# Patient Record
Sex: FEMALE | Race: WHITE | Employment: UNEMPLOYED
[De-identification: names, ages, dates, MRNs, and addresses within clinical notes are randomized per-mention and may not be internally consistent; named-entity substitution may affect disease eponyms.]

---

## 2017-01-03 RX ORDER — METFORMIN HYDROCHLORIDE 500 MG/1
TABLET, EXTENDED RELEASE ORAL
Qty: 120 TABLET | Refills: 0 | Status: SHIPPED | OUTPATIENT
Start: 2017-01-03 | End: 2017-01-10 | Stop reason: SDUPTHER

## 2017-01-10 RX ORDER — METFORMIN HYDROCHLORIDE 500 MG/1
TABLET, EXTENDED RELEASE ORAL
Qty: 120 TABLET | Refills: 5 | Status: SHIPPED | OUTPATIENT
Start: 2017-01-10 | End: 2017-09-06 | Stop reason: SDUPTHER

## 2017-01-24 ENCOUNTER — OFFICE VISIT (OUTPATIENT)
Dept: INTERNAL MEDICINE | Facility: CLINIC | Age: 57
End: 2017-01-24

## 2017-01-24 VITALS
SYSTOLIC BLOOD PRESSURE: 120 MMHG | RESPIRATION RATE: 16 BRPM | WEIGHT: 250.2 LBS | HEIGHT: 62 IN | BODY MASS INDEX: 46.04 KG/M2 | DIASTOLIC BLOOD PRESSURE: 58 MMHG | HEART RATE: 88 BPM

## 2017-01-24 DIAGNOSIS — N18.2 CONTROLLED TYPE 2 DIABETES MELLITUS WITH STAGE 2 CHRONIC KIDNEY DISEASE, WITHOUT LONG-TERM CURRENT USE OF INSULIN (HCC): ICD-10-CM

## 2017-01-24 DIAGNOSIS — Z23 NEED FOR PROPHYLACTIC VACCINATION AGAINST STREPTOCOCCUS PNEUMONIAE (PNEUMOCOCCUS): ICD-10-CM

## 2017-01-24 DIAGNOSIS — R07.2 PRECORDIAL PAIN: Primary | ICD-10-CM

## 2017-01-24 DIAGNOSIS — I10 ESSENTIAL HYPERTENSION: ICD-10-CM

## 2017-01-24 DIAGNOSIS — E11.22 CONTROLLED TYPE 2 DIABETES MELLITUS WITH STAGE 2 CHRONIC KIDNEY DISEASE, WITHOUT LONG-TERM CURRENT USE OF INSULIN (HCC): ICD-10-CM

## 2017-01-24 PROCEDURE — 90471 IMMUNIZATION ADMIN: CPT | Performed by: INTERNAL MEDICINE

## 2017-01-24 PROCEDURE — 99214 OFFICE O/P EST MOD 30 MIN: CPT | Performed by: INTERNAL MEDICINE

## 2017-01-24 PROCEDURE — 90732 PPSV23 VACC 2 YRS+ SUBQ/IM: CPT | Performed by: INTERNAL MEDICINE

## 2017-01-24 RX ORDER — OXYCODONE HYDROCHLORIDE AND ACETAMINOPHEN 5; 325 MG/1; MG/1
1 TABLET ORAL EVERY 12 HOURS PRN
Qty: 50 TABLET | Refills: 0 | Status: SHIPPED | OUTPATIENT
Start: 2017-01-24 | End: 2017-03-02 | Stop reason: SDUPTHER

## 2017-01-24 RX ORDER — ISOSORBIDE MONONITRATE 30 MG/1
30 TABLET, EXTENDED RELEASE ORAL DAILY
Qty: 30 TABLET | Refills: 5 | Status: SHIPPED | OUTPATIENT
Start: 2017-01-24 | End: 2017-07-27 | Stop reason: SDUPTHER

## 2017-01-24 RX ORDER — ISOSORBIDE MONONITRATE 30 MG/1
30 TABLET, EXTENDED RELEASE ORAL DAILY
COMMUNITY
Start: 2017-01-16 | End: 2017-01-24 | Stop reason: SDUPTHER

## 2017-01-24 ASSESSMENT — ENCOUNTER SYMPTOMS
SHORTNESS OF BREATH: 0
COUGH: 0
HEMOPTYSIS: 0
ORTHOPNEA: 0
ABDOMINAL PAIN: 0

## 2017-02-16 ENCOUNTER — OFFICE VISIT (OUTPATIENT)
Dept: INTERNAL MEDICINE | Facility: CLINIC | Age: 57
End: 2017-02-16

## 2017-02-16 VITALS
HEIGHT: 62 IN | DIASTOLIC BLOOD PRESSURE: 56 MMHG | TEMPERATURE: 98.1 F | RESPIRATION RATE: 18 BRPM | HEART RATE: 89 BPM | SYSTOLIC BLOOD PRESSURE: 108 MMHG | BODY MASS INDEX: 46.22 KG/M2 | OXYGEN SATURATION: 95 % | WEIGHT: 251.2 LBS

## 2017-02-16 DIAGNOSIS — E11.22 CONTROLLED TYPE 2 DIABETES MELLITUS WITH STAGE 2 CHRONIC KIDNEY DISEASE, WITHOUT LONG-TERM CURRENT USE OF INSULIN (HCC): Primary | ICD-10-CM

## 2017-02-16 DIAGNOSIS — N18.2 CONTROLLED TYPE 2 DIABETES MELLITUS WITH STAGE 2 CHRONIC KIDNEY DISEASE, WITHOUT LONG-TERM CURRENT USE OF INSULIN (HCC): Primary | ICD-10-CM

## 2017-02-16 DIAGNOSIS — J20.9 ACUTE BRONCHITIS, UNSPECIFIED ORGANISM: ICD-10-CM

## 2017-02-16 LAB — HBA1C MFR BLD: 7.3 %

## 2017-02-16 PROCEDURE — 99213 OFFICE O/P EST LOW 20 MIN: CPT | Performed by: INTERNAL MEDICINE

## 2017-02-16 PROCEDURE — 83036 HEMOGLOBIN GLYCOSYLATED A1C: CPT | Performed by: INTERNAL MEDICINE

## 2017-02-16 RX ORDER — CEFUROXIME AXETIL 500 MG/1
500 TABLET ORAL 2 TIMES DAILY
Qty: 20 TABLET | Refills: 0 | Status: SHIPPED | OUTPATIENT
Start: 2017-02-16 | End: 2017-02-26

## 2017-02-16 RX ORDER — BENZONATATE 100 MG/1
100 CAPSULE ORAL 3 TIMES DAILY PRN
Qty: 30 CAPSULE | Refills: 1 | Status: SHIPPED | OUTPATIENT
Start: 2017-02-16 | End: 2017-02-23

## 2017-02-16 RX ORDER — LANCETS 30 GAUGE
EACH MISCELLANEOUS
Qty: 120 EACH | Refills: 3 | Status: SHIPPED | OUTPATIENT
Start: 2017-02-16 | End: 2019-09-09 | Stop reason: SDUPTHER

## 2017-02-16 ASSESSMENT — ENCOUNTER SYMPTOMS
RHINORRHEA: 0
SORE THROAT: 0
SHORTNESS OF BREATH: 1
WHEEZING: 0
HEMOPTYSIS: 0
HEARTBURN: 0
COUGH: 1

## 2017-02-22 ENCOUNTER — OFFICE VISIT (OUTPATIENT)
Dept: PODIATRY | Facility: CLINIC | Age: 57
End: 2017-02-22

## 2017-02-22 VITALS
DIASTOLIC BLOOD PRESSURE: 82 MMHG | HEART RATE: 77 BPM | HEIGHT: 66 IN | BODY MASS INDEX: 40.18 KG/M2 | WEIGHT: 250 LBS | SYSTOLIC BLOOD PRESSURE: 130 MMHG | RESPIRATION RATE: 19 BRPM

## 2017-02-22 DIAGNOSIS — B35.1 DERMATOPHYTOSIS OF NAIL: Primary | ICD-10-CM

## 2017-02-22 DIAGNOSIS — E11.51 TYPE II DIABETES MELLITUS WITH PERIPHERAL CIRCULATORY DISORDER (HCC): ICD-10-CM

## 2017-02-22 PROCEDURE — 11721 DEBRIDE NAIL 6 OR MORE: CPT | Performed by: PODIATRIST

## 2017-03-01 RX ORDER — GLIMEPIRIDE 4 MG/1
TABLET ORAL
Qty: 180 TABLET | Refills: 0 | Status: SHIPPED | OUTPATIENT
Start: 2017-03-01 | End: 2017-05-31 | Stop reason: SDUPTHER

## 2017-03-02 ENCOUNTER — OFFICE VISIT (OUTPATIENT)
Dept: INTERNAL MEDICINE | Facility: CLINIC | Age: 57
End: 2017-03-02

## 2017-03-02 ENCOUNTER — HOSPITAL ENCOUNTER (OUTPATIENT)
Age: 57
Setting detail: SPECIMEN
Discharge: HOME | End: 2017-03-02
Payer: MEDICARE

## 2017-03-02 VITALS
WEIGHT: 252.8 LBS | HEIGHT: 62 IN | DIASTOLIC BLOOD PRESSURE: 46 MMHG | SYSTOLIC BLOOD PRESSURE: 112 MMHG | BODY MASS INDEX: 46.52 KG/M2 | HEART RATE: 80 BPM | RESPIRATION RATE: 16 BRPM

## 2017-03-02 DIAGNOSIS — Z86.718 HISTORY OF DVT OF LOWER EXTREMITY: ICD-10-CM

## 2017-03-02 DIAGNOSIS — Z79.01 ENCOUNTER FOR MONITORING COUMADIN THERAPY: ICD-10-CM

## 2017-03-02 DIAGNOSIS — E11.22 CONTROLLED TYPE 2 DIABETES MELLITUS WITH STAGE 2 CHRONIC KIDNEY DISEASE, WITHOUT LONG-TERM CURRENT USE OF INSULIN (HCC): Primary | ICD-10-CM

## 2017-03-02 DIAGNOSIS — I10 ESSENTIAL HYPERTENSION: ICD-10-CM

## 2017-03-02 DIAGNOSIS — E78.00 PURE HYPERCHOLESTEROLEMIA: ICD-10-CM

## 2017-03-02 DIAGNOSIS — M15.9 PRIMARY OSTEOARTHRITIS INVOLVING MULTIPLE JOINTS: ICD-10-CM

## 2017-03-02 DIAGNOSIS — Z51.81 ENCOUNTER FOR MONITORING COUMADIN THERAPY: ICD-10-CM

## 2017-03-02 DIAGNOSIS — N18.2 CONTROLLED TYPE 2 DIABETES MELLITUS WITH STAGE 2 CHRONIC KIDNEY DISEASE, WITHOUT LONG-TERM CURRENT USE OF INSULIN (HCC): Primary | ICD-10-CM

## 2017-03-02 LAB
INR BLD: 3.2
PROTHROMBIN TIME: 35 SEC (ref 9.4–12.6)

## 2017-03-02 PROCEDURE — 99214 OFFICE O/P EST MOD 30 MIN: CPT | Performed by: INTERNAL MEDICINE

## 2017-03-02 RX ORDER — OXYCODONE HYDROCHLORIDE AND ACETAMINOPHEN 5; 325 MG/1; MG/1
1 TABLET ORAL EVERY 12 HOURS PRN
Qty: 50 TABLET | Refills: 0 | Status: SHIPPED | OUTPATIENT
Start: 2017-03-02 | End: 2017-04-11 | Stop reason: SDUPTHER

## 2017-03-02 ASSESSMENT — ENCOUNTER SYMPTOMS
BACK PAIN: 0
VISUAL CHANGE: 0
ABDOMINAL PAIN: 0
WHEEZING: 0
BLURRED VISION: 0
SHORTNESS OF BREATH: 0

## 2017-03-06 ENCOUNTER — ANTI-COAG VISIT (OUTPATIENT)
Dept: INTERNAL MEDICINE | Facility: CLINIC | Age: 57
End: 2017-03-06

## 2017-03-29 ENCOUNTER — OFFICE VISIT (OUTPATIENT)
Dept: PRIMARY CARE CLINIC | Age: 57
End: 2017-03-29
Payer: MEDICARE

## 2017-03-29 VITALS
RESPIRATION RATE: 20 BRPM | SYSTOLIC BLOOD PRESSURE: 130 MMHG | DIASTOLIC BLOOD PRESSURE: 80 MMHG | WEIGHT: 254.2 LBS | BODY MASS INDEX: 45.04 KG/M2 | HEIGHT: 63 IN | HEART RATE: 80 BPM

## 2017-03-29 DIAGNOSIS — R06.02 SOB (SHORTNESS OF BREATH): ICD-10-CM

## 2017-03-29 DIAGNOSIS — R05.9 COUGH: ICD-10-CM

## 2017-03-29 DIAGNOSIS — M10.072 ACUTE IDIOPATHIC GOUT OF LEFT FOOT: ICD-10-CM

## 2017-03-29 DIAGNOSIS — J01.40 ACUTE NON-RECURRENT PANSINUSITIS: Primary | ICD-10-CM

## 2017-03-29 DIAGNOSIS — M10.071 ACUTE IDIOPATHIC GOUT OF RIGHT FOOT: ICD-10-CM

## 2017-03-29 PROCEDURE — 99213 OFFICE O/P EST LOW 20 MIN: CPT | Performed by: INTERNAL MEDICINE

## 2017-03-29 RX ORDER — AZITHROMYCIN 250 MG/1
TABLET, FILM COATED ORAL
Qty: 1 PACKET | Refills: 0 | Status: SHIPPED | OUTPATIENT
Start: 2017-03-29 | End: 2017-04-11 | Stop reason: ALTCHOICE

## 2017-03-29 RX ORDER — PREDNISONE 20 MG/1
20 TABLET ORAL DAILY
Qty: 7 TABLET | Refills: 0 | Status: SHIPPED | OUTPATIENT
Start: 2017-03-29 | End: 2017-04-05

## 2017-03-29 ASSESSMENT — ENCOUNTER SYMPTOMS
ABDOMINAL PAIN: 0
SHORTNESS OF BREATH: 1
EYE REDNESS: 0
BACK PAIN: 0
VOMITING: 0
COUGH: 1
TROUBLE SWALLOWING: 0
SINUS PRESSURE: 1
EYE DISCHARGE: 0
NAUSEA: 0
SORE THROAT: 1

## 2017-03-30 ENCOUNTER — TELEPHONE (OUTPATIENT)
Dept: PRIMARY CARE CLINIC | Age: 57
End: 2017-03-30

## 2017-03-31 ENCOUNTER — TELEPHONE (OUTPATIENT)
Dept: PRIMARY CARE CLINIC | Age: 57
End: 2017-03-31

## 2017-04-03 RX ORDER — FEBUXOSTAT 40 MG/1
TABLET ORAL
Qty: 30 TABLET | Refills: 0 | Status: SHIPPED | OUTPATIENT
Start: 2017-04-03 | End: 2017-08-30 | Stop reason: SDUPTHER

## 2017-04-11 ENCOUNTER — OFFICE VISIT (OUTPATIENT)
Dept: PRIMARY CARE CLINIC | Age: 57
End: 2017-04-11
Payer: MEDICARE

## 2017-04-11 VITALS
DIASTOLIC BLOOD PRESSURE: 58 MMHG | HEART RATE: 98 BPM | RESPIRATION RATE: 16 BRPM | BODY MASS INDEX: 44.69 KG/M2 | TEMPERATURE: 98.5 F | WEIGHT: 252.2 LBS | SYSTOLIC BLOOD PRESSURE: 124 MMHG | HEIGHT: 63 IN

## 2017-04-11 DIAGNOSIS — N18.2 CONTROLLED TYPE 2 DIABETES MELLITUS WITH STAGE 2 CHRONIC KIDNEY DISEASE, WITHOUT LONG-TERM CURRENT USE OF INSULIN (HCC): ICD-10-CM

## 2017-04-11 DIAGNOSIS — I10 ESSENTIAL HYPERTENSION: ICD-10-CM

## 2017-04-11 DIAGNOSIS — J20.8 ACUTE BACTERIAL BRONCHITIS: Primary | ICD-10-CM

## 2017-04-11 DIAGNOSIS — E11.22 CONTROLLED TYPE 2 DIABETES MELLITUS WITH STAGE 2 CHRONIC KIDNEY DISEASE, WITHOUT LONG-TERM CURRENT USE OF INSULIN (HCC): ICD-10-CM

## 2017-04-11 DIAGNOSIS — B96.89 ACUTE BACTERIAL BRONCHITIS: Primary | ICD-10-CM

## 2017-04-11 PROCEDURE — 99213 OFFICE O/P EST LOW 20 MIN: CPT | Performed by: INTERNAL MEDICINE

## 2017-04-11 RX ORDER — AMOXICILLIN AND CLAVULANATE POTASSIUM 875; 125 MG/1; MG/1
1 TABLET, FILM COATED ORAL 2 TIMES DAILY
Qty: 20 TABLET | Refills: 0 | Status: SHIPPED | OUTPATIENT
Start: 2017-04-11 | End: 2017-04-21

## 2017-04-11 RX ORDER — BENZONATATE 100 MG/1
100 CAPSULE ORAL 3 TIMES DAILY PRN
Qty: 30 CAPSULE | Refills: 1 | Status: SHIPPED | OUTPATIENT
Start: 2017-04-11 | End: 2017-04-18

## 2017-04-11 RX ORDER — OXYCODONE HYDROCHLORIDE AND ACETAMINOPHEN 5; 325 MG/1; MG/1
1 TABLET ORAL EVERY 12 HOURS PRN
Qty: 50 TABLET | Refills: 0 | Status: SHIPPED | OUTPATIENT
Start: 2017-04-11 | End: 2017-05-11 | Stop reason: SDUPTHER

## 2017-04-11 ASSESSMENT — ENCOUNTER SYMPTOMS
ABDOMINAL PAIN: 0
WHEEZING: 1
SHORTNESS OF BREATH: 1
SORE THROAT: 0
DIARRHEA: 0
HEMOPTYSIS: 0
COUGH: 1

## 2017-05-03 ENCOUNTER — OFFICE VISIT (OUTPATIENT)
Dept: PODIATRY | Age: 57
End: 2017-05-03
Payer: MEDICARE

## 2017-05-03 VITALS
WEIGHT: 250 LBS | HEART RATE: 90 BPM | SYSTOLIC BLOOD PRESSURE: 137 MMHG | BODY MASS INDEX: 40.18 KG/M2 | HEIGHT: 66 IN | DIASTOLIC BLOOD PRESSURE: 84 MMHG

## 2017-05-03 DIAGNOSIS — B35.1 DERMATOPHYTOSIS OF NAIL: ICD-10-CM

## 2017-05-03 DIAGNOSIS — E11.51 TYPE II DIABETES MELLITUS WITH PERIPHERAL CIRCULATORY DISORDER (HCC): Primary | ICD-10-CM

## 2017-05-03 PROCEDURE — 11721 DEBRIDE NAIL 6 OR MORE: CPT | Performed by: PODIATRIST

## 2017-05-03 RX ORDER — HYDRALAZINE HYDROCHLORIDE 50 MG/1
TABLET, FILM COATED ORAL
Qty: 180 TABLET | Refills: 0 | Status: SHIPPED | OUTPATIENT
Start: 2017-05-03 | End: 2017-07-27 | Stop reason: SDUPTHER

## 2017-05-03 RX ORDER — FEBUXOSTAT 40 MG/1
40 TABLET, FILM COATED ORAL DAILY
Qty: 30 TABLET | Refills: 3 | Status: SHIPPED | OUTPATIENT
Start: 2017-05-03 | End: 2017-10-16 | Stop reason: SDUPTHER

## 2017-05-03 RX ORDER — WARFARIN SODIUM 2 MG/1
TABLET ORAL
Qty: 45 TABLET | Refills: 0 | Status: SHIPPED | OUTPATIENT
Start: 2017-05-03 | End: 2017-05-31 | Stop reason: SDUPTHER

## 2017-05-11 RX ORDER — OXYCODONE HYDROCHLORIDE AND ACETAMINOPHEN 5; 325 MG/1; MG/1
1 TABLET ORAL EVERY 12 HOURS PRN
Qty: 50 TABLET | Refills: 0 | Status: SHIPPED | OUTPATIENT
Start: 2017-05-11 | End: 2017-06-21 | Stop reason: SDUPTHER

## 2017-05-15 ENCOUNTER — HOSPITAL ENCOUNTER (OUTPATIENT)
Age: 57
Setting detail: SPECIMEN
Discharge: HOME | End: 2017-05-15
Payer: MEDICARE

## 2017-05-15 ENCOUNTER — TELEPHONE (OUTPATIENT)
Dept: PRIMARY CARE CLINIC | Age: 57
End: 2017-05-15

## 2017-05-15 DIAGNOSIS — Z86.718 HISTORY OF DVT (DEEP VEIN THROMBOSIS): ICD-10-CM

## 2017-05-15 DIAGNOSIS — Z86.718 HISTORY OF DVT (DEEP VEIN THROMBOSIS): Primary | ICD-10-CM

## 2017-05-15 LAB
INR BLD: 2.7
PROTHROMBIN TIME: 29.7 SEC (ref 9.4–12.6)

## 2017-05-16 DIAGNOSIS — Z86.718 HISTORY OF DVT OF LOWER EXTREMITY: Primary | ICD-10-CM

## 2017-05-31 RX ORDER — WARFARIN SODIUM 2 MG/1
TABLET ORAL
Qty: 45 TABLET | Refills: 0 | Status: SHIPPED | OUTPATIENT
Start: 2017-05-31 | End: 2017-06-28 | Stop reason: SDUPTHER

## 2017-05-31 RX ORDER — GLIMEPIRIDE 4 MG/1
TABLET ORAL
Qty: 180 TABLET | Refills: 0 | Status: SHIPPED | OUTPATIENT
Start: 2017-05-31 | End: 2017-08-30 | Stop reason: SDUPTHER

## 2017-06-01 ENCOUNTER — TELEPHONE (OUTPATIENT)
Dept: PRIMARY CARE CLINIC | Age: 57
End: 2017-06-01

## 2017-06-02 ENCOUNTER — ANTI-COAG VISIT (OUTPATIENT)
Dept: PRIMARY CARE CLINIC | Age: 57
End: 2017-06-02

## 2017-06-21 RX ORDER — OXYCODONE HYDROCHLORIDE AND ACETAMINOPHEN 5; 325 MG/1; MG/1
1 TABLET ORAL EVERY 12 HOURS PRN
Qty: 50 TABLET | Refills: 0 | Status: SHIPPED | OUTPATIENT
Start: 2017-06-21 | End: 2017-07-27 | Stop reason: SDUPTHER

## 2017-06-27 ENCOUNTER — OFFICE VISIT (OUTPATIENT)
Dept: PRIMARY CARE CLINIC | Age: 57
End: 2017-06-27
Payer: MEDICARE

## 2017-06-27 VITALS
DIASTOLIC BLOOD PRESSURE: 70 MMHG | HEIGHT: 63 IN | WEIGHT: 250 LBS | RESPIRATION RATE: 14 BRPM | BODY MASS INDEX: 44.3 KG/M2 | SYSTOLIC BLOOD PRESSURE: 120 MMHG | HEART RATE: 88 BPM

## 2017-06-27 DIAGNOSIS — I10 ESSENTIAL HYPERTENSION: ICD-10-CM

## 2017-06-27 DIAGNOSIS — E78.00 PURE HYPERCHOLESTEROLEMIA: ICD-10-CM

## 2017-06-27 DIAGNOSIS — E11.65 TYPE 2 DIABETES MELLITUS WITH HYPERGLYCEMIA, WITHOUT LONG-TERM CURRENT USE OF INSULIN (HCC): Primary | ICD-10-CM

## 2017-06-27 LAB — HBA1C MFR BLD: 7.3 %

## 2017-06-27 PROCEDURE — 83036 HEMOGLOBIN GLYCOSYLATED A1C: CPT | Performed by: INTERNAL MEDICINE

## 2017-06-27 PROCEDURE — 99214 OFFICE O/P EST MOD 30 MIN: CPT | Performed by: INTERNAL MEDICINE

## 2017-06-27 ASSESSMENT — ENCOUNTER SYMPTOMS
WHEEZING: 0
VISUAL CHANGE: 0
SHORTNESS OF BREATH: 0
CHEST TIGHTNESS: 0
ABDOMINAL PAIN: 0
DIARRHEA: 0

## 2017-06-27 ASSESSMENT — PATIENT HEALTH QUESTIONNAIRE - PHQ9
SUM OF ALL RESPONSES TO PHQ9 QUESTIONS 1 & 2: 0
SUM OF ALL RESPONSES TO PHQ QUESTIONS 1-9: 0
2. FEELING DOWN, DEPRESSED OR HOPELESS: 0
1. LITTLE INTEREST OR PLEASURE IN DOING THINGS: 0

## 2017-06-28 RX ORDER — GEMFIBROZIL 600 MG/1
TABLET, FILM COATED ORAL
Qty: 60 TABLET | Refills: 0 | Status: SHIPPED | OUTPATIENT
Start: 2017-06-28 | End: 2017-07-27 | Stop reason: SDUPTHER

## 2017-06-28 RX ORDER — FUROSEMIDE 20 MG/1
TABLET ORAL
Qty: 30 TABLET | Refills: 0 | Status: SHIPPED | OUTPATIENT
Start: 2017-06-28 | End: 2017-07-27 | Stop reason: SDUPTHER

## 2017-06-28 RX ORDER — ATORVASTATIN CALCIUM 20 MG/1
TABLET, FILM COATED ORAL
Qty: 30 TABLET | Refills: 0 | Status: SHIPPED | OUTPATIENT
Start: 2017-06-28 | End: 2017-07-27 | Stop reason: SDUPTHER

## 2017-06-28 RX ORDER — OMEPRAZOLE 20 MG/1
CAPSULE, DELAYED RELEASE ORAL
Qty: 30 CAPSULE | Refills: 0 | Status: SHIPPED | OUTPATIENT
Start: 2017-06-28 | End: 2017-07-27 | Stop reason: SDUPTHER

## 2017-06-28 RX ORDER — WARFARIN SODIUM 2 MG/1
TABLET ORAL
Qty: 45 TABLET | Refills: 0 | Status: SHIPPED | OUTPATIENT
Start: 2017-06-28 | End: 2017-09-06 | Stop reason: SDUPTHER

## 2017-06-28 RX ORDER — LISINOPRIL 20 MG/1
TABLET ORAL
Qty: 30 TABLET | Refills: 0 | Status: SHIPPED | OUTPATIENT
Start: 2017-06-28 | End: 2017-07-27 | Stop reason: SDUPTHER

## 2017-07-27 ENCOUNTER — OFFICE VISIT (OUTPATIENT)
Dept: PRIMARY CARE CLINIC | Age: 57
End: 2017-07-27
Payer: MEDICARE

## 2017-07-27 ENCOUNTER — HOSPITAL ENCOUNTER (OUTPATIENT)
Age: 57
Setting detail: SPECIMEN
Discharge: HOME | End: 2017-07-27
Payer: MEDICARE

## 2017-07-27 VITALS
RESPIRATION RATE: 16 BRPM | HEIGHT: 63 IN | WEIGHT: 253 LBS | HEART RATE: 86 BPM | DIASTOLIC BLOOD PRESSURE: 72 MMHG | BODY MASS INDEX: 44.83 KG/M2 | SYSTOLIC BLOOD PRESSURE: 124 MMHG

## 2017-07-27 DIAGNOSIS — E78.00 PURE HYPERCHOLESTEROLEMIA: ICD-10-CM

## 2017-07-27 DIAGNOSIS — Z51.81 ENCOUNTER FOR THERAPEUTIC DRUG MONITORING: ICD-10-CM

## 2017-07-27 DIAGNOSIS — M15.9 PRIMARY OSTEOARTHRITIS INVOLVING MULTIPLE JOINTS: ICD-10-CM

## 2017-07-27 DIAGNOSIS — E66.01 MORBID OBESITY DUE TO EXCESS CALORIES (HCC): ICD-10-CM

## 2017-07-27 DIAGNOSIS — Z86.718 HISTORY OF DVT (DEEP VEIN THROMBOSIS): ICD-10-CM

## 2017-07-27 DIAGNOSIS — Z51.81 ENCOUNTER FOR THERAPEUTIC DRUG MONITORING: Primary | ICD-10-CM

## 2017-07-27 DIAGNOSIS — I10 ESSENTIAL HYPERTENSION: ICD-10-CM

## 2017-07-27 LAB
AMPHETAMINE SCREEN URINE: NEGATIVE
BARBITURATE SCREEN URINE: NEGATIVE
BENZODIAZEPINE SCREEN, URINE: NEGATIVE
BUPRENORPHINE URINE: NORMAL
CANNABINOID SCREEN URINE: NEGATIVE
COCAINE METABOLITE, URINE: NEGATIVE
INR BLD: 2.7
INR BLD: 2.7
MDMA URINE: NORMAL
METHADONE SCREEN, URINE: NEGATIVE
METHAMPHETAMINE, URINE: NORMAL
OPIATES, URINE: NEGATIVE
OXYCODONE SCREEN URINE: NEGATIVE
PHENCYCLIDINE, URINE: NEGATIVE
PROPOXYPHENE, URINE: NORMAL
PROTHROMBIN TIME: 28.7 SEC (ref 9.4–12.6)
TEST INFORMATION: NORMAL
TRICYCLIC ANTIDEPRESSANTS, UR: NORMAL

## 2017-07-27 PROCEDURE — 99214 OFFICE O/P EST MOD 30 MIN: CPT | Performed by: INTERNAL MEDICINE

## 2017-07-27 RX ORDER — OXYCODONE HYDROCHLORIDE AND ACETAMINOPHEN 5; 325 MG/1; MG/1
1 TABLET ORAL EVERY 12 HOURS PRN
Qty: 50 TABLET | Refills: 0 | Status: SHIPPED | OUTPATIENT
Start: 2017-07-27 | End: 2017-08-30 | Stop reason: SDUPTHER

## 2017-07-27 ASSESSMENT — ENCOUNTER SYMPTOMS
VOICE CHANGE: 0
COLOR CHANGE: 0
DIARRHEA: 0
EYE REDNESS: 0
VOMITING: 0
WHEEZING: 0
ABDOMINAL PAIN: 0
SHORTNESS OF BREATH: 0
NAUSEA: 0
EYE PAIN: 0
BLURRED VISION: 0
CHEST TIGHTNESS: 0
TROUBLE SWALLOWING: 0
BACK PAIN: 1
VISUAL CHANGE: 0

## 2017-07-31 RX ORDER — HYDRALAZINE HYDROCHLORIDE 50 MG/1
TABLET, FILM COATED ORAL
Qty: 180 TABLET | Refills: 0 | Status: SHIPPED | OUTPATIENT
Start: 2017-07-31 | End: 2017-11-07 | Stop reason: SDUPTHER

## 2017-07-31 RX ORDER — LISINOPRIL 20 MG/1
TABLET ORAL
Qty: 30 TABLET | Refills: 0 | Status: SHIPPED | OUTPATIENT
Start: 2017-07-31 | End: 2017-08-30 | Stop reason: SDUPTHER

## 2017-07-31 RX ORDER — ISOSORBIDE MONONITRATE 30 MG/1
TABLET, EXTENDED RELEASE ORAL
Qty: 30 TABLET | Refills: 0 | Status: SHIPPED | OUTPATIENT
Start: 2017-07-31 | End: 2017-09-06 | Stop reason: SDUPTHER

## 2017-07-31 RX ORDER — GEMFIBROZIL 600 MG/1
TABLET, FILM COATED ORAL
Qty: 60 TABLET | Refills: 0 | Status: SHIPPED | OUTPATIENT
Start: 2017-07-31 | End: 2017-09-06 | Stop reason: SDUPTHER

## 2017-07-31 RX ORDER — FUROSEMIDE 20 MG/1
TABLET ORAL
Qty: 30 TABLET | Refills: 0 | Status: SHIPPED | OUTPATIENT
Start: 2017-07-31 | End: 2017-09-06 | Stop reason: SDUPTHER

## 2017-07-31 RX ORDER — OMEPRAZOLE 20 MG/1
CAPSULE, DELAYED RELEASE ORAL
Qty: 30 CAPSULE | Refills: 0 | Status: SHIPPED | OUTPATIENT
Start: 2017-07-31 | End: 2017-09-06 | Stop reason: SDUPTHER

## 2017-07-31 RX ORDER — ATORVASTATIN CALCIUM 20 MG/1
TABLET, FILM COATED ORAL
Qty: 30 TABLET | Refills: 0 | Status: SHIPPED | OUTPATIENT
Start: 2017-07-31 | End: 2017-08-30 | Stop reason: SDUPTHER

## 2017-08-02 ENCOUNTER — ANTI-COAG VISIT (OUTPATIENT)
Dept: PRIMARY CARE CLINIC | Age: 57
End: 2017-08-02

## 2017-08-30 DIAGNOSIS — M10.072 ACUTE IDIOPATHIC GOUT OF LEFT FOOT: ICD-10-CM

## 2017-08-30 DIAGNOSIS — M10.071 ACUTE IDIOPATHIC GOUT OF RIGHT FOOT: ICD-10-CM

## 2017-08-30 RX ORDER — GLIMEPIRIDE 4 MG/1
TABLET ORAL
Qty: 180 TABLET | Refills: 0 | Status: SHIPPED | OUTPATIENT
Start: 2017-08-30 | End: 2017-12-20 | Stop reason: SDUPTHER

## 2017-08-30 RX ORDER — FEBUXOSTAT 40 MG/1
TABLET ORAL
Qty: 30 TABLET | Refills: 3 | Status: SHIPPED | OUTPATIENT
Start: 2017-08-30 | End: 2017-12-24 | Stop reason: SDUPTHER

## 2017-09-01 RX ORDER — OXYCODONE HYDROCHLORIDE AND ACETAMINOPHEN 5; 325 MG/1; MG/1
1 TABLET ORAL EVERY 12 HOURS PRN
Qty: 50 TABLET | Refills: 0 | Status: SHIPPED | OUTPATIENT
Start: 2017-09-01 | End: 2017-09-28 | Stop reason: SDUPTHER

## 2017-09-01 RX ORDER — ATORVASTATIN CALCIUM 20 MG/1
TABLET, FILM COATED ORAL
Qty: 30 TABLET | Refills: 5 | Status: SHIPPED | OUTPATIENT
Start: 2017-09-01 | End: 2017-10-19 | Stop reason: SDUPTHER

## 2017-09-01 RX ORDER — LISINOPRIL 20 MG/1
TABLET ORAL
Qty: 30 TABLET | Refills: 5 | Status: SHIPPED | OUTPATIENT
Start: 2017-09-01 | End: 2018-03-08 | Stop reason: SDUPTHER

## 2017-09-06 RX ORDER — GEMFIBROZIL 600 MG/1
TABLET, FILM COATED ORAL
Qty: 60 TABLET | Refills: 1 | Status: SHIPPED | OUTPATIENT
Start: 2017-09-06 | End: 2017-10-19 | Stop reason: ALTCHOICE

## 2017-09-06 RX ORDER — ISOSORBIDE MONONITRATE 30 MG/1
TABLET, EXTENDED RELEASE ORAL
Qty: 30 TABLET | Refills: 1 | Status: SHIPPED | OUTPATIENT
Start: 2017-09-06 | End: 2017-11-04 | Stop reason: SDUPTHER

## 2017-09-06 RX ORDER — METFORMIN HYDROCHLORIDE 500 MG/1
TABLET, EXTENDED RELEASE ORAL
Qty: 120 TABLET | Refills: 1 | Status: SHIPPED | OUTPATIENT
Start: 2017-09-06 | End: 2017-11-04 | Stop reason: SDUPTHER

## 2017-09-06 RX ORDER — FUROSEMIDE 20 MG/1
TABLET ORAL
Qty: 30 TABLET | Refills: 1 | Status: SHIPPED | OUTPATIENT
Start: 2017-09-06 | End: 2017-11-04 | Stop reason: SDUPTHER

## 2017-09-06 RX ORDER — OMEPRAZOLE 20 MG/1
CAPSULE, DELAYED RELEASE ORAL
Qty: 30 CAPSULE | Refills: 1 | Status: SHIPPED | OUTPATIENT
Start: 2017-09-06 | End: 2017-11-04 | Stop reason: SDUPTHER

## 2017-09-06 RX ORDER — WARFARIN SODIUM 2 MG/1
TABLET ORAL
Qty: 45 TABLET | Refills: 1 | Status: SHIPPED | OUTPATIENT
Start: 2017-09-06 | End: 2017-11-04 | Stop reason: SDUPTHER

## 2017-09-25 ENCOUNTER — OFFICE VISIT (OUTPATIENT)
Dept: PODIATRY | Age: 57
End: 2017-09-25
Payer: MEDICARE

## 2017-09-25 VITALS
SYSTOLIC BLOOD PRESSURE: 116 MMHG | HEIGHT: 66 IN | RESPIRATION RATE: 19 BRPM | BODY MASS INDEX: 40.18 KG/M2 | HEART RATE: 85 BPM | DIASTOLIC BLOOD PRESSURE: 82 MMHG | WEIGHT: 250 LBS

## 2017-09-25 DIAGNOSIS — L30.9 DERMATITIS: ICD-10-CM

## 2017-09-25 DIAGNOSIS — E11.51 TYPE II DIABETES MELLITUS WITH PERIPHERAL CIRCULATORY DISORDER (HCC): Primary | ICD-10-CM

## 2017-09-25 DIAGNOSIS — B35.1 DERMATOPHYTOSIS OF NAIL: ICD-10-CM

## 2017-09-25 DIAGNOSIS — I73.9 PVD (PERIPHERAL VASCULAR DISEASE) (HCC): ICD-10-CM

## 2017-09-25 PROCEDURE — 99213 OFFICE O/P EST LOW 20 MIN: CPT | Performed by: PODIATRIST

## 2017-09-25 PROCEDURE — 11721 DEBRIDE NAIL 6 OR MORE: CPT | Performed by: PODIATRIST

## 2017-09-25 RX ORDER — AMMONIUM LACTATE 12 G/100G
LOTION TOPICAL
Qty: 1 BOTTLE | Refills: 7 | Status: SHIPPED | OUTPATIENT
Start: 2017-09-25 | End: 2018-07-25 | Stop reason: ALTCHOICE

## 2017-10-03 ENCOUNTER — HOSPITAL ENCOUNTER (OUTPATIENT)
Age: 57
Setting detail: SPECIMEN
Discharge: HOME | End: 2017-10-03
Payer: MEDICARE

## 2017-10-03 DIAGNOSIS — N18.30 CHRONIC RENAL FAILURE, STAGE 3 (MODERATE) (HCC): ICD-10-CM

## 2017-10-03 DIAGNOSIS — Z86.718 HISTORY OF DVT (DEEP VEIN THROMBOSIS): ICD-10-CM

## 2017-10-03 LAB
ANION GAP SERPL CALCULATED.3IONS-SCNC: 17 MMOL/L (ref 9–17)
BUN BLDV-MCNC: 18 MG/DL (ref 6–20)
BUN/CREAT BLD: ABNORMAL (ref 9–20)
CALCIUM SERPL-MCNC: 9.4 MG/DL (ref 8.6–10.4)
CHLORIDE BLD-SCNC: 94 MMOL/L (ref 98–107)
CO2: 24 MMOL/L (ref 20–31)
CREAT SERPL-MCNC: 1.28 MG/DL (ref 0.5–0.9)
GFR AFRICAN AMERICAN: 52 ML/MIN
GFR NON-AFRICAN AMERICAN: 43 ML/MIN
GFR SERPL CREATININE-BSD FRML MDRD: ABNORMAL ML/MIN/{1.73_M2}
GFR SERPL CREATININE-BSD FRML MDRD: ABNORMAL ML/MIN/{1.73_M2}
GLUCOSE BLD-MCNC: 103 MG/DL (ref 70–99)
INR BLD: 1.6
POTASSIUM SERPL-SCNC: 4.9 MMOL/L (ref 3.7–5.3)
PROTHROMBIN TIME: 17.5 SEC (ref 9.4–12.6)
SODIUM BLD-SCNC: 135 MMOL/L (ref 135–144)

## 2017-10-03 RX ORDER — OXYCODONE HYDROCHLORIDE AND ACETAMINOPHEN 5; 325 MG/1; MG/1
TABLET ORAL
Qty: 50 TABLET | Refills: 0 | Status: SHIPPED | OUTPATIENT
Start: 2017-10-03 | End: 2017-11-04 | Stop reason: SDUPTHER

## 2017-10-04 ENCOUNTER — ANTI-COAG VISIT (OUTPATIENT)
Dept: PRIMARY CARE CLINIC | Age: 57
End: 2017-10-04

## 2017-10-10 ENCOUNTER — HOSPITAL ENCOUNTER (OUTPATIENT)
Age: 57
Setting detail: SPECIMEN
Discharge: HOME | End: 2017-10-10
Payer: MEDICARE

## 2017-10-10 DIAGNOSIS — Z86.718 HISTORY OF DVT (DEEP VEIN THROMBOSIS): ICD-10-CM

## 2017-10-10 LAB
INR BLD: 2
PROTHROMBIN TIME: 21.3 SEC (ref 9.4–12.6)

## 2017-10-11 ENCOUNTER — ANTI-COAG VISIT (OUTPATIENT)
Dept: PRIMARY CARE CLINIC | Age: 57
End: 2017-10-11

## 2017-10-11 NOTE — PROGRESS NOTES
Please advise of new dosing if any and when to re-check, please complete anti coag track    INR- 2. Continue current dose, 3mg daily. Recheck in one week. If therapeutic will increase length of time for recheck.  thanks

## 2017-10-16 ENCOUNTER — HOSPITAL ENCOUNTER (OUTPATIENT)
Age: 57
Setting detail: SPECIMEN
Discharge: HOME | End: 2017-10-16
Payer: MEDICARE

## 2017-10-16 ENCOUNTER — OFFICE VISIT (OUTPATIENT)
Dept: FAMILY MEDICINE CLINIC | Age: 57
End: 2017-10-16
Payer: MEDICARE

## 2017-10-16 VITALS
HEIGHT: 62 IN | WEIGHT: 242.8 LBS | TEMPERATURE: 97.8 F | RESPIRATION RATE: 20 BRPM | DIASTOLIC BLOOD PRESSURE: 76 MMHG | BODY MASS INDEX: 44.68 KG/M2 | SYSTOLIC BLOOD PRESSURE: 112 MMHG | HEART RATE: 88 BPM

## 2017-10-16 DIAGNOSIS — Z12.39 BREAST CANCER SCREENING: ICD-10-CM

## 2017-10-16 DIAGNOSIS — M15.9 PRIMARY OSTEOARTHRITIS INVOLVING MULTIPLE JOINTS: ICD-10-CM

## 2017-10-16 DIAGNOSIS — E78.00 PURE HYPERCHOLESTEROLEMIA: ICD-10-CM

## 2017-10-16 DIAGNOSIS — N18.2 CONTROLLED TYPE 2 DIABETES MELLITUS WITH STAGE 2 CHRONIC KIDNEY DISEASE, WITHOUT LONG-TERM CURRENT USE OF INSULIN (HCC): Primary | ICD-10-CM

## 2017-10-16 DIAGNOSIS — Z86.718 HISTORY OF DVT OF LOWER EXTREMITY: ICD-10-CM

## 2017-10-16 DIAGNOSIS — E11.22 CONTROLLED TYPE 2 DIABETES MELLITUS WITH STAGE 2 CHRONIC KIDNEY DISEASE, WITHOUT LONG-TERM CURRENT USE OF INSULIN (HCC): Primary | ICD-10-CM

## 2017-10-16 DIAGNOSIS — Z76.89 ESTABLISHING CARE WITH NEW DOCTOR, ENCOUNTER FOR: ICD-10-CM

## 2017-10-16 DIAGNOSIS — Z79.891 CHRONIC PRESCRIPTION OPIATE USE: ICD-10-CM

## 2017-10-16 DIAGNOSIS — I10 ESSENTIAL HYPERTENSION: ICD-10-CM

## 2017-10-16 LAB
INTERNATIONAL NORMALIZATION RATIO, POC: 3.1
PROTHROMBIN TIME, POC: 36.9

## 2017-10-16 PROCEDURE — 85610 PROTHROMBIN TIME: CPT | Performed by: NURSE PRACTITIONER

## 2017-10-16 PROCEDURE — 99204 OFFICE O/P NEW MOD 45 MIN: CPT | Performed by: NURSE PRACTITIONER

## 2017-10-16 RX ORDER — INFLUENZA VIRUS VACCINE 15; 15; 15; 15 UG/.5ML; UG/.5ML; UG/.5ML; UG/.5ML
SUSPENSION INTRAMUSCULAR
COMMUNITY
Start: 2017-10-12 | End: 2017-10-16 | Stop reason: ALTCHOICE

## 2017-10-16 ASSESSMENT — ENCOUNTER SYMPTOMS
WHEEZING: 0
SHORTNESS OF BREATH: 0
CHEST TIGHTNESS: 0
BACK PAIN: 1
SINUS PRESSURE: 0
NAUSEA: 0
ABDOMINAL PAIN: 0
EYES NEGATIVE: 1
SORE THROAT: 0
DIARRHEA: 0
CONSTIPATION: 0
COUGH: 0

## 2017-10-16 NOTE — PROGRESS NOTES
Subjective:      Patient ID: Karena Griffin is a 59 y.o. female. Visit Information    Have you changed or started any medications since your last visit including any over-the-counter medicines, vitamins, or herbal medicines? no   Are you having any side effects from any of your medications? -  no  Have you stopped taking any of your medications? Is so, why? -  no    Have you seen any other physician or provider since your last visit? No  Have you had any other diagnostic tests since your last visit? No  Have you been seen in the emergency room and/or had an admission to a hospital since we last saw you? No  Have you had your routine dental cleaning in the past 6 months? No- Dentures     Have you activated your Adpeps account? If not, what are your barriers? Yes     Patient Care Team:  Chase Castle CNP as PCP - General (Family Medicine)    Medical History Review  Past Medical, Family, and Social History reviewed and does contribute to the patient presenting condition    Health Maintenance   Topic Date Due    Diabetic retinal exam  01/01/2016    Lipid screen  11/02/2017    Breast cancer screen  01/05/2018    Diabetic hemoglobin A1C test  02/16/2018    Diabetic foot exam  09/25/2018    Cervical cancer screen  12/29/2018    Colon cancer screen colonoscopy  03/01/2023    DTaP/Tdap/Td vaccine (2 - Td) 08/04/2026    Flu vaccine  Completed    Pneumococcal med risk  Completed    Hepatitis C screen  Addressed    HIV screen  Addressed       Patient presents today to establish care. Overall is doing ok. She was seeing Dr Ollie Esposito but decided to switch to our office as it is closer to her home. She has a past history of diabetes, hyperlipidemia, hypertension, DVT, depression, anxiety, NARCISO, chronic low back pain, osteoarthritis, and CKD. She is due for fasting labs but is not fasting today. She will complete those tomorrow.  She is following with pulmonary (DR Saeid Del Valle) on yearly basis, podiatrist (Dr Snow Vega) every 10 weeks, nephrology (Dr Roma Pritchett) every 3 months, and psychiatry (Naina) every 3 months. She does ask that we prescribe her percocet for her arthritic / back pain. States she takes this as she is unable to take NSAID's d/t her CKD. Using 0.5-1 tablet twice daily as needed. Was receiving 50 tablets which would last 4-6 weeks. This amount was working well for her and she would like to continue. Denies any concerns today. umair         Review of Systems   Constitutional: Negative for activity change, appetite change, fatigue and fever. HENT: Negative for congestion, ear pain, sinus pressure and sore throat. Eyes: Negative. Respiratory: Negative for cough, chest tightness, shortness of breath and wheezing. NARCISO - CPAP nightly   Follows with Dr Melissa Ram yearly   Cardiovascular: Negative for chest pain, palpitations and leg swelling. Gastrointestinal: Negative for abdominal pain, constipation, diarrhea and nausea. Genitourinary: Negative. Postmenopausal - has not had PAP in 2 years  Dr Roma Pritchett every 6 months (nephrology)   Musculoskeletal: Positive for arthralgias (osteoarthritis multiple joints - knees, hips, shoulders, and back) and back pain (lower back pain chronically). Podiatrist every 10 weeks on [facility]. Skin: Negative for rash and wound. Neurological: Negative for dizziness, seizures, syncope, numbness and headaches. Psychiatric/Behavioral: Negative for dysphoric mood, self-injury, sleep disturbance and suicidal ideas. The patient is nervous/anxious. Dr Thao Dotson at [facility] every 3 months - managing her psych medication. Mood well controlled       Objective:   Physical Exam   Constitutional: She is oriented to person, place, and time. She appears well-developed. No distress. obese   HENT:   Head: Atraumatic. Right Ear: External ear normal.   Left Ear: External ear normal.   Nose: Nose normal.   Mouth/Throat: Oropharynx is clear and moist. No oropharyngeal exudate. Eyes: Conjunctivae are normal. Right eye exhibits no discharge. Left eye exhibits no discharge. Neck: Normal range of motion. Neck supple. No JVD present. No thyromegaly present. Cardiovascular: Normal rate, regular rhythm, normal heart sounds and intact distal pulses. No murmur heard. Pulmonary/Chest: Effort normal and breath sounds normal. No respiratory distress. She has no wheezes. She has no rales. She exhibits no tenderness. Abdominal: Soft. Bowel sounds are normal. She exhibits no distension. There is no tenderness. There is no guarding. Lymphadenopathy:     She has no cervical adenopathy. Neurological: She is alert and oriented to person, place, and time. She exhibits normal muscle tone. Skin: Skin is warm and dry. Psychiatric: She has a normal mood and affect. Her behavior is normal. Judgment and thought content normal.   Nursing note and vitals reviewed. Assessment:      1. Controlled type 2 diabetes mellitus with stage 3 chronic kidney disease, without long-term current use of insulin (HCC)  Lipid Panel    Comprehensive Metabolic Panel    Hemoglobin A1C    CBC   2. Essential hypertension  Lipid Panel    Comprehensive Metabolic Panel    Hemoglobin A1C    CBC   3. Pure hypercholesterolemia  Lipid Panel    Comprehensive Metabolic Panel    Hemoglobin A1C    CBC   4. Primary osteoarthritis involving multiple joints     5. Chronic prescription opiate use  DRUG SCREEN, PAIN   6. History of DVT of lower extremity  POCT INR    POCT INR   7. Breast cancer screening  JAIRO DIGITAL SCREEN W CAD BILATERAL   8.  Establishing care with new doctor, encounter for           Plan:       Care established in office today  Urine drug screen today  Check INR - 3.1 - take 2 mg today and 3 mg daily after that, recheck INR in one week   Proceed with mammogram as ordered  Fasting labs tomorrow as ordered  Discussed pain medication with Dr Camille David who is agreeable to our office continuing to prescribe this for

## 2017-10-16 NOTE — PATIENT INSTRUCTIONS
Patient Education        Arthritis: Care Instructions  Your Care Instructions  Arthritis, also called osteoarthritis, is a breakdown of the cartilage that cushions your joints. When the cartilage wears down, your bones rub against each other. This causes pain and stiffness. Many people have some arthritis as they age. Arthritis most often affects the joints of the spine, hands, hips, knees, or feet. You can take simple measures to protect your joints, ease your pain, and help you stay active. Follow-up care is a key part of your treatment and safety. Be sure to make and go to all appointments, and call your doctor if you are having problems. It's also a good idea to know your test results and keep a list of the medicines you take. How can you care for yourself at home? · Stay at a healthy weight. Being overweight puts extra strain on your joints. · Talk to your doctor or physical therapist about exercises that will help ease joint pain. ¨ Stretch. You may enjoy gentle forms of yoga to help keep your joints and muscles flexible. ¨ Walk instead of jog. Other types of exercise that are less stressful on the joints include riding a bicycle, swimming, birgit chi, or water exercise. ¨ Lift weights. Strong muscles help reduce stress on your joints. Stronger thigh muscles, for example, take some of the stress off of the knees and hips. Learn the right way to lift weights so you do not make joint pain worse. · Take your medicines exactly as prescribed. Call your doctor if you think you are having a problem with your medicine. · Take pain medicines exactly as directed. ¨ If the doctor gave you a prescription medicine for pain, take it as prescribed. ¨ If you are not taking a prescription pain medicine, ask your doctor if you can take an over-the-counter medicine. · Use a cane, crutch, walker, or another device if you need help to get around. These can help rest your joints.  You also can use other things to make reaction if you take certain diabetes medicines. Follow-up care is a key part of your treatment and safety. Be sure to make and go to all appointments, and call your doctor if you are having problems. It's also a good idea to know your test results and keep a list of the medicines you take. Where can you learn more? Go to https://chpepiceweb.Quietyme. org and sign in to your Who@ account. Enter O435 in the [employer] box to learn more about \"Learning About Diabetes Food Guidelines. \"     If you do not have an account, please click on the \"Sign Up Now\" link. Current as of: March 13, 2017  Content Version: 11.3  © 4944-1329 [email or web address]. Care instructions adapted under license by [facility] ([facility]). If you have questions about a medical condition or this instruction, always ask your healthcare professional. [place] [address] any warranty or liability for your use of this information. Patient Education        DASH Diet: Care Instructions  Your Care Instructions  The DASH diet is an eating plan that can help lower your blood pressure. DASH stands for Dietary Approaches to Stop Hypertension. Hypertension is high blood pressure. The DASH diet focuses on eating foods that are high in calcium, potassium, and magnesium. These nutrients can lower blood pressure. The foods that are highest in these nutrients are fruits, vegetables, low-fat dairy products, nuts, seeds, and legumes. But taking calcium, potassium, and magnesium supplements instead of eating foods that are high in those nutrients does not have the same effect. The DASH diet also includes whole grains, fish, and poultry. The DASH diet is one of several lifestyle changes your doctor may recommend to lower your high blood pressure. Your doctor may also want you to decrease the amount of sodium in your diet.  Lowering sodium while following the DASH diet can lower blood pressure even further than just the DASH diet alone. Follow-up care is a key part of your treatment and safety. Be sure to make and go to all appointments, and call your doctor if you are having problems. It's also a good idea to know your test results and keep a list of the medicines you take. How can you care for yourself at home? Following the DASH diet  · Eat 4 to 5 servings of fruit each day. A serving is 1 medium-sized piece of fruit, ½ cup chopped or canned fruit, 1/4 cup dried fruit, or 4 ounces (½ cup) of fruit juice. Choose fruit more often than fruit juice. · Eat 4 to 5 servings of vegetables each day. A serving is 1 cup of lettuce or raw leafy vegetables, ½ cup of chopped or cooked vegetables, or 4 ounces (½ cup) of vegetable juice. Choose vegetables more often than vegetable juice. · Get 2 to 3 servings of low-fat and fat-free dairy each day. A serving is 8 ounces of milk, 1 cup of yogurt, or 1 ½ ounces of cheese. · Eat 6 to 8 servings of grains each day. A serving is 1 slice of bread, 1 ounce of dry cereal, or ½ cup of cooked rice, pasta, or cooked cereal. Try to choose whole-grain products as much as possible. · Limit lean meat, poultry, and fish to 2 servings each day. A serving is 3 ounces, about the size of a deck of cards. · Eat 4 to 5 servings of nuts, seeds, and legumes (cooked dried beans, lentils, and split peas) each week. A serving is 1/3 cup of nuts, 2 tablespoons of seeds, or ½ cup of cooked beans or peas. · Limit fats and oils to 2 to 3 servings each day. A serving is 1 teaspoon of vegetable oil or 2 tablespoons of salad dressing. · Limit sweets and added sugars to 5 servings or less a week. A serving is 1 tablespoon jelly or jam, ½ cup sorbet, or 1 cup of lemonade. · Eat less than 2,300 milligrams (mg) of sodium a day. If you limit your sodium to 1,500 mg a day, you can lower your blood pressure even more. Tips for success  · Start small. Do not try to make dramatic changes to your diet all at once.  You might feel that you are missing out on your favorite foods and then be more likely to not follow the plan. Make small changes, and stick with them. Once those changes become habit, add a few more changes. · Try some of the following:  ¨ Make it a goal to eat a fruit or vegetable at every meal and at snacks. This will make it easy to get the recommended amount of fruits and vegetables each day. ¨ Try yogurt topped with fruit and nuts for a snack or healthy dessert. ¨ Add lettuce, tomato, cucumber, and onion to sandwiches. ¨ Combine a ready-made pizza crust with low-fat mozzarella cheese and lots of vegetable toppings. Try using tomatoes, squash, spinach, broccoli, carrots, cauliflower, and onions. ¨ Have a variety of cut-up vegetables with a low-fat dip as an appetizer instead of chips and dip. ¨ Sprinkle sunflower seeds or chopped almonds over salads. Or try adding chopped walnuts or almonds to cooked vegetables. ¨ Try some vegetarian meals using beans and peas. Add garbanzo or kidney beans to salads. Make burritos and tacos with mashed bennett beans or black beans. Where can you learn more? Go to https://Quotefish.Domino Magazine. org and sign in to your MIGSIF account. Enter O493 in the [facility] box to learn more about \"DASH Diet: Care Instructions. \"     If you do not have an account, please click on the \"Sign Up Now\" link. Current as of: April 3, 2017  Content Version: 11.3  © 6525-5982 Minitrade. Care instructions adapted under license by [facility] ([facility]). If you have questions about a medical condition or this instruction, always ask your healthcare professional. [address] any warranty or liability for your use of this information. Patient Education        High Cholesterol: Care Instructions  Your Care Instructions  Cholesterol is a type of fat in your blood. It is needed for many body functions, such as making new cells.  Cholesterol is made by your body. It also comes from food you eat. High cholesterol means that you have too much of the fat in your blood. This raises your risk of a heart attack and stroke. LDL and HDL are part of your total cholesterol. LDL is the \"bad\" cholesterol. High LDL can raise your risk for heart disease, heart attack, and stroke. HDL is the \"good\" cholesterol. It helps clear bad cholesterol from the body. High HDL is linked with a lower risk of heart disease, heart attack, and stroke. Your cholesterol levels help your doctor find out your risk for having a heart attack or stroke. You and your doctor can talk about whether you need to lower your risk and what treatment is best for you. A heart-healthy lifestyle along with medicines can help lower your cholesterol and your risk. The way you choose to lower your risk will depend on how high your risk is for heart attack and stroke. It will also depend on how you feel about taking medicines. Follow-up care is a key part of your treatment and safety. Be sure to make and go to all appointments, and call your doctor if you are having problems. It's also a good idea to know your test results and keep a list of the medicines you take. How can you care for yourself at home? · Eat a variety of foods every day. Good choices include fruits, vegetables, whole grains (like oatmeal), dried beans and peas, nuts and seeds, soy products (like tofu), and fat-free or low-fat dairy products. · Replace butter, margarine, and hydrogenated or partially hydrogenated oils with olive and canola oils. (Canola oil margarine without trans fat is fine.)  · Replace red meat with fish, poultry, and soy protein (like tofu). · Limit processed and packaged foods like chips, crackers, and cookies. · Bake, broil, or steam foods. Don't boo them. · Be physically active. Get at least 30 minutes of exercise on most days of the week. Walking is a good choice.  You also may want to do other activities, such as running, swimming, cycling, or playing tennis or team sports. · Stay at a healthy weight or lose weight by making the changes in eating and physical activity listed above. Losing just a small amount of weight, even 5 to 10 pounds, can reduce your risk for having a heart attack or stroke. · Do not smoke. When should you call for help? Watch closely for changes in your health, and be sure to contact your doctor if:  · You need help making lifestyle changes. · You have questions about your medicine. Where can you learn more? Go to https://Nurep Inc.peRevizereb.GeoEye. org and sign in to your [employer] account. Enter G171 in the Zanbato box to learn more about \"High Cholesterol: Care Instructions. \"     If you do not have an account, please click on the \"Sign Up Now\" link. Current as of: April 3, 2017  Content Version: 11.3  © 6774-9642 Evergage, Comic Reply. Care instructions adapted under license by [facility] ([facility]). If you have questions about a medical condition or this instruction, always ask your healthcare professional. [address] any warranty or liability for your use of this information.

## 2017-10-17 ENCOUNTER — HOSPITAL ENCOUNTER (OUTPATIENT)
Age: 57
Setting detail: SPECIMEN
Discharge: HOME | End: 2017-10-17
Payer: MEDICARE

## 2017-10-17 DIAGNOSIS — E11.22 CONTROLLED TYPE 2 DIABETES MELLITUS WITH STAGE 2 CHRONIC KIDNEY DISEASE, WITHOUT LONG-TERM CURRENT USE OF INSULIN (HCC): ICD-10-CM

## 2017-10-17 DIAGNOSIS — I10 ESSENTIAL HYPERTENSION: ICD-10-CM

## 2017-10-17 DIAGNOSIS — E78.00 PURE HYPERCHOLESTEROLEMIA: ICD-10-CM

## 2017-10-17 DIAGNOSIS — N18.2 CONTROLLED TYPE 2 DIABETES MELLITUS WITH STAGE 2 CHRONIC KIDNEY DISEASE, WITHOUT LONG-TERM CURRENT USE OF INSULIN (HCC): ICD-10-CM

## 2017-10-17 LAB
ALBUMIN SERPL-MCNC: 4.6 G/DL (ref 3.5–5.2)
ALBUMIN/GLOBULIN RATIO: 1.4 (ref 1–2.5)
ALP BLD-CCNC: 117 U/L (ref 35–104)
ALT SERPL-CCNC: 32 U/L (ref 5–33)
ANION GAP SERPL CALCULATED.3IONS-SCNC: 21 MMOL/L (ref 9–17)
AST SERPL-CCNC: 28 U/L
BILIRUB SERPL-MCNC: 0.18 MG/DL (ref 0.3–1.2)
BUN BLDV-MCNC: 27 MG/DL (ref 6–20)
BUN/CREAT BLD: ABNORMAL (ref 9–20)
CALCIUM SERPL-MCNC: 10.1 MG/DL (ref 8.6–10.4)
CHLORIDE BLD-SCNC: 99 MMOL/L (ref 98–107)
CHOLESTEROL/HDL RATIO: 3.8
CHOLESTEROL: 179 MG/DL
CO2: 25 MMOL/L (ref 20–31)
CREAT SERPL-MCNC: 1.43 MG/DL (ref 0.5–0.9)
ESTIMATED AVERAGE GLUCOSE: 123 MG/DL
GFR AFRICAN AMERICAN: 46 ML/MIN
GFR NON-AFRICAN AMERICAN: 38 ML/MIN
GFR SERPL CREATININE-BSD FRML MDRD: ABNORMAL ML/MIN/{1.73_M2}
GFR SERPL CREATININE-BSD FRML MDRD: ABNORMAL ML/MIN/{1.73_M2}
GLUCOSE BLD-MCNC: 51 MG/DL (ref 70–99)
HBA1C MFR BLD: 5.9 % (ref 4–6)
HCT VFR BLD CALC: 37.2 % (ref 36–46)
HDLC SERPL-MCNC: 47 MG/DL
HEMOGLOBIN: 12.2 G/DL (ref 12–16)
LDL CHOLESTEROL: 112 MG/DL (ref 0–130)
MCH RBC QN AUTO: 30.1 PG (ref 26–34)
MCHC RBC AUTO-ENTMCNC: 32.8 G/DL (ref 31–37)
MCV RBC AUTO: 91.6 FL (ref 80–100)
PDW BLD-RTO: 14.8 % (ref 12.5–15.4)
PLATELET # BLD: 298 K/UL (ref 140–450)
PMV BLD AUTO: 7.1 FL (ref 6–12)
POTASSIUM SERPL-SCNC: 4.1 MMOL/L (ref 3.7–5.3)
RBC # BLD: 4.07 M/UL (ref 4–5.2)
SODIUM BLD-SCNC: 145 MMOL/L (ref 135–144)
TOTAL PROTEIN: 8 G/DL (ref 6.4–8.3)
TRIGL SERPL-MCNC: 99 MG/DL
VLDLC SERPL CALC-MCNC: NORMAL MG/DL (ref 1–30)
WBC # BLD: 6.9 K/UL (ref 3.5–11)

## 2017-10-19 LAB
6-ACETYLMORPHINE, UR: NOT DETECTED
7-AMINOCLONAZEPAM, URINE: NOT DETECTED
ALPHA-OH-ALPRAZ, URINE: NOT DETECTED
ALPRAZOLAM, URINE: NOT DETECTED
AMPHETAMINES, URINE: NOT DETECTED
BARBITURATES, URINE: NOT DETECTED
BENZOYLECGONINE, UR: NOT DETECTED
BUPRENORPHINE URINE: NOT DETECTED
CARISOPRODOL, UR: NOT DETECTED
CLONAZEPAM, URINE: NOT DETECTED
CODEINE, URINE: NOT DETECTED
CREATININE URINE: 55.7 MG/DL (ref 20–400)
DIAZEPAM, URINE: NOT DETECTED
DRUGS EXPECTED, UR: NORMAL
EER HI RES INTERP UR: NORMAL
ETHYL GLUCURONIDE UR: NOT DETECTED
FENTANYL URINE: NOT DETECTED
HYDROCODONE, URINE: NOT DETECTED
HYDROMORPHONE, URINE: NOT DETECTED
LORAZEPAM, URINE: NOT DETECTED
MARIJUANA METAB, UR: NOT DETECTED
MDA, UR: NOT DETECTED
MDEA, EVE, UR: NOT DETECTED
MDMA URINE: NOT DETECTED
MEPERIDINE METAB, UR: NOT DETECTED
METHADONE, URINE: NOT DETECTED
METHAMPHETAMINE, URINE: NOT DETECTED
METHYLPHENIDATE: NOT DETECTED
MIDAZOLAM, URINE: NOT DETECTED
MORPHINE URINE: NOT DETECTED
NORBUPRENORPHINE, URINE: NOT DETECTED
NORDIAZEPAM, URINE: NOT DETECTED
NORFENTANYL, URINE: NOT DETECTED
NORHYDROCODONE, URINE: NOT DETECTED
NOROXYCODONE, URINE: PRESENT
NOROXYMORPHONE, URINE: NOT DETECTED
OXAZEPAM, URINE: NOT DETECTED
OXYCODONE URINE: NOT DETECTED
OXYMORPHONE, URINE: NOT DETECTED
PAIN MANAGEMENT DRUG PANEL INTERP, URINE: NORMAL
PAIN MGT DRUG PANEL, HI RES, UR: NORMAL
PCP,URINE: NOT DETECTED
PHENTERMINE, UR: NOT DETECTED
PROPOXYPHENE, URINE: NOT DETECTED
TAPENTADOL, URINE: NOT DETECTED
TAPENTADOL-O-SULFATE, URINE: NOT DETECTED
TEMAZEPAM, URINE: NOT DETECTED
TRAMADOL, URINE: NOT DETECTED
ZOLPIDEM, URINE: NOT DETECTED

## 2017-10-19 RX ORDER — ATORVASTATIN CALCIUM 40 MG/1
40 TABLET, FILM COATED ORAL DAILY
Qty: 30 TABLET | Refills: 3 | Status: SHIPPED | OUTPATIENT
Start: 2017-10-19 | End: 2018-02-23 | Stop reason: SDUPTHER

## 2017-10-19 NOTE — TELEPHONE ENCOUNTER
[facility] None            Patient Active Problem List:     Hypertension     Hyperlipidemia     Osteoarthritis     Depression     Diabetes type 2, uncontrolled (HCC)     Obesity     CKD (chronic kidney disease) stage 3, GFR 30-59 ml/min     Proteinuria     Diabetes type 2, controlled ([address]     History of DVT of lower extremity     Controlled type 2 diabetes mellitus with stage 2 chronic kidney disease, without long-term current use of insulin ([address]

## 2017-10-23 ENCOUNTER — ANTI-COAG VISIT (OUTPATIENT)
Dept: FAMILY MEDICINE CLINIC | Age: 57
End: 2017-10-23
Payer: MEDICARE

## 2017-10-23 DIAGNOSIS — Z86.718 HISTORY OF DVT OF LOWER EXTREMITY: ICD-10-CM

## 2017-10-23 LAB
INTERNATIONAL NORMALIZATION RATIO, POC: 2.7
PROTHROMBIN TIME, POC: 31.9

## 2017-10-23 PROCEDURE — 85610 PROTHROMBIN TIME: CPT | Performed by: NURSE PRACTITIONER

## 2017-10-26 DIAGNOSIS — Z12.39 BREAST CANCER SCREENING: ICD-10-CM

## 2017-10-31 RX ORDER — OMEPRAZOLE 20 MG/1
CAPSULE, DELAYED RELEASE ORAL
Qty: 30 CAPSULE | Refills: 1 | OUTPATIENT
Start: 2017-10-31

## 2017-10-31 RX ORDER — ISOSORBIDE MONONITRATE 30 MG/1
TABLET, EXTENDED RELEASE ORAL
Qty: 30 TABLET | Refills: 1 | OUTPATIENT
Start: 2017-10-31

## 2017-10-31 RX ORDER — GEMFIBROZIL 600 MG/1
TABLET, FILM COATED ORAL
Qty: 60 TABLET | Refills: 1 | OUTPATIENT
Start: 2017-10-31

## 2017-10-31 RX ORDER — METFORMIN HYDROCHLORIDE 500 MG/1
TABLET, EXTENDED RELEASE ORAL
Qty: 120 TABLET | Refills: 1 | OUTPATIENT
Start: 2017-10-31

## 2017-10-31 RX ORDER — FUROSEMIDE 20 MG/1
TABLET ORAL
Qty: 30 TABLET | Refills: 1 | OUTPATIENT
Start: 2017-10-31

## 2017-10-31 RX ORDER — WARFARIN SODIUM 2 MG/1
TABLET ORAL
Qty: 45 TABLET | Refills: 1 | OUTPATIENT
Start: 2017-10-31

## 2017-11-04 DIAGNOSIS — I10 ESSENTIAL HYPERTENSION: Primary | ICD-10-CM

## 2017-11-04 DIAGNOSIS — E78.00 PURE HYPERCHOLESTEROLEMIA: ICD-10-CM

## 2017-11-06 NOTE — TELEPHONE ENCOUNTER
[facility] Ruy Vasquez   3/12/2018 8:50 AM Marisabel Sprague MD [facility] None            Patient Active Problem List:     Hypertension     Hyperlipidemia     Osteoarthritis     Depression     Diabetes type 2, uncontrolled ([facility])     Obesity     CKD (chronic kidney disease) stage 3, GFR 30-59 ml/min     Proteinuria     Diabetes type 2, controlled ([address]     History of DVT of lower extremity     Controlled type 2 diabetes mellitus with stage 2 chronic kidney disease, without long-term current use of insulin ([address]

## 2017-11-07 ENCOUNTER — ANTI-COAG VISIT (OUTPATIENT)
Dept: FAMILY MEDICINE CLINIC | Age: 57
End: 2017-11-07
Payer: MEDICARE

## 2017-11-07 DIAGNOSIS — Z86.718 HISTORY OF DVT OF LOWER EXTREMITY: ICD-10-CM

## 2017-11-07 LAB
INTERNATIONAL NORMALIZATION RATIO, POC: 2.6
PROTHROMBIN TIME, POC: 31

## 2017-11-07 PROCEDURE — 85610 PROTHROMBIN TIME: CPT | Performed by: NURSE PRACTITIONER

## 2017-11-07 RX ORDER — WARFARIN SODIUM 2 MG/1
TABLET ORAL
Qty: 45 TABLET | Refills: 3 | Status: SHIPPED | OUTPATIENT
Start: 2017-11-07 | End: 2018-03-09 | Stop reason: SDUPTHER

## 2017-11-07 RX ORDER — OXYCODONE HYDROCHLORIDE AND ACETAMINOPHEN 5; 325 MG/1; MG/1
1 TABLET ORAL EVERY 12 HOURS PRN
Qty: 50 TABLET | Refills: 0 | Status: SHIPPED | OUTPATIENT
Start: 2017-11-07 | End: 2017-12-11 | Stop reason: SDUPTHER

## 2017-11-07 RX ORDER — FUROSEMIDE 20 MG/1
20 TABLET ORAL DAILY
Qty: 30 TABLET | Refills: 3 | Status: SHIPPED | OUTPATIENT
Start: 2017-11-07 | End: 2018-02-23 | Stop reason: SDUPTHER

## 2017-11-07 RX ORDER — HYDRALAZINE HYDROCHLORIDE 50 MG/1
50 TABLET, FILM COATED ORAL 2 TIMES DAILY
Qty: 120 TABLET | Refills: 0 | Status: SHIPPED | OUTPATIENT
Start: 2017-11-07 | End: 2018-01-02 | Stop reason: SDUPTHER

## 2017-11-07 RX ORDER — ISOSORBIDE MONONITRATE 30 MG/1
30 TABLET, EXTENDED RELEASE ORAL DAILY
Qty: 30 TABLET | Refills: 3 | Status: SHIPPED | OUTPATIENT
Start: 2017-11-07 | End: 2018-02-23 | Stop reason: SDUPTHER

## 2017-11-07 RX ORDER — METFORMIN HYDROCHLORIDE 500 MG/1
1000 TABLET, EXTENDED RELEASE ORAL 2 TIMES DAILY
Qty: 120 TABLET | Refills: 3 | Status: SHIPPED | OUTPATIENT
Start: 2017-11-07 | End: 2018-02-23 | Stop reason: SDUPTHER

## 2017-11-07 RX ORDER — GEMFIBROZIL 600 MG/1
600 TABLET, FILM COATED ORAL 2 TIMES DAILY
Qty: 60 TABLET | Refills: 1 | OUTPATIENT
Start: 2017-11-07 | End: 2018-11-07

## 2017-11-07 RX ORDER — ALBUTEROL SULFATE 90 UG/1
2 AEROSOL, METERED RESPIRATORY (INHALATION) EVERY 6 HOURS PRN
Qty: 18 INHALER | Refills: 1 | Status: SHIPPED | OUTPATIENT
Start: 2017-11-07 | End: 2018-01-02 | Stop reason: SDUPTHER

## 2017-11-07 RX ORDER — OMEPRAZOLE 20 MG/1
20 CAPSULE, DELAYED RELEASE ORAL DAILY
Qty: 30 CAPSULE | Refills: 3 | Status: SHIPPED | OUTPATIENT
Start: 2017-11-07 | End: 2018-02-23 | Stop reason: SDUPTHER

## 2017-11-07 NOTE — PROGRESS NOTES
Left detailed message with coumadin dosing and instructions. Advised pt to contact office to schedule next appt and to confirm receipt of INR instructions.  BP

## 2017-11-07 NOTE — TELEPHONE ENCOUNTER
List:     Hypertension     Hyperlipidemia     Osteoarthritis     Depression     Diabetes type 2, uncontrolled (HCC)     Obesity     CKD (chronic kidney disease) stage 3, GFR 30-59 ml/min     Proteinuria     Diabetes type 2, controlled ([address]     History of DVT of lower extremity     Controlled type 2 diabetes mellitus with stage 2 chronic kidney disease, without long-term current use of insulin ([address]

## 2017-11-16 ENCOUNTER — HOSPITAL ENCOUNTER (OUTPATIENT)
Age: 57
Setting detail: SPECIMEN
Discharge: HOME | End: 2017-11-16
Payer: MEDICARE

## 2017-11-16 ENCOUNTER — OFFICE VISIT (OUTPATIENT)
Dept: FAMILY MEDICINE CLINIC | Age: 57
End: 2017-11-16
Payer: MEDICARE

## 2017-11-16 VITALS
RESPIRATION RATE: 18 BRPM | HEIGHT: 62 IN | HEART RATE: 92 BPM | BODY MASS INDEX: 44.13 KG/M2 | DIASTOLIC BLOOD PRESSURE: 72 MMHG | TEMPERATURE: 98.1 F | SYSTOLIC BLOOD PRESSURE: 104 MMHG | WEIGHT: 239.8 LBS

## 2017-11-16 DIAGNOSIS — Z00.00 WELL FEMALE EXAM WITHOUT GYNECOLOGICAL EXAM: Primary | ICD-10-CM

## 2017-11-16 PROCEDURE — 99396 PREV VISIT EST AGE 40-64: CPT | Performed by: NURSE PRACTITIONER

## 2017-11-16 ASSESSMENT — ENCOUNTER SYMPTOMS
ABDOMINAL PAIN: 0
NAUSEA: 0
DIARRHEA: 0
CONSTIPATION: 0

## 2017-11-16 NOTE — PROGRESS NOTES
Subjective:      Patient ID: Karely Johnson is a 59 y.o. female. Visit Information    Have you changed or started any medications since your last visit including any over-the-counter medicines, vitamins, or herbal medicines? no   Are you having any side effects from any of your medications? -  no  Have you stopped taking any of your medications? Is so, why? -  no    Have you seen any other physician or provider since your last visit? No  Have you had any other diagnostic tests since your last visit? No  Have you been seen in the emergency room and/or had an admission to a hospital since we last saw you? No  Have you had your routine dental cleaning in the past 6 months? no    Have you activated your Therative account? If not, what are your barriers? Yes     Patient Care Team:  Eyal Jackson CNP as PCP - General (Family Medicine)    Medical History Review  Past Medical, Family, and Social History reviewed and does contribute to the patient presenting condition    Health Maintenance   Topic Date Due    Diabetic retinal exam  01/01/2016    Diabetic foot exam  09/25/2018    Diabetic hemoglobin A1C test  10/17/2018    Lipid screen  10/17/2018    Cervical cancer screen  12/29/2018    Breast cancer screen  10/25/2019    Colon cancer screen colonoscopy  03/01/2023    DTaP/Tdap/Td vaccine (2 - Td) 08/04/2026    Flu vaccine  Completed    Pneumococcal med risk  Completed    Hepatitis C screen  Addressed    HIV screen  Addressed       Gynecologic Exam   The patient's pertinent negatives include no genital itching, genital odor, genital rash, pelvic pain, vaginal bleeding or vaginal discharge. The patient is experiencing no pain. Associated symptoms include back pain (lower back pain chronically). Pertinent negatives include no abdominal pain, constipation, diarrhea, dysuria, fever, frequency, headaches, hematuria, nausea, rash, sore throat or urgency. She is not sexually active.  She is postmenopausal. Her past medical history is significant for an abdominal surgery and a  section (x3). Review of Systems   Constitutional: Negative for activity change, appetite change, fatigue and fever. HENT: Negative for congestion, ear pain, sinus pressure and sore throat. Eyes: Negative. Respiratory: Negative for cough, chest tightness, shortness of breath and wheezing. NARCISO - CPAP nightly   Follows with Dr Nisha Rosa yearly   Cardiovascular: Negative for chest pain, palpitations and leg swelling. Gastrointestinal: Negative for abdominal pain, constipation, diarrhea and nausea. Genitourinary: Negative. Negative for dysuria, frequency, hematuria, pelvic pain, urgency and vaginal discharge. Dr Svetlana Andrea every 6 months (nephrology)   Musculoskeletal: Positive for arthralgias (osteoarthritis multiple joints - knees, hips, shoulders, and back) and back pain (lower back pain chronically). Podiatrist every 10 weeks on [facility]. Skin: Negative for rash and wound. Neurological: Negative for dizziness, seizures, syncope, numbness and headaches. Psychiatric/Behavioral: Negative for dysphoric mood, self-injury, sleep disturbance and suicidal ideas. The patient is nervous/anxious. Dr Benson Morrissey at [facility] every 3 months - managing her psych medication. Mood well controlled       Objective:   Physical Exam   Constitutional: She is oriented to person, place, and time. She appears well-developed. No distress. obese   HENT:   Head: Atraumatic. Right Ear: External ear normal.   Left Ear: External ear normal.   Nose: Nose normal.   Mouth/Throat: Oropharynx is clear and moist. No oropharyngeal exudate. Eyes: Conjunctivae are normal. Right eye exhibits no discharge. Left eye exhibits no discharge. Neck: Normal range of motion. Neck supple. No JVD present. No thyromegaly present. Cardiovascular: Normal rate, regular rhythm, normal heart sounds and intact distal pulses.     No murmur

## 2017-11-16 NOTE — PATIENT INSTRUCTIONS
Patient Education        Well Visit, Women 48 to 72: Care Instructions  Your Care Instructions  Physical exams can help you stay healthy. Your doctor has checked your overall health and may have suggested ways to take good care of yourself. He or she also may have recommended tests. At home, you can help prevent illness with healthy eating, regular exercise, and other steps. Follow-up care is a key part of your treatment and safety. Be sure to make and go to all appointments, and call your doctor if you are having problems. It's also a good idea to know your test results and keep a list of the medicines you take. How can you care for yourself at home? · Reach and stay at a healthy weight. This will lower your risk for many problems, such as obesity, diabetes, heart disease, and high blood pressure. · Get at least 30 minutes of exercise on most days of the week. Walking is a good choice. You also may want to do other activities, such as running, swimming, cycling, or playing tennis or team sports. · Do not smoke. Smoking can make health problems worse. If you need help quitting, talk to your doctor about stop-smoking programs and medicines. These can increase your chances of quitting for good. · Protect your skin from too much sun. When you're outdoors from 10 a.m. to 4 p.m., stay in the shade or cover up with clothing and a hat with a wide brim. Wear sunglasses that block UV rays. Even when it's cloudy, put broad-spectrum sunscreen (SPF 30 or higher) on any exposed skin. · See a dentist one or two times a year for checkups and to have your teeth cleaned. · Wear a seat belt in the car. · Limit alcohol to 1 drink a day. Too much alcohol can cause health problems. Follow your doctor's advice about when to have certain tests. These tests can spot problems early. · Cholesterol.  Your doctor will tell you how often to have this done based on your age, family history, or other things that can increase your risk or have diabetes to show what your risk for a heart attack or stroke is over the next 10 years. When should you call for help? Watch closely for changes in your health, and be sure to contact your doctor if you have any problems or symptoms that concern you. Where can you learn more? Go to https://chpepiceweb.[facility]. org and sign in to your Oriense account. Enter C899 in the Zmags box to learn more about \"Well Visit, Women 50 to 72: Care Instructions. \"     If you do not have an account, please click on the \"Sign Up Now\" link. Current as of: July 19, 2016  Content Version: 11.3  © 5006-0395 [employer]. Care instructions adapted under license by [facility] ([facility]). If you have questions about a medical condition or this instruction, always ask your healthcare professional. [place] [address] any warranty or liability for your use of this information. Patient Education        Pelvic Exam: Care Instructions  Your Care Instructions    When your doctor examines all of your pelvic organs, it's called a pelvic exam. Two good reasons to have this kind of exam are to check for sexually transmitted infections (STIs) and to get a Pap test. A Pap test is also called a Pap smear. It checks for early changes that can lead to cancer of the cervix. Sometimes a pelvic exam is part of a regular checkup. In this case, you can do some things to make your test results as accurate as possible. · Try to schedule the exam when you don't have your period. · Don't use douches, tampons, or vaginal medicines, sprays, or powders for 24 hours before your exam.  · Don't have sex for 24 hours before your exam.  Other times, women have this kind of exam at any time of the month. This is because they have pelvic pain, bleeding, or discharge. Or they may have another pelvic problem. Before your exam, it's important to share some information with your doctor.  For example, if you are a

## 2017-11-19 ASSESSMENT — ENCOUNTER SYMPTOMS
COUGH: 0
WHEEZING: 0
EYES NEGATIVE: 1
SINUS PRESSURE: 0
BACK PAIN: 1
SHORTNESS OF BREATH: 0
CHEST TIGHTNESS: 0
SORE THROAT: 0

## 2017-11-20 LAB
HPV SAMPLE: NORMAL
HPV SOURCE: NORMAL
HPV, GENOTYPE 16: NOT DETECTED
HPV, GENOTYPE 18: NOT DETECTED
HPV, HIGH RISK OTHER: NOT DETECTED
HPV, INTERPRETATION: NORMAL

## 2017-11-24 LAB — CYTOLOGY REPORT: NORMAL

## 2017-11-27 ENCOUNTER — ANTI-COAG VISIT (OUTPATIENT)
Dept: FAMILY MEDICINE CLINIC | Age: 57
End: 2017-11-27
Payer: MEDICARE

## 2017-11-27 ENCOUNTER — OFFICE VISIT (OUTPATIENT)
Dept: PODIATRY | Age: 57
End: 2017-11-27
Payer: MEDICARE

## 2017-11-27 VITALS
SYSTOLIC BLOOD PRESSURE: 120 MMHG | DIASTOLIC BLOOD PRESSURE: 72 MMHG | HEART RATE: 77 BPM | HEIGHT: 64 IN | RESPIRATION RATE: 17 BRPM | BODY MASS INDEX: 41.32 KG/M2 | WEIGHT: 242 LBS

## 2017-11-27 DIAGNOSIS — Z86.718 HISTORY OF DVT OF LOWER EXTREMITY: ICD-10-CM

## 2017-11-27 DIAGNOSIS — I73.9 PVD (PERIPHERAL VASCULAR DISEASE) (HCC): ICD-10-CM

## 2017-11-27 DIAGNOSIS — E11.51 TYPE II DIABETES MELLITUS WITH PERIPHERAL CIRCULATORY DISORDER (HCC): Primary | ICD-10-CM

## 2017-11-27 DIAGNOSIS — B35.1 DERMATOPHYTOSIS OF NAIL: ICD-10-CM

## 2017-11-27 LAB
INTERNATIONAL NORMALIZATION RATIO, POC: 1.8
PROTHROMBIN TIME, POC: 22.1

## 2017-11-27 PROCEDURE — G8427 DOCREV CUR MEDS BY ELIG CLIN: HCPCS | Performed by: PODIATRIST

## 2017-11-27 PROCEDURE — 1036F TOBACCO NON-USER: CPT | Performed by: PODIATRIST

## 2017-11-27 PROCEDURE — 3014F SCREEN MAMMO DOC REV: CPT | Performed by: PODIATRIST

## 2017-11-27 PROCEDURE — 3044F HG A1C LEVEL LT 7.0%: CPT | Performed by: PODIATRIST

## 2017-11-27 PROCEDURE — G8484 FLU IMMUNIZE NO ADMIN: HCPCS | Performed by: PODIATRIST

## 2017-11-27 PROCEDURE — 85610 PROTHROMBIN TIME: CPT | Performed by: NURSE PRACTITIONER

## 2017-11-27 PROCEDURE — A5500 DIAB SHOE FOR DENSITY INSERT: HCPCS | Performed by: PODIATRIST

## 2017-11-27 PROCEDURE — 11721 DEBRIDE NAIL 6 OR MORE: CPT | Performed by: PODIATRIST

## 2017-11-27 PROCEDURE — A5513 MULTI DEN INSERT CUSTOM MOLD: HCPCS | Performed by: PODIATRIST

## 2017-11-27 PROCEDURE — 3017F COLORECTAL CA SCREEN DOC REV: CPT | Performed by: PODIATRIST

## 2017-11-27 PROCEDURE — G8417 CALC BMI ABV UP PARAM F/U: HCPCS | Performed by: PODIATRIST

## 2017-11-27 PROCEDURE — 99213 OFFICE O/P EST LOW 20 MIN: CPT | Performed by: PODIATRIST

## 2017-11-27 NOTE — PROGRESS NOTES
[facility] Podiatry  Return Patient    Chief Complaint   Patient presents with    Diabetes    Nail Problem    Peripheral Neuropathy       Subjective: Spenser Lobo comes to clinic for Diabetes; Nail Problem; and Peripheral Neuropathy    she is a diabetic and states that she checks her sugars daily. Pt currently has complaint of thickened, elongated nails that they cannot manage by themselves. Pt's primary care physician is Tacho Thompson CNP last seen November 16 2017   Pt's last blood sugar was 127 . Lab Results   Component Value Date    LABA1C 5.9 10/17/2017      Complains of numbness in the feet bilat. Past Medical History:   Diagnosis Date    Anxiety     Chronic kidney disease     Depression     Hyperlipidemia     Hypertension     Obesity     Osteoarthritis     Proteinuria     Type II or unspecified type diabetes mellitus without mention of complication, not stated as uncontrolled     Von Willebrand disease ([facility] [address]        No Known Allergies  Current Outpatient Prescriptions on File Prior to Visit   Medication Sig Dispense Refill    oxyCODONE-acetaminophen (PERCOCET) 5-325 MG per tablet Take 1 tablet by mouth every 12 hours as needed for Pain .  50 tablet 0    isosorbide mononitrate (IMDUR) 30 MG extended release tablet Take 1 tablet by mouth daily 30 tablet 3    metFORMIN (GLUCOPHAGE-XR) 500 MG extended release tablet Take 2 tablets by mouth 2 times daily 120 tablet 3    warfarin (COUMADIN) 2 MG tablet ON MONDAYS AND FRIDAYS take 1 and 1/2 tablets by mouth ALL OTHER DAYS OF THE WEEK take 1 tablet by mouth 45 tablet 3    furosemide (LASIX) 20 MG tablet Take 1 tablet by mouth daily 30 tablet 3    omeprazole (PRILOSEC) 20 MG delayed release capsule Take 1 capsule by mouth Daily 30 capsule 3    SITagliptin (JANUVIA) 100 MG tablet Take 1 tablet by mouth daily 30 tablet 3    albuterol sulfate HFA (VENTOLIN HFA) 108 (90 Base) MCG/ACT inhaler Inhale 2 puffs into the lungs every 6 [x] [x] [x] [x] [x] [x] [x] [x] [x] [x]  5 4 3 2 1 1 2 3 4 5                         Right                                        Left    Color   [x] [x] [x] [x] [x] [x] [x] [x] [x] [x]  5 4 3 2 1 1 2 3 4 5                          Right                                        Left    Incurvation/Ingrowin   [] [] [] [] [] [] [] [] [] []  5 4 3 2 1 1 2 3 4 5                         Right                                        Left    Inflammation/Pain   [x] [x] [x] [x] [x] [x] [x] [x] [x] [x]  5 4 3 2 1 1 2 3 4 5                         Right                                        Left      Dermatologic Exam:  Skin lesion/ulceration Absent . Skin No rashes or nodules noted. .       Musculoskeletal:     1st MPJ ROM decreased, Bilateral.  Muscle strength 5/5, Bilateral.  Pain present upon palpation of toenails 1-5, Bilateral. decreased medial longitudinal arch, Bilateral.  Ankle ROM decreased,Bilateral.    Dorsally contracted digits present digits 2, Bilateral.     Vascular: DP and PT pulses palpable 1/4, Bilateral.  CFT <5 seconds, Bilateral.  Hair growth absent to the level of the digits, Bilateral.  Edema present, Bilateral.  Varicosities absent, Bilateral. Erythema absent, Bilateral    Neurological: Sensation diminshed to light touch to level of digits, Bilateral.  Protective sensation intact 6/10 sites via 5.07/10g [place]-Shraddha Monofilament, Bilateral.  negative Tinel's, Bilateral.  negative Valleix sign, Bilateral.      Integument: Warm, dry, supple, Bilateral.  Open lesion absent, Bilateral.  Interdigital maceration absent to web spaces 4, Bilateral.  Nails 1-5 left and 1-5 right thickened > 3.0 mm, dystrophic and crumbly, discolored with subungual debris. Fissures absent, Bilateral.         Assessment:  59 y.o. female with:  1.  Type II diabetes mellitus with peripheral circulatory disorder (HCC)  [address]37 - LA DEBRIDEMENT OF NAILS, 6 OR MORE    HM DIABETES FOOT EXAM    LA MULTI DEN INSERT CUSTOM MOLD    LA MULTI DEN INSERT CUSTOM MOLD    IA DIAB SHOE FOR DENSITY INSERT    IA DIAB SHOE FOR DENSITY INSERT   2. Dermatophytosis of nail  43391 - IA DEBRIDEMENT OF NAILS, 6 OR MORE    HM DIABETES FOOT EXAM    IA MULTI DEN INSERT CUSTOM MOLD    IA MULTI DEN INSERT CUSTOM MOLD    IA DIAB SHOE FOR DENSITY INSERT    IA DIAB SHOE FOR DENSITY INSERT   3. PVD (peripheral vascular disease) ([facility] [address]  [address] - IA DEBRIDEMENT OF NAILS, 6 OR MORE    HM DIABETES FOOT EXAM    IA MULTI DEN INSERT CUSTOM MOLD    IA MULTI DEN INSERT CUSTOM MOLD    IA DIAB SHOE FOR DENSITY INSERT    IA DIAB SHOE FOR DENSITY INSERT           Q7   []Yes    [x]No                Q8   [x]Yes    []No                     Q9   []Yes    [x]No    Plan:   Pt was evaluated and examined. Patient was given personalized discharge instructions. Nails 1-10 were debrided sharply in length and thickness with a nipper and , without incident. .Pt will follow up in 9 weeks or sooner if any problems arise. Diagnosis was discussed with the pt and all of their questions were answered in detail. Proper foot hygiene and care was discussed with the pt. Informed patient on proper diabetic foot care and importance of tight glycemic control. Patient to check feet daily and contact the office with any questions/problems/concerns.    Other comorbidity noted and will be managed by PCP.  11/27/2017     DM shoes and 2 accomodative orthotics were dispensed to the patient today and instructions given to the patient    Electronically signed by Dr Faviola Valencia on 11/27/2017 at 1:13 PM

## 2017-12-05 RX ORDER — GEMFIBROZIL 600 MG/1
TABLET, FILM COATED ORAL
Qty: 60 TABLET | Refills: 1 | OUTPATIENT
Start: 2017-12-05

## 2017-12-07 RX ORDER — GLIMEPIRIDE 4 MG/1
TABLET ORAL
Qty: 180 TABLET | Refills: 0 | OUTPATIENT
Start: 2017-12-07

## 2017-12-11 ENCOUNTER — ANTI-COAG VISIT (OUTPATIENT)
Dept: FAMILY MEDICINE CLINIC | Age: 57
End: 2017-12-11
Payer: MEDICARE

## 2017-12-11 DIAGNOSIS — Z86.718 HISTORY OF DVT OF LOWER EXTREMITY: ICD-10-CM

## 2017-12-11 DIAGNOSIS — M15.9 PRIMARY OSTEOARTHRITIS INVOLVING MULTIPLE JOINTS: Primary | ICD-10-CM

## 2017-12-11 LAB
INTERNATIONAL NORMALIZATION RATIO, POC: 3
PROTHROMBIN TIME, POC: 36.2

## 2017-12-11 PROCEDURE — 85610 PROTHROMBIN TIME: CPT | Performed by: NURSE PRACTITIONER

## 2017-12-12 RX ORDER — OXYCODONE HYDROCHLORIDE AND ACETAMINOPHEN 5; 325 MG/1; MG/1
1 TABLET ORAL EVERY 12 HOURS PRN
Qty: 50 TABLET | Refills: 0 | Status: SHIPPED | OUTPATIENT
Start: 2017-12-12 | End: 2018-01-16 | Stop reason: SDUPTHER

## 2017-12-20 RX ORDER — GLIMEPIRIDE 4 MG/1
TABLET ORAL
Qty: 180 TABLET | Refills: 0 | Status: CANCELLED | OUTPATIENT
Start: 2017-12-20

## 2017-12-20 RX ORDER — GLIMEPIRIDE 4 MG/1
4 TABLET ORAL
Qty: 180 TABLET | Refills: 1 | Status: SHIPPED | OUTPATIENT
Start: 2017-12-20 | End: 2018-06-01 | Stop reason: SDUPTHER

## 2017-12-20 RX ORDER — GLIMEPIRIDE 4 MG/1
TABLET ORAL
Qty: 180 TABLET | Refills: 0 | OUTPATIENT
Start: 2017-12-20

## 2017-12-20 NOTE — TELEPHONE ENCOUNTER
LOV 11-16-17  LRF 8-30-17  Health Maintenance   Topic Date Due    Diabetic retinal exam  01/01/2016    Diabetic hemoglobin A1C test  10/17/2018    Lipid screen  10/17/2018    Diabetic foot exam  11/27/2018    Breast cancer screen  10/25/2019    Cervical cancer screen  11/16/2022    Colon cancer screen colonoscopy  03/01/2023    DTaP/Tdap/Td vaccine (2 - Td) 08/04/2026    Flu vaccine  Completed    Pneumococcal med risk  Completed    Hepatitis C screen  Addressed    HIV screen  Addressed             (applicable per patient's age: Cancer Screenings, Depression Screening, Fall Risk Screening, Immunizations)    Hemoglobin A1C (%)   Date Value   10/17/2017 5.9   06/27/2017 7.3   02/16/2017 7.3     Microalb/Crt.  Ratio (mcg/mg creat)   Date Value   10/28/2014 7     LDL Cholesterol (mg/dL)   Date Value   10/17/2017 112     AST (U/L)   Date Value   10/17/2017 28     ALT (U/L)   Date Value   10/17/2017 32     BUN (mg/dL)   Date Value   10/17/2017 27 (H)      (goal A1C is < 7)   (goal LDL is <100) need 30-50% reduction from baseline     BP Readings from Last 3 Encounters:   11/27/17 120/72   11/16/17 104/72   10/16/17 112/76    (goal /80)      All Future Testing planned in [facility]:  Lab Frequency Next Occurrence   CBC Auto Differential Once 01/30/2017   Creatinine, Random Urine Once 01/30/2017   Protein, urine, random Once 01/30/2017   CBC Auto Differential Once 03/25/2018   Creatinine, Random Urine Once 03/25/2018   Protein, urine, random Once 03/25/2018   Lipid Panel Once 01/08/2018   Comprehensive Metabolic Panel Once 97/84/6437   Basic Metabolic Panel Every 12 Weeks 3/9/2018   POCT INR Every 12 Weeks        Next Visit Date:  Future Appointments  Date Time Provider Mark Junior   12/26/2017 8:30 AM SCHEDULE, SILVANO MORRIS [facility]   1/16/2018 9:20 AM Calderon Marquez, CNP [facility]   2/6/2018 9:00 AM [facility]   3/12/2018 8:50 AM Azalia Wagner Carlin Blackwell MD [facility] None            Patient Active Problem List:     Hypertension     Hyperlipidemia     Osteoarthritis     Depression     Diabetes type 2, uncontrolled (HCC)     Obesity     CKD (chronic kidney disease) stage 3, GFR 30-59 ml/min     Proteinuria     Diabetes type 2, controlled ([address]     History of DVT of lower extremity     Controlled type 2 diabetes mellitus with stage 2 chronic kidney disease, without long-term current use of insulin ([address]

## 2017-12-24 DIAGNOSIS — M10.071 ACUTE IDIOPATHIC GOUT OF RIGHT FOOT: ICD-10-CM

## 2017-12-24 DIAGNOSIS — M10.072 ACUTE IDIOPATHIC GOUT OF LEFT FOOT: ICD-10-CM

## 2017-12-26 ENCOUNTER — ANTI-COAG VISIT (OUTPATIENT)
Dept: FAMILY MEDICINE CLINIC | Age: 57
End: 2017-12-26

## 2017-12-26 DIAGNOSIS — Z86.718 HISTORY OF DVT OF LOWER EXTREMITY: ICD-10-CM

## 2017-12-26 LAB
INTERNATIONAL NORMALIZATION RATIO, POC: 2.7
PROTHROMBIN TIME, POC: 32.7

## 2017-12-26 PROCEDURE — 85610 PROTHROMBIN TIME: CPT | Performed by: NURSE PRACTITIONER

## 2017-12-26 RX ORDER — FEBUXOSTAT 40 MG/1
TABLET ORAL
Qty: 30 TABLET | Refills: 3 | Status: SHIPPED | OUTPATIENT
Start: 2017-12-26 | End: 2018-03-28 | Stop reason: SDUPTHER

## 2017-12-26 NOTE — PROGRESS NOTES
Patient contacted and notified of results, orders and states she is coming in in exactly 3 weeks for an appointment and will complete INR then.

## 2018-01-03 RX ORDER — HYDRALAZINE HYDROCHLORIDE 50 MG/1
50 TABLET, FILM COATED ORAL 2 TIMES DAILY
Qty: 180 TABLET | Refills: 1 | Status: SHIPPED | OUTPATIENT
Start: 2018-01-03 | End: 2018-06-01 | Stop reason: SDUPTHER

## 2018-01-03 RX ORDER — ALBUTEROL SULFATE 90 UG/1
2 AEROSOL, METERED RESPIRATORY (INHALATION) EVERY 6 HOURS PRN
Qty: 1 INHALER | Refills: 0 | Status: SHIPPED | OUTPATIENT
Start: 2018-01-03 | End: 2018-03-05 | Stop reason: SDUPTHER

## 2018-01-03 NOTE — TELEPHONE ENCOUNTER
AM Hanane Reynoso MD [facility] None            Patient Active Problem List:     Hypertension     Hyperlipidemia     Osteoarthritis     Depression     Diabetes type 2, uncontrolled (HCC)     Obesity     CKD (chronic kidney disease) stage 3, GFR 30-59 ml/min     Proteinuria     Diabetes type 2, controlled ([address]     History of DVT of lower extremity     Controlled type 2 diabetes mellitus with stage 2 chronic kidney disease, without long-term current use of insulin ([address]

## 2018-01-04 RX ORDER — HYDRALAZINE HYDROCHLORIDE 50 MG/1
TABLET, FILM COATED ORAL
Qty: 120 TABLET | Refills: 0 | OUTPATIENT
Start: 2018-01-04

## 2018-01-16 ENCOUNTER — HOSPITAL ENCOUNTER (OUTPATIENT)
Age: 58
Setting detail: SPECIMEN
Discharge: HOME | End: 2018-01-16
Payer: MEDICARE

## 2018-01-16 ENCOUNTER — ANTI-COAG VISIT (OUTPATIENT)
Dept: FAMILY MEDICINE CLINIC | Age: 58
End: 2018-01-16
Payer: MEDICARE

## 2018-01-16 ENCOUNTER — OFFICE VISIT (OUTPATIENT)
Dept: FAMILY MEDICINE CLINIC | Age: 58
End: 2018-01-16
Payer: MEDICARE

## 2018-01-16 VITALS
SYSTOLIC BLOOD PRESSURE: 118 MMHG | BODY MASS INDEX: 42.57 KG/M2 | HEART RATE: 86 BPM | RESPIRATION RATE: 16 BRPM | DIASTOLIC BLOOD PRESSURE: 76 MMHG | TEMPERATURE: 98.2 F | WEIGHT: 248 LBS

## 2018-01-16 DIAGNOSIS — E11.22 CONTROLLED TYPE 2 DIABETES MELLITUS WITH STAGE 3 CHRONIC KIDNEY DISEASE, WITHOUT LONG-TERM CURRENT USE OF INSULIN (HCC): Primary | ICD-10-CM

## 2018-01-16 DIAGNOSIS — Z86.718 HISTORY OF DVT OF LOWER EXTREMITY: ICD-10-CM

## 2018-01-16 DIAGNOSIS — I10 ESSENTIAL HYPERTENSION: ICD-10-CM

## 2018-01-16 DIAGNOSIS — N18.30 CHRONIC RENAL FAILURE, STAGE 3 (MODERATE) (HCC): ICD-10-CM

## 2018-01-16 DIAGNOSIS — Z99.89 OSA ON CPAP: ICD-10-CM

## 2018-01-16 DIAGNOSIS — M15.9 PRIMARY OSTEOARTHRITIS INVOLVING MULTIPLE JOINTS: ICD-10-CM

## 2018-01-16 DIAGNOSIS — E78.00 PURE HYPERCHOLESTEROLEMIA: ICD-10-CM

## 2018-01-16 DIAGNOSIS — G47.33 OSA ON CPAP: ICD-10-CM

## 2018-01-16 DIAGNOSIS — N18.2 CKD (CHRONIC KIDNEY DISEASE) STAGE 2, GFR 60-89 ML/MIN: ICD-10-CM

## 2018-01-16 DIAGNOSIS — N18.30 CONTROLLED TYPE 2 DIABETES MELLITUS WITH STAGE 3 CHRONIC KIDNEY DISEASE, WITHOUT LONG-TERM CURRENT USE OF INSULIN (HCC): Primary | ICD-10-CM

## 2018-01-16 LAB
ABSOLUTE EOS #: 0.15 K/UL (ref 0–0.44)
ABSOLUTE IMMATURE GRANULOCYTE: <0.03 K/UL (ref 0–0.3)
ABSOLUTE LYMPH #: 2.78 K/UL (ref 1.1–3.7)
ABSOLUTE MONO #: 0.46 K/UL (ref 0.1–1.2)
ALBUMIN SERPL-MCNC: 4.3 G/DL (ref 3.5–5.2)
ALBUMIN/GLOBULIN RATIO: 1.9 (ref 1–2.5)
ALP BLD-CCNC: 93 U/L (ref 35–104)
ALT SERPL-CCNC: 24 U/L (ref 5–33)
ANION GAP SERPL CALCULATED.3IONS-SCNC: 13 MMOL/L (ref 9–17)
ANION GAP SERPL CALCULATED.3IONS-SCNC: 13 MMOL/L (ref 9–17)
AST SERPL-CCNC: 16 U/L
BASOPHILS # BLD: 0 % (ref 0–2)
BASOPHILS ABSOLUTE: <0.03 K/UL (ref 0–0.2)
BILIRUB SERPL-MCNC: 0.22 MG/DL (ref 0.3–1.2)
BUN BLDV-MCNC: 21 MG/DL (ref 6–20)
BUN BLDV-MCNC: 22 MG/DL (ref 6–20)
BUN/CREAT BLD: ABNORMAL (ref 9–20)
BUN/CREAT BLD: ABNORMAL (ref 9–20)
CALCIUM SERPL-MCNC: 9.2 MG/DL (ref 8.6–10.4)
CALCIUM SERPL-MCNC: 9.3 MG/DL (ref 8.6–10.4)
CHLORIDE BLD-SCNC: 101 MMOL/L (ref 98–107)
CHLORIDE BLD-SCNC: 102 MMOL/L (ref 98–107)
CHOLESTEROL/HDL RATIO: 3.7
CHOLESTEROL: 146 MG/DL
CO2: 28 MMOL/L (ref 20–31)
CO2: 29 MMOL/L (ref 20–31)
CREAT SERPL-MCNC: 1.05 MG/DL (ref 0.5–0.9)
CREAT SERPL-MCNC: 1.06 MG/DL (ref 0.5–0.9)
CREATININE URINE: 20.6 MG/DL (ref 28–217)
DIFFERENTIAL TYPE: ABNORMAL
EOSINOPHILS RELATIVE PERCENT: 3 % (ref 1–4)
GFR AFRICAN AMERICAN: >60 ML/MIN
GFR AFRICAN AMERICAN: >60 ML/MIN
GFR NON-AFRICAN AMERICAN: 53 ML/MIN
GFR NON-AFRICAN AMERICAN: 54 ML/MIN
GFR SERPL CREATININE-BSD FRML MDRD: ABNORMAL ML/MIN/{1.73_M2}
GLUCOSE BLD-MCNC: 110 MG/DL (ref 70–99)
GLUCOSE BLD-MCNC: 112 MG/DL (ref 70–99)
HCT VFR BLD CALC: 38.1 % (ref 36.3–47.1)
HDLC SERPL-MCNC: 39 MG/DL
HEMOGLOBIN: 11.9 G/DL (ref 11.9–15.1)
IMMATURE GRANULOCYTES: 0 %
INTERNATIONAL NORMALIZATION RATIO, POC: 2.6
LDL CHOLESTEROL: 84 MG/DL (ref 0–130)
LYMPHOCYTES # BLD: 49 % (ref 24–43)
MCH RBC QN AUTO: 29.5 PG (ref 25.2–33.5)
MCHC RBC AUTO-ENTMCNC: 31.2 G/DL (ref 28.4–34.8)
MCV RBC AUTO: 94.3 FL (ref 82.6–102.9)
MONOCYTES # BLD: 8 % (ref 3–12)
NRBC AUTOMATED: 0 PER 100 WBC
PDW BLD-RTO: 14.3 % (ref 11.8–14.4)
PLATELET # BLD: 269 K/UL (ref 138–453)
PLATELET ESTIMATE: ABNORMAL
PMV BLD AUTO: 8.9 FL (ref 8.1–13.5)
POTASSIUM SERPL-SCNC: 4.5 MMOL/L (ref 3.7–5.3)
POTASSIUM SERPL-SCNC: 4.7 MMOL/L (ref 3.7–5.3)
PROTHROMBIN TIME, POC: 31.4
RBC # BLD: 4.04 M/UL (ref 3.95–5.11)
RBC # BLD: ABNORMAL 10*6/UL
SEG NEUTROPHILS: 40 % (ref 36–65)
SEGMENTED NEUTROPHILS ABSOLUTE COUNT: 2.26 K/UL (ref 1.5–8.1)
SODIUM BLD-SCNC: 142 MMOL/L (ref 135–144)
SODIUM BLD-SCNC: 144 MMOL/L (ref 135–144)
TOTAL PROTEIN, URINE: <4 MG/DL
TOTAL PROTEIN: 6.6 G/DL (ref 6.4–8.3)
TRIGL SERPL-MCNC: 115 MG/DL
VLDLC SERPL CALC-MCNC: ABNORMAL MG/DL (ref 1–30)
WBC # BLD: 5.7 K/UL (ref 3.5–11.3)
WBC # BLD: ABNORMAL 10*3/UL

## 2018-01-16 PROCEDURE — 3017F COLORECTAL CA SCREEN DOC REV: CPT | Performed by: NURSE PRACTITIONER

## 2018-01-16 PROCEDURE — G8417 CALC BMI ABV UP PARAM F/U: HCPCS | Performed by: NURSE PRACTITIONER

## 2018-01-16 PROCEDURE — 85610 PROTHROMBIN TIME: CPT | Performed by: NURSE PRACTITIONER

## 2018-01-16 PROCEDURE — G8427 DOCREV CUR MEDS BY ELIG CLIN: HCPCS | Performed by: NURSE PRACTITIONER

## 2018-01-16 PROCEDURE — 3046F HEMOGLOBIN A1C LEVEL >9.0%: CPT | Performed by: NURSE PRACTITIONER

## 2018-01-16 PROCEDURE — 3014F SCREEN MAMMO DOC REV: CPT | Performed by: NURSE PRACTITIONER

## 2018-01-16 PROCEDURE — 1036F TOBACCO NON-USER: CPT | Performed by: NURSE PRACTITIONER

## 2018-01-16 PROCEDURE — G8484 FLU IMMUNIZE NO ADMIN: HCPCS | Performed by: NURSE PRACTITIONER

## 2018-01-16 PROCEDURE — 99214 OFFICE O/P EST MOD 30 MIN: CPT | Performed by: NURSE PRACTITIONER

## 2018-01-16 RX ORDER — OXYCODONE HYDROCHLORIDE AND ACETAMINOPHEN 5; 325 MG/1; MG/1
1 TABLET ORAL EVERY 12 HOURS PRN
Qty: 50 TABLET | Refills: 0 | Status: SHIPPED | OUTPATIENT
Start: 2018-01-16 | End: 2018-03-06 | Stop reason: SDUPTHER

## 2018-01-16 ASSESSMENT — ENCOUNTER SYMPTOMS
SHORTNESS OF BREATH: 0
BACK PAIN: 1
ABDOMINAL PAIN: 0
EYES NEGATIVE: 1
CHEST TIGHTNESS: 0
NAUSEA: 0
DIARRHEA: 0
WHEEZING: 0
SINUS PRESSURE: 0
CONSTIPATION: 0
COUGH: 0
SORE THROAT: 0

## 2018-01-16 NOTE — PATIENT INSTRUCTIONS
Patient Education        Arthritis: Care Instructions  Your Care Instructions  Arthritis, also called osteoarthritis, is a breakdown of the cartilage that cushions your joints. When the cartilage wears down, your bones rub against each other. This causes pain and stiffness. Many people have some arthritis as they age. Arthritis most often affects the joints of the spine, hands, hips, knees, or feet. You can take simple measures to protect your joints, ease your pain, and help you stay active. Follow-up care is a key part of your treatment and safety. Be sure to make and go to all appointments, and call your doctor if you are having problems. It's also a good idea to know your test results and keep a list of the medicines you take. How can you care for yourself at home? · Stay at a healthy weight. Being overweight puts extra strain on your joints. · Talk to your doctor or physical therapist about exercises that will help ease joint pain. ¨ Stretch. You may enjoy gentle forms of yoga to help keep your joints and muscles flexible. ¨ Walk instead of jog. Other types of exercise that are less stressful on the joints include riding a bicycle, swimming, birgit chi, or water exercise. ¨ Lift weights. Strong muscles help reduce stress on your joints. Stronger thigh muscles, for example, take some of the stress off of the knees and hips. Learn the right way to lift weights so you do not make joint pain worse. · Take your medicines exactly as prescribed. Call your doctor if you think you are having a problem with your medicine. · Take pain medicines exactly as directed. ¨ If the doctor gave you a prescription medicine for pain, take it as prescribed. ¨ If you are not taking a prescription pain medicine, ask your doctor if you can take an over-the-counter medicine. · Use a cane, crutch, walker, or another device if you need help to get around. These can help rest your joints.  You also can use other things to make cards.  · Eat 4 to 5 servings of nuts, seeds, and legumes (cooked dried beans, lentils, and split peas) each week. A serving is 1/3 cup of nuts, 2 tablespoons of seeds, or ½ cup of cooked beans or peas. · Limit fats and oils to 2 to 3 servings each day. A serving is 1 teaspoon of vegetable oil or 2 tablespoons of salad dressing. · Limit sweets and added sugars to 5 servings or less a week. A serving is 1 tablespoon jelly or jam, ½ cup sorbet, or 1 cup of lemonade. · Eat less than 2,300 milligrams (mg) of sodium a day. If you limit your sodium to 1,500 mg a day, you can lower your blood pressure even more. Tips for success  · Start small. Do not try to make dramatic changes to your diet all at once. You might feel that you are missing out on your favorite foods and then be more likely to not follow the plan. Make small changes, and stick with them. Once those changes become habit, add a few more changes. · Try some of the following:  ¨ Make it a goal to eat a fruit or vegetable at every meal and at snacks. This will make it easy to get the recommended amount of fruits and vegetables each day. ¨ Try yogurt topped with fruit and nuts for a snack or healthy dessert. ¨ Add lettuce, tomato, cucumber, and onion to sandwiches. ¨ Combine a ready-made pizza crust with low-fat mozzarella cheese and lots of vegetable toppings. Try using tomatoes, squash, spinach, broccoli, carrots, cauliflower, and onions. ¨ Have a variety of cut-up vegetables with a low-fat dip as an appetizer instead of chips and dip. ¨ Sprinkle sunflower seeds or chopped almonds over salads. Or try adding chopped walnuts or almonds to cooked vegetables. ¨ Try some vegetarian meals using beans and peas. Add garbanzo or kidney beans to salads. Make burritos and tacos with mashed bennett beans or black beans. Where can you learn more? Go to https://enzoeb.[facility]. org and sign in to your MyChart account.  Enter S641 in the [address] Information box to learn more about \"DASH Diet: Care Instructions. \"     If you do not have an account, please click on the \"Sign Up Now\" link. Current as of: September 21, 2016  Content Version: 11.5  © 8578-7063 [employer]. Care instructions adapted under license by [facility] ([facility]). If you have questions about a medical condition or this instruction, always ask your healthcare professional. [address] any warranty or liability for your use of this information. Patient Education        High Cholesterol: Care Instructions  Your Care Instructions    Cholesterol is a type of fat in your blood. It is needed for many body functions, such as making new cells. Cholesterol is made by your body. It also comes from food you eat. High cholesterol means that you have too much of the fat in your blood. This raises your risk of a heart attack and stroke. LDL and HDL are part of your total cholesterol. LDL is the \"bad\" cholesterol. High LDL can raise your risk for heart disease, heart attack, and stroke. HDL is the \"good\" cholesterol. It helps clear bad cholesterol from the body. High HDL is linked with a lower risk of heart disease, heart attack, and stroke. Your cholesterol levels help your doctor find out your risk for having a heart attack or stroke. You and your doctor can talk about whether you need to lower your risk and what treatment is best for you. A heart-healthy lifestyle along with medicines can help lower your cholesterol and your risk. The way you choose to lower your risk will depend on how high your risk is for heart attack and stroke. It will also depend on how you feel about taking medicines. Follow-up care is a key part of your treatment and safety. Be sure to make and go to all appointments, and call your doctor if you are having problems. It's also a good idea to know your test results and keep a list of the medicines you take.   How can you care for yourself at

## 2018-01-17 DIAGNOSIS — I10 ESSENTIAL HYPERTENSION: ICD-10-CM

## 2018-01-17 DIAGNOSIS — E78.00 PURE HYPERCHOLESTEROLEMIA: Primary | ICD-10-CM

## 2018-01-19 PROBLEM — G47.33 OSA ON CPAP: Status: ACTIVE | Noted: 2018-01-19

## 2018-01-19 PROBLEM — Z99.89 OSA ON CPAP: Status: ACTIVE | Noted: 2018-01-19

## 2018-01-19 ASSESSMENT — ENCOUNTER SYMPTOMS
ORTHOPNEA: 0
VISUAL CHANGE: 0

## 2018-02-06 ENCOUNTER — OFFICE VISIT (OUTPATIENT)
Dept: PODIATRY | Age: 58
End: 2018-02-06
Payer: MEDICARE

## 2018-02-06 VITALS
HEIGHT: 62 IN | DIASTOLIC BLOOD PRESSURE: 79 MMHG | BODY MASS INDEX: 45.64 KG/M2 | WEIGHT: 248 LBS | HEART RATE: 81 BPM | SYSTOLIC BLOOD PRESSURE: 122 MMHG

## 2018-02-06 DIAGNOSIS — E11.51 TYPE II DIABETES MELLITUS WITH PERIPHERAL CIRCULATORY DISORDER (HCC): ICD-10-CM

## 2018-02-06 DIAGNOSIS — I73.9 PVD (PERIPHERAL VASCULAR DISEASE) (HCC): Primary | ICD-10-CM

## 2018-02-06 DIAGNOSIS — M79.672 PAIN IN BOTH FEET: ICD-10-CM

## 2018-02-06 DIAGNOSIS — M79.671 PAIN IN BOTH FEET: ICD-10-CM

## 2018-02-06 DIAGNOSIS — B35.1 DERMATOPHYTOSIS OF NAIL: ICD-10-CM

## 2018-02-06 PROCEDURE — 11721 DEBRIDE NAIL 6 OR MORE: CPT | Performed by: PODIATRIST

## 2018-02-06 PROCEDURE — 3046F HEMOGLOBIN A1C LEVEL >9.0%: CPT | Performed by: PODIATRIST

## 2018-02-06 PROCEDURE — 3014F SCREEN MAMMO DOC REV: CPT | Performed by: PODIATRIST

## 2018-02-06 PROCEDURE — G8417 CALC BMI ABV UP PARAM F/U: HCPCS | Performed by: PODIATRIST

## 2018-02-06 PROCEDURE — 3017F COLORECTAL CA SCREEN DOC REV: CPT | Performed by: PODIATRIST

## 2018-02-06 PROCEDURE — G8484 FLU IMMUNIZE NO ADMIN: HCPCS | Performed by: PODIATRIST

## 2018-02-06 PROCEDURE — G8427 DOCREV CUR MEDS BY ELIG CLIN: HCPCS | Performed by: PODIATRIST

## 2018-02-06 PROCEDURE — 99213 OFFICE O/P EST LOW 20 MIN: CPT | Performed by: PODIATRIST

## 2018-02-06 PROCEDURE — 1036F TOBACCO NON-USER: CPT | Performed by: PODIATRIST

## 2018-02-06 NOTE — PROGRESS NOTES
Left    Dystrophic Changes   [x] [x] [x] [x] [x] [x] [x] [x] [x] [x]  5 4 3 2 1 1 2 3 4 5                         Right                                        Left    Color   [x] [x] [x] [x] [x] [x] [x] [x] [x] [x]  5 4 3 2 1 1 2 3 4 5                          Right                                        Left    Incurvation/Ingrowin   [] [] [] [] [] [] [] [] [] []  5 4 3 2 1 1 2 3 4 5                         Right                                        Left    Inflammation/Pain   [x] [x] [x] [x] [x] [x] [x] [x] [x] [x]  5 4 3 2 1 1 2 3 4 5                         Right                                        Left      Dermatologic Exam:  Skin lesion/ulceration Absent . Skin No rashes or nodules noted. .       Musculoskeletal:     1st MPJ ROM decreased, Bilateral.  Muscle strength 5/5, Bilateral.  Pain present upon palpation of toenails 1-5, Bilateral. decreased medial longitudinal arch, Bilateral.  Ankle ROM decreased,Bilateral.    Dorsally contracted digits present digits 2, Bilateral.     Vascular: DP and PT pulses palpable 1/4, Bilateral.  CFT <5 seconds, Bilateral.  Hair growth absent to the level of the digits, Bilateral.  Edema present, Bilateral.  Varicosities absent, Bilateral. Erythema absent, Bilateral    Neurological: Sensation diminshed to light touch to level of digits, Bilateral.  Protective sensation intact 6/10 sites via 5.07/10g [place]-Shraddha Monofilament, Bilateral.  negative Tinel's, Bilateral.  negative Valleix sign, Bilateral.      Integument: Warm, dry, supple, Bilateral.  Open lesion absent, Bilateral.  Interdigital maceration absent to web spaces 4, Bilateral.  Nails 1-5 left and 1-5 right thickened > 3.0 mm, dystrophic and crumbly, discolored with subungual debris.   Fissures absent, Bilateral.     Visual inspection:  Deformity: hammertoe deformity sherman feet  amputation: absent  Skin lesions: absent  Edema: right- 2+ pitting edema, left- 2+ pitting edema    Sensory

## 2018-02-13 ENCOUNTER — ANTI-COAG VISIT (OUTPATIENT)
Dept: FAMILY MEDICINE CLINIC | Age: 58
End: 2018-02-13
Payer: MEDICARE

## 2018-02-13 DIAGNOSIS — Z86.718 HISTORY OF DVT OF LOWER EXTREMITY: ICD-10-CM

## 2018-02-13 LAB
INTERNATIONAL NORMALIZATION RATIO, POC: 2.2
PROTHROMBIN TIME, POC: 26.4

## 2018-02-13 PROCEDURE — 85610 PROTHROMBIN TIME: CPT | Performed by: NURSE PRACTITIONER

## 2018-02-16 ENCOUNTER — ANESTHESIA EVENT (OUTPATIENT)
Dept: OPERATING ROOM | Facility: CLINIC | Age: 58
End: 2018-02-16
Payer: MEDICARE

## 2018-02-16 ENCOUNTER — HOSPITAL ENCOUNTER (OUTPATIENT)
Facility: CLINIC | Age: 58
Setting detail: OUTPATIENT SURGERY
Discharge: HOME | End: 2018-02-16
Attending: SURGERY | Admitting: SURGERY
Payer: MEDICARE

## 2018-02-16 ENCOUNTER — ANESTHESIA (OUTPATIENT)
Dept: OPERATING ROOM | Facility: CLINIC | Age: 58
End: 2018-02-16
Payer: MEDICARE

## 2018-02-16 VITALS
TEMPERATURE: 98.1 F | OXYGEN SATURATION: 97 % | DIASTOLIC BLOOD PRESSURE: 88 MMHG | BODY MASS INDEX: 46.01 KG/M2 | HEIGHT: 62 IN | HEART RATE: 73 BPM | SYSTOLIC BLOOD PRESSURE: 143 MMHG | WEIGHT: 250 LBS | RESPIRATION RATE: 13 BRPM

## 2018-02-16 VITALS
RESPIRATION RATE: 16 BRPM | OXYGEN SATURATION: 95 % | SYSTOLIC BLOOD PRESSURE: 135 MMHG | DIASTOLIC BLOOD PRESSURE: 70 MMHG

## 2018-02-16 PROBLEM — Z12.11 COLON CANCER SCREENING: Status: RESOLVED | Noted: 2018-02-16 | Resolved: 2018-02-16

## 2018-02-16 PROBLEM — Z12.11 COLON CANCER SCREENING: Status: ACTIVE | Noted: 2018-02-16

## 2018-02-16 PROCEDURE — 2500000003 HC RX 250 WO HCPCS: Performed by: NURSE ANESTHETIST, CERTIFIED REGISTERED

## 2018-02-16 PROCEDURE — 7100000011 HC PHASE II RECOVERY - ADDTL 15 MIN: Performed by: SURGERY

## 2018-02-16 PROCEDURE — 2580000003 HC RX 258: Performed by: ANESTHESIOLOGY

## 2018-02-16 PROCEDURE — 3700000000 HC ANESTHESIA ATTENDED CARE: Performed by: SURGERY

## 2018-02-16 PROCEDURE — 3609027000 HC COLONOSCOPY: Performed by: SURGERY

## 2018-02-16 PROCEDURE — 3700000001 HC ADD 15 MINUTES (ANESTHESIA): Performed by: SURGERY

## 2018-02-16 PROCEDURE — 6360000002 HC RX W HCPCS: Performed by: NURSE ANESTHETIST, CERTIFIED REGISTERED

## 2018-02-16 PROCEDURE — 7100000010 HC PHASE II RECOVERY - FIRST 15 MIN: Performed by: SURGERY

## 2018-02-16 RX ORDER — PROPOFOL 10 MG/ML
INJECTION, EMULSION INTRAVENOUS PRN
Status: DISCONTINUED | OUTPATIENT
Start: 2018-02-16 | End: 2018-02-16 | Stop reason: SDUPTHER

## 2018-02-16 RX ORDER — LIDOCAINE HYDROCHLORIDE 10 MG/ML
INJECTION, SOLUTION EPIDURAL; INFILTRATION; INTRACAUDAL; PERINEURAL PRN
Status: DISCONTINUED | OUTPATIENT
Start: 2018-02-16 | End: 2018-02-16 | Stop reason: SDUPTHER

## 2018-02-16 RX ORDER — SODIUM CHLORIDE, SODIUM LACTATE, POTASSIUM CHLORIDE, CALCIUM CHLORIDE 600; 310; 30; 20 MG/100ML; MG/100ML; MG/100ML; MG/100ML
INJECTION, SOLUTION INTRAVENOUS CONTINUOUS
Status: DISCONTINUED | OUTPATIENT
Start: 2018-02-16 | End: 2018-02-16 | Stop reason: HOSPADM

## 2018-02-16 RX ADMIN — PROPOFOL 30 MG: 10 INJECTION, EMULSION INTRAVENOUS at 08:57

## 2018-02-16 RX ADMIN — SODIUM CHLORIDE, POTASSIUM CHLORIDE, SODIUM LACTATE AND CALCIUM CHLORIDE: 600; 310; 30; 20 INJECTION, SOLUTION INTRAVENOUS at 08:30

## 2018-02-16 RX ADMIN — PROPOFOL 20 MG: 10 INJECTION, EMULSION INTRAVENOUS at 08:56

## 2018-02-16 RX ADMIN — LIDOCAINE HYDROCHLORIDE 50 MG: 10 INJECTION, SOLUTION EPIDURAL; INFILTRATION; INTRACAUDAL; PERINEURAL at 08:41

## 2018-02-16 RX ADMIN — PROPOFOL 20 MG: 10 INJECTION, EMULSION INTRAVENOUS at 08:47

## 2018-02-16 RX ADMIN — PROPOFOL 20 MG: 10 INJECTION, EMULSION INTRAVENOUS at 08:45

## 2018-02-16 RX ADMIN — PROPOFOL 20 MG: 10 INJECTION, EMULSION INTRAVENOUS at 08:59

## 2018-02-16 RX ADMIN — PROPOFOL 30 MG: 10 INJECTION, EMULSION INTRAVENOUS at 08:54

## 2018-02-16 RX ADMIN — PROPOFOL 30 MG: 10 INJECTION, EMULSION INTRAVENOUS at 08:49

## 2018-02-16 RX ADMIN — PROPOFOL 30 MG: 10 INJECTION, EMULSION INTRAVENOUS at 08:52

## 2018-02-16 RX ADMIN — PROPOFOL 50 MG: 10 INJECTION, EMULSION INTRAVENOUS at 08:42

## 2018-02-16 RX ADMIN — PROPOFOL 20 MG: 10 INJECTION, EMULSION INTRAVENOUS at 09:01

## 2018-02-16 RX ADMIN — PROPOFOL 30 MG: 10 INJECTION, EMULSION INTRAVENOUS at 08:43

## 2018-02-16 ASSESSMENT — PULMONARY FUNCTION TESTS
PIF_VALUE: 0

## 2018-02-16 ASSESSMENT — PAIN SCALES - GENERAL: PAINLEVEL_OUTOF10: 0

## 2018-02-16 ASSESSMENT — PAIN - FUNCTIONAL ASSESSMENT: PAIN_FUNCTIONAL_ASSESSMENT: 0-10

## 2018-02-16 NOTE — OP NOTE
[address]    2/16/2018    Britt Carlee  1960      Pre op Diag: screening    Procedure: colonoscopy     Postop Diag: normal exam    Anesthesia MAC    The patient was placed on her left side. A rectal exam was done that showed no masses. The scope was passed under direct vision through the rectum and sigmoid colon. The prep was fair. The scope was then passed across the transverse colon, down the right colon to the cecum. The scope was then slowly withdrawn checking all the walls of the bowel a second time. There were no abnormalities found. The scope was flexed in the rectum. There were no significant hemorrhoids. The patient tolerated the procedure well. I recommend a repeat colonoscopy in 10 years. Anurag Moser MD.    Copy to Duane Chappell CNP

## 2018-02-16 NOTE — H&P
REASON FOR procedure:  screening    REQUESTING PHYSICIAN:  Mitzi Bunch MD    HISTORY OF PRESENT ILLNESS:    The patient is a 59 y.o. female who presents for colonoscopy    MEDICAL HISTORY:   Past Medical History:   Diagnosis Date    Anxiety     Chronic kidney disease     Depression     Hyperlipidemia     Hypertension     Obesity     Osteoarthritis     Proteinuria     Sleep apnea     Type II or unspecified type diabetes mellitus without mention of complication, not stated as uncontrolled     Von Willebrand disease ([facility] [address]        SURGICAL HISTORY:  Past Surgical History:   Procedure Laterality Date    APPENDECTOMY       SECTION      x3, , ,     EYE SURGERY Bilateral     cataracts    EYE SURGERY Bilateral     glaucoma    TONSILLECTOMY AND ADENOIDECTOMY         MEDS:  Prior to Admission medications    Medication Sig Start Date End Date Taking? Authorizing Provider   oxyCODONE-acetaminophen (PERCOCET) 5-325 MG per tablet Take 1 tablet by mouth every 12 hours as needed for Pain.  18 Yes Guerda Galeas, CNP   hydrALAZINE (APRESOLINE) 50 MG tablet Take 1 tablet by mouth 2 times daily 1/3/18 1/3/19 Yes Guerda Galeas, CNP   albuterol sulfate HFA (VENTOLIN HFA) 108 (90 Base) MCG/ACT inhaler Inhale 2 puffs into the lungs every 6 hours as needed for Wheezing or Shortness of Breath 1/3/18 1/3/19 Yes Guerda Galeas, CNP   ULORIC 40 MG TABS tablet take 1 tablet by mouth once daily 17  Yes Renee Galeana DPM   glimepiride (AMARYL) 4 MG tablet Take 1 tablet by mouth 2 times daily (before meals) 17 Yes Guerda Galeas, CNP   isosorbide mononitrate (IMDUR) 30 MG extended release tablet Take 1 tablet by mouth daily 17 Yes Guerda Galeas, CNP   metFORMIN (GLUCOPHAGE-XR) 500 MG extended release tablet Take 2 tablets by mouth 2 times daily 17 Yes Guerda Galeas, CNP   warfarin (COUMADIN) 2 MG tablet ON  AND

## 2018-02-16 NOTE — ANESTHESIA POSTPROCEDURE EVALUATION
Department of Anesthesiology  Postprocedure Note    Patient: Alexia Chambers  MRN: [id number]  YOB: 1960  Date of evaluation: 2/16/2018  Time:  9:10 AM     Procedure Summary     Date:  02/16/18 Room / Location:  [facility] [address] [place] [address] [facility]    Anesthesia Start:  0840 Anesthesia Stop:  2135    Procedure:  COLONOSCOPY (N/A ) Diagnosis:  (SCREENING)    Surgeon:  Jeff Goayl MD Responsible Provider:  Leonardo Diaz MD    Anesthesia Type:  MAC, general ASA Status:  2          Anesthesia Type: MAC, general    Joanna Phase I: Joanna Score: 10    Joanna Phase II:      Last vitals: Reviewed and per EMR flowsheets.        Anesthesia Post Evaluation    Patient location during evaluation: PACU  Patient participation: complete - patient participated  Level of consciousness: awake  Pain score: 0  Airway patency: patent  Nausea & Vomiting: no nausea and no vomiting  Complications: no  Cardiovascular status: hemodynamically stable  Respiratory status: nonlabored ventilation  Hydration status: euvolemic

## 2018-02-23 ENCOUNTER — TELEPHONE (OUTPATIENT)
Dept: FAMILY MEDICINE CLINIC | Age: 58
End: 2018-02-23

## 2018-02-23 DIAGNOSIS — N18.30 CONTROLLED TYPE 2 DIABETES MELLITUS WITH STAGE 3 CHRONIC KIDNEY DISEASE, WITHOUT LONG-TERM CURRENT USE OF INSULIN (HCC): Primary | ICD-10-CM

## 2018-02-23 DIAGNOSIS — E11.22 CONTROLLED TYPE 2 DIABETES MELLITUS WITH STAGE 3 CHRONIC KIDNEY DISEASE, WITHOUT LONG-TERM CURRENT USE OF INSULIN (HCC): Primary | ICD-10-CM

## 2018-02-23 RX ORDER — OMEPRAZOLE 20 MG/1
20 CAPSULE, DELAYED RELEASE ORAL DAILY
Qty: 30 CAPSULE | Refills: 5 | Status: SHIPPED | OUTPATIENT
Start: 2018-02-23 | End: 2018-07-28 | Stop reason: SDUPTHER

## 2018-02-23 RX ORDER — BUPROPION HYDROCHLORIDE 150 MG/1
150 TABLET ORAL EVERY MORNING
Qty: 30 TABLET | Refills: 5 | OUTPATIENT
Start: 2018-02-23 | End: 2019-02-23

## 2018-02-23 RX ORDER — METFORMIN HYDROCHLORIDE 500 MG/1
1000 TABLET, EXTENDED RELEASE ORAL 2 TIMES DAILY
Qty: 120 TABLET | Refills: 5 | Status: SHIPPED | OUTPATIENT
Start: 2018-02-23 | End: 2018-07-28 | Stop reason: SDUPTHER

## 2018-02-23 RX ORDER — BUPROPION HYDROCHLORIDE 300 MG/1
300 TABLET ORAL EVERY MORNING
Qty: 30 TABLET | Refills: 5 | OUTPATIENT
Start: 2018-02-23 | End: 2019-02-23

## 2018-02-23 RX ORDER — ATORVASTATIN CALCIUM 40 MG/1
40 TABLET, FILM COATED ORAL DAILY
Qty: 30 TABLET | Refills: 5 | Status: SHIPPED | OUTPATIENT
Start: 2018-02-23 | End: 2018-07-28 | Stop reason: SDUPTHER

## 2018-02-23 RX ORDER — RISPERIDONE 3 MG/1
3 TABLET ORAL NIGHTLY
Qty: 30 TABLET | Refills: 5 | OUTPATIENT
Start: 2018-02-23 | End: 2019-02-23

## 2018-02-23 RX ORDER — FUROSEMIDE 20 MG/1
20 TABLET ORAL DAILY
Qty: 30 TABLET | Refills: 5 | Status: SHIPPED | OUTPATIENT
Start: 2018-02-23 | End: 2018-07-28 | Stop reason: SDUPTHER

## 2018-02-23 RX ORDER — ISOSORBIDE MONONITRATE 30 MG/1
30 TABLET, EXTENDED RELEASE ORAL DAILY
Qty: 30 TABLET | Refills: 5 | Status: SHIPPED | OUTPATIENT
Start: 2018-02-23 | End: 2018-07-25 | Stop reason: SDUPTHER

## 2018-02-23 RX ORDER — CITALOPRAM 20 MG/1
20 TABLET ORAL DAILY
Qty: 30 TABLET | Refills: 5 | OUTPATIENT
Start: 2018-02-23 | End: 2019-02-23

## 2018-02-23 NOTE — TELEPHONE ENCOUNTER
LOV:  01/16/18  RTO: 3 months  LR: 11/07/17    Health Maintenance   Topic Date Due    Shingles Vaccine (1 of 2 - 2 Dose Series) 04/23/2008    Diabetic retinal exam  01/01/2016    A1C test (Diabetic or Prediabetic)  10/17/2018    Lipid screen  01/16/2019    Potassium monitoring  01/16/2019    Creatinine monitoring  01/16/2019    Diabetic foot exam  02/09/2019    Breast cancer screen  10/25/2019    Cervical cancer screen  11/16/2022    DTaP/Tdap/Td vaccine (2 - Td) 08/04/2026    Colon cancer screen colonoscopy  02/16/2028    Flu vaccine  Completed    Pneumococcal med risk  Completed    Hepatitis C screen  Addressed    HIV screen  Addressed             (applicable per patient's age: Cancer Screenings, Depression Screening, Fall Risk Screening, Immunizations)    Hemoglobin A1C (%)   Date Value   10/17/2017 5.9   06/27/2017 7.3   02/16/2017 7.3     Microalb/Crt.  Ratio (mcg/mg creat)   Date Value   10/28/2014 7     LDL Cholesterol (mg/dL)   Date Value   01/16/2018 84     AST (U/L)   Date Value   01/16/2018 16     ALT (U/L)   Date Value   01/16/2018 24     BUN (mg/dL)   Date Value   01/16/2018 22 (H)   01/16/2018 21 (H)      (goal A1C is < 7)   (goal LDL is <100) need 30-50% reduction from baseline     BP Readings from Last 3 Encounters:   02/16/18 (!) 143/88   02/16/18 135/70   02/06/18 122/79    (goal /80)      All Future Testing planned in [facility]:  Lab Frequency Next Occurrence   CBC Auto Differential Once 03/25/2018   Creatinine, Random Urine Once 03/25/2018   Protein, urine, random Once 03/25/2018   Comprehensive Metabolic Panel Once 84/50/5766   Hemoglobin A1C Once 07/16/2018   Lipid Panel Once 07/16/2018   POCT INR         Next Visit Date:  Future Appointments  Date Time Provider Mark Junior   3/6/2018 8:30 AM SILVANO [facility] Jarret [facility]   3/12/2018 8:50 AM Kathy Marinelli MD [facility] None   4/10/2018 9:00 [facility]   4/17/2018

## 2018-03-06 ENCOUNTER — ANTI-COAG VISIT (OUTPATIENT)
Dept: FAMILY MEDICINE CLINIC | Age: 58
End: 2018-03-06
Payer: MEDICARE

## 2018-03-06 DIAGNOSIS — M15.9 PRIMARY OSTEOARTHRITIS INVOLVING MULTIPLE JOINTS: ICD-10-CM

## 2018-03-06 DIAGNOSIS — Z86.718 HISTORY OF DVT OF LOWER EXTREMITY: ICD-10-CM

## 2018-03-06 LAB
INTERNATIONAL NORMALIZATION RATIO, POC: 2.8
PROTHROMBIN TIME, POC: 33.2

## 2018-03-06 PROCEDURE — 85610 PROTHROMBIN TIME: CPT | Performed by: NURSE PRACTITIONER

## 2018-03-06 RX ORDER — ALBUTEROL SULFATE 90 UG/1
2 AEROSOL, METERED RESPIRATORY (INHALATION) EVERY 6 HOURS PRN
Qty: 18 G | Refills: 0 | Status: SHIPPED | OUTPATIENT
Start: 2018-03-06 | End: 2018-04-30 | Stop reason: SDUPTHER

## 2018-03-07 RX ORDER — OXYCODONE HYDROCHLORIDE AND ACETAMINOPHEN 5; 325 MG/1; MG/1
1 TABLET ORAL EVERY 12 HOURS
Qty: 50 TABLET | Refills: 0 | Status: SHIPPED | OUTPATIENT
Start: 2018-03-07 | End: 2018-04-30 | Stop reason: SDUPTHER

## 2018-03-09 RX ORDER — WARFARIN SODIUM 2 MG/1
TABLET ORAL
Qty: 45 TABLET | Refills: 3 | Status: SHIPPED | OUTPATIENT
Start: 2018-03-09 | End: 2018-06-26 | Stop reason: SDUPTHER

## 2018-03-12 RX ORDER — LISINOPRIL 20 MG/1
TABLET ORAL
Qty: 30 TABLET | Refills: 5 | Status: SHIPPED | OUTPATIENT
Start: 2018-03-12 | End: 2018-08-31 | Stop reason: SDUPTHER

## 2018-03-28 DIAGNOSIS — M10.071 ACUTE IDIOPATHIC GOUT OF RIGHT FOOT: ICD-10-CM

## 2018-03-28 DIAGNOSIS — M10.072 ACUTE IDIOPATHIC GOUT OF LEFT FOOT: ICD-10-CM

## 2018-03-29 RX ORDER — FEBUXOSTAT 40 MG/1
TABLET ORAL
Qty: 30 TABLET | Refills: 0 | Status: SHIPPED | OUTPATIENT
Start: 2018-03-29 | End: 2018-04-30 | Stop reason: SDUPTHER

## 2018-03-31 RX ORDER — BENZONATATE 100 MG/1
100 CAPSULE ORAL 3 TIMES DAILY PRN
Qty: 30 CAPSULE | Refills: 0 | Status: SHIPPED | OUTPATIENT
Start: 2018-03-31 | End: 2018-07-25 | Stop reason: ALTCHOICE

## 2018-04-03 ENCOUNTER — OFFICE VISIT (OUTPATIENT)
Dept: FAMILY MEDICINE CLINIC | Age: 58
End: 2018-04-03
Payer: MEDICARE

## 2018-04-03 ENCOUNTER — ANTI-COAG VISIT (OUTPATIENT)
Dept: FAMILY MEDICINE CLINIC | Age: 58
End: 2018-04-03
Payer: MEDICARE

## 2018-04-03 VITALS
SYSTOLIC BLOOD PRESSURE: 137 MMHG | HEART RATE: 86 BPM | BODY MASS INDEX: 46.82 KG/M2 | OXYGEN SATURATION: 97 % | DIASTOLIC BLOOD PRESSURE: 91 MMHG | RESPIRATION RATE: 18 BRPM | TEMPERATURE: 97.3 F | WEIGHT: 256 LBS

## 2018-04-03 DIAGNOSIS — R06.2 WHEEZING: ICD-10-CM

## 2018-04-03 DIAGNOSIS — B34.9 VIRAL ILLNESS: ICD-10-CM

## 2018-04-03 DIAGNOSIS — Z86.718 HISTORY OF DVT OF LOWER EXTREMITY: ICD-10-CM

## 2018-04-03 DIAGNOSIS — J40 BRONCHITIS: Primary | ICD-10-CM

## 2018-04-03 LAB
INTERNATIONAL NORMALIZATION RATIO, POC: 2.4
PROTHROMBIN TIME, POC: 28.4

## 2018-04-03 PROCEDURE — 85610 PROTHROMBIN TIME: CPT | Performed by: NURSE PRACTITIONER

## 2018-04-03 PROCEDURE — 99213 OFFICE O/P EST LOW 20 MIN: CPT | Performed by: NURSE PRACTITIONER

## 2018-04-03 PROCEDURE — G8427 DOCREV CUR MEDS BY ELIG CLIN: HCPCS | Performed by: NURSE PRACTITIONER

## 2018-04-03 PROCEDURE — 3014F SCREEN MAMMO DOC REV: CPT | Performed by: NURSE PRACTITIONER

## 2018-04-03 PROCEDURE — 3017F COLORECTAL CA SCREEN DOC REV: CPT | Performed by: NURSE PRACTITIONER

## 2018-04-03 PROCEDURE — G8417 CALC BMI ABV UP PARAM F/U: HCPCS | Performed by: NURSE PRACTITIONER

## 2018-04-03 PROCEDURE — 1036F TOBACCO NON-USER: CPT | Performed by: NURSE PRACTITIONER

## 2018-04-03 RX ORDER — PREDNISONE 20 MG/1
TABLET ORAL
Qty: 11 TABLET | Refills: 0 | Status: SHIPPED | OUTPATIENT
Start: 2018-04-03 | End: 2018-04-10

## 2018-04-03 RX ORDER — ALBUTEROL SULFATE 2.5 MG/3ML
2.5 SOLUTION RESPIRATORY (INHALATION) EVERY 4 HOURS PRN
Qty: 100 EACH | Refills: 1 | Status: SHIPPED | OUTPATIENT
Start: 2018-04-03 | End: 2020-05-06

## 2018-04-03 RX ORDER — ALBUTEROL SULFATE 2.5 MG/3ML
2.5 SOLUTION RESPIRATORY (INHALATION) ONCE
Status: COMPLETED | OUTPATIENT
Start: 2018-04-03 | End: 2018-04-03

## 2018-04-03 RX ADMIN — ALBUTEROL SULFATE 2.5 MG: 2.5 SOLUTION RESPIRATORY (INHALATION) at 10:36

## 2018-04-03 ASSESSMENT — ENCOUNTER SYMPTOMS
COUGH: 1
TROUBLE SWALLOWING: 1
EYE REDNESS: 0
VOICE CHANGE: 1
VOMITING: 0
EYE DISCHARGE: 0
RHINORRHEA: 0
DIARRHEA: 1
ABDOMINAL PAIN: 0
EYE ITCHING: 0
WHEEZING: 1
CONSTIPATION: 0
NAUSEA: 0
CHEST TIGHTNESS: 1
SORE THROAT: 1
SHORTNESS OF BREATH: 1

## 2018-04-06 ENCOUNTER — TELEPHONE (OUTPATIENT)
Dept: FAMILY MEDICINE CLINIC | Age: 58
End: 2018-04-06

## 2018-04-06 RX ORDER — AMOXICILLIN 500 MG/1
500 CAPSULE ORAL 2 TIMES DAILY
Qty: 20 CAPSULE | Refills: 0 | Status: SHIPPED | OUTPATIENT
Start: 2018-04-06 | End: 2018-04-16

## 2018-04-10 ENCOUNTER — OFFICE VISIT (OUTPATIENT)
Dept: PODIATRY | Age: 58
End: 2018-04-10
Payer: MEDICARE

## 2018-04-10 VITALS — BODY MASS INDEX: 42.34 KG/M2 | WEIGHT: 248 LBS | HEART RATE: 68 BPM | RESPIRATION RATE: 16 BRPM | HEIGHT: 64 IN

## 2018-04-10 DIAGNOSIS — E11.51 TYPE II DIABETES MELLITUS WITH PERIPHERAL CIRCULATORY DISORDER (HCC): ICD-10-CM

## 2018-04-10 DIAGNOSIS — I73.9 PVD (PERIPHERAL VASCULAR DISEASE) (HCC): ICD-10-CM

## 2018-04-10 DIAGNOSIS — M79.671 PAIN IN BOTH FEET: ICD-10-CM

## 2018-04-10 DIAGNOSIS — B35.1 DERMATOPHYTOSIS OF NAIL: Primary | ICD-10-CM

## 2018-04-10 DIAGNOSIS — M79.672 PAIN IN BOTH FEET: ICD-10-CM

## 2018-04-10 PROCEDURE — 3046F HEMOGLOBIN A1C LEVEL >9.0%: CPT | Performed by: PODIATRIST

## 2018-04-10 PROCEDURE — G8427 DOCREV CUR MEDS BY ELIG CLIN: HCPCS | Performed by: PODIATRIST

## 2018-04-10 PROCEDURE — 3014F SCREEN MAMMO DOC REV: CPT | Performed by: PODIATRIST

## 2018-04-10 PROCEDURE — 1036F TOBACCO NON-USER: CPT | Performed by: PODIATRIST

## 2018-04-10 PROCEDURE — 99213 OFFICE O/P EST LOW 20 MIN: CPT | Performed by: PODIATRIST

## 2018-04-10 PROCEDURE — 3017F COLORECTAL CA SCREEN DOC REV: CPT | Performed by: PODIATRIST

## 2018-04-10 PROCEDURE — 11721 DEBRIDE NAIL 6 OR MORE: CPT | Performed by: PODIATRIST

## 2018-04-10 PROCEDURE — G8417 CALC BMI ABV UP PARAM F/U: HCPCS | Performed by: PODIATRIST

## 2018-04-13 ENCOUNTER — ANTI-COAG VISIT (OUTPATIENT)
Dept: FAMILY MEDICINE CLINIC | Age: 58
End: 2018-04-13
Payer: MEDICARE

## 2018-04-13 DIAGNOSIS — Z86.718 HISTORY OF DVT OF LOWER EXTREMITY: ICD-10-CM

## 2018-04-13 LAB
INTERNATIONAL NORMALIZATION RATIO, POC: 3.9
PROTHROMBIN TIME, POC: 47

## 2018-04-13 PROCEDURE — 85610 PROTHROMBIN TIME: CPT | Performed by: NURSE PRACTITIONER

## 2018-04-17 ENCOUNTER — OFFICE VISIT (OUTPATIENT)
Dept: FAMILY MEDICINE CLINIC | Age: 58
End: 2018-04-17
Payer: MEDICARE

## 2018-04-17 VITALS
DIASTOLIC BLOOD PRESSURE: 80 MMHG | SYSTOLIC BLOOD PRESSURE: 121 MMHG | BODY MASS INDEX: 43.08 KG/M2 | TEMPERATURE: 97.3 F | OXYGEN SATURATION: 98 % | WEIGHT: 251 LBS | HEART RATE: 99 BPM

## 2018-04-17 DIAGNOSIS — Z86.718 HISTORY OF DVT OF LOWER EXTREMITY: ICD-10-CM

## 2018-04-17 DIAGNOSIS — E11.22 CONTROLLED TYPE 2 DIABETES MELLITUS WITH STAGE 3 CHRONIC KIDNEY DISEASE, WITHOUT LONG-TERM CURRENT USE OF INSULIN (HCC): Primary | ICD-10-CM

## 2018-04-17 DIAGNOSIS — E78.00 PURE HYPERCHOLESTEROLEMIA: ICD-10-CM

## 2018-04-17 DIAGNOSIS — M15.9 PRIMARY OSTEOARTHRITIS INVOLVING MULTIPLE JOINTS: ICD-10-CM

## 2018-04-17 DIAGNOSIS — I10 ESSENTIAL HYPERTENSION: ICD-10-CM

## 2018-04-17 DIAGNOSIS — N18.30 CONTROLLED TYPE 2 DIABETES MELLITUS WITH STAGE 3 CHRONIC KIDNEY DISEASE, WITHOUT LONG-TERM CURRENT USE OF INSULIN (HCC): Primary | ICD-10-CM

## 2018-04-17 LAB
INTERNATIONAL NORMALIZATION RATIO, POC: 3
PROTHROMBIN TIME, POC: 35.4

## 2018-04-17 PROCEDURE — 99214 OFFICE O/P EST MOD 30 MIN: CPT | Performed by: NURSE PRACTITIONER

## 2018-04-17 PROCEDURE — 3046F HEMOGLOBIN A1C LEVEL >9.0%: CPT | Performed by: NURSE PRACTITIONER

## 2018-04-17 PROCEDURE — 3017F COLORECTAL CA SCREEN DOC REV: CPT | Performed by: NURSE PRACTITIONER

## 2018-04-17 PROCEDURE — 85610 PROTHROMBIN TIME: CPT | Performed by: NURSE PRACTITIONER

## 2018-04-17 PROCEDURE — G8427 DOCREV CUR MEDS BY ELIG CLIN: HCPCS | Performed by: NURSE PRACTITIONER

## 2018-04-17 PROCEDURE — G8417 CALC BMI ABV UP PARAM F/U: HCPCS | Performed by: NURSE PRACTITIONER

## 2018-04-17 PROCEDURE — 1036F TOBACCO NON-USER: CPT | Performed by: NURSE PRACTITIONER

## 2018-04-17 PROCEDURE — 2022F DILAT RTA XM EVC RTNOPTHY: CPT | Performed by: NURSE PRACTITIONER

## 2018-04-17 ASSESSMENT — ENCOUNTER SYMPTOMS
DIARRHEA: 0
EYES NEGATIVE: 1
ABDOMINAL PAIN: 0
CONSTIPATION: 0
WHEEZING: 0
NAUSEA: 0
BACK PAIN: 1
SORE THROAT: 0
SINUS PRESSURE: 0
COUGH: 0
ORTHOPNEA: 0
VISUAL CHANGE: 0
SHORTNESS OF BREATH: 0
CHEST TIGHTNESS: 0

## 2018-04-24 ENCOUNTER — ANTI-COAG VISIT (OUTPATIENT)
Dept: FAMILY MEDICINE CLINIC | Age: 58
End: 2018-04-24
Payer: MEDICARE

## 2018-04-24 ENCOUNTER — HOSPITAL ENCOUNTER (OUTPATIENT)
Age: 58
Setting detail: SPECIMEN
Discharge: HOME | End: 2018-04-24
Payer: MEDICARE

## 2018-04-24 DIAGNOSIS — Z86.718 HISTORY OF DVT OF LOWER EXTREMITY: ICD-10-CM

## 2018-04-24 DIAGNOSIS — N18.30 CHRONIC RENAL FAILURE, STAGE 3 (MODERATE) (HCC): ICD-10-CM

## 2018-04-24 LAB
ABSOLUTE EOS #: 0.12 K/UL (ref 0–0.44)
ABSOLUTE IMMATURE GRANULOCYTE: <0.03 K/UL (ref 0–0.3)
ABSOLUTE LYMPH #: 2.38 K/UL (ref 1.1–3.7)
ABSOLUTE MONO #: 0.47 K/UL (ref 0.1–1.2)
ANION GAP SERPL CALCULATED.3IONS-SCNC: 13 MMOL/L (ref 9–17)
BASOPHILS # BLD: 0 % (ref 0–2)
BASOPHILS ABSOLUTE: <0.03 K/UL (ref 0–0.2)
BUN BLDV-MCNC: 20 MG/DL (ref 8–23)
BUN/CREAT BLD: ABNORMAL (ref 9–20)
CALCIUM SERPL-MCNC: 8.6 MG/DL (ref 8.6–10.4)
CHLORIDE BLD-SCNC: 101 MMOL/L (ref 98–107)
CO2: 28 MMOL/L (ref 20–31)
CREAT SERPL-MCNC: 1.14 MG/DL (ref 0.5–0.9)
CREATININE URINE: 19 MG/DL (ref 28–217)
DIFFERENTIAL TYPE: ABNORMAL
EOSINOPHILS RELATIVE PERCENT: 2 % (ref 1–4)
GFR AFRICAN AMERICAN: 59 ML/MIN
GFR NON-AFRICAN AMERICAN: 49 ML/MIN
GFR SERPL CREATININE-BSD FRML MDRD: ABNORMAL ML/MIN/{1.73_M2}
GFR SERPL CREATININE-BSD FRML MDRD: ABNORMAL ML/MIN/{1.73_M2}
GLUCOSE BLD-MCNC: 173 MG/DL (ref 70–99)
HCT VFR BLD CALC: 38 % (ref 36.3–47.1)
HEMOGLOBIN: 11.8 G/DL (ref 11.9–15.1)
IMMATURE GRANULOCYTES: 0 %
INTERNATIONAL NORMALIZATION RATIO, POC: 2.5
LYMPHOCYTES # BLD: 33 % (ref 24–43)
MCH RBC QN AUTO: 29 PG (ref 25.2–33.5)
MCHC RBC AUTO-ENTMCNC: 31.1 G/DL (ref 28.4–34.8)
MCV RBC AUTO: 93.4 FL (ref 82.6–102.9)
MONOCYTES # BLD: 7 % (ref 3–12)
NRBC AUTOMATED: 0 PER 100 WBC
PDW BLD-RTO: 14.3 % (ref 11.8–14.4)
PLATELET # BLD: 206 K/UL (ref 138–453)
PLATELET ESTIMATE: ABNORMAL
PMV BLD AUTO: 9.1 FL (ref 8.1–13.5)
POTASSIUM SERPL-SCNC: 4.2 MMOL/L (ref 3.7–5.3)
PROTHROMBIN TIME, POC: 30
RBC # BLD: 4.07 M/UL (ref 3.95–5.11)
RBC # BLD: ABNORMAL 10*6/UL
SEG NEUTROPHILS: 58 % (ref 36–65)
SEGMENTED NEUTROPHILS ABSOLUTE COUNT: 4.15 K/UL (ref 1.5–8.1)
SODIUM BLD-SCNC: 142 MMOL/L (ref 135–144)
TOTAL PROTEIN, URINE: <4 MG/DL
WBC # BLD: 7.2 K/UL (ref 3.5–11.3)
WBC # BLD: ABNORMAL 10*3/UL

## 2018-04-24 PROCEDURE — 85610 PROTHROMBIN TIME: CPT | Performed by: NURSE PRACTITIONER

## 2018-04-30 DIAGNOSIS — M10.072 ACUTE IDIOPATHIC GOUT OF LEFT FOOT: ICD-10-CM

## 2018-04-30 DIAGNOSIS — M10.071 ACUTE IDIOPATHIC GOUT OF RIGHT FOOT: ICD-10-CM

## 2018-04-30 DIAGNOSIS — M15.9 PRIMARY OSTEOARTHRITIS INVOLVING MULTIPLE JOINTS: ICD-10-CM

## 2018-04-30 RX ORDER — ALBUTEROL SULFATE 90 UG/1
2 AEROSOL, METERED RESPIRATORY (INHALATION) EVERY 6 HOURS PRN
Qty: 18 G | Refills: 0 | Status: SHIPPED | OUTPATIENT
Start: 2018-04-30 | End: 2018-08-30 | Stop reason: SDUPTHER

## 2018-04-30 RX ORDER — OXYCODONE HYDROCHLORIDE AND ACETAMINOPHEN 5; 325 MG/1; MG/1
1 TABLET ORAL EVERY 12 HOURS
Qty: 50 TABLET | Refills: 0 | Status: SHIPPED | OUTPATIENT
Start: 2018-04-30 | End: 2018-06-26 | Stop reason: SDUPTHER

## 2018-05-01 RX ORDER — FEBUXOSTAT 40 MG/1
TABLET ORAL
Qty: 30 TABLET | Refills: 0 | Status: SHIPPED | OUTPATIENT
Start: 2018-05-01 | End: 2018-06-22 | Stop reason: SDUPTHER

## 2018-05-08 ENCOUNTER — ANTI-COAG VISIT (OUTPATIENT)
Dept: FAMILY MEDICINE CLINIC | Age: 58
End: 2018-05-08

## 2018-05-08 DIAGNOSIS — Z86.718 HISTORY OF DVT OF LOWER EXTREMITY: ICD-10-CM

## 2018-05-08 LAB
INTERNATIONAL NORMALIZATION RATIO, POC: 1.7
PROTHROMBIN TIME, POC: 19.9

## 2018-05-08 PROCEDURE — 85610 PROTHROMBIN TIME: CPT | Performed by: NURSE PRACTITIONER

## 2018-05-15 ENCOUNTER — ANTI-COAG VISIT (OUTPATIENT)
Dept: FAMILY MEDICINE CLINIC | Age: 58
End: 2018-05-15
Payer: MEDICARE

## 2018-05-15 DIAGNOSIS — Z86.718 HISTORY OF DVT OF LOWER EXTREMITY: ICD-10-CM

## 2018-05-15 LAB
INTERNATIONAL NORMALIZATION RATIO, POC: 1.7
PROTHROMBIN TIME, POC: 21

## 2018-05-15 PROCEDURE — 85610 PROTHROMBIN TIME: CPT | Performed by: NURSE PRACTITIONER

## 2018-05-22 ENCOUNTER — ANTI-COAG VISIT (OUTPATIENT)
Dept: FAMILY MEDICINE CLINIC | Age: 58
End: 2018-05-22
Payer: MEDICARE

## 2018-05-22 DIAGNOSIS — Z86.718 HISTORY OF DVT OF LOWER EXTREMITY: ICD-10-CM

## 2018-05-22 LAB
INTERNATIONAL NORMALIZATION RATIO, POC: 1.8
PROTHROMBIN TIME, POC: 21.9

## 2018-05-22 PROCEDURE — 93793 ANTICOAG MGMT PT WARFARIN: CPT | Performed by: NURSE PRACTITIONER

## 2018-05-22 PROCEDURE — 85610 PROTHROMBIN TIME: CPT | Performed by: NURSE PRACTITIONER

## 2018-05-29 ENCOUNTER — ANTI-COAG VISIT (OUTPATIENT)
Dept: FAMILY MEDICINE CLINIC | Age: 58
End: 2018-05-29
Payer: MEDICARE

## 2018-05-29 DIAGNOSIS — Z86.718 HISTORY OF DVT OF LOWER EXTREMITY: Primary | ICD-10-CM

## 2018-05-29 DIAGNOSIS — Z86.718 HISTORY OF DVT OF LOWER EXTREMITY: ICD-10-CM

## 2018-05-29 LAB
INTERNATIONAL NORMALIZATION RATIO, POC: 2.7
PROTHROMBIN TIME, POC: 31.8

## 2018-05-29 PROCEDURE — 85610 PROTHROMBIN TIME: CPT | Performed by: NURSE PRACTITIONER

## 2018-05-29 PROCEDURE — 93793 ANTICOAG MGMT PT WARFARIN: CPT | Performed by: NURSE PRACTITIONER

## 2018-06-01 RX ORDER — HYDRALAZINE HYDROCHLORIDE 50 MG/1
50 TABLET, FILM COATED ORAL 2 TIMES DAILY
Qty: 180 TABLET | Refills: 1 | Status: SHIPPED | OUTPATIENT
Start: 2018-06-01 | End: 2018-12-22 | Stop reason: SDUPTHER

## 2018-06-01 RX ORDER — GLIMEPIRIDE 4 MG/1
4 TABLET ORAL
Qty: 180 TABLET | Refills: 1 | Status: SHIPPED | OUTPATIENT
Start: 2018-06-01 | End: 2018-12-22 | Stop reason: SDUPTHER

## 2018-06-12 ENCOUNTER — ANTI-COAG VISIT (OUTPATIENT)
Dept: FAMILY MEDICINE CLINIC | Age: 58
End: 2018-06-12
Payer: MEDICARE

## 2018-06-12 DIAGNOSIS — Z86.718 HISTORY OF DVT OF LOWER EXTREMITY: Primary | ICD-10-CM

## 2018-06-12 DIAGNOSIS — Z86.718 HISTORY OF DVT OF LOWER EXTREMITY: ICD-10-CM

## 2018-06-12 LAB
INTERNATIONAL NORMALIZATION RATIO, POC: 2.8
PROTHROMBIN TIME, POC: 33.2

## 2018-06-12 PROCEDURE — 93793 ANTICOAG MGMT PT WARFARIN: CPT | Performed by: NURSE PRACTITIONER

## 2018-06-12 PROCEDURE — 85610 PROTHROMBIN TIME: CPT | Performed by: NURSE PRACTITIONER

## 2018-06-13 ENCOUNTER — OFFICE VISIT (OUTPATIENT)
Dept: PODIATRY | Age: 58
End: 2018-06-13
Payer: MEDICARE

## 2018-06-13 VITALS
HEIGHT: 62 IN | SYSTOLIC BLOOD PRESSURE: 155 MMHG | BODY MASS INDEX: 46.01 KG/M2 | DIASTOLIC BLOOD PRESSURE: 90 MMHG | RESPIRATION RATE: 19 BRPM | WEIGHT: 250 LBS | HEART RATE: 95 BPM

## 2018-06-13 DIAGNOSIS — I73.9 PVD (PERIPHERAL VASCULAR DISEASE) (HCC): ICD-10-CM

## 2018-06-13 DIAGNOSIS — B35.1 DERMATOPHYTOSIS OF NAIL: ICD-10-CM

## 2018-06-13 DIAGNOSIS — M79.672 PAIN IN BOTH FEET: ICD-10-CM

## 2018-06-13 DIAGNOSIS — E11.51 TYPE II DIABETES MELLITUS WITH PERIPHERAL CIRCULATORY DISORDER (HCC): Primary | ICD-10-CM

## 2018-06-13 DIAGNOSIS — M79.671 PAIN IN BOTH FEET: ICD-10-CM

## 2018-06-13 PROCEDURE — G8417 CALC BMI ABV UP PARAM F/U: HCPCS | Performed by: PODIATRIST

## 2018-06-13 PROCEDURE — G8427 DOCREV CUR MEDS BY ELIG CLIN: HCPCS | Performed by: PODIATRIST

## 2018-06-13 PROCEDURE — 1036F TOBACCO NON-USER: CPT | Performed by: PODIATRIST

## 2018-06-13 PROCEDURE — 3046F HEMOGLOBIN A1C LEVEL >9.0%: CPT | Performed by: PODIATRIST

## 2018-06-13 PROCEDURE — 99213 OFFICE O/P EST LOW 20 MIN: CPT | Performed by: PODIATRIST

## 2018-06-13 PROCEDURE — 11721 DEBRIDE NAIL 6 OR MORE: CPT | Performed by: PODIATRIST

## 2018-06-13 PROCEDURE — 2022F DILAT RTA XM EVC RTNOPTHY: CPT | Performed by: PODIATRIST

## 2018-06-13 PROCEDURE — 3017F COLORECTAL CA SCREEN DOC REV: CPT | Performed by: PODIATRIST

## 2018-06-22 DIAGNOSIS — M10.072 ACUTE IDIOPATHIC GOUT OF LEFT FOOT: ICD-10-CM

## 2018-06-22 DIAGNOSIS — M10.071 ACUTE IDIOPATHIC GOUT OF RIGHT FOOT: ICD-10-CM

## 2018-06-22 RX ORDER — FEBUXOSTAT 40 MG/1
TABLET ORAL
Qty: 30 TABLET | Refills: 0 | Status: SHIPPED | OUTPATIENT
Start: 2018-06-22 | End: 2018-07-19 | Stop reason: SDUPTHER

## 2018-06-26 DIAGNOSIS — M15.9 PRIMARY OSTEOARTHRITIS INVOLVING MULTIPLE JOINTS: ICD-10-CM

## 2018-06-26 RX ORDER — WARFARIN SODIUM 2 MG/1
TABLET ORAL
Qty: 45 TABLET | Refills: 2 | Status: SHIPPED | OUTPATIENT
Start: 2018-06-26 | End: 2018-10-31 | Stop reason: SDUPTHER

## 2018-06-27 RX ORDER — OXYCODONE HYDROCHLORIDE AND ACETAMINOPHEN 5; 325 MG/1; MG/1
1 TABLET ORAL EVERY 12 HOURS
Qty: 40 TABLET | Refills: 0 | Status: SHIPPED | OUTPATIENT
Start: 2018-06-27 | End: 2018-07-12 | Stop reason: SDUPTHER

## 2018-07-03 ENCOUNTER — ANTI-COAG VISIT (OUTPATIENT)
Dept: FAMILY MEDICINE CLINIC | Age: 58
End: 2018-07-03
Payer: MEDICARE

## 2018-07-03 DIAGNOSIS — Z86.718 HISTORY OF DVT OF LOWER EXTREMITY: Primary | ICD-10-CM

## 2018-07-03 LAB
INTERNATIONAL NORMALIZATION RATIO, POC: 2.8
PROTHROMBIN TIME, POC: 33.5

## 2018-07-03 PROCEDURE — 85610 PROTHROMBIN TIME: CPT | Performed by: NURSE PRACTITIONER

## 2018-07-03 PROCEDURE — 93793 ANTICOAG MGMT PT WARFARIN: CPT | Performed by: NURSE PRACTITIONER

## 2018-07-09 ENCOUNTER — TELEPHONE (OUTPATIENT)
Dept: FAMILY MEDICINE CLINIC | Age: 58
End: 2018-07-09

## 2018-07-09 DIAGNOSIS — G89.29 CHRONIC JOINT PAIN: Primary | ICD-10-CM

## 2018-07-09 DIAGNOSIS — M25.50 CHRONIC JOINT PAIN: Primary | ICD-10-CM

## 2018-07-09 DIAGNOSIS — M19.90 CHRONIC OSTEOARTHRITIS: ICD-10-CM

## 2018-07-12 RX ORDER — OXYCODONE HYDROCHLORIDE AND ACETAMINOPHEN 5; 325 MG/1; MG/1
1 TABLET ORAL EVERY 12 HOURS
Qty: 40 TABLET | Refills: 0 | Status: SHIPPED | OUTPATIENT
Start: 2018-07-12 | End: 2018-07-23 | Stop reason: SDUPTHER

## 2018-07-12 NOTE — TELEPHONE ENCOUNTER
Patient contacted and scheduled. Pharmacy contacted and Amarilys at pharmacy states the insurances are starting to reduce allotted dispense amount to 7 day supplies.

## 2018-07-13 NOTE — TELEPHONE ENCOUNTER
Patient called the office due to not being able to get her script. Karen called the Rx and was informed by Rizwana Ziegler that no matter what the Dx is the insurance will only cover 7 day supply and to get the 30 day supply a PA will need to be completed. Writer called the patients insurance to initiate PA and [employer] will not do PA over the phone, they are to be faxing the PA for to the office today. Writer then spoke to Shruti Faulkner at the Rx and was informed that the patient can get 7 day supply today to give time to get PA completed. Writer informed patient.

## 2018-07-16 DIAGNOSIS — G89.29 CHRONIC JOINT PAIN: ICD-10-CM

## 2018-07-16 DIAGNOSIS — M25.50 CHRONIC JOINT PAIN: ICD-10-CM

## 2018-07-16 DIAGNOSIS — M19.90 CHRONIC OSTEOARTHRITIS: ICD-10-CM

## 2018-07-16 NOTE — TELEPHONE ENCOUNTER
LOV:4-17-18  ROV:3 months  LRF:7-25-18  Approval from insurance came in for prescription. Pt was able to get filled for a 7 day supply pt still has 4 days left of current prescription. Pharmacy states that a new script must be sent. Health Maintenance   Topic Date Due    Shingles Vaccine (1 of 2 - 2 Dose Series) 04/23/2008    Diabetic retinal exam  01/01/2016    Flu vaccine (1) 09/01/2018    A1C test (Diabetic or Prediabetic)  10/17/2018    Lipid screen  01/16/2019    Potassium monitoring  04/24/2019    Creatinine monitoring  04/24/2019    Diabetic foot exam  06/20/2019    Breast cancer screen  10/25/2019    Cervical cancer screen  11/16/2022    DTaP/Tdap/Td vaccine (2 - Td) 08/04/2026    Colon cancer screen colonoscopy  02/16/2028    Pneumococcal med risk  Completed    Hepatitis C screen  Addressed    HIV screen  Addressed             (applicable per patient's age: Cancer Screenings, Depression Screening, Fall Risk Screening, Immunizations)    Hemoglobin A1C (%)   Date Value   10/17/2017 5.9   06/27/2017 7.3   02/16/2017 7.3     Microalb/Crt.  Ratio (mcg/mg creat)   Date Value   10/28/2014 7     LDL Cholesterol (mg/dL)   Date Value   01/16/2018 84     AST (U/L)   Date Value   01/16/2018 16     ALT (U/L)   Date Value   01/16/2018 24     BUN (mg/dL)   Date Value   04/24/2018 20      (goal A1C is < 7)   (goal LDL is <100) need 30-50% reduction from baseline     BP Readings from Last 3 Encounters:   06/13/18 (!) 155/90   04/30/18 108/66   04/17/18 121/80    (goal /80)      All Future Testing planned in [facility]:  Lab Frequency Next Occurrence   Comprehensive Metabolic Panel Once 50/36/7429   Hemoglobin A1C Once 07/16/2018   Lipid Panel Once 97/26/3074   Basic Metabolic Panel Once 03/98/7508   CBC Auto Differential Once 10/30/2018   Protein / Creatinine Ratio, Urine Once 10/30/2018   POCT INR         Next Visit Date:  Future Appointments  Date Time Provider Mark Junior   7/25/2018 8:40 AM Carrie Hernández, APRN - CNP Lars [facility]   7/31/2018 8:30 AM SCHEDULE, [facility]O [facility] Lars [facility]   8/15/2018 10:00 AM Sheldon Chowdhury DPM [facility]   10/29/2018 8:50 AM Iqra Reid MD [facility] None            Patient Active Problem List:     Hypertension     Hyperlipidemia     Osteoarthritis     Depression     Obesity     CKD (chronic kidney disease) stage 3, GFR 30-59 ml/min     Proteinuria     Controlled type 2 diabetes mellitus with stage 3 chronic kidney disease, without long-term current use of insulin (HCC)     History of DVT of lower extremity     NARCISO on CPAP

## 2018-07-17 RX ORDER — OXYCODONE HYDROCHLORIDE AND ACETAMINOPHEN 5; 325 MG/1; MG/1
1 TABLET ORAL EVERY 12 HOURS
Qty: 40 TABLET | Refills: 0 | OUTPATIENT
Start: 2018-07-17 | End: 2019-07-17

## 2018-07-17 NOTE — TELEPHONE ENCOUNTER
As last script was for 7 days, unable to send until 7/19/18 at the earliest.   Really should not be sent until patient needs it as it is an as needed medication. Last script for 7 days lasted her 14 days. Check with her to see if refill is needed.

## 2018-07-19 DIAGNOSIS — M10.072 ACUTE IDIOPATHIC GOUT OF LEFT FOOT: ICD-10-CM

## 2018-07-19 DIAGNOSIS — M10.071 ACUTE IDIOPATHIC GOUT OF RIGHT FOOT: ICD-10-CM

## 2018-07-19 RX ORDER — FEBUXOSTAT 40 MG/1
TABLET ORAL
Qty: 30 TABLET | Refills: 0 | Status: SHIPPED | OUTPATIENT
Start: 2018-07-19 | End: 2018-08-18 | Stop reason: SDUPTHER

## 2018-07-23 DIAGNOSIS — M25.50 CHRONIC JOINT PAIN: ICD-10-CM

## 2018-07-23 DIAGNOSIS — G89.29 CHRONIC JOINT PAIN: ICD-10-CM

## 2018-07-23 DIAGNOSIS — M19.90 CHRONIC OSTEOARTHRITIS: ICD-10-CM

## 2018-07-24 RX ORDER — OXYCODONE HYDROCHLORIDE AND ACETAMINOPHEN 5; 325 MG/1; MG/1
1 TABLET ORAL EVERY 12 HOURS
Qty: 40 TABLET | Refills: 0 | Status: SHIPPED | OUTPATIENT
Start: 2018-07-24 | End: 2018-08-22 | Stop reason: SDUPTHER

## 2018-07-25 ENCOUNTER — HOSPITAL ENCOUNTER (OUTPATIENT)
Age: 58
Setting detail: SPECIMEN
Discharge: HOME | End: 2018-07-25
Payer: MEDICARE

## 2018-07-25 ENCOUNTER — OFFICE VISIT (OUTPATIENT)
Dept: FAMILY MEDICINE CLINIC | Age: 58
End: 2018-07-25
Payer: MEDICARE

## 2018-07-25 VITALS
TEMPERATURE: 97.3 F | SYSTOLIC BLOOD PRESSURE: 126 MMHG | HEART RATE: 72 BPM | DIASTOLIC BLOOD PRESSURE: 66 MMHG | WEIGHT: 260 LBS | RESPIRATION RATE: 16 BRPM | BODY MASS INDEX: 47.55 KG/M2

## 2018-07-25 DIAGNOSIS — M15.9 PRIMARY OSTEOARTHRITIS INVOLVING MULTIPLE JOINTS: ICD-10-CM

## 2018-07-25 DIAGNOSIS — M25.561 CHRONIC PAIN OF BOTH KNEES: ICD-10-CM

## 2018-07-25 DIAGNOSIS — G89.29 CHRONIC PAIN OF BOTH KNEES: ICD-10-CM

## 2018-07-25 DIAGNOSIS — E78.00 PURE HYPERCHOLESTEROLEMIA: ICD-10-CM

## 2018-07-25 DIAGNOSIS — G89.29 CHRONIC BILATERAL LOW BACK PAIN WITHOUT SCIATICA: ICD-10-CM

## 2018-07-25 DIAGNOSIS — I10 ESSENTIAL HYPERTENSION: ICD-10-CM

## 2018-07-25 DIAGNOSIS — E11.22 CONTROLLED TYPE 2 DIABETES MELLITUS WITH STAGE 3 CHRONIC KIDNEY DISEASE, WITHOUT LONG-TERM CURRENT USE OF INSULIN (HCC): Primary | ICD-10-CM

## 2018-07-25 DIAGNOSIS — N18.30 CONTROLLED TYPE 2 DIABETES MELLITUS WITH STAGE 3 CHRONIC KIDNEY DISEASE, WITHOUT LONG-TERM CURRENT USE OF INSULIN (HCC): Primary | ICD-10-CM

## 2018-07-25 DIAGNOSIS — G47.33 OSA ON CPAP: ICD-10-CM

## 2018-07-25 DIAGNOSIS — Z99.89 OSA ON CPAP: ICD-10-CM

## 2018-07-25 DIAGNOSIS — M25.562 CHRONIC PAIN OF BOTH KNEES: ICD-10-CM

## 2018-07-25 DIAGNOSIS — M54.50 CHRONIC BILATERAL LOW BACK PAIN WITHOUT SCIATICA: ICD-10-CM

## 2018-07-25 DIAGNOSIS — E55.9 VITAMIN D DEFICIENCY: ICD-10-CM

## 2018-07-25 LAB
ALBUMIN SERPL-MCNC: 4.3 G/DL (ref 3.5–5.2)
ALBUMIN/GLOBULIN RATIO: 1.7 (ref 1–2.5)
ALP BLD-CCNC: 99 U/L (ref 35–104)
ALT SERPL-CCNC: 31 U/L (ref 5–33)
ANION GAP SERPL CALCULATED.3IONS-SCNC: 14 MMOL/L (ref 9–17)
AST SERPL-CCNC: 19 U/L
BILIRUB SERPL-MCNC: 0.18 MG/DL (ref 0.3–1.2)
BUN BLDV-MCNC: 21 MG/DL (ref 8–23)
BUN/CREAT BLD: ABNORMAL (ref 9–20)
CALCIUM SERPL-MCNC: 9.2 MG/DL (ref 8.6–10.4)
CHLORIDE BLD-SCNC: 102 MMOL/L (ref 98–107)
CHOLESTEROL/HDL RATIO: 3.5
CHOLESTEROL: 129 MG/DL
CO2: 27 MMOL/L (ref 20–31)
CREAT SERPL-MCNC: 1.11 MG/DL (ref 0.5–0.9)
ESTIMATED AVERAGE GLUCOSE: 151 MG/DL
GFR AFRICAN AMERICAN: >60 ML/MIN
GFR NON-AFRICAN AMERICAN: 50 ML/MIN
GFR SERPL CREATININE-BSD FRML MDRD: ABNORMAL ML/MIN/{1.73_M2}
GFR SERPL CREATININE-BSD FRML MDRD: ABNORMAL ML/MIN/{1.73_M2}
GLUCOSE BLD-MCNC: 140 MG/DL (ref 70–99)
HBA1C MFR BLD: 6.9 % (ref 4–6)
HDLC SERPL-MCNC: 37 MG/DL
LDL CHOLESTEROL: 62 MG/DL (ref 0–130)
POTASSIUM SERPL-SCNC: 4.9 MMOL/L (ref 3.7–5.3)
SODIUM BLD-SCNC: 143 MMOL/L (ref 135–144)
TOTAL PROTEIN: 6.8 G/DL (ref 6.4–8.3)
TRIGL SERPL-MCNC: 151 MG/DL
VLDLC SERPL CALC-MCNC: ABNORMAL MG/DL (ref 1–30)

## 2018-07-25 PROCEDURE — 1036F TOBACCO NON-USER: CPT | Performed by: NURSE PRACTITIONER

## 2018-07-25 PROCEDURE — G8417 CALC BMI ABV UP PARAM F/U: HCPCS | Performed by: NURSE PRACTITIONER

## 2018-07-25 PROCEDURE — 99214 OFFICE O/P EST MOD 30 MIN: CPT | Performed by: NURSE PRACTITIONER

## 2018-07-25 PROCEDURE — 3017F COLORECTAL CA SCREEN DOC REV: CPT | Performed by: NURSE PRACTITIONER

## 2018-07-25 PROCEDURE — 2022F DILAT RTA XM EVC RTNOPTHY: CPT | Performed by: NURSE PRACTITIONER

## 2018-07-25 PROCEDURE — G8427 DOCREV CUR MEDS BY ELIG CLIN: HCPCS | Performed by: NURSE PRACTITIONER

## 2018-07-25 PROCEDURE — 3044F HG A1C LEVEL LT 7.0%: CPT | Performed by: NURSE PRACTITIONER

## 2018-07-25 RX ORDER — ACETAMINOPHEN 160 MG
1 TABLET,DISINTEGRATING ORAL DAILY
Qty: 30 CAPSULE | Refills: 5 | Status: SHIPPED | OUTPATIENT
Start: 2018-07-25 | End: 2019-01-26 | Stop reason: SDUPTHER

## 2018-07-25 ASSESSMENT — ENCOUNTER SYMPTOMS
COUGH: 0
DIARRHEA: 0
BOWEL INCONTINENCE: 0
CONSTIPATION: 0
SORE THROAT: 0
VOMITING: 0
CHEST TIGHTNESS: 0
EYES NEGATIVE: 1
NAUSEA: 0
ABDOMINAL PAIN: 0
WHEEZING: 0
SINUS PRESSURE: 0
BACK PAIN: 1
SHORTNESS OF BREATH: 0

## 2018-07-25 NOTE — PROGRESS NOTES
Subjective:      Patient ID: Torito Mi is a 59 y.o. female. Visit Information    Have you changed or started any medications since your last visit including any over-the-counter medicines, vitamins, or herbal medicines? no   Are you having any side effects from any of your medications? -  no  Have you stopped taking any of your medications? Is so, why? -  no    Have you seen any other physician or provider since your last visit? No  Have you had any other diagnostic tests since your last visit? No  Have you been seen in the emergency room and/or had an admission to a hospital since we last saw you? No  Have you had your routine dental cleaning in the past 6 months? no    Have you activated your Bizimply account? If not, what are your barriers?  Yes     Patient Care Team:  BRAULIO Abdul CNP as PCP - General (Family Medicine)  BRAULIO Abdul CNP as PCP - S Attributed Provider    Medical History Review  Past Medical, Family, and Social History reviewed and does contribute to the patient presenting condition    Health Maintenance   Topic Date Due    Diabetic retinal exam  01/01/2016    Flu vaccine (1) 09/01/2018    Diabetic foot exam  06/20/2019    A1C test (Diabetic or Prediabetic)  07/25/2019    Lipid screen  07/25/2019    Potassium monitoring  07/25/2019    Creatinine monitoring  07/25/2019    Breast cancer screen  10/25/2019    Cervical cancer screen  11/16/2022    DTaP/Tdap/Td vaccine (2 - Td) 08/04/2026    Colon cancer screen colonoscopy  02/16/2028    Pneumococcal med risk  Completed    Shingles Vaccine  Completed    Hepatitis C screen  Addressed    HIV screen  Addressed       PHQ Scores 7/25/2018 6/27/2017   PHQ2 Score 0 0   PHQ9 Score 0 0     Interpretation of Total Score Depression Severity: 1-4 = Minimal depression, 5-9 = Mild depression, 10-14 = Moderate depression, 15-19 = Moderately severe depression, 20-27 = Severe depression    Current Outpatient Prescriptions obesity and sedentary lifestyle. She has tried analgesics and walking (percocet) for the symptoms. The treatment provided mild relief. Memory loss in last 1-1.5 months. Was driving some where and forgot where she was going. Pulled over and took her about 15 minutes to remember. Ended up late to an appointment because of this. Also, has been calling her grand kids by the wrong name. This is new and concerning to her. Never seen neurology in past. Lives with daughter and has cell phone on her all of the time. No safety concerns. Wearing cpap less often as sleeping in her recliner which helps with her back pain. Advised her to bring her cpap to use while she is in recliner. Knee and back pain is getting worse. Has not had imaging or physical therapy in past. Using percocet which helps. 1.5-2 tablets per day which has increased over last few months. umair      Review of Systems   Constitutional: Negative for activity change, appetite change, fatigue and fever. Does not exercise. Spends most of her time in her chair sitting   HENT: Negative for congestion, ear pain, sinus pressure and sore throat. Eyes: Negative. Wears glasses   Respiratory: Negative for cough, chest tightness, shortness of breath and wheezing. NARCISO - CPAP nightly - has not been wearing for last 1-1.5 months dt/ falling asleep ing recliner which helps her back pain  Follows with Dr Es Valdez yearly - needs to schedule   Cardiovascular: Positive for chest pain (tighting intermittently lasting a few minutes). Negative for palpitations and leg swelling. Stress test normal 5 years ago  Takes low dose asa when chest tightening happens - noticed has been taking more often 1 every 10 days   Gastrointestinal: Negative for abdominal pain, bowel incontinence, constipation, diarrhea, nausea and vomiting. Endocrine: Positive for polydipsia (chronic - likely d/t medication). Negative for polyphagia and polyuria.    Genitourinary:

## 2018-07-25 NOTE — PATIENT INSTRUCTIONS
Patient Education        Arthritis: Care Instructions  Your Care Instructions  Arthritis, also called osteoarthritis, is a breakdown of the cartilage that cushions your joints. When the cartilage wears down, your bones rub against each other. This causes pain and stiffness. Many people have some arthritis as they age. Arthritis most often affects the joints of the spine, hands, hips, knees, or feet. You can take simple measures to protect your joints, ease your pain, and help you stay active. Follow-up care is a key part of your treatment and safety. Be sure to make and go to all appointments, and call your doctor if you are having problems. It's also a good idea to know your test results and keep a list of the medicines you take. How can you care for yourself at home? · Stay at a healthy weight. Being overweight puts extra strain on your joints. · Talk to your doctor or physical therapist about exercises that will help ease joint pain. ¨ Stretch. You may enjoy gentle forms of yoga to help keep your joints and muscles flexible. ¨ Walk instead of jog. Other types of exercise that are less stressful on the joints include riding a bicycle, swimming, birgit chi, or water exercise. ¨ Lift weights. Strong muscles help reduce stress on your joints. Stronger thigh muscles, for example, take some of the stress off of the knees and hips. Learn the right way to lift weights so you do not make joint pain worse. · Take your medicines exactly as prescribed. Call your doctor if you think you are having a problem with your medicine. · Take pain medicines exactly as directed. ¨ If the doctor gave you a prescription medicine for pain, take it as prescribed. ¨ If you are not taking a prescription pain medicine, ask your doctor if you can take an over-the-counter medicine. · Use a cane, crutch, walker, or another device if you need help to get around. These can help rest your joints.  You also can use other things to make Moving, lifting, standing, sitting, or sleeping in an awkward way can strain the back. Sometimes you don't notice the injury until later. Arthritis is another common cause of back pain. Although it may hurt a lot, back pain usually improves on its own within several weeks. Most people recover in 12 weeks or less. Using good home treatment and being careful not to stress your back can help you feel better sooner. Follow-up care is a key part of your treatment and safety. Be sure to make and go to all appointments, and call your doctor if you are having problems. It's also a good idea to know your test results and keep a list of the medicines you take. How can you care for yourself at home? · Sit or lie in positions that are most comfortable and reduce your pain. Try one of these positions when you lie down:  ¨ Lie on your back with your knees bent and supported by large pillows. ¨ Lie on the floor with your legs on the seat of a sofa or chair. Fiona Cobble on your side with your knees and hips bent and a pillow between your legs. ¨ Lie on your stomach if it does not make pain worse. · Do not sit up in bed, and avoid soft couches and twisted positions. Bed rest can help relieve pain at first, but it delays healing. Avoid bed rest after the first day of back pain. · Change positions every 30 minutes. If you must sit for long periods of time, take breaks from sitting. Get up and walk around, or lie in a comfortable position. · Try using a heating pad on a low or medium setting for 15 to 20 minutes every 2 or 3 hours. Try a warm shower in place of one session with the heating pad. · You can also try an ice pack for 10 to 15 minutes every 2 to 3 hours. Put a thin cloth between the ice pack and your skin. · Take pain medicines exactly as directed. ¨ If the doctor gave you a prescription medicine for pain, take it as prescribed.   ¨ If you are not taking a prescription pain medicine, ask your doctor if you can take an \"continuous positive airway pressure. \"  The CPAP machine will have one of the following:  · A mask that covers your nose and mouth  · Prongs that fit into your nose  · A mask that covers your nose only, the most common type. This type is called NCPAP. The N stands for \"nasal.\"  Why is it done? CPAP is usually the best treatment for obstructive sleep apnea. It is the first treatment choice and the most widely used. Your doctor may suggest CPAP if you have:  · Moderate to severe sleep apnea. · Sleep apnea and coronary artery disease (CAD) or heart failure. How does it help? · CPAP can help you have more normal sleep, so you feel less sleepy and more alert during the daytime. · CPAP may help keep heart failure or other heart problems from getting worse. · CPAP may help lower your blood pressure. · If you use CPAP, your bed partner may also sleep better because you are not snoring or restless. What are the side effects? Some people who use CPAP have:  · A dry or stuffy nose and a sore throat. · Irritated skin on the face. · Sore eyes. · Bloating. If you have any of these problems, work with your doctor to fix them. Here are some things you can try:  · Be sure the mask or nasal prongs fit well. · See if your doctor can adjust the pressure of your CPAP. · If your nose is dry, try a humidifier. · If your nose is runny or stuffy, try decongestant medicine or a steroid nasal spray. Be safe with medicines. Read and follow all instructions on the label. Do not use the medicine longer than the label says. If these things do not help, you might try a different type of machine. Some machines have air pressure that adjusts on its own. Others have air pressures that are different when you breathe in than when you breathe out. This may reduce discomfort caused by too much pressure in your nose. Where can you learn more? Go to https://chpecurtiseb.[employer]. org and sign in to your Qosmos account.  Alex Soto in the [facility] box to learn more about \"Learning About CPAP for Sleep Apnea. \"     If you do not have an account, please click on the \"Sign Up Now\" link. Current as of: December 6, 2017  Content Version: 11.6  © 5407-2040 [employer]urb[employer], Incorporated. Care instructions adapted under license by [facility] ([facility]). If you have questions about a medical condition or this instruction, always ask your healthcare professional. [address] any warranty or liability for your use of this information. Patient Education        High Cholesterol: Care Instructions  Your Care Instructions    Cholesterol is a type of fat in your blood. It is needed for many body functions, such as making new cells. Cholesterol is made by your body. It also comes from food you eat. High cholesterol means that you have too much of the fat in your blood. This raises your risk of a heart attack and stroke. LDL and HDL are part of your total cholesterol. LDL is the \"bad\" cholesterol. High LDL can raise your risk for heart disease, heart attack, and stroke. HDL is the \"good\" cholesterol. It helps clear bad cholesterol from the body. High HDL is linked with a lower risk of heart disease, heart attack, and stroke. Your cholesterol levels help your doctor find out your risk for having a heart attack or stroke. You and your doctor can talk about whether you need to lower your risk and what treatment is best for you. A heart-healthy lifestyle along with medicines can help lower your cholesterol and your risk. The way you choose to lower your risk will depend on how high your risk is for heart attack and stroke. It will also depend on how you feel about taking medicines. Follow-up care is a key part of your treatment and safety. Be sure to make and go to all appointments, and call your doctor if you are having problems. It's also a good idea to know your test results and keep a list of the medicines you take.   How can you care for yourself at home? · Eat a variety of foods every day. Good choices include fruits, vegetables, whole grains (like oatmeal), dried beans and peas, nuts and seeds, soy products (like tofu), and fat-free or low-fat dairy products. · Replace butter, margarine, and hydrogenated or partially hydrogenated oils with olive and canola oils. (Canola oil margarine without trans fat is fine.)  · Replace red meat with fish, poultry, and soy protein (like tofu). · Limit processed and packaged foods like chips, crackers, and cookies. · Bake, broil, or steam foods. Don't boo them. · Be physically active. Get at least 30 minutes of exercise on most days of the week. Walking is a good choice. You also may want to do other activities, such as running, swimming, cycling, or playing tennis or team sports. · Stay at a healthy weight or lose weight by making the changes in eating and physical activity listed above. Losing just a small amount of weight, even 5 to 10 pounds, can reduce your risk for having a heart attack or stroke. · Do not smoke. When should you call for help? Watch closely for changes in your health, and be sure to contact your doctor if:    · You need help making lifestyle changes.     · You have questions about your medicine. Where can you learn more? Go to https://Embark HoldingspepicFlextripeb.Onstream Media. org and sign in to your Digital Health Dialog account. Enter V346 in the [facility] box to learn more about \"High Cholesterol: Care Instructions. \"     If you do not have an account, please click on the \"Sign Up Now\" link. Current as of: May 10, 2017  Content Version: 11.6  © 9090-8183 [employer]. Care instructions adapted under license by [facility] ([facility]). If you have questions about a medical condition or this instruction, always ask your healthcare professional. [address] any warranty or liability for your use of this information.        Patient Education        Learning About Vitamin D  Why is it important to get enough vitamin D? Your body needs vitamin D to absorb calcium. Calcium keeps your bones and muscles, including your heart, healthy and strong. If your muscles don't get enough calcium, they can cramp, hurt, or feel weak. You may have long-term (chronic) muscle aches and pains. If you don't get enough vitamin D throughout life, you have an increased chance of having thin and brittle bones (osteoporosis) in your later years. Children who don't get enough vitamin D may not grow as much as others their age. They also have a chance of getting a rare disease called rickets. It causes weak bones. Vitamin D and calcium are added to many foods. And your body uses sunshine to make its own vitamin D. How much vitamin D do you need? The [place] of [employer] recommends that people ages 3 through 79 get 600 IU (international units) every day. Adults 71 and older need 800 IU every day. Blood tests for vitamin D can check your vitamin D level. But there is no standard normal range used by all laboratories. The [place] of [employer] recommends a blood level of 20 ng/mL of vitamin D for healthy bones. And most people in the [place] and [facility] ([facility]) meet this goal.  How can you get more vitamin D? Foods that contain vitamin D include:  · [place], tuna, and mackerel. These are some of the best foods to eat when you need to get more vitamin D.  · Cheese, egg yolks, and beef liver. These foods have vitamin D in small amounts. · Milk, soy drinks, orange juice, yogurt, margarine, and some kinds of cereal have vitamin D added to them. Some people don't make vitamin D as well as others. They may have to take extra care in getting enough vitamin D. Things that reduce how much vitamin D your body makes include:  · Dark skin, such as many  Americans have. · Age, especially if you are older than 72. · Digestive problems, such as Crohn's or celiac disease.   · Liver and kidney disease. Some people who do not get enough vitamin D may need supplements. Are there any risks from taking vitamin D?  · Too much vitamin D:  ¨ Can damage your kidneys. ¨ Can cause nausea and vomiting, constipation, and weakness. ¨ Raises the amount of calcium in your blood. If this happens, you can get confused or have an irregular heart rhythm. · Vitamin D may interact with other medicines. Tell your doctor about all of the medicines you take, including over-the-counter drugs, herbs, and pills. Tell your doctor about all of your current medical problems. Where can you learn more? Go to https://Eldarionpepiceweb.GrubHub. org and sign in to your Liquid Accounts account. Enter [id number] in the [facility] box to learn more about \"Learning About Vitamin D. \"     If you do not have an account, please click on the \"Sign Up Now\" link. Current as of: May 12, 2017  Content Version: 11.6  © 2747-5689 [employer], Incorporated. Care instructions adapted under license by [facility] ([facility]). If you have questions about a medical condition or this instruction, always ask your healthcare professional. [place] [address] any warranty or liability for your use of this information.

## 2018-07-26 RX ORDER — ISOSORBIDE MONONITRATE 30 MG/1
30 TABLET, EXTENDED RELEASE ORAL DAILY
Qty: 30 TABLET | Refills: 5 | Status: SHIPPED | OUTPATIENT
Start: 2018-07-26 | End: 2019-02-23 | Stop reason: SDUPTHER

## 2018-07-27 DIAGNOSIS — E11.22 CONTROLLED TYPE 2 DIABETES MELLITUS WITH STAGE 3 CHRONIC KIDNEY DISEASE, WITHOUT LONG-TERM CURRENT USE OF INSULIN (HCC): Primary | ICD-10-CM

## 2018-07-27 DIAGNOSIS — E78.00 PURE HYPERCHOLESTEROLEMIA: ICD-10-CM

## 2018-07-27 DIAGNOSIS — N18.30 CONTROLLED TYPE 2 DIABETES MELLITUS WITH STAGE 3 CHRONIC KIDNEY DISEASE, WITHOUT LONG-TERM CURRENT USE OF INSULIN (HCC): Primary | ICD-10-CM

## 2018-07-28 DIAGNOSIS — N18.30 CONTROLLED TYPE 2 DIABETES MELLITUS WITH STAGE 3 CHRONIC KIDNEY DISEASE, WITHOUT LONG-TERM CURRENT USE OF INSULIN (HCC): ICD-10-CM

## 2018-07-28 DIAGNOSIS — E11.22 CONTROLLED TYPE 2 DIABETES MELLITUS WITH STAGE 3 CHRONIC KIDNEY DISEASE, WITHOUT LONG-TERM CURRENT USE OF INSULIN (HCC): ICD-10-CM

## 2018-07-28 DIAGNOSIS — E78.5 HYPERLIPIDEMIA, UNSPECIFIED HYPERLIPIDEMIA TYPE: Primary | ICD-10-CM

## 2018-07-28 DIAGNOSIS — K21.9 GASTROESOPHAGEAL REFLUX DISEASE, ESOPHAGITIS PRESENCE NOT SPECIFIED: ICD-10-CM

## 2018-07-30 RX ORDER — METFORMIN HYDROCHLORIDE 500 MG/1
1000 TABLET, EXTENDED RELEASE ORAL 2 TIMES DAILY
Qty: 120 TABLET | Refills: 5 | Status: SHIPPED | OUTPATIENT
Start: 2018-07-30 | End: 2019-02-23 | Stop reason: SDUPTHER

## 2018-07-30 RX ORDER — OMEPRAZOLE 20 MG/1
20 CAPSULE, DELAYED RELEASE ORAL DAILY
Qty: 30 CAPSULE | Refills: 5 | Status: SHIPPED | OUTPATIENT
Start: 2018-07-30 | End: 2019-02-23 | Stop reason: SDUPTHER

## 2018-07-30 RX ORDER — ATORVASTATIN CALCIUM 40 MG/1
40 TABLET, FILM COATED ORAL DAILY
Qty: 30 TABLET | Refills: 5 | Status: SHIPPED | OUTPATIENT
Start: 2018-07-30 | End: 2018-10-29 | Stop reason: SDUPTHER

## 2018-07-30 RX ORDER — FUROSEMIDE 20 MG/1
20 TABLET ORAL DAILY
Qty: 30 TABLET | Refills: 5 | Status: SHIPPED | OUTPATIENT
Start: 2018-07-30 | End: 2019-02-26 | Stop reason: SDUPTHER

## 2018-07-31 ENCOUNTER — ANTI-COAG VISIT (OUTPATIENT)
Dept: FAMILY MEDICINE CLINIC | Age: 58
End: 2018-07-31
Payer: MEDICARE

## 2018-07-31 DIAGNOSIS — Z86.718 HISTORY OF DVT OF LOWER EXTREMITY: ICD-10-CM

## 2018-07-31 LAB
INTERNATIONAL NORMALIZATION RATIO, POC: 3.4
PROTHROMBIN TIME, POC: 40.8

## 2018-07-31 PROCEDURE — 85610 PROTHROMBIN TIME: CPT | Performed by: NURSE PRACTITIONER

## 2018-07-31 PROCEDURE — 93793 ANTICOAG MGMT PT WARFARIN: CPT | Performed by: NURSE PRACTITIONER

## 2018-08-07 ENCOUNTER — ANTI-COAG VISIT (OUTPATIENT)
Dept: FAMILY MEDICINE CLINIC | Age: 58
End: 2018-08-07
Payer: MEDICARE

## 2018-08-07 DIAGNOSIS — Z86.718 HISTORY OF DVT OF LOWER EXTREMITY: ICD-10-CM

## 2018-08-07 LAB
INTERNATIONAL NORMALIZATION RATIO, POC: 3.1
PROTHROMBIN TIME, POC: 37.7

## 2018-08-07 PROCEDURE — 85610 PROTHROMBIN TIME: CPT | Performed by: NURSE PRACTITIONER

## 2018-08-18 DIAGNOSIS — M10.072 ACUTE IDIOPATHIC GOUT OF LEFT FOOT: ICD-10-CM

## 2018-08-18 DIAGNOSIS — M10.071 ACUTE IDIOPATHIC GOUT OF RIGHT FOOT: ICD-10-CM

## 2018-08-20 RX ORDER — FEBUXOSTAT 40 MG/1
TABLET ORAL
Qty: 30 TABLET | Refills: 0 | Status: SHIPPED | OUTPATIENT
Start: 2018-08-20 | End: 2018-09-01 | Stop reason: SDUPTHER

## 2018-08-21 ENCOUNTER — ANTI-COAG VISIT (OUTPATIENT)
Dept: FAMILY MEDICINE CLINIC | Age: 58
End: 2018-08-21
Payer: MEDICARE

## 2018-08-21 DIAGNOSIS — Z86.718 HISTORY OF DVT OF LOWER EXTREMITY: ICD-10-CM

## 2018-08-21 LAB
INTERNATIONAL NORMALIZATION RATIO, POC: 2.1
PROTHROMBIN TIME, POC: 25.3

## 2018-08-21 PROCEDURE — 93793 ANTICOAG MGMT PT WARFARIN: CPT | Performed by: NURSE PRACTITIONER

## 2018-08-21 PROCEDURE — 85610 PROTHROMBIN TIME: CPT | Performed by: NURSE PRACTITIONER

## 2018-08-21 NOTE — PROGRESS NOTES
Patient contacted and notified of INR results, Coumadin orders and scheduled for follow up INR. Patient verbalized understanding and denies any questions.

## 2018-08-22 ENCOUNTER — OFFICE VISIT (OUTPATIENT)
Dept: PODIATRY | Age: 58
End: 2018-08-22
Payer: MEDICARE

## 2018-08-22 VITALS — BODY MASS INDEX: 47.84 KG/M2 | HEIGHT: 62 IN | WEIGHT: 260 LBS

## 2018-08-22 DIAGNOSIS — M79.671 PAIN IN BOTH FEET: ICD-10-CM

## 2018-08-22 DIAGNOSIS — M19.90 CHRONIC OSTEOARTHRITIS: ICD-10-CM

## 2018-08-22 DIAGNOSIS — M79.672 PAIN IN BOTH FEET: ICD-10-CM

## 2018-08-22 DIAGNOSIS — G89.29 CHRONIC JOINT PAIN: ICD-10-CM

## 2018-08-22 DIAGNOSIS — E11.51 TYPE II DIABETES MELLITUS WITH PERIPHERAL CIRCULATORY DISORDER (HCC): Primary | ICD-10-CM

## 2018-08-22 DIAGNOSIS — I73.9 PERIPHERAL VASCULAR DISEASE, UNSPECIFIED (HCC): ICD-10-CM

## 2018-08-22 DIAGNOSIS — M25.50 CHRONIC JOINT PAIN: ICD-10-CM

## 2018-08-22 DIAGNOSIS — B35.1 DERMATOPHYTOSIS OF NAIL: ICD-10-CM

## 2018-08-22 PROCEDURE — 99213 OFFICE O/P EST LOW 20 MIN: CPT | Performed by: PODIATRIST

## 2018-08-22 PROCEDURE — 1036F TOBACCO NON-USER: CPT | Performed by: PODIATRIST

## 2018-08-22 PROCEDURE — 2022F DILAT RTA XM EVC RTNOPTHY: CPT | Performed by: PODIATRIST

## 2018-08-22 PROCEDURE — 11721 DEBRIDE NAIL 6 OR MORE: CPT | Performed by: PODIATRIST

## 2018-08-22 PROCEDURE — 3017F COLORECTAL CA SCREEN DOC REV: CPT | Performed by: PODIATRIST

## 2018-08-22 PROCEDURE — 3044F HG A1C LEVEL LT 7.0%: CPT | Performed by: PODIATRIST

## 2018-08-22 PROCEDURE — G8417 CALC BMI ABV UP PARAM F/U: HCPCS | Performed by: PODIATRIST

## 2018-08-22 PROCEDURE — G8427 DOCREV CUR MEDS BY ELIG CLIN: HCPCS | Performed by: PODIATRIST

## 2018-08-22 NOTE — PROGRESS NOTES
40 MG tablet Take 1 tablet by mouth daily 30 tablet 5    omeprazole (PRILOSEC) 20 MG delayed release capsule Take 1 capsule by mouth Daily 30 capsule 5    metFORMIN (GLUCOPHAGE-XR) 500 MG extended release tablet Take 2 tablets by mouth 2 times daily 120 tablet 5    SITagliptin (JANUVIA) 100 MG tablet Take 1 tablet by mouth daily 30 tablet 5    isosorbide mononitrate (IMDUR) 30 MG extended release tablet Take 1 tablet by mouth daily 30 tablet 5    Cholecalciferol (VITAMIN D3) 2000 units CAPS Take 1 capsule by mouth daily 30 capsule 5    oxyCODONE-acetaminophen (PERCOCET) 5-325 MG per tablet Take 1 tablet by mouth every 12 hours. . 40 tablet 0    warfarin (COUMADIN) 2 MG tablet 2 mg (2 mg x 1) on Mon, Wed, Fri; 3 mg (3 mg x 1) all other days 45 tablet 2    glimepiride (AMARYL) 4 MG tablet Take 1 tablet by mouth 2 times daily (before meals) 180 tablet 1    hydrALAZINE (APRESOLINE) 50 MG tablet Take 1 tablet by mouth 2 times daily 180 tablet 1    albuterol sulfate HFA (VENTOLIN HFA) 108 (90 Base) MCG/ACT inhaler Inhale 2 puffs into the lungs every 6 hours as needed for Wheezing 18 g 0    albuterol (PROVENTIL) (2.5 MG/3ML) 0.083% nebulizer solution Take 3 mLs by nebulization every 4 hours as needed for Wheezing or Shortness of Breath 100 each 1    lisinopril (PRINIVIL;ZESTRIL) 20 MG tablet take 1 tablet by mouth once daily 30 tablet 5    Lancets (BD LANCET ULTRAFINE 30G) MISC TEST BLOOD GLUCOSE DAILY AND AS NEEDED 120 each 3    fluticasone (FLONASE) 50 MCG/ACT nasal spray 1 spray by Nasal route daily 1 Bottle 3    risperiDONE (RISPERDAL) 3 MG tablet Take 3 mg by mouth daily       Urea (CARMOL) 40 % cream APPLY TWICE A DAY 1 Tube 2    buPROPion (WELLBUTRIN XL) 300 MG XL tablet Take 300 mg by mouth every morning.  citalopram (CELEXA) 20 MG tablet Take 20 mg by mouth daily.  buPROPion (WELLBUTRIN XL) 150 MG XL tablet Take 150 mg by mouth every morning.        No current facility-administered

## 2018-08-23 RX ORDER — OXYCODONE HYDROCHLORIDE AND ACETAMINOPHEN 5; 325 MG/1; MG/1
1 TABLET ORAL EVERY 12 HOURS
Qty: 40 TABLET | Refills: 0 | Status: SHIPPED | OUTPATIENT
Start: 2018-08-23 | End: 2018-09-26 | Stop reason: SDUPTHER

## 2018-08-23 NOTE — TELEPHONE ENCOUNTER
From: Michael Holt  Sent: 8/22/2018 11:03 AM EDT  Subject: Medication Renewal Request    Yadira Hendricks would like a refill of the following medications:     oxyCODONE-acetaminophen (PERCOCET) 5-325 MG per tablet Marcin Horowitz, APRN - CNP]    Preferred pharmacy: [address] [facility] [phone] - F [phone]    Comment:
Has she completed pending xray that were ordered at last follow up here. ? Orders are still pending.
Message left for patient to contact office
Zaira Macedo DPM [facility]   10/29/2018 8:50 AM Carolee Yoo MD [facility] None            Patient Active Problem List:     Hypertension     Hyperlipidemia     Osteoarthritis     Depression     Obesity     CKD (chronic kidney disease) stage 3, GFR 30-59 ml/min     Proteinuria     Controlled type 2 diabetes mellitus with stage 3 chronic kidney disease, without long-term current use of insulin (HCC)     History of DVT of lower extremity     NARCISO on CPAP     Vitamin D deficiency

## 2018-08-30 DIAGNOSIS — G89.29 CHRONIC BILATERAL LOW BACK PAIN WITHOUT SCIATICA: ICD-10-CM

## 2018-08-30 DIAGNOSIS — M25.562 CHRONIC PAIN OF BOTH KNEES: ICD-10-CM

## 2018-08-30 DIAGNOSIS — M54.50 CHRONIC BILATERAL LOW BACK PAIN WITHOUT SCIATICA: ICD-10-CM

## 2018-08-30 DIAGNOSIS — E11.22 CONTROLLED TYPE 2 DIABETES MELLITUS WITH STAGE 3 CHRONIC KIDNEY DISEASE, WITHOUT LONG-TERM CURRENT USE OF INSULIN (HCC): ICD-10-CM

## 2018-08-30 DIAGNOSIS — N18.30 CONTROLLED TYPE 2 DIABETES MELLITUS WITH STAGE 3 CHRONIC KIDNEY DISEASE, WITHOUT LONG-TERM CURRENT USE OF INSULIN (HCC): ICD-10-CM

## 2018-08-30 DIAGNOSIS — M25.561 CHRONIC PAIN OF BOTH KNEES: ICD-10-CM

## 2018-08-30 DIAGNOSIS — M15.9 PRIMARY OSTEOARTHRITIS INVOLVING MULTIPLE JOINTS: ICD-10-CM

## 2018-08-30 DIAGNOSIS — G89.29 CHRONIC PAIN OF BOTH KNEES: ICD-10-CM

## 2018-08-30 RX ORDER — ALBUTEROL SULFATE 90 UG/1
2 AEROSOL, METERED RESPIRATORY (INHALATION) EVERY 6 HOURS PRN
Qty: 18 G | Refills: 0 | Status: SHIPPED | OUTPATIENT
Start: 2018-08-30 | End: 2018-09-28 | Stop reason: SDUPTHER

## 2018-08-30 NOTE — TELEPHONE ENCOUNTER
From: Juan Pablo Cordero  Sent: 8/29/2018 7:36 PM EDT  Subject: Medication Renewal Request    Jocelynn Gee.  Rosalba Mendieta would like a refill of the following medications:     albuterol sulfate HFA (VENTOLIN HFA) 108 (90 Base) MCG/ACT inhaler [CATALINA GORMNA, APRN - CNP]     blood glucose test strips (ONE TOUCH ULTRA TEST) strip BRAULIO Mooney - CNP]    Preferred pharmacy: [facility] [address] [place] [address], [address] [id number] -  [phone]    Comment:

## 2018-08-30 NOTE — TELEPHONE ENCOUNTER
LR 4/30/18   LOV 7/25/18  RTO 3 month    Health Maintenance   Topic Date Due    Diabetic retinal exam  01/01/2016    Flu vaccine (1) 09/01/2018    A1C test (Diabetic or Prediabetic)  07/25/2019    Lipid screen  07/25/2019    Potassium monitoring  07/25/2019    Creatinine monitoring  07/25/2019    Diabetic foot exam  08/24/2019    Breast cancer screen  10/25/2019    Cervical cancer screen  11/16/2022    DTaP/Tdap/Td vaccine (2 - Td) 08/04/2026    Colon cancer screen colonoscopy  02/16/2028    Pneumococcal med risk  Completed    Shingles Vaccine  Completed    Hepatitis C screen  Addressed    HIV screen  Addressed             (applicable per patient's age: Cancer Screenings, Depression Screening, Fall Risk Screening, Immunizations)    Hemoglobin A1C (%)   Date Value   07/25/2018 6.9 (H)   10/17/2017 5.9   06/27/2017 7.3     Microalb/Crt.  Ratio (mcg/mg creat)   Date Value   10/28/2014 7     LDL Cholesterol (mg/dL)   Date Value   07/25/2018 62     AST (U/L)   Date Value   07/25/2018 19     ALT (U/L)   Date Value   07/25/2018 31     BUN (mg/dL)   Date Value   07/25/2018 21      (goal A1C is < 7)   (goal LDL is <100) need 30-50% reduction from baseline     BP Readings from Last 3 Encounters:   07/25/18 126/66   06/13/18 (!) 155/90   04/30/18 108/66    (goal /80)      All Future Testing planned in [facility]:  Lab Frequency Next Occurrence   Basic Metabolic Panel Once 64/62/8341   CBC Auto Differential Once 10/30/2018   Protein / Creatinine Ratio, Urine Once 10/30/2018   XR KNEE LEFT (3 VIEWS) Once 07/25/2018   XR KNEE RIGHT (3 VIEWS) Once 07/25/2018   XR LUMBAR SPINE (2-3 VIEWS) Once 07/25/2018   Comprehensive Metabolic Panel Once 64/69/0649   Lipid Panel Once 01/27/2019   Hemoglobin A1C Once 01/27/2019   POCT INR         Next Visit Date:  Future Appointments  Date Time Provider Mark Junior   9/18/2018 8:30 AM SCHEDULE, SILVANO HACKETTTOER[facility]   10/24/2018 9:30 AM Patricia Back

## 2018-09-01 DIAGNOSIS — M10.071 ACUTE IDIOPATHIC GOUT OF RIGHT FOOT: ICD-10-CM

## 2018-09-01 DIAGNOSIS — N18.30 CONTROLLED TYPE 2 DIABETES MELLITUS WITH STAGE 3 CHRONIC KIDNEY DISEASE, WITHOUT LONG-TERM CURRENT USE OF INSULIN (HCC): ICD-10-CM

## 2018-09-01 DIAGNOSIS — M10.072 ACUTE IDIOPATHIC GOUT OF LEFT FOOT: ICD-10-CM

## 2018-09-01 DIAGNOSIS — E11.22 CONTROLLED TYPE 2 DIABETES MELLITUS WITH STAGE 3 CHRONIC KIDNEY DISEASE, WITHOUT LONG-TERM CURRENT USE OF INSULIN (HCC): ICD-10-CM

## 2018-09-04 DIAGNOSIS — E11.22 CONTROLLED TYPE 2 DIABETES MELLITUS WITH STAGE 3 CHRONIC KIDNEY DISEASE, WITHOUT LONG-TERM CURRENT USE OF INSULIN (HCC): ICD-10-CM

## 2018-09-04 DIAGNOSIS — N18.30 CONTROLLED TYPE 2 DIABETES MELLITUS WITH STAGE 3 CHRONIC KIDNEY DISEASE, WITHOUT LONG-TERM CURRENT USE OF INSULIN (HCC): ICD-10-CM

## 2018-09-04 RX ORDER — ALBUTEROL SULFATE 90 UG/1
AEROSOL, METERED RESPIRATORY (INHALATION)
Qty: 18 G | Refills: 0 | OUTPATIENT
Start: 2018-09-04

## 2018-09-04 RX ORDER — FEBUXOSTAT 40 MG/1
TABLET ORAL
Qty: 30 TABLET | Refills: 0 | Status: SHIPPED | OUTPATIENT
Start: 2018-09-04 | End: 2018-11-04 | Stop reason: SDUPTHER

## 2018-09-04 RX ORDER — LISINOPRIL 20 MG/1
20 TABLET ORAL DAILY
Qty: 90 TABLET | Refills: 1 | Status: SHIPPED | OUTPATIENT
Start: 2018-09-04 | End: 2019-02-23 | Stop reason: SDUPTHER

## 2018-09-04 NOTE — TELEPHONE ENCOUNTER
Hypertension     Hyperlipidemia     Osteoarthritis     Depression     Obesity     CKD (chronic kidney disease) stage 3, GFR 30-59 ml/min     Proteinuria     Controlled type 2 diabetes mellitus with stage 3 chronic kidney disease, without long-term current use of insulin ([facility])     History of DVT of lower extremity     NARCISO on CPAP     Vitamin D deficiency

## 2018-09-04 NOTE — TELEPHONE ENCOUNTER
Inhaler was filled 5 days ago, test strips were sent in July with 5 refills-please verify that the patient received these as refills should not be needed

## 2018-09-04 NOTE — TELEPHONE ENCOUNTER
LOV:7-25-18  ROV:9-18-18  LRF:8-30-18  Health Maintenance   Topic Date Due    Diabetic retinal exam  01/01/2016    Flu vaccine (1) 09/01/2018    A1C test (Diabetic or Prediabetic)  07/25/2019    Lipid screen  07/25/2019    Potassium monitoring  07/25/2019    Creatinine monitoring  07/25/2019    Diabetic foot exam  08/24/2019    Breast cancer screen  10/25/2019    Cervical cancer screen  11/16/2022    DTaP/Tdap/Td vaccine (2 - Td) 08/04/2026    Colon cancer screen colonoscopy  02/16/2028    Pneumococcal med risk  Completed    Shingles Vaccine  Completed    Hepatitis C screen  Addressed    HIV screen  Addressed             (applicable per patient's age: Cancer Screenings, Depression Screening, Fall Risk Screening, Immunizations)    Hemoglobin A1C (%)   Date Value   07/25/2018 6.9 (H)   10/17/2017 5.9   06/27/2017 7.3     Microalb/Crt.  Ratio (mcg/mg creat)   Date Value   10/28/2014 7     LDL Cholesterol (mg/dL)   Date Value   07/25/2018 62     AST (U/L)   Date Value   07/25/2018 19     ALT (U/L)   Date Value   07/25/2018 31     BUN (mg/dL)   Date Value   07/25/2018 21      (goal A1C is < 7)   (goal LDL is <100) need 30-50% reduction from baseline     BP Readings from Last 3 Encounters:   07/25/18 126/66   06/13/18 (!) 155/90   04/30/18 108/66    (goal /80)      All Future Testing planned in [facility]:  Lab Frequency Next Occurrence   Basic Metabolic Panel Once 99/67/7822   CBC Auto Differential Once 10/30/2018   Protein / Creatinine Ratio, Urine Once 10/30/2018   Comprehensive Metabolic Panel Once 88/38/3638   Lipid Panel Once 01/27/2019   Hemoglobin A1C Once 01/27/2019   POCT INR         Next Visit Date:  Future Appointments  Date Time Provider Mark Junior   9/18/2018 8:30 AM SCHEDULE, SIL[facility]O SCHULER [facility]   10/24/2018 9:30 AM Lashaun Tran DPM [facility]   10/29/2018 8:50 AM Gutierrez Evans MD [facility] None            Patient Active Problem List:

## 2018-09-04 NOTE — TELEPHONE ENCOUNTER
LOV 7/25/18  RTO 10/25/18  LRF 7/30/18    Controlled type 2 diabetes mellitus with stage 3 chronic kidney disease, without long-term current use of insulin (HCC) (E11.22 , N18.3)    Health Maintenance   Topic Date Due    Diabetic retinal exam  01/01/2016    Flu vaccine (1) 09/01/2018    A1C test (Diabetic or Prediabetic)  07/25/2019    Lipid screen  07/25/2019    Potassium monitoring  07/25/2019    Creatinine monitoring  07/25/2019    Diabetic foot exam  08/24/2019    Breast cancer screen  10/25/2019    Cervical cancer screen  11/16/2022    DTaP/Tdap/Td vaccine (2 - Td) 08/04/2026    Colon cancer screen colonoscopy  02/16/2028    Pneumococcal med risk  Completed    Shingles Vaccine  Completed    Hepatitis C screen  Addressed    HIV screen  Addressed             (applicable per patient's age: Cancer Screenings, Depression Screening, Fall Risk Screening, Immunizations)    Hemoglobin A1C (%)   Date Value   07/25/2018 6.9 (H)   10/17/2017 5.9   06/27/2017 7.3     Microalb/Crt.  Ratio (mcg/mg creat)   Date Value   10/28/2014 7     LDL Cholesterol (mg/dL)   Date Value   07/25/2018 62     AST (U/L)   Date Value   07/25/2018 19     ALT (U/L)   Date Value   07/25/2018 31     BUN (mg/dL)   Date Value   07/25/2018 21      (goal A1C is < 7)   (goal LDL is <100) need 30-50% reduction from baseline     BP Readings from Last 3 Encounters:   07/25/18 126/66   06/13/18 (!) 155/90   04/30/18 108/66    (goal /80)      All Future Testing planned in [facility]:  Lab Frequency Next Occurrence   Basic Metabolic Panel Once 32/77/6207   CBC Auto Differential Once 10/30/2018   Protein / Creatinine Ratio, Urine Once 10/30/2018   Comprehensive Metabolic Panel Once 36/91/8527   Lipid Panel Once 01/27/2019   Hemoglobin A1C Once 01/27/2019   POCT INR         Next Visit Date:  Future Appointments  Date Time Provider Mark Junior   9/18/2018 8:30 AM SCHEDULE, SILVANO [facility]ACKET[facility]OLPP   10/24/2018 9:30 AM

## 2018-09-18 ENCOUNTER — ANTI-COAG VISIT (OUTPATIENT)
Dept: FAMILY MEDICINE CLINIC | Age: 58
End: 2018-09-18

## 2018-09-18 DIAGNOSIS — Z86.718 HISTORY OF DVT OF LOWER EXTREMITY: ICD-10-CM

## 2018-09-18 LAB
INTERNATIONAL NORMALIZATION RATIO, POC: 3.2
PROTHROMBIN TIME, POC: 38.3

## 2018-09-18 PROCEDURE — 85610 PROTHROMBIN TIME: CPT | Performed by: NURSE PRACTITIONER

## 2018-09-18 NOTE — PROGRESS NOTES
Left message on voicemail with INR results and medication dosing instructions. Patient should repeat testing in 2 weeks.

## 2018-09-26 DIAGNOSIS — G89.29 CHRONIC JOINT PAIN: ICD-10-CM

## 2018-09-26 DIAGNOSIS — M19.90 CHRONIC OSTEOARTHRITIS: ICD-10-CM

## 2018-09-26 DIAGNOSIS — M25.50 CHRONIC JOINT PAIN: ICD-10-CM

## 2018-09-26 RX ORDER — OXYCODONE HYDROCHLORIDE AND ACETAMINOPHEN 5; 325 MG/1; MG/1
1 TABLET ORAL EVERY 12 HOURS
Qty: 40 TABLET | Refills: 0 | Status: SHIPPED | OUTPATIENT
Start: 2018-09-26 | End: 2018-10-26 | Stop reason: SDUPTHER

## 2018-09-26 NOTE — TELEPHONE ENCOUNTER
From: Snow Dc  Sent: 9/26/2018 10:15 AM EDT  Subject: Medication Renewal Request    Karoline Ramos.  Sandi Cabello would like a refill of the following medications:     oxyCODONE-acetaminophen (PERCOCET) 5-325 MG per tablet Thierry Restrepo, APRN - CNP]    Preferred pharmacy: [address], [address] [facility] [phone] - F [phone]    Comment:
Hypertension     Hyperlipidemia     Osteoarthritis     Depression     Obesity     CKD (chronic kidney disease) stage 3, GFR 30-59 ml/min     Proteinuria     Controlled type 2 diabetes mellitus with stage 3 chronic kidney disease, without long-term current use of insulin ([facility])     History of DVT of lower extremity     NARCISO on CPAP     Vitamin D deficiency

## 2018-09-28 RX ORDER — ALBUTEROL SULFATE 90 UG/1
2 AEROSOL, METERED RESPIRATORY (INHALATION) EVERY 6 HOURS PRN
Qty: 1 INHALER | Refills: 0 | Status: SHIPPED | OUTPATIENT
Start: 2018-09-28 | End: 2018-11-04 | Stop reason: SDUPTHER

## 2018-10-02 ENCOUNTER — ANTI-COAG VISIT (OUTPATIENT)
Dept: FAMILY MEDICINE CLINIC | Age: 58
End: 2018-10-02
Payer: MEDICARE

## 2018-10-02 DIAGNOSIS — Z86.718 HISTORY OF DVT OF LOWER EXTREMITY: Primary | ICD-10-CM

## 2018-10-02 LAB
INTERNATIONAL NORMALIZATION RATIO, POC: 3
PROTHROMBIN TIME, POC: 36.6

## 2018-10-02 PROCEDURE — 85610 PROTHROMBIN TIME: CPT | Performed by: NURSE PRACTITIONER

## 2018-10-02 PROCEDURE — 93793 ANTICOAG MGMT PT WARFARIN: CPT | Performed by: NURSE PRACTITIONER

## 2018-10-24 ENCOUNTER — OFFICE VISIT (OUTPATIENT)
Dept: PODIATRY | Age: 58
End: 2018-10-24
Payer: MEDICARE

## 2018-10-24 VITALS — BODY MASS INDEX: 47.84 KG/M2 | HEIGHT: 62 IN | WEIGHT: 260 LBS

## 2018-10-24 DIAGNOSIS — B35.1 DERMATOPHYTOSIS OF NAIL: ICD-10-CM

## 2018-10-24 DIAGNOSIS — I73.9 PVD (PERIPHERAL VASCULAR DISEASE) (HCC): ICD-10-CM

## 2018-10-24 DIAGNOSIS — M79.672 PAIN IN BOTH FEET: ICD-10-CM

## 2018-10-24 DIAGNOSIS — E11.51 TYPE II DIABETES MELLITUS WITH PERIPHERAL CIRCULATORY DISORDER (HCC): Primary | ICD-10-CM

## 2018-10-24 DIAGNOSIS — M79.671 PAIN IN BOTH FEET: ICD-10-CM

## 2018-10-24 PROBLEM — F33.3 MAJOR DEPRESSIVE DISORDER, RECURRENT EPISODE, SEVERE, WITH PSYCHOTIC BEHAVIOR (HCC): Status: ACTIVE | Noted: 2018-10-24

## 2018-10-24 PROBLEM — F33.41 MAJOR DEPRESSIVE DISORDER, RECURRENT, IN PARTIAL REMISSION (HCC): Status: ACTIVE | Noted: 2018-10-24

## 2018-10-24 PROBLEM — F33.3 MAJOR DEPRESSIVE DISORDER, RECURRENT, SEVERE WITH PSYCHOTIC SYMPTOMS (HCC): Status: ACTIVE | Noted: 2018-10-24

## 2018-10-24 PROCEDURE — G8417 CALC BMI ABV UP PARAM F/U: HCPCS | Performed by: PODIATRIST

## 2018-10-24 PROCEDURE — 1036F TOBACCO NON-USER: CPT | Performed by: PODIATRIST

## 2018-10-24 PROCEDURE — 11721 DEBRIDE NAIL 6 OR MORE: CPT | Performed by: PODIATRIST

## 2018-10-24 PROCEDURE — G8427 DOCREV CUR MEDS BY ELIG CLIN: HCPCS | Performed by: PODIATRIST

## 2018-10-24 PROCEDURE — G8484 FLU IMMUNIZE NO ADMIN: HCPCS | Performed by: PODIATRIST

## 2018-10-24 PROCEDURE — 3017F COLORECTAL CA SCREEN DOC REV: CPT | Performed by: PODIATRIST

## 2018-10-24 PROCEDURE — 2022F DILAT RTA XM EVC RTNOPTHY: CPT | Performed by: PODIATRIST

## 2018-10-24 PROCEDURE — 3044F HG A1C LEVEL LT 7.0%: CPT | Performed by: PODIATRIST

## 2018-10-24 RX ORDER — LISINOPRIL AND HYDROCHLOROTHIAZIDE 25; 20 MG/1; MG/1
TABLET ORAL
COMMUNITY
End: 2018-10-29 | Stop reason: ALTCHOICE

## 2018-10-24 RX ORDER — ALLOPURINOL 100 MG/1
100 TABLET ORAL DAILY
COMMUNITY
End: 2020-09-15 | Stop reason: SDUPTHER

## 2018-10-24 RX ORDER — ATORVASTATIN CALCIUM 40 MG/1
TABLET, FILM COATED ORAL
COMMUNITY
End: 2019-02-25 | Stop reason: SDUPTHER

## 2018-10-24 RX ORDER — SIMVASTATIN 40 MG
TABLET ORAL
COMMUNITY
End: 2018-10-29 | Stop reason: CLARIF

## 2018-10-24 NOTE — PROGRESS NOTES
1st MPJ ROM decreased, Bilateral.  Muscle strength 5/5, Bilateral.  Pain present upon palpation of toenails 1-5, Bilateral. decreased medial longitudinal arch, Bilateral.  Ankle ROM decreased,Bilateral.    Dorsally contracted digits present digits 2, Bilateral.     Vascular: DP and PT pulses palpable 1/4, Bilateral.  CFT <5 seconds, Bilateral.  Hair growth absent to the level of the digits, Bilateral.  Edema present, Bilateral.  Varicosities absent, Bilateral. Erythema absent, Bilateral    Neurological: Sensation diminshed to light touch to level of digits, Bilateral.  Protective sensation intact 6/10 sites via 5.07/10g Dover-Shraddha Monofilament, Bilateral.  negative Tinel's, Bilateral.  negative Valleix sign, Bilateral.      Integument: Warm, dry, supple, Bilateral.  Open lesion absent, Bilateral.  Interdigital maceration absent to web spaces 4, Bilateral.  Nails 1-5 left and 1-5 right thickened > 3.0 mm, dystrophic and crumbly, discolored with subungual debris. Fissures absent, Bilateral.     Visual inspection:  Deformity: hammertoe deformity sherman feet  amputation: absent  Skin lesions: absent  Edema: right- 2+ pitting edema, left- 2+ pitting edema    Sensory exam:  Monofilament sensation: abnormal - 6/10 via SW 5.07/10g monofilament to the plantar foot bilateral feet    Pulses: abnormal - 1/4 dorsalis pedis pulse and 1/4 Posterior tibial pulse,   Pinprick: Impaired  Proprioception: Impaired  Vibration (128 Hz): Impaired         DM with PVD       [x]Yes    []No      Assessment:  59 y.o. female with:   Diagnosis Orders   1. Type II diabetes mellitus with peripheral circulatory disorder (HCC)  ND DEBRIDEMENT OF NAILS, 6 OR MORE    HM DIABETES FOOT EXAM   2. Dermatophytosis of nail  ND DEBRIDEMENT OF NAILS, 6 OR MORE    HM DIABETES FOOT EXAM   3.  PVD (peripheral vascular disease) (HCC)  ND DEBRIDEMENT OF NAILS, 6 OR MORE    HM DIABETES FOOT EXAM   4. Pain in both feet  ND DEBRIDEMENT OF NAILS, 6 OR MORE    HM

## 2018-10-26 DIAGNOSIS — M25.50 CHRONIC JOINT PAIN: ICD-10-CM

## 2018-10-26 DIAGNOSIS — G89.29 CHRONIC JOINT PAIN: ICD-10-CM

## 2018-10-26 DIAGNOSIS — M19.90 CHRONIC OSTEOARTHRITIS: ICD-10-CM

## 2018-10-26 RX ORDER — OXYCODONE HYDROCHLORIDE AND ACETAMINOPHEN 5; 325 MG/1; MG/1
1 TABLET ORAL EVERY 12 HOURS
Qty: 40 TABLET | Refills: 0 | Status: SHIPPED | OUTPATIENT
Start: 2018-10-26 | End: 2018-11-21 | Stop reason: SDUPTHER

## 2018-10-30 ENCOUNTER — HOSPITAL ENCOUNTER (OUTPATIENT)
Age: 58
Setting detail: SPECIMEN
Discharge: HOME | End: 2018-10-30
Payer: MEDICARE

## 2018-10-30 ENCOUNTER — OFFICE VISIT (OUTPATIENT)
Dept: FAMILY MEDICINE CLINIC | Age: 58
End: 2018-10-30
Payer: MEDICARE

## 2018-10-30 VITALS
RESPIRATION RATE: 14 BRPM | SYSTOLIC BLOOD PRESSURE: 126 MMHG | WEIGHT: 263 LBS | DIASTOLIC BLOOD PRESSURE: 74 MMHG | BODY MASS INDEX: 48.1 KG/M2 | TEMPERATURE: 97.5 F | HEART RATE: 88 BPM

## 2018-10-30 DIAGNOSIS — G89.29 CHRONIC PAIN OF BOTH KNEES: ICD-10-CM

## 2018-10-30 DIAGNOSIS — M25.562 CHRONIC PAIN OF BOTH KNEES: ICD-10-CM

## 2018-10-30 DIAGNOSIS — Z86.718 HISTORY OF DVT OF LOWER EXTREMITY: ICD-10-CM

## 2018-10-30 DIAGNOSIS — M25.50 CHRONIC JOINT PAIN: ICD-10-CM

## 2018-10-30 DIAGNOSIS — G89.29 CHRONIC JOINT PAIN: ICD-10-CM

## 2018-10-30 DIAGNOSIS — M19.90 CHRONIC OSTEOARTHRITIS: ICD-10-CM

## 2018-10-30 DIAGNOSIS — E78.00 PURE HYPERCHOLESTEROLEMIA: ICD-10-CM

## 2018-10-30 DIAGNOSIS — M54.50 CHRONIC BILATERAL LOW BACK PAIN WITHOUT SCIATICA: ICD-10-CM

## 2018-10-30 DIAGNOSIS — Z12.39 BREAST CANCER SCREENING: ICD-10-CM

## 2018-10-30 DIAGNOSIS — I10 ESSENTIAL HYPERTENSION: ICD-10-CM

## 2018-10-30 DIAGNOSIS — M25.561 CHRONIC PAIN OF BOTH KNEES: ICD-10-CM

## 2018-10-30 DIAGNOSIS — N18.30 CONTROLLED TYPE 2 DIABETES MELLITUS WITH STAGE 3 CHRONIC KIDNEY DISEASE, WITHOUT LONG-TERM CURRENT USE OF INSULIN (HCC): Primary | ICD-10-CM

## 2018-10-30 DIAGNOSIS — N18.30 CKD (CHRONIC KIDNEY DISEASE), STAGE III (HCC): ICD-10-CM

## 2018-10-30 DIAGNOSIS — G89.29 CHRONIC BILATERAL LOW BACK PAIN WITHOUT SCIATICA: ICD-10-CM

## 2018-10-30 DIAGNOSIS — F33.3 MAJOR DEPRESSIVE DISORDER, RECURRENT EPISODE, SEVERE, WITH PSYCHOTIC BEHAVIOR (HCC): ICD-10-CM

## 2018-10-30 DIAGNOSIS — E11.22 CONTROLLED TYPE 2 DIABETES MELLITUS WITH STAGE 3 CHRONIC KIDNEY DISEASE, WITHOUT LONG-TERM CURRENT USE OF INSULIN (HCC): Primary | ICD-10-CM

## 2018-10-30 LAB
ABSOLUTE EOS #: 0.18 K/UL (ref 0–0.44)
ABSOLUTE IMMATURE GRANULOCYTE: <0.03 K/UL (ref 0–0.3)
ABSOLUTE LYMPH #: 2.88 K/UL (ref 1.1–3.7)
ABSOLUTE MONO #: 0.55 K/UL (ref 0.1–1.2)
ANION GAP SERPL CALCULATED.3IONS-SCNC: 12 MMOL/L (ref 9–17)
BASOPHILS # BLD: 0 % (ref 0–2)
BASOPHILS ABSOLUTE: 0.03 K/UL (ref 0–0.2)
BUN BLDV-MCNC: 22 MG/DL (ref 8–23)
BUN/CREAT BLD: ABNORMAL (ref 9–20)
CALCIUM SERPL-MCNC: 9.1 MG/DL (ref 8.6–10.4)
CHLORIDE BLD-SCNC: 101 MMOL/L (ref 98–107)
CO2: 26 MMOL/L (ref 20–31)
CREAT SERPL-MCNC: 1.3 MG/DL (ref 0.5–0.9)
CREATININE URINE: 53.7 MG/DL (ref 28–217)
DIFFERENTIAL TYPE: ABNORMAL
EOSINOPHILS RELATIVE PERCENT: 3 % (ref 1–4)
GFR AFRICAN AMERICAN: 51 ML/MIN
GFR NON-AFRICAN AMERICAN: 42 ML/MIN
GFR SERPL CREATININE-BSD FRML MDRD: ABNORMAL ML/MIN/{1.73_M2}
GFR SERPL CREATININE-BSD FRML MDRD: ABNORMAL ML/MIN/{1.73_M2}
GLUCOSE BLD-MCNC: 143 MG/DL (ref 70–99)
HCT VFR BLD CALC: 40.2 % (ref 36.3–47.1)
HEMOGLOBIN: 12.1 G/DL (ref 11.9–15.1)
IMMATURE GRANULOCYTES: 0 %
INTERNATIONAL NORMALIZATION RATIO, POC: 2.3
LYMPHOCYTES # BLD: 39 % (ref 24–43)
MCH RBC QN AUTO: 29.3 PG (ref 25.2–33.5)
MCHC RBC AUTO-ENTMCNC: 30.1 G/DL (ref 28.4–34.8)
MCV RBC AUTO: 97.3 FL (ref 82.6–102.9)
MONOCYTES # BLD: 8 % (ref 3–12)
NRBC AUTOMATED: 0 PER 100 WBC
PDW BLD-RTO: 14.6 % (ref 11.8–14.4)
PLATELET # BLD: 254 K/UL (ref 138–453)
PLATELET ESTIMATE: ABNORMAL
PMV BLD AUTO: 8.8 FL (ref 8.1–13.5)
POTASSIUM SERPL-SCNC: 4.5 MMOL/L (ref 3.7–5.3)
PROTHROMBIN TIME, POC: 27.3
RBC # BLD: 4.13 M/UL (ref 3.95–5.11)
RBC # BLD: ABNORMAL 10*6/UL
SEG NEUTROPHILS: 50 % (ref 36–65)
SEGMENTED NEUTROPHILS ABSOLUTE COUNT: 3.66 K/UL (ref 1.5–8.1)
SODIUM BLD-SCNC: 139 MMOL/L (ref 135–144)
TOTAL PROTEIN, URINE: 4 MG/DL
URINE TOTAL PROTEIN CREATININE RATIO: 0.07 (ref 0–0.2)
WBC # BLD: 7.3 K/UL (ref 3.5–11.3)
WBC # BLD: ABNORMAL 10*3/UL

## 2018-10-30 PROCEDURE — G8427 DOCREV CUR MEDS BY ELIG CLIN: HCPCS | Performed by: NURSE PRACTITIONER

## 2018-10-30 PROCEDURE — 85610 PROTHROMBIN TIME: CPT | Performed by: NURSE PRACTITIONER

## 2018-10-30 PROCEDURE — 2022F DILAT RTA XM EVC RTNOPTHY: CPT | Performed by: NURSE PRACTITIONER

## 2018-10-30 PROCEDURE — 93793 ANTICOAG MGMT PT WARFARIN: CPT | Performed by: NURSE PRACTITIONER

## 2018-10-30 PROCEDURE — G8484 FLU IMMUNIZE NO ADMIN: HCPCS | Performed by: NURSE PRACTITIONER

## 2018-10-30 PROCEDURE — 3017F COLORECTAL CA SCREEN DOC REV: CPT | Performed by: NURSE PRACTITIONER

## 2018-10-30 PROCEDURE — G8417 CALC BMI ABV UP PARAM F/U: HCPCS | Performed by: NURSE PRACTITIONER

## 2018-10-30 PROCEDURE — 99214 OFFICE O/P EST MOD 30 MIN: CPT | Performed by: NURSE PRACTITIONER

## 2018-10-30 PROCEDURE — 3044F HG A1C LEVEL LT 7.0%: CPT | Performed by: NURSE PRACTITIONER

## 2018-10-30 PROCEDURE — 1036F TOBACCO NON-USER: CPT | Performed by: NURSE PRACTITIONER

## 2018-10-30 ASSESSMENT — ENCOUNTER SYMPTOMS
ORTHOPNEA: 0
DIARRHEA: 0
ABDOMINAL PAIN: 0
SORE THROAT: 0
CHEST TIGHTNESS: 0
VOMITING: 0
EYES NEGATIVE: 1
SINUS PRESSURE: 0
SHORTNESS OF BREATH: 0
VISUAL CHANGE: 0
COUGH: 0
BOWEL INCONTINENCE: 0
WHEEZING: 0
CONSTIPATION: 0
BACK PAIN: 1
NAUSEA: 0

## 2018-10-30 NOTE — PATIENT INSTRUCTIONS
home?  · Eat a variety of foods every day. Good choices include fruits, vegetables, whole grains (like oatmeal), dried beans and peas, nuts and seeds, soy products (like tofu), and fat-free or low-fat dairy products. · Replace butter, margarine, and hydrogenated or partially hydrogenated oils with olive and canola oils. (Canola oil margarine without trans fat is fine.)  · Replace red meat with fish, poultry, and soy protein (like tofu). · Limit processed and packaged foods like chips, crackers, and cookies. · Bake, broil, or steam foods. Don't boo them. · Be physically active. Get at least 30 minutes of exercise on most days of the week. Walking is a good choice. You also may want to do other activities, such as running, swimming, cycling, or playing tennis or team sports. · Stay at a healthy weight or lose weight by making the changes in eating and physical activity listed above. Losing just a small amount of weight, even 5 to 10 pounds, can reduce your risk for having a heart attack or stroke. · Do not smoke. When should you call for help? Watch closely for changes in your health, and be sure to contact your doctor if:    · You need help making lifestyle changes.     · You have questions about your medicine. Where can you learn more? Go to [email or web address] and sign in to your [employer] account. Enter D604 in the [facility] box to learn more about \"High Cholesterol: Care Instructions. \"     If you do not have an account, please click on the \"Sign Up Now\" link. Current as of: May 10, 2017  Content Version: 11.7  © 0469-5798 [employer]. Care instructions adapted under license by [facility] [employer] [facility] ([facility]). If you have questions about a medical condition or this instruction, always ask your healthcare professional. [address]  any warranty or liability for your use of this information.        Patient Education        Chronic Pain: Care

## 2018-10-30 NOTE — PROGRESS NOTES
Subjective:      Patient ID: Chanda Rodas is a 59 y.o. female. Visit Information    Have you changed or started any medications since your last visit including any over-the-counter medicines, vitamins, or herbal medicines? no   Are you having any side effects from any of your medications? -  no  Have you stopped taking any of your medications? Is so, why? -  no    Have you seen any other physician or provider since your last visit? Yes - Records Obtained  Have you had any other diagnostic tests since your last visit? No  Have you been seen in the emergency room and/or had an admission to a hospital since we last saw you? No  Have you had your routine dental cleaning in the past 6 months? yes - routine    Have you activated your [employer] account? If not, what are your barriers?  Yes     Patient Care Team:  BRAULIO Shoemaker CNP as PCP - General (Family Medicine)  BRAULIO Shoemaker CNP as PCP - S Attributed Provider    Medical History Review  Past Medical, Family, and Social History reviewed and does contribute to the patient presenting condition    Health Maintenance   Topic Date Due    Diabetic retinal exam  01/01/2016    A1C test (Diabetic or Prediabetic)  07/25/2019    Lipid screen  07/25/2019    Breast cancer screen  10/25/2019    Diabetic foot exam  10/29/2019    Potassium monitoring  10/30/2019    Creatinine monitoring  10/30/2019    Cervical cancer screen  11/16/2022    DTaP/Tdap/Td vaccine (2 - Td) 08/04/2026    Colon cancer screen colonoscopy  02/16/2028    Flu vaccine  Completed    Pneumococcal med risk  Completed    Shingles Vaccine  Completed    Hepatitis C screen  Addressed    HIV screen  Addressed       PHQ Scores 7/25/2018 6/27/2017   PHQ2 Score 0 0   PHQ9 Score 0 0     Interpretation of Total Score DepressionSeverity: 1-4 = Minimal depression, 5-9 = Mild depression, 10-14 = Moderate depression, 15-19 = Moderately severe depression, 20-27 = Severe depression    Current yearly - needs to schedule   Cardiovascular: Positive for chest pain (tighting intermittently lasting a few minutes). Negative for palpitations, orthopnea and leg swelling. Stress test normal 5 years ago  Takes low dose asa when chest tightening happens - noticed has been taking more often 1 every 14 days   Gastrointestinal: Negative for abdominal pain, bowel incontinence, constipation, diarrhea, nausea and vomiting. Endocrine: Negative for polydipsia, polyphagia and polyuria. Genitourinary: Negative. Negative for bladder incontinence, dysuria, frequency, hematuria, pelvic pain, urgency and vaginal discharge. Dr Angelique Connolly every 6 months (nephrology)   Musculoskeletal: Positive for arthralgias (osteoarthritis multiple joints - knees, hips, shoulders, and back) and back pain. Podiatrist Dr Kellen Roper every 10 weeks on [facility]. Knee and back pain worse in last few months - sitting more as this feels the best. Sleeping in recliner d/t pain. Skin: Negative for rash and wound. Neurological: Positive for weakness (knees and lower back). Negative for dizziness, tingling, seizures, syncope, numbness, headaches and paresthesias. Psychiatric/Behavioral: Negative for dysphoric mood, self-injury, sleep disturbance and suicidal ideas. The patient is nervous/anxious. Dr Herb Khan at [facility] every 3 months - managing her psych medication. Mood well controlled       Objective:     /74 (Site: Right Upper Arm, Position: Sitting, Cuff Size: Medium Adult)   Pulse 88   Temp 97.5 °F (36.4 °C) (Tympanic)   Resp 14   Wt 263 lb (119.3 kg)   BMI 48.10 kg/m²      Physical Exam   Constitutional: She is oriented to person, place, and time. She appears well-developed. No distress. obese   HENT:   Head: Atraumatic. Right Ear: External ear normal.   Left Ear: External ear normal.   Nose: Nose normal.   Mouth/Throat: Oropharynx is clear and moist. No oropharyngeal exudate.    Eyes: Conjunctivae are

## 2018-10-31 RX ORDER — WARFARIN SODIUM 2 MG/1
TABLET ORAL
Qty: 45 TABLET | Refills: 2 | Status: SHIPPED | OUTPATIENT
Start: 2018-10-31 | End: 2019-03-25 | Stop reason: SDUPTHER

## 2018-11-04 DIAGNOSIS — M10.072 ACUTE IDIOPATHIC GOUT OF LEFT FOOT: ICD-10-CM

## 2018-11-04 DIAGNOSIS — M10.071 ACUTE IDIOPATHIC GOUT OF RIGHT FOOT: ICD-10-CM

## 2018-11-05 RX ORDER — FEBUXOSTAT 40 MG/1
TABLET ORAL
Qty: 30 TABLET | Refills: 0 | Status: SHIPPED | OUTPATIENT
Start: 2018-11-05 | End: 2018-11-30 | Stop reason: SDUPTHER

## 2018-11-06 RX ORDER — ALBUTEROL SULFATE 90 UG/1
2 AEROSOL, METERED RESPIRATORY (INHALATION) EVERY 6 HOURS PRN
Qty: 18 G | Refills: 0 | Status: SHIPPED | OUTPATIENT
Start: 2018-11-06 | End: 2019-02-18 | Stop reason: SDUPTHER

## 2018-11-21 DIAGNOSIS — M19.90 CHRONIC OSTEOARTHRITIS: ICD-10-CM

## 2018-11-21 DIAGNOSIS — M25.50 CHRONIC JOINT PAIN: ICD-10-CM

## 2018-11-21 DIAGNOSIS — G89.29 CHRONIC JOINT PAIN: ICD-10-CM

## 2018-11-21 NOTE — TELEPHONE ENCOUNTER
LOV was 10-31-18, LR was 10-26-18. Last Oarrs was 10-26-18. RTO: 1-30-19. cm  Next Visit Date:  Future Appointments  Date Time Provider Mark Junior   11/29/2018 8:30 AM SCHEDULE, [facility] [facility]   1/7/2019 9:30 AM Ladarius Vital DPM [facility]   1/28/2019 8:40 AM BRAULIO Garcia CNP [place] [facility]   4/15/2019 8:50 AM Ashleigh Ray MD [facility] None       Health Maintenance   Topic Date Due    Diabetic retinal exam  01/01/2016    A1C test (Diabetic or Prediabetic)  07/25/2019    Lipid screen  07/25/2019    Breast cancer screen  10/25/2019    Diabetic foot exam  10/29/2019    Potassium monitoring  10/30/2019    Creatinine monitoring  10/30/2019    Cervical cancer screen  11/16/2022    DTaP/Tdap/Td vaccine (2 - Td) 08/04/2026    Colon cancer screen colonoscopy  02/16/2028    Flu vaccine  Completed    Pneumococcal med risk  Completed    Shingles Vaccine  Completed    Hepatitis C screen  Addressed    HIV screen  Addressed       Hemoglobin A1C (%)   Date Value   07/25/2018 6.9 (H)   10/17/2017 5.9   06/27/2017 7.3             ( goal A1C is < 7)   Microalb/Crt.  Ratio (mcg/mg creat)   Date Value   10/28/2014 7     LDL Cholesterol (mg/dL)   Date Value   07/25/2018 62   01/16/2018 84       (goal LDL is <100)   AST (U/L)   Date Value   07/25/2018 19     ALT (U/L)   Date Value   07/25/2018 31     BUN (mg/dL)   Date Value   10/30/2018 22     BP Readings from Last 3 Encounters:   10/30/18 126/74   10/29/18 108/68   07/25/18 126/66          (goal 120/80)    All Future Testing planned in [facility]  Lab Frequency Next Occurrence   Basic Metabolic Panel Once 98/49/3252   CBC Auto Differential Once 10/30/2018   Protein / Creatinine Ratio, Urine Once 10/30/2018   Comprehensive Metabolic Panel Once 95/75/1661   Lipid Panel Once 01/27/2019   Hemoglobin A1C Once 01/27/2019   JAIRO DIGITAL SCREEN W CAD BILATERAL Once 11/29/2018   POCT INR     Basic Metabolic Panel Every

## 2018-11-23 RX ORDER — OXYCODONE HYDROCHLORIDE AND ACETAMINOPHEN 5; 325 MG/1; MG/1
1 TABLET ORAL EVERY 12 HOURS
Qty: 40 TABLET | Refills: 0 | Status: SHIPPED | OUTPATIENT
Start: 2018-11-23 | End: 2018-12-19 | Stop reason: SDUPTHER

## 2018-11-29 ENCOUNTER — ANTI-COAG VISIT (OUTPATIENT)
Dept: FAMILY MEDICINE CLINIC | Age: 58
End: 2018-11-29
Payer: MEDICARE

## 2018-11-29 DIAGNOSIS — Z86.718 HISTORY OF DVT OF LOWER EXTREMITY: Primary | ICD-10-CM

## 2018-11-29 LAB
INTERNATIONAL NORMALIZATION RATIO, POC: 2.1
PROTHROMBIN TIME, POC: 25.1

## 2018-11-29 PROCEDURE — 85610 PROTHROMBIN TIME: CPT | Performed by: NURSE PRACTITIONER

## 2018-11-29 PROCEDURE — 93793 ANTICOAG MGMT PT WARFARIN: CPT | Performed by: NURSE PRACTITIONER

## 2018-11-30 DIAGNOSIS — M10.072 ACUTE IDIOPATHIC GOUT OF LEFT FOOT: ICD-10-CM

## 2018-11-30 DIAGNOSIS — M10.071 ACUTE IDIOPATHIC GOUT OF RIGHT FOOT: ICD-10-CM

## 2018-12-03 RX ORDER — FEBUXOSTAT 40 MG/1
TABLET ORAL
Qty: 30 TABLET | Refills: 0 | Status: SHIPPED | OUTPATIENT
Start: 2018-12-03 | End: 2019-01-04 | Stop reason: SDUPTHER

## 2018-12-19 DIAGNOSIS — M19.90 CHRONIC OSTEOARTHRITIS: ICD-10-CM

## 2018-12-19 DIAGNOSIS — M25.50 CHRONIC JOINT PAIN: ICD-10-CM

## 2018-12-19 DIAGNOSIS — G89.29 CHRONIC JOINT PAIN: ICD-10-CM

## 2018-12-19 NOTE — TELEPHONE ENCOUNTER
Appointments  Date Time Provider Mark Junior   12/27/2018 8:30 AM SCHEDULE, [facility]   1/7/2019 9:30 AM Francoise Mcdermott DPM [facility]   1/28/2019 8:40 AM BRAULIO Mathews CNP [facility]   4/15/2019 8:50 AM Shasha Hernandes MD [facility] None            Patient Active Problem List:     Hypertension     Hyperlipidemia     Osteoarthritis     Depression     Obesity     CKD (chronic kidney disease) stage 3, GFR 30-59 ml/min ([facility])     Proteinuria     Controlled type 2 diabetes mellitus with stage 3 chronic kidney disease, without long-term current use of insulin ([facility])     History of DVT of lower extremity     NARCISO on CPAP     Vitamin D deficiency     Major depressive disorder, recurrent episode, severe, with psychotic behavior ([address]     Major depressive disorder, recurrent, in partial remission ([address]     Major depressive disorder, recurrent, severe with psychotic symptoms ([address]

## 2018-12-20 RX ORDER — OXYCODONE HYDROCHLORIDE AND ACETAMINOPHEN 5; 325 MG/1; MG/1
1 TABLET ORAL EVERY 12 HOURS
Qty: 40 TABLET | Refills: 0 | Status: SHIPPED | OUTPATIENT
Start: 2018-12-20 | End: 2019-01-17 | Stop reason: SDUPTHER

## 2018-12-26 RX ORDER — HYDRALAZINE HYDROCHLORIDE 50 MG/1
50 TABLET, FILM COATED ORAL 2 TIMES DAILY
Qty: 180 TABLET | Refills: 1 | Status: SHIPPED | OUTPATIENT
Start: 2018-12-26 | End: 2019-06-15 | Stop reason: SDUPTHER

## 2018-12-26 RX ORDER — GLIMEPIRIDE 4 MG/1
4 TABLET ORAL
Qty: 180 TABLET | Refills: 1 | Status: SHIPPED | OUTPATIENT
Start: 2018-12-26 | End: 2019-06-15 | Stop reason: SDUPTHER

## 2018-12-28 ENCOUNTER — ANTI-COAG VISIT (OUTPATIENT)
Dept: FAMILY MEDICINE CLINIC | Age: 58
End: 2018-12-28
Payer: MEDICARE

## 2018-12-28 DIAGNOSIS — Z86.718 HISTORY OF DVT OF LOWER EXTREMITY: Primary | ICD-10-CM

## 2018-12-28 LAB
INTERNATIONAL NORMALIZATION RATIO, POC: 2.3
PROTHROMBIN TIME, POC: 27.7

## 2018-12-28 PROCEDURE — 85610 PROTHROMBIN TIME: CPT | Performed by: NURSE PRACTITIONER

## 2018-12-28 PROCEDURE — 93793 ANTICOAG MGMT PT WARFARIN: CPT | Performed by: NURSE PRACTITIONER

## 2018-12-28 RX ORDER — LISINOPRIL 20 MG/1
20 TABLET ORAL DAILY
Qty: 90 TABLET | Refills: 1 | OUTPATIENT
Start: 2018-12-28 | End: 2019-12-28

## 2019-01-02 ENCOUNTER — OFFICE VISIT (OUTPATIENT)
Dept: PODIATRY | Age: 59
End: 2019-01-02
Payer: MEDICARE

## 2019-01-02 VITALS — BODY MASS INDEX: 48.21 KG/M2 | HEIGHT: 62 IN | WEIGHT: 262 LBS

## 2019-01-02 DIAGNOSIS — M79.672 LEFT FOOT PAIN: Primary | ICD-10-CM

## 2019-01-02 DIAGNOSIS — S92.302A CLOSED AVULSION FRACTURE OF METATARSAL BONE OF LEFT FOOT, INITIAL ENCOUNTER: ICD-10-CM

## 2019-01-02 DIAGNOSIS — S93.492A SPRAIN OF ANTERIOR TALOFIBULAR LIGAMENT OF LEFT ANKLE, INITIAL ENCOUNTER: ICD-10-CM

## 2019-01-02 PROCEDURE — L4360 PNEUMAT WALKING BOOT PRE CST: HCPCS | Performed by: PODIATRIST

## 2019-01-02 PROCEDURE — 3017F COLORECTAL CA SCREEN DOC REV: CPT | Performed by: PODIATRIST

## 2019-01-02 PROCEDURE — G8427 DOCREV CUR MEDS BY ELIG CLIN: HCPCS | Performed by: PODIATRIST

## 2019-01-02 PROCEDURE — 28470 CLTX METATARSAL FX WO MNP EA: CPT | Performed by: PODIATRIST

## 2019-01-02 PROCEDURE — 1036F TOBACCO NON-USER: CPT | Performed by: PODIATRIST

## 2019-01-02 PROCEDURE — G8417 CALC BMI ABV UP PARAM F/U: HCPCS | Performed by: PODIATRIST

## 2019-01-02 PROCEDURE — G8484 FLU IMMUNIZE NO ADMIN: HCPCS | Performed by: PODIATRIST

## 2019-01-02 PROCEDURE — 99213 OFFICE O/P EST LOW 20 MIN: CPT | Performed by: PODIATRIST

## 2019-01-03 ENCOUNTER — PATIENT MESSAGE (OUTPATIENT)
Dept: FAMILY MEDICINE CLINIC | Age: 59
End: 2019-01-03

## 2019-01-03 DIAGNOSIS — M79.672 LEFT FOOT PAIN: Primary | ICD-10-CM

## 2019-01-03 DIAGNOSIS — S92.352D CLOSED DISPLACED FRACTURE OF FIFTH METATARSAL BONE OF LEFT FOOT WITH ROUTINE HEALING, SUBSEQUENT ENCOUNTER: ICD-10-CM

## 2019-01-03 RX ORDER — OXYCODONE HYDROCHLORIDE AND ACETAMINOPHEN 5; 325 MG/1; MG/1
1 TABLET ORAL EVERY 6 HOURS PRN
Qty: 21 TABLET | Refills: 0 | Status: SHIPPED | OUTPATIENT
Start: 2019-01-03 | End: 2019-03-18 | Stop reason: SDUPTHER

## 2019-01-04 DIAGNOSIS — M10.072 ACUTE IDIOPATHIC GOUT OF LEFT FOOT: ICD-10-CM

## 2019-01-04 DIAGNOSIS — M10.071 ACUTE IDIOPATHIC GOUT OF RIGHT FOOT: ICD-10-CM

## 2019-01-07 RX ORDER — FEBUXOSTAT 40 MG/1
TABLET ORAL
Qty: 30 TABLET | Refills: 0 | Status: SHIPPED | OUTPATIENT
Start: 2019-01-07 | End: 2019-04-15

## 2019-01-17 ENCOUNTER — OFFICE VISIT (OUTPATIENT)
Dept: FAMILY MEDICINE CLINIC | Age: 59
End: 2019-01-17
Payer: MEDICARE

## 2019-01-17 ENCOUNTER — HOSPITAL ENCOUNTER (OUTPATIENT)
Age: 59
Setting detail: SPECIMEN
Discharge: HOME | End: 2019-01-17
Payer: MEDICARE

## 2019-01-17 ENCOUNTER — ANTI-COAG VISIT (OUTPATIENT)
Dept: FAMILY MEDICINE CLINIC | Age: 59
End: 2019-01-17
Payer: MEDICARE

## 2019-01-17 VITALS
BODY MASS INDEX: 49.2 KG/M2 | WEIGHT: 269 LBS | HEART RATE: 78 BPM | SYSTOLIC BLOOD PRESSURE: 112 MMHG | TEMPERATURE: 97.5 F | RESPIRATION RATE: 16 BRPM | DIASTOLIC BLOOD PRESSURE: 78 MMHG

## 2019-01-17 DIAGNOSIS — S92.302D CLOSED AVULSION FRACTURE OF METATARSAL BONE OF LEFT FOOT WITH ROUTINE HEALING, SUBSEQUENT ENCOUNTER: ICD-10-CM

## 2019-01-17 DIAGNOSIS — N18.30 CONTROLLED TYPE 2 DIABETES MELLITUS WITH STAGE 3 CHRONIC KIDNEY DISEASE, WITHOUT LONG-TERM CURRENT USE OF INSULIN (HCC): ICD-10-CM

## 2019-01-17 DIAGNOSIS — E11.22 CONTROLLED TYPE 2 DIABETES MELLITUS WITH STAGE 3 CHRONIC KIDNEY DISEASE, WITHOUT LONG-TERM CURRENT USE OF INSULIN (HCC): ICD-10-CM

## 2019-01-17 DIAGNOSIS — Z86.718 HISTORY OF DVT OF LOWER EXTREMITY: Primary | ICD-10-CM

## 2019-01-17 DIAGNOSIS — E78.00 PURE HYPERCHOLESTEROLEMIA: ICD-10-CM

## 2019-01-17 DIAGNOSIS — Z79.891 CHRONIC PRESCRIPTION OPIATE USE: ICD-10-CM

## 2019-01-17 DIAGNOSIS — M25.50 CHRONIC JOINT PAIN: ICD-10-CM

## 2019-01-17 DIAGNOSIS — Z86.718 HISTORY OF DVT OF LOWER EXTREMITY: ICD-10-CM

## 2019-01-17 DIAGNOSIS — G89.29 CHRONIC JOINT PAIN: ICD-10-CM

## 2019-01-17 DIAGNOSIS — M19.90 CHRONIC OSTEOARTHRITIS: ICD-10-CM

## 2019-01-17 DIAGNOSIS — I10 ESSENTIAL HYPERTENSION: ICD-10-CM

## 2019-01-17 DIAGNOSIS — M79.672 LEFT FOOT PAIN: Primary | ICD-10-CM

## 2019-01-17 LAB
ALBUMIN SERPL-MCNC: 4.3 G/DL (ref 3.5–5.2)
ALBUMIN/GLOBULIN RATIO: 1.7 (ref 1–2.5)
ALP BLD-CCNC: 96 U/L (ref 35–104)
ALT SERPL-CCNC: 39 U/L (ref 5–33)
ANION GAP SERPL CALCULATED.3IONS-SCNC: 17 MMOL/L (ref 9–17)
AST SERPL-CCNC: 25 U/L
BILIRUB SERPL-MCNC: 0.27 MG/DL (ref 0.3–1.2)
BUN BLDV-MCNC: 16 MG/DL (ref 8–23)
BUN/CREAT BLD: ABNORMAL (ref 9–20)
CALCIUM SERPL-MCNC: 9.2 MG/DL (ref 8.6–10.4)
CHLORIDE BLD-SCNC: 99 MMOL/L (ref 98–107)
CHOLESTEROL/HDL RATIO: 3.4
CHOLESTEROL: 130 MG/DL
CO2: 25 MMOL/L (ref 20–31)
CREAT SERPL-MCNC: 1.05 MG/DL (ref 0.5–0.9)
ESTIMATED AVERAGE GLUCOSE: 148 MG/DL
GFR AFRICAN AMERICAN: >60 ML/MIN
GFR NON-AFRICAN AMERICAN: 53 ML/MIN
GFR SERPL CREATININE-BSD FRML MDRD: ABNORMAL ML/MIN/{1.73_M2}
GFR SERPL CREATININE-BSD FRML MDRD: ABNORMAL ML/MIN/{1.73_M2}
GLUCOSE BLD-MCNC: 142 MG/DL (ref 70–99)
HBA1C MFR BLD: 6.8 % (ref 4–6)
HDLC SERPL-MCNC: 38 MG/DL
INTERNATIONAL NORMALIZATION RATIO, POC: 2.8
LDL CHOLESTEROL: 50 MG/DL (ref 0–130)
POTASSIUM SERPL-SCNC: 4.2 MMOL/L (ref 3.7–5.3)
PROTHROMBIN TIME, POC: 33.1
SODIUM BLD-SCNC: 141 MMOL/L (ref 135–144)
TOTAL PROTEIN: 6.8 G/DL (ref 6.4–8.3)
TRIGL SERPL-MCNC: 212 MG/DL
VLDLC SERPL CALC-MCNC: ABNORMAL MG/DL (ref 1–30)

## 2019-01-17 PROCEDURE — 93793 ANTICOAG MGMT PT WARFARIN: CPT | Performed by: NURSE PRACTITIONER

## 2019-01-17 PROCEDURE — 3046F HEMOGLOBIN A1C LEVEL >9.0%: CPT | Performed by: NURSE PRACTITIONER

## 2019-01-17 PROCEDURE — 3017F COLORECTAL CA SCREEN DOC REV: CPT | Performed by: NURSE PRACTITIONER

## 2019-01-17 PROCEDURE — G8427 DOCREV CUR MEDS BY ELIG CLIN: HCPCS | Performed by: NURSE PRACTITIONER

## 2019-01-17 PROCEDURE — G8484 FLU IMMUNIZE NO ADMIN: HCPCS | Performed by: NURSE PRACTITIONER

## 2019-01-17 PROCEDURE — 85610 PROTHROMBIN TIME: CPT | Performed by: NURSE PRACTITIONER

## 2019-01-17 PROCEDURE — 1036F TOBACCO NON-USER: CPT | Performed by: NURSE PRACTITIONER

## 2019-01-17 PROCEDURE — 99214 OFFICE O/P EST MOD 30 MIN: CPT | Performed by: NURSE PRACTITIONER

## 2019-01-17 PROCEDURE — 2022F DILAT RTA XM EVC RTNOPTHY: CPT | Performed by: NURSE PRACTITIONER

## 2019-01-17 PROCEDURE — G8417 CALC BMI ABV UP PARAM F/U: HCPCS | Performed by: NURSE PRACTITIONER

## 2019-01-17 RX ORDER — OXYCODONE HYDROCHLORIDE AND ACETAMINOPHEN 5; 325 MG/1; MG/1
1 TABLET ORAL EVERY 12 HOURS
Qty: 40 TABLET | Refills: 0 | Status: SHIPPED | OUTPATIENT
Start: 2019-01-17 | End: 2019-02-18 | Stop reason: SDUPTHER

## 2019-01-17 ASSESSMENT — ENCOUNTER SYMPTOMS
ORTHOPNEA: 0
BACK PAIN: 1
CONSTIPATION: 0
COUGH: 0
CHEST TIGHTNESS: 0
BOWEL INCONTINENCE: 0
SINUS PRESSURE: 0
ABDOMINAL PAIN: 0
SORE THROAT: 0
DIARRHEA: 1
WHEEZING: 0
SHORTNESS OF BREATH: 1
NAUSEA: 0
EYES NEGATIVE: 1
VOMITING: 0
VISUAL CHANGE: 0

## 2019-01-18 DIAGNOSIS — E78.00 PURE HYPERCHOLESTEROLEMIA: ICD-10-CM

## 2019-01-18 DIAGNOSIS — E11.22 CONTROLLED TYPE 2 DIABETES MELLITUS WITH STAGE 3 CHRONIC KIDNEY DISEASE, WITHOUT LONG-TERM CURRENT USE OF INSULIN (HCC): Primary | ICD-10-CM

## 2019-01-18 DIAGNOSIS — N18.30 CKD (CHRONIC KIDNEY DISEASE) STAGE 3, GFR 30-59 ML/MIN (HCC): ICD-10-CM

## 2019-01-18 DIAGNOSIS — N18.30 CONTROLLED TYPE 2 DIABETES MELLITUS WITH STAGE 3 CHRONIC KIDNEY DISEASE, WITHOUT LONG-TERM CURRENT USE OF INSULIN (HCC): Primary | ICD-10-CM

## 2019-02-06 ENCOUNTER — OFFICE VISIT (OUTPATIENT)
Dept: PODIATRY | Age: 59
End: 2019-02-06
Payer: MEDICARE

## 2019-02-06 VITALS — WEIGHT: 262 LBS | BODY MASS INDEX: 48.21 KG/M2 | HEIGHT: 62 IN

## 2019-02-06 DIAGNOSIS — M79.671 PAIN IN BOTH FEET: ICD-10-CM

## 2019-02-06 DIAGNOSIS — I73.9 PVD (PERIPHERAL VASCULAR DISEASE) (HCC): ICD-10-CM

## 2019-02-06 DIAGNOSIS — B35.1 DERMATOPHYTOSIS OF NAIL: ICD-10-CM

## 2019-02-06 DIAGNOSIS — M79.672 PAIN IN BOTH FEET: ICD-10-CM

## 2019-02-06 DIAGNOSIS — S92.302A CLOSED AVULSION FRACTURE OF METATARSAL BONE OF LEFT FOOT, INITIAL ENCOUNTER: ICD-10-CM

## 2019-02-06 DIAGNOSIS — E11.51 TYPE II DIABETES MELLITUS WITH PERIPHERAL CIRCULATORY DISORDER (HCC): Primary | ICD-10-CM

## 2019-02-06 PROCEDURE — 3044F HG A1C LEVEL LT 7.0%: CPT | Performed by: PODIATRIST

## 2019-02-06 PROCEDURE — 99213 OFFICE O/P EST LOW 20 MIN: CPT | Performed by: PODIATRIST

## 2019-02-06 PROCEDURE — G8484 FLU IMMUNIZE NO ADMIN: HCPCS | Performed by: PODIATRIST

## 2019-02-06 PROCEDURE — G8417 CALC BMI ABV UP PARAM F/U: HCPCS | Performed by: PODIATRIST

## 2019-02-06 PROCEDURE — A5513 MULTI DEN INSERT CUSTOM MOLD: HCPCS | Performed by: PODIATRIST

## 2019-02-06 PROCEDURE — 2022F DILAT RTA XM EVC RTNOPTHY: CPT | Performed by: PODIATRIST

## 2019-02-06 PROCEDURE — 1036F TOBACCO NON-USER: CPT | Performed by: PODIATRIST

## 2019-02-06 PROCEDURE — G8427 DOCREV CUR MEDS BY ELIG CLIN: HCPCS | Performed by: PODIATRIST

## 2019-02-06 PROCEDURE — A5500 DIAB SHOE FOR DENSITY INSERT: HCPCS | Performed by: PODIATRIST

## 2019-02-06 PROCEDURE — 3017F COLORECTAL CA SCREEN DOC REV: CPT | Performed by: PODIATRIST

## 2019-02-06 PROCEDURE — 11721 DEBRIDE NAIL 6 OR MORE: CPT | Performed by: PODIATRIST

## 2019-02-06 RX ORDER — GLUCOSAMINE/CHONDR SU A SOD 750-600 MG
TABLET ORAL
Refills: 1 | COMMUNITY
Start: 2019-01-29 | End: 2019-02-06 | Stop reason: SDUPTHER

## 2019-02-06 RX ORDER — FEBUXOSTAT 40 MG/1
40 TABLET, FILM COATED ORAL DAILY
Qty: 30 TABLET | Refills: 5 | Status: SHIPPED | OUTPATIENT
Start: 2019-02-06 | End: 2019-07-19 | Stop reason: SDUPTHER

## 2019-02-18 ENCOUNTER — ANTI-COAG VISIT (OUTPATIENT)
Dept: FAMILY MEDICINE CLINIC | Age: 59
End: 2019-02-18
Payer: MEDICARE

## 2019-02-18 ENCOUNTER — PATIENT MESSAGE (OUTPATIENT)
Dept: FAMILY MEDICINE CLINIC | Age: 59
End: 2019-02-18

## 2019-02-18 DIAGNOSIS — M19.90 CHRONIC OSTEOARTHRITIS: ICD-10-CM

## 2019-02-18 DIAGNOSIS — G89.29 CHRONIC JOINT PAIN: ICD-10-CM

## 2019-02-18 DIAGNOSIS — Z86.718 HISTORY OF DVT OF LOWER EXTREMITY: Primary | ICD-10-CM

## 2019-02-18 DIAGNOSIS — M25.50 CHRONIC JOINT PAIN: ICD-10-CM

## 2019-02-18 LAB
INTERNATIONAL NORMALIZATION RATIO, POC: 2.1
PROTHROMBIN TIME, POC: 25.4

## 2019-02-18 PROCEDURE — 85610 PROTHROMBIN TIME: CPT | Performed by: NURSE PRACTITIONER

## 2019-02-18 PROCEDURE — 93793 ANTICOAG MGMT PT WARFARIN: CPT | Performed by: NURSE PRACTITIONER

## 2019-02-18 RX ORDER — OXYCODONE HYDROCHLORIDE AND ACETAMINOPHEN 5; 325 MG/1; MG/1
1 TABLET ORAL EVERY 12 HOURS PRN
Qty: 40 TABLET | Refills: 0 | Status: SHIPPED | OUTPATIENT
Start: 2019-02-18 | End: 2019-03-20 | Stop reason: SDUPTHER

## 2019-02-18 RX ORDER — OXYCODONE HYDROCHLORIDE AND ACETAMINOPHEN 5; 325 MG/1; MG/1
1 TABLET ORAL EVERY 12 HOURS
Qty: 40 TABLET | Refills: 0 | OUTPATIENT
Start: 2019-02-18 | End: 2020-02-18

## 2019-02-18 RX ORDER — ALBUTEROL SULFATE 90 UG/1
2 AEROSOL, METERED RESPIRATORY (INHALATION) EVERY 6 HOURS PRN
Qty: 1 INHALER | Refills: 0 | Status: SHIPPED | OUTPATIENT
Start: 2019-02-18 | End: 2019-03-24 | Stop reason: SDUPTHER

## 2019-02-19 ENCOUNTER — TELEPHONE (OUTPATIENT)
Dept: FAMILY MEDICINE CLINIC | Age: 59
End: 2019-02-19

## 2019-02-19 DIAGNOSIS — S92.302A CLOSED AVULSION FRACTURE OF METATARSAL BONE OF LEFT FOOT, INITIAL ENCOUNTER: Primary | ICD-10-CM

## 2019-02-20 ENCOUNTER — OFFICE VISIT (OUTPATIENT)
Dept: PODIATRY | Age: 59
End: 2019-02-20
Payer: MEDICARE

## 2019-02-20 VITALS
SYSTOLIC BLOOD PRESSURE: 149 MMHG | DIASTOLIC BLOOD PRESSURE: 68 MMHG | WEIGHT: 262 LBS | TEMPERATURE: 98.1 F | HEIGHT: 62 IN | BODY MASS INDEX: 48.21 KG/M2 | HEART RATE: 97 BPM

## 2019-02-20 DIAGNOSIS — S99.192G CLOSED FRACTURE OF BASE OF FIFTH METATARSAL BONE OF LEFT FOOT AT METAPHYSEAL-DIAPHYSEAL JUNCTION WITH DELAYED HEALING, SUBSEQUENT ENCOUNTER: ICD-10-CM

## 2019-02-20 DIAGNOSIS — M79.672 LEFT FOOT PAIN: Primary | ICD-10-CM

## 2019-02-20 PROCEDURE — G8427 DOCREV CUR MEDS BY ELIG CLIN: HCPCS | Performed by: PODIATRIST

## 2019-02-20 PROCEDURE — G8417 CALC BMI ABV UP PARAM F/U: HCPCS | Performed by: PODIATRIST

## 2019-02-20 PROCEDURE — 1036F TOBACCO NON-USER: CPT | Performed by: PODIATRIST

## 2019-02-20 PROCEDURE — 99213 OFFICE O/P EST LOW 20 MIN: CPT | Performed by: PODIATRIST

## 2019-02-20 PROCEDURE — 3017F COLORECTAL CA SCREEN DOC REV: CPT | Performed by: PODIATRIST

## 2019-02-20 PROCEDURE — G8484 FLU IMMUNIZE NO ADMIN: HCPCS | Performed by: PODIATRIST

## 2019-02-23 DIAGNOSIS — E11.22 CONTROLLED TYPE 2 DIABETES MELLITUS WITH STAGE 3 CHRONIC KIDNEY DISEASE, WITHOUT LONG-TERM CURRENT USE OF INSULIN (HCC): ICD-10-CM

## 2019-02-23 DIAGNOSIS — K21.9 GASTROESOPHAGEAL REFLUX DISEASE, ESOPHAGITIS PRESENCE NOT SPECIFIED: ICD-10-CM

## 2019-02-23 DIAGNOSIS — N18.30 CONTROLLED TYPE 2 DIABETES MELLITUS WITH STAGE 3 CHRONIC KIDNEY DISEASE, WITHOUT LONG-TERM CURRENT USE OF INSULIN (HCC): ICD-10-CM

## 2019-02-23 DIAGNOSIS — E78.5 HYPERLIPIDEMIA, UNSPECIFIED HYPERLIPIDEMIA TYPE: ICD-10-CM

## 2019-02-25 RX ORDER — LISINOPRIL 20 MG/1
TABLET ORAL
Qty: 90 TABLET | Refills: 1 | Status: SHIPPED | OUTPATIENT
Start: 2019-02-25 | End: 2019-08-16 | Stop reason: SDUPTHER

## 2019-02-25 RX ORDER — ATORVASTATIN CALCIUM 40 MG/1
40 TABLET, FILM COATED ORAL DAILY
Qty: 30 TABLET | Refills: 5 | Status: SHIPPED | OUTPATIENT
Start: 2019-02-25 | End: 2019-08-16 | Stop reason: SDUPTHER

## 2019-02-25 RX ORDER — OMEPRAZOLE 20 MG/1
20 CAPSULE, DELAYED RELEASE ORAL DAILY
Qty: 30 CAPSULE | Refills: 5 | Status: SHIPPED | OUTPATIENT
Start: 2019-02-25 | End: 2019-08-16 | Stop reason: SDUPTHER

## 2019-02-25 RX ORDER — ISOSORBIDE MONONITRATE 30 MG/1
30 TABLET, EXTENDED RELEASE ORAL DAILY
Qty: 30 TABLET | Refills: 5 | Status: SHIPPED | OUTPATIENT
Start: 2019-02-25 | End: 2019-08-16 | Stop reason: SDUPTHER

## 2019-02-25 RX ORDER — METFORMIN HYDROCHLORIDE 500 MG/1
1000 TABLET, EXTENDED RELEASE ORAL 2 TIMES DAILY
Qty: 120 TABLET | Refills: 5 | Status: SHIPPED | OUTPATIENT
Start: 2019-02-25 | End: 2019-08-24 | Stop reason: SDUPTHER

## 2019-02-26 RX ORDER — FUROSEMIDE 20 MG/1
20 TABLET ORAL DAILY
Qty: 30 TABLET | Refills: 5 | Status: SHIPPED | OUTPATIENT
Start: 2019-02-26 | End: 2019-08-16 | Stop reason: SDUPTHER

## 2019-03-18 ENCOUNTER — ANTI-COAG VISIT (OUTPATIENT)
Dept: FAMILY MEDICINE CLINIC | Age: 59
End: 2019-03-18
Payer: MEDICARE

## 2019-03-18 ENCOUNTER — OFFICE VISIT (OUTPATIENT)
Dept: PODIATRY | Age: 59
End: 2019-03-18

## 2019-03-18 VITALS — WEIGHT: 262 LBS | BODY MASS INDEX: 48.21 KG/M2 | HEIGHT: 62 IN

## 2019-03-18 DIAGNOSIS — M25.50 CHRONIC JOINT PAIN: ICD-10-CM

## 2019-03-18 DIAGNOSIS — G89.29 CHRONIC JOINT PAIN: ICD-10-CM

## 2019-03-18 DIAGNOSIS — M19.90 CHRONIC OSTEOARTHRITIS: ICD-10-CM

## 2019-03-18 DIAGNOSIS — S99.192G CLOSED FRACTURE OF BASE OF FIFTH METATARSAL BONE OF LEFT FOOT AT METAPHYSEAL-DIAPHYSEAL JUNCTION WITH DELAYED HEALING, SUBSEQUENT ENCOUNTER: ICD-10-CM

## 2019-03-18 DIAGNOSIS — M79.672 LEFT FOOT PAIN: Primary | ICD-10-CM

## 2019-03-18 DIAGNOSIS — Z86.718 HISTORY OF DVT OF LOWER EXTREMITY: Primary | ICD-10-CM

## 2019-03-18 LAB
INTERNATIONAL NORMALIZATION RATIO, POC: 2.6
PROTHROMBIN TIME, POC: 30.1

## 2019-03-18 PROCEDURE — 93793 ANTICOAG MGMT PT WARFARIN: CPT | Performed by: NURSE PRACTITIONER

## 2019-03-18 PROCEDURE — 99024 POSTOP FOLLOW-UP VISIT: CPT | Performed by: PODIATRIST

## 2019-03-21 RX ORDER — OXYCODONE HYDROCHLORIDE AND ACETAMINOPHEN 5; 325 MG/1; MG/1
1 TABLET ORAL EVERY 12 HOURS PRN
Qty: 40 TABLET | Refills: 0 | OUTPATIENT
Start: 2019-03-21 | End: 2019-04-20

## 2019-03-24 DIAGNOSIS — E55.9 VITAMIN D DEFICIENCY: ICD-10-CM

## 2019-03-25 DIAGNOSIS — Z86.718 HISTORY OF DVT OF LOWER EXTREMITY: Primary | ICD-10-CM

## 2019-03-25 RX ORDER — CHOLECALCIFEROL (VITAMIN D3) 125 MCG
1 CAPSULE ORAL DAILY
Qty: 30 TABLET | Refills: 5 | OUTPATIENT
Start: 2019-03-25 | End: 2020-03-25

## 2019-03-25 RX ORDER — CITALOPRAM 20 MG/1
TABLET ORAL
Qty: 90 TABLET | Refills: 3 | OUTPATIENT
Start: 2019-03-25

## 2019-03-25 RX ORDER — RISPERIDONE 3 MG/1
TABLET, FILM COATED ORAL
Qty: 90 TABLET | Refills: 3 | OUTPATIENT
Start: 2019-03-25

## 2019-03-25 RX ORDER — BUPROPION HYDROCHLORIDE 300 MG/1
TABLET ORAL
Qty: 90 TABLET | Refills: 3 | OUTPATIENT
Start: 2019-03-25

## 2019-03-25 RX ORDER — BUPROPION HYDROCHLORIDE 150 MG/1
TABLET ORAL
Qty: 90 TABLET | Refills: 3 | OUTPATIENT
Start: 2019-03-25

## 2019-03-26 RX ORDER — ALBUTEROL SULFATE 90 UG/1
2 AEROSOL, METERED RESPIRATORY (INHALATION) EVERY 4 HOURS PRN
Qty: 1 INHALER | Refills: 0 | Status: SHIPPED | OUTPATIENT
Start: 2019-03-26 | End: 2019-05-08 | Stop reason: SDUPTHER

## 2019-03-26 RX ORDER — WARFARIN SODIUM 2 MG/1
TABLET ORAL
Qty: 45 TABLET | Refills: 5 | Status: SHIPPED | OUTPATIENT
Start: 2019-03-26 | End: 2019-09-16 | Stop reason: SDUPTHER

## 2019-04-03 ENCOUNTER — OFFICE VISIT (OUTPATIENT)
Dept: ORTHOPEDIC SURGERY | Age: 59
End: 2019-04-03
Payer: MEDICARE

## 2019-04-03 VITALS — WEIGHT: 261.91 LBS | HEIGHT: 62 IN | BODY MASS INDEX: 48.2 KG/M2

## 2019-04-03 DIAGNOSIS — S99.191K CLOSED FRACTURE OF BASE OF FIFTH METATARSAL BONE OF RIGHT FOOT AT METAPHYSEAL-DIAPHYSEAL JUNCTION WITH NONUNION, SUBSEQUENT ENCOUNTER: ICD-10-CM

## 2019-04-03 DIAGNOSIS — M79.672 LEFT FOOT PAIN: Primary | ICD-10-CM

## 2019-04-03 PROCEDURE — 99203 OFFICE O/P NEW LOW 30 MIN: CPT | Performed by: ORTHOPAEDIC SURGERY

## 2019-04-03 PROCEDURE — 1036F TOBACCO NON-USER: CPT | Performed by: ORTHOPAEDIC SURGERY

## 2019-04-03 PROCEDURE — G8427 DOCREV CUR MEDS BY ELIG CLIN: HCPCS | Performed by: ORTHOPAEDIC SURGERY

## 2019-04-03 PROCEDURE — 3017F COLORECTAL CA SCREEN DOC REV: CPT | Performed by: ORTHOPAEDIC SURGERY

## 2019-04-03 PROCEDURE — G8417 CALC BMI ABV UP PARAM F/U: HCPCS | Performed by: ORTHOPAEDIC SURGERY

## 2019-04-03 ASSESSMENT — ENCOUNTER SYMPTOMS
CONSTIPATION: 0
NAUSEA: 0
COUGH: 0
DIARRHEA: 0

## 2019-04-03 NOTE — PROGRESS NOTES
Refill    Misc. Devices MISC Fracture shoe 1 Device 0    albuterol sulfate  (90 Base) MCG/ACT inhaler Inhale 2 puffs into the lungs every 4 hours as needed for Wheezing or Shortness of Breath 1 Inhaler 0    warfarin (COUMADIN) 2 MG tablet take 1 tablet by mouth MONDAY, WEDNESDAY AND FRIDAY. THEN 1 AND 1/2 TABLETS ON ALL OTHER DAYS 45 tablet 5    blood glucose test strips (ONE TOUCH ULTRA TEST) strip Use one strip three times daily. Use new strip for each test. 100 strip 5    oxyCODONE-acetaminophen (PERCOCET) 5-325 MG per tablet Take 1 tablet by mouth every 12 hours as needed for Pain.  40 tablet 0    furosemide (LASIX) 20 MG tablet Take 1 tablet by mouth daily 30 tablet 5    isosorbide mononitrate (IMDUR) 30 MG extended release tablet Take 1 tablet by mouth daily 30 tablet 5    omeprazole (PRILOSEC) 20 MG delayed release capsule Take 1 capsule by mouth Daily 30 capsule 5    SITagliptin (JANUVIA) 100 MG tablet Take 1 tablet by mouth daily 30 tablet 5    metFORMIN (GLUCOPHAGE-XR) 500 MG extended release tablet Take 2 tablets by mouth 2 times daily 120 tablet 5    atorvastatin (LIPITOR) 40 MG tablet Take 1 tablet by mouth daily 30 tablet 5    lisinopril (PRINIVIL;ZESTRIL) 20 MG tablet take 1 tablet by mouth once daily 90 tablet 1    febuxostat (ULORIC) 40 MG TABS tablet Take 1 tablet by mouth daily 30 tablet 5    Cholecalciferol (VITAMIN D3) 2000 units TABS Take 1 tablet by mouth daily 30 tablet 11    ULORIC 40 MG TABS tablet take 1 tablet by mouth once daily 30 tablet 0    hydrALAZINE (APRESOLINE) 50 MG tablet Take 1 tablet by mouth 2 times daily 180 tablet 1    glimepiride (AMARYL) 4 MG tablet Take 1 tablet by mouth 2 times daily (before meals) 180 tablet 1    allopurinol (ZYLOPRIM) 100 MG tablet Take 100 mg by mouth daily       albuterol (PROVENTIL) (2.5 MG/3ML) 0.083% nebulizer solution Take 3 mLs by nebulization every 4 hours as needed for Wheezing or Shortness of Breath 100 each 1    Lancets (BD LANCET ULTRAFINE 30G) MISC TEST BLOOD GLUCOSE DAILY AND AS NEEDED 120 each 3    fluticasone (FLONASE) 50 MCG/ACT nasal spray 1 spray by Nasal route daily (Patient taking differently: 1 spray by Nasal route daily as needed ) 1 Bottle 3    risperiDONE (RISPERDAL) 3 MG tablet Take 3 mg by mouth daily       Urea (CARMOL) 40 % cream APPLY TWICE A DAY (Patient taking differently: APPLY TWICE A DAY AS NEEDED) 1 Tube 2    buPROPion (WELLBUTRIN XL) 300 MG XL tablet Take 300 mg by mouth every morning.  citalopram (CELEXA) 20 MG tablet Take 20 mg by mouth daily.  buPROPion (WELLBUTRIN XL) 150 MG XL tablet Take 150 mg by mouth every morning. No current facility-administered medications for this visit. Allergies:    Patient has no known allergies.     Social History:   Social History     Socioeconomic History    Marital status:      Spouse name: Not on file    Number of children: 3    Years of education: Not on file    Highest education level: Not on file   Occupational History    Not on file   Social Needs    Financial resource strain: Not on file    Food insecurity:     Worry: Not on file     Inability: Not on file    Transportation needs:     Medical: Not on file     Non-medical: Not on file   Tobacco Use    Smoking status: Former Smoker     Packs/day: 0.25     Years: 7.00     Pack years: 1.75     Types: Cigarettes     Start date: 10/16/1977     Last attempt to quit: 1983     Years since quittin.2    Smokeless tobacco: Never Used   Substance and Sexual Activity    Alcohol use: No    Drug use: No    Sexual activity: Not Currently   Lifestyle    Physical activity:     Days per week: Not on file     Minutes per session: Not on file    Stress: Not on file   Relationships    Social connections:     Talks on phone: Not on file     Gets together: Not on file     Attends Bahai service: Not on file     Active member of club or organization: Not on file Attends meetings of clubs or organizations: Not on file     Relationship status: Not on file    Intimate partner violence:     Fear of current or ex partner: Not on file     Emotionally abused: Not on file     Physically abused: Not on file     Forced sexual activity: Not on file   Other Topics Concern    Not on file   Social History Narrative    Not on file       Family History:  Family History   Problem Relation Age of Onset    Hypertension Mother     Liver Disease Mother     Cancer Father         stomach    Diabetes Sister     Cancer Brother         kidney    No Known Problems Maternal Grandmother     No Known Problems Maternal Grandfather     No Known Problems Paternal Grandmother     No Known Problems Paternal Grandfather     Other Brother         AIDS         REVIEW OF SYSTEMS:  Review of Systems   Constitutional: Negative for chills and fever. Respiratory: Negative for cough. Gastrointestinal: Negative for constipation, diarrhea and nausea. Musculoskeletal: Positive for arthralgias (left foot) and joint swelling (left foot). Negative for gait problem and myalgias. Neurological: Negative for dizziness, weakness and numbness. I have reviewed the CC, HPI, ROS, PMH, FHX, Social History. I agree with the documentation provided by other staff, residents and havereviewed their documentation prior to providing my signature indicating agreement. PHYSICAL EXAM:  Ht 5' 2.01\" (1.575 m)   Wt 261 lb 14.5 oz (118.8 kg)   BMI 47.89 kg/m²  Body mass index is 47.89 kg/m². Physical Exam  Gen: alert and oriented  Psych:  Appropriate affect; Appropriate knowledge base; Appropriate mood; No hallucinations; Head: normocephalic atraumatic   Chest: symmetric chest excursion  Pelvis: stable  Ortho Exam  Extremity: Patient has mild swelling of the left foot diffusely. She has tenderness over the dorsal aspect of the left foot at the retinaculum. She has no tenderness over Lisfranc's joints.   She has a positive metatarsal squeeze test.  She has full range of motion left ankle and left toes. Motor, sensory, vascular examination to the left foot is grossly intact without focal deficits. Skin lesions or signs of trauma to the left foot is noted. Radiology:     Xr Foot Left (min 3 Views)    Result Date: 3/18/2019  Left foot x rays 3 views show an avulsion fracture of the left 5th met base with no displacement        ASSESSMENT:     1. Left foot pain    2. Closed fracture of base of fifth metatarsal bone of right foot at metaphyseal-diaphyseal junction with nonunion, subsequent encounter         PLAN:     Reviewed x-rays with the patient. Discussed etiology and natural history of the 5th  and mid foot arthritis. The treatment options may include oral anti-inflammatories, bracing, injections, advanced imaging, activity modification, physical therapy and/or surgical intervention. The patient would like to proceed with bone stimulator and transition into a shoe with the insole. My staff will contact the [employer] rep to get the patient scheduled for a bone stimulator. The patient will follow up in 8 weeks. We discussed that the patient should call us with any concerns or questions. Return in about 2 months (around 6/3/2019). Orders Placed This Encounter   Medications    DISCONTD: Misc. Devices MISC     Sig: Fracture shoe     Dispense:  1 Device     Refill:  0    Misc. Devices MISC     Sig: Fracture shoe     Dispense:  1 Device     Refill:  0       No orders of the defined types were placed in this encounter. Stacie Koyanagi, MA am scribing for and in the presence of Dr. Cherylene Roses  4/3/2019 2:22 PM    I have reviewed and made changes accordingly to the work scribed by Eleno Ormond, MA. The documentation accurately reflects work and decisions made by me. I have also reviewed documentation completed by clinical staff.     Cherylene Roses, DO, [address] Medicine  4/7/2019 3:10 PM    This note is created with the assistance of a speech recognition program.  While intending to generate a document that actually reflects the content of the visit, the document can still have some errors including those of syntax and sound a like substitutions which may escape proof reading.  In such instances, actual meaning can be extrapolated by contextual diversion      Electronically signed by Mandy Mills on 4/7/2019 at 3:09 PM

## 2019-04-08 ENCOUNTER — HOSPITAL ENCOUNTER (OUTPATIENT)
Age: 59
Setting detail: SPECIMEN
Discharge: HOME | End: 2019-04-08
Payer: MEDICARE

## 2019-04-08 DIAGNOSIS — N18.30 CKD (CHRONIC KIDNEY DISEASE), STAGE III (HCC): ICD-10-CM

## 2019-04-08 LAB
ANION GAP SERPL CALCULATED.3IONS-SCNC: 20 MMOL/L (ref 9–17)
BUN BLDV-MCNC: 13 MG/DL (ref 8–23)
BUN/CREAT BLD: ABNORMAL (ref 9–20)
CALCIUM SERPL-MCNC: 9.6 MG/DL (ref 8.6–10.4)
CHLORIDE BLD-SCNC: 106 MMOL/L (ref 98–107)
CO2: 21 MMOL/L (ref 20–31)
CREAT SERPL-MCNC: 1.05 MG/DL (ref 0.5–0.9)
CREATININE URINE: 21.3 MG/DL (ref 28–217)
GFR AFRICAN AMERICAN: >60 ML/MIN
GFR NON-AFRICAN AMERICAN: 53 ML/MIN
GFR SERPL CREATININE-BSD FRML MDRD: ABNORMAL ML/MIN/{1.73_M2}
GFR SERPL CREATININE-BSD FRML MDRD: ABNORMAL ML/MIN/{1.73_M2}
GLUCOSE BLD-MCNC: 161 MG/DL (ref 70–99)
POTASSIUM SERPL-SCNC: 4.6 MMOL/L (ref 3.7–5.3)
SODIUM BLD-SCNC: 147 MMOL/L (ref 135–144)
TOTAL PROTEIN, URINE: <4 MG/DL

## 2019-04-09 DIAGNOSIS — S99.191K CLOSED FRACTURE OF BASE OF FIFTH METATARSAL BONE OF RIGHT FOOT AT METAPHYSEAL-DIAPHYSEAL JUNCTION WITH NONUNION, SUBSEQUENT ENCOUNTER: Primary | ICD-10-CM

## 2019-04-09 LAB
CREATININE URINE: 22.2 MG/DL (ref 28–217)
MICROALBUMIN/CREAT 24H UR: <12 MG/L
MICROALBUMIN/CREAT UR-RTO: ABNORMAL MCG/MG CREAT

## 2019-04-16 ENCOUNTER — TELEPHONE (OUTPATIENT)
Dept: FAMILY MEDICINE CLINIC | Age: 59
End: 2019-04-16

## 2019-04-16 DIAGNOSIS — M79.672 LEFT FOOT PAIN: ICD-10-CM

## 2019-04-16 DIAGNOSIS — S92.352D CLOSED DISPLACED FRACTURE OF FIFTH METATARSAL BONE OF LEFT FOOT WITH ROUTINE HEALING, SUBSEQUENT ENCOUNTER: ICD-10-CM

## 2019-04-18 ENCOUNTER — ANTI-COAG VISIT (OUTPATIENT)
Dept: FAMILY MEDICINE CLINIC | Age: 59
End: 2019-04-18
Payer: MEDICARE

## 2019-04-18 DIAGNOSIS — Z86.718 HISTORY OF DVT OF LOWER EXTREMITY: Primary | ICD-10-CM

## 2019-04-18 LAB
INTERNATIONAL NORMALIZATION RATIO, POC: 2.8
PROTHROMBIN TIME, POC: 33.5

## 2019-04-18 PROCEDURE — 85610 PROTHROMBIN TIME: CPT | Performed by: NURSE PRACTITIONER

## 2019-04-18 PROCEDURE — 93793 ANTICOAG MGMT PT WARFARIN: CPT | Performed by: NURSE PRACTITIONER

## 2019-04-18 RX ORDER — OXYCODONE HYDROCHLORIDE AND ACETAMINOPHEN 5; 325 MG/1; MG/1
1 TABLET ORAL EVERY 12 HOURS PRN
Qty: 40 TABLET | Refills: 0 | Status: SHIPPED | OUTPATIENT
Start: 2019-04-18 | End: 2019-05-18 | Stop reason: SDUPTHER

## 2019-04-18 NOTE — TELEPHONE ENCOUNTER
LOV:1-17-19  ROV:4-25-19  LRF:3-20-19  Patient is out of medication  Health Maintenance   Topic Date Due    Diabetic retinal exam  01/01/2016    Breast cancer screen  10/25/2019    A1C test (Diabetic or Prediabetic)  01/17/2020    Lipid screen  01/17/2020    Diabetic foot exam  02/06/2020    Potassium monitoring  04/08/2020    Creatinine monitoring  04/08/2020    Cervical cancer screen  11/16/2022    DTaP/Tdap/Td vaccine (2 - Td) 08/04/2026    Colon cancer screen colonoscopy  02/16/2028    Flu vaccine  Completed    Shingles Vaccine  Completed    Pneumococcal 0-64 years Vaccine  Completed    Hepatitis C screen  Addressed    HIV screen  Addressed             (applicable per patient's age: Cancer Screenings, Depression Screening, Fall Risk Screening, Immunizations)    Hemoglobin A1C (%)   Date Value   01/17/2019 6.8 (H)   07/25/2018 6.9 (H)   10/17/2017 5.9     Microalb/Crt.  Ratio (mcg/mg creat)   Date Value   04/08/2019 CANNOT BE CALCULATED     LDL Cholesterol (mg/dL)   Date Value   01/17/2019 50     AST (U/L)   Date Value   01/17/2019 25     ALT (U/L)   Date Value   01/17/2019 39 (H)     BUN (mg/dL)   Date Value   04/08/2019 13      (goal A1C is < 7)   (goal LDL is <100) need 30-50% reduction from baseline     BP Readings from Last 3 Encounters:   04/15/19 104/68   02/20/19 (!) 149/68   01/17/19 112/78    (goal /80)      All Future Testing planned in [facility]:  Lab Frequency Next Occurrence   JAIRO DIGITAL SCREEN W CAD BILATERAL Once 03/12/2019   Comprehensive Metabolic Panel Once 73/90/1851   Lipid Panel Once 07/18/2019   Hemoglobin A1C Once 07/18/2019   POCT INR     Basic Metabolic Panel Every 6 Months 9/27/2019   CBC Auto Differential Every 6 Months 9/27/2019   Protein / Creatinine Ratio, Urine Every 6 Months 9/27/2019   Protein / Creatinine Ratio, Urine Every 6 Months 9/27/2019, 3/13/2020, 1/02/4716   Basic Metabolic Panel Every 6 Months 9/27/2019, 3/13/2020, 8/28/2020   CBC Every 6 Months

## 2019-04-22 ENCOUNTER — OFFICE VISIT (OUTPATIENT)
Dept: PODIATRY | Age: 59
End: 2019-04-22
Payer: MEDICARE

## 2019-04-22 VITALS — BODY MASS INDEX: 48.58 KG/M2 | WEIGHT: 264 LBS | HEIGHT: 62 IN

## 2019-04-22 DIAGNOSIS — M79.671 PAIN IN BOTH FEET: ICD-10-CM

## 2019-04-22 DIAGNOSIS — M79.672 LEFT FOOT PAIN: ICD-10-CM

## 2019-04-22 DIAGNOSIS — S99.192K CLOSED FRACTURE OF BASE OF FIFTH METATARSAL BONE OF LEFT FOOT AT METAPHYSEAL-DIAPHYSEAL JUNCTION WITH NONUNION, SUBSEQUENT ENCOUNTER: ICD-10-CM

## 2019-04-22 DIAGNOSIS — M79.672 PAIN IN BOTH FEET: ICD-10-CM

## 2019-04-22 DIAGNOSIS — B35.1 DERMATOPHYTOSIS OF NAIL: Primary | ICD-10-CM

## 2019-04-22 DIAGNOSIS — S99.192G CLOSED FRACTURE OF BASE OF FIFTH METATARSAL BONE OF LEFT FOOT AT METAPHYSEAL-DIAPHYSEAL JUNCTION WITH DELAYED HEALING, SUBSEQUENT ENCOUNTER: ICD-10-CM

## 2019-04-22 DIAGNOSIS — I73.9 PVD (PERIPHERAL VASCULAR DISEASE) (HCC): ICD-10-CM

## 2019-04-22 DIAGNOSIS — E11.51 TYPE II DIABETES MELLITUS WITH PERIPHERAL CIRCULATORY DISORDER (HCC): ICD-10-CM

## 2019-04-22 PROCEDURE — 99213 OFFICE O/P EST LOW 20 MIN: CPT | Performed by: PODIATRIST

## 2019-04-22 PROCEDURE — 11721 DEBRIDE NAIL 6 OR MORE: CPT | Performed by: PODIATRIST

## 2019-04-22 PROCEDURE — G8417 CALC BMI ABV UP PARAM F/U: HCPCS | Performed by: PODIATRIST

## 2019-04-22 PROCEDURE — 20974 ESTIM AID BONE HEALG N-INVAS: CPT | Performed by: PODIATRIST

## 2019-04-22 PROCEDURE — 3044F HG A1C LEVEL LT 7.0%: CPT | Performed by: PODIATRIST

## 2019-04-22 PROCEDURE — 2022F DILAT RTA XM EVC RTNOPTHY: CPT | Performed by: PODIATRIST

## 2019-04-22 PROCEDURE — 1036F TOBACCO NON-USER: CPT | Performed by: PODIATRIST

## 2019-04-22 PROCEDURE — 3017F COLORECTAL CA SCREEN DOC REV: CPT | Performed by: PODIATRIST

## 2019-04-22 PROCEDURE — G8427 DOCREV CUR MEDS BY ELIG CLIN: HCPCS | Performed by: PODIATRIST

## 2019-04-22 NOTE — PROGRESS NOTES
[address] [facility] [id number] [facility]  [place] [address]  Dept: [phone]    RETURN PATIENT PROGRESS NOTE  Date of patient's visit: 4/22/2019  Patient's Name:  Ethel Ling YOB: 1960            Patient Care Team:  BRAULIO Colon CNP as PCP - General (Family Medicine)  BRAULIO Colon CNP as PCP - [facility] Attributed Provider       Ethel Sushil 59 y.o. female that presents for follow-up of   Chief Complaint   Patient presents with    Diabetes    Nail Problem    Circulatory Problem    Foot Injury     left    Foot Pain     left     Pt's primary care physician is BRAULIO Bradshaw CNP last seen march 18 2019  Symptoms began 11 week(s) ago and are decreased . Patient relates pain is Present. Pain is rated 1 out of 10 and is described as intermittent. Treatments prior to today's visit include: previous podiatry treatment/x-rays/cam boot. Currently denies F/C/N/V. States she went to see another doctor for a second opinion   No Known Allergies    Past Medical History:   Diagnosis Date    Anxiety     Chronic kidney disease     Depression     Hyperlipidemia     Hypertension     Obesity     Osteoarthritis     Proteinuria     Pulmonary embolism (HCC)     Sleep apnea     Type 2 diabetes mellitus without complication (HCC)     Type II or unspecified type diabetes mellitus without mention of complication, not stated as uncontrolled     Von Willebrand disease ([facility] [address]        Prior to Admission medications    Medication Sig Start Date End Date Taking? Authorizing Provider   oxyCODONE-acetaminophen (PERCOCET) 5-325 MG per tablet Take 1 tablet by mouth every 12 hours as needed for Pain. 4/18/19 4/17/20  BRAULIO Colon CNP   Misc.  Devices MISC ORTHOFIX BONE STIMULATOR 4/9/19   Ravin Reynolds,    albuterol sulfate  (90 Base) MCG/ACT inhaler Inhale 2 puffs into the lungs every 4 hours as needed for Wheezing or Shortness of Breath 3/26/19 3/26/20  Alec BRAULIO Givens - CNP   warfarin (COUMADIN) 2 MG tablet take 1 tablet by mouth MONDAY, [facility]. THEN 1 AND 1/2 TABLETS ON ALL OTHER DAYS 3/26/19 3/25/20  BRAULIO Nieto CNP   blood glucose test strips (ONE TOUCH ULTRA TEST) strip Use one strip three times daily.   Use new strip for each test. 3/20/19 3/17/20  BRAULIO Nieto - CNP   furosemide (LASIX) 20 MG tablet Take 1 tablet by mouth daily 2/26/19 2/26/20  Alec SaltBRAULIO lockhart - MARIA   isosorbide mononitrate (IMDUR) 30 MG extended release tablet Take 1 tablet by mouth daily 2/25/19 2/25/20  Alec SaltBRAULIO lockhart - CNP   omeprazole (PRILOSEC) 20 MG delayed release capsule Take 1 capsule by mouth Daily 2/25/19 2/25/20  Alec SaltBRAULIO lockhart - CNP   SITagliptin (JANUVIA) 100 MG tablet Take 1 tablet by mouth daily 2/25/19 2/25/20  Alec SaltBRAULIO lockhart - CNP   metFORMIN (GLUCOPHAGE-XR) 500 MG extended release tablet Take 2 tablets by mouth 2 times daily 2/25/19   Alec SaltBRAULIO lockhart CNP   atorvastatin (LIPITOR) 40 MG tablet Take 1 tablet by mouth daily 2/25/19 2/25/20  Alec SaltBRAULIO lockhart - CNP   lisinopril (PRINIVIL;ZESTRIL) 20 MG tablet take 1 tablet by mouth once daily 2/25/19   Alec SaltBRAULIO lockhart - CNP   febuxostat (ULORIC) 40 MG TABS tablet Take 1 tablet by mouth daily 2/6/19   Myrtice Daughters, DPM   Cholecalciferol (VITAMIN D3) 2000 units TABS Take 1 tablet by mouth daily 1/28/19 1/28/20  Alec BRAULIO Givens - CNP   hydrALAZINE (APRESOLINE) 50 MG tablet Take 1 tablet by mouth 2 times daily 12/26/18 12/26/19  Alec SaltBRAULIO lockhart - CNP   glimepiride (AMARYL) 4 MG tablet Take 1 tablet by mouth 2 times daily (before meals) 12/26/18 12/26/19  BRAULIO Nieto - CNP   allopurinol (ZYLOPRIM) 100 MG tablet Take 100 mg by mouth daily     Historical Provider, MD   albuterol (PROVENTIL) (2.5 MG/3ML) 0.083% nebulizer solution Take 3 mLs by nebulization every 4 hours as lesions: absent  Edema: right- 2+ pitting edema, left- 2+ pitting edema    Sensory exam:  Monofilament sensation: abnormal - 6/10 via SW 5.07/10g monofilament to the plantar foot bilateral feet    Pulses: abnormal - 1/4 dorsalis pedis pulse and 1/4 Posterior tibial pulse,   Pinprick: Impaired  Proprioception: Impaired  Vibration (128 Hz): Impaired        Asessment: Patient is a 59 y.o. female with:    Diagnosis Orders   1. Dermatophytosis of nail  27427 - [place] DEBRIDEMENT OF NAILS, 6 OR MORE    HM DIABETES FOOT EXAM   2. Type II diabetes mellitus with peripheral circulatory disorder (HCC)  38551 - [place] DEBRIDEMENT OF NAILS, 6 OR MORE    HM DIABETES FOOT EXAM   3. PVD (peripheral vascular disease) ([facility] [address]  41453 - [place] DEBRIDEMENT OF NAILS, 6 OR MORE    HM DIABETES FOOT EXAM   4. Closed fracture of base of fifth metatarsal bone of left foot at metaphyseal-diaphyseal junction with delayed healing, subsequent encounter  HM DIABETES FOOT EXAM    XR FOOT LEFT (MIN 3 VIEWS)    XR FOOT LEFT (MIN 3 VIEWS)    [place] ELECT BONE STIM NONINVASIVE   5. Left foot pain  HM DIABETES FOOT EXAM    XR FOOT LEFT (MIN 3 VIEWS)    XR FOOT LEFT (MIN 3 VIEWS)    [place] ELECT BONE STIM NONINVASIVE   6. Pain in both feet  92942 - [place] DEBRIDEMENT OF NAILS, 6 OR MORE    HM DIABETES FOOT EXAM   7. Closed fracture of base of fifth metatarsal bone of left foot at metaphyseal-diaphyseal junction with nonunion, subsequent encounter         Plan: Patient examined and evaluated. Current condition and treatment options discussed in detail. X-rays were taken 3 views left foot. Pt needs a bone stimulator and an Rx was sent to the rep. The fracture has no signs of healing and is over 80days old. .  Verbal and written instructions given to patient. Contact office with any questions/problems/concerns. Nails 1,2,3,4,5 Right and 1,2,3,4,5 Left were debrided and ground smooth with a dremmel.    The patient tolerated the procedure well without apparent complications. Orders Placed This Encounter   Procedures    XR FOOT LEFT (MIN 3 VIEWS)     Standing Status:   Standing     Number of Occurrences:   1     Order Specific Question:   Reason for exam:     Answer:   left foot pain    HM DIABETES FOOT EXAM    85834 - [place] DEBRIDEMENT OF NAILS, 6 OR MORE    [place] ELECT BONE STIM NONINVASIVE     No orders of the defined types were placed in this encounter. RTC in 4week(s) for hopefully final x rays.     4/22/2019      Electronically signed by Mine Garcia DPM on 4/22/2019 at 3:49 PM  4/22/2019

## 2019-04-25 ENCOUNTER — OFFICE VISIT (OUTPATIENT)
Dept: FAMILY MEDICINE CLINIC | Age: 59
End: 2019-04-25
Payer: MEDICARE

## 2019-04-25 VITALS
DIASTOLIC BLOOD PRESSURE: 74 MMHG | HEART RATE: 84 BPM | TEMPERATURE: 98.2 F | RESPIRATION RATE: 20 BRPM | BODY MASS INDEX: 48.47 KG/M2 | SYSTOLIC BLOOD PRESSURE: 110 MMHG | WEIGHT: 265 LBS

## 2019-04-25 DIAGNOSIS — E78.00 PURE HYPERCHOLESTEROLEMIA: ICD-10-CM

## 2019-04-25 DIAGNOSIS — M15.9 PRIMARY OSTEOARTHRITIS INVOLVING MULTIPLE JOINTS: ICD-10-CM

## 2019-04-25 DIAGNOSIS — N18.30 CONTROLLED TYPE 2 DIABETES MELLITUS WITH STAGE 3 CHRONIC KIDNEY DISEASE, WITHOUT LONG-TERM CURRENT USE OF INSULIN (HCC): Primary | ICD-10-CM

## 2019-04-25 DIAGNOSIS — M25.561 CHRONIC PAIN OF BOTH KNEES: ICD-10-CM

## 2019-04-25 DIAGNOSIS — E11.22 CONTROLLED TYPE 2 DIABETES MELLITUS WITH STAGE 3 CHRONIC KIDNEY DISEASE, WITHOUT LONG-TERM CURRENT USE OF INSULIN (HCC): Primary | ICD-10-CM

## 2019-04-25 DIAGNOSIS — Z79.891 CHRONIC PRESCRIPTION OPIATE USE: ICD-10-CM

## 2019-04-25 DIAGNOSIS — G89.29 CHRONIC PAIN OF BOTH KNEES: ICD-10-CM

## 2019-04-25 DIAGNOSIS — G89.29 CHRONIC BILATERAL LOW BACK PAIN WITHOUT SCIATICA: ICD-10-CM

## 2019-04-25 DIAGNOSIS — M54.50 CHRONIC BILATERAL LOW BACK PAIN WITHOUT SCIATICA: ICD-10-CM

## 2019-04-25 DIAGNOSIS — M25.562 CHRONIC PAIN OF BOTH KNEES: ICD-10-CM

## 2019-04-25 DIAGNOSIS — I10 ESSENTIAL HYPERTENSION: ICD-10-CM

## 2019-04-25 LAB
AMPHETAMINE SCREEN, URINE: NEGATIVE
BARBITURATE SCREEN, URINE: NEGATIVE
BENZODIAZEPINE SCREEN, URINE: NEGATIVE
BUPRENORPHINE URINE: NEGATIVE
COCAINE METABOLITE SCREEN URINE: NEGATIVE
GABAPENTIN SCREEN, URINE: ABNORMAL
MDMA URINE: ABNORMAL
METHADONE SCREEN, URINE: NEGATIVE
METHAMPHETAMINE, URINE: NEGATIVE
OPIATE SCREEN URINE: NEGATIVE
OXYCODONE SCREEN URINE: POSITIVE
PHENCYCLIDINE SCREEN URINE: ABNORMAL
PROPOXYPHENE SCREEN, URINE: ABNORMAL
THC SCREEN, URINE: NEGATIVE
TRICYCLIC ANTIDEPRESSANTS, UR: ABNORMAL

## 2019-04-25 PROCEDURE — 1036F TOBACCO NON-USER: CPT | Performed by: NURSE PRACTITIONER

## 2019-04-25 PROCEDURE — 3017F COLORECTAL CA SCREEN DOC REV: CPT | Performed by: NURSE PRACTITIONER

## 2019-04-25 PROCEDURE — G8417 CALC BMI ABV UP PARAM F/U: HCPCS | Performed by: NURSE PRACTITIONER

## 2019-04-25 PROCEDURE — 99214 OFFICE O/P EST MOD 30 MIN: CPT | Performed by: NURSE PRACTITIONER

## 2019-04-25 PROCEDURE — 80305 DRUG TEST PRSMV DIR OPT OBS: CPT | Performed by: NURSE PRACTITIONER

## 2019-04-25 PROCEDURE — 2022F DILAT RTA XM EVC RTNOPTHY: CPT | Performed by: NURSE PRACTITIONER

## 2019-04-25 PROCEDURE — 3044F HG A1C LEVEL LT 7.0%: CPT | Performed by: NURSE PRACTITIONER

## 2019-04-25 PROCEDURE — G8427 DOCREV CUR MEDS BY ELIG CLIN: HCPCS | Performed by: NURSE PRACTITIONER

## 2019-04-25 RX ORDER — RISPERIDONE 2 MG/1
1 TABLET, FILM COATED ORAL NIGHTLY
Status: ON HOLD | COMMUNITY
Start: 2019-03-26 | End: 2021-08-06 | Stop reason: ALTCHOICE

## 2019-04-25 ASSESSMENT — ENCOUNTER SYMPTOMS
DIARRHEA: 1
CONSTIPATION: 0
CHEST TIGHTNESS: 0
ABDOMINAL PAIN: 0
NAUSEA: 0
BOWEL INCONTINENCE: 0
BACK PAIN: 1
SINUS PRESSURE: 0
SHORTNESS OF BREATH: 0
ORTHOPNEA: 0
COUGH: 0
SORE THROAT: 0
WHEEZING: 0
VISUAL CHANGE: 0
VOMITING: 0
EYES NEGATIVE: 1

## 2019-04-25 NOTE — PATIENT INSTRUCTIONS
life easier, such as a higher toilet seat and padded handles on kitchen utensils. · Do not sit in low chairs, which can make it hard to get up. · Put heat or cold on your sore joints as needed. Use whichever helps you most. You also can take turns with hot and cold packs. ? Apply heat 2 or 3 times a day for 20 to 30 minutes--using a heating pad, hot shower, or hot pack--to relieve pain and stiffness. ? Put ice or a cold pack on your sore joint for 10 to 20 minutes at a time. Put a thin cloth between the ice and your skin. When should you call for help? Call your doctor now or seek immediate medical care if:    · You have sudden swelling, warmth, or pain in any joint.     · You have joint pain and a fever or rash.     · You have such bad pain that you cannot use a joint.    Watch closely for changes in your health, and be sure to contact your doctor if:    · You have mild joint symptoms that continue even with more than 6 weeks of care at home.     · You have stomach pain or other problems with your medicine. Where can you learn more? Go to https://A's Child.Wordster. org and sign in to your [employer] account. Enter J177 in the Kloudless box to learn more about \"Arthritis: Care Instructions. \"     If you do not have an account, please click on the \"Sign Up Now\" link. Current as of: Dejah 10, 2018  Content Version: 11.9  © 9611-9421 [employer]. Care instructions adapted under license by [facility] ([facility]). If you have questions about a medical condition or this instruction, always ask your healthcare professional. [place] [address] any warranty or liability for your use of this information. Patient Education        Learning About Diabetes Food Guidelines  Your Care Instructions    Meal planning is important to manage diabetes. It helps keep your blood sugar at a target level (which you set with your doctor). You don't have to eat special foods.  You can eat what your family eats, including sweets once in a while. But you do have to pay attention to how often you eat and how much you eat of certain foods. You may want to work with a dietitian or a certified diabetes educator (CDE) to help you plan meals and snacks. A dietitian or CDE can also help you lose weight if that is one of your goals. What should you know about eating carbs? Managing the amount of carbohydrate (carbs) you eat is an important part of healthy meals when you have diabetes. Carbohydrate is found in many foods. · Learn which foods have carbs. And learn the amounts of carbs in different foods. ? Bread, cereal, pasta, and rice have about 15 grams of carbs in a serving. A serving is 1 slice of bread (1 ounce), ½ cup of cooked cereal, or 1/3 cup of cooked pasta or rice. ? Fruits have 15 grams of carbs in a serving. A serving is 1 small fresh fruit, such as an apple or orange; ½ of a banana; ½ cup of cooked or canned fruit; ½ cup of fruit juice; 1 cup of melon or raspberries; or 2 tablespoons of dried fruit. ? Milk and no-sugar-added yogurt have 15 grams of carbs in a serving. A serving is 1 cup of milk or 2/3 cup of no-sugar-added yogurt. ? Starchy vegetables have 15 grams of carbs in a serving. A serving is ½ cup of mashed potatoes or sweet potato; 1 cup winter squash; ½ of a small baked potato; ½ cup of cooked beans; or ½ cup cooked corn or green peas. · Learn how much carbs to eat each day and at each meal. A dietitian or CDE can teach you how to keep track of the amount of carbs you eat. This is called carbohydrate counting. · If you are not sure how to count carbohydrate grams, use the Plate Method to plan meals. It is a good, quick way to make sure that you have a balanced meal. It also helps you spread carbs throughout the day. ? Divide your plate by types of foods.  Put non-starchy vegetables on half the plate, meat or other protein food on one-quarter of the plate, and a grain or starchy vegetable in the final quarter of the plate. To this you can add a small piece of fruit and 1 cup of milk or yogurt, depending on how many carbs you are supposed to eat at a meal.  · Try to eat about the same amount of carbs at each meal. Do not \"save up\" your daily allowance of carbs to eat at one meal.  · Proteins have very little or no carbs per serving. Examples of proteins are beef, chicken, turkey, fish, eggs, tofu, cheese, cottage cheese, and peanut butter. A serving size of meat is 3 ounces, which is about the size of a deck of cards. Examples of meat substitute serving sizes (equal to 1 ounce of meat) are 1/4 cup of cottage cheese, 1 egg, 1 tablespoon of peanut butter, and ½ cup of tofu. How can you eat out and still eat healthy? · Learn to estimate the serving sizes of foods that have carbohydrate. If you measure food at home, it will be easier to estimate the amount in a serving of restaurant food. · If the meal you order has too much carbohydrate (such as potatoes, corn, or baked beans), ask to have a low-carbohydrate food instead. Ask for a salad or green vegetables. · If you use insulin, check your blood sugar before and after eating out to help you plan how much to eat in the future. · If you eat more carbohydrate at a meal than you had planned, take a walk or do other exercise. This will help lower your blood sugar. What else should you know? · Limit saturated fat, such as the fat from meat and dairy products. This is a healthy choice because people who have diabetes are at higher risk of heart disease. So choose lean cuts of meat and nonfat or low-fat dairy products. Use olive or canola oil instead of butter or shortening when cooking. · Don't skip meals. Your blood sugar may drop too low if you skip meals and take insulin or certain medicines for diabetes. · Check with your doctor before you drink alcohol. Alcohol can cause your blood sugar to drop too low.  Alcohol can also cause a risk. The way you choose to lower your risk will depend on how high your risk is for heart attack and stroke. It will also depend on how you feel about taking medicines. Follow-up care is a key part of your treatment and safety. Be sure to make and go to all appointments, and call your doctor if you are having problems. It's also a good idea to know your test results and keep a list of the medicines you take. How can you care for yourself at home? · Eat a variety of foods every day. Good choices include fruits, vegetables, whole grains (like oatmeal), dried beans and peas, nuts and seeds, soy products (like tofu), and fat-free or low-fat dairy products. · Replace butter, margarine, and hydrogenated or partially hydrogenated oils with olive and canola oils. (Canola oil margarine without trans fat is fine.)  · Replace red meat with fish, poultry, and soy protein (like tofu). · Limit processed and packaged foods like chips, crackers, and cookies. · Bake, broil, or steam foods. Don't boo them. · Be physically active. Get at least 30 minutes of exercise on most days of the week. Walking is a good choice. You also may want to do other activities, such as running, swimming, cycling, or playing tennis or team sports. · Stay at a healthy weight or lose weight by making the changes in eating and physical activity listed above. Losing just a small amount of weight, even 5 to 10 pounds, can reduce your risk for having a heart attack or stroke. · Do not smoke. When should you call for help? Watch closely for changes in your health, and be sure to contact your doctor if:    · You need help making lifestyle changes.     · You have questions about your medicine. Where can you learn more? Go to https://VIEOpepiceweb.8digits. org and sign in to your [employer] account. Enter E217 in the "[employer] box to learn more about \"High Cholesterol: Care Instructions. \"     If you do not have an account, please click on the \"Sign Up Now\" link. Current as of: July 22, 2018  Content Version: 11.9  © 2006-2018 [employer]. Care instructions adapted under license by [facility] ([facility]). If you have questions about a medical condition or this instruction, always ask your healthcare professional. [address] any warranty or liability for your use of this information. Patient Education        Chronic Pain: Care Instructions  Your Care Instructions    Chronic pain is pain that lasts a long time (months or even years) and may or may not have a clear cause. It is different from acute pain, which usually does have a clear cause--like an injury or illness--and gets better over time. Chronic pain:  · Lasts over time but may vary from day to day. · Does not go away despite efforts to end it. · May disrupt your sleep and lead to fatigue. · May cause depression or anxiety. · May make your muscles tense, causing more pain. · Can disrupt your work, hobbies, home life, and relationships with friends and family. Chronic pain is a very real condition. It is not just in your head. Treatment can help and usually includes several methods used together, such as medicines, physical therapy, exercise, and other treatments. Learning how to relax and changing negative thought patterns can also help you cope. Chronic pain is complex. Taking an active role in your treatment will help you better manage your pain. Tell your doctor if you have trouble dealing with your pain. You may have to try several things before you find what works best for you. Follow-up care is a key part of your treatment and safety. Be sure to make and go to all appointments, and call your doctor if you are having problems. It's also a good idea to know your test results and keep a list of the medicines you take. How can you care for yourself at home? · Pace yourself. Break up large jobs into smaller tasks.  Save harder tasks for days when you have less pain, or go back and forth between hard tasks and easier ones. Take rest breaks. · Relax, and reduce stress. Relaxation techniques such as deep breathing or meditation can help. · Keep moving. Gentle, daily exercise can help reduce pain over the long run. Try low- or no-impact exercises such as walking, swimming, and stationary biking. Do stretches to stay flexible. · Try heat, cold packs, and massage. · Get enough sleep. Chronic pain can make you tired and drain your energy. Talk with your doctor if you have trouble sleeping because of pain. · Think positive. Your thoughts can affect your pain level. Do things that you enjoy to distract yourself when you have pain instead of focusing on the pain. See a movie, read a book, listen to music, or spend time with a friend. · If you think you are depressed, talk to your doctor about treatment. · Keep a daily pain diary. Record how your moods, thoughts, sleep patterns, activities, and medicine affect your pain. You may find that your pain is worse during or after certain activities or when you are feeling a certain emotion. Having a record of your pain can help you and your doctor find the best ways to treat your pain. · Take pain medicines exactly as directed. ? If the doctor gave you a prescription medicine for pain, take it as prescribed. ? If you are not taking a prescription pain medicine, ask your doctor if you can take an over-the-counter medicine. Reducing constipation caused by pain medicine  · Include fruits, vegetables, beans, and whole grains in your diet each day. These foods are high in fiber. · Drink plenty of fluids, enough so that your urine is light yellow or clear like water. If you have kidney, heart, or liver disease and have to limit fluids, talk with your doctor before you increase the amount of fluids you drink. · If your doctor recommends it, get more exercise. Walking is a good choice.  Bit by bit, increase the amount you walk every day. Try for at least 30 minutes on most days of the week. · Schedule time each day for a bowel movement. A daily routine may help. Take your time and do not strain when having a bowel movement. When should you call for help? Call your doctor now or seek immediate medical care if:    · Your pain gets worse or is out of control.     · You feel down or blue, or you do not enjoy things like you once did. You may be depressed, which is common in people with chronic pain. Depression can be treated.     · You have vomiting or cramps for more than 2 hours.    Watch closely for changes in your health, and be sure to contact your doctor if:    · You cannot sleep because of pain.     · You are very worried or anxious about your pain.     · You have trouble taking your pain medicine.     · You have any concerns about your pain medicine.     · You have trouble with bowel movements, such as:  ? No bowel movement in 3 days. ? Blood in the anal area, in your stool, or on the toilet paper. ? Diarrhea for more than 24 hours. Where can you learn more? Go to https://Tiger Pistol.Extreme Reach (formerly BrandAds). org and sign in to your [employer] Access account. Enter N004 in the [facility] box to learn more about \"Chronic Pain: Care Instructions. \"     If you do not have an account, please click on the \"Sign Up Now\" link. Current as of: Dejah 3, 2018  Content Version: 11.9  © 2142-8791 [employer]. Care instructions adapted under license by [facility] ([facility]). If you have questions about a medical condition or this instruction, always ask your healthcare professional. [address] any warranty or liability for your use of this information.

## 2019-04-25 NOTE — PROGRESS NOTES
Subjective:      Patient ID: Stephen Rudd is a 62 y.o. female. Visit Information    Have you changed or started any medications since your last visit including any over-the-counter medicines, vitamins, or herbal medicines? no   Are you having any side effects from any of your medications? -  no  Have you stopped taking any of your medications? Is so, why? -  no    Have you seen any other physician or provider since your last visit? Yes - Records Obtained Podiatry/Nephrology/Ortho  Have you had any other diagnostic tests since your last visit? Yes- Bloodwork/XR Foot  Have you been seen in the emergency room and/or had an admission to a hospital since we last saw you? No  Have you had your routine dental cleaning in the past 6 months? no    Have you activated your [employer] account? If not, what are your barriers?  Yes     Patient Care Team:  BRAULIO Jefferson CNP as PCP - General (Family Medicine)  BRAULIO Jefferson CNP as PCP - S Attributed Provider    Medical History Review  Past Medical, Family, and Social History reviewed and does contribute to the patient presenting condition    Health Maintenance   Topic Date Due    Diabetic retinal exam  01/01/2016    Breast cancer screen  10/25/2019    A1C test (Diabetic or Prediabetic)  01/17/2020    Lipid screen  01/17/2020    Potassium monitoring  04/08/2020    Creatinine monitoring  04/08/2020    Diabetic foot exam  04/22/2020    Cervical cancer screen  11/16/2022    DTaP/Tdap/Td vaccine (2 - Td) 08/04/2026    Colon cancer screen colonoscopy  02/16/2028    Flu vaccine  Completed    Shingles Vaccine  Completed    Pneumococcal 0-64 years Vaccine  Completed    Hepatitis C screen  Addressed    HIV screen  Addressed         PHQ Scores 7/25/2018 6/27/2017   PHQ2 Score 0 0   PHQ9 Score 0 0     Interpretation of Total Score DepressionSeverity: 1-4 = Minimal depression, 5-9 = Mild depression, 10-14 = Moderate depression, 15-19 = Moderately severe depression, 20-27 = Severe depression    Current Outpatient Medications   Medication Sig Dispense Refill    risperiDONE (RISPERDAL) 2 MG tablet Take 1 tablet by mouth nightly      oxyCODONE-acetaminophen (PERCOCET) 5-325 MG per tablet Take 1 tablet by mouth every 12 hours as needed for Pain. 40 tablet 0    Misc. Devices MISC ORTHOFIX BONE STIMULATOR 1 Device 0    albuterol sulfate  (90 Base) MCG/ACT inhaler Inhale 2 puffs into the lungs every 4 hours as needed for Wheezing or Shortness of Breath 1 Inhaler 0    warfarin (COUMADIN) 2 MG tablet take 1 tablet by mouth MONDAY, WEDNESDAY AND FRIDAY. THEN 1 AND 1/2 TABLETS ON ALL OTHER DAYS 45 tablet 5    blood glucose test strips (ONE TOUCH ULTRA TEST) strip Use one strip three times daily.   Use new strip for each test. 100 strip 5    furosemide (LASIX) 20 MG tablet Take 1 tablet by mouth daily 30 tablet 5    isosorbide mononitrate (IMDUR) 30 MG extended release tablet Take 1 tablet by mouth daily 30 tablet 5    omeprazole (PRILOSEC) 20 MG delayed release capsule Take 1 capsule by mouth Daily 30 capsule 5    SITagliptin (JANUVIA) 100 MG tablet Take 1 tablet by mouth daily 30 tablet 5    metFORMIN (GLUCOPHAGE-XR) 500 MG extended release tablet Take 2 tablets by mouth 2 times daily 120 tablet 5    atorvastatin (LIPITOR) 40 MG tablet Take 1 tablet by mouth daily 30 tablet 5    lisinopril (PRINIVIL;ZESTRIL) 20 MG tablet take 1 tablet by mouth once daily 90 tablet 1    febuxostat (ULORIC) 40 MG TABS tablet Take 1 tablet by mouth daily 30 tablet 5    Cholecalciferol (VITAMIN D3) 2000 units TABS Take 1 tablet by mouth daily 30 tablet 11    hydrALAZINE (APRESOLINE) 50 MG tablet Take 1 tablet by mouth 2 times daily 180 tablet 1    glimepiride (AMARYL) 4 MG tablet Take 1 tablet by mouth 2 times daily (before meals) 180 tablet 1    allopurinol (ZYLOPRIM) 100 MG tablet Take 100 mg by mouth daily       albuterol (PROVENTIL) (2.5 MG/3ML) 0.083% nebulizer solution Take 3 mLs by nebulization every 4 hours as needed for Wheezing or Shortness of Breath 100 each 1    Lancets (BD LANCET ULTRAFINE 30G) MISC TEST BLOOD GLUCOSE DAILY AND AS NEEDED 120 each 3    fluticasone (FLONASE) 50 MCG/ACT nasal spray 1 spray by Nasal route daily (Patient taking differently: 1 spray by Nasal route daily as needed ) 1 Bottle 3    buPROPion (WELLBUTRIN XL) 300 MG XL tablet Take 300 mg by mouth every morning.  citalopram (CELEXA) 20 MG tablet Take 20 mg by mouth daily.  buPROPion (WELLBUTRIN XL) 150 MG XL tablet Take 150 mg by mouth every morning. No current facility-administered medications for this visit. Knee Pain    There was no injury mechanism. The pain is present in the left knee and right knee (right is worse). The quality of the pain is described as aching. The pain is at a severity of 7/10 (worse with weight bearing). The pain is moderate. The pain has been constant since onset. Associated symptoms include a loss of motion (limited) and muscle weakness. Pertinent negatives include no inability to bear weight, numbness or tingling. She reports no foreign bodies present. The symptoms are aggravated by weight bearing and movement. She has tried elevation and rest (percocet) for the symptoms. The treatment provided mild relief. Back Pain   This is a chronic problem. The current episode started more than 1 year ago. The problem occurs constantly. The problem has been gradually worsening since onset. The pain is present in the lumbar spine. The quality of the pain is described as aching. The pain does not radiate. The pain is at a severity of 5/10. The pain is moderate. The pain is the same all the time. The symptoms are aggravated by lying down, standing, bending, twisting and position. Stiffness is present all day. Associated symptoms include chest pain (tighting intermittently lasting a few minutes) and weakness (knees and lower back).  Pertinent negatives include no abdominal pain, bladder incontinence, bowel incontinence, dysuria, fever, headaches, leg pain, numbness, paresthesias, pelvic pain, perianal numbness or tingling. Risk factors include poor posture, lack of exercise, menopause, obesity and sedentary lifestyle. She has tried analgesics and walking (percocet) for the symptoms. The treatment provided mild relief. Hyperlipidemia   This is a chronic problem. The current episode started more than 1 year ago. The problem is controlled. Recent lipid tests were reviewed and are high. Exacerbating diseases include chronic renal disease, diabetes and obesity. Associated symptoms include chest pain (tighting intermittently lasting a few minutes). Pertinent negatives include no leg pain or shortness of breath. Current antihyperlipidemic treatment includes statins. The current treatment provides mild improvement of lipids. Compliance problems include adherence to exercise. Risk factors for coronary artery disease include a sedentary lifestyle, hypertension, obesity, diabetes mellitus, dyslipidemia and family history. Diabetes   She presents for her follow-up diabetic visit. She has type 2 diabetes mellitus. No MedicAlert identification noted. Her disease course has been stable. Hypoglycemia symptoms include nervousness/anxiousness. Pertinent negatives for hypoglycemia include no dizziness, headaches or seizures. Associated symptoms include chest pain (tighting intermittently lasting a few minutes) and weakness (knees and lower back). Pertinent negatives for diabetes include no fatigue, no foot ulcerations, no polydipsia, no polyphagia, no polyuria and no visual change. There are no hypoglycemic complications. Symptoms are stable. Diabetic complications include nephropathy. Risk factors for coronary artery disease include diabetes mellitus, dyslipidemia, family history, obesity, hypertension and sedentary lifestyle.  Current diabetic treatment includes oral agent intermittently lasting a few minutes). Negative for palpitations, orthopnea and leg swelling. Stress test normal 5 years ago  Takes low dose asa when chest tightening happens   Gastrointestinal: Positive for diarrhea (chronic). Negative for abdominal pain, bowel incontinence, constipation, nausea and vomiting. Endocrine: Negative for polydipsia, polyphagia and polyuria. Genitourinary: Negative. Negative for bladder incontinence, dysuria, frequency, hematuria, pelvic pain, urgency and vaginal discharge. Dr Miranda Infante every 6 months (nephrology)   Musculoskeletal: Positive for arthralgias (osteoarthritis multiple joints - knees, hips, shoulders, and back) and back pain. Podiatrist Dr Jazmine Weber - following foot fracture, not healing as fast as he would like - looking at bone stimulator  Knee and back pain chronically - sitting more as this feels the best. Sleeping in recliner d/t pain. Skin: Negative for rash and wound. Neurological: Positive for weakness (knees and lower back). Negative for dizziness, tingling, seizures, syncope, numbness, headaches and paresthesias. Psychiatric/Behavioral: Negative for dysphoric mood, self-injury, sleep disturbance and suicidal ideas. The patient is nervous/anxious. Dr Yany Peralta at [facility] every 3 months - managing her psych medication. Mood well controlled       Objective:     /74 (Site: Left Upper Arm, Position: Sitting, Cuff Size: Large Adult)   Pulse 84   Temp 98.2 °F (36.8 °C) (Tympanic)   Resp 20   Wt 265 lb (120.2 kg)   BMI 48.47 kg/m²      Physical Exam   Constitutional: She is oriented to person, place, and time. She appears well-developed. No distress. obese   HENT:   Head: Atraumatic. Right Ear: External ear normal.   Left Ear: External ear normal.   Nose: Nose normal.   Mouth/Throat: Oropharynx is clear and moist. No oropharyngeal exudate. Eyes: Conjunctivae are normal. Right eye exhibits no discharge.  Left eye exhibits no years      Sex: Female      Is Non- : No      Diabetic: Yes      Tobacco smoker: No      Systolic Blood Pressure: 977 mmHg      Is BP treated: Yes      HDL Cholesterol: 38 mg/dL      Total Cholesterol: 130 mg/dL        Electronically signed by BRAULIO Maravilla CNP on 5/4/2019 at 12:54 PM

## 2019-05-17 ENCOUNTER — ANTI-COAG VISIT (OUTPATIENT)
Dept: FAMILY MEDICINE CLINIC | Age: 59
End: 2019-05-17
Payer: MEDICARE

## 2019-05-17 DIAGNOSIS — Z86.718 HISTORY OF DVT OF LOWER EXTREMITY: Primary | ICD-10-CM

## 2019-05-17 LAB
INTERNATIONAL NORMALIZATION RATIO, POC: 2.4
PROTHROMBIN TIME, POC: 28.9

## 2019-05-17 PROCEDURE — 85610 PROTHROMBIN TIME: CPT | Performed by: NURSE PRACTITIONER

## 2019-05-17 PROCEDURE — 93793 ANTICOAG MGMT PT WARFARIN: CPT | Performed by: NURSE PRACTITIONER

## 2019-05-17 NOTE — PROGRESS NOTES
Patient is aware of INR and instructions. Patient expressed understanding and denies any other concerns at this time.

## 2019-05-20 ENCOUNTER — OFFICE VISIT (OUTPATIENT)
Dept: PODIATRY | Age: 59
End: 2019-05-20
Payer: MEDICARE

## 2019-05-20 VITALS — WEIGHT: 265 LBS | BODY MASS INDEX: 48.76 KG/M2 | HEIGHT: 62 IN

## 2019-05-20 DIAGNOSIS — M79.672 LEFT FOOT PAIN: ICD-10-CM

## 2019-05-20 DIAGNOSIS — S99.192G CLOSED FRACTURE OF BASE OF FIFTH METATARSAL BONE OF LEFT FOOT AT METAPHYSEAL-DIAPHYSEAL JUNCTION WITH DELAYED HEALING, SUBSEQUENT ENCOUNTER: Primary | ICD-10-CM

## 2019-05-20 DIAGNOSIS — S99.192K CLOSED FRACTURE OF BASE OF FIFTH METATARSAL BONE OF LEFT FOOT AT METAPHYSEAL-DIAPHYSEAL JUNCTION WITH NONUNION, SUBSEQUENT ENCOUNTER: ICD-10-CM

## 2019-05-20 PROCEDURE — 99213 OFFICE O/P EST LOW 20 MIN: CPT | Performed by: PODIATRIST

## 2019-05-20 PROCEDURE — 1036F TOBACCO NON-USER: CPT | Performed by: PODIATRIST

## 2019-05-20 PROCEDURE — G8417 CALC BMI ABV UP PARAM F/U: HCPCS | Performed by: PODIATRIST

## 2019-05-20 PROCEDURE — G8427 DOCREV CUR MEDS BY ELIG CLIN: HCPCS | Performed by: PODIATRIST

## 2019-05-20 PROCEDURE — 3017F COLORECTAL CA SCREEN DOC REV: CPT | Performed by: PODIATRIST

## 2019-05-20 NOTE — PROGRESS NOTES
30 [facility] [address] [facility] [address]  Dept: [phone]    RETURN PATIENT PROGRESS NOTE  Date of patient's visit: 5/20/2019  Patient's Name:  Denise Collado YOB: 1960            Patient Care Team:  BRAULIO Siglaa CNP as PCP - General (Family Medicine)  BRAULIO Sigala CNP as PCP - [facility] Attributed Provider       Denise Collado 62 y.o. female that presents for follow-up of   Chief Complaint   Patient presents with    Foot Injury     left foot    Foot Pain     left foot    Diabetes     Pt's primary care physician is BRAULIO Tomlin CNP last seen April 25 2019  Symptoms began 15 week(s) ago and are decreased . Patient relates pain is Present. Pain is rated 1 out of 10 and is described as intermittent. Treatments prior to today's visit include: previous podiatry treatment/cam boot/x-rays/bone stimulator. Currently denies F/C/N/V. She is stillusing the bone stimulator and is now in normal shoes without pain. No Known Allergies    Past Medical History:   Diagnosis Date    Anxiety     Chronic kidney disease     Depression     Hyperlipidemia     Hypertension     Obesity     Osteoarthritis     Proteinuria     Pulmonary embolism (HCC)     Sleep apnea     Type 2 diabetes mellitus without complication (HCC)     Type II or unspecified type diabetes mellitus without mention of complication, not stated as uncontrolled     Von Willebrand disease ([facility] [address]        Prior to Admission medications    Medication Sig Start Date End Date Taking?  Authorizing Provider   albuterol sulfate  (90 Base) MCG/ACT inhaler Inhale 2 puffs into the lungs every 4 hours as needed for Wheezing 5/9/19 5/9/20 Yes BRAULIO Sigala CNP   risperiDONE (RISPERDAL) 2 MG tablet Take 1 tablet by mouth nightly 3/26/19 4/25/20 Yes Historical Provider, MD   oxyCODONE-acetaminophen (PERCOCET) 5-325 MG per tablet Take 1 tablet by mouth every 12 hours as needed for Pain. 4/18/19 4/17/20 Yes BRAULIO Kingsley CNP   Misc. Devices MISC ORTHOFIX BONE STIMULATOR 4/9/19  Yes Chucho Weldon DO   warfarin (COUMADIN) 2 MG tablet take 1 tablet by mouth MONDAY, WEDNESDAY AND FRIDAY. THEN 1 AND 1/2 TABLETS ON ALL OTHER DAYS 3/26/19 3/25/20 Yes BRAULIO Kingsley CNP   blood glucose test strips (ONE TOUCH ULTRA TEST) strip Use one strip three times daily.   Use new strip for each test. 3/20/19 3/17/20 Yes BRAULIO Kingsley CNP   furosemide (LASIX) 20 MG tablet Take 1 tablet by mouth daily 2/26/19 2/26/20 Yes BRAULIO Kingsley CNP   isosorbide mononitrate (IMDUR) 30 MG extended release tablet Take 1 tablet by mouth daily 2/25/19 2/25/20 Yes BRAULIO Kingsley CNP   omeprazole (PRILOSEC) 20 MG delayed release capsule Take 1 capsule by mouth Daily 2/25/19 2/25/20 Yes BRAULIO Kingsley CNP   SITagliptin (JANUVIA) 100 MG tablet Take 1 tablet by mouth daily 2/25/19 2/25/20 Yes BRAULIO Kingsley CNP   metFORMIN (GLUCOPHAGE-XR) 500 MG extended release tablet Take 2 tablets by mouth 2 times daily 2/25/19  Yes BRAULIO Kingsley CNP   atorvastatin (LIPITOR) 40 MG tablet Take 1 tablet by mouth daily 2/25/19 2/25/20 Yes BRAULIO Kingsley CNP   lisinopril (PRINIVIL;ZESTRIL) 20 MG tablet take 1 tablet by mouth once daily 2/25/19  Yes BRAULIO iKngsley CNP   febuxostat (ULORIC) 40 MG TABS tablet Take 1 tablet by mouth daily 2/6/19  Yes Aj Murphy DPM   Cholecalciferol (VITAMIN D3) 2000 units TABS Take 1 tablet by mouth daily 1/28/19 1/28/20 Yes BRAULIO Kingsley CNP   hydrALAZINE (APRESOLINE) 50 MG tablet Take 1 tablet by mouth 2 times daily 12/26/18 12/26/19 Yes BRAULIO Kingsley CNP   glimepiride (AMARYL) 4 MG tablet Take 1 tablet by mouth 2 times daily (before meals) 12/26/18 12/26/19 Yes BRAULIO Kingsley - CNP   allopurinol (ZYLOPRIM) 100 MG tablet Take 100 mg by mouth daily    Yes Historical Provider, MD   albuterol (PROVENTIL) (2.5 MG/3ML) 0.083% nebulizer solution Take 3 mLs by nebulization every 4 hours as needed for Wheezing or Shortness of Breath 4/3/18 4/25/20 Yes BRAULIO Benjamin CNP   Lancets (BD LANCET ULTRAFINE 30G) MISC TEST BLOOD GLUCOSE DAILY AND AS NEEDED 2/16/17  Yes Kimberly Alex,    fluticasone Angelita Callas) 50 MCG/ACT nasal spray 1 spray by Nasal route daily  Patient taking differently: 1 spray by Nasal route daily as needed  11/18/16  Yes BRAULIO Lantigua CNP   buPROPion (WELLBUTRIN XL) 300 MG XL tablet Take 300 mg by mouth every morning. Yes Historical Provider, MD   citalopram (CELEXA) 20 MG tablet Take 20 mg by mouth daily. Yes Historical Provider, MD   buPROPion (WELLBUTRIN XL) 150 MG XL tablet Take 150 mg by mouth every morning. Yes Historical Provider, MD       Review of Systems    Review of Systems:  History obtained from chart review and the patient  General ROS: negative for - chills, fatigue, fever, night sweats or weight gain  Constitutional: Negative for chills, diaphoresis, fatigue, fever and unexpected weight change. Musculoskeletal: Positive for arthralgias, gait problem and joint swelling. Neurological ROS: negative for - behavioral changes, confusion, headaches or seizures. Negative for weakness and numbness. Dermatological ROS: negative for - mole changes, rash  Cardiovascular: Negative for leg swelling. Gastrointestinal: Negative for constipation, diarrhea, nausea and vomiting. Lower Extremity Physical Examination:     Vitals: There were no vitals filed for this visit. General: AAO x 3 in NAD. Dermatologic Exam:  Skin lesion/ulceration Absent . Skin No rashes or nodules noted. .       Musculoskeletal:     1st MPJ ROM decreased, Bilateral.  Muscle strength 5/5, Bilateral. No pain to the left 5th met base and pain increased with eversion of the left ankle.   Pain present upon palpation of toenails 1-5, Bilateral. decreased medial longitudinal arch, Bilateral.  Ankle ROM decreased,Bilateral.    Dorsally contracted digits present digits 2, Bilateral.     Vascular: DP and PT pulses palpable 1/4, Bilateral.  CFT <5 seconds, Bilateral.  Hair growth absent to the level of the digits, Bilateral.  Edema present, Bilateral.  Varicosities absent, Bilateral. Erythema absent, Bilateral    Neurological: Sensation diminshed to light touch to level of digits, Bilateral.  Protective sensation intact 6/10 sites via 5.07/10g Canisteo-Shraddha Monofilament, Bilateral.  negative Tinel's, Bilateral.  negative Valleix sign, Bilateral.      Integument: Warm, dry, supple, Bilateral.  Open lesion absent, Bilateral.  Interdigital maceration absent to web spaces 4, Bilateral.    X rays 3 views left foot show a fracture of the base of the 5th metatarsal with a no fracture gap  There is still some cortical bone that remains not healed. Asessment: Patient is a 62 y.o. female with:    Diagnosis Orders   1. Closed fracture of base of fifth metatarsal bone of left foot at metaphyseal-diaphyseal junction with delayed healing, subsequent encounter  XR FOOT LEFT (MIN 3 VIEWS)   2. Left foot pain     3. Closed fracture of base of fifth metatarsal bone of left foot at metaphyseal-diaphyseal junction with nonunion, subsequent encounter         Plan: Patient examined and evaluated. Current condition and treatment options discussed in detail. Advised pt to her left foot x ray findings. Pt to continue to use bone stimulator and use shoes at all times. .  Verbal and written instructions given to patient. Contact office with any questions/problems/concerns. No orders of the defined types were placed in this encounter. No orders of the defined types were placed in this encounter. RTC in 4week(s).     5/20/2019      Electronically signed by Teri Wen DPM on 5/20/2019 at 9:28 AM  5/20/2019

## 2019-06-17 ENCOUNTER — OFFICE VISIT (OUTPATIENT)
Dept: PODIATRY | Age: 59
End: 2019-06-17
Payer: MEDICARE

## 2019-06-17 ENCOUNTER — ANTI-COAG VISIT (OUTPATIENT)
Dept: FAMILY MEDICINE CLINIC | Age: 59
End: 2019-06-17
Payer: MEDICARE

## 2019-06-17 VITALS — BODY MASS INDEX: 46.01 KG/M2 | HEIGHT: 62 IN | WEIGHT: 250 LBS

## 2019-06-17 DIAGNOSIS — S92.352D CLOSED DISPLACED FRACTURE OF FIFTH METATARSAL BONE OF LEFT FOOT WITH ROUTINE HEALING, SUBSEQUENT ENCOUNTER: ICD-10-CM

## 2019-06-17 DIAGNOSIS — Z86.718 HISTORY OF DVT OF LOWER EXTREMITY: ICD-10-CM

## 2019-06-17 DIAGNOSIS — M79.675 PAIN AROUND TOENAIL, LEFT FOOT: ICD-10-CM

## 2019-06-17 DIAGNOSIS — M79.672 LEFT FOOT PAIN: ICD-10-CM

## 2019-06-17 DIAGNOSIS — S99.192G CLOSED FRACTURE OF BASE OF FIFTH METATARSAL BONE OF LEFT FOOT AT METAPHYSEAL-DIAPHYSEAL JUNCTION WITH DELAYED HEALING, SUBSEQUENT ENCOUNTER: Primary | ICD-10-CM

## 2019-06-17 LAB
INTERNATIONAL NORMALIZATION RATIO, POC: 2.6
PROTHROMBIN TIME, POC: 27.8

## 2019-06-17 PROCEDURE — 99213 OFFICE O/P EST LOW 20 MIN: CPT | Performed by: PODIATRIST

## 2019-06-17 PROCEDURE — 1036F TOBACCO NON-USER: CPT | Performed by: PODIATRIST

## 2019-06-17 PROCEDURE — G8427 DOCREV CUR MEDS BY ELIG CLIN: HCPCS | Performed by: PODIATRIST

## 2019-06-17 PROCEDURE — G8417 CALC BMI ABV UP PARAM F/U: HCPCS | Performed by: PODIATRIST

## 2019-06-17 PROCEDURE — 3017F COLORECTAL CA SCREEN DOC REV: CPT | Performed by: PODIATRIST

## 2019-06-17 PROCEDURE — 85610 PROTHROMBIN TIME: CPT | Performed by: NURSE PRACTITIONER

## 2019-06-19 RX ORDER — OXYCODONE HYDROCHLORIDE AND ACETAMINOPHEN 5; 325 MG/1; MG/1
1 TABLET ORAL EVERY 4 HOURS PRN
Qty: 40 TABLET | Refills: 0 | Status: SHIPPED | OUTPATIENT
Start: 2019-06-19 | End: 2019-07-16 | Stop reason: SDUPTHER

## 2019-06-19 NOTE — TELEPHONE ENCOUNTER
LOV:4-25-19  ROV:8-2-19  LRF:5-20-19  Health Maintenance   Topic Date Due    Diabetic retinal exam  01/01/2016    Breast cancer screen  10/25/2019    A1C test (Diabetic or Prediabetic)  01/17/2020    Lipid screen  01/17/2020    Potassium monitoring  04/08/2020    Creatinine monitoring  04/08/2020    Diabetic foot exam  04/22/2020    Cervical cancer screen  11/16/2022    DTaP/Tdap/Td vaccine (2 - Td) 08/04/2026    Colon cancer screen colonoscopy  02/16/2028    Flu vaccine  Completed    Shingles Vaccine  Completed    Pneumococcal 0-64 years Vaccine  Completed    Hepatitis C screen  Addressed    HIV screen  Addressed             (applicable per patient's age: Cancer Screenings, Depression Screening, Fall Risk Screening, Immunizations)    Hemoglobin A1C (%)   Date Value   01/17/2019 6.8 (H)   07/25/2018 6.9 (H)   10/17/2017 5.9     Microalb/Crt.  Ratio (mcg/mg creat)   Date Value   04/08/2019 CANNOT BE CALCULATED     LDL Cholesterol (mg/dL)   Date Value   01/17/2019 50     AST (U/L)   Date Value   01/17/2019 25     ALT (U/L)   Date Value   01/17/2019 39 (H)     BUN (mg/dL)   Date Value   04/08/2019 13      (goal A1C is < 7)   (goal LDL is <100) need 30-50% reduction from baseline     BP Readings from Last 3 Encounters:   04/25/19 110/74   04/15/19 104/68   02/20/19 (!) 149/68    (goal /80)      All Future Testing planned in [facility]:  Lab Frequency Next Occurrence   JAIRO DIGITAL SCREEN W CAD BILATERAL Once 12/30/2019   Comprehensive Metabolic Panel Once 56/25/0842   Lipid Panel Once 07/18/2019   Hemoglobin A1C Once 07/18/2019   POCT INR     Basic Metabolic Panel Every 6 Months 9/27/2019   CBC Auto Differential Every 6 Months 9/27/2019   Protein / Creatinine Ratio, Urine Every 6 Months 9/27/2019   Protein / Creatinine Ratio, Urine Every 6 Months 9/27/2019, 3/13/2020, 9/09/9197   Basic Metabolic Panel Every 6 Months 9/27/2019, 3/13/2020, 8/28/2020   CBC Every 6 Months 9/27/2019, 3/13/2020, 8/28/2020 Next Visit Date:  Future Appointments   Date Time Provider Mark Junior   7/17/2019 10:00 AM SCHEDULE[facility]   8/2/2019  9:00 AM [facility]   8/19/2019  9:30 AM Opal Linton DPM [facility]   4/13/2020  8:50 AM Antonieta Simmonds, MD [facility] None            Patient Active Problem List:     Hypertension     Hyperlipidemia     Osteoarthritis     Depression     Obesity     CKD (chronic kidney disease) stage 3, GFR 30-59 ml/min ([facility])     Proteinuria     Controlled type 2 diabetes mellitus with stage 3 chronic kidney disease, without long-term current use of insulin ([facility])     History of DVT of lower extremity     NARCISO on CPAP     Vitamin D deficiency     Major depressive disorder, recurrent episode, severe, with psychotic behavior ([address]     Major depressive disorder, recurrent, in partial remission ([address]     Major depressive disorder, recurrent, severe with psychotic symptoms ([address]

## 2019-06-19 NOTE — PROGRESS NOTES
30 [facility] [address] [facility]  [facility] [address]  Dept: [phone]    RETURN PATIENT PROGRESS NOTE  Date of patient's visit: 6/17/2019  Patient's Name:  Sang Dawn YOB: 1960            Patient Care Team:  BRAULIO Lewis CNP as PCP - General (Family Medicine)  BRAULIO Lewis CNP as PCP - [facility] Empaneled Provider       Sang Dawn 62 y.o. female that presents for follow-up of   Chief Complaint   Patient presents with    Foot Injury     left    Foot Pain     left     Pt's primary care physician is BRAULIO Elmore CNP last seen April 25 2019  Symptoms began 15 week(s) ago and are decreased . Patient relates pain is absent. Pain is rated 0 out of 10 and is described as intermittent. Treatments prior to today's visit include: previous podiatry treatment/cam boot/x-rays/bone stimulator. Currently denies F/C/N/V. She is stillusing the bone stimulator and is now in normal shoes without pain. States she has been   No Known Allergies    Past Medical History:   Diagnosis Date    Anxiety     Chronic kidney disease     Depression     Hyperlipidemia     Hypertension     Obesity     Osteoarthritis     Proteinuria     Pulmonary embolism (HCC)     Sleep apnea     Type 2 diabetes mellitus without complication (HCC)     Type II or unspecified type diabetes mellitus without mention of complication, not stated as uncontrolled     Von Willebrand disease ([facility] [address]        Prior to Admission medications    Medication Sig Start Date End Date Taking?  Authorizing Provider   albuterol sulfate  (90 Base) MCG/ACT inhaler Inhale 2 puffs into the lungs every 4 hours as needed for Wheezing or Shortness of Breath 6/17/19 6/17/20 Yes BRAULIO Lewis CNP   hydrALAZINE (APRESOLINE) 50 MG tablet Take 1 tablet by mouth 2 times daily 6/17/19 6/17/20 Yes BRAULIO Lewis CNP   glimepiride (AMARYL) 4 MG tablet Take 1 tablet by mouth every morning (before breakfast) 6/17/19 6/17/20 Yes BRAULIO Duke CNP   risperiDONE (RISPERDAL) 2 MG tablet Take 1 tablet by mouth nightly 3/26/19 4/25/20 Yes Historical Provider, MD [employer] MISC ORTHOFIX BONE STIMULATOR 4/9/19  Yes Ady Woods DO   warfarin (COUMADIN) 2 MG tablet take 1 tablet by mouth MONDAY, WEDNESDAY AND FRIDAY. THEN 1 AND 1/2 TABLETS ON ALL OTHER DAYS 3/26/19 3/25/20 Yes BRAULIO Duke CNP   blood glucose test strips (ONE TOUCH ULTRA TEST) strip Use one strip three times daily.   Use new strip for each test. 3/20/19 3/17/20 Yes BRAULIO Duke CNP   furosemide (LASIX) 20 MG tablet Take 1 tablet by mouth daily 2/26/19 2/26/20 Yes BRAULIO Duke CNP   isosorbide mononitrate (IMDUR) 30 MG extended release tablet Take 1 tablet by mouth daily 2/25/19 2/25/20 Yes BRAULIO Duke CNP   omeprazole (PRILOSEC) 20 MG delayed release capsule Take 1 capsule by mouth Daily 2/25/19 2/25/20 Yes BRAULIO Duke CNP   SITagliptin (JANUVIA) 100 MG tablet Take 1 tablet by mouth daily 2/25/19 2/25/20 Yes BRAULIO Duke CNP   metFORMIN (GLUCOPHAGE-XR) 500 MG extended release tablet Take 2 tablets by mouth 2 times daily 2/25/19  Yes BRAULIO Duke CNP   atorvastatin (LIPITOR) 40 MG tablet Take 1 tablet by mouth daily 2/25/19 2/25/20 Yes BRAULIO Duke CNP   lisinopril (PRINIVIL;ZESTRIL) 20 MG tablet take 1 tablet by mouth once daily 2/25/19  Yes BRAULIO Duke CNP   febuxostat (ULORIC) 40 MG TABS tablet Take 1 tablet by mouth daily 2/6/19  Yes Mahamed Marin DPM   Cholecalciferol (VITAMIN D3) 2000 units TABS Take 1 tablet by mouth daily 1/28/19 1/28/20 Yes Saba Soto APRN - CNP   allopurinol (ZYLOPRIM) 100 MG tablet Take 100 mg by mouth daily    Yes Historical Provider, MD   albuterol (PROVENTIL) (2.5 MG/3ML) 0.083% nebulizer solution Take 3 mLs by nebulization every 4 hours

## 2019-07-02 DIAGNOSIS — E11.22 CONTROLLED TYPE 2 DIABETES MELLITUS WITH STAGE 3 CHRONIC KIDNEY DISEASE, WITHOUT LONG-TERM CURRENT USE OF INSULIN (HCC): Primary | ICD-10-CM

## 2019-07-02 DIAGNOSIS — N18.30 CONTROLLED TYPE 2 DIABETES MELLITUS WITH STAGE 3 CHRONIC KIDNEY DISEASE, WITHOUT LONG-TERM CURRENT USE OF INSULIN (HCC): Primary | ICD-10-CM

## 2019-07-02 RX ORDER — GLIMEPIRIDE 4 MG/1
4 TABLET ORAL 2 TIMES DAILY
Qty: 30 TABLET | Refills: 0 | Status: SHIPPED | OUTPATIENT
Start: 2019-07-02 | End: 2019-08-08 | Stop reason: SDUPTHER

## 2019-07-16 DIAGNOSIS — M25.562 CHRONIC PAIN OF BOTH KNEES: ICD-10-CM

## 2019-07-16 DIAGNOSIS — G89.29 CHRONIC BILATERAL LOW BACK PAIN WITHOUT SCIATICA: Primary | ICD-10-CM

## 2019-07-16 DIAGNOSIS — M54.50 CHRONIC BILATERAL LOW BACK PAIN WITHOUT SCIATICA: Primary | ICD-10-CM

## 2019-07-16 DIAGNOSIS — S92.352D CLOSED DISPLACED FRACTURE OF FIFTH METATARSAL BONE OF LEFT FOOT WITH ROUTINE HEALING, SUBSEQUENT ENCOUNTER: ICD-10-CM

## 2019-07-16 DIAGNOSIS — G89.29 CHRONIC PAIN OF BOTH KNEES: ICD-10-CM

## 2019-07-16 DIAGNOSIS — M25.561 CHRONIC PAIN OF BOTH KNEES: ICD-10-CM

## 2019-07-16 DIAGNOSIS — M79.672 LEFT FOOT PAIN: ICD-10-CM

## 2019-07-16 RX ORDER — OXYCODONE HYDROCHLORIDE AND ACETAMINOPHEN 5; 325 MG/1; MG/1
1 TABLET ORAL EVERY 4 HOURS PRN
Qty: 40 TABLET | Refills: 0 | Status: SHIPPED | OUTPATIENT
Start: 2019-07-16 | End: 2019-08-22 | Stop reason: SDUPTHER

## 2019-07-17 ENCOUNTER — ANTI-COAG VISIT (OUTPATIENT)
Dept: FAMILY MEDICINE CLINIC | Age: 59
End: 2019-07-17
Payer: MEDICARE

## 2019-07-17 DIAGNOSIS — Z86.718 HISTORY OF DVT OF LOWER EXTREMITY: Primary | ICD-10-CM

## 2019-07-17 LAB
INTERNATIONAL NORMALIZATION RATIO, POC: 2.7
PROTHROMBIN TIME, POC: 32.4

## 2019-07-17 PROCEDURE — 85610 PROTHROMBIN TIME: CPT | Performed by: NURSE PRACTITIONER

## 2019-07-17 PROCEDURE — 93793 ANTICOAG MGMT PT WARFARIN: CPT | Performed by: NURSE PRACTITIONER

## 2019-07-19 RX ORDER — FEBUXOSTAT 40 MG/1
TABLET ORAL
Qty: 30 TABLET | Refills: 5 | Status: SHIPPED | OUTPATIENT
Start: 2019-07-19 | End: 2020-01-27

## 2019-08-01 ENCOUNTER — HOSPITAL ENCOUNTER (OUTPATIENT)
Dept: GENERAL RADIOLOGY | Age: 59
Discharge: HOME | End: 2019-08-03
Payer: MEDICARE

## 2019-08-01 ENCOUNTER — TELEPHONE (OUTPATIENT)
Dept: FAMILY MEDICINE CLINIC | Age: 59
End: 2019-08-01

## 2019-08-01 ENCOUNTER — HOSPITAL ENCOUNTER (OUTPATIENT)
Age: 59
Setting detail: SPECIMEN
Discharge: HOME | End: 2019-08-01
Payer: MEDICARE

## 2019-08-01 ENCOUNTER — ANTI-COAG VISIT (OUTPATIENT)
Dept: FAMILY MEDICINE CLINIC | Age: 59
End: 2019-08-01
Payer: MEDICARE

## 2019-08-01 ENCOUNTER — OFFICE VISIT (OUTPATIENT)
Dept: FAMILY MEDICINE CLINIC | Age: 59
End: 2019-08-01
Payer: MEDICARE

## 2019-08-01 ENCOUNTER — HOSPITAL ENCOUNTER (OUTPATIENT)
Age: 59
Discharge: HOME | End: 2019-08-03
Payer: MEDICARE

## 2019-08-01 VITALS
BODY MASS INDEX: 47.74 KG/M2 | DIASTOLIC BLOOD PRESSURE: 82 MMHG | WEIGHT: 261 LBS | SYSTOLIC BLOOD PRESSURE: 134 MMHG | HEART RATE: 76 BPM | RESPIRATION RATE: 16 BRPM | TEMPERATURE: 98.2 F

## 2019-08-01 DIAGNOSIS — R07.9 EXERTIONAL CHEST PAIN: ICD-10-CM

## 2019-08-01 DIAGNOSIS — R07.9 CHEST PAIN, UNSPECIFIED TYPE: ICD-10-CM

## 2019-08-01 DIAGNOSIS — Z86.718 HISTORY OF DVT OF LOWER EXTREMITY: ICD-10-CM

## 2019-08-01 DIAGNOSIS — R06.02 SHORTNESS OF BREATH: ICD-10-CM

## 2019-08-01 DIAGNOSIS — E78.00 PURE HYPERCHOLESTEROLEMIA: ICD-10-CM

## 2019-08-01 DIAGNOSIS — R07.89 CHEST PRESSURE: ICD-10-CM

## 2019-08-01 DIAGNOSIS — N18.30 CONTROLLED TYPE 2 DIABETES MELLITUS WITH STAGE 3 CHRONIC KIDNEY DISEASE, WITHOUT LONG-TERM CURRENT USE OF INSULIN (HCC): ICD-10-CM

## 2019-08-01 DIAGNOSIS — R07.89 CHEST PRESSURE: Primary | ICD-10-CM

## 2019-08-01 DIAGNOSIS — N18.30 CKD (CHRONIC KIDNEY DISEASE) STAGE 3, GFR 30-59 ML/MIN (HCC): ICD-10-CM

## 2019-08-01 DIAGNOSIS — E11.22 CONTROLLED TYPE 2 DIABETES MELLITUS WITH STAGE 3 CHRONIC KIDNEY DISEASE, WITHOUT LONG-TERM CURRENT USE OF INSULIN (HCC): ICD-10-CM

## 2019-08-01 LAB
% CKMB: 4.1 % (ref 0–3)
ALBUMIN SERPL-MCNC: 4.2 G/DL (ref 3.5–5.2)
ALBUMIN/GLOBULIN RATIO: 1.4 (ref 1–2.5)
ALP BLD-CCNC: 110 U/L (ref 35–104)
ALT SERPL-CCNC: 36 U/L (ref 5–33)
ANION GAP SERPL CALCULATED.3IONS-SCNC: 11 MMOL/L (ref 9–17)
AST SERPL-CCNC: 24 U/L
BILIRUB SERPL-MCNC: 0.21 MG/DL (ref 0.3–1.2)
BNP INTERPRETATION: NORMAL
BUN BLDV-MCNC: 16 MG/DL (ref 8–23)
BUN/CREAT BLD: ABNORMAL (ref 9–20)
CALCIUM SERPL-MCNC: 9.2 MG/DL (ref 8.6–10.4)
CHLORIDE BLD-SCNC: 101 MMOL/L (ref 98–107)
CHOLESTEROL/HDL RATIO: 3.2
CHOLESTEROL: 122 MG/DL
CK MB: 2.8 NG/ML
CK MB: 2.9 NG/ML
CKMB INTERPRETATION: ABNORMAL
CO2: 29 MMOL/L (ref 20–31)
CREAT SERPL-MCNC: 0.99 MG/DL (ref 0.5–0.9)
ESTIMATED AVERAGE GLUCOSE: 151 MG/DL
GFR AFRICAN AMERICAN: >60 ML/MIN
GFR NON-AFRICAN AMERICAN: 57 ML/MIN
GFR SERPL CREATININE-BSD FRML MDRD: ABNORMAL ML/MIN/{1.73_M2}
GFR SERPL CREATININE-BSD FRML MDRD: ABNORMAL ML/MIN/{1.73_M2}
GLUCOSE BLD-MCNC: 146 MG/DL (ref 70–99)
HBA1C MFR BLD: 6.9 % (ref 4–6)
HDLC SERPL-MCNC: 38 MG/DL
INTERNATIONAL NORMALIZATION RATIO, POC: 4.2
LDL CHOLESTEROL: 51 MG/DL (ref 0–130)
POTASSIUM SERPL-SCNC: 4.5 MMOL/L (ref 3.7–5.3)
PRO-BNP: 51 PG/ML
PROTHROMBIN TIME, POC: 26.3
SODIUM BLD-SCNC: 141 MMOL/L (ref 135–144)
TOTAL CK: 70 U/L (ref 26–192)
TOTAL PROTEIN: 7.2 G/DL (ref 6.4–8.3)
TRIGL SERPL-MCNC: 166 MG/DL
TROPONIN INTERP: NORMAL
TROPONIN T: NORMAL NG/ML
TROPONIN, HIGH SENSITIVITY: 10 NG/L (ref 0–14)
TSH SERPL DL<=0.05 MIU/L-ACNC: 1.45 MIU/L (ref 0.3–5)
VLDLC SERPL CALC-MCNC: ABNORMAL MG/DL (ref 1–30)

## 2019-08-01 PROCEDURE — 1036F TOBACCO NON-USER: CPT | Performed by: NURSE PRACTITIONER

## 2019-08-01 PROCEDURE — 99214 OFFICE O/P EST MOD 30 MIN: CPT | Performed by: NURSE PRACTITIONER

## 2019-08-01 PROCEDURE — 85610 PROTHROMBIN TIME: CPT | Performed by: NURSE PRACTITIONER

## 2019-08-01 PROCEDURE — 93793 ANTICOAG MGMT PT WARFARIN: CPT | Performed by: NURSE PRACTITIONER

## 2019-08-01 PROCEDURE — 71046 X-RAY EXAM CHEST 2 VIEWS: CPT

## 2019-08-01 PROCEDURE — 3017F COLORECTAL CA SCREEN DOC REV: CPT | Performed by: NURSE PRACTITIONER

## 2019-08-01 PROCEDURE — G8427 DOCREV CUR MEDS BY ELIG CLIN: HCPCS | Performed by: NURSE PRACTITIONER

## 2019-08-01 PROCEDURE — G8417 CALC BMI ABV UP PARAM F/U: HCPCS | Performed by: NURSE PRACTITIONER

## 2019-08-01 PROCEDURE — 93000 ELECTROCARDIOGRAM COMPLETE: CPT | Performed by: NURSE PRACTITIONER

## 2019-08-01 ASSESSMENT — ENCOUNTER SYMPTOMS
EYES NEGATIVE: 1
ABDOMINAL PAIN: 0
WHEEZING: 0
VOMITING: 0
SHORTNESS OF BREATH: 1
CHEST TIGHTNESS: 1
HEMOPTYSIS: 0
BACK PAIN: 1
ORTHOPNEA: 1
DIARRHEA: 0
NAUSEA: 0
SORE THROAT: 0
CONSTIPATION: 0
SINUS PRESSURE: 0
RHINORRHEA: 0
COUGH: 0

## 2019-08-01 NOTE — TELEPHONE ENCOUNTER
Patient called stating that you had seen this morning and asked if had anything to eat, patient stated no, so labs were done. Remembered later that patient had a banana this morning before coming in.

## 2019-08-01 NOTE — PATIENT INSTRUCTIONS
adult-strength or 2 to 4 low-dose aspirin. Wait for an ambulance. Do not try to drive yourself.    Call your doctor today if:    · You have any trouble breathing.     · Your chest pain gets worse.     · You are dizzy or lightheaded, or you feel like you may faint.     · You are not getting better as expected.     · You are having new or different chest pain. Where can you learn more? Go to [email or web address] and sign in to your [employer] account. Enter A120 in the [employer] box to learn more about \"Chest Pain: Care Instructions. \"     If you do not have an account, please click on the \"Sign Up Now\" link. Current as of: September 23, 2018  Content Version: 12.0  © 4664-1407 [employer]. Care instructions adapted under license by [facility] ([facility]). If you have questions about a medical condition or this instruction, always ask your healthcare professional. [place] [address] any warranty or liability for your use of this information. Patient Education        Shortness of Breath: Care Instructions  Your Care Instructions  Shortness of breath has many causes. Sometimes conditions such as anxiety can lead to shortness of breath. Some people get mild shortness of breath when they exercise. Trouble breathing also can be a symptom of a serious problem, such as asthma, lung disease, emphysema, heart problems, and pneumonia. If your shortness of breath continues, you may need tests and treatment. Watch for any changes in your breathing and other symptoms. Follow-up care is a key part of your treatment and safety. Be sure to make and go to all appointments, and call your doctor if you are having problems. It's also a good idea to know your test results and keep a list of the medicines you take. How can you care for yourself at home? · Do not smoke or allow others to smoke around you.  If you need help quitting, talk to your doctor about stop-smoking

## 2019-08-02 DIAGNOSIS — R07.89 CHEST PRESSURE: Primary | ICD-10-CM

## 2019-08-02 DIAGNOSIS — R07.9 EXERTIONAL CHEST PAIN: ICD-10-CM

## 2019-08-02 DIAGNOSIS — R06.02 SHORTNESS OF BREATH: ICD-10-CM

## 2019-08-02 DIAGNOSIS — R07.9 CHEST PAIN, UNSPECIFIED TYPE: ICD-10-CM

## 2019-08-05 ENCOUNTER — ANTI-COAG VISIT (OUTPATIENT)
Dept: FAMILY MEDICINE CLINIC | Age: 59
End: 2019-08-05
Payer: MEDICARE

## 2019-08-05 DIAGNOSIS — Z86.718 HISTORY OF DVT OF LOWER EXTREMITY: Primary | ICD-10-CM

## 2019-08-05 LAB
INTERNATIONAL NORMALIZATION RATIO, POC: 3.2
PROTHROMBIN TIME, POC: 38.2

## 2019-08-05 PROCEDURE — 93793 ANTICOAG MGMT PT WARFARIN: CPT | Performed by: NURSE PRACTITIONER

## 2019-08-05 PROCEDURE — 85610 PROTHROMBIN TIME: CPT | Performed by: NURSE PRACTITIONER

## 2019-08-12 ENCOUNTER — ANTI-COAG VISIT (OUTPATIENT)
Dept: FAMILY MEDICINE CLINIC | Age: 59
End: 2019-08-12
Payer: MEDICARE

## 2019-08-12 DIAGNOSIS — Z86.718 HISTORY OF DVT OF LOWER EXTREMITY: Primary | ICD-10-CM

## 2019-08-12 LAB
INTERNATIONAL NORMALIZATION RATIO, POC: 2.8
PROTHROMBIN TIME, POC: 33.6

## 2019-08-12 PROCEDURE — 93793 ANTICOAG MGMT PT WARFARIN: CPT | Performed by: NURSE PRACTITIONER

## 2019-08-12 PROCEDURE — 85610 PROTHROMBIN TIME: CPT | Performed by: NURSE PRACTITIONER

## 2019-08-18 ENCOUNTER — TELEPHONE (OUTPATIENT)
Dept: FAMILY MEDICINE CLINIC | Age: 59
End: 2019-08-18

## 2019-08-19 ENCOUNTER — OFFICE VISIT (OUTPATIENT)
Dept: PODIATRY | Age: 59
End: 2019-08-19
Payer: MEDICARE

## 2019-08-19 VITALS — HEIGHT: 62 IN | WEIGHT: 261 LBS | BODY MASS INDEX: 48.03 KG/M2

## 2019-08-19 DIAGNOSIS — M79.671 PAIN IN BOTH FEET: ICD-10-CM

## 2019-08-19 DIAGNOSIS — B35.1 DERMATOPHYTOSIS OF NAIL: ICD-10-CM

## 2019-08-19 DIAGNOSIS — R60.0 EDEMA OF LOWER EXTREMITY: ICD-10-CM

## 2019-08-19 DIAGNOSIS — M79.672 PAIN IN BOTH FEET: ICD-10-CM

## 2019-08-19 DIAGNOSIS — E11.51 TYPE II DIABETES MELLITUS WITH PERIPHERAL CIRCULATORY DISORDER (HCC): Primary | ICD-10-CM

## 2019-08-19 DIAGNOSIS — S99.192K CLOSED FRACTURE OF BASE OF FIFTH METATARSAL BONE OF LEFT FOOT AT METAPHYSEAL-DIAPHYSEAL JUNCTION WITH NONUNION, SUBSEQUENT ENCOUNTER: ICD-10-CM

## 2019-08-19 DIAGNOSIS — I73.9 PVD (PERIPHERAL VASCULAR DISEASE) (HCC): ICD-10-CM

## 2019-08-19 PROCEDURE — G8417 CALC BMI ABV UP PARAM F/U: HCPCS | Performed by: PODIATRIST

## 2019-08-19 PROCEDURE — 11721 DEBRIDE NAIL 6 OR MORE: CPT | Performed by: PODIATRIST

## 2019-08-19 PROCEDURE — 2022F DILAT RTA XM EVC RTNOPTHY: CPT | Performed by: PODIATRIST

## 2019-08-19 PROCEDURE — G8427 DOCREV CUR MEDS BY ELIG CLIN: HCPCS | Performed by: PODIATRIST

## 2019-08-19 PROCEDURE — 3044F HG A1C LEVEL LT 7.0%: CPT | Performed by: PODIATRIST

## 2019-08-19 PROCEDURE — 1036F TOBACCO NON-USER: CPT | Performed by: PODIATRIST

## 2019-08-19 PROCEDURE — 99213 OFFICE O/P EST LOW 20 MIN: CPT | Performed by: PODIATRIST

## 2019-08-19 PROCEDURE — 3017F COLORECTAL CA SCREEN DOC REV: CPT | Performed by: PODIATRIST

## 2019-08-19 RX ORDER — SIMVASTATIN 40 MG
TABLET ORAL
COMMUNITY
End: 2020-05-06

## 2019-08-19 RX ORDER — FEBUXOSTAT 40 MG/1
TABLET, FILM COATED ORAL
Refills: 0 | COMMUNITY
Start: 2019-08-01 | End: 2020-03-06 | Stop reason: SDUPTHER

## 2019-08-19 NOTE — PROGRESS NOTES
diabetes mellitus with peripheral circulatory disorder (HCC)  53681 - OR DEBRIDEMENT OF NAILS, 6 OR MORE    HM DIABETES FOOT EXAM   2. Dermatophytosis of nail  39416 - OR DEBRIDEMENT OF NAILS, 6 OR MORE     DIABETES FOOT EXAM   3. PVD (peripheral vascular disease) ([facility] [address]  30331 - OR DEBRIDEMENT OF NAILS, 6 OR MORE    HM DIABETES FOOT EXAM   4. Pain in both feet  14184 - OR DEBRIDEMENT OF NAILS, 6 OR MORE     DIABETES FOOT EXAM   5. Closed fracture of base of fifth metatarsal bone of left foot at metaphyseal-diaphyseal junction with nonunion, subsequent encounter   DIABETES FOOT EXAM   6. Edema of lower extremity   DIABETES FOOT EXAM           Q7   []Yes    []No                Q8   [x]Yes    []No                     Q9   []Yes    []No    Plan:   Pt was evaluated and examined. Patient was given personalized discharge instructions. For the leg swelling  Rx given for compression stockings to be worn during the day. Pt to elevate at night above the heart     Nails 1-10 were debrided sharply in length and thickness with a nipper and , without incident. Pt will follow up in 9 weeks or sooner if any problems arise. Diagnosis was discussed with the pt and all of their questions were answered in detail. Proper foot hygiene and care was discussed with the pt. Informed patient on proper diabetic foot care and importance of tight glycemic control. Patient to check feet daily and contact the office with any questions/problems/concerns.    Other comorbidity noted and will be managed by PCP.  8/19/2019    Electronically signed by Wil Johnson DPM on 8/19/2019 at 11:37 AM  8/19/2019

## 2019-08-26 ENCOUNTER — ANTI-COAG VISIT (OUTPATIENT)
Dept: FAMILY MEDICINE CLINIC | Age: 59
End: 2019-08-26
Payer: MEDICARE

## 2019-08-26 DIAGNOSIS — Z86.718 HISTORY OF DVT OF LOWER EXTREMITY: Primary | ICD-10-CM

## 2019-08-26 LAB
INTERNATIONAL NORMALIZATION RATIO, POC: 3.2
PROTHROMBIN TIME, POC: 38.3

## 2019-08-26 PROCEDURE — 85610 PROTHROMBIN TIME: CPT | Performed by: NURSE PRACTITIONER

## 2019-08-26 PROCEDURE — 93793 ANTICOAG MGMT PT WARFARIN: CPT | Performed by: NURSE PRACTITIONER

## 2019-09-09 ENCOUNTER — ANTI-COAG VISIT (OUTPATIENT)
Dept: FAMILY MEDICINE CLINIC | Age: 59
End: 2019-09-09

## 2019-09-09 ENCOUNTER — OFFICE VISIT (OUTPATIENT)
Dept: FAMILY MEDICINE CLINIC | Age: 59
End: 2019-09-09
Payer: MEDICARE

## 2019-09-09 ENCOUNTER — TELEPHONE (OUTPATIENT)
Dept: FAMILY MEDICINE CLINIC | Age: 59
End: 2019-09-09

## 2019-09-09 VITALS
RESPIRATION RATE: 16 BRPM | BODY MASS INDEX: 47.92 KG/M2 | HEART RATE: 72 BPM | WEIGHT: 262 LBS | SYSTOLIC BLOOD PRESSURE: 118 MMHG | DIASTOLIC BLOOD PRESSURE: 84 MMHG

## 2019-09-09 DIAGNOSIS — M25.562 CHRONIC PAIN OF BOTH KNEES: ICD-10-CM

## 2019-09-09 DIAGNOSIS — Z86.718 HISTORY OF DVT OF LOWER EXTREMITY: Primary | ICD-10-CM

## 2019-09-09 DIAGNOSIS — N18.30 CONTROLLED TYPE 2 DIABETES MELLITUS WITH STAGE 3 CHRONIC KIDNEY DISEASE, WITHOUT LONG-TERM CURRENT USE OF INSULIN (HCC): Primary | ICD-10-CM

## 2019-09-09 DIAGNOSIS — E11.22 CONTROLLED TYPE 2 DIABETES MELLITUS WITH STAGE 3 CHRONIC KIDNEY DISEASE, WITHOUT LONG-TERM CURRENT USE OF INSULIN (HCC): Primary | ICD-10-CM

## 2019-09-09 DIAGNOSIS — E78.5 HYPERLIPIDEMIA, UNSPECIFIED HYPERLIPIDEMIA TYPE: ICD-10-CM

## 2019-09-09 DIAGNOSIS — I10 ESSENTIAL HYPERTENSION: ICD-10-CM

## 2019-09-09 DIAGNOSIS — I10 ESSENTIAL HYPERTENSION: Primary | ICD-10-CM

## 2019-09-09 DIAGNOSIS — M54.50 CHRONIC BILATERAL LOW BACK PAIN WITHOUT SCIATICA: ICD-10-CM

## 2019-09-09 DIAGNOSIS — M25.561 CHRONIC PAIN OF BOTH KNEES: ICD-10-CM

## 2019-09-09 DIAGNOSIS — F33.3 MAJOR DEPRESSIVE DISORDER, RECURRENT EPISODE, SEVERE, WITH PSYCHOTIC BEHAVIOR (HCC): ICD-10-CM

## 2019-09-09 DIAGNOSIS — K21.9 GASTROESOPHAGEAL REFLUX DISEASE, ESOPHAGITIS PRESENCE NOT SPECIFIED: ICD-10-CM

## 2019-09-09 DIAGNOSIS — G89.29 CHRONIC PAIN OF BOTH KNEES: ICD-10-CM

## 2019-09-09 DIAGNOSIS — Z86.718 HISTORY OF DVT OF LOWER EXTREMITY: ICD-10-CM

## 2019-09-09 DIAGNOSIS — G89.29 CHRONIC BILATERAL LOW BACK PAIN WITHOUT SCIATICA: ICD-10-CM

## 2019-09-09 LAB
INTERNATIONAL NORMALIZATION RATIO, POC: 3.9
PROTHROMBIN TIME, POC: 46.6

## 2019-09-09 PROCEDURE — G8417 CALC BMI ABV UP PARAM F/U: HCPCS | Performed by: NURSE PRACTITIONER

## 2019-09-09 PROCEDURE — 3017F COLORECTAL CA SCREEN DOC REV: CPT | Performed by: NURSE PRACTITIONER

## 2019-09-09 PROCEDURE — 3044F HG A1C LEVEL LT 7.0%: CPT | Performed by: NURSE PRACTITIONER

## 2019-09-09 PROCEDURE — 2022F DILAT RTA XM EVC RTNOPTHY: CPT | Performed by: NURSE PRACTITIONER

## 2019-09-09 PROCEDURE — G8427 DOCREV CUR MEDS BY ELIG CLIN: HCPCS | Performed by: NURSE PRACTITIONER

## 2019-09-09 PROCEDURE — 85610 PROTHROMBIN TIME: CPT | Performed by: NURSE PRACTITIONER

## 2019-09-09 PROCEDURE — 1036F TOBACCO NON-USER: CPT | Performed by: NURSE PRACTITIONER

## 2019-09-09 PROCEDURE — 99214 OFFICE O/P EST MOD 30 MIN: CPT | Performed by: NURSE PRACTITIONER

## 2019-09-09 PROCEDURE — 93793 ANTICOAG MGMT PT WARFARIN: CPT | Performed by: NURSE PRACTITIONER

## 2019-09-09 RX ORDER — UBIQUINOL 100 MG
CAPSULE ORAL
Qty: 120 EACH | Refills: 3 | Status: SHIPPED | OUTPATIENT
Start: 2019-09-09 | End: 2021-07-19 | Stop reason: SDUPTHER

## 2019-09-09 RX ORDER — LANCETS 30 GAUGE
EACH MISCELLANEOUS
Qty: 120 EACH | Refills: 3 | Status: SHIPPED | OUTPATIENT
Start: 2019-09-09 | End: 2021-07-19 | Stop reason: SDUPTHER

## 2019-09-09 ASSESSMENT — ENCOUNTER SYMPTOMS
EYE PAIN: 0
DIARRHEA: 1
RHINORRHEA: 0
NAUSEA: 0
EYE DISCHARGE: 0
ABDOMINAL PAIN: 0
BACK PAIN: 1
ABDOMINAL DISTENTION: 0
SORE THROAT: 0
VISUAL CHANGE: 0
SHORTNESS OF BREATH: 0
ORTHOPNEA: 0
VOMITING: 0
EYE REDNESS: 0
COUGH: 0
BOWEL INCONTINENCE: 0
BLOOD IN STOOL: 0
CHEST TIGHTNESS: 0
WHEEZING: 0
SINUS PRESSURE: 0

## 2019-09-09 NOTE — PROGRESS NOTES
Medications   Medication Sig Dispense Refill    Lancets (BD LANCET ULTRAFINE 30G) MISC Use to check sugars twice daily and as needed 120 each 3    Alcohol Swabs (ALCOHOL PREP) 70 % PADS Use twice daily and as needed to check blood sugars 120 each 3    Blood Pressure Monitoring KIT Use to check blood pressure daily and as needed 1 kit 0    metFORMIN (GLUCOPHAGE-XR) 500 MG extended release tablet Take 2 tablets by mouth 2 times daily 120 tablet 5    oxyCODONE-acetaminophen (PERCOCET) 5-325 MG per tablet Take 1 tablet by mouth every 6 hours as needed for Pain. 40 tablet 0    lisinopril (PRINIVIL;ZESTRIL) 20 MG tablet Take 1 tablet by mouth daily 30 tablet 5    omeprazole (PRILOSEC) 20 MG delayed release capsule Take 1 capsule by mouth Daily 30 capsule 5    isosorbide mononitrate (IMDUR) 30 MG extended release tablet Take 1 tablet by mouth daily 30 tablet 5    SITagliptin (JANUVIA) 100 MG tablet Take 1 tablet by mouth daily 30 tablet 5    atorvastatin (LIPITOR) 40 MG tablet Take 1 tablet by mouth daily 30 tablet 5    furosemide (LASIX) 20 MG tablet Take 1 tablet by mouth daily 30 tablet 5    simvastatin (ZOCOR) 40 MG tablet       HYDROCODONE-ACETAMINOPHEN PO as needed      febuxostat (ULORIC) 40 MG TABS tablet   0    glimepiride (AMARYL) 4 MG tablet Take 1 tablet by mouth 2 times daily 60 tablet 5    ULORIC 40 MG TABS tablet take 1 tablet by mouth once daily 30 tablet 5    albuterol sulfate  (90 Base) MCG/ACT inhaler Inhale 2 puffs into the lungs every 4 hours as needed for Wheezing or Shortness of Breath 1 Inhaler 1    hydrALAZINE (APRESOLINE) 50 MG tablet Take 1 tablet by mouth 2 times daily 180 tablet 1    risperiDONE (RISPERDAL) 2 MG tablet Take 1 tablet by mouth nightly      Misc. Devices MISC ORTHOFIX BONE STIMULATOR 1 Device 0    warfarin (COUMADIN) 2 MG tablet take 1 tablet by mouth MONDAY, WEDNESDAY AND FRIDAY.  THEN 1 AND 1/2 TABLETS ON ALL OTHER DAYS 45 tablet 5    blood glucose aggravated by lying down, standing, bending, twisting and position. Stiffness is present all day. Pertinent negatives include no abdominal pain, bladder incontinence, bowel incontinence, chest pain, dysuria, fever, headaches, leg pain, numbness, paresthesias, pelvic pain, perianal numbness, tingling or weakness. Risk factors include poor posture, lack of exercise, menopause, obesity and sedentary lifestyle. She has tried analgesics and walking (percocet) for the symptoms. The treatment provided mild relief. Hyperlipidemia   This is a chronic problem. The current episode started more than 1 year ago. The problem is controlled. Recent lipid tests were reviewed and are high. Exacerbating diseases include chronic renal disease, diabetes and obesity. Pertinent negatives include no chest pain, leg pain, myalgias or shortness of breath. Current antihyperlipidemic treatment includes statins. The current treatment provides mild improvement of lipids. Compliance problems include adherence to exercise. Risk factors for coronary artery disease include a sedentary lifestyle, hypertension, obesity, diabetes mellitus, dyslipidemia and family history. Diabetes   She presents for her follow-up diabetic visit. She has type 2 diabetes mellitus. No MedicAlert identification noted. Her disease course has been stable. Pertinent negatives for hypoglycemia include no dizziness, headaches, nervousness/anxiousness or seizures. Pertinent negatives for diabetes include no chest pain, no fatigue, no foot ulcerations, no polydipsia, no polyphagia, no polyuria, no visual change and no weakness. There are no hypoglycemic complications. Symptoms are stable. Diabetic complications include nephropathy. Risk factors for coronary artery disease include diabetes mellitus, dyslipidemia, family history, obesity, hypertension and sedentary lifestyle. Current diabetic treatment includes oral agent (triple therapy).  She is compliant with treatment most of the time. Her weight is stable. She is following a generally healthy diet. Meal planning includes avoidance of concentrated sweets. She has not had a previous visit with a dietitian. She never participates in exercise. There is no change in her home blood glucose trend. An ACE inhibitor/angiotensin II receptor blocker is being taken. She sees a podiatrist.Eye exam is not current. Hypertension   This is a chronic problem. The current episode started more than 1 year ago. The problem is unchanged. The problem is controlled. Associated symptoms include anxiety and malaise/fatigue. Pertinent negatives include no chest pain, headaches, orthopnea, palpitations, peripheral edema or shortness of breath. Risk factors for coronary artery disease include diabetes mellitus, dyslipidemia, family history, obesity and sedentary lifestyle. Past treatments include ACE inhibitors, diuretics and direct vasodilators. The current treatment provides moderate improvement. Compliance problems include exercise. Hypertensive end-organ damage includes kidney disease. Identifiable causes of hypertension include chronic renal disease and sleep apnea. Review of Systems   Constitutional: Positive for malaise/fatigue. Negative for activity change, appetite change, fatigue and fever. HENT: Negative for congestion, ear discharge, ear pain, postnasal drip, rhinorrhea, sinus pressure and sore throat. Eyes: Negative for pain, discharge and redness. Eye exam yearly   Respiratory: Negative for cough, chest tightness, shortness of breath and wheezing. Cardiovascular: Negative for chest pain, palpitations, orthopnea and leg swelling. Gastrointestinal: Positive for diarrhea (chronically). Negative for abdominal distention, abdominal pain, blood in stool, bowel incontinence, nausea and vomiting. GERD controlled   Endocrine: Negative for polydipsia, polyphagia and polyuria.    Genitourinary: Negative for bladder Left shoulder: She exhibits decreased range of motion and tenderness. Right hip: She exhibits decreased range of motion and tenderness. Left hip: She exhibits decreased range of motion and tenderness. Right knee: She exhibits decreased range of motion. Tenderness found. Left knee: She exhibits decreased range of motion. Tenderness found. Lumbar back: She exhibits decreased range of motion, tenderness and pain. Lymphadenopathy:     She has no cervical adenopathy. Neurological: She is alert and oriented to person, place, and time. She exhibits normal muscle tone. Skin: Skin is warm and dry. Psychiatric: She has a normal mood and affect. Her behavior is normal. Judgment and thought content normal.   Nursing note and vitals reviewed. Assessment:      Diagnosis Orders   1. Controlled type 2 diabetes mellitus with stage 3 chronic kidney disease, without long-term current use of insulin ([facility])  Lancets (BD LANCET ULTRAFINE 30G) MISC    Alcohol Swabs (ALCOHOL PREP) 70 % PADS   2. Essential hypertension  Blood Pressure Monitoring KIT   3. Hyperlipidemia, unspecified hyperlipidemia type     4. Chronic bilateral low back pain without sciatica     5. Chronic pain of both knees     6. Gastroesophageal reflux disease, esophagitis presence not specified     7. Major depressive disorder, recurrent episode, severe, with psychotic behavior ([facility] [address]     8.  History of DVT of lower extremity  POCT INR       Lab Results   Component Value Date    WBC 7.3 10/30/2018    HGB 12.1 10/30/2018    HCT 40.2 10/30/2018    MCV 97.3 10/30/2018     10/30/2018       Lab Results   Component Value Date     08/01/2019    K 4.5 08/01/2019     08/01/2019    CO2 29 08/01/2019    BUN 16 08/01/2019    CREATININE 0.99 (H) 08/01/2019    GLUCOSE 146 (H) 08/01/2019    CALCIUM 9.2 08/01/2019    PROT 7.2 08/01/2019    LABALBU 4.2 08/01/2019    BILITOT 0.21 (L) 08/01/2019    ALKPHOS 110 (H) 08/01/2019 given in paper form or via [employer]? No    Requested Prescriptions     Signed Prescriptions Disp Refills    Lancets (BD LANCET ULTRAFINE 30G) MISC 120 each 3     Sig: Use to check sugars twice daily and as needed    Alcohol Swabs (ALCOHOL PREP) 70 % PADS 120 each 3     Sig: Use twice daily and as needed to check blood sugars    Blood Pressure Monitoring KIT 1 kit 0     Sig: Use to check blood pressure daily and as needed       All patient questions answered. Patient voiced understanding. Quality Measures    Body mass index is 47.92 kg/m². Elevated. Weight control planned discussed Healthy diet and regular exercise. BP: 118/84 Blood pressure is normal. Treatment plan consists of No treatment change needed.     Lab Results   Component Value Date    LDLCHOLESTEROL 51 08/01/2019    (goal LDL reduction with dx if diabetes is 50% LDL reduction)      PHQ Scores 7/25/2018 6/27/2017   PHQ2 Score 0 0   PHQ9 Score 0 0     Interpretation of Total Score Depression Severity: 1-4 = Minimal depression, 5-9 = Mild depression, 10-14 = Moderate depression, 15-19 = Moderately severe depression, 20-27 = Severe depression            Electronically signed by BRAULIO Montalvo CNP on 9/9/2019 at 8:22 AM

## 2019-09-09 NOTE — PATIENT INSTRUCTIONS
serving. Examples of proteins are beef, chicken, turkey, fish, eggs, tofu, cheese, cottage cheese, and peanut butter. A serving size of meat is 3 ounces, which is about the size of a deck of cards. Examples of meat substitute serving sizes (equal to 1 ounce of meat) are 1/4 cup of cottage cheese, 1 egg, 1 tablespoon of peanut butter, and ½ cup of tofu. How can you eat out and still eat healthy? · Learn to estimate the serving sizes of foods that have carbohydrate. If you measure food at home, it will be easier to estimate the amount in a serving of restaurant food. · If the meal you order has too much carbohydrate (such as potatoes, corn, or baked beans), ask to have a low-carbohydrate food instead. Ask for a salad or green vegetables. · If you use insulin, check your blood sugar before and after eating out to help you plan how much to eat in the future. · If you eat more carbohydrate at a meal than you had planned, take a walk or do other exercise. This will help lower your blood sugar. What else should you know? · Limit saturated fat, such as the fat from meat and dairy products. This is a healthy choice because people who have diabetes are at higher risk of heart disease. So choose lean cuts of meat and nonfat or low-fat dairy products. Use olive or canola oil instead of butter or shortening when cooking. · Don't skip meals. Your blood sugar may drop too low if you skip meals and take insulin or certain medicines for diabetes. · Check with your doctor before you drink alcohol. Alcohol can cause your blood sugar to drop too low. Alcohol can also cause a bad reaction if you take certain diabetes medicines. Follow-up care is a key part of your treatment and safety. Be sure to make and go to all appointments, and call your doctor if you are having problems. It's also a good idea to know your test results and keep a list of the medicines you take. Where can you learn more?   Go to call for help? Watch closely for changes in your health, and be sure to contact your doctor if:    · You need help making lifestyle changes.     · You have questions about your medicine. Where can you learn more? Go to [email or web address] and sign in to your [employer] account. Enter D174 in the vivit box to learn more about \"High Cholesterol: Care Instructions. \"     If you do not have an account, please click on the \"Sign Up Now\" link. Current as of: July 22, 2018  Content Version: 12.1  © 6312-3483 [employer]. Care instructions adapted under license by [facility] ([facility]). If you have questions about a medical condition or this instruction, always ask your healthcare professional. [address] any warranty or liability for your use of this information.

## 2019-09-10 RX ORDER — BLOOD PRESSURE TEST KIT
KIT MISCELLANEOUS
Qty: 1 KIT | Refills: 0 | Status: SHIPPED | OUTPATIENT
Start: 2019-09-10 | End: 2020-06-08 | Stop reason: SDUPTHER

## 2019-09-16 DIAGNOSIS — Z86.718 HISTORY OF DVT OF LOWER EXTREMITY: ICD-10-CM

## 2019-09-16 RX ORDER — WARFARIN SODIUM 2 MG/1
TABLET ORAL
Qty: 45 TABLET | Refills: 5 | Status: SHIPPED | OUTPATIENT
Start: 2019-09-16 | End: 2020-04-14 | Stop reason: SDUPTHER

## 2019-09-23 ENCOUNTER — ANTI-COAG VISIT (OUTPATIENT)
Dept: FAMILY MEDICINE CLINIC | Age: 59
End: 2019-09-23
Payer: MEDICARE

## 2019-09-23 DIAGNOSIS — Z86.718 HISTORY OF DVT OF LOWER EXTREMITY: Primary | ICD-10-CM

## 2019-09-23 LAB
INTERNATIONAL NORMALIZATION RATIO, POC: 2.3
PROTHROMBIN TIME, POC: 27.3

## 2019-09-23 PROCEDURE — 85610 PROTHROMBIN TIME: CPT | Performed by: NURSE PRACTITIONER

## 2019-09-23 PROCEDURE — 93793 ANTICOAG MGMT PT WARFARIN: CPT | Performed by: NURSE PRACTITIONER

## 2019-09-23 NOTE — PROGRESS NOTES
Patient is aware of test results and instructions.  Patient denies any other concerns at this time and did schedule her next INR

## 2019-10-02 ENCOUNTER — OFFICE VISIT (OUTPATIENT)
Dept: FAMILY MEDICINE CLINIC | Age: 59
End: 2019-10-02
Payer: MEDICARE

## 2019-10-02 ENCOUNTER — HOSPITAL ENCOUNTER (OUTPATIENT)
Age: 59
Setting detail: SPECIMEN
Discharge: HOME | End: 2019-10-02
Payer: MEDICARE

## 2019-10-02 VITALS
SYSTOLIC BLOOD PRESSURE: 124 MMHG | HEART RATE: 76 BPM | BODY MASS INDEX: 47.92 KG/M2 | RESPIRATION RATE: 16 BRPM | WEIGHT: 262 LBS | DIASTOLIC BLOOD PRESSURE: 82 MMHG
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
## 2019-10-02 DIAGNOSIS — M53.3 ACUTE COCCYGEAL PAIN: Primary | ICD-10-CM

## 2019-10-02 DIAGNOSIS — L85.3 DRY SKIN DERMATITIS: ICD-10-CM

## 2019-10-02 DIAGNOSIS — Z86.718 HISTORY OF DVT OF LOWER EXTREMITY: ICD-10-CM

## 2019-10-02 DIAGNOSIS — N18.30 CKD (CHRONIC KIDNEY DISEASE), STAGE III (HCC): ICD-10-CM

## 2019-10-02 DIAGNOSIS — M79.89 LEG SWELLING: ICD-10-CM

## 2019-10-02 DIAGNOSIS — I10 ESSENTIAL HYPERTENSION: ICD-10-CM

## 2019-10-02 LAB
ANION GAP SERPL CALCULATED.3IONS-SCNC: 18 MMOL/L (ref 9–17)
BUN BLDV-MCNC: 11 MG/DL (ref 8–23)
BUN/CREAT BLD: ABNORMAL (ref 9–20)
CALCIUM SERPL-MCNC: 9.7 MG/DL (ref 8.6–10.4)
CHLORIDE BLD-SCNC: 104 MMOL/L (ref 98–107)
CO2: 23 MMOL/L (ref 20–31)
CREAT SERPL-MCNC: 1.09 MG/DL (ref 0.5–0.9)
CREATININE URINE: 95.7 MG/DL (ref 28–217)
GFR AFRICAN AMERICAN: >60 ML/MIN
GFR NON-AFRICAN AMERICAN: 51 ML/MIN
GFR SERPL CREATININE-BSD FRML MDRD: ABNORMAL ML/MIN/{1.73_M2}
GFR SERPL CREATININE-BSD FRML MDRD: ABNORMAL ML/MIN/{1.73_M2}
GLUCOSE BLD-MCNC: 126 MG/DL (ref 70–99)
HCT VFR BLD CALC: 38.5 % (ref 36.3–47.1)
HEMOGLOBIN: 11.9 G/DL (ref 11.9–15.1)
MCH RBC QN AUTO: 30 PG (ref 25.2–33.5)
MCHC RBC AUTO-ENTMCNC: 30.9 G/DL (ref 28.4–34.8)
MCV RBC AUTO: 97 FL (ref 82.6–102.9)
NRBC AUTOMATED: 0 PER 100 WBC
PDW BLD-RTO: 14.6 % (ref 11.8–14.4)
PLATELET # BLD: 255 K/UL (ref 138–453)
PMV BLD AUTO: 9 FL (ref 8.1–13.5)
POTASSIUM SERPL-SCNC: 4.3 MMOL/L (ref 3.7–5.3)
RBC # BLD: 3.97 M/UL (ref 3.95–5.11)
SODIUM BLD-SCNC: 145 MMOL/L (ref 135–144)
TOTAL PROTEIN, URINE: 7 MG/DL
URINE TOTAL PROTEIN CREATININE RATIO: 0.07 (ref 0–0.2)
WBC # BLD: 6.7 K/UL (ref 3.5–11.3)

## 2019-10-02 PROCEDURE — G8482 FLU IMMUNIZE ORDER/ADMIN: HCPCS | Performed by: NURSE PRACTITIONER

## 2019-10-02 PROCEDURE — 1036F TOBACCO NON-USER: CPT | Performed by: NURSE PRACTITIONER

## 2019-10-02 PROCEDURE — G8417 CALC BMI ABV UP PARAM F/U: HCPCS | Performed by: NURSE PRACTITIONER

## 2019-10-02 PROCEDURE — 3017F COLORECTAL CA SCREEN DOC REV: CPT | Performed by: NURSE PRACTITIONER

## 2019-10-02 PROCEDURE — 99214 OFFICE O/P EST MOD 30 MIN: CPT | Performed by: NURSE PRACTITIONER

## 2019-10-02 PROCEDURE — G8427 DOCREV CUR MEDS BY ELIG CLIN: HCPCS | Performed by: NURSE PRACTITIONER

## 2019-10-02 RX ORDER — FUROSEMIDE 20 MG/1
TABLET ORAL
Qty: 10 TABLET | Refills: 0 | Status: SHIPPED | OUTPATIENT
Start: 2019-10-02 | End: 2020-03-06 | Stop reason: SDUPTHER

## 2019-10-02 ASSESSMENT — ENCOUNTER SYMPTOMS
VOMITING: 0
RHINORRHEA: 0
ROS SKIN COMMENTS: DRY SKIN ON FACE
EYE DISCHARGE: 0
EYE REDNESS: 0
BLOOD IN STOOL: 0
EYE PAIN: 0
SORE THROAT: 0
WHEEZING: 0
CHEST TIGHTNESS: 0
COUGH: 0
DIARRHEA: 1
ABDOMINAL PAIN: 0
SHORTNESS OF BREATH: 0
BACK PAIN: 1
NAUSEA: 0
SINUS PRESSURE: 0
ABDOMINAL DISTENTION: 0

## 2019-10-04 ENCOUNTER — TELEPHONE (OUTPATIENT)
Dept: FAMILY MEDICINE CLINIC | Age: 59
End: 2019-10-04

## 2019-10-04 DIAGNOSIS — R07.89 CHEST PRESSURE: ICD-10-CM

## 2019-10-04 DIAGNOSIS — R07.9 CHEST PAIN, UNSPECIFIED TYPE: ICD-10-CM

## 2019-10-04 DIAGNOSIS — Z12.39 BREAST CANCER SCREENING: ICD-10-CM

## 2019-10-04 DIAGNOSIS — R06.02 SHORTNESS OF BREATH: ICD-10-CM

## 2019-10-04 DIAGNOSIS — R07.9 EXERTIONAL CHEST PAIN: ICD-10-CM

## 2019-10-17 DIAGNOSIS — M79.672 LEFT FOOT PAIN: ICD-10-CM

## 2019-10-17 DIAGNOSIS — M54.50 CHRONIC BILATERAL LOW BACK PAIN WITHOUT SCIATICA: ICD-10-CM

## 2019-10-17 DIAGNOSIS — G89.29 CHRONIC BILATERAL LOW BACK PAIN WITHOUT SCIATICA: ICD-10-CM

## 2019-10-17 DIAGNOSIS — M25.561 CHRONIC PAIN OF BOTH KNEES: ICD-10-CM

## 2019-10-17 DIAGNOSIS — M25.562 CHRONIC PAIN OF BOTH KNEES: ICD-10-CM

## 2019-10-17 DIAGNOSIS — G89.29 CHRONIC PAIN OF BOTH KNEES: ICD-10-CM

## 2019-10-17 DIAGNOSIS — S92.352D CLOSED DISPLACED FRACTURE OF FIFTH METATARSAL BONE OF LEFT FOOT WITH ROUTINE HEALING, SUBSEQUENT ENCOUNTER: ICD-10-CM

## 2019-10-21 ENCOUNTER — OFFICE VISIT (OUTPATIENT)
Dept: PODIATRY | Age: 59
End: 2019-10-21
Payer: MEDICARE

## 2019-10-21 VITALS — WEIGHT: 260 LBS | BODY MASS INDEX: 47.84 KG/M2 | HEIGHT: 62 IN

## 2019-10-21 DIAGNOSIS — I73.9 PVD (PERIPHERAL VASCULAR DISEASE) (HCC): ICD-10-CM

## 2019-10-21 DIAGNOSIS — E11.51 TYPE II DIABETES MELLITUS WITH PERIPHERAL CIRCULATORY DISORDER (HCC): Primary | ICD-10-CM

## 2019-10-21 DIAGNOSIS — M19.071 DEGENERATIVE JOINT DISEASE OF BOTH ANKLES AND FEET: ICD-10-CM

## 2019-10-21 DIAGNOSIS — M79.671 PAIN IN BOTH FEET: ICD-10-CM

## 2019-10-21 DIAGNOSIS — M19.072 DEGENERATIVE JOINT DISEASE OF BOTH ANKLES AND FEET: ICD-10-CM

## 2019-10-21 DIAGNOSIS — M79.672 PAIN IN BOTH FEET: ICD-10-CM

## 2019-10-21 DIAGNOSIS — B35.1 DERMATOPHYTOSIS OF NAIL: ICD-10-CM

## 2019-10-21 PROCEDURE — 3044F HG A1C LEVEL LT 7.0%: CPT | Performed by: PODIATRIST

## 2019-10-21 PROCEDURE — G8427 DOCREV CUR MEDS BY ELIG CLIN: HCPCS | Performed by: PODIATRIST

## 2019-10-21 PROCEDURE — 2022F DILAT RTA XM EVC RTNOPTHY: CPT | Performed by: PODIATRIST

## 2019-10-21 PROCEDURE — G8417 CALC BMI ABV UP PARAM F/U: HCPCS | Performed by: PODIATRIST

## 2019-10-21 PROCEDURE — 3017F COLORECTAL CA SCREEN DOC REV: CPT | Performed by: PODIATRIST

## 2019-10-21 PROCEDURE — 99213 OFFICE O/P EST LOW 20 MIN: CPT | Performed by: PODIATRIST

## 2019-10-21 PROCEDURE — G8482 FLU IMMUNIZE ORDER/ADMIN: HCPCS | Performed by: PODIATRIST

## 2019-10-21 PROCEDURE — 11721 DEBRIDE NAIL 6 OR MORE: CPT | Performed by: PODIATRIST

## 2019-10-21 PROCEDURE — 1036F TOBACCO NON-USER: CPT | Performed by: PODIATRIST

## 2019-10-21 RX ORDER — GLUCOSAMINE/CHONDR SU A SOD 750-600 MG
TABLET ORAL
Refills: 1 | COMMUNITY
Start: 2019-10-14 | End: 2019-10-21 | Stop reason: SDUPTHER

## 2019-10-22 RX ORDER — OXYCODONE HYDROCHLORIDE AND ACETAMINOPHEN 5; 325 MG/1; MG/1
1 TABLET ORAL EVERY 6 HOURS PRN
Qty: 40 TABLET | Refills: 0 | Status: SHIPPED | OUTPATIENT
Start: 2019-10-22 | End: 2019-11-18 | Stop reason: SDUPTHER

## 2019-10-23 ENCOUNTER — ANTI-COAG VISIT (OUTPATIENT)
Dept: FAMILY MEDICINE CLINIC | Age: 59
End: 2019-10-23
Payer: MEDICARE

## 2019-10-23 DIAGNOSIS — Z86.718 HISTORY OF DVT OF LOWER EXTREMITY: ICD-10-CM

## 2019-10-23 LAB
INTERNATIONAL NORMALIZATION RATIO, POC: 3
PROTHROMBIN TIME, POC: 35.4

## 2019-10-23 PROCEDURE — 85610 PROTHROMBIN TIME: CPT | Performed by: NURSE PRACTITIONER

## 2019-11-18 DIAGNOSIS — G89.29 CHRONIC PAIN OF BOTH KNEES: ICD-10-CM

## 2019-11-18 DIAGNOSIS — M25.562 CHRONIC PAIN OF BOTH KNEES: ICD-10-CM

## 2019-11-18 DIAGNOSIS — M79.672 LEFT FOOT PAIN: ICD-10-CM

## 2019-11-18 DIAGNOSIS — M54.50 CHRONIC BILATERAL LOW BACK PAIN WITHOUT SCIATICA: ICD-10-CM

## 2019-11-18 DIAGNOSIS — M25.561 CHRONIC PAIN OF BOTH KNEES: ICD-10-CM

## 2019-11-18 DIAGNOSIS — S92.352D CLOSED DISPLACED FRACTURE OF FIFTH METATARSAL BONE OF LEFT FOOT WITH ROUTINE HEALING, SUBSEQUENT ENCOUNTER: ICD-10-CM

## 2019-11-18 DIAGNOSIS — G89.29 CHRONIC BILATERAL LOW BACK PAIN WITHOUT SCIATICA: ICD-10-CM

## 2019-11-21 RX ORDER — OXYCODONE HYDROCHLORIDE AND ACETAMINOPHEN 5; 325 MG/1; MG/1
1 TABLET ORAL EVERY 6 HOURS PRN
Qty: 40 TABLET | Refills: 0 | Status: SHIPPED | OUTPATIENT
Start: 2019-11-21 | End: 2019-12-20 | Stop reason: SDUPTHER

## 2019-11-22 ENCOUNTER — ANTI-COAG VISIT (OUTPATIENT)
Dept: FAMILY MEDICINE CLINIC | Age: 59
End: 2019-11-22
Payer: MEDICARE

## 2019-11-22 DIAGNOSIS — Z86.718 HISTORY OF DVT OF LOWER EXTREMITY: Primary | ICD-10-CM

## 2019-11-22 DIAGNOSIS — Z86.718 HISTORY OF DVT OF LOWER EXTREMITY: ICD-10-CM

## 2019-11-22 LAB
INTERNATIONAL NORMALIZATION RATIO, POC: 2.5
PROTHROMBIN TIME, POC: 30.1

## 2019-11-22 PROCEDURE — 85610 PROTHROMBIN TIME: CPT | Performed by: NURSE PRACTITIONER

## 2019-11-22 PROCEDURE — 93793 ANTICOAG MGMT PT WARFARIN: CPT | Performed by: NURSE PRACTITIONER

## 2019-12-13 DIAGNOSIS — J40 BRONCHITIS: ICD-10-CM

## 2019-12-13 RX ORDER — ALBUTEROL SULFATE 90 UG/1
AEROSOL, METERED RESPIRATORY (INHALATION)
Qty: 18 G | Refills: 0 | Status: SHIPPED | OUTPATIENT
Start: 2019-12-13 | End: 2020-01-05

## 2019-12-20 DIAGNOSIS — M25.562 CHRONIC PAIN OF BOTH KNEES: ICD-10-CM

## 2019-12-20 DIAGNOSIS — G89.29 CHRONIC PAIN OF BOTH KNEES: ICD-10-CM

## 2019-12-20 DIAGNOSIS — M79.672 LEFT FOOT PAIN: ICD-10-CM

## 2019-12-20 DIAGNOSIS — M54.50 CHRONIC BILATERAL LOW BACK PAIN WITHOUT SCIATICA: ICD-10-CM

## 2019-12-20 DIAGNOSIS — S92.352D CLOSED DISPLACED FRACTURE OF FIFTH METATARSAL BONE OF LEFT FOOT WITH ROUTINE HEALING, SUBSEQUENT ENCOUNTER: ICD-10-CM

## 2019-12-20 DIAGNOSIS — G89.29 CHRONIC BILATERAL LOW BACK PAIN WITHOUT SCIATICA: ICD-10-CM

## 2019-12-20 DIAGNOSIS — M25.561 CHRONIC PAIN OF BOTH KNEES: ICD-10-CM

## 2019-12-21 RX ORDER — OXYCODONE HYDROCHLORIDE AND ACETAMINOPHEN 5; 325 MG/1; MG/1
1 TABLET ORAL EVERY 6 HOURS PRN
Qty: 40 TABLET | Refills: 0 | Status: SHIPPED | OUTPATIENT
Start: 2019-12-21 | End: 2020-01-22

## 2019-12-23 ENCOUNTER — ANTI-COAG VISIT (OUTPATIENT)
Dept: FAMILY MEDICINE CLINIC | Age: 59
End: 2019-12-23
Payer: MEDICARE

## 2019-12-23 DIAGNOSIS — Z86.718 HISTORY OF DVT OF LOWER EXTREMITY: ICD-10-CM

## 2019-12-23 LAB
INTERNATIONAL NORMALIZATION RATIO, POC: 2
PROTHROMBIN TIME, POC: 23.8

## 2019-12-23 PROCEDURE — 85610 PROTHROMBIN TIME: CPT | Performed by: NURSE PRACTITIONER

## 2019-12-23 PROCEDURE — 93793 ANTICOAG MGMT PT WARFARIN: CPT | Performed by: NURSE PRACTITIONER

## 2019-12-30 ENCOUNTER — OFFICE VISIT (OUTPATIENT)
Dept: PODIATRY | Age: 59
End: 2019-12-30
Payer: MEDICARE

## 2019-12-30 VITALS — WEIGHT: 260 LBS | BODY MASS INDEX: 47.84 KG/M2 | HEIGHT: 62 IN

## 2019-12-30 PROCEDURE — G8417 CALC BMI ABV UP PARAM F/U: HCPCS | Performed by: PODIATRIST

## 2019-12-30 PROCEDURE — 11721 DEBRIDE NAIL 6 OR MORE: CPT | Performed by: PODIATRIST

## 2019-12-30 PROCEDURE — 2022F DILAT RTA XM EVC RTNOPTHY: CPT | Performed by: PODIATRIST

## 2019-12-30 PROCEDURE — G8482 FLU IMMUNIZE ORDER/ADMIN: HCPCS | Performed by: PODIATRIST

## 2019-12-30 PROCEDURE — G8427 DOCREV CUR MEDS BY ELIG CLIN: HCPCS | Performed by: PODIATRIST

## 2019-12-30 PROCEDURE — 1036F TOBACCO NON-USER: CPT | Performed by: PODIATRIST

## 2019-12-30 PROCEDURE — 3017F COLORECTAL CA SCREEN DOC REV: CPT | Performed by: PODIATRIST

## 2019-12-30 PROCEDURE — 3044F HG A1C LEVEL LT 7.0%: CPT | Performed by: PODIATRIST

## 2019-12-30 PROCEDURE — 99213 OFFICE O/P EST LOW 20 MIN: CPT | Performed by: PODIATRIST

## 2019-12-30 RX ORDER — GLUCOSAMINE/CHONDR SU A SOD 750-600 MG
TABLET ORAL
COMMUNITY
Start: 2019-12-13 | End: 2019-12-30 | Stop reason: SDUPTHER

## 2019-12-30 NOTE — PROGRESS NOTES
[address] [facility] [id number] [address]  Dept: [phone]    DIABETIC PROGRESS NOTE  Date of patient's visit: 12/30/2019  Patient's Name:  Namita Kong YOB: 1960            Patient Care Team:  BRAULIO Carmen CNP as PCP - General (Family Medicine)  BRAULIO Carmen CNP as PCP - [facility] Empaneled Provider          Chief Complaint   Patient presents with    Diabetes    Nail Problem    Circulatory Problem       Subjective:   Namita Kong comes to clinic for Diabetes; Nail Problem; and Circulatory Problem    she is a diabetic and states that she checks her feet daily. Pt currently has complaint of thickened, elongated nails that they cannot manage by themselves. Pt's primary care physician is BRAUILO George CNP last seen November 22 2019   Pt's last blood sugar was 116 . Pt has a new complaint of right hammetoe that is causing rubbing. Pt has tried changing shoes but it has not helped the pain  Patient is taking over the counter medications with no relief. Lab Results   Component Value Date    LABA1C 6.9 (H) 08/01/2019      Complains of numbness in the feet bilat. Past Medical History:   Diagnosis Date    Anxiety     Chronic kidney disease     Depression     Hyperlipidemia     Hypertension     Obesity     Osteoarthritis     Proteinuria     Pulmonary embolism (HCC)     Sleep apnea     Type 2 diabetes mellitus without complication (HCC)     Type II or unspecified type diabetes mellitus without mention of complication, not stated as uncontrolled     Von Willebrand disease ([facility] [address]        No Known Allergies  Current Outpatient Medications on File Prior to Visit   Medication Sig Dispense Refill    oxyCODONE-acetaminophen (PERCOCET) 5-325 MG per tablet Take 1 tablet by mouth every 6 hours as needed for Pain.  40 tablet 0    albuterol sulfate  (90 Base) MCG/ACT inhaler inhale 2 puffs by mouth and INTO THE LUNGS every 4 hours if needed for wheezing or shortness of breath 18 g 0    hydrALAZINE (APRESOLINE) 50 MG tablet Take 1 tablet by mouth 2 times daily 180 tablet 1    blood glucose test strips (ONE TOUCH ULTRA TEST) strip TEST three times a day 100 strip 5    furosemide (LASIX) 20 MG tablet Take 20 mg as needed for leg swelling 10 tablet 0    warfarin (COUMADIN) 2 MG tablet take 1 tablet by mouth ON MONDAY, WEDNESDAY AND FRIDAY,  THEN TAKE 1 AND 1/2 TABLETS ON ALL OTHER DAYS 45 tablet 5    Blood Pressure KIT Use as directed. 1 kit 0    Lancets (BD LANCET ULTRAFINE 30G) MISC Use to check sugars twice daily and as needed 120 each 3    Alcohol Swabs (ALCOHOL PREP) 70 % PADS Use twice daily and as needed to check blood sugars 120 each 3    metFORMIN (GLUCOPHAGE-XR) 500 MG extended release tablet Take 2 tablets by mouth 2 times daily 120 tablet 5    lisinopril (PRINIVIL;ZESTRIL) 20 MG tablet Take 1 tablet by mouth daily 30 tablet 5    omeprazole (PRILOSEC) 20 MG delayed release capsule Take 1 capsule by mouth Daily 30 capsule 5    isosorbide mononitrate (IMDUR) 30 MG extended release tablet Take 1 tablet by mouth daily 30 tablet 5    SITagliptin (JANUVIA) 100 MG tablet Take 1 tablet by mouth daily 30 tablet 5    atorvastatin (LIPITOR) 40 MG tablet Take 1 tablet by mouth daily 30 tablet 5    furosemide (LASIX) 20 MG tablet Take 1 tablet by mouth daily 30 tablet 5    simvastatin (ZOCOR) 40 MG tablet       febuxostat (ULORIC) 40 MG TABS tablet   0    glimepiride (AMARYL) 4 MG tablet Take 1 tablet by mouth 2 times daily 60 tablet 5    ULORIC 40 MG TABS tablet take 1 tablet by mouth once daily 30 tablet 5    risperiDONE (RISPERDAL) 2 MG tablet Take 1 tablet by mouth nightly      Misc.  Devices MISC ORTHOFIX BONE STIMULATOR 1 Device 0    Cholecalciferol (VITAMIN D3) 2000 units TABS Take 1 tablet by mouth daily 30 tablet 11    allopurinol (ZYLOPRIM) 100 MG tablet Take 100 mg by mouth daily       albuterol (PROVENTIL) (2.5 MG/3ML) 0.083% nebulizer solution Take 3 mLs by nebulization every 4 hours as needed for Wheezing or Shortness of Breath 100 each 1    fluticasone (FLONASE) 50 MCG/ACT nasal spray 1 spray by Nasal route daily (Patient taking differently: 1 spray by Nasal route daily as needed ) 1 Bottle 3    buPROPion (WELLBUTRIN XL) 300 MG XL tablet Take 300 mg by mouth every morning.  citalopram (CELEXA) 20 MG tablet Take 20 mg by mouth daily.  buPROPion (WELLBUTRIN XL) 150 MG XL tablet Take 150 mg by mouth every morning.  Blood Pressure Monitoring KIT Use to check blood pressure daily and as needed 1 kit 0     No current facility-administered medications on file prior to visit. Review of Systems    Review of Systems:   History obtained from chart review and the patient  General ROS: negative for - chills, fatigue, fever, night sweats or weight gain  Constitutional: Negative for chills, diaphoresis, fatigue, fever and unexpected weight change. Musculoskeletal: Positive for arthralgias, gait problem and joint swelling. Neurological ROS: negative for - behavioral changes, confusion, headaches or seizures. Negative for weakness and numbness. Dermatological ROS: negative for - mole changes, rash  Cardiovascular: Negative for leg swelling. Gastrointestinal: Negative for constipation, diarrhea, nausea and vomiting. Objective:  General: AAO x 3 in NAD.     Derm  Toenail Description  Sites of Onychomycosis Involvement (Check affected area)  [x] [x] [x] [x] [x] [x] [x] [x] [x] [x]  5 4 3 2 1 1 2 3 4 5                          Right                                        Left    Thickness  [x] [x] [x] [x] [x] [x] [x] [x] [x] [x]  5 4 3 2 1 1 2 3 4 5                         Right                                        Left    Dystrophic Changes   [x] [x] [x] [x] [x] [x] [x] [x] [x] [x]  5 4 3 2 1 1 2 3 4 5                         Right Left    Color   [x] [x] [x] [x] [x] [x] [x] [x] [x] [x]  5 4 3 2 1 1 2 3 4 5                          Right                                        Left    Incurvation/Ingrowin   [] [] [] [] [] [] [] [] [] []  5 4 3 2 1 1 2 3 4 5                         Right                                        Left    Inflammation/Pain   [x] [x] [x] [x] [x] [x] [x] [x] [x] [x]  5 4 3 2 1 1 2 3 4 5                         Right                                        Left      Dermatologic Exam:  Skin lesion/ulceration Absent . Skin No rashes or nodules noted. .   Skin is thin, with flaky sloughing skin as well as decreased hair growth to the lower leg   Small red hemosiderin deposits seen dorsal foot    Musculoskeletal:     1st MPJ ROM decreased, Bilateral.  Muscle strength 5/5, Bilateral.  Pain present upon palpation of toenails 1-5, Bilateral. decreased medial longitudinal arch, Bilateral.  Ankle ROM decreased,Bilateral.    Dorsally contracted digits present digits 2, Bilateral.     Vascular: DP and PT pulses palpable 1/4, Bilateral.  CFT <5 seconds, Bilateral.  Hair growth absent to the level of the digits, Bilateral.  Edema present, Bilateral.  Varicosities absent, Bilateral. Erythema absent, Bilateral    Neurological: Sensation diminshed to light touch to level of digits, Bilateral.  Protective sensation intact 6/10 sites via 5.07/10g [place] Monofilament, Bilateral.  negative Tinel's, Bilateral.  negative Valleix sign, Bilateral.      Integument: Warm, dry, supple, Bilateral.  Open lesion absent, Bilateral.  Interdigital maceration absent to web spaces 4, Bilateral.  Nails 1-5 left and 1-5 right thickened > 3.0 mm, dystrophic and crumbly, discolored with subungual debris.   Fissures absent, Bilateral.     Visual inspection:  Deformity: hammertoe deformity sherman feet  amputation: absent  Skin lesions: absent  Edema: right- 2+ pitting edema, left- 2+ pitting edema    Sensory exam:  Monofilament sensation: abnormal - 6/10 via SW 5.07/10g monofilament to the plantar foot bilateral feet    Pulses: abnormal - 1/4 dorsalis pedis pulse and 1/4 Posterior tibial pulse,   Pinprick: Impaired  Proprioception: Impaired  Vibration (128 Hz): Impaired       DM with PVD       [x]Yes    []No      Assessment:  62 y.o. female with:   Diagnosis Orders   1. Type II diabetes mellitus with peripheral circulatory disorder ([facility])  [address] - CO DEBRIDEMENT OF NAILS, 6 OR MORE    HM DIABETES FOOT EXAM   2. Dermatophytosis of nail  41515 - CO DEBRIDEMENT OF NAILS, 6 OR MORE    HM DIABETES FOOT EXAM   3. PVD (peripheral vascular disease) ([facility])  [address] - CO DEBRIDEMENT OF NAILS, 6 OR MORE    HM DIABETES FOOT EXAM   4. Pain in both feet  46694 - CO DEBRIDEMENT OF NAILS, 6 OR MORE    HM DIABETES FOOT EXAM   5. Hammertoe of second toe of right foot             Q7   []Yes    []No                Q8   [x]Yes    []No                     Q9   []Yes    []No    Plan:   Pt was evaluated and examined. Patient was given personalized discharge instructions. For the hammertoe right foot  X rays reviewed labs reviewed  Recommend OTC anti inflammatories. RICE therapy if pain worsens after activity   Recommend proper shoe gear that has mesh toebox    Nails 1-10 were debrided sharply in length and thickness with a nipper and , without incident. Pt will follow up in 9 weeks or sooner if any problems arise. Diagnosis was discussed with the pt and all of their questions were answered in detail. Proper foot hygiene and care was discussed with the pt. Informed patient on proper diabetic foot care and importance of tight glycemic control. Patient to check feet daily and contact the office with any questions/problems/concerns.    Other comorbidity noted and will be managed by PCP.  12/30/2019    Electronically signed by Lacy Barbour DPM on 12/30/2019 at 9:09 AM  12/30/2019

## 2020-01-09 NOTE — TELEPHONE ENCOUNTER
Marcelle Krishan  ROV:2-24-20  LRF:1-28-19  Health Maintenance   Topic Date Due    Diabetic retinal exam  07/24/2020    A1C test (Diabetic or Prediabetic)  08/01/2020    Lipid screen  08/01/2020    Potassium monitoring  10/02/2020    Creatinine monitoring  10/02/2020    Diabetic foot exam  01/01/2021    Breast cancer screen  10/03/2021    Cervical cancer screen  11/16/2022    DTaP/Tdap/Td vaccine (2 - Td) 08/04/2026    Colon cancer screen colonoscopy  02/16/2028    Flu vaccine  Completed    Shingles Vaccine  Completed    Pneumococcal 0-64 years Vaccine  Completed    Hepatitis C screen  Addressed    HIV screen  Addressed             (applicable per patient's age: Cancer Screenings, Depression Screening, Fall Risk Screening, Immunizations)    Hemoglobin A1C (%)   Date Value   08/01/2019 6.9 (H)   01/17/2019 6.8 (H)   07/25/2018 6.9 (H)     Microalb/Crt.  Ratio (mcg/mg creat)   Date Value   04/08/2019 CANNOT BE CALCULATED     LDL Cholesterol (mg/dL)   Date Value   08/01/2019 51     AST (U/L)   Date Value   08/01/2019 24     ALT (U/L)   Date Value   08/01/2019 36 (H)     BUN (mg/dL)   Date Value   10/02/2019 11      (goal A1C is < 7)   (goal LDL is <100) need 30-50% reduction from baseline     BP Readings from Last 3 Encounters:   10/02/19 124/82   09/09/19 118/84   08/01/19 134/82    (goal /80)      All Future Testing planned in [facility]:  Lab Frequency Next Occurrence   POCT INR     Protein / Creatinine Ratio, Urine Every 6 Months 3/13/2020, 6/67/2352   Basic Metabolic Panel Every 6 Months 3/13/2020, 8/28/2020   CBC Every 6 Months 3/13/2020, 8/28/2020       Next Visit Date:  Future Appointments   Date Time Provider Mark Junior   1/23/2020  8:30 AM SCHEDULE, SILVANO SCHULER PC ASSOC Lars PC [facility]   2/24/2020  9:00 AM BRAULIO Alford - NP Lars PC [facility]   3/9/2020  9:00 AM BRODERICK MathisURG PODIAT [facility]   4/13/2020  8:50 AM Romaine Figueredo MD [facility] None            Patient Active Problem List:     Hypertension     Hyperlipidemia     Osteoarthritis     Depression     Obesity     CKD (chronic kidney disease) stage 3, GFR 30-59 ml/min ([facility])     Proteinuria     Controlled type 2 diabetes mellitus with stage 3 chronic kidney disease, without long-term current use of insulin ([facility])     History of DVT of lower extremity     NARCISO on CPAP     Vitamin D deficiency     Major depressive disorder, recurrent episode, severe, with psychotic behavior ([address]     Major depressive disorder, recurrent, in partial remission ([address]     Major depressive disorder, recurrent, severe with psychotic symptoms ([address]

## 2020-01-22 RX ORDER — OXYCODONE HYDROCHLORIDE AND ACETAMINOPHEN 5; 325 MG/1; MG/1
TABLET ORAL
Qty: 40 TABLET | Refills: 0 | Status: SHIPPED | OUTPATIENT
Start: 2020-01-22 | End: 2020-02-20 | Stop reason: SDUPTHER

## 2020-01-22 NOTE — TELEPHONE ENCOUNTER
KCU:[birth year]-0[birth year]04  ROV:2-24-20  QOT:92-21-99  Health Maintenance   Topic Date Due    Diabetic retinal exam  07/24/2020    A1C test (Diabetic or Prediabetic)  08/01/2020    Lipid screen  08/01/2020    Potassium monitoring  10/02/2020    Creatinine monitoring  10/02/2020    Diabetic foot exam  01/01/2021    Breast cancer screen  10/03/2021    Cervical cancer screen  11/16/2022    DTaP/Tdap/Td vaccine (2 - Td) 08/04/2026    Colon cancer screen colonoscopy  02/16/2028    Flu vaccine  Completed    Shingles Vaccine  Completed    Pneumococcal 0-64 years Vaccine  Completed    Hepatitis C screen  Addressed    HIV screen  Addressed             (applicable per patient's age: Cancer Screenings, Depression Screening, Fall Risk Screening, Immunizations)    Hemoglobin A1C (%)   Date Value   08/01/2019 6.9 (H)   01/17/2019 6.8 (H)   07/25/2018 6.9 (H)     Microalb/Crt.  Ratio (mcg/mg creat)   Date Value   04/08/2019 CANNOT BE CALCULATED     LDL Cholesterol (mg/dL)   Date Value   08/01/2019 51     AST (U/L)   Date Value   08/01/2019 24     ALT (U/L)   Date Value   08/01/2019 36 (H)     BUN (mg/dL)   Date Value   10/02/2019 11      (goal A1C is < 7)   (goal LDL is <100) need 30-50% reduction from baseline     BP Readings from Last 3 Encounters:   10/02/19 124/82   09/09/19 118/84   08/01/19 134/82    (goal /80)      All Future Testing planned in [facility]:  Lab Frequency Next Occurrence   POCT INR     Protein / Creatinine Ratio, Urine Every 6 Months 3/13/2020, 0/90/9011   Basic Metabolic Panel Every 6 Months 3/13/2020, 8/28/2020   CBC Every 6 Months 3/13/2020, 8/28/2020       Next Visit Date:  Future Appointments   Date Time Provider Mark Junior   1/23/2020  8:30 AM SCHEDULE, SILVANO MORRIS ASSOC Lars [facility]ORR[facility]   2/24/2020  9:00 AM BRAULIO Robertson NP [facility]   3/9/2020  9:00 AM BRODERICK Fry POD[facility]   4/13/2020  8:50 AM Linda Skaggs MD [facility] None            Patient

## 2020-01-23 ENCOUNTER — ANTI-COAG VISIT (OUTPATIENT)
Dept: FAMILY MEDICINE CLINIC | Age: 60
End: 2020-01-23
Payer: MEDICARE

## 2020-01-23 LAB
INTERNATIONAL NORMALIZATION RATIO, POC: 2
PROTHROMBIN TIME, POC: 24.2

## 2020-01-23 PROCEDURE — 85610 PROTHROMBIN TIME: CPT | Performed by: NURSE PRACTITIONER

## 2020-01-27 RX ORDER — FEBUXOSTAT 40 MG/1
TABLET, FILM COATED ORAL
Qty: 30 TABLET | Refills: 5 | Status: SHIPPED | OUTPATIENT
Start: 2020-01-27 | End: 2020-07-20

## 2020-01-31 RX ORDER — LISINOPRIL 20 MG/1
TABLET ORAL
Qty: 30 TABLET | Refills: 5 | Status: SHIPPED | OUTPATIENT
Start: 2020-01-31 | End: 2020-08-04

## 2020-01-31 RX ORDER — ISOSORBIDE MONONITRATE 30 MG/1
TABLET, EXTENDED RELEASE ORAL
Qty: 30 TABLET | Refills: 5 | Status: SHIPPED | OUTPATIENT
Start: 2020-01-31 | End: 2020-08-04

## 2020-01-31 RX ORDER — ALBUTEROL SULFATE 90 UG/1
2 AEROSOL, METERED RESPIRATORY (INHALATION) EVERY 4 HOURS PRN
Qty: 18 G | Refills: 0 | Status: SHIPPED | OUTPATIENT
Start: 2020-01-31 | End: 2020-02-25

## 2020-01-31 RX ORDER — GLIMEPIRIDE 4 MG/1
TABLET ORAL
Qty: 60 TABLET | Refills: 5 | Status: SHIPPED | OUTPATIENT
Start: 2020-01-31 | End: 2020-08-04

## 2020-01-31 RX ORDER — ATORVASTATIN CALCIUM 40 MG/1
TABLET, FILM COATED ORAL
Qty: 30 TABLET | Refills: 5 | Status: SHIPPED | OUTPATIENT
Start: 2020-01-31 | End: 2020-08-04

## 2020-01-31 RX ORDER — SITAGLIPTIN 100 MG/1
TABLET, FILM COATED ORAL
Qty: 30 TABLET | Refills: 5 | Status: SHIPPED | OUTPATIENT
Start: 2020-01-31 | End: 2020-08-04

## 2020-01-31 RX ORDER — OMEPRAZOLE 20 MG/1
CAPSULE, DELAYED RELEASE ORAL
Qty: 30 CAPSULE | Refills: 5 | Status: SHIPPED | OUTPATIENT
Start: 2020-01-31 | End: 2020-08-04

## 2020-01-31 NOTE — TELEPHONE ENCOUNTER
LOV:10-2-19  ROV:2-24-20  LRF:4-3-18  Health Maintenance   Topic Date Due    Diabetic retinal exam  07/24/2020    A1C test (Diabetic or Prediabetic)  08/01/2020    Lipid screen  08/01/2020    Potassium monitoring  10/02/2020    Creatinine monitoring  10/02/2020    Diabetic foot exam  01/01/2021    Breast cancer screen  10/03/2021    Cervical cancer screen  11/16/2022    DTaP/Tdap/Td vaccine (2 - Td) 08/04/2026    Colon cancer screen colonoscopy  02/16/2028    Flu vaccine  Completed    Shingles Vaccine  Completed    Pneumococcal 0-64 years Vaccine  Completed    Hepatitis C screen  Addressed    HIV screen  Addressed             (applicable per patient's age: Cancer Screenings, Depression Screening, Fall Risk Screening, Immunizations)    Hemoglobin A1C (%)   Date Value   08/01/2019 6.9 (H)   01/17/2019 6.8 (H)   07/25/2018 6.9 (H)     Microalb/Crt.  Ratio (mcg/mg creat)   Date Value   04/08/2019 CANNOT BE CALCULATED     LDL Cholesterol (mg/dL)   Date Value   08/01/2019 51     AST (U/L)   Date Value   08/01/2019 24     ALT (U/L)   Date Value   08/01/2019 36 (H)     BUN (mg/dL)   Date Value   10/02/2019 11      (goal A1C is < 7)   (goal LDL is <100) need 30-50% reduction from baseline     BP Readings from Last 3 Encounters:   10/02/19 124/82   09/09/19 118/84   08/01/19 134/82    (goal /80)      All Future Testing planned in [facility]:  Lab Frequency Next Occurrence   POCT INR     Protein / Creatinine Ratio, Urine Every 6 Months 3/13/2020, 0/92/1900   Basic Metabolic Panel Every 6 Months 3/13/2020, 8/28/2020   CBC Every 6 Months 3/13/2020, 8/28/2020       Next Visit Date:  Future Appointments   Date Time Provider Mark Junior   2/24/2020  9:00 AM BRAULIO Blandon NP [facility]   3/6/2020  8:30 AM SCHEDULE, SILVANO MORRIS [facility]   3/9/2020  9:00 AM Jan[facility]efort[facility] D[facility]   4/13/2020  8:50 AM Paddy Arredondo MD [facility] None            Patient

## 2020-02-18 RX ORDER — OXYCODONE HYDROCHLORIDE AND ACETAMINOPHEN 5; 325 MG/1; MG/1
TABLET ORAL
Qty: 40 TABLET | Refills: 0 | Status: CANCELLED | OUTPATIENT
Start: 2020-02-18 | End: 2020-03-19

## 2020-02-19 NOTE — TELEPHONE ENCOUNTER
Last visit: 10/2/19  Last Med refill: 1/20/20  Does patient have enough medication for 72 hours:     Next Visit Date:  Future Appointments   Date Time Provider Mark Junior   3/6/2020  8:20 AM BRAULIO Baldwin - SANDEE Pemberton PC [facility]   3/6/2020  8:30 AM SCHEDULE, MHP [place] PC ASSOC Pemberton PC [facility]   3/9/2020  9:00 AM J Carlos Chandra DPM [facility]   4/13/2020  8:50 AM Erika Scott MD [facility] None       Health Maintenance   Topic Date Due    Hepatitis B vaccine (1 of 3 - Risk 3-dose series) 04/23/1979    Diabetic retinal exam  07/24/2020    A1C test (Diabetic or Prediabetic)  08/01/2020    Lipid screen  08/01/2020    Potassium monitoring  10/02/2020    Creatinine monitoring  10/02/2020    Diabetic foot exam  01/01/2021    Breast cancer screen  10/03/2021    Cervical cancer screen  11/16/2022    DTaP/Tdap/Td vaccine (2 - Td) 08/04/2026    Colon cancer screen colonoscopy  02/16/2028    Flu vaccine  Completed    Shingles Vaccine  Completed    Pneumococcal 0-64 years Vaccine  Completed    Hepatitis C screen  Addressed    HIV screen  Addressed    Hepatitis A vaccine  Aged Out    Hib vaccine  Aged Out    Meningococcal (ACWY) vaccine  Aged Out       Hemoglobin A1C (%)   Date Value   08/01/2019 6.9 (H)   01/17/2019 6.8 (H)   07/25/2018 6.9 (H)             ( goal A1C is < 7)   Microalb/Crt.  Ratio (mcg/mg creat)   Date Value   04/08/2019 CANNOT BE CALCULATED     LDL Cholesterol (mg/dL)   Date Value   08/01/2019 51   01/17/2019 50       (goal LDL is <100)   AST (U/L)   Date Value   08/01/2019 24     ALT (U/L)   Date Value   08/01/2019 36 (H)     BUN (mg/dL)   Date Value   10/02/2019 11     BP Readings from Last 3 Encounters:   10/02/19 124/82   09/09/19 118/84   08/01/19 134/82          (goal 120/80)    All Future Testing planned in [facility]  Lab Frequency Next Occurrence   POCT INR     Protein / Creatinine Ratio, Urine Every 6 Months 3/13/2020, 8/07/7105   Basic Metabolic

## 2020-02-20 RX ORDER — OXYCODONE HYDROCHLORIDE AND ACETAMINOPHEN 5; 325 MG/1; MG/1
1 TABLET ORAL EVERY 8 HOURS PRN
Qty: 40 TABLET | Refills: 0 | Status: SHIPPED | OUTPATIENT
Start: 2020-02-20 | End: 2020-03-18 | Stop reason: SDUPTHER

## 2020-02-26 NOTE — TELEPHONE ENCOUNTER
[facility]   3/9/2020  9:00 AM Bud Brownlee DPM [facility]   4/13/2020  8:50 AM Jonahtan Reynoso MD [facility] None            Patient Active Problem List:     Hypertension     Hyperlipidemia     Osteoarthritis     Depression     Obesity     CKD (chronic kidney disease) stage 3, GFR 30-59 ml/min ([facility])     Proteinuria     Controlled type 2 diabetes mellitus with stage 3 chronic kidney disease, without long-term current use of insulin ([facility])     History of DVT of lower extremity     NARCISO on CPAP     Vitamin D deficiency     Major depressive disorder, recurrent episode, severe, with psychotic behavior ([address]     Major depressive disorder, recurrent, in partial remission ([address]     Major depressive disorder, recurrent, severe with psychotic symptoms ([address]

## 2020-02-27 RX ORDER — METFORMIN HYDROCHLORIDE 500 MG/1
1000 TABLET, EXTENDED RELEASE ORAL 2 TIMES DAILY
Qty: 120 TABLET | Refills: 5 | Status: SHIPPED | OUTPATIENT
Start: 2020-02-27 | End: 2020-09-15

## 2020-03-06 ENCOUNTER — OFFICE VISIT (OUTPATIENT)
Dept: FAMILY MEDICINE CLINIC | Age: 60
End: 2020-03-06
Payer: MEDICARE

## 2020-03-06 VITALS
TEMPERATURE: 97.2 F | BODY MASS INDEX: 46.64 KG/M2 | HEART RATE: 102 BPM | DIASTOLIC BLOOD PRESSURE: 76 MMHG | WEIGHT: 255 LBS | SYSTOLIC BLOOD PRESSURE: 124 MMHG

## 2020-03-06 LAB
INTERNATIONAL NORMALIZATION RATIO, POC: 2.4
PROTHROMBIN TIME, POC: 29.4

## 2020-03-06 PROCEDURE — 85610 PROTHROMBIN TIME: CPT | Performed by: NURSE PRACTITIONER

## 2020-03-06 PROCEDURE — 99214 OFFICE O/P EST MOD 30 MIN: CPT | Performed by: NURSE PRACTITIONER

## 2020-03-06 RX ORDER — FUROSEMIDE 20 MG/1
20 TABLET ORAL DAILY
Qty: 30 TABLET | Refills: 5 | Status: SHIPPED | OUTPATIENT
Start: 2020-03-06 | End: 2020-09-15

## 2020-03-06 ASSESSMENT — ENCOUNTER SYMPTOMS
WHEEZING: 0
VISUAL CHANGE: 0
RHINORRHEA: 0
CHEST TIGHTNESS: 0
DIARRHEA: 0
CONSTIPATION: 0
SHORTNESS OF BREATH: 1
VOMITING: 0
TROUBLE SWALLOWING: 0
SORE THROAT: 0
COUGH: 0
ABDOMINAL PAIN: 0
NAUSEA: 0
BACK PAIN: 1
SINUS PRESSURE: 0

## 2020-03-06 NOTE — TELEPHONE ENCOUNTER
[place]   3/9/2020  9:00 AM Delmer Pugh DPM [facility]   4/3/2020  8:30 AM SCHEDULE, SILVANO SCHULER PC ASSOC Jarret PC [facility]   4/13/2020  8:50 AM Frankey Marus, MD [facility] None   6/8/2020  8:40 AM Trisha Canas, APRN - NP Jbel Rota [facility]            Patient Active Problem List:     Hypertension     Hyperlipidemia     Osteoarthritis     Depression     Obesity     CKD (chronic kidney disease) stage 3, GFR 30-59 ml/min ([facility])     Proteinuria     Controlled type 2 diabetes mellitus with stage 3 chronic kidney disease, without long-term current use of insulin ([facility])     History of DVT of lower extremity     NARCISO on CPAP     Vitamin D deficiency     Major depressive disorder, recurrent episode, severe, with psychotic behavior ([address]     Major depressive disorder, recurrent, in partial remission ([address]     Major depressive disorder, recurrent, severe with psychotic symptoms ([address]

## 2020-03-06 NOTE — PROGRESS NOTES
4 hours if needed for WHEEZING,SHORTNESS OF BREATH, &/OR COUGH 18 g 3    oxyCODONE-acetaminophen (PERCOCET) 5-325 MG per tablet Take 1 tablet by mouth every 8 hours as needed for Pain. 40 tablet 0    atorvastatin (LIPITOR) 40 MG tablet take 1 tablet by mouth once daily 30 tablet 5    JANUVIA 100 MG tablet take 1 tablet by mouth once daily 30 tablet 5    lisinopril (PRINIVIL;ZESTRIL) 20 MG tablet take 1 tablet by mouth once daily 30 tablet 5    isosorbide mononitrate (IMDUR) 30 MG extended release tablet take 1 tablet by mouth once daily 30 tablet 5    omeprazole (PRILOSEC) 20 MG delayed release capsule take 1 capsule by mouth once daily 30 capsule 5    glimepiride (AMARYL) 4 MG tablet take 1 tablet by mouth twice a day 60 tablet 5    febuxostat (ULORIC) 40 MG TABS tablet take 1 tablet by mouth once daily 30 tablet 5    Cholecalciferol (RA VITAMIN D-3) 50 MCG (2000 UT) CAPS Take 1 capsule by mouth daily 30 capsule 5    hydrALAZINE (APRESOLINE) 50 MG tablet Take 1 tablet by mouth 2 times daily 180 tablet 1    furosemide (LASIX) 20 MG tablet Take 20 mg as needed for leg swelling 10 tablet 0    warfarin (COUMADIN) 2 MG tablet take 1 tablet by mouth ON MONDAY, WEDNESDAY AND FRIDAY,  THEN TAKE 1 AND 1/2 TABLETS ON ALL OTHER DAYS 45 tablet 5    furosemide (LASIX) 20 MG tablet Take 1 tablet by mouth daily 30 tablet 5    risperiDONE (RISPERDAL) 2 MG tablet Take 1 tablet by mouth nightly      allopurinol (ZYLOPRIM) 100 MG tablet Take 100 mg by mouth daily       albuterol (PROVENTIL) (2.5 MG/3ML) 0.083% nebulizer solution Take 3 mLs by nebulization every 4 hours as needed for Wheezing or Shortness of Breath 100 each 1    fluticasone (FLONASE) 50 MCG/ACT nasal spray 1 spray by Nasal route daily (Patient taking differently: 1 spray by Nasal route daily as needed ) 1 Bottle 3    buPROPion (WELLBUTRIN XL) 300 MG XL tablet Take 300 mg by mouth every morning.       buPROPion (WELLBUTRIN XL) 150 MG XL tablet Take 150 mg by mouth every morning.  blood glucose test strips (ONE TOUCH ULTRA TEST) strip TEST three times a day 100 strip 5    Blood Pressure KIT Use as directed. 1 kit 0    Lancets (BD LANCET ULTRAFINE 30G) MISC Use to check sugars twice daily and as needed 120 each 3    Alcohol Swabs (ALCOHOL PREP) 70 % PADS Use twice daily and as needed to check blood sugars 120 each 3    Blood Pressure Monitoring KIT Use to check blood pressure daily and as needed 1 kit 0    simvastatin (ZOCOR) 40 MG tablet       Misc. Devices MISC ORTHOFIX BONE STIMULATOR 1 Device 0     No current facility-administered medications for this visit. No Known Allergies    Health Maintenance   Topic Date Due    Hepatitis B vaccine (1 of 3 - Risk 3-dose series) 04/23/1979    Diabetic retinal exam  07/24/2020    A1C test (Diabetic or Prediabetic)  08/01/2020    Lipid screen  08/01/2020    Potassium monitoring  10/02/2020    Creatinine monitoring  10/02/2020    Diabetic foot exam  01/01/2021    Breast cancer screen  10/03/2021    Cervical cancer screen  11/16/2022    DTaP/Tdap/Td vaccine (2 - Td) 08/04/2026    Colon cancer screen colonoscopy  02/16/2028    Flu vaccine  Completed    Shingles Vaccine  Completed    Pneumococcal 0-64 years Vaccine  Completed    Hepatitis C screen  Addressed    HIV screen  Addressed    Hepatitis A vaccine  Aged Out    Hib vaccine  Aged Out    Meningococcal (ACWY) vaccine  Aged Out       Subjective:      Review of Systems   Constitutional: Negative. Negative for activity change, appetite change, chills, fatigue and fever. HENT: Negative for congestion, postnasal drip, rhinorrhea, sinus pressure, sore throat and trouble swallowing. Respiratory: Positive for shortness of breath (on occasion). Negative for cough, chest tightness and wheezing. Cardiovascular: Negative for chest pain, palpitations and leg swelling.    Gastrointestinal: Negative for abdominal pain, constipation, diarrhea, nausea and vomiting. Endocrine: Negative for polydipsia, polyphagia and polyuria. Genitourinary: Negative for difficulty urinating, dysuria and hematuria. Musculoskeletal: Positive for arthralgias (knees, overall OA ), back pain (lower back ), gait problem (walker at home), joint swelling and myalgias. Skin: Negative for rash. Neurological: Positive for dizziness (w/ low sugar). Negative for syncope, weakness, light-headedness, numbness and headaches. Psychiatric/Behavioral: Negative for agitation and sleep disturbance. The patient is nervous/anxious. Objective:     Physical Exam  Vitals signs and nursing note reviewed. Constitutional:       Appearance: Normal appearance. She is well-developed. She is morbidly obese. Cardiovascular:      Rate and Rhythm: Normal rate and regular rhythm. Pulses: Normal pulses. Heart sounds: Normal heart sounds, S1 normal and S2 normal. No murmur. Pulmonary:      Effort: Pulmonary effort is normal. No respiratory distress. Breath sounds: Normal breath sounds and air entry. No wheezing or rales. Musculoskeletal:      Right lower leg: No edema. Left lower leg: No edema. Skin:     General: Skin is warm and dry. Neurological:      Mental Status: She is alert. Psychiatric:         Attention and Perception: Attention and perception normal.         Mood and Affect: Mood and affect normal.         Speech: Speech normal.         Behavior: Behavior normal. Behavior is cooperative. Thought Content: Thought content normal.         Cognition and Memory: Cognition and memory normal.         Judgment: Judgment normal.          Assessment:      1. Controlled type 2 diabetes mellitus with stage 3 chronic kidney disease, without long-term current use of insulin ([facility])  - Comprehensive Metabolic Panel;  Future  - Hemoglobin A1C; Future  Patient to continue with daily Glucophage, Januvia, and Amaryl as previously prescribed  Continue to check blood sugars    2. History of DVT of lower extremity  - POCT INR. Viewed in office, resulted at 2.4. To continue daily dose of 2 mg. Recheck PT/INR in 4 weeks per nurse visit    3. Essential hypertension  - CBC; Future  Patient to continue with her prescribed lisinopril and Imdur    4. Pure hypercholesterolemia  - Lipid Panel; Future  To continue with Lipitor    5. Primary osteoarthritis involving multiple joints  Due to patient's renal disease she is not able to take any NSAIDs  Patient may continue taking prescribed Percocet for osteoarthritic pain    6. CKD (chronic kidney disease) stage 3, GFR 30-59 ml/min ([facility])  Patient to continue with nephrology as needed    7. Major depressive disorder, recurrent episode, severe, with psychotic behavior ([facility] [address]  Continue following with psychiatrist every 12 weeks for Wellbutrin and Risperdal prescriptions     No prescriptions needed refill at today's visit. With maintenance appropriate  Plan:      Return in about 3 months (around 6/6/2020) for complete labs prior, INR check in 4 weeks. Orders Placed This Encounter   Procedures    Lipid Panel     Standing Status:   Future     Standing Expiration Date:   6/4/2020     Order Specific Question:   Is Patient Fasting?/# of Hours     Answer:   10 to 12    Comprehensive Metabolic Panel     Standing Status:   Future     Standing Expiration Date:   6/4/2020    CBC     Standing Status:   Future     Standing Expiration Date:   6/4/2020    Hemoglobin A1C     Standing Status:   Future     Standing Expiration Date:   6/4/2020     No orders of the defined types were placed in this encounter. Patient given educational materials - see patient instructions. Discussed use, benefit, and side effects of prescribed medications. All patient questions answered. Pt voiced understanding. Reviewed health maintenance. Instructed to continue current medications, diet and exercise. Patient agreed with treatment plan.  Follow up as directed below.     Electronically signed by HUE Ashton on 3/6/2020 at 8:35 AM

## 2020-03-09 ENCOUNTER — OFFICE VISIT (OUTPATIENT)
Dept: PODIATRY | Age: 60
End: 2020-03-09
Payer: MEDICARE

## 2020-03-09 VITALS — WEIGHT: 255 LBS | HEIGHT: 62 IN | BODY MASS INDEX: 46.93 KG/M2

## 2020-03-09 PROCEDURE — G8482 FLU IMMUNIZE ORDER/ADMIN: HCPCS | Performed by: PODIATRIST

## 2020-03-09 PROCEDURE — 3017F COLORECTAL CA SCREEN DOC REV: CPT | Performed by: PODIATRIST

## 2020-03-09 PROCEDURE — G8427 DOCREV CUR MEDS BY ELIG CLIN: HCPCS | Performed by: PODIATRIST

## 2020-03-09 PROCEDURE — 3046F HEMOGLOBIN A1C LEVEL >9.0%: CPT | Performed by: PODIATRIST

## 2020-03-09 PROCEDURE — 2022F DILAT RTA XM EVC RTNOPTHY: CPT | Performed by: PODIATRIST

## 2020-03-09 PROCEDURE — 99213 OFFICE O/P EST LOW 20 MIN: CPT | Performed by: PODIATRIST

## 2020-03-09 PROCEDURE — G8417 CALC BMI ABV UP PARAM F/U: HCPCS | Performed by: PODIATRIST

## 2020-03-09 PROCEDURE — 11721 DEBRIDE NAIL 6 OR MORE: CPT | Performed by: PODIATRIST

## 2020-03-09 PROCEDURE — 1036F TOBACCO NON-USER: CPT | Performed by: PODIATRIST

## 2020-03-09 NOTE — PROGRESS NOTES
[facility] [address] [address]  Dept: [phone]    DIABETIC PROGRESS NOTE  Date of patient's visit: 3/9/2020  Patient's Name:  Qiana Leiva YOB: 1960            Patient Care Team:  BRAULIO Charles NP as PCP - General (Nurse Practitioner Family)  BRAULIO Hernandez CNP as PCP - [facility] Empaneled Provider          Chief Complaint   Patient presents with    Diabetes    Nail Problem    Circulatory Problem       Subjective:   Qiana Leiva comes to clinic for Diabetes; Nail Problem; and Circulatory Problem    she is a diabetic and states that she checks her feet daily. Pt currently has complaint of thickened, elongated nails that they cannot manage by themselves. Pt's primary care physician is BRAULIO Charles NP last seen march 6 2020  Pt's last blood sugar was 147 . Pt has a new complaint of dry scaly skin to the bottom of both of the feet. Pt states they have tried OTC cream but it isnt applied regularly. Pt has tried changing shoes but it has not helped the pain          Lab Results   Component Value Date    LABA1C 6.9 (H) 08/01/2019      Complains of numbness in the feet bilat.   Past Medical History:   Diagnosis Date    Anxiety     Chronic kidney disease     Depression     Hyperlipidemia     Hypertension     Obesity     Osteoarthritis     Proteinuria     Pulmonary embolism (HCC)     Sleep apnea     Type 2 diabetes mellitus without complication (HCC)     Type II or unspecified type diabetes mellitus without mention of complication, not stated as uncontrolled     Von Willebrand disease ([facility] [address]        No Known Allergies  Current Outpatient Medications on File Prior to Visit   Medication Sig Dispense Refill    furosemide (LASIX) 20 MG tablet Take 1 tablet by mouth daily 30 tablet 5    metFORMIN (GLUCOPHAGE-XR) 500 MG extended release tablet Take 2 tablets by mouth 2 times daily 120 tablet 5    albuterol sulfate  (90 Base) MCG/ACT inhaler inhale 2 puffs by mouth INTO THE LUNGS every 4 hours if needed for WHEEZING,SHORTNESS OF BREATH, &/OR COUGH 18 g 3    oxyCODONE-acetaminophen (PERCOCET) 5-325 MG per tablet Take 1 tablet by mouth every 8 hours as needed for Pain. 40 tablet 0    atorvastatin (LIPITOR) 40 MG tablet take 1 tablet by mouth once daily 30 tablet 5    JANUVIA 100 MG tablet take 1 tablet by mouth once daily 30 tablet 5    lisinopril (PRINIVIL;ZESTRIL) 20 MG tablet take 1 tablet by mouth once daily 30 tablet 5    isosorbide mononitrate (IMDUR) 30 MG extended release tablet take 1 tablet by mouth once daily 30 tablet 5    omeprazole (PRILOSEC) 20 MG delayed release capsule take 1 capsule by mouth once daily 30 capsule 5    glimepiride (AMARYL) 4 MG tablet take 1 tablet by mouth twice a day 60 tablet 5    febuxostat (ULORIC) 40 MG TABS tablet take 1 tablet by mouth once daily 30 tablet 5    Cholecalciferol (RA VITAMIN D-3) 50 MCG (2000 UT) CAPS Take 1 capsule by mouth daily 30 capsule 5    hydrALAZINE (APRESOLINE) 50 MG tablet Take 1 tablet by mouth 2 times daily 180 tablet 1    blood glucose test strips (ONE TOUCH ULTRA TEST) strip TEST three times a day 100 strip 5    warfarin (COUMADIN) 2 MG tablet take 1 tablet by mouth ON MONDAY, WEDNESDAY AND FRIDAY,  THEN TAKE 1 AND 1/2 TABLETS ON ALL OTHER DAYS 45 tablet 5    Blood Pressure KIT Use as directed. 1 kit 0    Lancets (BD LANCET ULTRAFINE 30G) MISC Use to check sugars twice daily and as needed 120 each 3    Alcohol Swabs (ALCOHOL PREP) 70 % PADS Use twice daily and as needed to check blood sugars 120 each 3    simvastatin (ZOCOR) 40 MG tablet       risperiDONE (RISPERDAL) 2 MG tablet Take 1 tablet by mouth nightly      Misc.  Devices MISC ORTHOFIX BONE STIMULATOR 1 Device 0    allopurinol (ZYLOPRIM) 100 MG tablet Take 100 mg by mouth daily       albuterol (PROVENTIL) (2.5 MG/3ML) 0.083% nebulizer solution Take 3 mLs by nebulization every 4 hours as needed for Wheezing or Shortness of Breath 100 each 1    fluticasone (FLONASE) 50 MCG/ACT nasal spray 1 spray by Nasal route daily (Patient taking differently: 1 spray by Nasal route daily as needed ) 1 Bottle 3    buPROPion (WELLBUTRIN XL) 300 MG XL tablet Take 300 mg by mouth every morning.  buPROPion (WELLBUTRIN XL) 150 MG XL tablet Take 150 mg by mouth every morning.  Blood Pressure Monitoring KIT Use to check blood pressure daily and as needed 1 kit 0     No current facility-administered medications on file prior to visit. Review of Systems    Review of Systems:   History obtained from chart review and the patient  General ROS: negative for - chills, fatigue, fever, night sweats or weight gain  Constitutional: Negative for chills, diaphoresis, fatigue, fever and unexpected weight change. Musculoskeletal: Positive for arthralgias, gait problem and joint swelling. Neurological ROS: negative for - behavioral changes, confusion, headaches or seizures. Negative for weakness and numbness. Dermatological ROS: negative for - mole changes, rash  Cardiovascular: Negative for leg swelling. Gastrointestinal: Negative for constipation, diarrhea, nausea and vomiting. Objective:  Dermatologic Exam:   Dry scaly skin noted to the plantar surface of the right and left foot.   Small superficial fissuring of the skin noted to the plantar heel bilateral  .   Skin is thin, with flaky sloughing skin as well as decreased hair growth to the lower leg  Small red hemosiderin deposits seen dorsal foot   Musculoskeletal:     1st MPJ ROM decreased, Bilateral.  Muscle strength 5/5, Bilateral.  Pain present upon palpation of toenails 1-5, Bilateral. decreased medial longitudinal arch, Bilateral.  Ankle ROM decreased,Bilateral.    Dorsally contracted digits present digits 2, Bilateral.     Vascular: DP pulses 1/4 bilateral.  PT pulses 0/4 bilateral.   CFT <5 seconds, Bilateral.  Hair growth absent to the level of the digits, Bilateral.  Edema present, Bilateral.  Varicosities absent, Bilateral. Erythema absent, Bilateral    Neurological: Sensation diminshed to light touch to level of digits, Bilateral.  Protective sensation intact 6/10 sites via 5.07/10g [place] Monofilament, Bilateral.  negative Tinel's, Bilateral.  negative Valleix sign, Bilateral.      Integument: Warm, dry, supple, Bilateral.  Open lesion absent, Bilateral.  Interdigital maceration absent to web spaces 4, Bilateral.  Nails 1-5 left and 1-5 right thickened > 3.0 mm, dystrophic and crumbly, discolored with subungual debris. Fissures absent, Bilateral.   General: AAO x 3 in NAD.     Derm  Toenail Description  Sites of Onychomycosis Involvement (Check affected area)  [x] [x] [x] [x] [x] [x] [x] [x] [x] [x]  5 4 3 2 1 1 2 3 4 5                          Right                                        Left    Thickness  [x] [x] [x] [x] [x] [x] [x] [x] [x] [x]  5 4 3 2 1 1 2 3 4 5                         Right                                        Left    Dystrophic Changes   [x] [x] [x] [x] [x] [x] [x] [x] [x] [x]  5 4 3 2 1 1 2 3 4 5                         Right                                        Left    Color   [x] [x] [x] [x] [x] [x] [x] [x] [x] [x]  5 4 3 2 1 1 2 3 4 5                          Right                                        Left    Incurvation/Ingrowin   [] [] [] [] [] [] [] [] [] []  5 4 3 2 1 1 2 3 4 5                         Right                                        Left    Inflammation/Pain   [x] [x] [x] [x] [x] [x] [x] [x] [x] [x]  5 4 3 2 1 1 2 3 4 5                         Right                                        Left        Visual inspection:  Deformity: hammertoe deformity sherman feet  amputation: absent  Skin lesions: present - as above  Edema: right- 2+ pitting edema, left- 2+ pitting edema    Sensory exam:  Monofilament sensation: abnormal - 6/10 via SW 5.07/10g monofilament to the

## 2020-04-02 RX ORDER — HYDRALAZINE HYDROCHLORIDE 50 MG/1
50 TABLET, FILM COATED ORAL 2 TIMES DAILY
Qty: 180 TABLET | Refills: 1 | Status: SHIPPED | OUTPATIENT
Start: 2020-04-02 | End: 2020-11-16

## 2020-04-03 ENCOUNTER — ANTI-COAG VISIT (OUTPATIENT)
Dept: FAMILY MEDICINE CLINIC | Age: 60
End: 2020-04-03
Payer: MEDICARE

## 2020-04-03 LAB
INTERNATIONAL NORMALIZATION RATIO, POC: 2.5
PROTHROMBIN TIME, POC: 29.7

## 2020-04-03 PROCEDURE — 85610 PROTHROMBIN TIME: CPT | Performed by: NURSE PRACTITIONER

## 2020-04-10 LAB
AVERAGE GLUCOSE: 148
BASOPHILS ABSOLUTE: NORMAL
BASOPHILS RELATIVE PERCENT: NORMAL
CHOLESTEROL, TOTAL: 135 MG/DL
CHOLESTEROL/HDL RATIO: 3.9
EOSINOPHILS ABSOLUTE: NORMAL
EOSINOPHILS RELATIVE PERCENT: NORMAL
HBA1C MFR BLD: 6.8 %
HCT VFR BLD CALC: 36.8 % (ref 36–46)
HDLC SERPL-MCNC: 35 MG/DL (ref 35–70)
HEMOGLOBIN: 12 G/DL (ref 12–16)
LDL CHOLESTEROL CALCULATED: 59 MG/DL (ref 0–160)
LYMPHOCYTES ABSOLUTE: NORMAL
LYMPHOCYTES RELATIVE PERCENT: NORMAL
MCH RBC QN AUTO: 29.8 PG
MCHC RBC AUTO-ENTMCNC: 32.5 G/DL
MCV RBC AUTO: 92 FL
MONOCYTES ABSOLUTE: NORMAL
MONOCYTES RELATIVE PERCENT: NORMAL
NEUTROPHILS ABSOLUTE: NORMAL
NEUTROPHILS RELATIVE PERCENT: NORMAL
PLATELET # BLD: 248 K/ΜL
PMV BLD AUTO: 6.7 FL
RBC # BLD: 4.02 10^6/ΜL
TRIGL SERPL-MCNC: 204 MG/DL
VLDLC SERPL CALC-MCNC: NORMAL MG/DL
WBC # BLD: 8.4 10^3/ML

## 2020-04-14 NOTE — TELEPHONE ENCOUNTER
LOV 3/6/20  LRF 11/14/19, Warfarin 9/16/19  RTO 3 months,     Health Maintenance   Topic Date Due    Diabetic retinal exam  07/24/2020    Diabetic foot exam  03/09/2021    A1C test (Diabetic or Prediabetic)  04/10/2021    Lipid screen  04/10/2021    Potassium monitoring  04/10/2021    Creatinine monitoring  04/10/2021    Breast cancer screen  10/03/2021    Cervical cancer screen  11/16/2022    DTaP/Tdap/Td vaccine (2 - Td) 08/04/2026    Colon cancer screen colonoscopy  02/16/2028    Flu vaccine  Completed    Shingles Vaccine  Completed    Pneumococcal 0-64 years Vaccine  Completed    Hepatitis C screen  Addressed    HIV screen  Addressed    Hepatitis A vaccine  Aged Out    Hib vaccine  Aged Out    Meningococcal (ACWY) vaccine  Aged Out             (applicable per patient's age: Cancer Screenings, Depression Screening, Fall Risk Screening, Immunizations)    Hemoglobin A1C (%)   Date Value   04/10/2020 6.8   08/01/2019 6.9 (H)   01/17/2019 6.8 (H)     Microalb/Crt.  Ratio (mcg/mg creat)   Date Value   04/08/2019 CANNOT BE CALCULATED     LDL Cholesterol (mg/dL)   Date Value   08/01/2019 51     LDL Calculated (mg/dL)   Date Value   04/10/2020 59     AST (U/L)   Date Value   08/01/2019 24     ALT (U/L)   Date Value   08/01/2019 36 (H)     BUN (mg/dL)   Date Value   10/02/2019 11      (goal A1C is < 7)   (goal LDL is <100) need 30-50% reduction from baseline     BP Readings from Last 3 Encounters:   03/06/20 124/76   10/02/19 124/82   09/09/19 118/84    (goal /80)      All Future Testing planned in [facility]:  Lab Frequency Next Occurrence   Basic Metabolic Panel Once 64/23/7170   Basic Metabolic Panel Once 22/95/6691   CBC Once 10/13/2020   Protein / Creatinine Ratio, Urine Every 6 Months 3/09/2037   Basic Metabolic Panel Every 6 Months 8/28/2020   CBC Every 6 Months 8/28/2020   POCT INR         Next Visit Date:  Future Appointments   Date Time Provider Mark Junior   5/1/2020  8:40 AM Trisha Bravo

## 2020-04-15 RX ORDER — WARFARIN SODIUM 2 MG/1
TABLET ORAL
Qty: 45 TABLET | Refills: 2 | Status: SHIPPED | OUTPATIENT
Start: 2020-04-15 | End: 2020-07-20

## 2020-04-21 RX ORDER — OXYCODONE HYDROCHLORIDE AND ACETAMINOPHEN 5; 325 MG/1; MG/1
1 TABLET ORAL EVERY 8 HOURS PRN
Qty: 40 TABLET | Refills: 0 | Status: ON HOLD | OUTPATIENT
Start: 2020-04-21 | End: 2020-05-07 | Stop reason: HOSPADM

## 2020-05-01 ENCOUNTER — ANTI-COAG VISIT (OUTPATIENT)
Dept: FAMILY MEDICINE CLINIC | Age: 60
End: 2020-05-01
Payer: MEDICARE

## 2020-05-01 LAB
INTERNATIONAL NORMALIZATION RATIO, POC: 2.1
PROTHROMBIN TIME, POC: 25.7

## 2020-05-01 PROCEDURE — 93793 ANTICOAG MGMT PT WARFARIN: CPT | Performed by: NURSE PRACTITIONER

## 2020-05-01 PROCEDURE — 85610 PROTHROMBIN TIME: CPT | Performed by: NURSE PRACTITIONER

## 2020-05-06 ENCOUNTER — APPOINTMENT (OUTPATIENT)
Dept: CT IMAGING | Age: 60
End: 2020-05-06
Payer: MEDICARE

## 2020-05-06 ENCOUNTER — NURSE TRIAGE (OUTPATIENT)
Dept: OTHER | Facility: CLINIC | Age: 60
End: 2020-05-06

## 2020-05-06 ENCOUNTER — APPOINTMENT (OUTPATIENT)
Dept: GENERAL RADIOLOGY | Age: 60
End: 2020-05-06
Payer: MEDICARE

## 2020-05-06 ENCOUNTER — HOSPITAL ENCOUNTER (OUTPATIENT)
Age: 60
Setting detail: OBSERVATION
Discharge: HOME | End: 2020-05-07
Attending: EMERGENCY MEDICINE | Admitting: INTERNAL MEDICINE
Payer: MEDICARE

## 2020-05-06 PROBLEM — R06.02 SOB (SHORTNESS OF BREATH): Status: ACTIVE | Noted: 2020-05-06

## 2020-05-06 LAB
-: ABNORMAL
ABSOLUTE EOS #: 0.1 K/UL (ref 0–0.4)
ABSOLUTE IMMATURE GRANULOCYTE: NORMAL K/UL (ref 0–0.3)
ABSOLUTE LYMPH #: 2.7 K/UL (ref 1–4.8)
ABSOLUTE MONO #: 0.6 K/UL (ref 0.1–1.2)
ALBUMIN SERPL-MCNC: 4.3 G/DL (ref 3.5–5.2)
ALBUMIN/GLOBULIN RATIO: 1.5 (ref 1–2.5)
ALP BLD-CCNC: 96 U/L (ref 35–104)
ALT SERPL-CCNC: 54 U/L (ref 5–33)
AMORPHOUS: ABNORMAL
AMYLASE: 64 U/L (ref 28–100)
ANION GAP SERPL CALCULATED.3IONS-SCNC: 15 MMOL/L (ref 9–17)
AST SERPL-CCNC: 38 U/L
BACTERIA: ABNORMAL
BASOPHILS # BLD: 1 % (ref 0–2)
BASOPHILS ABSOLUTE: 0 K/UL (ref 0–0.2)
BILIRUB SERPL-MCNC: 0.2 MG/DL (ref 0.3–1.2)
BILIRUBIN URINE: NEGATIVE
BNP INTERPRETATION: NORMAL
BUN BLDV-MCNC: 18 MG/DL (ref 8–23)
BUN/CREAT BLD: ABNORMAL (ref 9–20)
C-REACTIVE PROTEIN: 2.5 MG/L (ref 0–5)
CALCIUM SERPL-MCNC: 9.4 MG/DL (ref 8.6–10.4)
CASTS UA: ABNORMAL /LPF
CHLORIDE BLD-SCNC: 101 MMOL/L (ref 98–107)
CO2: 24 MMOL/L (ref 20–31)
COLOR: YELLOW
COMMENT UA: ABNORMAL
CREAT SERPL-MCNC: 1.12 MG/DL (ref 0.5–0.9)
CRYSTALS, UA: ABNORMAL /HPF
D-DIMER QUANTITATIVE: <0.19 MG/L FEU
DIFFERENTIAL TYPE: NORMAL
EOSINOPHILS RELATIVE PERCENT: 2 % (ref 1–4)
EPITHELIAL CELLS UA: ABNORMAL /HPF (ref 0–5)
FERRITIN: 34 UG/L (ref 13–150)
FIO2: NORMAL
GFR AFRICAN AMERICAN: 60 ML/MIN
GFR NON-AFRICAN AMERICAN: 49 ML/MIN
GFR SERPL CREATININE-BSD FRML MDRD: ABNORMAL ML/MIN/{1.73_M2}
GFR SERPL CREATININE-BSD FRML MDRD: ABNORMAL ML/MIN/{1.73_M2}
GLUCOSE BLD-MCNC: 174 MG/DL (ref 65–105)
GLUCOSE BLD-MCNC: 218 MG/DL (ref 70–99)
GLUCOSE URINE: NEGATIVE
HCO3: 27.7 MMOL/L (ref 24–32)
HCT VFR BLD CALC: 39.6 % (ref 36–46)
HEMOGLOBIN: 13.2 G/DL (ref 12–16)
IMMATURE GRANULOCYTES: NORMAL %
INR BLD: 2.4
KETONES, URINE: NEGATIVE
LACTATE DEHYDROGENASE: 309 U/L (ref 135–214)
LACTIC ACID, WHOLE BLOOD: ABNORMAL MMOL/L (ref 0.7–2.1)
LACTIC ACID: 1.6 MMOL/L (ref 0.5–2.2)
LACTIC ACID: 2.9 MMOL/L (ref 0.5–2.2)
LEUKOCYTE ESTERASE, URINE: ABNORMAL
LIPASE: 51 U/L (ref 13–60)
LYMPHOCYTES # BLD: 35 % (ref 24–44)
MCH RBC QN AUTO: 30.4 PG (ref 26–34)
MCHC RBC AUTO-ENTMCNC: 33.3 G/DL (ref 31–37)
MCV RBC AUTO: 91.1 FL (ref 80–100)
MONOCYTES # BLD: 7 % (ref 2–11)
MUCUS: ABNORMAL
MYOGLOBIN: 63 NG/ML (ref 25–58)
NEGATIVE BASE EXCESS: NORMAL (ref 0–2)
NITRITE, URINE: NEGATIVE
NRBC AUTOMATED: NORMAL PER 100 WBC
O2 DEVICE/FLOW/%: NORMAL
O2 SATURATION: 90 %
OTHER OBSERVATIONS UA: ABNORMAL
PARTIAL THROMBOPLASTIN TIME: 33.7 SEC (ref 21.3–31.3)
PATIENT TEMP: NORMAL
PCO2: 49 MM HG
PDW BLD-RTO: 14.6 % (ref 12.5–15.4)
PH UA: 6 (ref 5–8)
PH: 7.36
PLATELET # BLD: 286 K/UL (ref 140–450)
PLATELET ESTIMATE: NORMAL
PMV BLD AUTO: 6.9 FL (ref 6–12)
PO2: 61 MM HG
POC PCO2 TEMP: NORMAL MM HG
POC PH TEMP: NORMAL
POC PO2 TEMP: NORMAL MM HG
POC TCO2: 29 MMOL/L
POSITIVE BASE EXCESS: 2 (ref 0–2)
POTASSIUM SERPL-SCNC: 4.4 MMOL/L (ref 3.7–5.3)
PRO-BNP: 29 PG/ML
PROTEIN UA: NEGATIVE
PROTHROMBIN TIME: 24.5 SEC (ref 9.4–12.6)
RBC # BLD: 4.35 M/UL (ref 4–5.2)
RBC # BLD: NORMAL 10*6/UL
RBC UA: ABNORMAL /HPF (ref 0–2)
RENAL EPITHELIAL, UA: ABNORMAL /HPF
SARS-COV-2, PCR: NORMAL
SARS-COV-2, RAPID: NOT DETECTED
SARS-COV-2: NORMAL
SEG NEUTROPHILS: 55 % (ref 36–66)
SEGMENTED NEUTROPHILS ABSOLUTE COUNT: 4.4 K/UL (ref 1.8–7.7)
SODIUM BLD-SCNC: 140 MMOL/L (ref 135–144)
SOURCE: NORMAL
SPECIFIC GRAVITY UA: 1.02 (ref 1–1.03)
TOTAL PROTEIN: 7.2 G/DL (ref 6.4–8.3)
TRICHOMONAS: ABNORMAL
TROPONIN INTERP: ABNORMAL
TROPONIN INTERP: ABNORMAL
TROPONIN INTERP: NORMAL
TROPONIN T: ABNORMAL NG/ML
TROPONIN T: ABNORMAL NG/ML
TROPONIN T: NORMAL NG/ML
TROPONIN, HIGH SENSITIVITY: 14 NG/L (ref 0–14)
TROPONIN, HIGH SENSITIVITY: 15 NG/L (ref 0–14)
TROPONIN, HIGH SENSITIVITY: 16 NG/L (ref 0–14)
TURBIDITY: CLEAR
URINE HGB: NEGATIVE
UROBILINOGEN, URINE: NORMAL
WBC # BLD: 7.9 K/UL (ref 3.5–11)
WBC # BLD: NORMAL 10*3/UL
WBC UA: ABNORMAL /HPF (ref 0–5)
YEAST: ABNORMAL

## 2020-05-06 PROCEDURE — 84484 ASSAY OF TROPONIN QUANT: CPT

## 2020-05-06 PROCEDURE — 85610 PROTHROMBIN TIME: CPT

## 2020-05-06 PROCEDURE — 83036 HEMOGLOBIN GLYCOSYLATED A1C: CPT

## 2020-05-06 PROCEDURE — 81001 URINALYSIS AUTO W/SCOPE: CPT

## 2020-05-06 PROCEDURE — 2580000003 HC RX 258: Performed by: NURSE PRACTITIONER

## 2020-05-06 PROCEDURE — 83880 ASSAY OF NATRIURETIC PEPTIDE: CPT

## 2020-05-06 PROCEDURE — 85730 THROMBOPLASTIN TIME PARTIAL: CPT

## 2020-05-06 PROCEDURE — G0378 HOSPITAL OBSERVATION PER HR: HCPCS

## 2020-05-06 PROCEDURE — 99285 EMERGENCY DEPT VISIT HI MDM: CPT

## 2020-05-06 PROCEDURE — 82150 ASSAY OF AMYLASE: CPT

## 2020-05-06 PROCEDURE — 93005 ELECTROCARDIOGRAM TRACING: CPT | Performed by: EMERGENCY MEDICINE

## 2020-05-06 PROCEDURE — 85379 FIBRIN DEGRADATION QUANT: CPT

## 2020-05-06 PROCEDURE — 82803 BLOOD GASES ANY COMBINATION: CPT

## 2020-05-06 PROCEDURE — U0002 COVID-19 LAB TEST NON-CDC: HCPCS

## 2020-05-06 PROCEDURE — 85025 COMPLETE CBC W/AUTO DIFF WBC: CPT

## 2020-05-06 PROCEDURE — 6370000000 HC RX 637 (ALT 250 FOR IP): Performed by: NURSE PRACTITIONER

## 2020-05-06 PROCEDURE — 86140 C-REACTIVE PROTEIN: CPT

## 2020-05-06 PROCEDURE — 82728 ASSAY OF FERRITIN: CPT

## 2020-05-06 PROCEDURE — 83690 ASSAY OF LIPASE: CPT

## 2020-05-06 PROCEDURE — 71250 CT THORAX DX C-: CPT

## 2020-05-06 PROCEDURE — 80053 COMPREHEN METABOLIC PANEL: CPT

## 2020-05-06 PROCEDURE — 71045 X-RAY EXAM CHEST 1 VIEW: CPT

## 2020-05-06 PROCEDURE — 36415 COLL VENOUS BLD VENIPUNCTURE: CPT

## 2020-05-06 PROCEDURE — 83615 LACTATE (LD) (LDH) ENZYME: CPT

## 2020-05-06 PROCEDURE — 83605 ASSAY OF LACTIC ACID: CPT

## 2020-05-06 PROCEDURE — 83874 ASSAY OF MYOGLOBIN: CPT

## 2020-05-06 PROCEDURE — 82947 ASSAY GLUCOSE BLOOD QUANT: CPT

## 2020-05-06 RX ORDER — BUPROPION HYDROCHLORIDE 150 MG/1
150 TABLET ORAL EVERY MORNING
Status: DISCONTINUED | OUTPATIENT
Start: 2020-05-07 | End: 2020-05-06 | Stop reason: SDUPTHER

## 2020-05-06 RX ORDER — DEXTROSE MONOHYDRATE 50 MG/ML
100 INJECTION, SOLUTION INTRAVENOUS PRN
Status: DISCONTINUED | OUTPATIENT
Start: 2020-05-06 | End: 2020-05-08 | Stop reason: HOSPADM

## 2020-05-06 RX ORDER — NITROGLYCERIN 0.4 MG/1
0.4 TABLET SUBLINGUAL EVERY 5 MIN PRN
Status: DISCONTINUED | OUTPATIENT
Start: 2020-05-06 | End: 2020-05-08 | Stop reason: HOSPADM

## 2020-05-06 RX ORDER — ISOSORBIDE MONONITRATE 30 MG/1
30 TABLET, EXTENDED RELEASE ORAL DAILY
Status: DISCONTINUED | OUTPATIENT
Start: 2020-05-07 | End: 2020-05-08 | Stop reason: HOSPADM

## 2020-05-06 RX ORDER — ONDANSETRON 2 MG/ML
4 INJECTION INTRAMUSCULAR; INTRAVENOUS EVERY 6 HOURS PRN
Status: DISCONTINUED | OUTPATIENT
Start: 2020-05-06 | End: 2020-05-08 | Stop reason: HOSPADM

## 2020-05-06 RX ORDER — VITAMIN B COMPLEX
2000 TABLET ORAL DAILY
Status: DISCONTINUED | OUTPATIENT
Start: 2020-05-07 | End: 2020-05-08 | Stop reason: HOSPADM

## 2020-05-06 RX ORDER — ACETAMINOPHEN 650 MG/1
650 SUPPOSITORY RECTAL EVERY 6 HOURS PRN
Status: DISCONTINUED | OUTPATIENT
Start: 2020-05-06 | End: 2020-05-08 | Stop reason: HOSPADM

## 2020-05-06 RX ORDER — BUPROPION HYDROCHLORIDE 150 MG/1
450 TABLET ORAL EVERY MORNING
Status: DISCONTINUED | OUTPATIENT
Start: 2020-05-07 | End: 2020-05-08 | Stop reason: HOSPADM

## 2020-05-06 RX ORDER — WARFARIN SODIUM 1 MG/1
2 TABLET ORAL DAILY
Status: DISCONTINUED | OUTPATIENT
Start: 2020-05-06 | End: 2020-05-08 | Stop reason: HOSPADM

## 2020-05-06 RX ORDER — WARFARIN SODIUM 5 MG/1
5 TABLET ORAL DAILY
Status: DISCONTINUED | OUTPATIENT
Start: 2020-05-06 | End: 2020-05-06

## 2020-05-06 RX ORDER — IPRATROPIUM BROMIDE AND ALBUTEROL SULFATE 2.5; .5 MG/3ML; MG/3ML
1 SOLUTION RESPIRATORY (INHALATION) EVERY 4 HOURS PRN
Status: DISCONTINUED | OUTPATIENT
Start: 2020-05-06 | End: 2020-05-08 | Stop reason: HOSPADM

## 2020-05-06 RX ORDER — PROMETHAZINE HYDROCHLORIDE 25 MG/1
12.5 TABLET ORAL EVERY 6 HOURS PRN
Status: DISCONTINUED | OUTPATIENT
Start: 2020-05-06 | End: 2020-05-08 | Stop reason: HOSPADM

## 2020-05-06 RX ORDER — POTASSIUM CHLORIDE 20 MEQ/1
40 TABLET, EXTENDED RELEASE ORAL PRN
Status: DISCONTINUED | OUTPATIENT
Start: 2020-05-06 | End: 2020-05-08 | Stop reason: HOSPADM

## 2020-05-06 RX ORDER — NICOTINE POLACRILEX 4 MG
15 LOZENGE BUCCAL PRN
Status: DISCONTINUED | OUTPATIENT
Start: 2020-05-06 | End: 2020-05-08 | Stop reason: HOSPADM

## 2020-05-06 RX ORDER — BUPROPION HYDROCHLORIDE 150 MG/1
300 TABLET ORAL EVERY MORNING
Status: DISCONTINUED | OUTPATIENT
Start: 2020-05-07 | End: 2020-05-06

## 2020-05-06 RX ORDER — LISINOPRIL 20 MG/1
20 TABLET ORAL DAILY
Status: DISCONTINUED | OUTPATIENT
Start: 2020-05-07 | End: 2020-05-08 | Stop reason: HOSPADM

## 2020-05-06 RX ORDER — POTASSIUM CHLORIDE 7.45 MG/ML
10 INJECTION INTRAVENOUS PRN
Status: DISCONTINUED | OUTPATIENT
Start: 2020-05-06 | End: 2020-05-08 | Stop reason: HOSPADM

## 2020-05-06 RX ORDER — DEXTROSE MONOHYDRATE 25 G/50ML
12.5 INJECTION, SOLUTION INTRAVENOUS PRN
Status: DISCONTINUED | OUTPATIENT
Start: 2020-05-06 | End: 2020-05-08 | Stop reason: HOSPADM

## 2020-05-06 RX ORDER — ATORVASTATIN CALCIUM 40 MG/1
40 TABLET, FILM COATED ORAL NIGHTLY
Status: DISCONTINUED | OUTPATIENT
Start: 2020-05-06 | End: 2020-05-06 | Stop reason: SDUPTHER

## 2020-05-06 RX ORDER — ALLOPURINOL 100 MG/1
100 TABLET ORAL DAILY
Status: DISCONTINUED | OUTPATIENT
Start: 2020-05-07 | End: 2020-05-08 | Stop reason: HOSPADM

## 2020-05-06 RX ORDER — RISPERIDONE 1 MG/1
2 TABLET, FILM COATED ORAL NIGHTLY
Status: DISCONTINUED | OUTPATIENT
Start: 2020-05-06 | End: 2020-05-08 | Stop reason: HOSPADM

## 2020-05-06 RX ORDER — ATORVASTATIN CALCIUM 40 MG/1
40 TABLET, FILM COATED ORAL NIGHTLY
Status: DISCONTINUED | OUTPATIENT
Start: 2020-05-06 | End: 2020-05-08 | Stop reason: HOSPADM

## 2020-05-06 RX ORDER — FUROSEMIDE 20 MG/1
20 TABLET ORAL DAILY
Status: DISCONTINUED | OUTPATIENT
Start: 2020-05-07 | End: 2020-05-08 | Stop reason: HOSPADM

## 2020-05-06 RX ORDER — SODIUM CHLORIDE 0.9 % (FLUSH) 0.9 %
10 SYRINGE (ML) INJECTION PRN
Status: DISCONTINUED | OUTPATIENT
Start: 2020-05-06 | End: 2020-05-08 | Stop reason: HOSPADM

## 2020-05-06 RX ORDER — SODIUM CHLORIDE 0.9 % (FLUSH) 0.9 %
10 SYRINGE (ML) INJECTION EVERY 12 HOURS SCHEDULED
Status: DISCONTINUED | OUTPATIENT
Start: 2020-05-06 | End: 2020-05-08 | Stop reason: HOSPADM

## 2020-05-06 RX ORDER — OXYCODONE HYDROCHLORIDE AND ACETAMINOPHEN 5; 325 MG/1; MG/1
2 TABLET ORAL EVERY 4 HOURS PRN
Status: DISCONTINUED | OUTPATIENT
Start: 2020-05-06 | End: 2020-05-08 | Stop reason: HOSPADM

## 2020-05-06 RX ORDER — PANTOPRAZOLE SODIUM 40 MG/1
40 TABLET, DELAYED RELEASE ORAL
Status: DISCONTINUED | OUTPATIENT
Start: 2020-05-07 | End: 2020-05-08 | Stop reason: HOSPADM

## 2020-05-06 RX ORDER — MAGNESIUM SULFATE 1 G/100ML
1 INJECTION INTRAVENOUS PRN
Status: DISCONTINUED | OUTPATIENT
Start: 2020-05-06 | End: 2020-05-08 | Stop reason: HOSPADM

## 2020-05-06 RX ORDER — HYDRALAZINE HYDROCHLORIDE 25 MG/1
50 TABLET, FILM COATED ORAL 2 TIMES DAILY
Status: DISCONTINUED | OUTPATIENT
Start: 2020-05-06 | End: 2020-05-08 | Stop reason: HOSPADM

## 2020-05-06 RX ORDER — ACETAMINOPHEN 325 MG/1
650 TABLET ORAL EVERY 6 HOURS PRN
Status: DISCONTINUED | OUTPATIENT
Start: 2020-05-06 | End: 2020-05-08 | Stop reason: HOSPADM

## 2020-05-06 RX ORDER — OXYCODONE HYDROCHLORIDE AND ACETAMINOPHEN 5; 325 MG/1; MG/1
1 TABLET ORAL EVERY 4 HOURS PRN
Status: DISCONTINUED | OUTPATIENT
Start: 2020-05-06 | End: 2020-05-08 | Stop reason: HOSPADM

## 2020-05-06 RX ADMIN — Medication 10 ML: at 22:54

## 2020-05-06 RX ADMIN — WARFARIN SODIUM 2 MG: 1 TABLET ORAL at 22:51

## 2020-05-06 RX ADMIN — ASPIRIN 325 MG: 325 TABLET, COATED ORAL at 22:51

## 2020-05-06 RX ADMIN — HYDRALAZINE HYDROCHLORIDE 50 MG: 25 TABLET, FILM COATED ORAL at 22:51

## 2020-05-06 RX ADMIN — ATORVASTATIN CALCIUM 40 MG: 40 TABLET, FILM COATED ORAL at 22:50

## 2020-05-06 RX ADMIN — INSULIN LISPRO 1 UNITS: 100 INJECTION, SOLUTION INTRAVENOUS; SUBCUTANEOUS at 22:59

## 2020-05-06 RX ADMIN — RISPERIDONE 2 MG: 1 TABLET, FILM COATED ORAL at 22:50

## 2020-05-06 ASSESSMENT — ENCOUNTER SYMPTOMS
CONSTIPATION: 0
EYE DISCHARGE: 0
EYE REDNESS: 0
ABDOMINAL PAIN: 0
NAUSEA: 0
SHORTNESS OF BREATH: 1
SORE THROAT: 0
WHEEZING: 0
EYE PAIN: 0
DIARRHEA: 0
VOMITING: 0
STRIDOR: 0
COLOR CHANGE: 0
COUGH: 1

## 2020-05-06 NOTE — ED PROVIDER NOTES
tablet Take 1 tablet by mouth daily  Qty: 30 tablet, Refills: 5    Associated Diagnoses: Leg swelling      metFORMIN (GLUCOPHAGE-XR) 500 MG extended release tablet Take 2 tablets by mouth 2 times daily  Qty: 120 tablet, Refills: 5    Associated Diagnoses: Controlled type 2 diabetes mellitus with stage 3 chronic kidney disease, without long-term current use of insulin ([facility])      albuterol sulfate  (90 Base) MCG/ACT inhaler inhale 2 puffs by mouth INTO THE LUNGS every 4 hours if needed for WHEEZING,SHORTNESS OF BREATH, &/OR COUGH  Qty: 18 g, Refills: 3    Associated Diagnoses: Bronchitis      atorvastatin (LIPITOR) 40 MG tablet take 1 tablet by mouth once daily  Qty: 30 tablet, Refills: 5    Associated Diagnoses: Hyperlipidemia, unspecified hyperlipidemia type      JANUVIA 100 MG tablet take 1 tablet by mouth once daily  Qty: 30 tablet, Refills: 5    Associated Diagnoses: Controlled type 2 diabetes mellitus with stage 3 chronic kidney disease, without long-term current use of insulin ([facility])      lisinopril (PRINIVIL;ZESTRIL) 20 MG tablet take 1 tablet by mouth once daily  Qty: 30 tablet, Refills: 5      isosorbide mononitrate (IMDUR) 30 MG extended release tablet take 1 tablet by mouth once daily  Qty: 30 tablet, Refills: 5      omeprazole (PRILOSEC) 20 MG delayed release capsule take 1 capsule by mouth once daily  Qty: 30 capsule, Refills: 5    Associated Diagnoses: Gastroesophageal reflux disease, esophagitis presence not specified      glimepiride (AMARYL) 4 MG tablet take 1 tablet by mouth twice a day  Qty: 60 tablet, Refills: 5    Associated Diagnoses: Controlled type 2 diabetes mellitus with stage 3 chronic kidney disease, without long-term current use of insulin ([facility])      febuxostat (ULORIC) 40 MG TABS tablet take 1 tablet by mouth once daily  Qty: 30 tablet, Refills: 5      Cholecalciferol (RA VITAMIN D-3) 50 MCG (2000 UT) CAPS Take 1 capsule by mouth daily  Qty: 30 capsule, Refills: 5    Associated Disease Mother     Cancer Father         stomach    Diabetes Sister     Cancer Brother         kidney    No Known Problems Maternal Grandmother     No Known Problems Maternal Grandfather     No Known Problems Paternal Grandmother     No Known Problems Paternal Grandfather     Other Brother         AIDS     Family Status   Relation Name Status    Mother     Paddy Miners Father      Sister Derl Mcburney    Brother roopa     MGM      MGF      [facility]      PGF      Brother otto Alive    Sister salvador Alive    Sister ghazala Alive    Sister denae Alive    Brother j luis Alive    Brother nydia-         SOCIAL HISTORY      reports that she quit smoking about 37 years ago. Her smoking use included cigarettes. She started smoking about 42 years ago. She has a 1.75 pack-year smoking history. She has never used smokeless tobacco. She reports that she does not drink alcohol or use drugs. PHYSICAL EXAM    (up to 7 for level 4, 8 or more for level 5)     ED Triage Vitals [20 1653]   BP Temp Temp Source Pulse Resp SpO2 Height Weight   (!) 141/101 98.1 °F (36.7 °C) Oral 117 18 98 % 5' 2\" (1.575 m) 260 lb (117.9 kg)     Physical Exam  Constitutional:       General: She is in acute distress. Appearance: She is well-developed. She is obese. She is not ill-appearing, toxic-appearing or diaphoretic. HENT:      Head: Normocephalic and atraumatic. Right Ear: External ear normal.      Left Ear: External ear normal.      Nose: Nose normal.      Mouth/Throat:      Mouth: Mucous membranes are moist.   Eyes:      General:         Right eye: No discharge. Left eye: No discharge. Conjunctiva/sclera: Conjunctivae normal.      Pupils: Pupils are equal, round, and reactive to light. Neck:      Musculoskeletal: Normal range of motion and neck supple. Cardiovascular:      Rate and Rhythm: Normal rate and regular rhythm.       Heart sounds: Normal [address]  [phone]            DISCHARGE MEDICATIONS:  Current Discharge Medication List          (Please note that portions of this note were completed with a voice recognition program.  Efforts were made to edit the dictations but occasionally words are mis-transcribed.)    Deepika Green MD  Attending Emergency Physician        Deepika Green MD  05/07/20 [id number]

## 2020-05-07 VITALS
DIASTOLIC BLOOD PRESSURE: 74 MMHG | TEMPERATURE: 98.1 F | BODY MASS INDEX: 47.95 KG/M2 | RESPIRATION RATE: 20 BRPM | OXYGEN SATURATION: 97 % | HEART RATE: 85 BPM | WEIGHT: 260.58 LBS | HEIGHT: 62 IN | SYSTOLIC BLOOD PRESSURE: 128 MMHG

## 2020-05-07 PROBLEM — G47.33 OSA ON CPAP: Chronic | Status: ACTIVE | Noted: 2018-01-19

## 2020-05-07 PROBLEM — R91.8 MULTIPLE LUNG NODULES ON CT: Status: ACTIVE | Noted: 2020-05-07

## 2020-05-07 PROBLEM — F33.41 MAJOR DEPRESSIVE DISORDER, RECURRENT, IN PARTIAL REMISSION (HCC): Chronic | Status: ACTIVE | Noted: 2018-10-24

## 2020-05-07 PROBLEM — R06.02 SOB (SHORTNESS OF BREATH): Status: ACTIVE | Noted: 2020-05-07

## 2020-05-07 PROBLEM — R06.02 SOB (SHORTNESS OF BREATH): Status: RESOLVED | Noted: 2020-05-06 | Resolved: 2020-05-07

## 2020-05-07 PROBLEM — F33.3 MAJOR DEPRESSIVE DISORDER, RECURRENT EPISODE, SEVERE, WITH PSYCHOTIC BEHAVIOR (HCC): Chronic | Status: ACTIVE | Noted: 2018-10-24

## 2020-05-07 PROBLEM — E83.42 HYPOMAGNESEMIA: Status: ACTIVE | Noted: 2020-05-07

## 2020-05-07 PROBLEM — Z99.89 OSA ON CPAP: Chronic | Status: ACTIVE | Noted: 2018-01-19

## 2020-05-07 LAB
ALBUMIN SERPL-MCNC: 3.9 G/DL (ref 3.5–5.2)
ALBUMIN/GLOBULIN RATIO: 1.6 (ref 1–2.5)
ALP BLD-CCNC: 84 U/L (ref 35–104)
ALT SERPL-CCNC: 42 U/L (ref 5–33)
ANION GAP SERPL CALCULATED.3IONS-SCNC: 13 MMOL/L (ref 9–17)
AST SERPL-CCNC: 25 U/L
BILIRUB SERPL-MCNC: 0.32 MG/DL (ref 0.3–1.2)
BUN BLDV-MCNC: 18 MG/DL (ref 8–23)
BUN/CREAT BLD: ABNORMAL (ref 9–20)
CALCIUM SERPL-MCNC: 9 MG/DL (ref 8.6–10.4)
CHLORIDE BLD-SCNC: 103 MMOL/L (ref 98–107)
CHOLESTEROL/HDL RATIO: 3.7
CHOLESTEROL: 132 MG/DL
CO2: 27 MMOL/L (ref 20–31)
CREAT SERPL-MCNC: 0.99 MG/DL (ref 0.5–0.9)
EKG ATRIAL RATE: 113 BPM
EKG ATRIAL RATE: 89 BPM
EKG P AXIS: 46 DEGREES
EKG P AXIS: 46 DEGREES
EKG P-R INTERVAL: 168 MS
EKG P-R INTERVAL: 174 MS
EKG Q-T INTERVAL: 316 MS
EKG Q-T INTERVAL: 372 MS
EKG QRS DURATION: 58 MS
EKG QRS DURATION: 68 MS
EKG QTC CALCULATION (BAZETT): 433 MS
EKG QTC CALCULATION (BAZETT): 452 MS
EKG R AXIS: 6 DEGREES
EKG R AXIS: 7 DEGREES
EKG T AXIS: 54 DEGREES
EKG T AXIS: 61 DEGREES
EKG VENTRICULAR RATE: 113 BPM
EKG VENTRICULAR RATE: 89 BPM
ESTIMATED AVERAGE GLUCOSE: 166 MG/DL
GFR AFRICAN AMERICAN: >60 ML/MIN
GFR NON-AFRICAN AMERICAN: 57 ML/MIN
GFR SERPL CREATININE-BSD FRML MDRD: ABNORMAL ML/MIN/{1.73_M2}
GFR SERPL CREATININE-BSD FRML MDRD: ABNORMAL ML/MIN/{1.73_M2}
GLUCOSE BLD-MCNC: 156 MG/DL (ref 70–99)
HBA1C MFR BLD: 7.4 % (ref 4–6)
HCT VFR BLD CALC: 36.1 % (ref 36–46)
HDLC SERPL-MCNC: 36 MG/DL
HEMOGLOBIN: 11.8 G/DL (ref 12–16)
INR BLD: 2.4
LDL CHOLESTEROL: 68 MG/DL (ref 0–130)
MAGNESIUM: 1.3 MG/DL (ref 1.6–2.6)
MCH RBC QN AUTO: 29.6 PG (ref 26–34)
MCHC RBC AUTO-ENTMCNC: 32.7 G/DL (ref 31–37)
MCV RBC AUTO: 90.5 FL (ref 80–100)
NRBC AUTOMATED: ABNORMAL PER 100 WBC
PDW BLD-RTO: 14.5 % (ref 12.5–15.4)
PLATELET # BLD: 262 K/UL (ref 140–450)
PMV BLD AUTO: 6.5 FL (ref 6–12)
POTASSIUM SERPL-SCNC: 3.9 MMOL/L (ref 3.7–5.3)
PROTHROMBIN TIME: 24.5 SEC (ref 9.4–12.6)
RBC # BLD: 3.99 M/UL (ref 4–5.2)
SODIUM BLD-SCNC: 143 MMOL/L (ref 135–144)
TOTAL PROTEIN: 6.3 G/DL (ref 6.4–8.3)
TRIGL SERPL-MCNC: 138 MG/DL
TROPONIN INTERP: NORMAL
TROPONIN T: NORMAL NG/ML
TROPONIN, HIGH SENSITIVITY: 14 NG/L (ref 0–14)
VLDLC SERPL CALC-MCNC: ABNORMAL MG/DL (ref 1–30)
WBC # BLD: 7.1 K/UL (ref 3.5–11)

## 2020-05-07 PROCEDURE — 6370000000 HC RX 637 (ALT 250 FOR IP): Performed by: NURSE PRACTITIONER

## 2020-05-07 PROCEDURE — 6360000002 HC RX W HCPCS: Performed by: NURSE PRACTITIONER

## 2020-05-07 PROCEDURE — APPSS60 APP SPLIT SHARED TIME 46-60 MINUTES: Performed by: NURSE PRACTITIONER

## 2020-05-07 PROCEDURE — G0378 HOSPITAL OBSERVATION PER HR: HCPCS

## 2020-05-07 PROCEDURE — 80053 COMPREHEN METABOLIC PANEL: CPT

## 2020-05-07 PROCEDURE — 80061 LIPID PANEL: CPT

## 2020-05-07 PROCEDURE — 96365 THER/PROPH/DIAG IV INF INIT: CPT

## 2020-05-07 PROCEDURE — 83735 ASSAY OF MAGNESIUM: CPT

## 2020-05-07 PROCEDURE — 85610 PROTHROMBIN TIME: CPT

## 2020-05-07 PROCEDURE — 93005 ELECTROCARDIOGRAM TRACING: CPT | Performed by: NURSE PRACTITIONER

## 2020-05-07 PROCEDURE — 36415 COLL VENOUS BLD VENIPUNCTURE: CPT

## 2020-05-07 PROCEDURE — 2580000003 HC RX 258: Performed by: NURSE PRACTITIONER

## 2020-05-07 PROCEDURE — 94760 N-INVAS EAR/PLS OXIMETRY 1: CPT

## 2020-05-07 PROCEDURE — 84484 ASSAY OF TROPONIN QUANT: CPT

## 2020-05-07 PROCEDURE — 85027 COMPLETE CBC AUTOMATED: CPT

## 2020-05-07 PROCEDURE — 99220 PR INITIAL OBSERVATION CARE/DAY 70 MINUTES: CPT | Performed by: INTERNAL MEDICINE

## 2020-05-07 RX ORDER — METFORMIN HYDROCHLORIDE 500 MG/1
1000 TABLET, EXTENDED RELEASE ORAL 2 TIMES DAILY
Status: DISCONTINUED | OUTPATIENT
Start: 2020-05-07 | End: 2020-05-08 | Stop reason: HOSPADM

## 2020-05-07 RX ORDER — GLIMEPIRIDE 2 MG/1
4 TABLET ORAL 2 TIMES DAILY
Status: DISCONTINUED | OUTPATIENT
Start: 2020-05-07 | End: 2020-05-08 | Stop reason: HOSPADM

## 2020-05-07 RX ORDER — ALOGLIPTIN 12.5 MG/1
25 TABLET, FILM COATED ORAL DAILY
Status: DISCONTINUED | OUTPATIENT
Start: 2020-05-07 | End: 2020-05-08 | Stop reason: HOSPADM

## 2020-05-07 RX ADMIN — MAGNESIUM SULFATE HEPTAHYDRATE 4 G: 1 INJECTION, SOLUTION INTRAVENOUS at 16:50

## 2020-05-07 RX ADMIN — PANTOPRAZOLE SODIUM 40 MG: 40 TABLET, DELAYED RELEASE ORAL at 08:36

## 2020-05-07 RX ADMIN — METFORMIN HYDROCHLORIDE 1000 MG: 500 TABLET, EXTENDED RELEASE ORAL at 16:49

## 2020-05-07 RX ADMIN — ALLOPURINOL 100 MG: 100 TABLET ORAL at 08:35

## 2020-05-07 RX ADMIN — ALOGLIPTIN 25 MG: 12.5 TABLET, FILM COATED ORAL at 08:34

## 2020-05-07 RX ADMIN — HYDRALAZINE HYDROCHLORIDE 50 MG: 25 TABLET, FILM COATED ORAL at 08:34

## 2020-05-07 RX ADMIN — VITAMIN D, TAB 1000IU (100/BT) 2000 UNITS: 25 TAB at 08:34

## 2020-05-07 RX ADMIN — Medication 10 ML: at 08:35

## 2020-05-07 RX ADMIN — LISINOPRIL 20 MG: 20 TABLET ORAL at 08:35

## 2020-05-07 RX ADMIN — FUROSEMIDE 20 MG: 20 TABLET ORAL at 08:35

## 2020-05-07 RX ADMIN — ASPIRIN 325 MG: 325 TABLET, COATED ORAL at 08:34

## 2020-05-07 RX ADMIN — METFORMIN HYDROCHLORIDE 1000 MG: 500 TABLET, EXTENDED RELEASE ORAL at 08:34

## 2020-05-07 RX ADMIN — BUPROPION HYDROCHLORIDE 450 MG: 150 TABLET, FILM COATED, EXTENDED RELEASE ORAL at 08:34

## 2020-05-07 RX ADMIN — ISOSORBIDE MONONITRATE 30 MG: 30 TABLET, EXTENDED RELEASE ORAL at 08:35

## 2020-05-07 RX ADMIN — GLIMEPIRIDE 4 MG: 2 TABLET ORAL at 16:49

## 2020-05-07 RX ADMIN — GLIMEPIRIDE 4 MG: 2 TABLET ORAL at 08:35

## 2020-05-07 ASSESSMENT — ENCOUNTER SYMPTOMS
BLOOD IN STOOL: 0
VOMITING: 0
CONSTIPATION: 0
STRIDOR: 0
COUGH: 0
WHEEZING: 0
NAUSEA: 0
ABDOMINAL PAIN: 0
DIARRHEA: 0
SHORTNESS OF BREATH: 1

## 2020-05-07 ASSESSMENT — PAIN SCALES - GENERAL
PAINLEVEL_OUTOF10: 0
PAINLEVEL_OUTOF10: 0

## 2020-05-07 NOTE — CARE COORDINATION
Case Management Initial Discharge Plan  Charlie Sethi,             Met with:patient to discuss discharge plans. Information verified: address, contacts, phone number, , insurance Yes  Emergency Contact/Next of Kin name & number:   PCP: BRAULIO Campbell CNP  Date of last visit: March    Insurance Provider: [employer]    Discharge Planning    Living Arrangements:  Children   Support Systems:  [id number] has 1 stories  4 stairs to climb to get into front door, 0 stairs to climb to reach second floor  Location of bedroom/bathroom in home main    Patient able to perform ADL's:Independent    Current Services (outpatient & in home) CPAP  DME equipment: CPAP, Rollator  DME provider: Jim Almanzar      Potential Assistance Needed:  Home Care    Patient agreeable to home care: No  [place] of choice provided:  n/a    Prior SNF/Rehab Placement and Facility: yes, [place]  Agreeable to SNF/Rehab: No  [place] of choice provided: n/a   Evaluation: no    Expected Discharge date:  20  Patient expects to be discharged to:  home   Follow Up Appointment: Best Day/ Time: Friday AM    Transportation provider: self vs daughter  Transportation arrangements needed for discharge: No, daughter, Yaneth Tavares can pick her up. Readmission Risk              Risk of Unplanned Readmission:        0             Does patient have a readmission risk score greater than 14?: No  If yes, follow-up appointment must be made within 7 days of discharge. Goals of Care: Ease of breathing. Patient O2 sats above 95%, she states she's been told in past she has COPD, however just feels winded when walking. RT will do O2 test today. Discharge Plan: Plan to DC home. She does not have pulmonologist, may need appt at [place].            Electronically signed by Demetra Younger RN, BSN on 20 at 9:04 AM EDT

## 2020-05-07 NOTE — CARE COORDINATION
Phone call to [employer] [facility] [facility], [facility] [place]. She states her CPAP was from 2009 and her last order for supplies was from 2013. They did not accept her insurance any longer, she is unsure if got new supplies somewhere else. SADAF called [facility] [place] and he states she had CPAP supplies order from there in 2017. CM met with patient and confirmed 2017 was last time she got supplies. CM notified staff. 1100: Patient states she made appt with Dr. Chaitanya Muñoz, pulmonary,  for follow up for CPAP on 5/19.

## 2020-05-07 NOTE — DISCHARGE SUMMARY
lower extremity, PE, CKD, and diabetes.       CT of chest without contrast reveals no focal airspace disease but there are several small noncalcified pulmonary nodules measuring up to approximately 6 mm    The patient's vitals and pulse ox remained within normal limits at rest and with activity. Patient encouraged to follow-up with pulmonary related to old CPAP settings, establish care and follow-up on lung nodules per CT.   She is agreeable and states understanding    Significant Diagnostic Studies:   Labs / Micro:  CBC:   Lab Results   Component Value Date    WBC 7.1 05/07/2020    RBC 3.99 05/07/2020    RBC 3.61 05/23/2012    HGB 11.8 05/07/2020    HCT 36.1 05/07/2020    MCV 90.5 05/07/2020    MCH 29.6 05/07/2020    MCHC 32.7 05/07/2020    RDW 14.5 05/07/2020     05/07/2020     05/23/2012     BMP:    Lab Results   Component Value Date    GLUCOSE 156 05/07/2020    GLUCOSE 84 05/23/2012     05/07/2020    K 3.9 05/07/2020     05/07/2020    CO2 27 05/07/2020    ANIONGAP 13 05/07/2020    BUN 18 05/07/2020    CREATININE 0.99 05/07/2020    BUNCRER NOT REPORTED 05/07/2020    CALCIUM 9.0 05/07/2020    LABGLOM 57 05/07/2020    GFRAA >60 05/07/2020    GFR      05/07/2020    GFR NOT REPORTED 05/07/2020     HFP:    Lab Results   Component Value Date    PROT 6.3 05/07/2020     FLP:    Lab Results   Component Value Date    CHOL 132 05/07/2020    CHOL 135 04/10/2020    TRIG 138 05/07/2020    HDL 36 05/07/2020     U/A:    Lab Results   Component Value Date    COLORU YELLOW 05/06/2020    TURBIDITY CLEAR 05/06/2020    SPECGRAV 1.025 05/06/2020    HGBUR NEGATIVE 05/06/2020    PHUR 6.0 05/06/2020    PROTEINU NEGATIVE 05/06/2020    GLUCOSEU NEGATIVE 05/06/2020    GLUCOSEU NEGATIVE 02/24/2012    KETUA NEGATIVE 05/06/2020    BILIRUBINUR NEGATIVE 05/06/2020    BILIRUBINUR NEGATIVE 02/24/2012    UROBILINOGEN Normal 05/06/2020    NITRU NEGATIVE 05/06/2020    LEUKOCYTESUR SMALL 05/06/2020     TSH:    Lab Results is in conjunction with any daily progress note from day of discharge. Discharge plan:     Disposition: Home    Physician Follow Up:     Viky Cardona, APRN - CNP   [address] [facility] [phone]      7 to 10-day hospital follow-up    Batsheva Mata MD  [address], [address]  [address] [facility]  [phone]      Patient made appt for CPAP follow up on 5/19       Requiring Further Evaluation/Follow Up POST HOSPITALIZATION/Incidental Findings: CT of chest without contrast reveals no focal airspace disease but there are several small noncalcified pulmonary nodules measuring up to approximately 6 mm     Diet: diabetic diet and low fat, low cholesterol diet    Activity: As tolerated. Encouraged to slowly increase activity/ambulation. Instructions to Patient: Follow-up with Dr. Gelacio Arguelles as we discussed. Increase activity as tolerated. Continue take medications as prescribed. Return to the ER with further concerns    Discharge Medications:      Medication List      CHANGE how you take these medications    albuterol sulfate  (90 Base) MCG/ACT inhaler  inhale 2 puffs by mouth INTO THE LUNGS every 4 hours if needed for [address], &/OR COUGH  What changed:  Another medication with the same name was removed. Continue taking this medication, and follow the directions you see here. fluticasone 50 MCG/ACT nasal spray  Commonly known as:  Flonase  1 spray by Nasal route daily  What changed:    · when to take this  · reasons to take this     warfarin 2 MG tablet  Commonly known as:  COUMADIN  Take as directed. If you are unsure how to take this medication, talk to your nurse or doctor.   Original instructions:  take 1 tablet by mouth ON MONDAY, WEDNESDAY AND FRIDAY,  THEN TAKE 1 AND 1/2 TABLETS ON ALL OTHER DAYS  What changed:    · how much to take  · how to take this  · when to take this  · additional instructions        CONTINUE taking these medications    Alcohol Prep 70 % Pads  Use twice daily and as needed to check blood sugars     allopurinol 100 MG tablet  Commonly known as:  ZYLOPRIM     atorvastatin 40 MG tablet  Commonly known as:  LIPITOR  take 1 tablet by mouth once daily     BD Lancet Ultrafine 30G Misc  Use to check sugars twice daily and as needed     * Blood Pressure Monitoring Kit  Use to check blood pressure daily and as needed     * Blood Pressure Kit  Use as directed. * buPROPion 300 MG extended release tablet  Commonly known as:  WELLBUTRIN XL     * buPROPion 150 MG extended release tablet  Commonly known as:  WELLBUTRIN XL     Cholecalciferol 50 MCG (2000 UT) Caps  Commonly known as:  RA Vitamin D-3  Take 1 capsule by mouth daily     febuxostat 40 MG Tabs tablet  Commonly known as:  ULORIC  take 1 tablet by mouth once daily     furosemide 20 MG tablet  Commonly known as:  LASIX  Take 1 tablet by mouth daily     glimepiride 4 MG tablet  Commonly known as:  AMARYL  take 1 tablet by mouth twice a day     hydrALAZINE 50 MG tablet  Commonly known as:  APRESOLINE  Take 1 tablet by mouth 2 times daily     isosorbide mononitrate 30 MG extended release tablet  Commonly known as:  IMDUR  take 1 tablet by mouth once daily     Januvia 100 MG tablet  Generic drug:  SITagliptin  take 1 tablet by mouth once daily     lisinopril 20 MG tablet  Commonly known as:  PRINIVIL;ZESTRIL  take 1 tablet by mouth once daily     metFORMIN 500 MG extended release tablet  Commonly known as:  GLUCOPHAGE-XR  Take 2 tablets by mouth 2 times daily     * Misc. Devices Misc  ORTHOFIX BONE STIMULATOR     * Misc.  Devices Misc  1 PAIR OF DIABETIC SHOES (1 LEFT/ 1 RIGHT)  1-3 PAIRS OF INSERTS (LEFT/ RIGHT)     omeprazole 20 MG delayed release capsule  Commonly known as:  PRILOSEC  take 1 capsule by mouth once daily     ONE TOUCH ULTRA TEST strip  Generic drug:  blood glucose test strips  TEST three times a day     RisperDAL 2 MG tablet  Generic drug:  risperiDONE         * This list has 6

## 2020-05-07 NOTE — H&P
[facility]    HISTORY AND PHYSICAL EXAMINATION            Date:   5/7/2020  Patient name:  Maddison Anders  Date of admission:  5/6/2020  4:53 PM  MRN:   [id number]  Account:  [de-identified]  YOB: 1960  PCP:    BRAULIO Tolbert CNP  Room:   326/326-01  Code Status:    Full Code    Chief Complaint:     Chief Complaint   Patient presents with    Shortness of Breath     x1 wk       History Obtained From:     patient    History of Present Illness:     Maddison Anders is a 56 y.o. Non-/non  female who presents with Shortness of Breath (x1 wk)   and is admitted to the hospital for the management of SOB (shortness of breath). Patient reported to the emergency room with complaints of fatigue and a one-week history of shortness of breath. She states she is mildly short of breath at rest and has some dyspnea with exertion. He denies fever, cough, chills, chest pain. Patient states since the COVID crisis began her daughter has basically quarantined her to her room. She states she does go to the kitchen occasionally or to the bathroom but otherwise she is always in her room. Her pertinent history includes NARCISO with CPAP but she states for the last week she has not worn her CPAP because she has not felt good. Other history includes obesity, hypertension, history of DVT in her lower extremity, PE, CKD, and diabetes. Vitals in the ER 98.1, 18, 117, 141/101 and 98% on room air. This x-ray shows no acute cardiopulmonary process. CT of chest without contrast reveals no focal airspace disease but there are several small noncalcified pulmonary nodules measuring up to approximately 6 mm and trace pericardial effusion. BUN/creatinine 18/1.12 m glucose 218, magnesium 1.3, myoglobin 63 troponin 16,15,14. Initial lactic 2.9 repeat 1.6. INR 2.4, d-dimer less than 0.19. UA reveals few bacteria small amount leukocytes.   Urine MCG/ACT inhaler inhale 2 puffs by mouth INTO THE LUNGS every 4 hours if needed for WHEEZING,SHORTNESS OF BREATH, &/OR COUGH 2/25/20  Yes BRAULIO Hong CNP   atorvastatin (LIPITOR) 40 MG tablet take 1 tablet by mouth once daily 1/31/20  Yes Jnena Garza MD   JANUVIA 100 MG tablet take 1 tablet by mouth once daily 1/31/20  Yes Jenna Garza MD   lisinopril (PRINIVIL;ZESTRIL) 20 MG tablet take 1 tablet by mouth once daily 1/31/20  Yes Jenna Garza MD   isosorbide mononitrate (IMDUR) 30 MG extended release tablet take 1 tablet by mouth once daily 1/31/20  Yes Jenna Garza MD   omeprazole (PRILOSEC) 20 MG delayed release capsule take 1 capsule by mouth once daily 1/31/20  Yes Jenna Garza MD   glimepiride (AMARYL) 4 MG tablet take 1 tablet by mouth twice a day 1/31/20  Yes Jenna Garza MD   febuxostat (ULORIC) 40 MG TABS tablet take 1 tablet by mouth once daily 1/27/20  Yes Naida Kalata DPCLAUDIA   Cholecalciferol (RA VITAMIN D-3) 50 MCG (2000 UT) CAPS Take 1 capsule by mouth daily 1/9/20 1/9/21 Yes BRAULIO Hong CNP   risperiDONE (RISPERDAL) 2 MG tablet Take 1 tablet by mouth nightly 3/26/19 5/6/20 Yes Historical Provider, MD   allopurinol (ZYLOPRIM) 100 MG tablet Take 100 mg by mouth daily    Yes Historical Provider, MD   fluticasone (FLONASE) 50 MCG/ACT nasal spray 1 spray by Nasal route daily  Patient taking differently: 1 spray by Nasal route daily as needed  11/18/16  Yes BRAULIO Johnson CNP   buPROPion (WELLBUTRIN XL) 300 MG XL tablet Take 300 mg by mouth every morning. Yes Historical Provider, MD   buPROPion (WELLBUTRIN XL) 150 MG XL tablet Take 150 mg by mouth every morning. Yes Historical Provider, MD   blood glucose test strips (ONE TOUCH ULTRA TEST) strip TEST three times a day 4/15/20 4/15/21  BRAULIO Vega CNP   Misc.  Devices MISC 1 PAIR OF DIABETIC SHOES (1 LEFT/ 1 RIGHT)  1-3 PAIRS OF INSERTS Hemoglobin 13.2 12.0 - 16.0 g/dL    Hematocrit 39.6 36 - 46 %    MCV 91.1 80 - 100 fL    MCH 30.4 26 - 34 pg    MCHC 33.3 31 - 37 g/dL    RDW 14.6 12.5 - 15.4 %    Platelets 547 854 - 261 k/uL    MPV 6.9 6.0 - 12.0 fL    NRBC Automated NOT REPORTED per 100 WBC    Differential Type NOT REPORTED     Seg Neutrophils 55 36 - 66 %    Lymphocytes 35 24 - 44 %    Monocytes 7 2 - 11 %    Eosinophils % 2 1 - 4 %    Basophils 1 0 - 2 %    Immature Granulocytes NOT REPORTED 0 %    Segs Absolute 4.40 1.8 - 7.7 k/uL    Absolute Lymph # 2.70 1.0 - 4.8 k/uL    Absolute Mono # 0.60 0.1 - 1.2 k/uL    Absolute Eos # 0.10 0.0 - 0.4 k/uL    Basophils Absolute 0.00 0.0 - 0.2 k/uL    Absolute Immature Granulocyte NOT REPORTED 0.00 - 0.30 k/uL    WBC Morphology NOT REPORTED     RBC Morphology NOT REPORTED     Platelet Estimate NOT REPORTED    Comprehensive Metabolic Panel w/ Reflex to MG    Collection Time: 05/06/20  5:44 PM   Result Value Ref Range    Glucose 218 (H) 70 - 99 mg/dL    BUN 18 8 - 23 mg/dL    CREATININE 1.12 (H) 0.50 - 0.90 mg/dL    Bun/Cre Ratio NOT REPORTED 9 - 20    Calcium 9.4 8.6 - 10.4 mg/dL    Sodium 140 135 - 144 mmol/L    Potassium 4.4 3.7 - 5.3 mmol/L    Chloride 101 98 - 107 mmol/L    CO2 24 20 - 31 mmol/L    Anion Gap 15 9 - 17 mmol/L    Alkaline Phosphatase 96 35 - 104 U/L    ALT 54 (H) 5 - 33 U/L    AST 38 (H) <32 U/L    Total Bilirubin 0.20 (L) 0.3 - 1.2 mg/dL    Total Protein 7.2 6.4 - 8.3 g/dL    Alb 4.3 3.5 - 5.2 g/dL    Albumin/Globulin Ratio 1.5 1.0 - 2.5    GFR Non- 49 (L) >60 mL/min    GFR African American 60 (L) >60 mL/min    GFR Comment          GFR Staging NOT REPORTED    Lipase    Collection Time: 05/06/20  5:44 PM   Result Value Ref Range    Lipase 51 13 - 60 U/L   Amylase    Collection Time: 05/06/20  5:44 PM   Result Value Ref Range    Amylase 64 28 - 100 U/L   Troponin    Collection Time: 05/06/20  5:44 PM   Result Value Ref Range    Troponin, High Sensitivity 16 (H) 0 - 14 ng/L Cholesterol 132 <200 mg/dL    HDL 36 (L) >40 mg/dL    LDL Cholesterol 68 0 - 130 mg/dL    Chol/HDL Ratio 3.7 <5    Triglycerides 138 <150 mg/dL    VLDL NOT REPORTED 1 - 30 mg/dL   CBC    Collection Time: 05/07/20  7:14 AM   Result Value Ref Range    WBC 7.1 3.5 - 11.0 k/uL    RBC 3.99 (L) 4.0 - 5.2 m/uL    Hemoglobin 11.8 (L) 12.0 - 16.0 g/dL    Hematocrit 36.1 36 - 46 %    MCV 90.5 80 - 100 fL    MCH 29.6 26 - 34 pg    MCHC 32.7 31 - 37 g/dL    RDW 14.5 12.5 - 15.4 %    Platelets 717 023 - 445 k/uL    MPV 6.5 6.0 - 12.0 fL    NRBC Automated NOT REPORTED per 100 WBC   PROTIME-INR    Collection Time: 05/07/20  7:14 AM   Result Value Ref Range    Protime 24.5 (H) 9.4 - 12.6 sec    INR 2.4        Imaging/Diagnostics:    Ct Chest Wo Contrast    Result Date: 5/6/2020  No focal airspace disease. Several small noncalcified pulmonary nodules are seen measuring up to approximately 6 mm. Follow-up recommendations are as below. Trace pericardial effusion. RECOMMENDATIONS: Fleischner Society guidelines for follow-up and management of incidentally detected pulmonary nodules: Single Solid Nodule: Nodule size less than 6 mm In a low-risk patient, no routine follow-up. In a high-risk patient, optional CT at 12 months. Nodule size equals 6-8 mm In a low-risk patient, CT at 6-12 months, then consider CT at 18-24 months. In a high-risk patient, CT at 6-12 months, then CT at 18-24 months. Nodule size greater than 8 mm In a low-risk patient, consider CT at 3 months, PET/CT, or tissue sampling. In a high-risk patient, consider CT at 3 months, PET/CT, or tissue sampling. Multiple Solid Nodules: Nodule size less than 6 mm In a low-risk patient, no routine follow-up. In a high-risk patient, optional CT at 12 months. Nodule size equals 6-8 mm In a low-risk patient, CT at 3-6 months, then consider CT at 18-24 months. In a high-risk patient, CT at 3-6 months, then CT at 18-24 months.  Nodule size greater than 8 mm In a low-risk patient, CT at 3-6 months, then consider CT at 18-24 months. In a high-risk patient, CT at 3-6 months, then CT at 18-24 months. - Low risk patients include individuals with minimal or absent history of smoking and other known risk factors. - High risk patients include individuals with a history or smoking or known risk factors. Radiology 2017 http://pubs. rsna.org/doi/full/10.1148/radiol. 8580558220     Xr Chest Portable    Result Date: 5/6/2020  No acute cardiopulmonary process. Assessment :      Hospital Problems           Last Modified POA    * (Principal) SOB (shortness of breath) 5/7/2020 Yes    Hypertension (Chronic) 5/7/2020 Yes    Hyperlipidemia (Chronic) 5/7/2020 Yes    Obesity (Chronic) 5/7/2020 Yes    CKD (chronic kidney disease) stage 3, GFR 30-59 ml/min (HCC) (Chronic) 5/7/2020 Yes    Controlled type 2 diabetes mellitus with stage 3 chronic kidney disease, without long-term current use of insulin (HCC) (Chronic) 5/7/2020 Yes    History of DVT of lower extremity (Chronic) 5/7/2020 Yes    NARCISO on CPAP (Chronic) 5/7/2020 Yes    Multiple lung nodules on CT 5/7/2020 Yes    Hypomagnesemia 5/7/2020 Yes    Pulmonary embolism ([address] (Chronic) 5/7/2020 Yes          Plan:     Patient status observation in the Med/Surge    Shortness of breath possibly related to deconditioning/sedentary lifestyle. Patient needs to maintain CPAP use. Patient has made follow-up with Dr. Efren Cheung from pulmonary to establish with his service after discharge. Oxygen as needed. Supply hospital CPAP at night. Resume home Zyloprim, aspirin, Lipitor, Wellbutrin, Lasix, hydralazine, Imdur, lisinopril, Protonix, Risperdal, and vitamin D    Diabetes; resume home Januvia, Amaryl, Glucophage and add insulin sliding scale coverage. Carb controlled diet    History of PE/DVT managed with Coumadin. Consult pharmacy to dose    Monitor and replace electrolytes as needed    Plan for possible home later today.       Consultations:   PHARMACY TO DOSE

## 2020-05-07 NOTE — PLAN OF CARE
Problem: Falls - Risk of:  Siderails up x 2  Hourly rounding  Call light in reach  Instructed to call for assist before attempting out of bed. Remains free from falls and accidental injury at this time   Floor free from obstacles  Bed is locked and in lowest position  Adequate lighting provided        Goal: Will remain free from falls  Description: Will remain free from falls  5/6/2020 2314 by Karen Llamas RN  Outcome: Ongoing  5/6/2020 2314 by Karen Llamas RN  Outcome: Ongoing  Goal: Absence of physical injury  Description: Absence of physical injury  5/6/2020 2314 by Karen Llamas RN  Outcome: Ongoing  5/6/2020 2314 by Karen Llamas RN  Outcome: Ongoing     Problem: Pain:  Pain level assessment complete.    Patient educated on pain scale and control interventions  PRN pain medication given per patient request  Patient instructed to call out with new onset of pain or unrelieved pain    Goal: Pain level will decrease  Description: Pain level will decrease  Outcome: Ongoing  Goal: Control of acute pain  Description: Control of acute pain  Outcome: Ongoing  Goal: Control of chronic pain  Description: Control of chronic pain  Outcome: Ongoing

## 2020-05-07 NOTE — ED NOTES
Report to [facility] RN and patient to floor with RN in [facility]  05/06/20 [id number]

## 2020-05-07 NOTE — ED PROVIDER NOTES
History of DVT of lower extremity      hydrALAZINE (APRESOLINE) 50 MG tablet Take 1 tablet by mouth 2 times daily  Qty: 180 tablet, Refills: 1      furosemide (LASIX) 20 MG tablet Take 1 tablet by mouth daily  Qty: 30 tablet, Refills: 5    Associated Diagnoses: Leg swelling      metFORMIN (GLUCOPHAGE-XR) 500 MG extended release tablet Take 2 tablets by mouth 2 times daily  Qty: 120 tablet, Refills: 5    Associated Diagnoses: Controlled type 2 diabetes mellitus with stage 3 chronic kidney disease, without long-term current use of insulin ([facility])      albuterol sulfate  (90 Base) MCG/ACT inhaler inhale 2 puffs by mouth INTO THE LUNGS every 4 hours if needed for WHEEZING,SHORTNESS OF BREATH, &/OR COUGH  Qty: 18 g, Refills: 3    Associated Diagnoses: Bronchitis      atorvastatin (LIPITOR) 40 MG tablet take 1 tablet by mouth once daily  Qty: 30 tablet, Refills: 5    Associated Diagnoses: Hyperlipidemia, unspecified hyperlipidemia type      JANUVIA 100 MG tablet take 1 tablet by mouth once daily  Qty: 30 tablet, Refills: 5    Associated Diagnoses: Controlled type 2 diabetes mellitus with stage 3 chronic kidney disease, without long-term current use of insulin ([facility])      lisinopril (PRINIVIL;ZESTRIL) 20 MG tablet take 1 tablet by mouth once daily  Qty: 30 tablet, Refills: 5      isosorbide mononitrate (IMDUR) 30 MG extended release tablet take 1 tablet by mouth once daily  Qty: 30 tablet, Refills: 5      omeprazole (PRILOSEC) 20 MG delayed release capsule take 1 capsule by mouth once daily  Qty: 30 capsule, Refills: 5    Associated Diagnoses: Gastroesophageal reflux disease, esophagitis presence not specified      glimepiride (AMARYL) 4 MG tablet take 1 tablet by mouth twice a day  Qty: 60 tablet, Refills: 5    Associated Diagnoses: Controlled type 2 diabetes mellitus with stage 3 chronic kidney disease, without long-term current use of insulin ([facility])      febuxostat (ULORIC) 40 MG TABS tablet take 1 tablet by mouth Medical/Surgical History: asthma, COPD, former smoker, HTn DM2. No surg to chest FINDINGS: Mediastinum: Calcification of the thoracic aorta. Trace pericardial effusion. Within the mediastinum, no pathologic lymphadenopathy by size criteria. No axillary adenopathy. Lungs/pleura: Several punctate pulmonary nodules are seen bilaterally, measuring up to 6 mm for example within the left lower lobe. No confluent airspace disease. No pneumothorax or pleural effusion. Upper Abdomen: Liver is fatty. Fatty sparing about the gallbladder fossa. Soft Tissues/Bones: Degenerative changes of the thoracolumbar spine. No focal airspace disease. Several small noncalcified pulmonary nodules are seen measuring up to approximately 6 mm. Follow-up recommendations are as below. Trace pericardial effusion. RECOMMENDATIONS: Fleischner Society guidelines for follow-up and management of incidentally detected pulmonary nodules: Single Solid Nodule: Nodule size less than 6 mm In a low-risk patient, no routine follow-up. In a high-risk patient, optional CT at 12 months. Nodule size equals 6-8 mm In a low-risk patient, CT at 6-12 months, then consider CT at 18-24 months. In a high-risk patient, CT at 6-12 months, then CT at 18-24 months. Nodule size greater than 8 mm In a low-risk patient, consider CT at 3 months, PET/CT, or tissue sampling. In a high-risk patient, consider CT at 3 months, PET/CT, or tissue sampling. Multiple Solid Nodules: Nodule size less than 6 mm In a low-risk patient, no routine follow-up. In a high-risk patient, optional CT at 12 months. Nodule size equals 6-8 mm In a low-risk patient, CT at 3-6 months, then consider CT at 18-24 months. In a high-risk patient, CT at 3-6 months, then CT at 18-24 months. Nodule size greater than 8 mm In a low-risk patient, CT at 3-6 months, then consider CT at 18-24 months. In a high-risk patient, CT at 3-6 months, then CT at 18-24 months.  - Low risk patients include individuals Urine SMALL (A) NEGATIVE    Urinalysis Comments Microscopic performed on uncentrifuged urine -     Lactic Acid, Plasma   Result Value Ref Range    Lactic Acid 2.9 (H) 0.5 - 2.2 mmol/L    Lactic Acid, Whole Blood NOT REPORTED 0.7 - 2.1 mmol/L   POC PANEL (G3)   Result Value Ref Range    pH 7.36     PCO2 49 mm Hg    PO2 61 mm Hg    POC TCO2 29 mmol/L    HCO3 27.7 24 - 32 mmol/L    Negative Base Excess NOT REPORTED 0.0 - 2.0    Positive Base Excess 2 0.0 - 2.0    O2 Sat 90 %    Pt Temp NOT REPORTED     O2 Device/Flow/% NOT REPORTED     FIO2 NOT REPORTED     POC pH Temp NOT REPORTED     POC pCO2 Temp NOT REPORTED mm Hg    POC pO2 Temp NOT REPORTED mm Hg   Microscopic Urinalysis   Result Value Ref Range    -          WBC, UA 2 TO 5 0 - 5 /HPF    RBC, UA 0 TO 2 0 - 2 /HPF    Casts UA NOT REPORTED /LPF    Crystals, UA NOT REPORTED None /HPF    Epithelial Cells UA 2 TO 5 0 - 5 /HPF    Renal Epithelial, UA NOT REPORTED 0 /HPF    Bacteria, UA FEW (A) None    Mucus, UA NOT REPORTED None    Trichomonas, UA NOT REPORTED None    Amorphous, UA NOT REPORTED None    Other Observations UA (A) NOT REQ. Utilizing a urinalysis as the only screening method to exclude a potential uropathogen can be unreliable in many patient populations. Rapid screening tests are less sensitive than culture and if UTI is a clinical possibility, culture should be considered despite a negative urinalysis. Yeast, UA NOT REPORTED None   TROP/MYOGLOBIN   Result Value Ref Range    Troponin, High Sensitivity 15 (H) 0 - 14 ng/L    Troponin T NOT REPORTED <0.03 ng/mL    Troponin Interp NOT REPORTED     Myoglobin 63 (H) 25 - 58 ng/mL   COVID-19   Result Value Ref Range    SARS-CoV-2          SARS-CoV-2, Rapid Not Detected Not Detected    Source . NASOPHARYNGEAL SWAB     SARS-CoV-2, PCR         FERRITIN   Result Value Ref Range    Ferritin 34 13 - 150 ug/L   Lactate Dehydrogenase   Result Value Ref Range     (H) 135 - 214 U/L   Troponin   Result Value in dextrose 5% 100 mL IVPB    OR Linked Order Group     acetaminophen (TYLENOL) tablet 650 mg     acetaminophen (TYLENOL) suppository 650 mg    magnesium hydroxide (MILK OF MAGNESIA) 400 MG/5ML suspension 30 mL    OR Linked Order Group     promethazine (PHENERGAN) tablet 12.5 mg     ondansetron (ZOFRAN) injection 4 mg    DISCONTD: atorvastatin (LIPITOR) tablet 40 mg    glucose (GLUTOSE) 40 % oral gel 15 g    dextrose 50 % IV solution    glucagon (rDNA) injection 1 mg    dextrose 5 % solution    aspirin EC tablet 325 mg    DISCONTD: enoxaparin (LOVENOX) injection 40 mg    nitroGLYCERIN (NITROSTAT) SL tablet 0.4 mg    insulin lispro (HUMALOG) injection vial 0-6 Units    insulin lispro (HUMALOG) injection vial 0-3 Units    buPROPion (WELLBUTRIN XL) extended release tablet 450 mg    warfarin (COUMADIN) daily dosing (placeholder)    warfarin (COUMADIN) tablet 2 mg    OR Linked Order Group     oxyCODONE-acetaminophen (PERCOCET) 5-325 MG per tablet 1 tablet     oxyCODONE-acetaminophen (PERCOCET) 5-325 MG per tablet 2 tablet           MEDICAL DECISION MAKING:     Patient presents with dyspnea that seems to be worse with exertion. Occurred over the past few days. Does have risk factors for coronary artery disease. No significant cough. Denies fevers. No chest discomfort. Work-up shows no obvious significant findings other than slightly elevated troponin that after second set is essentially unchanged. Slightly elevated LDH. Chest imaging shows no obvious acute findings or source. Unsure if this is coronavirus versus cardiac or other pulmonary process in nature. D-dimer was negative. Low suspicion for PE. I did speak with the hospitalist who accepted admission of the patient. Patient updated and agreeable/comfortable with the plan. CONSULTS:    None    CRITICAL CARE:     None    PROCEDURES:    None    FINAL IMPRESSION      1.  Urinary tract infection without hematuria, site unspecified

## 2020-05-07 NOTE — ED NOTES
Mervat Nichols calls back and speaks with Dr. Deneice Schirmer regarding pt     Case Campbell RN  05/06/20 6421

## 2020-05-08 ENCOUNTER — CARE COORDINATION (OUTPATIENT)
Dept: CARE COORDINATION | Age: 60
End: 2020-05-08

## 2020-05-08 NOTE — CARE COORDINATION
professional if you have concerns about COVID-19 and your underlying condition or if you are sick. For more information on steps you can take to protect yourself, see [facility]'s How to [id number] [facility] for follow-up call in 5-7 days based on severity of symptoms and risk factors.

## 2020-05-11 ENCOUNTER — TELEPHONE (OUTPATIENT)
Dept: FAMILY MEDICINE CLINIC | Age: 60
End: 2020-05-11

## 2020-05-11 NOTE — TELEPHONE ENCOUNTER
Notified Patient  recommendations. Patient voiced understanding,denying any further questions or concerns at this time.

## 2020-05-11 NOTE — TELEPHONE ENCOUNTER
Per last INR encounter 5/01/20 provider wanted patient back for recheck in 1 month. Patient has F/U scheduled on 6/8/20. Would provider be ok with waiting a week or should patient schedule nurse visit prior? Please advise and call patient back with recommendations.

## 2020-05-14 ENCOUNTER — OFFICE VISIT (OUTPATIENT)
Dept: FAMILY MEDICINE CLINIC | Age: 60
End: 2020-05-14
Payer: MEDICARE

## 2020-05-14 ENCOUNTER — CARE COORDINATION (OUTPATIENT)
Dept: CARE COORDINATION | Age: 60
End: 2020-05-14

## 2020-05-14 VITALS
OXYGEN SATURATION: 98 % | DIASTOLIC BLOOD PRESSURE: 72 MMHG | WEIGHT: 262.8 LBS | HEART RATE: 89 BPM | SYSTOLIC BLOOD PRESSURE: 112 MMHG | TEMPERATURE: 95.2 F | RESPIRATION RATE: 22 BRPM | BODY MASS INDEX: 48.07 KG/M2

## 2020-05-14 PROBLEM — S82.899A FRACTURE OF ANKLE: Status: ACTIVE | Noted: 2020-05-14

## 2020-05-14 PROCEDURE — 99215 OFFICE O/P EST HI 40 MIN: CPT | Performed by: NURSE PRACTITIONER

## 2020-05-14 PROCEDURE — 1111F DSCHRG MED/CURRENT MED MERGE: CPT | Performed by: NURSE PRACTITIONER

## 2020-05-14 RX ORDER — IPRATROPIUM BROMIDE AND ALBUTEROL SULFATE 2.5; .5 MG/3ML; MG/3ML
3 SOLUTION RESPIRATORY (INHALATION) EVERY 6 HOURS PRN
Qty: 360 ML | Refills: 0 | Status: SHIPPED | OUTPATIENT
Start: 2020-05-14 | End: 2021-01-06 | Stop reason: SDUPTHER

## 2020-05-14 RX ORDER — OXYCODONE HYDROCHLORIDE AND ACETAMINOPHEN 5; 325 MG/1; MG/1
1 TABLET ORAL PRN
COMMUNITY
Start: 2019-09-23 | End: 2020-05-22

## 2020-05-14 RX ORDER — IPRATROPIUM BROMIDE AND ALBUTEROL SULFATE 2.5; .5 MG/3ML; MG/3ML
1 SOLUTION RESPIRATORY (INHALATION)
COMMUNITY
Start: 2020-05-06 | End: 2020-05-14 | Stop reason: SDUPTHER

## 2020-05-14 ASSESSMENT — ENCOUNTER SYMPTOMS
COUGH: 1
SHORTNESS OF BREATH: 1
GASTROINTESTINAL NEGATIVE: 1
CHEST TIGHTNESS: 0
WHEEZING: 0

## 2020-05-14 NOTE — CARE COORDINATION
Left message requesting return call re:subsequent ER/Covid F/U Call.      Per chart review- she saw PCP this am.

## 2020-05-14 NOTE — PROGRESS NOTES
Post-Discharge Transitional Care Management Services or Hospital Follow Up      Jimmy Heimlich   YOB: 1960    Date of Office Visit:  5/14/2020  Date of Hospital Admission: 5/6/20  Date of Hospital Discharge: 5/7/20  Readmission Risk Score(high >=14%.  Medium >=10%):No data recorded    Care management risk score Rising risk (score 2-5) and Complex Care (Scores >=6): 6     Non face to face  following discharge, date last encounter closed (first attempt may have been earlier): *No documented post hospital discharge outreach found in the last 14 days *No documented post hospital discharge outreach found in the last 14 days    Call initiated 2 business days of discharge: *No response recorded in the last 14 days     Patient Active Problem List   Diagnosis    Hypertension    Hyperlipidemia    Osteoarthritis    Depression    Obesity    CKD (chronic kidney disease) stage 3, GFR 30-59 ml/min ([facility])    Proteinuria    Controlled type 2 diabetes mellitus with stage 3 chronic kidney disease, without long-term current use of insulin ([address]    History of DVT of lower extremity    NARCISO on CPAP    Vitamin D deficiency    Major depressive disorder, recurrent episode, severe, with psychotic behavior ([address]    Major depressive disorder, recurrent, in partial remission ([address]    Major depressive disorder, recurrent, severe with psychotic symptoms ([address]    Multiple lung nodules on CT    Hypomagnesemia    Pulmonary embolism ([address]    Urinary tract infection without hematuria    SOB (shortness of breath)    Fracture of ankle       No Known Allergies    Medications listed as ordered at the time of discharge from hospital   [facility]   [place] Medication Instructions WEN:    Printed on:05/14/20 0909   Medication Information                      albuterol sulfate  (90 Base) MCG/ACT inhaler  inhale 2 puffs by mouth INTO THE LUNGS every 4 hours if needed for [address], &/OR COUGH Alcohol Swabs (ALCOHOL PREP) 70 % PADS  Use twice daily and as needed to check blood sugars             allopurinol (ZYLOPRIM) 100 MG tablet  Take 100 mg by mouth daily              atorvastatin (LIPITOR) 40 MG tablet  take 1 tablet by mouth once daily             blood glucose test strips (ONE TOUCH ULTRA TEST) strip  TEST three times a day             Blood Pressure KIT  Use as directed. Blood Pressure Monitoring KIT  Use to check blood pressure daily and as needed             buPROPion (WELLBUTRIN XL) 150 MG XL tablet  Take 150 mg by mouth every morning. buPROPion (WELLBUTRIN XL) 300 MG XL tablet  Take 300 mg by mouth every morning. Cholecalciferol (RA VITAMIN D-3) 50 MCG (2000 UT) CAPS  Take 1 capsule by mouth daily             febuxostat (ULORIC) 40 MG TABS tablet  take 1 tablet by mouth once daily             fluticasone (FLONASE) 50 MCG/ACT nasal spray  1 spray by Nasal route daily             furosemide (LASIX) 20 MG tablet  Take 1 tablet by mouth daily             glimepiride (AMARYL) 4 MG tablet  take 1 tablet by mouth twice a day             hydrALAZINE (APRESOLINE) 50 MG tablet  Take 1 tablet by mouth 2 times daily             ipratropium-albuterol (DUONEB) 0.5-2.5 (3) MG/3ML SOLN nebulizer solution  Inhale 3 mLs into the lungs every 6 hours as needed for Shortness of Breath             isosorbide mononitrate (IMDUR) 30 MG extended release tablet  take 1 tablet by mouth once daily             JANUVIA 100 MG tablet  take 1 tablet by mouth once daily             Lancets (BD LANCET ULTRAFINE 30G) MISC  Use to check sugars twice daily and as needed             lisinopril (PRINIVIL;ZESTRIL) 20 MG tablet  take 1 tablet by mouth once daily             metFORMIN (GLUCOPHAGE-XR) 500 MG extended release tablet  Take 2 tablets by mouth 2 times daily             Misc.  Devices MISC  1 PAIR OF DIABETIC SHOES (1 LEFT/ 1 RIGHT)  1-3 PAIRS OF INSERTS (LEFT/ RIGHT) omeprazole (PRILOSEC) 20 MG delayed release capsule  take 1 capsule by mouth once daily             oxyCODONE-acetaminophen (PERCOCET) 5-325 MG per tablet  Take 1 tablet by mouth as needed. risperiDONE (RISPERDAL) 2 MG tablet  Take 1 tablet by mouth nightly             warfarin (COUMADIN) 2 MG tablet  take 1 tablet by mouth ON MONDAY, WEDNESDAY AND FRIDAY,  THEN TAKE 1 AND 1/2 TABLETS ON ALL OTHER DAYS                   Medications marked \"taking\" at this time  Outpatient Medications Marked as Taking for the 5/14/20 encounter (Office Visit) with BRAULIO Sanon CNP   Medication Sig Dispense Refill    oxyCODONE-acetaminophen (PERCOCET) 5-325 MG per tablet Take 1 tablet by mouth as needed.  ipratropium-albuterol (DUONEB) 0.5-2.5 (3) MG/3ML SOLN nebulizer solution Inhale 3 mLs into the lungs every 6 hours as needed for Shortness of Breath 360 mL 0    blood glucose test strips (ONE TOUCH ULTRA TEST) strip TEST three times a day 100 strip 5    warfarin (COUMADIN) 2 MG tablet take 1 tablet by mouth ON MONDAY, WEDNESDAY AND FRIDAY,  THEN TAKE 1 AND 1/2 TABLETS ON ALL OTHER DAYS (Patient taking differently: Take 2 mg by mouth daily ) 45 tablet 2    hydrALAZINE (APRESOLINE) 50 MG tablet Take 1 tablet by mouth 2 times daily 180 tablet 1    Misc.  Devices MISC 1 PAIR OF DIABETIC SHOES (1 LEFT/ 1 RIGHT)  1-3 PAIRS OF INSERTS (LEFT/ RIGHT) 2 each 0    furosemide (LASIX) 20 MG tablet Take 1 tablet by mouth daily 30 tablet 5    metFORMIN (GLUCOPHAGE-XR) 500 MG extended release tablet Take 2 tablets by mouth 2 times daily 120 tablet 5    albuterol sulfate  (90 Base) MCG/ACT inhaler inhale 2 puffs by mouth INTO THE LUNGS every 4 hours if needed for WHEEZING,SHORTNESS OF BREATH, &/OR COUGH 18 g 3    atorvastatin (LIPITOR) 40 MG tablet take 1 tablet by mouth once daily 30 tablet 5    JANUVIA 100 MG tablet take 1 tablet by mouth once daily 30 tablet 5    lisinopril (PRINIVIL;ZESTRIL) 20 MG daily basis work. Patient's initial diagnosis was shortness of breath. She was not tested for COVID-19. Patient has been using her albuterol daily. We will order patient a new nebulizer machine as well as DuoNeb's to use in place of her albuterol inhaler. Patient is checking her blood sugars at least 3 times a day. She states they average between 120 and 180 is the highest.  Patient does admit to having to take her Percocet at least once a day. Patient continues to follow with her nephrologist every 6 months and renal insufficiency. Patient already has a pulmonary follow-up appointment with Dr. Magalie Casey on [phone] for the multiple lung nodules found per CT at admission. Inpatient course: Discharge summary reviewed- see chart. Interval history/Current status: stable    Review of Systems   Constitutional: Positive for fatigue. Negative for activity change, appetite change, chills and fever. HENT: Negative. Respiratory: Positive for cough and shortness of breath (w/exertion). Negative for chest tightness and wheezing (at times). Cardiovascular: Negative for chest pain, palpitations and leg swelling. Gastrointestinal: Negative. Genitourinary: Negative. Musculoskeletal:        Chronic pain     Skin: Negative. Neurological: Positive for weakness. Negative for dizziness, syncope, light-headedness, numbness and headaches. Psychiatric/Behavioral: Negative for agitation, decreased concentration and sleep disturbance. The patient is not nervous/anxious. CPAP HS, no O2         Vitals:    05/14/20 0837   BP: 112/72   Site: Right Upper Arm   Position: Sitting   Cuff Size: Large Adult   Pulse: 89   Resp: 22   Temp: 95.2 °F (35.1 °C)   TempSrc: Tympanic   SpO2: 98%   Weight: 262 lb 12.8 oz (119.2 kg)     Body mass index is 48.07 kg/m².    Wt Readings from Last 3 Encounters:   05/14/20 262 lb 12.8 oz (119.2 kg)   05/07/20 260 lb 9.3 oz (118.2 kg)   04/13/20 260 lb (117.9 kg)     BP Readings from

## 2020-05-18 ENCOUNTER — OFFICE VISIT (OUTPATIENT)
Dept: PODIATRY | Age: 60
End: 2020-05-18
Payer: MEDICARE

## 2020-05-18 VITALS — HEIGHT: 62 IN | WEIGHT: 262 LBS | BODY MASS INDEX: 48.21 KG/M2 | TEMPERATURE: 97.3 F

## 2020-05-18 PROCEDURE — G8417 CALC BMI ABV UP PARAM F/U: HCPCS | Performed by: PODIATRIST

## 2020-05-18 PROCEDURE — 3051F HG A1C>EQUAL 7.0%<8.0%: CPT | Performed by: PODIATRIST

## 2020-05-18 PROCEDURE — 99213 OFFICE O/P EST LOW 20 MIN: CPT | Performed by: PODIATRIST

## 2020-05-18 PROCEDURE — 3017F COLORECTAL CA SCREEN DOC REV: CPT | Performed by: PODIATRIST

## 2020-05-18 PROCEDURE — 1036F TOBACCO NON-USER: CPT | Performed by: PODIATRIST

## 2020-05-18 PROCEDURE — 11721 DEBRIDE NAIL 6 OR MORE: CPT | Performed by: PODIATRIST

## 2020-05-18 PROCEDURE — 2022F DILAT RTA XM EVC RTNOPTHY: CPT | Performed by: PODIATRIST

## 2020-05-18 PROCEDURE — G8427 DOCREV CUR MEDS BY ELIG CLIN: HCPCS | Performed by: PODIATRIST

## 2020-05-18 NOTE — PROGRESS NOTES
[address] [facility] [address] [id number] [address]  Dept: [phone]    DIABETIC PROGRESS NOTE  Date of patient's visit: 5/18/2020  Patient's Name:  Jaylin Kelly YOB: 1960            Patient Care Team:  BRAULIO Cho CNP as PCP - General (Nurse Practitioner Family)  BRAULIO Cho CNP as PCP - [facility] Empaneled Provider  Malorie Almendarez RN as           Chief Complaint   Patient presents with    Diabetes    Peripheral Neuropathy    Nail Problem       Subjective:   Jaylin Kelly comes to clinic for Diabetes; Peripheral Neuropathy; and Nail Problem    she is a diabetic and states that she has some pain to her heel. Pt currently has complaint of thickened, elongated nails that they cannot manage by themselves. Pt's primary care physician is BRAULIO Cho CNP last seen may 14 2020   Pt's last blood sugar was 131 . Pt has a new complaint of cracking of the skin to the back of the heel. Pt has tried changing shoes but it has not helped the pain  Patient is taking over the counter medications with no relief. Lab Results   Component Value Date    LABA1C 7.4 (H) 05/06/2020      Complains of numbness in the feet bilat. Past Medical History:   Diagnosis Date    Anxiety     Chronic kidney disease     COPD (chronic obstructive pulmonary disease) ([address]     Depression     Hyperlipidemia     Hypertension     Obesity     Osteoarthritis     Proteinuria     Pulmonary embolism (HCC)     Sleep apnea     c-pap.  Doesn't use everynight    Type 2 diabetes mellitus without complication (HCC)     Type II or unspecified type diabetes mellitus without mention of complication, not stated as uncontrolled     Von Willebrand disease ([address]        No Known Allergies  Current Outpatient Medications on File Prior to Visit   Medication Sig Dispense Refill    oxyCODONE-acetaminophen (PERCOCET) 5-325 MG per tablet Take 1 tablet heel  Edema: right- 2+ pitting edema, left- 2+ pitting edema    Sensory exam:  Monofilament sensation: abnormal - 6/10 via SW 5.07/10g monofilament to the plantar foot bilateral feet    Pulses: abnormal - 1/4 dorsalis pedis pulse and 1/4 Posterior tibial pulse,   Pinprick: Impaired  Proprioception: Impaired  Vibration (128 Hz): Impaired       DM with PVD       [x]Yes    []No      Assessment:  56 y.o. female with:   Diagnosis Orders   1. Type II diabetes mellitus with peripheral circulatory disorder ([facility])  31196 - IN DEBRIDEMENT OF NAILS, 6 OR MORE    HM DIABETES FOOT EXAM   2. Dermatophytosis of nail  53983 - IN DEBRIDEMENT OF NAILS, 6 OR MORE    HM DIABETES FOOT EXAM   3. PVD (peripheral vascular disease) ([facility])  75779 - IN DEBRIDEMENT OF NAILS, 6 OR MORE    HM DIABETES FOOT EXAM   4. Pain in both feet  87207 - IN DEBRIDEMENT OF NAILS, 6 OR MORE    HM DIABETES FOOT EXAM   5. Fissure in skin of foot  HM DIABETES FOOT EXAM           Q7   []Yes    []No                Q8   [x]Yes    []No                     Q9   []Yes    []No    Plan:   Pt was evaluated and examined. Patient was given personalized discharge instructions. For patients fissure of skin to both heels pt to apply OTC foot cream, vasoline or Aquaphor to the area to be applied daily. Pt to use a pummuce stone to the plantar surface of the feet weekly    Pt to stop being barefooted or just in socks around the house and is to wear shoes. Nails 1-10 were debrided sharply in length and thickness with a nipper and , without incident. Pt will follow up in 9 weeks or sooner if any problems arise. Diagnosis was discussed with the pt and all of their questions were answered in detail. Proper foot hygiene and care was discussed with the pt. Informed patient on proper diabetic foot care and importance of tight glycemic control. Patient to check feet daily and contact the office with any questions/problems/concerns.    Other comorbidity noted and will be

## 2020-05-19 ENCOUNTER — CARE COORDINATION (OUTPATIENT)
Dept: CARE COORDINATION | Age: 60
End: 2020-05-19

## 2020-05-22 RX ORDER — OXYCODONE HYDROCHLORIDE AND ACETAMINOPHEN 5; 325 MG/1; MG/1
1 TABLET ORAL EVERY 8 HOURS PRN
Qty: 40 TABLET | Refills: 0 | Status: SHIPPED | OUTPATIENT
Start: 2020-05-22 | End: 2020-06-23

## 2020-05-22 NOTE — TELEPHONE ENCOUNTER
Patient Active Problem List:     Hypertension     Hyperlipidemia     Osteoarthritis     Depression     Obesity     CKD (chronic kidney disease) stage 3, GFR 30-59 ml/min (HCC)     Proteinuria     Controlled type 2 diabetes mellitus with stage 3 chronic kidney disease, without long-term current use of insulin (HCC)     History of DVT of lower extremity     NARCISO on CPAP     Vitamin D deficiency     Major depressive disorder, recurrent episode, severe, with psychotic behavior ([address]     Major depressive disorder, recurrent, in partial remission ([address]     Major depressive disorder, recurrent, severe with psychotic symptoms ([address]     Multiple lung nodules on CT     Hypomagnesemia     Pulmonary embolism (HCC)     Urinary tract infection without hematuria     SOB (shortness of breath)     Fracture of ankle

## 2020-05-28 RX ORDER — ALBUTEROL SULFATE 90 UG/1
2 AEROSOL, METERED RESPIRATORY (INHALATION) EVERY 4 HOURS PRN
Qty: 18 G | Refills: 1 | Status: SHIPPED | OUTPATIENT
Start: 2020-05-28 | End: 2020-06-18

## 2020-06-08 ENCOUNTER — ANTI-COAG VISIT (OUTPATIENT)
Dept: FAMILY MEDICINE CLINIC | Age: 60
End: 2020-06-08

## 2020-06-08 ENCOUNTER — OFFICE VISIT (OUTPATIENT)
Dept: FAMILY MEDICINE CLINIC | Age: 60
End: 2020-06-08
Payer: MEDICARE

## 2020-06-08 VITALS
RESPIRATION RATE: 22 BRPM | HEART RATE: 84 BPM | WEIGHT: 260 LBS | OXYGEN SATURATION: 97 % | BODY MASS INDEX: 47.55 KG/M2 | TEMPERATURE: 95.6 F | SYSTOLIC BLOOD PRESSURE: 118 MMHG | DIASTOLIC BLOOD PRESSURE: 76 MMHG

## 2020-06-08 DIAGNOSIS — Z86.718 HISTORY OF DVT OF LOWER EXTREMITY: ICD-10-CM

## 2020-06-08 PROBLEM — F41.9 ANXIETY: Status: ACTIVE | Noted: 2020-06-08

## 2020-06-08 PROBLEM — J44.9 CHRONIC OBSTRUCTIVE PULMONARY DISEASE (HCC): Status: ACTIVE | Noted: 2020-06-08

## 2020-06-08 LAB
INTERNATIONAL NORMALIZATION RATIO, POC: 1.8
PROTHROMBIN TIME, POC: 21.3

## 2020-06-08 PROCEDURE — 85610 PROTHROMBIN TIME: CPT | Performed by: NURSE PRACTITIONER

## 2020-06-08 PROCEDURE — 99215 OFFICE O/P EST HI 40 MIN: CPT | Performed by: NURSE PRACTITIONER

## 2020-06-08 PROCEDURE — 3051F HG A1C>EQUAL 7.0%<8.0%: CPT | Performed by: NURSE PRACTITIONER

## 2020-06-08 ASSESSMENT — ENCOUNTER SYMPTOMS
COUGH: 0
NAUSEA: 0
ABDOMINAL PAIN: 0
TROUBLE SWALLOWING: 0
BACK PAIN: 0
FOREIGN BODY SENSATION: 1
EYE ITCHING: 1
VOMITING: 0
RHINORRHEA: 0
WHEEZING: 1
EYE DISCHARGE: 0
CONSTIPATION: 0
SINUS PRESSURE: 0
SHORTNESS OF BREATH: 1
BLURRED VISION: 0
CHEST TIGHTNESS: 0
DIARRHEA: 0
SORE THROAT: 0

## 2020-06-08 NOTE — PROGRESS NOTES
[address] [facility]   [id number]  [address]  [phone]    6/8/2020    Visit Information    Have you changed or started any medications since your last visit including any over-the-counter medicines, vitamins, or herbal medicines? yes - Med list updated   Are you having any side effects from any of your medications? -  no  Have you stopped taking any of your medications? Is so, why? -  no    Have you seen any other physician or provider since your last visit? Yes - Records Obtained Podiatry  Have you had any other diagnostic tests since your last visit? No  Have you been seen in the emergency room and/or had an admission to a hospital since we last saw you? No  Have you had your routine dental cleaning in the past 6 months? No teeth    Have you activated your [employer] account? If not, what are your barriers? Yes     Patient Care Team:  BRAULIO Moulton CNP as PCP - General (Nurse Practitioner Family)  BRAULIO Moulton CNP as PCP - [facility]      Nathanael Gutierrez is a 56 y.o. female who presents today for her  medical conditions/complaints as noted below. Nathanael Gutierrez is c/o of Hypertension; Diabetes; Hyperlipidemia; and Eye Problem (Left side)  . HPI:     Patient is a pleasant 57-year-old  female. She presents the office today for routine follow-up of care. Patient does check her blood sugars approximately 3 times a day. Patient states she averages between 110s and 170s. She does have a few moments where her sugar is hypoglycemic. Since last visit there was one morning she was at 36. Patient was alert and oriented, and able to drink orange juice and a snack. Patient does admit she knows it was low due to not eating prior to bed. Patient does follow with podiatry every 3 months to clip her nails. Patient was recently admitted overnight to [facility] for coughing and shortness of breath. Patient was discharged charged home the next day. Since that time patient's been taking her DuoNeb breathing treatments twice a day, and if she is out and about and needs her albuterol inhaler she does use it. Patient verbalized understanding that there is albuterol also in the [facility] and she is to take 6 hours apart. Patient does state that ever since she started using albuterol it does help, however she is very short of breath with any type of exertion. Care team includes:  Cardiology, Dr. Mitzi Modi, first appointment/new consultation is tomorrow  Podiatrist, every 3 months for nail clippings  Pulmonary, Dr. Getachew Goetz, first appointment/new consultation is July 2, 2020  Nephrologist, Dr Viet England, follows every 6 months. Next ointment scheduled  Pod 3 months   Pulmonologist, Dr Getachew Goetz 7/2         Diabetes   She presents for her follow-up diabetic visit. She has type 2 diabetes mellitus. No MedicAlert identification noted. Her disease course has been stable. Hypoglycemia symptoms include confusion, dizziness (w/ low sugar), sweats and tremors (w/ low sugar). Pertinent negatives for hypoglycemia include no headaches or nervousness/anxiousness. Pertinent negatives for diabetes include no blurred vision, no chest pain, no fatigue, no foot paresthesias, no foot ulcerations and no weakness. There are no hypoglycemic complications. Pertinent negatives for hypoglycemia complications include no required glucagon injection. Symptoms are stable. Current diabetic treatment includes oral agent (triple therapy). She is compliant with treatment all of the time. Her weight is stable. She is following a generally healthy diet. When asked about meal planning, she reported none. She rarely participates in exercise. Her overall blood glucose range is 140-180 mg/dl. An ACE inhibitor/angiotensin II receptor blocker is being taken. She sees a podiatrist.Eye exam is current. Eye Problem    The left eye is affected. This is a new problem. The current episode started more than 1 month ago. The problem occurs constantly. The problem has been gradually worsening. There was no injury mechanism. The pain is at a severity of 0/10. The patient is experiencing no pain. There is no known exposure to pink eye. She does not wear contacts. Associated symptoms include a foreign body sensation (mild) and itching. Pertinent negatives include no blurred vision, eye discharge, fever, nausea, vomiting or weakness. Associated symptoms comments: Left outer eye cyst like. She has tried nothing for the symptoms. The treatment provided no relief. Hypertension   This is a chronic problem. The current episode started more than 1 year ago. The problem is controlled. Associated symptoms include shortness of breath (w/exertion) and sweats. Pertinent negatives include no blurred vision, chest pain, headaches or palpitations. Vitals:    20 0824   BP: 118/76   Site: Left Upper Arm   Position: Sitting   Cuff Size: Large Adult   Pulse: 84   Resp: 22   Temp: 95.6 °F (35.3 °C)   TempSrc: Tympanic   SpO2: 97%   Weight: 260 lb (117.9 kg)      Past Medical History:   Diagnosis Date    Anxiety     Chronic kidney disease     COPD (chronic obstructive pulmonary disease) (HCC)     Depression     Hyperlipidemia     Hypertension     Obesity     Osteoarthritis     Proteinuria     Pulmonary embolism (HCC)     Sleep apnea     c-pap.  Doesn't use everynight    Type 2 diabetes mellitus without complication (HCC)     Type II or unspecified type diabetes mellitus without mention of complication, not stated as uncontrolled     Von Willebrand disease [facility])       Past Surgical History:   Procedure Laterality Date    APPENDECTOMY       SECTION      x3, 1977, ,     COLONOSCOPY  2018    with biopsy    COLONOSCOPY N/A 2018    COLONOSCOPY performed by Lela Naylor MD at [address] Bilateral     cataracts    EYE SURGERY Bilateral     glaucoma    TONSILLECTOMY AND daily 30 tablet 5    lisinopril (PRINIVIL;ZESTRIL) 20 MG tablet take 1 tablet by mouth once daily 30 tablet 5    isosorbide mononitrate (IMDUR) 30 MG extended release tablet take 1 tablet by mouth once daily 30 tablet 5    omeprazole (PRILOSEC) 20 MG delayed release capsule take 1 capsule by mouth once daily 30 capsule 5    glimepiride (AMARYL) 4 MG tablet take 1 tablet by mouth twice a day 60 tablet 5    febuxostat (ULORIC) 40 MG TABS tablet take 1 tablet by mouth once daily 30 tablet 5    Cholecalciferol (RA VITAMIN D-3) 50 MCG (2000 UT) CAPS Take 1 capsule by mouth daily 30 capsule 5    Lancets (BD LANCET ULTRAFINE 30G) MISC Use to check sugars twice daily and as needed 120 each 3    Alcohol Swabs (ALCOHOL PREP) 70 % PADS Use twice daily and as needed to check blood sugars 120 each 3    Blood Pressure Monitoring KIT Use to check blood pressure daily and as needed 1 kit 0    risperiDONE (RISPERDAL) 2 MG tablet Take 1 tablet by mouth nightly      allopurinol (ZYLOPRIM) 100 MG tablet Take 100 mg by mouth daily       fluticasone (FLONASE) 50 MCG/ACT nasal spray 1 spray by Nasal route daily (Patient taking differently: 1 spray by Nasal route daily as needed ) 1 Bottle 3    buPROPion (WELLBUTRIN XL) 300 MG XL tablet Take 300 mg by mouth every morning.  buPROPion (WELLBUTRIN XL) 150 MG XL tablet Take 150 mg by mouth every morning. No current facility-administered medications for this visit.       No Known Allergies    Health Maintenance   Topic Date Due    Diabetic retinal exam  07/24/2020    A1C test (Diabetic or Prediabetic)  05/06/2021    Lipid screen  05/07/2021    Potassium monitoring  05/07/2021    Creatinine monitoring  05/07/2021    Diabetic foot exam  05/20/2021    Breast cancer screen  10/03/2021    Cervical cancer screen  11/16/2022    DTaP/Tdap/Td vaccine (2 - Td) 08/04/2026    Colon cancer screen colonoscopy  02/16/2028    Flu vaccine  Completed    Shingles Vaccine Completed    Pneumococcal 0-64 years Vaccine  Completed    Hepatitis C screen  Addressed    HIV screen  Addressed    Hepatitis A vaccine  Aged Out    Hib vaccine  Aged Out    Meningococcal (ACWY) vaccine  Aged Out       Subjective:      Review of Systems   Constitutional: Negative for activity change, appetite change, chills, fatigue and fever. HENT: Negative for congestion, postnasal drip, rhinorrhea, sinus pressure, sore throat and trouble swallowing. Eyes: Positive for itching. Negative for blurred vision and discharge. Respiratory: Positive for shortness of breath (w/exertion) and wheezing. Negative for cough and chest tightness (maybe monthly ). Cardiovascular: Negative for chest pain, palpitations and leg swelling (on diuretic ). Gastrointestinal: Negative for abdominal pain, constipation, diarrhea, nausea and vomiting. Genitourinary: Negative for difficulty urinating, dysuria and hematuria. Musculoskeletal: Negative for arthralgias (arthritic pain, tylenol rarely ), back pain, gait problem and myalgias. Skin: Negative for rash. Neurological: Positive for dizziness (w/ low sugar) and tremors (w/ low sugar). Negative for syncope, weakness, light-headedness, numbness and headaches. Psychiatric/Behavioral: Positive for confusion. Negative for agitation and sleep disturbance. The patient is not nervous/anxious. Objective:     Physical Exam  Vitals signs and nursing note reviewed. Constitutional:       Appearance: Normal appearance. She is well-developed and well-groomed. She is morbidly obese. Eyes:     Cardiovascular:      Rate and Rhythm: Normal rate and regular rhythm. Pulses: Normal pulses. Carotid pulses are 2+ on the right side and 2+ on the left side. Radial pulses are 2+ on the right side and 2+ on the left side. Heart sounds: Normal heart sounds, S1 normal and S2 normal. No murmur.    Pulmonary:      Effort: Pulmonary effort is normal. Prolonged expiration present. No accessory muscle usage or respiratory distress. Breath sounds: Normal breath sounds and air entry. No decreased air movement. No wheezing or rales. Musculoskeletal: Normal range of motion. Right lower leg: No edema. Left lower leg: No edema. Skin:     General: Skin is warm and dry. Capillary Refill: Capillary refill takes less than 2 seconds. Neurological:      Mental Status: She is alert and oriented to person, place, and time. Psychiatric:         Attention and Perception: Attention and perception normal.         Mood and Affect: Mood and affect normal.         Speech: Speech normal.         Behavior: Behavior normal. Behavior is cooperative. Thought Content: Thought content normal.         Cognition and Memory: Cognition and memory normal.         Judgment: Judgment normal.          Assessment:      1. Essential hypertension  -Stable, medicated, monitered   Continue Apresoline    2. NARCISO on CPAP    3. Controlled type 2 diabetes mellitus with stage 3 chronic kidney disease, without long-term current use of insulin (HCC)  -Stable, medicated, monitered  Continue with metformin, Amaryl and Januvia  Continue checking blood sugars  Continue following podiatry    4. CKD (chronic kidney disease) stage 3, GFR 30-59 ml/min (HCC)  -Stable, medicated, monitered  Continue following with nephrology    5. Pure hypercholesterolemia  -Stable, medicated, monitered   Continue Lipitor    6. Multiple lung nodules on CT  -Stable, monitered  Continue with pulmonology appointment, Dr. Jesus Alberto Lennon    Patient encouraged to follow-up with her ophthalmologist to decipher appropriate plan plan of care moving forward to the cyst-like area to her left outer lids. Until appointment patient encouraged to apply warm compresses    Plan:      Return in about 3 months (around 9/8/2020). No orders of the defined types were placed in this encounter.     No orders of the defined types were placed in this encounter. Reviewed health maintenance, prior labs and imaging. Patient given educational materials - see patient instructions. Discussed use, benefit, and side effects of prescribed medications. Barriers to medication compliance addressed. All patient questions answered. Pt voiced understanding to plan of care. Instructed to continue current medications, diet and exercise. Patient agreed with treatment plan. Follow up as directed below. Communication:      Items for Patient/Writer/Staff to Accomplish  Please ensure referral/consult completed.      Quality Measures  BMI Readings from Last 1 Encounters:   06/08/20 47.55 kg/m²        Lab Results   Component Value Date    LABA1C 7.4 (H) 05/06/2020       BP Readings from Last 3 Encounters:   06/08/20 118/76   05/14/20 112/72   05/07/20 128/74        The 10-year ASCVD risk score (Stu Fuentes et al., 2013) is: 8.5%    Values used to calculate the score:      Age: 56 years      Sex: Female      Is Non- : No      Diabetic: Yes      Tobacco smoker: No      Systolic Blood Pressure: 286 mmHg      Is BP treated: Yes      HDL Cholesterol: 36 mg/dL      Total Cholesterol: 132 mg/dL     PHQ Scores 7/25/2018 6/27/2017   PHQ2 Score 0 0   PHQ9 Score 0 0     Interpretation of Total Score Depression Severity: 1-4 = Minimal depression, 5-9 = Mild depression, 10-14 = Moderate depression, 15-19 = Moderately severe depression, 20-27 = Severe depression     Electronically signed by HUE Hadley on 6/8/2020 at 9:15 AM

## 2020-06-15 ENCOUNTER — ANTI-COAG VISIT (OUTPATIENT)
Dept: FAMILY MEDICINE CLINIC | Age: 60
End: 2020-06-15
Payer: MEDICARE

## 2020-06-15 VITALS — TEMPERATURE: 97.5 F

## 2020-06-15 LAB
INTERNATIONAL NORMALIZATION RATIO, POC: 1.9
PROTHROMBIN TIME, POC: 22.9

## 2020-06-15 PROCEDURE — G8417 CALC BMI ABV UP PARAM F/U: HCPCS | Performed by: NURSE PRACTITIONER

## 2020-06-15 PROCEDURE — 93793 ANTICOAG MGMT PT WARFARIN: CPT | Performed by: NURSE PRACTITIONER

## 2020-06-15 PROCEDURE — 3017F COLORECTAL CA SCREEN DOC REV: CPT | Performed by: NURSE PRACTITIONER

## 2020-06-15 PROCEDURE — G8427 DOCREV CUR MEDS BY ELIG CLIN: HCPCS | Performed by: NURSE PRACTITIONER

## 2020-06-15 PROCEDURE — 1036F TOBACCO NON-USER: CPT | Performed by: NURSE PRACTITIONER

## 2020-06-15 PROCEDURE — 85610 PROTHROMBIN TIME: CPT | Performed by: NURSE PRACTITIONER

## 2020-06-15 NOTE — PROGRESS NOTES
Patient presents in the office today for INR check. Patient is alert, oriented, and dressed appropriately for the day.

## 2020-06-18 NOTE — TELEPHONE ENCOUNTER
POD[facility]   9/9/2020  8:00 AM BRAULIO Loredo - CNP [facility] TOLP   10/12/2020  8:50 AM Catarina Muniz MD [facility] Neph [facility]ofi None            Patient Active Problem List:     Hypertension     Hyperlipidemia     Osteoarthritis     Depression     Obesity     CKD (chronic kidney disease) stage 3, GFR 30-59 ml/min ([facility])     Proteinuria     Controlled type 2 diabetes mellitus with stage 3 chronic kidney disease, without long-term current use of insulin ([facility])     History of DVT of lower extremity     NARCISO on CPAP     Vitamin D deficiency     Major depressive disorder, recurrent episode, severe, with psychotic behavior ([address]     Major depressive disorder, recurrent, in partial remission ([address]     Major depressive disorder, recurrent, severe with psychotic symptoms ([address]     Multiple lung nodules on CT     Hypomagnesemia     Pulmonary embolism (HCC)     Urinary tract infection without hematuria     SOB (shortness of breath)     Fracture of ankle     Anxiety     Chronic obstructive pulmonary disease ([address]

## 2020-06-22 ENCOUNTER — ANTI-COAG VISIT (OUTPATIENT)
Dept: FAMILY MEDICINE CLINIC | Age: 60
End: 2020-06-22
Payer: MEDICARE

## 2020-06-22 LAB
INTERNATIONAL NORMALIZATION RATIO, POC: 2
PROTHROMBIN TIME, POC: 23.7

## 2020-06-22 PROCEDURE — 85610 PROTHROMBIN TIME: CPT | Performed by: NURSE PRACTITIONER

## 2020-06-22 PROCEDURE — 93793 ANTICOAG MGMT PT WARFARIN: CPT | Performed by: NURSE PRACTITIONER

## 2020-06-22 NOTE — PROGRESS NOTES
Patient presents in the office today for INR check. Patient is alert, oriented, and dressed appropriately for the day. Tolerated well with no reactions.

## 2020-06-23 RX ORDER — OXYCODONE HYDROCHLORIDE AND ACETAMINOPHEN 5; 325 MG/1; MG/1
1 TABLET ORAL EVERY 8 HOURS PRN
Qty: 40 TABLET | Refills: 0 | Status: SHIPPED | OUTPATIENT
Start: 2020-06-23 | End: 2020-07-20

## 2020-06-30 PROBLEM — R09.89 DECREASED PEDAL PULSES: Status: ACTIVE | Noted: 2020-06-30

## 2020-06-30 PROBLEM — Z82.49 FAMILY HISTORY OF ABDOMINAL AORTIC ANEURYSM: Status: ACTIVE | Noted: 2020-06-30

## 2020-06-30 PROBLEM — Z86.711 HISTORY OF PULMONARY EMBOLISM: Status: ACTIVE | Noted: 2020-06-30

## 2020-06-30 PROBLEM — R07.2 PRECORDIAL PAIN: Status: ACTIVE | Noted: 2020-06-30

## 2020-06-30 RX ORDER — ALBUTEROL SULFATE 90 UG/1
2 AEROSOL, METERED RESPIRATORY (INHALATION) EVERY 4 HOURS PRN
Qty: 18 G | Refills: 1 | Status: SHIPPED | OUTPATIENT
Start: 2020-06-30 | End: 2020-08-29 | Stop reason: SDUPTHER

## 2020-07-01 ENCOUNTER — TELEPHONE (OUTPATIENT)
Dept: FAMILY MEDICINE CLINIC | Age: 60
End: 2020-07-01

## 2020-07-01 NOTE — TELEPHONE ENCOUNTER
Patient is contacting the office today to cancel her INR check on Monday because she is stopping her coumadin tonight due to her having a heart cath on 7/7/20 by Dr. Cristel Rodríguez. Patient states that she needs to know when she is to restart the coumadin and when she is supposed to come in for her INR to be tested. Please advise.

## 2020-07-01 NOTE — TELEPHONE ENCOUNTER
Please let patient know that typically Coumadin is resumed 24 to 48 hours after procedure is complete. She can confirm this timeframe with cardiology after her cardiac catheterization. I am assuming that Dr. Lois Miller will tell her when to restart it after, due to her telling her the timeframe to hold it prior. Once she starts it I will have her come in for more frequent INR checking.

## 2020-07-07 RX ORDER — GLUCOSAMINE/CHONDR SU A SOD 750-600 MG
1 TABLET ORAL DAILY
Qty: 30 CAPSULE | Refills: 5 | Status: SHIPPED | OUTPATIENT
Start: 2020-07-07 | End: 2020-12-31 | Stop reason: SDUPTHER

## 2020-07-08 ENCOUNTER — TELEPHONE (OUTPATIENT)
Dept: FAMILY MEDICINE CLINIC | Age: 60
End: 2020-07-08

## 2020-07-08 NOTE — TELEPHONE ENCOUNTER
Patient started taking her coumadin last night per dr. Jim Simon. Patient is asking when she should return to the office for her test results. Patient states that she is taking 2.5 mg. Please advise.

## 2020-07-10 ENCOUNTER — ANTI-COAG VISIT (OUTPATIENT)
Dept: FAMILY MEDICINE CLINIC | Age: 60
End: 2020-07-10
Payer: MEDICARE

## 2020-07-10 LAB
INTERNATIONAL NORMALIZATION RATIO, POC: 1
PROTHROMBIN TIME, POC: 11.8

## 2020-07-10 PROCEDURE — 85610 PROTHROMBIN TIME: CPT | Performed by: NURSE PRACTITIONER

## 2020-07-17 ENCOUNTER — ANTI-COAG VISIT (OUTPATIENT)
Dept: FAMILY MEDICINE CLINIC | Age: 60
End: 2020-07-17
Payer: MEDICARE

## 2020-07-17 LAB
INTERNATIONAL NORMALIZATION RATIO, POC: 1.6
PROTHROMBIN TIME, POC: 18.8

## 2020-07-17 PROCEDURE — 85610 PROTHROMBIN TIME: CPT | Performed by: NURSE PRACTITIONER

## 2020-07-17 NOTE — PROGRESS NOTES
Please call and let Yesica know that her INR today is at 1.6. Please tell her to take a total of 4 mg for today's dose, and then continue with 2 mg daily. Please asked her to repeat her INR next Friday morning within the office.

## 2020-07-20 ENCOUNTER — OFFICE VISIT (OUTPATIENT)
Dept: PODIATRY | Age: 60
End: 2020-07-20
Payer: MEDICARE

## 2020-07-20 VITALS — WEIGHT: 260 LBS | HEIGHT: 62 IN | BODY MASS INDEX: 47.84 KG/M2 | TEMPERATURE: 97.8 F

## 2020-07-20 PROCEDURE — 2022F DILAT RTA XM EVC RTNOPTHY: CPT | Performed by: PODIATRIST

## 2020-07-20 PROCEDURE — G8427 DOCREV CUR MEDS BY ELIG CLIN: HCPCS | Performed by: PODIATRIST

## 2020-07-20 PROCEDURE — 1036F TOBACCO NON-USER: CPT | Performed by: PODIATRIST

## 2020-07-20 PROCEDURE — 3017F COLORECTAL CA SCREEN DOC REV: CPT | Performed by: PODIATRIST

## 2020-07-20 PROCEDURE — G8417 CALC BMI ABV UP PARAM F/U: HCPCS | Performed by: PODIATRIST

## 2020-07-20 PROCEDURE — 3051F HG A1C>EQUAL 7.0%<8.0%: CPT | Performed by: PODIATRIST

## 2020-07-20 PROCEDURE — 11721 DEBRIDE NAIL 6 OR MORE: CPT | Performed by: PODIATRIST

## 2020-07-20 PROCEDURE — 99213 OFFICE O/P EST LOW 20 MIN: CPT | Performed by: PODIATRIST

## 2020-07-20 RX ORDER — FEBUXOSTAT 40 MG/1
TABLET, FILM COATED ORAL
Qty: 30 TABLET | Refills: 5 | Status: SHIPPED | OUTPATIENT
Start: 2020-07-20 | End: 2020-10-12 | Stop reason: ALTCHOICE

## 2020-07-20 RX ORDER — OXYCODONE HYDROCHLORIDE AND ACETAMINOPHEN 5; 325 MG/1; MG/1
1 TABLET ORAL EVERY 8 HOURS PRN
Qty: 40 TABLET | Refills: 0 | Status: SHIPPED | OUTPATIENT
Start: 2020-07-20 | End: 2020-08-20

## 2020-07-20 RX ORDER — WARFARIN SODIUM 2 MG/1
TABLET ORAL
Qty: 30 TABLET | Refills: 2 | Status: SHIPPED | OUTPATIENT
Start: 2020-07-20 | End: 2020-10-15

## 2020-07-20 NOTE — PROGRESS NOTES
30 [facility] [address]11 [id number] [address]  Dept: [phone]    DIABETIC PROGRESS NOTE  Date of patient's visit: 7/20/2020  Patient's Name:  Fernando Wang YOB: 1960            Patient Care Team:  BRAULIO John CNP as PCP - General (Nurse Practitioner Family)  BRAULIO John CNP as PCP - [facility] Empaneled Provider          Chief Complaint   Patient presents with    Diabetes    Nail Problem    Peripheral Neuropathy       Subjective:   Fernando Wang comes to clinic for Diabetes; Nail Problem; and Peripheral Neuropathy    she is a diabetic and states that she has pain to the heel. She is still using her bone stimulator and has no pain anymore. Pt currently has complaint of thickened, elongated nails that they cannot manage by themselves. Pt's primary care physician is BRAULIO John CNP last seen June 22 2020   Pt's last blood sugar was 133 . Pt has a new complaint of heel pain to both feet during the frst few steps of the day. Pt has tried changing shoes but it has not helped the pain  Patient is taking over the counter medications with no relief. Lab Results   Component Value Date    LABA1C 7.4 (H) 05/06/2020      Complains of numbness in the feet bilat. Past Medical History:   Diagnosis Date    Anxiety     Chronic kidney disease     COPD (chronic obstructive pulmonary disease) ([address]     Depression     Hyperlipidemia     Hypertension     Obesity     Osteoarthritis     Proteinuria     Pulmonary embolism (HCC)     Sleep apnea     c-pap.  Doesn't use everynight    Type 2 diabetes mellitus without complication (HCC)     Type II or unspecified type diabetes mellitus without mention of complication, not stated as uncontrolled     Von Willebrand disease ([address]        No Known Allergies  Current Outpatient Medications on File Prior to Visit   Medication Sig Dispense Refill    febuxostat (ULORIC) 40 MG TABS tablet take 1 tablet by mouth once daily 30 tablet 5    Cholecalciferol (RA VITAMIN D-3) 50 MCG (2000 UT) CAPS Take 1 capsule by mouth daily 30 capsule 5    albuterol sulfate  (90 Base) MCG/ACT inhaler Inhale 2 puffs into the lungs every 4 hours as needed for Wheezing or Shortness of Breath (AND/OR COUGH) 18 g 1    ipratropium-albuterol (DUONEB) 0.5-2.5 (3) MG/3ML SOLN nebulizer solution Inhale 3 mLs into the lungs every 6 hours as needed for Shortness of Breath 360 mL 0    blood glucose test strips (ONE TOUCH ULTRA TEST) strip TEST three times a day 100 strip 5    warfarin (COUMADIN) 2 MG tablet take 1 tablet by mouth ON MONDAY, WEDNESDAY AND FRIDAY,  THEN TAKE 1 AND 1/2 TABLETS ON ALL OTHER DAYS (Patient taking differently: Take 2 mg by mouth daily ) 45 tablet 2    hydrALAZINE (APRESOLINE) 50 MG tablet Take 1 tablet by mouth 2 times daily 180 tablet 1    Misc.  Devices MISC 1 PAIR OF DIABETIC SHOES (1 LEFT/ 1 RIGHT)  1-3 PAIRS OF INSERTS (LEFT/ RIGHT) 2 each 0    furosemide (LASIX) 20 MG tablet Take 1 tablet by mouth daily 30 tablet 5    metFORMIN (GLUCOPHAGE-XR) 500 MG extended release tablet Take 2 tablets by mouth 2 times daily 120 tablet 5    atorvastatin (LIPITOR) 40 MG tablet take 1 tablet by mouth once daily 30 tablet 5    JANUVIA 100 MG tablet take 1 tablet by mouth once daily 30 tablet 5    lisinopril (PRINIVIL;ZESTRIL) 20 MG tablet take 1 tablet by mouth once daily 30 tablet 5    isosorbide mononitrate (IMDUR) 30 MG extended release tablet take 1 tablet by mouth once daily 30 tablet 5    omeprazole (PRILOSEC) 20 MG delayed release capsule take 1 capsule by mouth once daily 30 capsule 5    glimepiride (AMARYL) 4 MG tablet take 1 tablet by mouth twice a day 60 tablet 5    Lancets (BD LANCET ULTRAFINE 30G) MISC Use to check sugars twice daily and as needed 120 each 3    Alcohol Swabs (ALCOHOL PREP) 70 % PADS Use twice daily and as needed to check blood sugars 120 each 3    Blood decreased,Bilateral.    Dorsally contracted digits present digits 2, Bilateral.     Vascular: DP pulses 1/4 bilateral.  PT pulses 0/4 bilateral.   CFT <5 seconds, Bilateral.  Hair growth absent to the level of the digits, Bilateral.  Edema present, Bilateral.  Varicosities absent, Bilateral. Erythema absent, Bilateral    Neurological: Sensation diminshed to light touch to level of digits, Bilateral.  Protective sensation intact 6/10 sites via 5.07/10g [place] Monofilament, Bilateral.  negative Tinel's, Bilateral.  negative Valleix sign, Bilateral.      Integument: Warm, dry, supple, Bilateral.  Open lesion absent, Bilateral.  Interdigital maceration absent to web spaces 4, Bilateral.  Nails 1-5 left and 1-5 right thickened > 3.0 mm, dystrophic and crumbly, discolored with subungual debris. Fissures absent, Bilateral.   General: AAO x 3 in NAD.     Derm  Toenail Description  Sites of Onychomycosis Involvement (Check affected area)  [x] [x] [x] [x] [x] [x] [x] [x] [x] [x]  5 4 3 2 1 1 2 3 4 5                          Right                                        Left    Thickness  [x] [x] [x] [x] [x] [x] [x] [x] [x] [x]  5 4 3 2 1 1 2 3 4 5                         Right                                        Left    Dystrophic Changes   [x] [x] [x] [x] [x] [x] [x] [x] [x] [x]  5 4 3 2 1 1 2 3 4 5                         Right                                        Left    Color   [x] [x] [x] [x] [x] [x] [x] [x] [x] [x]  5 4 3 2 1 1 2 3 4 5                          Right                                        Left    Incurvation/Ingrowin   [] [] [] [] [] [] [] [] [] []  5 4 3 2 1 1 2 3 4 5                         Right                                        Left    Inflammation/Pain   [x] [x] [x] [x] [x] [x] [x] [x] [x] [x]  5 4 3 2 1 1 2 3 4 5                         Right                                        Left        Visual inspection:  Deformity: hammertoe deformity sherman feet  amputation: absent  Skin stretch (kneeling)    1. Get on your hands and knees on the floor. Keep your heels pointing up and the balls of your feet and your toes on the floor. 2. Slowly sit back toward your ankles. 3. If this is too hard, you can try doing it one leg at a time. Stand up, and then kneel on one knee and keep the other leg forward. Place the foot of your forward leg flat on the ground and bend that knee. The heel on the leg still behind you should point up. The ball and toes of that foot should be on the floor. Sit back toward that ankle. 4. Hold 15 to 30 seconds. 5. Repeat 2 to 4 times. Switch legs if you are doing this one leg at a time. Plantar fascia self-massage    1. Sit in a chair. 2. Place your affected foot on a firm, tube-shaped object, such as a can or water bottle. 3. Roll your foot back and forth over the object to massage the bottom of your foot. 4. If you want to do ice massage, fill a water bottle about three-fourths of the way full and freeze before using. 5. Continue for 2 to 5 minutes. Bilateral calf stretch (knees straight)    1. Place a book on the floor a few inches from a wall or countertop, and put the balls of your feet on it. Your heels should be on the floor. The book needs to be thick enough so that you can feel a gentle stretch in each calf. If you are not steady on your feet, hold on to a chair, counter, or wall while you do this stretch. 2. Keep your knees straight, and lean forward until you feel a stretch in each calf. 3. To get more stretch, add another book or use a thicker book, such as a phone book, a dictionary, or an encyclopedia. 4. Hold the stretch for at least 15 to 30 seconds. 5. Repeat 2 to 4 times. Bilateral calf stretch (knees bent)    1. Place a book on the floor a few inches from a wall or countertop, and put the balls of your feet on it. Your heels should be on the floor. The book needs to be thick enough so that you can feel a gentle stretch in each calf. If you are not steady on your feet, hold on to a chair, counter, or wall while you do this stretch. 2. Bend your knees, and lean forward until you feel a stretch in each calf. 3. To get more stretch, add another book or use a thicker book, such as a phone book, a dictionary, or an encyclopedia. 4. Hold the stretch for at least 15 to 30 seconds. 5. Repeat 2 to 4 times. [place] pick-ups    1. Put some marbles on the floor next to a cup.  2. Sit down, and use the toes of your affected foot to lift up one marble from the floor at a time. Then try to put the marble in the cup.  3. Repeat 8 to 12 times. Towel scrunches    1. Sit down, and place your affected foot on a towel on the floor. You may also do this with both feet on the towel. 2. Scrunch the towel toward you with your toes. Then use your toes to push the towel back into place. 3. Repeat 8 to 12 times. Heel raises on a step    1. Stand on the bottom step of a staircase, facing up toward the stairs. Put the balls of your feet on the step. If you are not steady on your feet, hold on to the banister or wall. 2. Keeping both knees straight, slowly lift your heels above the step so that you are standing on your toes. Then slowly lower your heels below the step and toward the floor. 3. Return to the starting position, with your feet even with the step. 4. Repeat 8 to 12 times. Follow-up care is a key part of your treatment and safety. Be sure to make and go to all appointments, and call your doctor if you are having problems. It's also a good idea to know your test results and keep a list of the medicines you take. Where can you learn more? Go to [email or web address] and sign in to your Guardium account. Enter H119 in the Per[employer]ix box to learn more about \"Arch Pain: Exercises. \"     If you do not have an account, please click on the \"Sign Up Now\" link.   Current as of: September 20, 2018  Content Version: 12.1  © 7205-6614 Healthwise, Incorporated. Care instructions adapted under license by [facility] ([facility]). If you have questions about a medical condition or this instruction, always ask your healthcare professional. [address] any warranty or liability for your use of this information. Heel Spurs/Plantar Fasciitis      1. Taping- keep on for 5-7 days if possible and DO NOT get it wet. If you see redness or feel itching, then take off the taping: this may be an indication that you are allergic to the tap. 2. Injection- contains a small amount of cortisone to reduce your inflammation. It is possible that after 24 hours you may feel a small increase in pain at the injection site. This is \"Steroid Flare\" and will subside after 24 hours. 3. Anti-inflammatory medications- Take as prescribed      4. Ice massage- There are a number of ways to ice your foot/feet. The best way is to place a full water bottle in the freezer and then roll the bottom of your foot on it up to 3 times a day. 5. Heel cord stretching- stretch at least twice a day. See below    Wall Stretch        Affected Heel flat on floor and keep that knee straight  Bend the other knee and lean into the wall  Hold that stretched position for 20-30 seconds  Repeat 5 times    DO NOT: Bounce or jerk back and forth while doing the stretch  DO Not: Point toes out to the side                  Towel Stretch      Place towel under ball of foot  Pull foot towards you and hold for 5 seconds  Then push foot down and hold for 5 seconds  Repeat this 5-10 times      6. Wear good supportive shoes with adequate shock absorption. 7. Orthotics: We recommend custom molded orthotics and we can call your insurance to see if this is a covered benefit for you. However, if they are not covered, we carry over-the- counter orthotics called Powersteps. They cost $43.00 and can be purchased with cash, check or charge at the .       8.

## 2020-07-24 ENCOUNTER — ANTI-COAG VISIT (OUTPATIENT)
Dept: FAMILY MEDICINE CLINIC | Age: 60
End: 2020-07-24
Payer: MEDICARE

## 2020-07-24 LAB
INTERNATIONAL NORMALIZATION RATIO, POC: 1.7
PROTHROMBIN TIME, POC: 19.8

## 2020-07-24 PROCEDURE — 85610 PROTHROMBIN TIME: CPT | Performed by: NURSE PRACTITIONER

## 2020-07-24 NOTE — PROGRESS NOTES
Please call Yesica and tell her to take 4 mg of Coumadin today. Tomorrow, Saturday, July 25 tell her to take 2 mg. Sunday, July 26 to take 2 mg. Monday, July 27 she is to take 4 mg. Tuesday Wednesday and Thursday I want her to take 2 mg. Next Friday want her to come back in for another INR check.

## 2020-07-31 ENCOUNTER — ANTI-COAG VISIT (OUTPATIENT)
Dept: FAMILY MEDICINE CLINIC | Age: 60
End: 2020-07-31
Payer: MEDICARE

## 2020-07-31 LAB
INTERNATIONAL NORMALIZATION RATIO, POC: 2.4
PROTHROMBIN TIME, POC: 28.7

## 2020-07-31 PROCEDURE — G8417 CALC BMI ABV UP PARAM F/U: HCPCS | Performed by: NURSE PRACTITIONER

## 2020-07-31 PROCEDURE — 85610 PROTHROMBIN TIME: CPT | Performed by: NURSE PRACTITIONER

## 2020-07-31 PROCEDURE — 1036F TOBACCO NON-USER: CPT | Performed by: NURSE PRACTITIONER

## 2020-07-31 PROCEDURE — 3017F COLORECTAL CA SCREEN DOC REV: CPT | Performed by: NURSE PRACTITIONER

## 2020-07-31 PROCEDURE — G8428 CUR MEDS NOT DOCUMENT: HCPCS | Performed by: NURSE PRACTITIONER

## 2020-07-31 PROCEDURE — 93793 ANTICOAG MGMT PT WARFARIN: CPT | Performed by: NURSE PRACTITIONER

## 2020-07-31 NOTE — PROGRESS NOTES
Please let Yesica know that her INR today is 2.4. She is therapeutic. Please have her continue taking 2 mg of Coumadin on a daily basis. She can repeat her INR in 1 month, approximately August 28.

## 2020-08-04 RX ORDER — GLIMEPIRIDE 4 MG/1
TABLET ORAL
Qty: 60 TABLET | Refills: 5 | Status: SHIPPED | OUTPATIENT
Start: 2020-08-04 | End: 2021-01-22

## 2020-08-04 RX ORDER — SITAGLIPTIN 100 MG/1
TABLET, FILM COATED ORAL
Qty: 30 TABLET | Refills: 5 | Status: SHIPPED | OUTPATIENT
Start: 2020-08-04 | End: 2021-01-22

## 2020-08-04 RX ORDER — OMEPRAZOLE 20 MG/1
CAPSULE, DELAYED RELEASE ORAL
Qty: 30 CAPSULE | Refills: 5 | Status: SHIPPED | OUTPATIENT
Start: 2020-08-04 | End: 2021-01-22

## 2020-08-04 RX ORDER — ATORVASTATIN CALCIUM 40 MG/1
TABLET, FILM COATED ORAL
Qty: 30 TABLET | Refills: 5 | Status: SHIPPED | OUTPATIENT
Start: 2020-08-04 | End: 2021-01-22

## 2020-08-04 RX ORDER — LISINOPRIL 20 MG/1
TABLET ORAL
Qty: 30 TABLET | Refills: 5 | Status: SHIPPED | OUTPATIENT
Start: 2020-08-04 | End: 2021-01-22

## 2020-08-04 RX ORDER — ISOSORBIDE MONONITRATE 30 MG/1
TABLET, EXTENDED RELEASE ORAL
Qty: 30 TABLET | Refills: 5 | Status: SHIPPED | OUTPATIENT
Start: 2020-08-04 | End: 2021-01-22

## 2020-08-07 PROBLEM — I71.40 AAA (ABDOMINAL AORTIC ANEURYSM) WITHOUT RUPTURE (HCC): Status: ACTIVE | Noted: 2020-08-07

## 2020-08-07 PROBLEM — Z79.01 LONG TERM (CURRENT) USE OF ANTICOAGULANTS: Status: ACTIVE | Noted: 2020-08-07

## 2020-08-11 ENCOUNTER — TELEPHONE (OUTPATIENT)
Dept: PODIATRY | Age: 60
End: 2020-08-11

## 2020-08-20 RX ORDER — OXYCODONE HYDROCHLORIDE AND ACETAMINOPHEN 5; 325 MG/1; MG/1
1 TABLET ORAL EVERY 8 HOURS PRN
Qty: 40 TABLET | Refills: 0 | Status: SHIPPED | OUTPATIENT
Start: 2020-08-20 | End: 2020-09-09 | Stop reason: SDUPTHER

## 2020-08-20 NOTE — TELEPHONE ENCOUNTER
Please address the medication refill and close the encounter. If I can be of assistance, please route to the applicable pool. Thank you. Last visit: 07/31/20  Last Med refill: 07/20/20  Does patient have enough medication for 72 hours: No:     Next Visit Date:  Future Appointments   Date Time Provider Mark Junior   8/31/2020  8:30 AM SCHEDULE, [facility]   9/9/2020  8:00 AM BRAULIO Betancourt - CNP [facility]   10/12/2020  8:50 AM Opal Gutierrez MD [facility] None   10/21/2020  9:30 AM Aditi Ricci DPM [address] Maintenance   Topic Date Due    Diabetic retinal exam  07/24/2020    Flu vaccine (1) 09/01/2020    A1C test (Diabetic or Prediabetic)  05/06/2021    Lipid screen  05/07/2021    Potassium monitoring  05/07/2021    Creatinine monitoring  05/07/2021    Diabetic foot exam  07/27/2021    Breast cancer screen  10/03/2021    Cervical cancer screen  11/16/2022    DTaP/Tdap/Td vaccine (2 - Td) 08/04/2026    Colon cancer screen colonoscopy  02/16/2028    Shingles Vaccine  Completed    Pneumococcal 0-64 years Vaccine  Completed    Hepatitis C screen  Addressed    HIV screen  Addressed    Hepatitis A vaccine  Aged Out    Hib vaccine  Aged Out    Meningococcal (ACWY) vaccine  Aged Out       Hemoglobin A1C (%)   Date Value   05/06/2020 7.4 (H)   04/10/2020 6.8   08/01/2019 6.9 (H)             ( goal A1C is < 7)   Microalb/Crt.  Ratio (mcg/mg creat)   Date Value   04/08/2019 CANNOT BE CALCULATED     LDL Cholesterol (mg/dL)   Date Value   05/07/2020 68   08/01/2019 51     LDL Calculated (mg/dL)   Date Value   04/10/2020 59       (goal LDL is <100)   AST (U/L)   Date Value   05/07/2020 25     ALT (U/L)   Date Value   05/07/2020 42 (H)     BUN (mg/dL)   Date Value   05/07/2020 18     BP Readings from Last 3 Encounters:   08/07/20 (!) 148/90   06/30/20 120/70 06/08/20 118/76          (goal 120/80)    All Future Testing planned in [facility]  Lab Frequency Next Occurrence   Basic Metabolic Panel Once 89/93/8465   Basic Metabolic Panel Once 17/46/0428   CBC Once 10/13/2020   POCT INR                 Patient Active Problem List:     Essential hypertension     Hyperlipidemia     Osteoarthritis     Depression     Obesity     CKD (chronic kidney disease) stage 3, GFR 30-59 ml/min ([facility])     Proteinuria     Controlled type 2 diabetes mellitus with stage 3 chronic kidney disease, without long-term current use of insulin (HCC)     History of DVT of lower extremity     NARCISO on CPAP     Vitamin D deficiency     Major depressive disorder, recurrent episode, severe, with psychotic behavior ([address]     Major depressive disorder, recurrent, in partial remission ([address]     Major depressive disorder, recurrent, severe with psychotic symptoms ([address]     Multiple lung nodules on CT     Hypomagnesemia     Pulmonary embolism (HCC)     Urinary tract infection without hematuria     SOB (shortness of breath)     Fracture of ankle     Anxiety     Chronic obstructive pulmonary disease (HCC)     Precordial pain     History of pulmonary embolism     Decreased pedal pulses     Family history of abdominal aortic aneurysm     Normal coronary arteries     Long term (current) use of anticoagulants     AAA (abdominal aortic aneurysm) without rupture ([address]

## 2020-08-31 ENCOUNTER — ANTI-COAG VISIT (OUTPATIENT)
Dept: FAMILY MEDICINE CLINIC | Age: 60
End: 2020-08-31
Payer: MEDICARE

## 2020-08-31 LAB
INTERNATIONAL NORMALIZATION RATIO, POC: 1.9
PROTHROMBIN TIME, POC: 22.4

## 2020-08-31 PROCEDURE — 85610 PROTHROMBIN TIME: CPT | Performed by: NURSE PRACTITIONER

## 2020-08-31 PROCEDURE — 3017F COLORECTAL CA SCREEN DOC REV: CPT | Performed by: NURSE PRACTITIONER

## 2020-08-31 PROCEDURE — 1036F TOBACCO NON-USER: CPT | Performed by: NURSE PRACTITIONER

## 2020-08-31 PROCEDURE — G8417 CALC BMI ABV UP PARAM F/U: HCPCS | Performed by: NURSE PRACTITIONER

## 2020-08-31 PROCEDURE — 93793 ANTICOAG MGMT PT WARFARIN: CPT | Performed by: NURSE PRACTITIONER

## 2020-08-31 PROCEDURE — G8428 CUR MEDS NOT DOCUMENT: HCPCS | Performed by: NURSE PRACTITIONER

## 2020-08-31 NOTE — PROGRESS NOTES
Please call Yesica and let her know that her INR was slightly sub-therapeutic at 1.9. I would like her INR goal to be between 2.0 and 3.0. Please ask her to take an additional 2 mg dose today so therefore she has a total of 4 mg. She is to return back to her 2 mg daily starting tomorrow. Please have her recheck an INR in 2 weeks.

## 2020-08-31 NOTE — PROGRESS NOTES
Patient notified of results recommendations and verbalized understanding. Appt schedule 9/14/20 for next INR.

## 2020-08-31 NOTE — PROGRESS NOTES
Patient presents in the office today with self for INR check. Patient is alert, oriented, and dressed appropriately for the day. Tolerated well with no reaction.

## 2020-09-02 RX ORDER — ALBUTEROL SULFATE 90 UG/1
2 AEROSOL, METERED RESPIRATORY (INHALATION) EVERY 4 HOURS PRN
Qty: 18 G | Refills: 1 | Status: SHIPPED | OUTPATIENT
Start: 2020-09-02 | End: 2021-03-10 | Stop reason: SDUPTHER

## 2020-09-02 NOTE — TELEPHONE ENCOUNTER
LOV 6/8/20  RTO 3 months; F/U scheduled  LRF 6/30/20    Health Maintenance   Topic Date Due    Diabetic retinal exam  07/24/2020    A1C test (Diabetic or Prediabetic)  05/06/2021    Lipid screen  05/07/2021    Potassium monitoring  05/07/2021    Creatinine monitoring  05/07/2021    Diabetic foot exam  07/27/2021    Breast cancer screen  10/03/2021    Cervical cancer screen  11/16/2022    DTaP/Tdap/Td vaccine (2 - Td) 08/04/2026    Colon cancer screen colonoscopy  02/16/2028    Flu vaccine  Completed    Shingles Vaccine  Completed    Pneumococcal 0-64 years Vaccine  Completed    Hepatitis C screen  Addressed    HIV screen  Addressed    Hepatitis A vaccine  Aged Out    Hib vaccine  Aged Out    Meningococcal (ACWY) vaccine  Aged Out             (applicable per patient's age: Cancer Screenings, Depression Screening, Fall Risk Screening, Immunizations)    Hemoglobin A1C (%)   Date Value   05/06/2020 7.4 (H)   04/10/2020 6.8   08/01/2019 6.9 (H)     Microalb/Crt.  Ratio (mcg/mg creat)   Date Value   04/08/2019 CANNOT BE CALCULATED     LDL Cholesterol (mg/dL)   Date Value   05/07/2020 68     LDL Calculated (mg/dL)   Date Value   04/10/2020 59     AST (U/L)   Date Value   05/07/2020 25     ALT (U/L)   Date Value   05/07/2020 42 (H)     BUN (mg/dL)   Date Value   05/07/2020 18      (goal A1C is < 7)   (goal LDL is <100) need 30-50% reduction from baseline     BP Readings from Last 3 Encounters:   08/07/20 (!) 148/90   06/30/20 120/70   06/08/20 118/76    (goal /80)      All Future Testing planned in [facility]:  Lab Frequency Next Occurrence   Basic Metabolic Panel Once 11/84/2459   Basic Metabolic Panel Once 74/65/7288   CBC Once 10/13/2020   POCT INR         Next Visit Date:  Future Appointments   Date Time Provider Mark Junior   9/9/2020  8:00 AM Trisha Paris, APRN - CNP [facility]   9/14/2020  8:30 AM SCHEDULE, [facility]   10/12/2020  8:50 AM Mancel Pop Mattie Dukes MD [facility] None   10/21/2020  9:30 AM Tracy Adhikari DPM [facility]            Patient Active Problem List:     Essential hypertension     Hyperlipidemia     Osteoarthritis     Depression     Obesity     CKD (chronic kidney disease) stage 3, GFR 30-59 ml/min (HCC)     Proteinuria     Controlled type 2 diabetes mellitus with stage 3 chronic kidney disease, without long-term current use of insulin (HCC)     History of DVT of lower extremity     NARCISO on CPAP     Vitamin D deficiency     Major depressive disorder, recurrent episode, severe, with psychotic behavior ([address]     Major depressive disorder, recurrent, in partial remission ([address]     Major depressive disorder, recurrent, severe with psychotic symptoms ([address]     Multiple lung nodules on CT     Hypomagnesemia     Pulmonary embolism (HCC)     Urinary tract infection without hematuria     SOB (shortness of breath)     Fracture of ankle     Anxiety     Chronic obstructive pulmonary disease (HCC)     Precordial pain     History of pulmonary embolism     Decreased pedal pulses     Family history of abdominal aortic aneurysm     Normal coronary arteries     Long term (current) use of anticoagulants     AAA (abdominal aortic aneurysm) without rupture ([address]

## 2020-09-09 ENCOUNTER — OFFICE VISIT (OUTPATIENT)
Dept: FAMILY MEDICINE CLINIC | Age: 60
End: 2020-09-09
Payer: MEDICARE

## 2020-09-09 VITALS
DIASTOLIC BLOOD PRESSURE: 80 MMHG | TEMPERATURE: 95.7 F | OXYGEN SATURATION: 98 % | WEIGHT: 259 LBS | SYSTOLIC BLOOD PRESSURE: 120 MMHG | BODY MASS INDEX: 47.37 KG/M2 | HEART RATE: 84 BPM

## 2020-09-09 PROCEDURE — G8417 CALC BMI ABV UP PARAM F/U: HCPCS | Performed by: NURSE PRACTITIONER

## 2020-09-09 PROCEDURE — 2022F DILAT RTA XM EVC RTNOPTHY: CPT | Performed by: NURSE PRACTITIONER

## 2020-09-09 PROCEDURE — 3023F SPIROM DOC REV: CPT | Performed by: NURSE PRACTITIONER

## 2020-09-09 PROCEDURE — 99215 OFFICE O/P EST HI 40 MIN: CPT | Performed by: NURSE PRACTITIONER

## 2020-09-09 PROCEDURE — 1036F TOBACCO NON-USER: CPT | Performed by: NURSE PRACTITIONER

## 2020-09-09 PROCEDURE — 3017F COLORECTAL CA SCREEN DOC REV: CPT | Performed by: NURSE PRACTITIONER

## 2020-09-09 PROCEDURE — 3051F HG A1C>EQUAL 7.0%<8.0%: CPT | Performed by: NURSE PRACTITIONER

## 2020-09-09 PROCEDURE — G8427 DOCREV CUR MEDS BY ELIG CLIN: HCPCS | Performed by: NURSE PRACTITIONER

## 2020-09-09 PROCEDURE — G8926 SPIRO NO PERF OR DOC: HCPCS | Performed by: NURSE PRACTITIONER

## 2020-09-09 RX ORDER — OXYCODONE HYDROCHLORIDE AND ACETAMINOPHEN 5; 325 MG/1; MG/1
1 TABLET ORAL 2 TIMES DAILY PRN
Qty: 40 TABLET | Refills: 0 | Status: SHIPPED | OUTPATIENT
Start: 2020-09-18 | End: 2020-10-16

## 2020-09-09 ASSESSMENT — ENCOUNTER SYMPTOMS
WHEEZING: 1
CHEST TIGHTNESS: 0
CONSTIPATION: 0
BLOOD IN STOOL: 0
BACK PAIN: 0
RHINORRHEA: 0
SPUTUM PRODUCTION: 0
DIARRHEA: 0
SORE THROAT: 0
COUGH: 0
EYES NEGATIVE: 1
NAUSEA: 0
SINUS PRESSURE: 0
ABDOMINAL PAIN: 0
SHORTNESS OF BREATH: 1

## 2020-09-09 ASSESSMENT — VISUAL ACUITY: OU: 1

## 2020-09-09 NOTE — PROGRESS NOTES
always using accessory muscles with extended expiration. Patient utilizes her nebulizer DuoNeb at least once per day. Today I discussed with patient adding a daily inhaler to help with inflammation. We will see how she does with this, and moving forward we can we add if needed. Patient states pulmonology did not discuss doing any type of daily inhalers at her recent appointment. Care team includes:  Cardiology, Dr. Angelique León. Patient is to follow-up again in 6 months. She recently underwent a cardiac catheterization which was unremarkable. Podiatrist, every 3 months for nail clippings and diabetic foot exams. Pulmonary, Dr. Nicola Bishop. Her first appointment was in July. He does not feel that the nodules found are of concern at this time, more benign. He will continue to monitor moving forward. Nephrologist, Dr Ginger Rivera, follows every 6 months. Hypertension   This is a chronic problem. The current episode started more than 1 year ago. The problem is controlled. Associated symptoms include malaise/fatigue and shortness of breath. Pertinent negatives include no anxiety, chest pain, headaches, palpitations or peripheral edema. Past treatments include ACE inhibitors and direct vasodilators. The current treatment provides moderate improvement. Compliance problems include diet and exercise. Shortness of Breath   This is a chronic problem. The current episode started more than 1 year ago. The problem occurs constantly. The problem has been waxing and waning. Associated symptoms include wheezing. Pertinent negatives include no abdominal pain, chest pain, ear pain, fever, headaches, leg pain, leg swelling, rash, rhinorrhea, sore throat or sputum production. Exacerbated by: unsure. Risk factors: hisotry PE, on coumadin  She has tried beta agonist inhalers, cool air and rest for the symptoms. The treatment provided mild relief.  Her past medical history is significant for asthma, chronic lung disease, COPD, PE and pneumonia. Hyperlipidemia   This is a chronic problem. The current episode started more than 1 year ago. The problem is controlled. Recent lipid tests were reviewed and are variable. Exacerbating diseases include diabetes and obesity. Associated symptoms include shortness of breath. Pertinent negatives include no chest pain, leg pain or myalgias. Current antihyperlipidemic treatment includes statins. The current treatment provides moderate improvement of lipids. Compliance problems include adherence to diet and adherence to exercise. Risk factors for coronary artery disease include diabetes mellitus, dyslipidemia, hypertension, obesity, a sedentary lifestyle and post-menopausal.       Vitals:    20 0802   BP: 120/80   Pulse: 84   Temp: 95.7 °F (35.4 °C)   TempSrc: Temporal   SpO2: 98%   Weight: 259 lb (117.5 kg)      Past Medical History:   Diagnosis Date    Anxiety     Chronic kidney disease     COPD (chronic obstructive pulmonary disease) (HCC)     Depression     Hyperlipidemia     Hypertension     Obesity     Osteoarthritis     Proteinuria     Pulmonary embolism (HCC)     Sleep apnea     c-pap.  Doesn't use everynight    Type 2 diabetes mellitus without complication (HCC)     Type II or unspecified type diabetes mellitus without mention of complication, not stated as uncontrolled     Von Willebrand disease [facility])       Past Surgical History:   Procedure Laterality Date    APPENDECTOMY       SECTION      x3, 1977, 1979,     COLONOSCOPY  2018    with biopsy    COLONOSCOPY N/A 2018    COLONOSCOPY performed by Branden Almendarez MD at [address] CATH LAB PROCEDURE      EYE SURGERY Bilateral     cataracts    EYE SURGERY Bilateral     glaucoma    TONSILLECTOMY AND ADENOIDECTOMY       Family History   Problem Relation Age of Onset    Hypertension Mother     Liver Disease Mother     Cancer Father         stomach    Diabetes Sister    [facility] Cancer Brother         kidney    No Known Problems Maternal Grandmother     No Known Problems Maternal Grandfather     No Known Problems Paternal Grandmother     No Known Problems Paternal Grandfather     Other Brother         AIDS     Social History     Tobacco Use    Smoking status: Former Smoker     Packs/day: 0.25     Years: 7.00     Pack years: 1.75     Types: Cigarettes     Start date: 10/16/1977     Last attempt to quit: 1983     Years since quittin.7    Smokeless tobacco: Never Used   Substance Use Topics    Alcohol use: No      Current Outpatient Medications   Medication Sig Dispense Refill    fluticasone-salmeterol (ADVAIR DISKUS) 100-50 MCG/DOSE diskus inhaler Inhale 1 puff into the lungs 2 times daily 60 each 2    [START ON 2020] oxyCODONE-acetaminophen (PERCOCET) 5-325 MG per tablet Take 1 tablet by mouth 2 times daily as needed for Pain for up to 30 days.  40 tablet 0    albuterol sulfate  (90 Base) MCG/ACT inhaler Inhale 2 puffs into the lungs every 4 hours as needed for Wheezing or Shortness of Breath (AND/OR COUGH) 18 g 1    isosorbide mononitrate (IMDUR) 30 MG extended release tablet take 1 tablet by mouth once daily 30 tablet 5    omeprazole (PRILOSEC) 20 MG delayed release capsule take 1 capsule by mouth once daily 30 capsule 5    glimepiride (AMARYL) 4 MG tablet take 1 tablet by mouth twice a day 60 tablet 5    atorvastatin (LIPITOR) 40 MG tablet take 1 tablet by mouth once daily 30 tablet 5    JANUVIA 100 MG tablet take 1 tablet by mouth once daily 30 tablet 5    lisinopril (PRINIVIL;ZESTRIL) 20 MG tablet take 1 tablet by mouth once daily 30 tablet 5    warfarin (COUMADIN) 2 MG tablet take 2 mg daily, or as directed otherwise 30 tablet 2    Cholecalciferol (RA VITAMIN D-3) 50 MCG (2000 UT) CAPS Take 1 capsule by mouth daily 30 capsule 5    ipratropium-albuterol (DUONEB) 0.5-2.5 (3) MG/3ML SOLN nebulizer solution Inhale 3 mLs into the lungs every 6 hours as needed for Shortness of Breath 360 mL 0    blood glucose test strips (ONE TOUCH ULTRA TEST) strip TEST three times a day 100 strip 5    hydrALAZINE (APRESOLINE) 50 MG tablet Take 1 tablet by mouth 2 times daily 180 tablet 1    Misc. Devices MISC 1 PAIR OF DIABETIC SHOES (1 LEFT/ 1 RIGHT)  1-3 PAIRS OF INSERTS (LEFT/ RIGHT) 2 each 0    furosemide (LASIX) 20 MG tablet Take 1 tablet by mouth daily 30 tablet 5    metFORMIN (GLUCOPHAGE-XR) 500 MG extended release tablet Take 2 tablets by mouth 2 times daily 120 tablet 5    Lancets (BD LANCET ULTRAFINE 30G) MISC Use to check sugars twice daily and as needed 120 each 3    Alcohol Swabs (ALCOHOL PREP) 70 % PADS Use twice daily and as needed to check blood sugars 120 each 3    Blood Pressure Monitoring KIT Use to check blood pressure daily and as needed 1 kit 0    risperiDONE (RISPERDAL) 2 MG tablet Take 1 tablet by mouth nightly      allopurinol (ZYLOPRIM) 100 MG tablet Take 100 mg by mouth daily       fluticasone (FLONASE) 50 MCG/ACT nasal spray 1 spray by Nasal route daily 1 Bottle 3    buPROPion (WELLBUTRIN XL) 300 MG XL tablet Take 300 mg by mouth every morning.  buPROPion (WELLBUTRIN XL) 150 MG XL tablet Take 150 mg by mouth every morning.  febuxostat (ULORIC) 40 MG TABS tablet take 1 tablet by mouth once daily (Patient not taking: Reported on 9/9/2020) 30 tablet 5     No current facility-administered medications for this visit.       No Known Allergies    Health Maintenance   Topic Date Due    Diabetic retinal exam  12/09/2020 (Originally 7/24/2020)    A1C test (Diabetic or Prediabetic)  05/06/2021    Lipid screen  05/07/2021    Potassium monitoring  05/07/2021    Creatinine monitoring  05/07/2021    Diabetic foot exam  07/27/2021    Breast cancer screen  10/03/2021    Cervical cancer screen  11/16/2022    DTaP/Tdap/Td vaccine (2 - Td) 08/04/2026    Colon cancer screen colonoscopy  02/16/2028    Flu vaccine  Completed    Shingles Vaccine  Completed    Pneumococcal 0-64 years Vaccine  Completed    Hepatitis C screen  Addressed    HIV screen  Addressed    Hepatitis A vaccine  Aged Out    Hib vaccine  Aged Out    Meningococcal (ACWY) vaccine  Aged Out       Subjective:      Review of Systems   Constitutional: Positive for activity change, appetite change and malaise/fatigue. Negative for chills, fatigue and fever. HENT: Negative for congestion, ear pain, postnasal drip, rhinorrhea, sinus pressure, sneezing and sore throat. Eyes: Negative. Respiratory: Positive for shortness of breath and wheezing. Negative for cough, sputum production and chest tightness. Cardiovascular: Negative for chest pain, palpitations and leg swelling. Gastrointestinal: Negative for abdominal pain, blood in stool, constipation, diarrhea and nausea. Endocrine: Negative. Genitourinary: Positive for frequency. Negative for difficulty urinating, dysuria, hematuria and urgency. Musculoskeletal: Negative for arthralgias, back pain, gait problem, joint swelling and myalgias. Generalized arthritis   Skin: Negative for rash and wound. Allergic/Immunologic: Negative for environmental allergies. Neurological: Negative for dizziness, syncope, light-headedness, numbness and headaches. Hematological: Negative. Psychiatric/Behavioral: Negative for agitation, sleep disturbance (CPAP HS) and suicidal ideas. The patient is nervous/anxious. Objective:     Physical Exam  Vitals signs and nursing note reviewed. Constitutional:       General: She is not in acute distress. Appearance: Normal appearance. She is well-developed and well-groomed. She is morbidly obese. She is not ill-appearing or toxic-appearing. HENT:      Head: Normocephalic. Right Ear: Hearing, tympanic membrane, ear canal and external ear normal. No middle ear effusion. There is no impacted cerumen. Tympanic membrane is not erythematous, retracted or bulging. Left Ear: Hearing, tympanic membrane, ear canal and external ear normal.  No middle ear effusion. There is no impacted cerumen. Tympanic membrane is not erythematous, retracted or bulging. Nose: Nose normal. No mucosal edema, congestion or rhinorrhea. Right Sinus: No maxillary sinus tenderness or frontal sinus tenderness. Left Sinus: No maxillary sinus tenderness or frontal sinus tenderness. Mouth/Throat:      Lips: Pink. Mouth: Mucous membranes are moist.      Dentition: Has dentures. Pharynx: Oropharynx is clear. No oropharyngeal exudate, posterior oropharyngeal erythema or uvula swelling. Tonsils: No tonsillar exudate. Eyes:      General: Lids are normal. Vision grossly intact. No allergic shiner. Extraocular Movements: Extraocular movements intact. Conjunctiva/sclera: Conjunctivae normal.      Pupils: Pupils are equal, round, and reactive to light. Neck:      Musculoskeletal: Full passive range of motion without pain and normal range of motion. No neck rigidity or pain with movement. Cardiovascular:      Rate and Rhythm: Normal rate and regular rhythm. No extrasystoles are present. Pulses: Normal pulses. Radial pulses are 2+ on the right side and 2+ on the left side. Dorsalis pedis pulses are 2+ on the right side and 2+ on the left side. Heart sounds: S1 normal and S2 normal. Heart sounds are distant. No murmur. Pulmonary:      Effort: Pulmonary effort is normal. Prolonged expiration present. No accessory muscle usage or respiratory distress. Breath sounds: Normal breath sounds. Decreased air movement present. No decreased breath sounds, wheezing, rhonchi or rales. Abdominal:      General: Abdomen is protuberant. Bowel sounds are normal. There is no distension. Palpations: Abdomen is soft. Tenderness: There is no abdominal tenderness. Musculoskeletal:      Right lower leg: No edema. Left lower leg: No edema. Comments: Full active and passive range of motion. No use of cane or walker for ambulation. Lymphadenopathy:      Cervical: No cervical adenopathy. Skin:     General: Skin is warm and dry. Neurological:      General: No focal deficit present. Mental Status: She is alert and oriented to person, place, and time. Sensory: Sensation is intact. Motor: Motor function is intact. Coordination: Coordination is intact. Gait: Gait is intact. Psychiatric:         Attention and Perception: Attention and perception normal.         Mood and Affect: Mood and affect normal.         Speech: Speech normal.         Behavior: Behavior normal. Behavior is cooperative. Thought Content: Thought content normal.         Cognition and Memory: Cognition and memory normal.         Judgment: Judgment normal.          Assessment:      1. Mucopurulent chronic bronchitis (HCC)  - fluticasone-salmeterol (ADVAIR DISKUS) 100-50 MCG/DOSE diskus inhaler; Inhale 1 puff into the lungs 2 times daily  Dispense: 60 each; Refill: 2    2. ANRCISO on CPAP  -Stable, monitered     3. Controlled type 2 diabetes mellitus with stage 3 chronic kidney disease, without long-term current use of insulin ([facility])  -Stable, medicated, monitered   - Comprehensive Metabolic Panel; Future  - Hemoglobin A1C; Future    4. Morbidly obese ([facility])  -Stable, monitered     5. Primary osteoarthritis involving multiple joints  -Stable, monitered   - oxyCODONE-acetaminophen (PERCOCET) 5-325 MG per tablet; Take 1 tablet by mouth 2 times daily as needed for Pain for up to 30 days. Dispense: 40 tablet; Refill: 0       Plan:      Return in about 3 months (around 12/9/2020).   Orders Placed This Encounter   Procedures    Comprehensive Metabolic Panel     Standing Status:   Future     Standing Expiration Date:   9/9/2021    Hemoglobin A1C     Standing Status:   Future     Standing Expiration Date:   9/9/2021     Orders Placed This Encounter

## 2020-09-14 ENCOUNTER — ANTI-COAG VISIT (OUTPATIENT)
Dept: FAMILY MEDICINE CLINIC | Age: 60
End: 2020-09-14
Payer: MEDICARE

## 2020-09-14 ENCOUNTER — HOSPITAL ENCOUNTER (OUTPATIENT)
Age: 60
Setting detail: SPECIMEN
Discharge: HOME | End: 2020-09-14
Payer: MEDICARE

## 2020-09-14 VITALS — WEIGHT: 259 LBS | TEMPERATURE: 96.5 F | BODY MASS INDEX: 47.66 KG/M2 | HEIGHT: 62 IN

## 2020-09-14 LAB
ALBUMIN SERPL-MCNC: 4 G/DL (ref 3.5–5.2)
ALBUMIN/GLOBULIN RATIO: 1.4 (ref 1–2.5)
ALP BLD-CCNC: 77 U/L (ref 35–104)
ALT SERPL-CCNC: 31 U/L (ref 5–33)
ANION GAP SERPL CALCULATED.3IONS-SCNC: 10 MMOL/L (ref 9–17)
ANION GAP SERPL CALCULATED.3IONS-SCNC: 10 MMOL/L (ref 9–17)
AST SERPL-CCNC: 24 U/L
BILIRUB SERPL-MCNC: 0.22 MG/DL (ref 0.3–1.2)
BUN BLDV-MCNC: 13 MG/DL (ref 8–23)
BUN BLDV-MCNC: 13 MG/DL (ref 8–23)
BUN/CREAT BLD: ABNORMAL (ref 9–20)
BUN/CREAT BLD: ABNORMAL (ref 9–20)
CALCIUM SERPL-MCNC: 8.9 MG/DL (ref 8.6–10.4)
CALCIUM SERPL-MCNC: 8.9 MG/DL (ref 8.6–10.4)
CHLORIDE BLD-SCNC: 107 MMOL/L (ref 98–107)
CHLORIDE BLD-SCNC: 107 MMOL/L (ref 98–107)
CO2: 23 MMOL/L (ref 20–31)
CO2: 23 MMOL/L (ref 20–31)
CREAT SERPL-MCNC: 1.01 MG/DL (ref 0.5–0.9)
CREAT SERPL-MCNC: 1.01 MG/DL (ref 0.5–0.9)
CREATININE URINE: 82.3 MG/DL (ref 28–217)
GFR AFRICAN AMERICAN: >60 ML/MIN
GFR AFRICAN AMERICAN: >60 ML/MIN
GFR NON-AFRICAN AMERICAN: 56 ML/MIN
GFR NON-AFRICAN AMERICAN: 56 ML/MIN
GFR SERPL CREATININE-BSD FRML MDRD: ABNORMAL ML/MIN/{1.73_M2}
GLUCOSE BLD-MCNC: 150 MG/DL (ref 70–99)
GLUCOSE BLD-MCNC: 150 MG/DL (ref 70–99)
HCT VFR BLD CALC: 39.7 % (ref 36.3–47.1)
HEMOGLOBIN: 12.2 G/DL (ref 11.9–15.1)
INTERNATIONAL NORMALIZATION RATIO, POC: 2.7
MCH RBC QN AUTO: 29.4 PG (ref 25.2–33.5)
MCHC RBC AUTO-ENTMCNC: 30.7 G/DL (ref 28.4–34.8)
MCV RBC AUTO: 95.7 FL (ref 82.6–102.9)
NRBC AUTOMATED: 0 PER 100 WBC
PDW BLD-RTO: 14.6 % (ref 11.8–14.4)
PLATELET # BLD: 262 K/UL (ref 138–453)
PMV BLD AUTO: 8.8 FL (ref 8.1–13.5)
POTASSIUM SERPL-SCNC: 4.1 MMOL/L (ref 3.7–5.3)
POTASSIUM SERPL-SCNC: 4.1 MMOL/L (ref 3.7–5.3)
PROTHROMBIN TIME, POC: 32.7
RBC # BLD: 4.15 M/UL (ref 3.95–5.11)
SODIUM BLD-SCNC: 140 MMOL/L (ref 135–144)
SODIUM BLD-SCNC: 140 MMOL/L (ref 135–144)
TOTAL PROTEIN, URINE: 8 MG/DL
TOTAL PROTEIN: 6.8 G/DL (ref 6.4–8.3)
URINE TOTAL PROTEIN CREATININE RATIO: 0.1 (ref 0–0.2)
WBC # BLD: 7.2 K/UL (ref 3.5–11.3)

## 2020-09-14 PROCEDURE — 93793 ANTICOAG MGMT PT WARFARIN: CPT | Performed by: NURSE PRACTITIONER

## 2020-09-14 PROCEDURE — 85610 PROTHROMBIN TIME: CPT | Performed by: NURSE PRACTITIONER

## 2020-09-14 RX ORDER — TIOTROPIUM BROMIDE 18 UG/1
18 CAPSULE ORAL; RESPIRATORY (INHALATION) DAILY
Qty: 90 CAPSULE | Refills: 1 | Status: SHIPPED | OUTPATIENT
Start: 2020-09-14 | End: 2021-03-11

## 2020-09-14 NOTE — PROGRESS NOTES
Please call the patient know that I switched the inhaler that I ordered at her appointment of the Advair to Spiriva. I already sent this to her pharmacy. It was noted to me that the patient's Advair was not covered by her insurance and was still quite expensive out-of-pocket. When I ordered patient Spiriva it was noted that this medication will be covered under her plan. Please have her contact me if it is still expensive and unable to afford.

## 2020-09-15 LAB
ESTIMATED AVERAGE GLUCOSE: 169 MG/DL
HBA1C MFR BLD: 7.5 % (ref 4–6)

## 2020-09-15 RX ORDER — ALLOPURINOL 100 MG/1
100 TABLET ORAL DAILY
Qty: 90 TABLET | Refills: 1 | Status: SHIPPED | OUTPATIENT
Start: 2020-09-15 | End: 2021-03-11

## 2020-09-15 RX ORDER — METFORMIN HYDROCHLORIDE 500 MG/1
TABLET, EXTENDED RELEASE ORAL
Qty: 120 TABLET | Refills: 5 | Status: SHIPPED | OUTPATIENT
Start: 2020-09-15 | End: 2021-03-11

## 2020-09-15 RX ORDER — FUROSEMIDE 20 MG/1
TABLET ORAL
Qty: 30 TABLET | Refills: 5 | Status: SHIPPED | OUTPATIENT
Start: 2020-09-15 | End: 2021-03-11

## 2020-09-15 NOTE — TELEPHONE ENCOUNTER
Patient requesting refill of Uloric for her gout. She is having a flare up and will need medication sent to [facility] [place].

## 2020-09-15 NOTE — TELEPHONE ENCOUNTER
LOV 9/9/20  RTO 3 months; F/U scheduled  LRF 2/27/20    Health Maintenance   Topic Date Due    Diabetic retinal exam  12/09/2020 (Originally 7/24/2020)    A1C test (Diabetic or Prediabetic)  05/06/2021    Lipid screen  05/07/2021    Diabetic foot exam  07/27/2021    Potassium monitoring  09/14/2021    Creatinine monitoring  09/14/2021    Breast cancer screen  10/03/2021    Cervical cancer screen  11/16/2022    DTaP/Tdap/Td vaccine (2 - Td) 08/04/2026    Colon cancer screen colonoscopy  02/16/2028    Flu vaccine  Completed    Shingles Vaccine  Completed    Pneumococcal 0-64 years Vaccine  Completed    Hepatitis C screen  Addressed    HIV screen  Addressed    Hepatitis A vaccine  Aged Out    Hib vaccine  Aged Out    Meningococcal (ACWY) vaccine  Aged Out             (applicable per patient's age: Cancer Screenings, Depression Screening, Fall Risk Screening, Immunizations)    Hemoglobin A1C (%)   Date Value   09/14/2020 7.5 (H)   05/06/2020 7.4 (H)   04/10/2020 6.8     Microalb/Crt.  Ratio (mcg/mg creat)   Date Value   04/08/2019 CANNOT BE CALCULATED     LDL Cholesterol (mg/dL)   Date Value   05/07/2020 68     LDL Calculated (mg/dL)   Date Value   04/10/2020 59     AST (U/L)   Date Value   09/14/2020 24     ALT (U/L)   Date Value   09/14/2020 31     BUN (mg/dL)   Date Value   09/14/2020 13   09/14/2020 13      (goal A1C is < 7)   (goal LDL is <100) need 30-50% reduction from baseline     BP Readings from Last 3 Encounters:   09/09/20 120/80   08/07/20 (!) 148/90   06/30/20 120/70    (goal /80)      All Future Testing planned in [facility]:  Lab Frequency Next Occurrence   Basic Metabolic Panel Once 13/48/9316   POCT INR         Next Visit Date:  Future Appointments   Date Time Provider Mark Junior   10/12/2020  8:50 AM Halie Varghese MD [facility] None   10/21/2020  9:30 AM Raulito Sotelo DPM [facility]   12/10/2020  8:30 AM Trisha Munroe, APRN - CNP [facility] Patient Active Problem List:     Essential hypertension     Hyperlipidemia     Osteoarthritis     Depression     Obesity     CKD (chronic kidney disease) stage 3, GFR 30-59 ml/min (HCC)     Proteinuria     Controlled type 2 diabetes mellitus with stage 3 chronic kidney disease, without long-term current use of insulin (HCC)     History of DVT of lower extremity     NARCISO on CPAP     Vitamin D deficiency     Major depressive disorder, recurrent episode, severe, with psychotic behavior ([address]     Major depressive disorder, recurrent, in partial remission ([address]     Major depressive disorder, recurrent, severe with psychotic symptoms ([address]     Multiple lung nodules on CT     Hypomagnesemia     Pulmonary embolism (HCC)     Urinary tract infection without hematuria     SOB (shortness of breath)     Fracture of ankle     Anxiety     Chronic obstructive pulmonary disease (HCC)     Precordial pain     History of pulmonary embolism     Decreased pedal pulses     Family history of abdominal aortic aneurysm     Normal coronary arteries     Long term (current) use of anticoagulants     AAA (abdominal aortic aneurysm) without rupture ([address]

## 2020-09-28 ENCOUNTER — ANTI-COAG VISIT (OUTPATIENT)
Dept: FAMILY MEDICINE CLINIC | Age: 60
End: 2020-09-28
Payer: MEDICARE

## 2020-09-28 VITALS — OXYGEN SATURATION: 95 % | RESPIRATION RATE: 22 BRPM | HEART RATE: 95 BPM | TEMPERATURE: 96.5 F

## 2020-09-28 LAB
INTERNATIONAL NORMALIZATION RATIO, POC: 2.7
PROTHROMBIN TIME, POC: 31.8

## 2020-09-28 PROCEDURE — G8417 CALC BMI ABV UP PARAM F/U: HCPCS | Performed by: NURSE PRACTITIONER

## 2020-09-28 PROCEDURE — 85610 PROTHROMBIN TIME: CPT | Performed by: NURSE PRACTITIONER

## 2020-09-28 PROCEDURE — 93793 ANTICOAG MGMT PT WARFARIN: CPT | Performed by: NURSE PRACTITIONER

## 2020-09-28 PROCEDURE — G8428 CUR MEDS NOT DOCUMENT: HCPCS | Performed by: NURSE PRACTITIONER

## 2020-09-28 NOTE — PROGRESS NOTES
Patient's INR is therapeutic at 2.7. Please have her continue 2 mg of Coumadin on a daily basis. She is to recheck her INR in 4 weeks.

## 2020-10-15 RX ORDER — WARFARIN SODIUM 2 MG/1
TABLET ORAL
Qty: 30 TABLET | Refills: 2 | Status: SHIPPED | OUTPATIENT
Start: 2020-10-15 | End: 2021-01-12

## 2020-10-15 RX ORDER — BLOOD SUGAR DIAGNOSTIC
STRIP MISCELLANEOUS
Qty: 100 STRIP | Refills: 5 | Status: SHIPPED | OUTPATIENT
Start: 2020-10-15 | End: 2021-04-12

## 2020-10-15 NOTE — TELEPHONE ENCOUNTER
Last visit: 9/9/20  Last Med refill: Coumadin 7/20/20  Test strips 4/15/20    Next Visit Date:  Future Appointments   Date Time Provider Mark Junior   10/21/2020  9:30 AM BRODERICK Ramirez [facility]   10/28/2020  8:30 AM SCHEDULE, [facility]ILVANO [facility]ARD[facility]   12/10/2020  8:30 AM Trisha White, APRN - CNP [facility]       Health Maintenance   Topic Date Due    Diabetic retinal exam  12/09/2020 (Originally 7/24/2020)    Lipid screen  05/07/2021    Diabetic foot exam  07/27/2021    A1C test (Diabetic or Prediabetic)  09/14/2021    Potassium monitoring  09/14/2021    Creatinine monitoring  09/14/2021    Breast cancer screen  10/03/2021    Cervical cancer screen  11/16/2022    DTaP/Tdap/Td vaccine (2 - Td) 08/04/2026    Colon cancer screen colonoscopy  02/16/2028    Flu vaccine  Completed    Shingles Vaccine  Completed    Pneumococcal 0-64 years Vaccine  Completed    Hepatitis C screen  Addressed    HIV screen  Addressed    Hepatitis A vaccine  Aged Out    Hib vaccine  Aged Out    Meningococcal (ACWY) vaccine  Aged Out       Hemoglobin A1C (%)   Date Value   09/14/2020 7.5 (H)   05/06/2020 7.4 (H)   04/10/2020 6.8             ( goal A1C is < 7)   Microalb/Crt.  Ratio (mcg/mg creat)   Date Value   04/08/2019 CANNOT BE CALCULATED     LDL Cholesterol (mg/dL)   Date Value   05/07/2020 68   08/01/2019 51     LDL Calculated (mg/dL)   Date Value   04/10/2020 59       (goal LDL is <100)   AST (U/L)   Date Value   09/14/2020 24     ALT (U/L)   Date Value   09/14/2020 31     BUN (mg/dL)   Date Value   09/14/2020 13   09/14/2020 13     BP Readings from Last 3 Encounters:   10/12/20 138/78   09/09/20 120/80   08/07/20 (!) 148/90          (goal 120/80)    All Future Testing planned in [facility]  Lab Frequency Next Occurrence   Basic Metabolic Panel Once 75/50/2932   CBC Once 09/12/2021   Protein / Creatinine Ratio, Urine Once 09/12/2021   POCT INR     Basic Metabolic Panel Every 6 Months 3/26/2021, 9/10/2021               Patient Active Problem List:     Essential hypertension     Hyperlipidemia     Osteoarthritis     Depression     Obesity     CKD (chronic kidney disease) stage 3, GFR 30-59 ml/min (HCC)     Proteinuria     Controlled type 2 diabetes mellitus with stage 3 chronic kidney disease, without long-term current use of insulin (HCC)     History of DVT of lower extremity     NARCISO on CPAP     Vitamin D deficiency     Major depressive disorder, recurrent episode, severe, with psychotic behavior ([address]     Major depressive disorder, recurrent, in partial remission ([address]     Major depressive disorder, recurrent, severe with psychotic symptoms ([address]     Multiple lung nodules on CT     Hypomagnesemia     Pulmonary embolism (HCC)     Urinary tract infection without hematuria     SOB (shortness of breath)     Fracture of ankle     Anxiety     Chronic obstructive pulmonary disease (HCC)     Precordial pain     History of pulmonary embolism     Decreased pedal pulses     Family history of abdominal aortic aneurysm     Normal coronary arteries     Long term (current) use of anticoagulants     AAA (abdominal aortic aneurysm) without rupture ([address]

## 2020-10-16 RX ORDER — NALOXONE HYDROCHLORIDE 4 MG/.1ML
1 SPRAY NASAL PRN
Qty: 1 EACH | Refills: 5 | Status: SHIPPED | OUTPATIENT
Start: 2020-10-16 | End: 2021-02-16 | Stop reason: SDUPTHER

## 2020-10-16 RX ORDER — OXYCODONE HYDROCHLORIDE AND ACETAMINOPHEN 5; 325 MG/1; MG/1
TABLET ORAL
Qty: 40 TABLET | Refills: 0 | Status: SHIPPED | OUTPATIENT
Start: 2020-10-16 | End: 2020-11-18

## 2020-10-16 NOTE — TELEPHONE ENCOUNTER
Lov: 9/28/20  Lrf: 9/18/20  Na: 10/28/20  Health Maintenance   Topic Date Due    Diabetic retinal exam  12/09/2020 (Originally 7/24/2020)    Lipid screen  05/07/2021    Diabetic foot exam  07/27/2021    A1C test (Diabetic or Prediabetic)  09/14/2021    Potassium monitoring  09/14/2021    Creatinine monitoring  09/14/2021    Breast cancer screen  10/03/2021    Cervical cancer screen  11/16/2022    DTaP/Tdap/Td vaccine (2 - Td) 08/04/2026    Colon cancer screen colonoscopy  02/16/2028    Flu vaccine  Completed    Shingles Vaccine  Completed    Pneumococcal 0-64 years Vaccine  Completed    Hepatitis C screen  Addressed    HIV screen  Addressed    Hepatitis A vaccine  Aged Out    Hib vaccine  Aged Out    Meningococcal (ACWY) vaccine  Aged Out             (applicable per patient's age: Cancer Screenings, Depression Screening, Fall Risk Screening, Immunizations)    Hemoglobin A1C (%)   Date Value   09/14/2020 7.5 (H)   05/06/2020 7.4 (H)   04/10/2020 6.8     Microalb/Crt.  Ratio (mcg/mg creat)   Date Value   04/08/2019 CANNOT BE CALCULATED     LDL Cholesterol (mg/dL)   Date Value   05/07/2020 68     LDL Calculated (mg/dL)   Date Value   04/10/2020 59     AST (U/L)   Date Value   09/14/2020 24     ALT (U/L)   Date Value   09/14/2020 31     BUN (mg/dL)   Date Value   09/14/2020 13   09/14/2020 13      (goal A1C is < 7)   (goal LDL is <100) need 30-50% reduction from baseline     BP Readings from Last 3 Encounters:   10/12/20 138/78   09/09/20 120/80   08/07/20 (!) 148/90    (goal /80)      All Future Testing planned in [facility]:  Lab Frequency Next Occurrence   Basic Metabolic Panel Once 01/52/9293   CBC Once 09/12/2021   Protein / Creatinine Ratio, Urine Once 09/12/2021   POCT INR     Basic Metabolic Panel Every 6 Months 3/26/2021, 9/10/2021       Next Visit Date:  Future Appointments   Date Time Provider Mark Junior   10/21/2020  9:30 AM [facility]   10/28/2020 8:30 AM SCHEDULE, [facility]   12/10/2020  8:30 AM Trisha Fong, APRN - CNP [place] [facility]            Patient Active Problem List:     Essential hypertension     Hyperlipidemia     Osteoarthritis     Depression     Obesity     CKD (chronic kidney disease) stage 3, GFR 30-59 ml/min (HCC)     Proteinuria     Controlled type 2 diabetes mellitus with stage 3 chronic kidney disease, without long-term current use of insulin ([facility])     History of DVT of lower extremity     NARCISO on CPAP     Vitamin D deficiency     Major depressive disorder, recurrent episode, severe, with psychotic behavior ([address]     Major depressive disorder, recurrent, in partial remission ([address]     Major depressive disorder, recurrent, severe with psychotic symptoms ([address]     Multiple lung nodules on CT     Hypomagnesemia     Pulmonary embolism (HCC)     Urinary tract infection without hematuria     SOB (shortness of breath)     Fracture of ankle     Anxiety     Chronic obstructive pulmonary disease (HCC)     Precordial pain     History of pulmonary embolism     Decreased pedal pulses     Family history of abdominal aortic aneurysm     Normal coronary arteries     Long term (current) use of anticoagulants     AAA (abdominal aortic aneurysm) without rupture ([address]

## 2020-10-20 ENCOUNTER — TELEPHONE (OUTPATIENT)
Dept: FAMILY MEDICINE CLINIC | Age: 60
End: 2020-10-20

## 2020-10-20 RX ORDER — NEBULIZER ACCESSORIES
1 KIT MISCELLANEOUS 4 TIMES DAILY PRN
Qty: 1 KIT | Refills: 0 | Status: SHIPPED | OUTPATIENT
Start: 2020-10-20 | End: 2022-05-03 | Stop reason: SDUPTHER

## 2020-10-20 NOTE — TELEPHONE ENCOUNTER
Patient called and asked for an order to be sent to [facility] at Fax # [phone] Phone # [phone]. Order placed and pended for nebulizer administration set.

## 2020-10-21 ENCOUNTER — OFFICE VISIT (OUTPATIENT)
Dept: PODIATRY | Age: 60
End: 2020-10-21
Payer: MEDICARE

## 2020-10-21 VITALS — WEIGHT: 260 LBS | HEIGHT: 62 IN | BODY MASS INDEX: 47.84 KG/M2 | TEMPERATURE: 97 F

## 2020-10-21 PROCEDURE — 2022F DILAT RTA XM EVC RTNOPTHY: CPT | Performed by: PODIATRIST

## 2020-10-21 PROCEDURE — 11721 DEBRIDE NAIL 6 OR MORE: CPT | Performed by: PODIATRIST

## 2020-10-21 PROCEDURE — 3017F COLORECTAL CA SCREEN DOC REV: CPT | Performed by: PODIATRIST

## 2020-10-21 PROCEDURE — 1036F TOBACCO NON-USER: CPT | Performed by: PODIATRIST

## 2020-10-21 PROCEDURE — 99213 OFFICE O/P EST LOW 20 MIN: CPT | Performed by: PODIATRIST

## 2020-10-21 PROCEDURE — G8484 FLU IMMUNIZE NO ADMIN: HCPCS | Performed by: PODIATRIST

## 2020-10-21 PROCEDURE — 3051F HG A1C>EQUAL 7.0%<8.0%: CPT | Performed by: PODIATRIST

## 2020-10-21 PROCEDURE — G8427 DOCREV CUR MEDS BY ELIG CLIN: HCPCS | Performed by: PODIATRIST

## 2020-10-21 PROCEDURE — G8417 CALC BMI ABV UP PARAM F/U: HCPCS | Performed by: PODIATRIST

## 2020-10-21 NOTE — PROGRESS NOTES
pressure daily and as needed 1 kit 0    risperiDONE (RISPERDAL) 2 MG tablet Take 1 tablet by mouth nightly      fluticasone (FLONASE) 50 MCG/ACT nasal spray 1 spray by Nasal route daily 1 Bottle 3    buPROPion (WELLBUTRIN XL) 300 MG XL tablet Take 300 mg by mouth every morning.  buPROPion (WELLBUTRIN XL) 150 MG XL tablet Take 150 mg by mouth every morning.  fluticasone-salmeterol (ADVAIR DISKUS) 100-50 MCG/DOSE diskus inhaler Inhale 1 puff into the lungs 2 times daily 60 each 2    hydrALAZINE (APRESOLINE) 50 MG tablet Take 1 tablet by mouth 2 times daily 180 tablet 1    Lancets (BD LANCET ULTRAFINE 30G) MISC Use to check sugars twice daily and as needed 120 each 3    Alcohol Swabs (ALCOHOL PREP) 70 % PADS Use twice daily and as needed to check blood sugars 120 each 3     No current facility-administered medications on file prior to visit. Review of Systems    Review of Systems:   History obtained from chart review and the patient  General ROS: negative for - chills, fatigue, fever, night sweats or weight gain  Constitutional: Negative for chills, diaphoresis, fatigue, fever and unexpected weight change. Musculoskeletal: Positive for arthralgias, gait problem and joint swelling. Neurological ROS: negative for - behavioral changes, confusion, headaches or seizures. Negative for weakness and numbness. Dermatological ROS: negative for - mole changes, rash  Cardiovascular: Negative for leg swelling. Gastrointestinal: Negative for constipation, diarrhea, nausea and vomiting. Objective:  Dermatologic Exam:  Skin lesion/ulceration Absent . Skin No rashes or nodules noted. .   Skin is thin, with flaky sloughing skin as well as decreased hair growth to the lower leg  Small red hemosiderin deposits seen dorsal foot   Musculoskeletal:     1st MPJ ROM decreased, Bilateral.  Muscle strength 5/5, Bilateral.  Pain present upon palpation of toenails 1-5, Bilateral. decreased medial longitudinal arch, Bilateral.  Ankle ROM decreased,Bilateral.    Dorsally contracted digits present digits 2, Bilateral.     Vascular: DP pulses 1/4 bilateral.  PT pulses 0/4 bilateral.   CFT <5 seconds, Bilateral.  Hair growth absent to the level of the digits, Bilateral.  Edema present, Bilateral.  Varicosities absent, Bilateral. Erythema absent, Bilateral    Neurological: Sensation diminshed to light touch to level of digits, Bilateral.  Protective sensation intact 6/10 sites via 5.07/10g [place]addha Monofilament, Bilateral.  negative Tinel's, Bilateral.  negative Valleix sign, Bilateral.      Integument: Warm, dry, supple, Bilateral.  Open lesion absent, Bilateral.  Interdigital maceration absent to web spaces 4, Bilateral.  Nails 1-5 left and 1-5 right thickened > 3.0 mm, dystrophic and crumbly, discolored with subungual debris. Fissures absent, Bilateral.   General: AAO x 3 in NAD.     Derm  Toenail Description  Sites of Onychomycosis Involvement (Check affected area)  [x] [x] [x] [x] [x] [x] [x] [x] [x] [x]  5 4 3 2 1 1 2 3 4 5                          Right                                        Left    Thickness  [x] [x] [x] [x] [x] [x] [x] [x] [x] [x]  5 4 3 2 1 1 2 3 4 5                         Right                                        Left    Dystrophic Changes   [x] [x] [x] [x] [x] [x] [x] [x] [x] [x]  5 4 3 2 1 1 2 3 4 5                         Right                                        Left    Color   [x] [x] [x] [x] [x] [x] [x] [x] [x] [x]  5 4 3 2 1 1 2 3 4 5                          Right                                        Left    Incurvation/Ingrowin   [] [] [] [] [] [] [] [] [] []  5 4 3 2 1 1 2 3 4 5                         Right                                        Left    Inflammation/Pain   [x] [x] [x] [x] [x] [x] [x] [x] [x] [x]  5 4 3 2 1 1 2 3 4 5                         Right                                        Left        Visual inspection:  Deformity: hammertoe deformity sherman feet  amputation: absent  Skin lesions: absent  Edema: right- 2+ pitting edema, left- 2+ pitting edema    Sensory exam:  Monofilament sensation: abnormal - 6/10 via SW 5.07/10g monofilament to the plantar foot bilateral feet    Pulses: abnormal - 1/4 dorsalis pedis pulse and 1/4 Posterior tibial pulse,   Pinprick: Impaired  Proprioception: Impaired  Vibration (128 Hz): Impaired       DM with PVD       [x]Yes    []No      Assessment:  56 y.o. female with:   Diagnosis Orders   1. Type II diabetes mellitus with peripheral circulatory disorder (HCC)  09939 - [place] DEBRIDEMENT OF NAILS, 6 OR MORE    Misc. Devices MISC   2. Dermatophytosis of nail  84886 - VT DEBRIDEMENT OF NAILS, 6 OR MORE    Misc. Devices MISC   3. PVD (peripheral vascular disease) ([facility] [address]  84493 - VT DEBRIDEMENT OF NAILS, 6 OR MORE    Misc. Devices MISC   4. Hammer toes of both feet  19100 - VT DEBRIDEMENT OF NAILS, 6 OR MORE    Misc. Devices MISC   5. Pain in both feet  86535 - VT DEBRIDEMENT OF NAILS, 6 OR MORE    Misc. Devices MISC   6. Edema of lower extremity             Q7   []Yes    []No                Q8   [x]Yes    []No                     Q9   []Yes    []No    Plan:   Pt was evaluated and examined. Patient was given personalized discharge instructions. For the NEW leg swelling  Rx given for compression stockings to be worn during the day. Pt to elevate at night above the heart . Pt to talk to PCP about nephro vs cardiac work up    Patient was fitted in their diabetic shoes and inserts today. I feel that these appliances are medically necessary for my patients well being and in my opinion are both reasonable and necessary to the accepted medical practice in the treatment of the above conditions. Diabetic  shoes prevent the frictional forces on the feet and  from the feet being overloaded and decrease plantar presssure to the forefoot Abnormal foot/leg functions demands mechanical, functional protections and control.   Without the diabetic shoes and inserts, the patient may develop an ulcer to the forefoot. Later on in life, the patient may develop an ulcer which leads to osteomyelitis and infections. These shoes accommodate the toe deformities    Description of the devices: These devices prevent ulcerations on diabetics  Due to the nature of my patients condition, I feel that this therapy is necessary indefinitely, as this is a form of continuous therapy. This is NOT a convenience item. It is my opinion that these devices will preven        Nails 1-10 were debrided sharply in length and thickness with a nipper and , without incident. Pt will follow up in 9 weeks or sooner if any problems arise. Diagnosis was discussed with the pt and all of their questions were answered in detail. Proper foot hygiene and care was discussed with the pt. Informed patient on proper diabetic foot care and importance of tight glycemic control. Patient to check feet daily and contact the office with any questions/problems/concerns. Other comorbidity noted and will be managed by PCP.   10/21/2020    Electronically signed by Randy Turner DPM on 10/21/2020 at 9:25 AM  10/21/2020

## 2020-10-28 ENCOUNTER — ANTI-COAG VISIT (OUTPATIENT)
Dept: FAMILY MEDICINE CLINIC | Age: 60
End: 2020-10-28
Payer: MEDICARE

## 2020-10-28 LAB
INTERNATIONAL NORMALIZATION RATIO, POC: 2.4
PROTHROMBIN TIME, POC: 28.9

## 2020-10-28 PROCEDURE — 93793 ANTICOAG MGMT PT WARFARIN: CPT | Performed by: NURSE PRACTITIONER

## 2020-10-28 PROCEDURE — 85610 PROTHROMBIN TIME: CPT | Performed by: NURSE PRACTITIONER

## 2020-10-28 PROCEDURE — G8428 CUR MEDS NOT DOCUMENT: HCPCS | Performed by: NURSE PRACTITIONER

## 2020-10-28 PROCEDURE — G8417 CALC BMI ABV UP PARAM F/U: HCPCS | Performed by: NURSE PRACTITIONER

## 2020-10-28 NOTE — PROGRESS NOTES
Patient came in office to check INR. No changes have been made. Patients INR was 2.4. She states she takes 2 mg once a daily.

## 2020-11-16 RX ORDER — HYDRALAZINE HYDROCHLORIDE 50 MG/1
50 TABLET, FILM COATED ORAL 2 TIMES DAILY
Qty: 180 TABLET | Refills: 1 | Status: SHIPPED | OUTPATIENT
Start: 2020-11-16 | End: 2021-08-02

## 2020-11-16 NOTE — TELEPHONE ENCOUNTER
LOV 9/9/20  RTO 3 months; F/U scheduled  LRF 4/2/20    Health Maintenance   Topic Date Due    Diabetic retinal exam  12/09/2020 (Originally 7/24/2020)    Lipid screen  05/07/2021    Diabetic foot exam  07/27/2021    A1C test (Diabetic or Prediabetic)  09/14/2021    Potassium monitoring  09/14/2021    Creatinine monitoring  09/14/2021    Breast cancer screen  10/03/2021    Cervical cancer screen  11/16/2022    DTaP/Tdap/Td vaccine (2 - Td) 08/04/2026    Colon cancer screen colonoscopy  02/16/2028    Flu vaccine  Completed    Shingles Vaccine  Completed    Pneumococcal 0-64 years Vaccine  Completed    Hepatitis C screen  Addressed    HIV screen  Addressed    Hepatitis A vaccine  Aged Out    Hib vaccine  Aged Out    Meningococcal (ACWY) vaccine  Aged Out             (applicable per patient's age: Cancer Screenings, Depression Screening, Fall Risk Screening, Immunizations)    Hemoglobin A1C (%)   Date Value   09/14/2020 7.5 (H)   05/06/2020 7.4 (H)   04/10/2020 6.8     Microalb/Crt.  Ratio (mcg/mg creat)   Date Value   04/08/2019 CANNOT BE CALCULATED     LDL Cholesterol (mg/dL)   Date Value   05/07/2020 68     LDL Calculated (mg/dL)   Date Value   04/10/2020 59     AST (U/L)   Date Value   09/14/2020 24     ALT (U/L)   Date Value   09/14/2020 31     BUN (mg/dL)   Date Value   09/14/2020 13   09/14/2020 13      (goal A1C is < 7)   (goal LDL is <100) need 30-50% reduction from baseline     BP Readings from Last 3 Encounters:   10/12/20 138/78   09/09/20 120/80   08/07/20 (!) 148/90    (goal /80)      All Future Testing planned in [facility]:  Lab Frequency Next Occurrence   Basic Metabolic Panel Once 97/25/1438   CBC Once 09/12/2021   Protein / Creatinine Ratio, Urine Once 09/12/2021   POCT INR     Basic Metabolic Panel Every 6 Months 3/26/2021, 9/10/2021       Next Visit Date:  Future Appointments   Date Time Provider Mark Junior   11/30/2020  8:30 AM SCHEDULE, [facility]   12/10/2020  8:30 AM Trisha Bullard, APRN - CNP [facility]   12/23/2020  9:30 AM Enid Wallace DPM [facility]            Patient Active Problem List:     Essential hypertension     Hyperlipidemia     Osteoarthritis     Depression     Obesity     CKD (chronic kidney disease) stage 3, GFR 30-59 ml/min ([facility])     Proteinuria     Controlled type 2 diabetes mellitus with stage 3 chronic kidney disease, without long-term current use of insulin ([facility])     History of DVT of lower extremity     NARCISO on CPAP     Vitamin D deficiency     Major depressive disorder, recurrent episode, severe, with psychotic behavior ([address]     Major depressive disorder, recurrent, in partial remission ([address]     Major depressive disorder, recurrent, severe with psychotic symptoms ([address]     Multiple lung nodules on CT     Hypomagnesemia     Pulmonary embolism (HCC)     Urinary tract infection without hematuria     SOB (shortness of breath)     Fracture of ankle     Anxiety     Chronic obstructive pulmonary disease (HCC)     Precordial pain     History of pulmonary embolism     Decreased pedal pulses     Family history of abdominal aortic aneurysm     Normal coronary arteries     Long term (current) use of anticoagulants     AAA (abdominal aortic aneurysm) without rupture ([address]

## 2020-11-18 RX ORDER — OXYCODONE HYDROCHLORIDE AND ACETAMINOPHEN 5; 325 MG/1; MG/1
1 TABLET ORAL 2 TIMES DAILY PRN
Qty: 40 TABLET | Refills: 0 | Status: SHIPPED | OUTPATIENT
Start: 2020-11-18 | End: 2020-12-17

## 2020-11-18 NOTE — TELEPHONE ENCOUNTER
LOV 9/09/20  RTO 3 months; F/U scheduled  LRF 10/16/20  CSM 9/23/19    Health Maintenance   Topic Date Due    Diabetic retinal exam  12/09/2020 (Originally 7/24/2020)    Lipid screen  05/07/2021    Diabetic foot exam  07/27/2021    A1C test (Diabetic or Prediabetic)  09/14/2021    Potassium monitoring  09/14/2021    Creatinine monitoring  09/14/2021    Breast cancer screen  10/03/2021    Cervical cancer screen  11/16/2022    DTaP/Tdap/Td vaccine (2 - Td) 08/04/2026    Colon cancer screen colonoscopy  02/16/2028    Flu vaccine  Completed    Shingles Vaccine  Completed    Pneumococcal 0-64 years Vaccine  Completed    Hepatitis C screen  Addressed    HIV screen  Addressed    Hepatitis A vaccine  Aged Out    Hib vaccine  Aged Out    Meningococcal (ACWY) vaccine  Aged Out             (applicable per patient's age: Cancer Screenings, Depression Screening, Fall Risk Screening, Immunizations)    Hemoglobin A1C (%)   Date Value   09/14/2020 7.5 (H)   05/06/2020 7.4 (H)   04/10/2020 6.8     Microalb/Crt.  Ratio (mcg/mg creat)   Date Value   04/08/2019 CANNOT BE CALCULATED     LDL Cholesterol (mg/dL)   Date Value   05/07/2020 68     LDL Calculated (mg/dL)   Date Value   04/10/2020 59     AST (U/L)   Date Value   09/14/2020 24     ALT (U/L)   Date Value   09/14/2020 31     BUN (mg/dL)   Date Value   09/14/2020 13   09/14/2020 13      (goal A1C is < 7)   (goal LDL is <100) need 30-50% reduction from baseline     BP Readings from Last 3 Encounters:   10/12/20 138/78   09/09/20 120/80   08/07/20 (!) 148/90    (goal /80)      All Future Testing planned in [facility]:  Lab Frequency Next Occurrence   Basic Metabolic Panel Once 69/43/6272   CBC Once 09/12/2021   Protein / Creatinine Ratio, Urine Once 09/12/2021   POCT INR     Basic Metabolic Panel Every 6 Months 3/26/2021, 9/10/2021       Next Visit Date:  Future Appointments   Date Time Provider Mark Junior   11/30/2020  8:30 AM [facility]   12/10/2020  8:30 AM Tr[facility] Falk[facility] [place] [facility]   12/23/2020  9:30 AM Miley Jacskon DPM [facility]            Patient Active Problem List:     Essential hypertension     Hyperlipidemia     Osteoarthritis     Depression     Obesity     CKD (chronic kidney disease) stage 3, GFR 30-59 ml/min ([facility])     Proteinuria     Controlled type 2 diabetes mellitus with stage 3 chronic kidney disease, without long-term current use of insulin ([facility])     History of DVT of lower extremity     NARCISO on CPAP     Vitamin D deficiency     Major depressive disorder, recurrent episode, severe, with psychotic behavior ([address]     Major depressive disorder, recurrent, in partial remission ([address]     Major depressive disorder, recurrent, severe with psychotic symptoms ([address]     Multiple lung nodules on CT     Hypomagnesemia     Pulmonary embolism (HCC)     Urinary tract infection without hematuria     SOB (shortness of breath)     Fracture of ankle     Anxiety     Chronic obstructive pulmonary disease (HCC)     Precordial pain     History of pulmonary embolism     Decreased pedal pulses     Family history of abdominal aortic aneurysm     Normal coronary arteries     Long term (current) use of anticoagulants     AAA (abdominal aortic aneurysm) without rupture ([address]

## 2020-11-25 ENCOUNTER — TELEPHONE (OUTPATIENT)
Dept: FAMILY MEDICINE CLINIC | Age: 60
End: 2020-11-25

## 2020-11-25 NOTE — TELEPHONE ENCOUNTER
Pt called in asking if it was OK for her to push back her INR to Wed the 10th and just do it at her appt with AD. She did reschedule this INR from 11/30/2020 to 12/7/2020 and is asking if she can push it back to the 10th and get it done then.

## 2020-12-01 ENCOUNTER — TELEPHONE (OUTPATIENT)
Dept: PODIATRY | Age: 60
End: 2020-12-01

## 2020-12-10 ENCOUNTER — OFFICE VISIT (OUTPATIENT)
Dept: FAMILY MEDICINE CLINIC | Age: 60
End: 2020-12-10
Payer: MEDICARE

## 2020-12-10 VITALS
BODY MASS INDEX: 47.63 KG/M2 | OXYGEN SATURATION: 98 % | HEART RATE: 93 BPM | DIASTOLIC BLOOD PRESSURE: 62 MMHG | WEIGHT: 260.4 LBS | SYSTOLIC BLOOD PRESSURE: 102 MMHG | TEMPERATURE: 96.7 F | RESPIRATION RATE: 22 BRPM

## 2020-12-10 LAB
INTERNATIONAL NORMALIZATION RATIO, POC: 1.9
PROTHROMBIN TIME, POC: 22.6

## 2020-12-10 PROCEDURE — 1036F TOBACCO NON-USER: CPT | Performed by: NURSE PRACTITIONER

## 2020-12-10 PROCEDURE — G8417 CALC BMI ABV UP PARAM F/U: HCPCS | Performed by: NURSE PRACTITIONER

## 2020-12-10 PROCEDURE — 3017F COLORECTAL CA SCREEN DOC REV: CPT | Performed by: NURSE PRACTITIONER

## 2020-12-10 PROCEDURE — 3051F HG A1C>EQUAL 7.0%<8.0%: CPT | Performed by: NURSE PRACTITIONER

## 2020-12-10 PROCEDURE — 99214 OFFICE O/P EST MOD 30 MIN: CPT | Performed by: NURSE PRACTITIONER

## 2020-12-10 PROCEDURE — 3023F SPIROM DOC REV: CPT | Performed by: NURSE PRACTITIONER

## 2020-12-10 PROCEDURE — 85610 PROTHROMBIN TIME: CPT | Performed by: NURSE PRACTITIONER

## 2020-12-10 PROCEDURE — 93793 ANTICOAG MGMT PT WARFARIN: CPT | Performed by: NURSE PRACTITIONER

## 2020-12-10 PROCEDURE — G8484 FLU IMMUNIZE NO ADMIN: HCPCS | Performed by: NURSE PRACTITIONER

## 2020-12-10 PROCEDURE — G8427 DOCREV CUR MEDS BY ELIG CLIN: HCPCS | Performed by: NURSE PRACTITIONER

## 2020-12-10 PROCEDURE — G8926 SPIRO NO PERF OR DOC: HCPCS | Performed by: NURSE PRACTITIONER

## 2020-12-10 PROCEDURE — 2022F DILAT RTA XM EVC RTNOPTHY: CPT | Performed by: NURSE PRACTITIONER

## 2020-12-10 RX ORDER — LATANOPROST 50 UG/ML
SOLUTION/ DROPS OPHTHALMIC NIGHTLY
Status: ON HOLD | COMMUNITY
Start: 2020-09-21 | End: 2021-08-05

## 2020-12-10 ASSESSMENT — ENCOUNTER SYMPTOMS
ABDOMINAL PAIN: 0
RHINORRHEA: 0
DIARRHEA: 0
COUGH: 1
BLOOD IN STOOL: 0
SORE THROAT: 0
NAUSEA: 0
CHEST TIGHTNESS: 1
CONSTIPATION: 0
WHEEZING: 1
SHORTNESS OF BREATH: 1
SINUS PRESSURE: 0
BACK PAIN: 1
TROUBLE SWALLOWING: 0

## 2020-12-10 NOTE — PROGRESS NOTES
[facility] [address]  [phone]    12/10/2020     Patient ID: Sharif Phillips is a 56 y.o. female. CHIEF COMPLAINT:     Sharif Phillips presents today for   Chief Complaint   Patient presents with    Hypertension    COPD    Diabetes    Chronic Kidney Disease   . VISIT INFORMATION:      Have you changed or started any medications since your last visit including any over-the-counter medicines, vitamins, or herbal medicines? yes - Med list updated   Are you having any side effects from any of your medications? -  no  Have you stopped taking any of your medications? Is so, why? -  Updated    Have you seen any other physician or provider since your last visit? Yes - Records Obtained Podiatry, Nephrology   Have you had any other diagnostic tests since your last visit? No  Have you been seen in the emergency room and/or had an admission to a hospital since we last saw you? No    Have you activated your [employer] account? If not, what are your barriers?  Yes    Patient Care Team:  BRAULIO Nolen CNP as PCP - General (Nurse Practitioner Family)  BRAULIO Nolen CNP as PCP - [facility]  Parish Diaz MD as Consulting Physician (Nephrology)  Héctor Dumont MD as Consulting Physician (Pulmonology)  Aruna Garcia MD as Consulting Physician (Cardiology)  Fiona Son DPM as Consulting Physician (Podiatry)    REVIEWED:     [x] Laboratory Results, Vital signs, Imaging, Active Problems, Immunizations, Current/Recently Discontinued Medications, Health Maintenance Activities Due, Referral Notes (if available) were reviewed per writer     [x] Reviewed Depression screening if taken or valid today or any other valid screening tool (others seen below) Interpretation of Total Score DepressionSeverity: 1-4 = Minimal depression, 5-9 = Mild depression, 10-14 = Moderate depression, 15-19 = Moderately severe depression, 20-27 =Severe depression    PHQ Scores 7/25/2018 6/27/2017   PHQ2 Score 0 0   PHQ9 Score 0 0       Interpretation of Total Score DepressionSeverity: 1-4 = Minimal depression, 5-9 = Mild depression, 10-14 = Moderate depression, 15-19 = Moderately severe depression, 20-27 = Severe depression    No Known Allergies  Current Outpatient Medications   Medication Sig Dispense Refill    latanoprost (XALATAN) 0.005 % ophthalmic solution nightly      oxyCODONE-acetaminophen (PERCOCET) 5-325 MG per tablet Take 1 tablet by mouth 2 times daily as needed for Pain for up to 30 days. 40 tablet 0    hydrALAZINE (APRESOLINE) 50 MG tablet Take 1 tablet by mouth 2 times daily 180 tablet 1    Misc. Devices MISC 1 PAIR OF DIABETIC SHOES (1 LEFT/ 1 RIGHT)  1-3 PAIRS OF INSERTS (LEFT/ RIGHT) 2 each 0    Respiratory Therapy Supplies (NEBULIZER/TUBING/MOUTHPIECE) KIT 1 kit by Does not apply route 4 times daily as needed (wheezing) 1 kit 0    naloxone 4 MG/0.1ML LIQD nasal spray 1 spray by Nasal route as needed for Opioid Reversal 1 each 5    blood glucose test strips (ONETOUCH ULTRA) strip TEST THREE TIMES DAILY 100 strip 5    warfarin (COUMADIN) 2 MG tablet take 1 tablet by mouth daily or as directed OTHERWISE 30 tablet 2    metFORMIN (GLUCOPHAGE-XR) 500 MG extended release tablet Take 2 tablets by mouth twice a day.  120 tablet 5    furosemide (LASIX) 20 MG tablet take 1 tablet by mouth once daily 30 tablet 5    allopurinol (ZYLOPRIM) 100 MG tablet Take 1 tablet by mouth daily 90 tablet 1    tiotropium (SPIRIVA HANDIHALER) 18 MCG inhalation capsule Inhale 1 capsule into the lungs daily 90 capsule 1    albuterol sulfate  (90 Base) MCG/ACT inhaler Inhale 2 puffs into the lungs every 4 hours as needed for Wheezing or Shortness of Breath (AND/OR COUGH) 18 g 1    isosorbide mononitrate (IMDUR) 30 MG extended release tablet take 1 tablet by mouth once daily 30 tablet 5    omeprazole (PRILOSEC) 20 MG delayed release capsule take 1 capsule by mouth once daily 30 capsule 5    glimepiride (AMARYL) 4 MG tablet take 1 tablet by mouth twice a day 60 tablet 5    atorvastatin (LIPITOR) 40 MG tablet take 1 tablet by mouth once daily 30 tablet 5    JANUVIA 100 MG tablet take 1 tablet by mouth once daily 30 tablet 5    lisinopril (PRINIVIL;ZESTRIL) 20 MG tablet take 1 tablet by mouth once daily 30 tablet 5    Cholecalciferol (RA VITAMIN D-3) 50 MCG (2000 UT) CAPS Take 1 capsule by mouth daily 30 capsule 5    ipratropium-albuterol (DUONEB) 0.5-2.5 (3) MG/3ML SOLN nebulizer solution Inhale 3 mLs into the lungs every 6 hours as needed for Shortness of Breath 360 mL 0    Misc. Devices MISC 1 PAIR OF DIABETIC SHOES (1 LEFT/ 1 RIGHT)  1-3 PAIRS OF INSERTS (LEFT/ RIGHT) 2 each 0    Lancets (BD LANCET ULTRAFINE 30G) MISC Use to check sugars twice daily and as needed 120 each 3    Alcohol Swabs (ALCOHOL PREP) 70 % PADS Use twice daily and as needed to check blood sugars 120 each 3    Blood Pressure Monitoring KIT Use to check blood pressure daily and as needed 1 kit 0    risperiDONE (RISPERDAL) 2 MG tablet Take 1 tablet by mouth nightly      fluticasone (FLONASE) 50 MCG/ACT nasal spray 1 spray by Nasal route daily 1 Bottle 3    buPROPion (WELLBUTRIN XL) 300 MG XL tablet Take 300 mg by mouth every morning.  buPROPion (WELLBUTRIN XL) 150 MG XL tablet Take 150 mg by mouth every morning. No current facility-administered medications for this visit. Past Medical History:   Diagnosis Date    Anxiety     Chronic kidney disease     COPD [facility]ch[facility] obstructive [facility] disease) [facility] [address]     Depression     Fracture of ankle 5/14/2020    Hyperlipidemia     Hypertension     Obesity     Osteoarthritis     Proteinuria     Pulmonary embolism (HCC)     Sleep apnea     c-pap.  Doesn't use everynight    Type 2 diabetes mellitus without complication (HCC)     Type II or unspecified type diabetes mellitus without mention of complication, not stated as uncontrolled     Von Willebrand disease [facility])       Past Surgical History:   Procedure Laterality Date    APPENDECTOMY       SECTION      x3, , ,     COLONOSCOPY  2018    with biopsy    COLONOSCOPY N/A 2018    COLONOSCOPY performed by Loren Montoya MD at [address] [facility] CATH LAB PROCEDURE      EYE SURGERY Bilateral     cataracts    EYE SURGERY Bilateral     glaucoma    TONSILLECTOMY AND ADENOIDECTOMY       Family History   Problem Relation Age of Onset    Hypertension Mother     Liver Disease Mother     Cancer Father         stomach    Diabetes Sister     Cancer Brother         kidney    No Known Problems Maternal Grandmother     No Known Problems Maternal Grandfather     No Known Problems Paternal Grandmother     No Known Problems Paternal Grandfather     Other Brother         AIDS     Social History     Tobacco Use    Smoking status: Former Smoker     Packs/day: 0.25     Years: 7.00     Pack years: 1.75     Types: Cigarettes     Start date: 10/16/1977     Quit date: 1983     Years since quittin.9    Smokeless tobacco: Never Used   Substance Use Topics    Alcohol use: No      Health Maintenance   Topic Date Due    Diabetic retinal exam  2020    Lipid screen  2021    Diabetic foot exam  2021    A1C test (Diabetic or Prediabetic)  2021    Potassium monitoring  2021    Creatinine monitoring  2021    Breast cancer screen  10/03/2021    Cervical cancer screen  2022    DTaP/Tdap/Td vaccine (2 - Td) 2026    Colon cancer screen colonoscopy  2028    Flu vaccine  Completed    Shingles Vaccine  Completed    Pneumococcal 0-64 years Vaccine  Completed    Hepatitis C screen  Addressed    HIV screen  Addressed    Hepatitis A vaccine  Aged Out    Hib vaccine  Aged Out    Meningococcal (ACWY) vaccine  Aged Out       VISIT SUMMARY:      Is a very pleasant 68-year-old  female.   She presents the office today for her 3-month follow-up for her controlled substances. Also at this time we will check her PT/INR as she is on Coumadin for history of DVT and PE. Patient's INR today is just slightly subtherapeutic at 1.9. Her INR goal is between 2.0 and 3.0. I did discuss with patient to take an extra Coumadin today. She takes a 2 mg Coumadin tablet on a daily basis. Patient verbalized understanding that she is to take 2 2 mg today equal 4 mg and then resume her 2 mg on a daily basis. We can recheck her INR in 4 weeks. Patient denies any pain or discomfort. Patient actually states that her breathing, wheezing, and shortness of breath has improved slightly with the Spiriva inhaler that we started at last appointment. We will continue to prescribe this is any other inhalers, even with insurance, are extremely expensive and patient will be unable to financially afford them. HISTORY OF PRESENT ILLNESS:     Hypertension  This is a chronic problem. The current episode started more than 1 year ago. The problem is controlled. Associated symptoms include palpitations, peripheral edema and shortness of breath. Pertinent negatives include no anxiety, chest pain or headaches. Past treatments include ACE inhibitors, beta blockers, diuretics and direct vasodilators. The current treatment provides moderate improvement. Compliance problems include diet and exercise. COPD  She complains of chest tightness, cough, frequent throat clearing, hoarse voice, shortness of breath, sputum production and wheezing. This is a chronic problem. The current episode started more than 1 year ago. The problem occurs daily. The cough is productive. Associated symptoms include dyspnea on exertion. Pertinent negatives include no appetite change, chest pain, ear pain, fever, headaches, myalgias, postnasal drip, rhinorrhea, sneezing, sore throat or trouble swallowing.  Her symptoms are aggravated by minimal activity and exposure to fumes. Her symptoms are alleviated by beta-agonist. She reports minimal improvement on treatment. Her past medical history is significant for asthma and COPD. Diabetes  She presents for her follow-up diabetic visit. She has type 2 diabetes mellitus. Her disease course has been stable. Pertinent negatives for hypoglycemia include no dizziness, headaches or nervousness/anxiousness. Associated symptoms include fatigue. Pertinent negatives for diabetes include no chest pain. There are no hypoglycemic complications. Symptoms are stable. Current diabetic treatment includes oral agent (monotherapy). She is compliant with treatment all of the time. Her weight is stable. She is following a generally unhealthy diet. When asked about meal planning, she reported none. She rarely participates in exercise. Her overall blood glucose range is 140-180 mg/dl. An ACE inhibitor/angiotensin II receptor blocker is being taken. She sees a podiatrist.Eye exam is current. REVIEW OF SYMPTOMS:      Review of Systems   Constitutional: Positive for fatigue. Negative for activity change, appetite change, chills and fever. HENT: Positive for hoarse voice. Negative for congestion, ear pain, postnasal drip, rhinorrhea, sinus pressure, sneezing, sore throat and trouble swallowing. Respiratory: Positive for cough, sputum production, chest tightness, shortness of breath and wheezing. Cardiovascular: Positive for dyspnea on exertion, palpitations and leg swelling. Negative for chest pain. Gastrointestinal: Negative for abdominal pain, blood in stool, constipation, diarrhea and nausea. Genitourinary: Negative for difficulty urinating, dysuria, frequency, hematuria and urgency. Musculoskeletal: Positive for arthralgias, back pain and gait problem. Negative for joint swelling and myalgias. Skin: Negative for rash and wound. Allergic/Immunologic: Negative for environmental allergies and food allergies.    Neurological: Negative for dizziness, light-headedness, numbness and headaches. Psychiatric/Behavioral: Negative for agitation, decreased concentration, self-injury, sleep disturbance and suicidal ideas. The patient is not nervous/anxious. PHYSICAL EXAM:     /62 (Site: Left Upper Arm, Position: Sitting, Cuff Size: Medium Adult)   Pulse 93   Temp 96.7 °F (35.9 °C) (Temporal)   Resp 22   Wt 260 lb 6.4 oz (118.1 kg)   SpO2 98%   BMI 47.63 kg/m²      Physical Exam  Vitals signs and nursing note reviewed. Constitutional:       General: She is not in acute distress. Appearance: Normal appearance. She is well-developed and well-groomed. She is morbidly obese. She is not ill-appearing or toxic-appearing. HENT:      Head: Normocephalic. Right Ear: Tympanic membrane, ear canal and external ear normal. No middle ear effusion. There is no impacted cerumen. Tympanic membrane is not erythematous, retracted or bulging. Left Ear: Tympanic membrane, ear canal and external ear normal.  No middle ear effusion. There is no impacted cerumen. Tympanic membrane is not erythematous, retracted or bulging. Nose: Nose normal. No mucosal edema, congestion or rhinorrhea. Right Sinus: No maxillary sinus tenderness or frontal sinus tenderness. Left Sinus: No maxillary sinus tenderness or frontal sinus tenderness. Mouth/Throat:      Lips: Pink. Mouth: Mucous membranes are moist.      Dentition: No gum lesions. Pharynx: Oropharynx is clear. No oropharyngeal exudate, posterior oropharyngeal erythema or uvula swelling. Comments: No teeth, no dentures   Eyes:      General: Lids are normal. Vision grossly intact. No allergic shiner. Conjunctiva/sclera: Conjunctivae normal.      Comments: Glasses    Neck:      Musculoskeletal: Normal range of motion. No pain with movement or torticollis. Cardiovascular:      Rate and Rhythm: Normal rate and regular rhythm. No extrasystoles are present. Pulses: Normal pulses. Dorsalis pedis pulses are 2+ on the right side and 2+ on the left side. Heart sounds: S1 normal and S2 normal. Heart sounds are distant. No murmur. Pulmonary:      Effort: Pulmonary effort is normal. Prolonged expiration present. No accessory muscle usage or respiratory distress. Breath sounds: Normal breath sounds and air entry. Transmitted upper airway sounds present. No wheezing. Abdominal:      General: There is no distension. Palpations: Abdomen is soft. Tenderness: There is no abdominal tenderness. Musculoskeletal:      Right lower leg: No edema. Left lower leg: No edema. Lymphadenopathy:      Cervical: No cervical adenopathy. Skin:     General: Skin is warm and dry. Coloration: Skin is not ashen, cyanotic, jaundiced or pale. Neurological:      General: No focal deficit present. Mental Status: She is alert and oriented to person, place, and time. Motor: Motor function is intact. Gait: Gait is intact. Psychiatric:         Attention and Perception: Attention and perception normal.         Mood and Affect: Mood normal. Affect is flat. Speech: Speech normal.         Behavior: Behavior normal. Behavior is cooperative. Thought Content: Thought content normal.         Cognition and Memory: Cognition and memory normal.         Judgment: Judgment normal.          ASSESSMENT / PLAN     1. Essential hypertension  Stable, medicated, monitered     2. Controlled type 2 diabetes mellitus with stage 3 chronic kidney disease, without long-term current use of insulin (HCC)  Stable, medicated, monitered     3. Mucopurulent chronic bronchitis (HCC)  Stable, medicated, monitered   Continue Spiriva inhaler. 4. NARCISO on CPAP  Monitered      5. History of pulmonary embolism  Anticoagulation visit complete    6. History of DVT of lower extremity  Anticoagulation visit completed  - POCT INR 1.9    7.  Long term (current) use of anticoagulants  Anticoagulation visit complete      Return in about 3 months (around 3/10/2021). No orders of the defined types were placed in this encounter. No orders of the defined types were placed in this encounter. All patient questions answered. Pt voiced understanding to plan of care. Instructed to continue medications as discussed, healthy diet and exercise. COMMUNICATION:     Follow up to be scheduled as discussed. QUALITY MEASURES:     BMI Readings from Last 1 Encounters:   12/10/20 47.63 kg/m²        Lab Results   Component Value Date    LABA1C 7.5 (H) 09/14/2020       BP Readings from Last 3 Encounters:   12/10/20 102/62   10/12/20 138/78   09/09/20 120/80        The 10-year ASCVD risk score (Kriss Grimaldo, et al., 2013) is: 6.4%    Values used to calculate the score:      Age: 56 years      Sex: Female      Is Non- : No      Diabetic: Yes      Tobacco smoker: No      Systolic Blood Pressure: 577 mmHg      Is BP treated: Yes      HDL Cholesterol: 36 mg/dL      Total Cholesterol: 132 mg/dL     This note is created with the assistance of a speech-recognition program. While intending to generate a document that actually reflects the content of the visit, no guarantees can be provided that every mistake has been identified and corrected by editing.     Electronically signed by HUE Spring on 12/12/2020 at 6:08 PM

## 2020-12-12 PROBLEM — S82.899A FRACTURE OF ANKLE: Status: RESOLVED | Noted: 2020-05-14 | Resolved: 2020-12-12

## 2020-12-12 ASSESSMENT — ENCOUNTER SYMPTOMS
FREQUENT THROAT CLEARING: 1
SPUTUM PRODUCTION: 1
HOARSE VOICE: 1
CHEST TIGHTNESS: 1

## 2020-12-12 ASSESSMENT — COPD QUESTIONNAIRES: COPD: 1

## 2020-12-12 ASSESSMENT — VISUAL ACUITY: OU: 1

## 2020-12-17 RX ORDER — OXYCODONE HYDROCHLORIDE AND ACETAMINOPHEN 5; 325 MG/1; MG/1
1 TABLET ORAL 2 TIMES DAILY PRN
Qty: 40 TABLET | Refills: 0 | Status: SHIPPED | OUTPATIENT
Start: 2020-12-17 | End: 2021-01-15 | Stop reason: SDUPTHER

## 2020-12-17 NOTE — TELEPHONE ENCOUNTER
1/11/2021  8:30 AM SCHEDULE, [facility] Lars [facility]   3/10/2021  8:30 AM BRAULIO Valladares - CNP [place]  [facility]            Patient Active Problem List:     Essential hypertension     Hyperlipidemia     Osteoarthritis     Depression     Obesity     CKD (chronic kidney disease) stage 3, GFR 30-59 ml/min ([facility])     Proteinuria     Controlled type 2 diabetes mellitus with stage 3 chronic kidney disease, without long-term current use of insulin ([facility])     History of DVT of lower extremity     NARCISO on CPAP     Vitamin D deficiency     Major depressive disorder, recurrent episode, severe, with psychotic behavior ([address]     Major depressive disorder, recurrent, severe with psychotic symptoms ([address]     Multiple lung nodules on CT     Hypomagnesemia     SOB (shortness of breath)     Anxiety     Chronic obstructive pulmonary disease (HCC)     Precordial pain     History of pulmonary embolism     Decreased pedal pulses     Family history of abdominal aortic aneurysm     Normal coronary arteries     Long term (current) use of anticoagulants     AAA (abdominal aortic aneurysm) without rupture ([address]

## 2020-12-31 RX ORDER — GLUCOSAMINE/CHONDR SU A SOD 750-600 MG
1 TABLET ORAL DAILY
Qty: 30 CAPSULE | Refills: 5 | Status: SHIPPED | OUTPATIENT
Start: 2020-12-31 | End: 2021-08-10

## 2021-01-06 DIAGNOSIS — R06.02 SOB (SHORTNESS OF BREATH): ICD-10-CM

## 2021-01-07 RX ORDER — IPRATROPIUM BROMIDE AND ALBUTEROL SULFATE 2.5; .5 MG/3ML; MG/3ML
3 SOLUTION RESPIRATORY (INHALATION) EVERY 6 HOURS PRN
Qty: 360 ML | Refills: 0 | Status: SHIPPED | OUTPATIENT
Start: 2021-01-07 | End: 2022-05-03

## 2021-01-07 NOTE — TELEPHONE ENCOUNTER
LOV 12/10/20  RTO 3 months; F/U scheduled  LRF 5/14/20    Health Maintenance   Topic Date Due    Diabetic retinal exam  07/24/2020    Lipid screen  05/07/2021    Diabetic foot exam  07/27/2021    A1C test (Diabetic or Prediabetic)  09/14/2021    Potassium monitoring  09/14/2021    Creatinine monitoring  09/14/2021    Breast cancer screen  10/03/2021    Cervical cancer screen  11/16/2022    DTaP/Tdap/Td vaccine (2 - Td) 08/04/2026    Colon cancer screen colonoscopy  02/16/2028    Flu vaccine  Completed    Shingles Vaccine  Completed    Pneumococcal 0-64 years Vaccine  Completed    Hepatitis C screen  Addressed    HIV screen  Addressed    Hepatitis A vaccine  Aged Out    Hib vaccine  Aged Out    Meningococcal (ACWY) vaccine  Aged Out             (applicable per patient's age: Cancer Screenings, Depression Screening, Fall Risk Screening, Immunizations)    Hemoglobin A1C (%)   Date Value   09/14/2020 7.5 (H)   05/06/2020 7.4 (H)   04/10/2020 6.8     Microalb/Crt.  Ratio (mcg/mg creat)   Date Value   04/08/2019 CANNOT BE CALCULATED     LDL Cholesterol (mg/dL)   Date Value   05/07/2020 68     LDL Calculated (mg/dL)   Date Value   04/10/2020 59     AST (U/L)   Date Value   09/14/2020 24     ALT (U/L)   Date Value   09/14/2020 31     BUN (mg/dL)   Date Value   09/14/2020 13   09/14/2020 13      (goal A1C is < 7)   (goal LDL is <100) need 30-50% reduction from baseline     BP Readings from Last 3 Encounters:   12/10/20 102/62   10/12/20 138/78   09/09/20 120/80    (goal /80)      All Future Testing planned in [facility]:  Lab Frequency Next Occurrence   Basic Metabolic Panel Once 12/68/7833   CBC Once 09/12/2021   Protein / Creatinine Ratio, Urine Once 09/12/2021   POCT INR     Basic Metabolic Panel Every 6 Months 3/26/2021, 9/10/2021       Next Visit Date:  Future Appointments   Date Time Provider Mark Junior   1/11/2021  8:30 AM SCHEDULE, [facility] Lars [facility]   3/10/2021  8:30

## 2021-01-12 ENCOUNTER — ANTI-COAG VISIT (OUTPATIENT)
Dept: FAMILY MEDICINE CLINIC | Age: 61
End: 2021-01-12
Payer: MEDICARE

## 2021-01-12 DIAGNOSIS — Z86.718 HISTORY OF DVT OF LOWER EXTREMITY: Primary | ICD-10-CM

## 2021-01-12 DIAGNOSIS — Z86.718 HISTORY OF DVT OF LOWER EXTREMITY: ICD-10-CM

## 2021-01-12 LAB
INTERNATIONAL NORMALIZATION RATIO, POC: 1.7
PROTHROMBIN TIME, POC: 21

## 2021-01-12 PROCEDURE — 93793 ANTICOAG MGMT PT WARFARIN: CPT | Performed by: NURSE PRACTITIONER

## 2021-01-12 PROCEDURE — 85610 PROTHROMBIN TIME: CPT | Performed by: NURSE PRACTITIONER

## 2021-01-12 NOTE — TELEPHONE ENCOUNTER
Last visit:12/10/2020  Last Med refill: 12/17/2020  Does patient have enough medication for 72 hours: No:     Next Visit Date:  Future Appointments   Date Time Provider Mark Junior   3/10/2021  8:30 AM BRAULIO Pavon CNP [facility]       Health Maintenance   Topic Date Due    Diabetic retinal exam  07/24/2020    Lipid screen  05/07/2021    Diabetic foot exam  07/27/2021    A1C test (Diabetic or Prediabetic)  09/14/2021    Potassium monitoring  09/14/2021    Creatinine monitoring  09/14/2021    Breast cancer screen  10/03/2021    Cervical cancer screen  11/16/2022    DTaP/Tdap/Td vaccine (2 - Td) 08/04/2026    Colon cancer screen colonoscopy  02/16/2028    Flu vaccine  Completed    Shingles Vaccine  Completed    Pneumococcal 0-64 years Vaccine  Completed    Hepatitis C screen  Addressed    HIV screen  Addressed    Hepatitis A vaccine  Aged Out    Hib vaccine  Aged Out    Meningococcal (ACWY) vaccine  Aged Out       Hemoglobin A1C (%)   Date Value   09/14/2020 7.5 (H)   05/06/2020 7.4 (H)   04/10/2020 6.8             ( goal A1C is < 7)   Microalb/Crt.  Ratio (mcg/mg creat)   Date Value   04/08/2019 CANNOT BE CALCULATED     LDL Cholesterol (mg/dL)   Date Value   05/07/2020 68   08/01/2019 51     LDL Calculated (mg/dL)   Date Value   04/10/2020 59       (goal LDL is <100)   AST (U/L)   Date Value   09/14/2020 24     ALT (U/L)   Date Value   09/14/2020 31     BUN (mg/dL)   Date Value   09/14/2020 13   09/14/2020 13     BP Readings from Last 3 Encounters:   12/10/20 102/62   10/12/20 138/78   09/09/20 120/80          (goal 120/80)    All Future Testing planned in [facility]  Lab Frequency Next Occurrence   Basic Metabolic Panel Once 59/20/3641   CBC Once 09/12/2021   Protein / Creatinine Ratio, Urine Once 09/12/2021   POCT INR     Basic Metabolic Panel Every 6 Months 3/26/2021, 9/10/2021               Patient Active Problem List:     Essential hypertension     Hyperlipidemia Osteoarthritis     Depression     Obesity     CKD (chronic kidney disease) stage 3, GFR 30-59 ml/min ([facility])     Proteinuria     Controlled type 2 diabetes mellitus with stage 3 chronic kidney disease, without long-term current use of insulin ([facility])     History of DVT of lower extremity     NARCISO on CPAP     Vitamin D deficiency     Major depressive disorder, recurrent episode, severe, with psychotic behavior ([address]     Major depressive disorder, recurrent, severe with psychotic symptoms ([address]     Multiple lung nodules on CT     Hypomagnesemia     SOB (shortness of breath)     Anxiety     Chronic obstructive pulmonary disease (HCC)     Precordial pain     History of pulmonary embolism     Decreased pedal pulses     Family history of abdominal aortic aneurysm     Normal coronary arteries     Long term (current) use of anticoagulants     AAA (abdominal aortic aneurysm) without rupture ([address]

## 2021-01-13 RX ORDER — WARFARIN SODIUM 2 MG/1
TABLET ORAL
Qty: 30 TABLET | Refills: 2 | Status: SHIPPED | OUTPATIENT
Start: 2021-01-13 | End: 2021-02-01 | Stop reason: DRUGHIGH

## 2021-01-13 NOTE — RESULT ENCOUNTER NOTE
Anticoagulation visit completed. Patient made aware of value and recommendations, as well as next INR draw.

## 2021-01-15 ENCOUNTER — ANTI-COAG VISIT (OUTPATIENT)
Dept: FAMILY MEDICINE CLINIC | Age: 61
End: 2021-01-15
Payer: MEDICARE

## 2021-01-15 DIAGNOSIS — Z86.718 HISTORY OF DVT OF LOWER EXTREMITY: ICD-10-CM

## 2021-01-15 DIAGNOSIS — M15.9 PRIMARY OSTEOARTHRITIS INVOLVING MULTIPLE JOINTS: ICD-10-CM

## 2021-01-15 LAB
INTERNATIONAL NORMALIZATION RATIO, POC: 3
PROTHROMBIN TIME, POC: 35.9

## 2021-01-15 PROCEDURE — 85610 PROTHROMBIN TIME: CPT | Performed by: NURSE PRACTITIONER

## 2021-01-15 RX ORDER — OXYCODONE HYDROCHLORIDE AND ACETAMINOPHEN 5; 325 MG/1; MG/1
1 TABLET ORAL 2 TIMES DAILY PRN
Qty: 40 TABLET | Refills: 0 | Status: SHIPPED | OUTPATIENT
Start: 2021-01-15 | End: 2021-02-16

## 2021-01-15 NOTE — TELEPHONE ENCOUNTER
Last visit: 12/10/20  Last Med refill: 12/17/20  Does patient have enough medication for 72 hours: No:     Next Visit Date:  Future Appointments   Date Time Provider Mark Junior   2/1/2021 10:00 AM Radames Barclay DPM [facility]   3/10/2021  8:30 AM Amy Sherel Angelucci, APRN - CNP [place] [facility] [address] Maintenance   Topic Date Due    Diabetic retinal exam  07/24/2020    Lipid screen  05/07/2021    Diabetic foot exam  07/27/2021    A1C test (Diabetic or Prediabetic)  09/14/2021    Potassium monitoring  09/14/2021    Creatinine monitoring  09/14/2021    Breast cancer screen  10/03/2021    Cervical cancer screen  11/16/2022    DTaP/Tdap/Td vaccine (2 - Td) 08/04/2026    Colon cancer screen colonoscopy  02/16/2028    Flu vaccine  Completed    Shingles Vaccine  Completed    Pneumococcal 0-64 years Vaccine  Completed    Hepatitis C screen  Addressed    HIV screen  Addressed    Hepatitis A vaccine  Aged Out    Hib vaccine  Aged Out    Meningococcal (ACWY) vaccine  Aged Out       Hemoglobin A1C (%)   Date Value   09/14/2020 7.5 (H)   05/06/2020 7.4 (H)   04/10/2020 6.8             ( goal A1C is < 7)   Microalb/Crt.  Ratio (mcg/mg creat)   Date Value   04/08/2019 CANNOT BE CALCULATED     LDL Cholesterol (mg/dL)   Date Value   05/07/2020 68   08/01/2019 51     LDL Calculated (mg/dL)   Date Value   04/10/2020 59       (goal LDL is <100)   AST (U/L)   Date Value   09/14/2020 24     ALT (U/L)   Date Value   09/14/2020 31     BUN (mg/dL)   Date Value   09/14/2020 13   09/14/2020 13     BP Readings from Last 3 Encounters:   12/10/20 102/62   10/12/20 138/78   09/09/20 120/80          (goal 120/80)    All Future Testing planned in [facility]  Lab Frequency Next Occurrence   Basic Metabolic Panel Once 15/58/9517   CBC Once 09/12/2021   Protein / Creatinine Ratio, Urine Once 09/12/2021   POCT INR     Basic Metabolic Panel Every 6 Months 3/26/2021, 9/10/2021               Patient Active Problem List:     Essential hypertension     Hyperlipidemia     Osteoarthritis     Depression     Obesity     CKD (chronic kidney disease) stage 3, GFR 30-59 ml/min ([facility])     Proteinuria     Controlled type 2 diabetes mellitus with stage 3 chronic kidney disease, without long-term current use of insulin ([facility])     History of DVT of lower extremity     NARCISO on CPAP     Vitamin D deficiency     Major depressive disorder, recurrent episode, severe, with psychotic behavior ([address]     Major depressive disorder, recurrent, severe with psychotic symptoms ([address]     Multiple lung nodules on CT     Hypomagnesemia     SOB (shortness of breath)     Anxiety     Chronic obstructive pulmonary disease (HCC)     Precordial pain     History of pulmonary embolism     Decreased pedal pulses     Family history of abdominal aortic aneurysm     Normal coronary arteries     Long term (current) use of anticoagulants     AAA (abdominal aortic aneurysm) without rupture ([address]

## 2021-01-22 DIAGNOSIS — E78.5 HYPERLIPIDEMIA, UNSPECIFIED HYPERLIPIDEMIA TYPE: ICD-10-CM

## 2021-01-22 DIAGNOSIS — E11.22 CONTROLLED TYPE 2 DIABETES MELLITUS WITH STAGE 3 CHRONIC KIDNEY DISEASE, WITHOUT LONG-TERM CURRENT USE OF INSULIN (HCC): ICD-10-CM

## 2021-01-22 DIAGNOSIS — N18.30 CONTROLLED TYPE 2 DIABETES MELLITUS WITH STAGE 3 CHRONIC KIDNEY DISEASE, WITHOUT LONG-TERM CURRENT USE OF INSULIN (HCC): ICD-10-CM

## 2021-01-22 DIAGNOSIS — K21.9 GASTROESOPHAGEAL REFLUX DISEASE: ICD-10-CM

## 2021-01-23 RX ORDER — LISINOPRIL 20 MG/1
20 TABLET ORAL DAILY
Qty: 90 TABLET | Refills: 1 | Status: SHIPPED | OUTPATIENT
Start: 2021-01-23 | End: 2021-07-26

## 2021-01-23 RX ORDER — OMEPRAZOLE 20 MG/1
20 CAPSULE, DELAYED RELEASE ORAL DAILY
Qty: 90 CAPSULE | Refills: 1 | Status: SHIPPED | OUTPATIENT
Start: 2021-01-23 | End: 2021-07-28

## 2021-01-23 RX ORDER — ISOSORBIDE MONONITRATE 30 MG/1
30 TABLET, EXTENDED RELEASE ORAL DAILY
Qty: 90 TABLET | Refills: 1 | Status: SHIPPED | OUTPATIENT
Start: 2021-01-23 | End: 2021-07-19

## 2021-01-23 RX ORDER — GLIMEPIRIDE 4 MG/1
4 TABLET ORAL 2 TIMES DAILY
Qty: 180 TABLET | Refills: 1 | Status: SHIPPED | OUTPATIENT
Start: 2021-01-23 | End: 2021-07-19 | Stop reason: ALTCHOICE

## 2021-01-23 RX ORDER — ATORVASTATIN CALCIUM 40 MG/1
40 TABLET, FILM COATED ORAL DAILY
Qty: 90 TABLET | Refills: 1 | Status: SHIPPED | OUTPATIENT
Start: 2021-01-23 | End: 2021-07-28

## 2021-02-01 ENCOUNTER — TELEPHONE (OUTPATIENT)
Dept: FAMILY MEDICINE CLINIC | Age: 61
End: 2021-02-01

## 2021-02-01 ENCOUNTER — NURSE ONLY (OUTPATIENT)
Dept: FAMILY MEDICINE CLINIC | Age: 61
End: 2021-02-01
Payer: MEDICARE

## 2021-02-01 DIAGNOSIS — M15.9 OSTEOARTHRITIS OF MULTIPLE JOINTS, UNSPECIFIED OSTEOARTHRITIS TYPE: Primary | ICD-10-CM

## 2021-02-01 DIAGNOSIS — Z86.718 HISTORY OF DVT OF LOWER EXTREMITY: ICD-10-CM

## 2021-02-01 DIAGNOSIS — R06.02 SOB (SHORTNESS OF BREATH): ICD-10-CM

## 2021-02-01 LAB
INTERNATIONAL NORMALIZATION RATIO, POC: 1.6
PROTHROMBIN TIME, POC: 19

## 2021-02-01 PROCEDURE — 85610 PROTHROMBIN TIME: CPT | Performed by: NURSE PRACTITIONER

## 2021-02-01 RX ORDER — WARFARIN SODIUM 2 MG/1
TABLET ORAL
Qty: 45 TABLET | Refills: 3 | Status: SHIPPED | OUTPATIENT
Start: 2021-02-01 | End: 2021-07-06

## 2021-02-01 NOTE — PROGRESS NOTES
I am fine with her doing 3 mg tabs on Monday Wednesday and Fridays. And to continue 2 mg tablets on Sunday, Tuesday, Thursday, and Saturday. She can have her next INR checked in 2 weeks. Please ask if she needs a refill on her 2 mg or her 3 mg Coumadin tablets.

## 2021-02-14 DIAGNOSIS — M15.9 PRIMARY OSTEOARTHRITIS INVOLVING MULTIPLE JOINTS: ICD-10-CM

## 2021-02-15 ENCOUNTER — OFFICE VISIT (OUTPATIENT)
Dept: PODIATRY | Age: 61
End: 2021-02-15
Payer: MEDICARE

## 2021-02-15 ENCOUNTER — ANTI-COAG VISIT (OUTPATIENT)
Dept: FAMILY MEDICINE CLINIC | Age: 61
End: 2021-02-15
Payer: MEDICARE

## 2021-02-15 VITALS — BODY MASS INDEX: 47.84 KG/M2 | WEIGHT: 260 LBS | TEMPERATURE: 98 F | HEIGHT: 62 IN

## 2021-02-15 DIAGNOSIS — I73.9 PVD (PERIPHERAL VASCULAR DISEASE) (HCC): ICD-10-CM

## 2021-02-15 DIAGNOSIS — B35.1 DERMATOPHYTOSIS OF NAIL: ICD-10-CM

## 2021-02-15 DIAGNOSIS — M79.672 PAIN IN BOTH FEET: ICD-10-CM

## 2021-02-15 DIAGNOSIS — M19.071 DEGENERATIVE JOINT DISEASE OF BOTH ANKLES AND FEET: ICD-10-CM

## 2021-02-15 DIAGNOSIS — M79.671 PAIN IN BOTH FEET: ICD-10-CM

## 2021-02-15 DIAGNOSIS — Z86.718 HISTORY OF DVT OF LOWER EXTREMITY: ICD-10-CM

## 2021-02-15 DIAGNOSIS — E11.51 TYPE II DIABETES MELLITUS WITH PERIPHERAL CIRCULATORY DISORDER (HCC): Primary | ICD-10-CM

## 2021-02-15 DIAGNOSIS — M19.072 DEGENERATIVE JOINT DISEASE OF BOTH ANKLES AND FEET: ICD-10-CM

## 2021-02-15 LAB
INTERNATIONAL NORMALIZATION RATIO, POC: 2.7
PROTHROMBIN TIME, POC: 32

## 2021-02-15 PROCEDURE — 1036F TOBACCO NON-USER: CPT | Performed by: PODIATRIST

## 2021-02-15 PROCEDURE — 85610 PROTHROMBIN TIME: CPT | Performed by: NURSE PRACTITIONER

## 2021-02-15 PROCEDURE — 3046F HEMOGLOBIN A1C LEVEL >9.0%: CPT | Performed by: PODIATRIST

## 2021-02-15 PROCEDURE — 11721 DEBRIDE NAIL 6 OR MORE: CPT | Performed by: PODIATRIST

## 2021-02-15 PROCEDURE — 99213 OFFICE O/P EST LOW 20 MIN: CPT | Performed by: PODIATRIST

## 2021-02-15 PROCEDURE — 93793 ANTICOAG MGMT PT WARFARIN: CPT | Performed by: NURSE PRACTITIONER

## 2021-02-15 PROCEDURE — 2022F DILAT RTA XM EVC RTNOPTHY: CPT | Performed by: PODIATRIST

## 2021-02-15 PROCEDURE — G8427 DOCREV CUR MEDS BY ELIG CLIN: HCPCS | Performed by: PODIATRIST

## 2021-02-15 PROCEDURE — 3017F COLORECTAL CA SCREEN DOC REV: CPT | Performed by: PODIATRIST

## 2021-02-15 PROCEDURE — G8484 FLU IMMUNIZE NO ADMIN: HCPCS | Performed by: PODIATRIST

## 2021-02-15 PROCEDURE — G8417 CALC BMI ABV UP PARAM F/U: HCPCS | Performed by: PODIATRIST

## 2021-02-15 NOTE — PROGRESS NOTES
[address] [facility] [id number] [address]  Dept: [phone]    DIABETIC PROGRESS NOTE  Date of patient's visit: 2/15/2021  Patient's Name:  Enmanuel Garibay YOB: 1960            Patient Care Team:  BRAULIO Aburto CNP as PCP - General (Nurse Practitioner Family)  BRAULIO Aburto CNP as PCP - [facility] Empaneled Provider  Ciarra Chun MD as Consulting Physician (Nephrology)  Aliya Acvees MD as Consulting Physician (Pulmonology)  Ladarius Hogan MD as Consulting Physician (Cardiology)  Suma Ornelas DPM as Consulting Physician (Podiatry)          Chief Complaint   Patient presents with    Diabetes    Nail Problem    Peripheral Neuropathy       Subjective:   Enmanuel Garibay comes to clinic for Diabetes, Nail Problem, and Peripheral Neuropathy    she is a diabetic and states that she has cracking to her mid foot. Pt currently has complaint of thickened, elongated nails that they cannot manage by themselves. Pt's primary care physician is BRAULIO Aburto CNP last seen December 10 2020   Pt's last blood sugar was 133 . Pt has a new complaint of pain to the right and left foot. States they have been walking on her feet more. Relates to changing shoes but it has not helped       Lab Results   Component Value Date    LABA1C 7.5 (H) 09/14/2020      Complains of numbness in the feet bilat. Past Medical History:   Diagnosis Date    Anxiety     Chronic kidney disease     [facility]     Depression     Fracture of ankle 5/14/2020    Hyperlipidemia     Hypertension     Obesity     Osteoarthritis     Proteinuria     Pulmonary embolism (HCC)     Sleep apnea     c-pap.  Doesn't use everynight    Type 2 diabetes mellitus without complication (HCC)     Type II or unspecified type diabetes mellitus without mention of complication, not stated as uncontrolled     [facility]        No Known Allergies  Current Outpatient Medications on File Prior to Visit   Medication Sig Dispense Refill    Handicap Placard MISC by Does not apply route Exp 2/3/2026 1 each 0    warfarin (COUMADIN) 2 MG tablet Take 2 mg Sunday Tuesday Thursday Saturday. Take 3 mg (one and half tabs) Monday Wednesday Friday 45 tablet 3    isosorbide mononitrate (IMDUR) 30 MG extended release tablet Take 1 tablet by mouth daily Do not crush or chew. 90 tablet 1    omeprazole (PRILOSEC) 20 MG delayed release capsule Take 1 capsule by mouth Daily Do not crush or break. 90 capsule 1    glimepiride (AMARYL) 4 MG tablet Take 1 tablet by mouth 2 times daily 180 tablet 1    atorvastatin (LIPITOR) 40 MG tablet Take 1 tablet by mouth daily 90 tablet 1    SITagliptin (JANUVIA) 100 MG tablet Take 1 tablet by mouth daily 90 tablet 1    lisinopril (PRINIVIL;ZESTRIL) 20 MG tablet Take 1 tablet by mouth daily 90 tablet 1    ipratropium-albuterol (DUONEB) 0.5-2.5 (3) MG/3ML SOLN nebulizer solution Inhale 3 mLs into the lungs every 6 hours as needed for Shortness of Breath 360 mL 0    RA VITAMIN D-3 50 MCG (2000 UT) CAPS Take 1 capsule by mouth daily 30 capsule 5    latanoprost (XALATAN) 0.005 % ophthalmic solution nightly      hydrALAZINE (APRESOLINE) 50 MG tablet Take 1 tablet by mouth 2 times daily 180 tablet 1    Misc. Devices MISC 1 PAIR OF DIABETIC SHOES (1 LEFT/ 1 RIGHT)  1-3 PAIRS OF INSERTS (LEFT/ RIGHT) 2 each 0    Respiratory Therapy Supplies (NEBULIZER/TUBING/MOUTHPIECE) KIT 1 kit by Does not apply route 4 times daily as needed (wheezing) 1 kit 0    naloxone 4 MG/0.1ML LIQD nasal spray 1 spray by Nasal route as needed for Opioid Reversal 1 each 5    blood glucose test strips (ONETOUCH ULTRA) strip TEST THREE TIMES DAILY 100 strip 5    metFORMIN (GLUCOPHAGE-XR) 500 MG extended release tablet Take 2 tablets by mouth twice a day.  120 tablet 5    furosemide (LASIX) 20 MG tablet take 1 tablet by mouth once daily 30 tablet 5    allopurinol (ZYLOPRIM) 100 MG tablet Take 1 tablet by mouth daily 90 tablet 1    tiotropium (SPIRIVA HANDIHALER) 18 MCG inhalation capsule Inhale 1 capsule into the lungs daily 90 capsule 1    albuterol sulfate  (90 Base) MCG/ACT inhaler Inhale 2 puffs into the lungs every 4 hours as needed for Wheezing or Shortness of Breath (AND/OR COUGH) 18 g 1    Misc. Devices MISC 1 PAIR OF DIABETIC SHOES (1 LEFT/ 1 RIGHT)  1-3 PAIRS OF INSERTS (LEFT/ RIGHT) 2 each 0    Lancets (BD LANCET ULTRAFINE 30G) MISC Use to check sugars twice daily and as needed 120 each 3    Alcohol Swabs (ALCOHOL PREP) 70 % PADS Use twice daily and as needed to check blood sugars 120 each 3    Blood Pressure Monitoring KIT Use to check blood pressure daily and as needed 1 kit 0    risperiDONE (RISPERDAL) 2 MG tablet Take 1 tablet by mouth nightly      fluticasone (FLONASE) 50 MCG/ACT nasal spray 1 spray by Nasal route daily 1 Bottle 3    buPROPion (WELLBUTRIN XL) 300 MG XL tablet Take 300 mg by mouth every morning.  buPROPion (WELLBUTRIN XL) 150 MG XL tablet Take 150 mg by mouth every morning. No current facility-administered medications on file prior to visit. Review of Systems    Review of Systems:   History obtained from chart review and the patient  General ROS: negative for - chills, fatigue, fever, night sweats or weight gain  Constitutional: Negative for chills, diaphoresis, fatigue, fever and unexpected weight change. Musculoskeletal: Positive for arthralgias, gait problem and joint swelling. Neurological ROS: negative for - behavioral changes, confusion, headaches or seizures. Negative for weakness and numbness. Dermatological ROS: negative for - mole changes, rash  Cardiovascular: Negative for leg swelling. Gastrointestinal: Negative for constipation, diarrhea, nausea and vomiting. Objective:  Dermatologic Exam:  Skin lesion/ulceration Absent .    Skin No rashes or nodules noted..   Skin is thin, with flaky sloughing skin as well as decreased hair growth to the lower leg  Small red hemosiderin deposits seen dorsal foot   Musculoskeletal:         Degenerative joint disease to the right and left midfoot with crepitus on ROM  1st MPJ ROM decreased, Bilateral.  Muscle strength 5/5, Bilateral.  Pain present upon palpation of toenails 1-5, Bilateral. decreased medial longitudinal arch, Bilateral.  Ankle ROM decreased,Bilateral.    Dorsally contracted digits present digits 2, Bilateral.     Vascular: DP pulses 1/4 bilateral.  PT pulses 0/4 bilateral.   CFT <5 seconds, Bilateral.  Hair growth absent to the level of the digits, Bilateral.  Edema present, Bilateral.  Varicosities absent, Bilateral. Erythema absent, Bilateral    Neurological: Sensation diminshed to light touch to level of digits, Bilateral.  Protective sensation intact 6/10 sites via 5.07/10g [place] Monofilament, Bilateral.  negative Tinel's, Bilateral.  negative Valleix sign, Bilateral.      Integument: Warm, dry, supple, Bilateral.  Open lesion absent, Bilateral.  Interdigital maceration absent to web spaces 4, Bilateral.  Nails 1-5 left and 1-5 right thickened > 3.0 mm, dystrophic and crumbly, discolored with subungual debris. Fissures absent, Bilateral.   General: AAO x 3 in NAD.     Derm  Toenail Description  Sites of Onychomycosis Involvement (Check affected area)  [x] [x] [x] [x] [x] [x] [x] [x] [x] [x]  5 4 3 2 1 1 2 3 4 5                          Right                                        Left    Thickness  [x] [x] [x] [x] [x] [x] [x] [x] [x] [x]  5 4 3 2 1 1 2 3 4 5                         Right                                        Left    Dystrophic Changes   [x] [x] [x] [x] [x] [x] [x] [x] [x] [x]  5 4 3 2 1 1 2 3 4 5                         Right                                        Left    Color   [x] [x] [x] [x] [x] [x] [x] [x] [x] [x]  5 4 3 2 1 1 2 3 4 5                          Right sooner if any problems arise. Diagnosis was discussed with the pt and all of their questions were answered in detail. Proper foot hygiene and care was discussed with the pt. Informed patient on proper diabetic foot care and importance of tight glycemic control. Patient to check feet daily and contact the office with any questions/problems/concerns. Other comorbidity noted and will be managed by PCP. All labs were reviewed and all imagining including the above findings were reviewed prior to the patients arrival and with the patient today. Previous patient encounter was reviewed. Encounters from the patients other medical providers were reviewed and noted. Time was spent educating the patient and their families/caregivers on proper care of the feet and ankles. All the above diagnosis were addressed at todays visit and all questions were answered.   A total of 25 minutes was spent with this patients encounter    2/15/2021    Electronically signed by Maria Alejandra Holcomb DPM on 2/15/2021 at 9:30 AM  2/15/2021

## 2021-02-16 RX ORDER — NALOXONE HYDROCHLORIDE 4 MG/.1ML
1 SPRAY NASAL PRN
Qty: 1 EACH | Refills: 5 | Status: SHIPPED | OUTPATIENT
Start: 2021-02-16

## 2021-02-16 RX ORDER — OXYCODONE HYDROCHLORIDE AND ACETAMINOPHEN 5; 325 MG/1; MG/1
TABLET ORAL
Qty: 40 TABLET | Refills: 0 | Status: SHIPPED | OUTPATIENT
Start: 2021-02-16 | End: 2021-03-10 | Stop reason: SDUPTHER

## 2021-03-10 ENCOUNTER — ANTI-COAG VISIT (OUTPATIENT)
Dept: FAMILY MEDICINE CLINIC | Age: 61
End: 2021-03-10
Payer: MEDICARE

## 2021-03-10 ENCOUNTER — HOSPITAL ENCOUNTER (OUTPATIENT)
Age: 61
Setting detail: SPECIMEN
Discharge: HOME | End: 2021-03-10
Payer: MEDICARE

## 2021-03-10 ENCOUNTER — OFFICE VISIT (OUTPATIENT)
Dept: FAMILY MEDICINE CLINIC | Age: 61
End: 2021-03-10
Payer: MEDICARE

## 2021-03-10 VITALS
HEART RATE: 84 BPM | BODY MASS INDEX: 46.35 KG/M2 | WEIGHT: 253.4 LBS | SYSTOLIC BLOOD PRESSURE: 122 MMHG | TEMPERATURE: 97.7 F | OXYGEN SATURATION: 97 % | RESPIRATION RATE: 20 BRPM | DIASTOLIC BLOOD PRESSURE: 74 MMHG

## 2021-03-10 DIAGNOSIS — Z86.711 HISTORY OF PULMONARY EMBOLISM: ICD-10-CM

## 2021-03-10 DIAGNOSIS — G47.33 OSA ON CPAP: Chronic | ICD-10-CM

## 2021-03-10 DIAGNOSIS — M15.9 PRIMARY OSTEOARTHRITIS INVOLVING MULTIPLE JOINTS: Primary | ICD-10-CM

## 2021-03-10 DIAGNOSIS — N18.30 STAGE 3 CHRONIC KIDNEY DISEASE, UNSPECIFIED WHETHER STAGE 3A OR 3B CKD (HCC): Chronic | ICD-10-CM

## 2021-03-10 DIAGNOSIS — N18.30 CONTROLLED TYPE 2 DIABETES MELLITUS WITH STAGE 3 CHRONIC KIDNEY DISEASE, WITHOUT LONG-TERM CURRENT USE OF INSULIN (HCC): ICD-10-CM

## 2021-03-10 DIAGNOSIS — Z86.718 HISTORY OF DVT OF LOWER EXTREMITY: ICD-10-CM

## 2021-03-10 DIAGNOSIS — Z86.718 HISTORY OF DVT OF LOWER EXTREMITY: Primary | ICD-10-CM

## 2021-03-10 DIAGNOSIS — F33.3 MAJOR DEPRESSIVE DISORDER, RECURRENT EPISODE, SEVERE, WITH PSYCHOTIC BEHAVIOR (HCC): ICD-10-CM

## 2021-03-10 DIAGNOSIS — Z99.89 OSA ON CPAP: Chronic | ICD-10-CM

## 2021-03-10 DIAGNOSIS — J41.1 MUCOPURULENT CHRONIC BRONCHITIS (HCC): ICD-10-CM

## 2021-03-10 DIAGNOSIS — E11.22 CONTROLLED TYPE 2 DIABETES MELLITUS WITH STAGE 3 CHRONIC KIDNEY DISEASE, WITHOUT LONG-TERM CURRENT USE OF INSULIN (HCC): ICD-10-CM

## 2021-03-10 DIAGNOSIS — R91.8 MULTIPLE LUNG NODULES ON CT: ICD-10-CM

## 2021-03-10 LAB
AMPHETAMINE SCREEN URINE: NEGATIVE
BARBITURATE SCREEN URINE: NEGATIVE
BENZODIAZEPINE SCREEN, URINE: NEGATIVE
BUPRENORPHINE URINE: ABNORMAL
CANNABINOID SCREEN URINE: NEGATIVE
COCAINE METABOLITE, URINE: NEGATIVE
INTERNATIONAL NORMALIZATION RATIO, POC: 2.4
MDMA URINE: ABNORMAL
METHADONE SCREEN, URINE: NEGATIVE
METHAMPHETAMINE, URINE: ABNORMAL
OPIATES, URINE: NEGATIVE
OXYCODONE SCREEN URINE: POSITIVE
PHENCYCLIDINE, URINE: NEGATIVE
PROPOXYPHENE, URINE: ABNORMAL
PROTHROMBIN TIME, POC: 28.9
TEST INFORMATION: ABNORMAL
TRICYCLIC ANTIDEPRESSANTS, UR: ABNORMAL

## 2021-03-10 PROCEDURE — 3017F COLORECTAL CA SCREEN DOC REV: CPT | Performed by: NURSE PRACTITIONER

## 2021-03-10 PROCEDURE — G8417 CALC BMI ABV UP PARAM F/U: HCPCS | Performed by: NURSE PRACTITIONER

## 2021-03-10 PROCEDURE — 1036F TOBACCO NON-USER: CPT | Performed by: NURSE PRACTITIONER

## 2021-03-10 PROCEDURE — 99214 OFFICE O/P EST MOD 30 MIN: CPT | Performed by: NURSE PRACTITIONER

## 2021-03-10 PROCEDURE — G8484 FLU IMMUNIZE NO ADMIN: HCPCS | Performed by: NURSE PRACTITIONER

## 2021-03-10 PROCEDURE — 3023F SPIROM DOC REV: CPT | Performed by: NURSE PRACTITIONER

## 2021-03-10 PROCEDURE — 85610 PROTHROMBIN TIME: CPT | Performed by: NURSE PRACTITIONER

## 2021-03-10 PROCEDURE — 2022F DILAT RTA XM EVC RTNOPTHY: CPT | Performed by: NURSE PRACTITIONER

## 2021-03-10 PROCEDURE — 3046F HEMOGLOBIN A1C LEVEL >9.0%: CPT | Performed by: NURSE PRACTITIONER

## 2021-03-10 PROCEDURE — G8926 SPIRO NO PERF OR DOC: HCPCS | Performed by: NURSE PRACTITIONER

## 2021-03-10 PROCEDURE — G8427 DOCREV CUR MEDS BY ELIG CLIN: HCPCS | Performed by: NURSE PRACTITIONER

## 2021-03-10 RX ORDER — OXYCODONE HYDROCHLORIDE AND ACETAMINOPHEN 5; 325 MG/1; MG/1
1 TABLET ORAL 2 TIMES DAILY PRN
Qty: 40 TABLET | Refills: 0 | Status: SHIPPED | OUTPATIENT
Start: 2021-03-10 | End: 2021-04-09 | Stop reason: SDUPTHER

## 2021-03-10 RX ORDER — ALBUTEROL SULFATE 90 UG/1
2 AEROSOL, METERED RESPIRATORY (INHALATION) EVERY 4 HOURS PRN
Qty: 18 G | Refills: 1 | Status: SHIPPED | OUTPATIENT
Start: 2021-03-10 | End: 2021-05-23 | Stop reason: SDUPTHER

## 2021-03-10 ASSESSMENT — ENCOUNTER SYMPTOMS
BACK PAIN: 1
ABDOMINAL PAIN: 0
CONSTIPATION: 0
DIARRHEA: 0
RHINORRHEA: 0
CHEST TIGHTNESS: 1
SORE THROAT: 0
SINUS PRESSURE: 0
NAUSEA: 0
TROUBLE SWALLOWING: 0
BLOOD IN STOOL: 0
WHEEZING: 1
SHORTNESS OF BREATH: 1
COUGH: 1

## 2021-03-10 ASSESSMENT — VISUAL ACUITY: OU: 1

## 2021-03-10 NOTE — PROGRESS NOTES
[address]  [phone]    3/10/21     Patient ID: Juany Snyder is a 56 y.o. female  Established patient    Chief Complaint  Juany Snyder presents today for Hypertension, COPD, and Discuss Labs      Have you seen any other physician or provider since your last visit? Yes - Records Obtained Podiatry- 2/15/21  Have you had any other diagnostic tests since your last visit? Yes - Records Obtained INR  Have you been seen in the emergency room and/or had an admission to a hospital since we last saw you? No    ASSESSMENT/PLAN    1. Primary osteoarthritis involving multiple joints  -     oxyCODONE-acetaminophen (PERCOCET) 5-325 MG per tablet; Take 1 tablet by mouth 2 times daily as needed for Pain for up to 30 days. , Disp-40 tablet, R-0Normal  2. Mucopurulent chronic bronchitis (HCC)  Assessment & Plan:  Stable, medicated, monitored. Continue Spiriva  Following with Dr Jaden Chan   3. Multiple lung nodules on CT  4. NARCISO on CPAP  5. History of pulmonary embolism  Assessment & Plan:  INR checked and resulted in anticoagulation encounter   Attempting to get in home monitor   6. Controlled type 2 diabetes mellitus with stage 3 chronic kidney disease, without long-term current use of insulin (HCC)  Assessment & Plan:  Stable, medicated, monitored. 7. Stage 3 chronic kidney disease, unspecified whether stage 3a or 3b CKD  Assessment & Plan:  Stable, medicated, monitored. following with Nephrology   8. Major depressive disorder, recurrent episode, severe, with psychotic behavior ([facility]  Assessment & Plan:  Stable, medicated, monitored. Continue to follow with [facility] center        Return in about 3 months (around 6/10/2021).     Patient Care Team:  BRAULIO Coker CNP as PCP - General (Nurse Practitioner Family)  BRAULIO Coker CNP as PCP - [facility] Empaneled Provider  Marleni Bower MD as Consulting Physician (Nephrology)  Fauzia Cast MD as Consulting Physician (Pulmonology)  Jason Lorenzo MD as Consulting Physician (Cardiology)  Rima Mcbride DPM as Consulting Physician (Podiatry)    SUBJECTIVE/OBJECTIVE    History of Present illness / Visit Summary   Imani Gabriel is a very pleasant 73-year-old  female. She presents the office today for her 3-month follow-up. Patient sees me every 3 months due to controlled substance of Percocet for chronic lower back pain. I also monitor patient's blood sugars as well as her breathing. Patient also takes Coumadin for history of PEs. Her anticoagulation visit was also completed today with no changes noted as her INR is stable. Patient does follow with podiatrist on a routine basis. There is no concern of wounds to her feet today. Patient has not followed with her pulmonologist since last October. She has wears CPAP every night. We did add Spiriva with that her office and her breathing is doing exceptionally better. She still has shortness of breath with exertion however only minimal exertion and there is no auditory wheezing noted. Patient usually checks her blood sugars at least twice a day. They always average less than 200. Patient also brings my attention that her second Covid vaccine is scheduled on March 31. She follows with neurology and cardiology on a yearly basis. She also follows with pulmonology and is due for an follow-up appointment. Still follows with psychiatry at the [facility]. They prescribed her risperidone and Wellbutrin. Review of Systems  Review of Systems   Constitutional: Positive for fatigue. Negative for activity change, appetite change, chills and fever. HENT: Negative for congestion, ear pain, postnasal drip, rhinorrhea, sinus pressure, sneezing, sore throat and trouble swallowing. Respiratory: Positive for cough, chest tightness, shortness of breath (w/exertion ) and wheezing (minimal, much improved ).     Cardiovascular: Positive for palpitations and leg swelling. Negative for chest pain. Gastrointestinal: Negative for abdominal pain, blood in stool, constipation, diarrhea and nausea. Genitourinary: Negative for difficulty urinating, dysuria, frequency, hematuria and urgency. Musculoskeletal: Positive for arthralgias, back pain and gait problem. Negative for joint swelling and myalgias. Skin: Negative for rash and wound. Allergic/Immunologic: Negative for environmental allergies and food allergies. Neurological: Negative for dizziness, light-headedness, numbness and headaches. Psychiatric/Behavioral: Negative for agitation, decreased concentration, self-injury, sleep disturbance and suicidal ideas. The patient is not nervous/anxious. Physical exam   Physical Exam  Vitals signs and nursing note reviewed. Constitutional:       General: She is not in acute distress. Appearance: Normal appearance. She is well-developed and well-groomed. She is morbidly obese. She is not ill-appearing or toxic-appearing. HENT:      Head: Normocephalic. Right Ear: Tympanic membrane, ear canal and external ear normal. No middle ear effusion. There is no impacted cerumen. Tympanic membrane is not erythematous, retracted or bulging. Left Ear: Tympanic membrane, ear canal and external ear normal.  No middle ear effusion. There is no impacted cerumen. Tympanic membrane is not erythematous, retracted or bulging. Nose: Nose normal. No mucosal edema, congestion or rhinorrhea. Right Sinus: No maxillary sinus tenderness or frontal sinus tenderness. Left Sinus: No maxillary sinus tenderness or frontal sinus tenderness. Mouth/Throat:      Lips: Pink. Mouth: Mucous membranes are moist.      Dentition: No gum lesions. Pharynx: Oropharynx is clear. No oropharyngeal exudate, posterior oropharyngeal erythema or uvula swelling. Comments: No teeth, no dentures   Eyes:      General: Lids are normal. Vision grossly intact.  No allergic shiner. Conjunctiva/sclera: Conjunctivae normal.      Comments: Glasses    Neck:      Musculoskeletal: Normal range of motion. No pain with movement or torticollis. Cardiovascular:      Rate and Rhythm: Normal rate and regular rhythm. No extrasystoles are present. Pulses: Normal pulses. Dorsalis pedis pulses are 2+ on the right side and 2+ on the left side. Heart sounds: S1 normal and S2 normal. Heart sounds are distant. No murmur. Pulmonary:      Effort: Pulmonary effort is normal. No accessory muscle usage, prolonged expiration or respiratory distress. Breath sounds: Normal breath sounds and air entry. No decreased air movement or transmitted upper airway sounds. No wheezing. Abdominal:      General: There is no distension. Palpations: Abdomen is soft. Tenderness: There is no abdominal tenderness. Comments: Obese    Musculoskeletal:      Lumbar back: She exhibits pain and spasm. She exhibits normal range of motion. Back:       Right lower leg: No edema. Left lower leg: No edema. Lymphadenopathy:      Cervical: No cervical adenopathy. Skin:     General: Skin is warm and dry. Coloration: Skin is not ashen, cyanotic, jaundiced or pale. Neurological:      General: No focal deficit present. Mental Status: She is alert and oriented to person, place, and time. Motor: Motor function is intact. Gait: Gait is intact. Psychiatric:         Attention and Perception: Attention and perception normal.         Mood and Affect: Mood and affect normal.         Speech: Speech normal.         Behavior: Behavior normal. Behavior is cooperative. Thought Content:  Thought content normal.         Cognition and Memory: Cognition and memory normal.         Judgment: Judgment normal.         Medical history    [x] Laboratory Results, Vital signs, Imaging, Active Problems, Immunizations, Current/Recently Discontinued Medications, Health Maintenance Activities Due, Referral Notes (if available) were reviewed per writer     Quality Measures    [x] Reviewed Depression screening if taken or valid today or any other valid screening tool (others seen below) Interpretation of Total Score DepressionSeverity: 1-4 = Minimal depression, 5-9 = Mild depression, 10-14 = Moderate depression, 15-19 = Moderately severe depression, 20-27 =Severe depression    PHQ Scores 7/25/2018 6/27/2017   PHQ2 Score 0 0   PHQ9 Score 0 0       Interpretation of Total Score DepressionSeverity: 1-4 = Minimal depression, 5-9 = Mild depression, 10-14 = Moderate depression, 15-19 = Moderately severe depression, 20-27 = Severe depression    BMI Readings from Last 1 Encounters:   03/10/21 46.35 kg/m²        Lab Results   Component Value Date    LABA1C 7.5 (H) 09/14/2020       BP Readings from Last 3 Encounters:   03/10/21 122/74   12/10/20 102/62   10/12/20 138/78        The ASCVD Risk score (Parveen Dawkins., et al., 2013) failed to calculate for the following reasons: The systolic blood pressure is missing        Electronically signed by HUE Kumar on 3/21/2021 at 4:17 PM    This note is created with the assistance of a speech-recognition program. While intending to generate a document that actually reflects the content of the visit, no guarantees can be provided that every mistake has been identified and corrected by editing.

## 2021-03-11 DIAGNOSIS — M1A.9XX0 CHRONIC GOUT INVOLVING TOE WITHOUT TOPHUS, UNSPECIFIED CAUSE, UNSPECIFIED LATERALITY: ICD-10-CM

## 2021-03-11 DIAGNOSIS — N18.30 CONTROLLED TYPE 2 DIABETES MELLITUS WITH STAGE 3 CHRONIC KIDNEY DISEASE, WITHOUT LONG-TERM CURRENT USE OF INSULIN (HCC): ICD-10-CM

## 2021-03-11 DIAGNOSIS — M79.89 LEG SWELLING: ICD-10-CM

## 2021-03-11 DIAGNOSIS — E11.22 CONTROLLED TYPE 2 DIABETES MELLITUS WITH STAGE 3 CHRONIC KIDNEY DISEASE, WITHOUT LONG-TERM CURRENT USE OF INSULIN (HCC): ICD-10-CM

## 2021-03-11 DIAGNOSIS — J41.1 MUCOPURULENT CHRONIC BRONCHITIS (HCC): ICD-10-CM

## 2021-03-11 NOTE — TELEPHONE ENCOUNTER
Last visit: 3/10/21  Last Med refill: 9/15/20  Does patient have enough medication for 72 hours: Yes    Next Visit Date:  Future Appointments   Date Time Provider Mark Junior   4/19/2021 10:00 AM Suma Ornelas DPM [facility]   10/11/2021  8:30 AM Ciarra Chun MD [facility] None       Health Maintenance   Topic Date Due    Diabetic retinal exam  07/24/2020    COVID-19 Vaccine (2 of 2 - Moderna series) 03/31/2021    Lipid screen  05/07/2021    A1C test (Diabetic or Prediabetic)  09/14/2021    Potassium monitoring  09/14/2021    Creatinine monitoring  09/14/2021    Breast cancer screen  10/03/2021    Diabetic foot exam  02/18/2022    Cervical cancer screen  11/16/2022    DTaP/Tdap/Td vaccine (2 - Td) 08/04/2026    Colon cancer screen colonoscopy  02/16/2028    Flu vaccine  Completed    Shingles Vaccine  Completed    Pneumococcal 0-64 years Vaccine  Completed    Hepatitis C screen  Addressed    HIV screen  Addressed    Hepatitis A vaccine  Aged Out    Hib vaccine  Aged Out    Meningococcal (ACWY) vaccine  Aged Out       Hemoglobin A1C (%)   Date Value   09/14/2020 7.5 (H)   05/06/2020 7.4 (H)   04/10/2020 6.8             ( goal A1C is < 7)   Microalb/Crt.  Ratio (mcg/mg creat)   Date Value   04/08/2019 CANNOT BE CALCULATED     LDL Cholesterol (mg/dL)   Date Value   05/07/2020 68   08/01/2019 51     LDL Calculated (mg/dL)   Date Value   04/10/2020 59       (goal LDL is <100)   AST (U/L)   Date Value   09/14/2020 24     ALT (U/L)   Date Value   09/14/2020 31     BUN (mg/dL)   Date Value   09/14/2020 13   09/14/2020 13     BP Readings from Last 3 Encounters:   03/10/21 122/74   12/10/20 102/62   10/12/20 138/78          (goal 120/80)    All Future Testing planned in [facility]  Lab Frequency Next Occurrence   Basic Metabolic Panel Once 47/37/1631   CBC Once 09/12/2021   Protein / Creatinine Ratio, Urine Once 09/12/2021   POCT INR     Basic Metabolic Panel Every 6 Months 3/26/2021, 9/10/2021               Patient Active Problem List:     Essential hypertension     Hyperlipidemia     Osteoarthritis     Depression     Obesity     CKD (chronic kidney disease) stage 3, GFR 30-59 ml/min ([facility])     Proteinuria     Controlled type 2 diabetes mellitus with stage 3 chronic kidney disease, without long-term current use of insulin ([facility])     History of DVT of lower extremity     NARCISO on CPAP     Vitamin D deficiency     Major depressive disorder, recurrent episode, severe, with psychotic behavior ([address]     Major depressive disorder, recurrent, severe with psychotic symptoms ([address]     Multiple lung nodules on CT     Hypomagnesemia     SOB (shortness of breath)     Anxiety     Chronic obstructive pulmonary disease (HCC)     Precordial pain     History of pulmonary embolism     Decreased pedal pulses     Family history of abdominal aortic aneurysm     Normal coronary arteries     Long term (current) use of anticoagulants     AAA (abdominal aortic aneurysm) without rupture ([address]

## 2021-03-12 RX ORDER — ALLOPURINOL 100 MG/1
100 TABLET ORAL DAILY
Qty: 90 TABLET | Refills: 1 | Status: SHIPPED | OUTPATIENT
Start: 2021-03-12 | End: 2021-08-30

## 2021-03-12 RX ORDER — TIOTROPIUM BROMIDE 18 UG/1
18 CAPSULE ORAL; RESPIRATORY (INHALATION) DAILY
Qty: 90 CAPSULE | Refills: 1 | Status: SHIPPED | OUTPATIENT
Start: 2021-03-12 | End: 2021-10-04 | Stop reason: ALTCHOICE

## 2021-03-12 RX ORDER — FUROSEMIDE 20 MG/1
20 TABLET ORAL DAILY
Qty: 90 TABLET | Refills: 1 | Status: SHIPPED | OUTPATIENT
Start: 2021-03-12 | End: 2021-08-28

## 2021-03-12 RX ORDER — METFORMIN HYDROCHLORIDE 500 MG/1
1000 TABLET, EXTENDED RELEASE ORAL 2 TIMES DAILY
Qty: 360 TABLET | Refills: 1 | Status: SHIPPED | OUTPATIENT
Start: 2021-03-12 | End: 2021-08-30

## 2021-03-21 ENCOUNTER — TELEPHONE (OUTPATIENT)
Dept: FAMILY MEDICINE CLINIC | Age: 61
End: 2021-03-21

## 2021-04-07 ENCOUNTER — NURSE ONLY (OUTPATIENT)
Dept: FAMILY MEDICINE CLINIC | Age: 61
End: 2021-04-07
Payer: MEDICARE

## 2021-04-07 DIAGNOSIS — Z86.718 HISTORY OF DVT OF LOWER EXTREMITY: Primary | ICD-10-CM

## 2021-04-07 LAB
INTERNATIONAL NORMALIZATION RATIO, POC: 2.4
PROTHROMBIN TIME, POC: 29

## 2021-04-07 PROCEDURE — 85610 PROTHROMBIN TIME: CPT | Performed by: NURSE PRACTITIONER

## 2021-04-07 PROCEDURE — 93793 ANTICOAG MGMT PT WARFARIN: CPT | Performed by: NURSE PRACTITIONER

## 2021-04-07 NOTE — PROGRESS NOTES
Patients INR today is 2.4. No findings. Patient was instructed by provider to come back in 4 weeks and to continue same dose.

## 2021-04-07 NOTE — PROGRESS NOTES
INR is therapeutic at 2.4. Patient's goal is between 2.0-3.0. Patient is to continue her same regimen of 3 mg on Monday Wednesday and Friday, and 2 mg the other 4 days. Repeat INR in 4 weeks.

## 2021-04-09 DIAGNOSIS — M15.9 PRIMARY OSTEOARTHRITIS INVOLVING MULTIPLE JOINTS: ICD-10-CM

## 2021-04-09 NOTE — TELEPHONE ENCOUNTER
Last visit: 3/10/21  Last Med refill: 3/10/21    Next Visit Date:  Future Appointments   Date Time Provider Mark Junior   4/19/2021 10:00 AM Emily Victoria DPM [facility]   5/3/2021  8:30 AM SCHEDULE, TEJ SCHULER  [facility]   6/30/2021  8:30 AM Trisha Ashley, APRN - CNP [place]  [facility]   10/11/2021  8:30 AM Antonino Santos MD [facility] None       Health Maintenance   Topic Date Due    Diabetic retinal exam  07/24/2020    Lipid screen  05/07/2021    A1C test (Diabetic or Prediabetic)  09/14/2021    Potassium monitoring  09/14/2021    Creatinine monitoring  09/14/2021    Breast cancer screen  10/03/2021    Diabetic foot exam  02/18/2022    Cervical cancer screen  11/16/2022    DTaP/Tdap/Td vaccine (2 - Td) 08/04/2026    Colon cancer screen colonoscopy  02/16/2028    Flu vaccine  Completed    Shingles Vaccine  Completed    Pneumococcal 0-64 years Vaccine  Completed    COVID-19 Vaccine  Completed    Hepatitis C screen  Addressed    HIV screen  Addressed    Hepatitis A vaccine  Aged Out    Hib vaccine  Aged Out    Meningococcal (ACWY) vaccine  Aged Out       Hemoglobin A1C (%)   Date Value   09/14/2020 7.5 (H)   05/06/2020 7.4 (H)   04/10/2020 6.8             ( goal A1C is < 7)   Microalb/Crt.  Ratio (mcg/mg creat)   Date Value   04/08/2019 CANNOT BE CALCULATED     LDL Cholesterol (mg/dL)   Date Value   05/07/2020 68   08/01/2019 51     LDL Calculated (mg/dL)   Date Value   04/10/2020 59       (goal LDL is <100)   AST (U/L)   Date Value   09/14/2020 24     ALT (U/L)   Date Value   09/14/2020 31     BUN (mg/dL)   Date Value   09/14/2020 13   09/14/2020 13     BP Readings from Last 3 Encounters:   03/10/21 122/74   12/10/20 102/62   10/12/20 138/78          (goal 120/80)    All Future Testing planned in [facility]  Lab Frequency Next Occurrence   Basic Metabolic Panel Once 94/21/9996   CBC Once 09/12/2021   Protein / Creatinine Ratio, Urine Once 09/12/2021   POCT INR     Basic Metabolic Panel Every 6 Months 9/10/2021               Patient Active Problem List:     Essential hypertension     Hyperlipidemia     Osteoarthritis     Depression     Obesity     CKD (chronic kidney disease) stage 3, GFR 30-59 ml/min ([facility])     Proteinuria     Controlled type 2 diabetes mellitus with stage 3 chronic kidney disease, without long-term current use of insulin ([facility])     History of DVT of lower extremity     NARCISO on CPAP     Vitamin D deficiency     Major depressive disorder, recurrent episode, severe, with psychotic behavior ([address]     Major depressive disorder, recurrent, severe with psychotic symptoms ([address]     Multiple lung nodules on CT     Hypomagnesemia     SOB (shortness of breath)     Anxiety     Chronic obstructive pulmonary disease (HCC)     Precordial pain     History of pulmonary embolism     Family history of abdominal aortic aneurysm     Normal coronary arteries     Long term (current) use of anticoagulants     AAA (abdominal aortic aneurysm) without rupture ([address]

## 2021-04-10 RX ORDER — OXYCODONE HYDROCHLORIDE AND ACETAMINOPHEN 5; 325 MG/1; MG/1
1 TABLET ORAL 2 TIMES DAILY PRN
Qty: 40 TABLET | Refills: 0 | Status: SHIPPED | OUTPATIENT
Start: 2021-04-10 | End: 2021-05-06 | Stop reason: SDUPTHER

## 2021-04-11 DIAGNOSIS — N18.30 CONTROLLED TYPE 2 DIABETES MELLITUS WITH STAGE 3 CHRONIC KIDNEY DISEASE, WITHOUT LONG-TERM CURRENT USE OF INSULIN (HCC): ICD-10-CM

## 2021-04-11 DIAGNOSIS — E11.22 CONTROLLED TYPE 2 DIABETES MELLITUS WITH STAGE 3 CHRONIC KIDNEY DISEASE, WITHOUT LONG-TERM CURRENT USE OF INSULIN (HCC): ICD-10-CM

## 2021-04-12 RX ORDER — BLOOD SUGAR DIAGNOSTIC
STRIP MISCELLANEOUS
Qty: 100 STRIP | Refills: 5 | Status: SHIPPED | OUTPATIENT
Start: 2021-04-12 | End: 2021-09-27

## 2021-04-12 NOTE — TELEPHONE ENCOUNTER
LOV 3/10/21  RTO 3 months; F/U scheduled  LRF 10/15/20    Health Maintenance   Topic Date Due    Diabetic retinal exam  07/24/2020    Lipid screen  05/07/2021    A1C test (Diabetic or Prediabetic)  09/14/2021    Potassium monitoring  09/14/2021    Creatinine monitoring  09/14/2021    Breast cancer screen  10/03/2021    Diabetic foot exam  02/18/2022    Cervical cancer screen  11/16/2022    DTaP/Tdap/Td vaccine (2 - Td) 08/04/2026    Colon cancer screen colonoscopy  02/16/2028    Flu vaccine  Completed    Shingles Vaccine  Completed    Pneumococcal 0-64 years Vaccine  Completed    COVID-19 Vaccine  Completed    Hepatitis C screen  Addressed    HIV screen  Addressed    Hepatitis A vaccine  Aged Out    Hib vaccine  Aged Out    Meningococcal (ACWY) vaccine  Aged Out             (applicable per patient's age: Cancer Screenings, Depression Screening, Fall Risk Screening, Immunizations)    Hemoglobin A1C (%)   Date Value   09/14/2020 7.5 (H)   05/06/2020 7.4 (H)   04/10/2020 6.8     Microalb/Crt.  Ratio (mcg/mg creat)   Date Value   04/08/2019 CANNOT BE CALCULATED     LDL Cholesterol (mg/dL)   Date Value   05/07/2020 68     LDL Calculated (mg/dL)   Date Value   04/10/2020 59     AST (U/L)   Date Value   09/14/2020 24     ALT (U/L)   Date Value   09/14/2020 31     BUN (mg/dL)   Date Value   09/14/2020 13   09/14/2020 13      (goal A1C is < 7)   (goal LDL is <100) need 30-50% reduction from baseline     BP Readings from Last 3 Encounters:   03/10/21 122/74   12/10/20 102/62   10/12/20 138/78    (goal /80)      All Future Testing planned in [facility]:  Lab Frequency Next Occurrence   Basic Metabolic Panel Once 00/40/3581   CBC Once 09/12/2021   Protein / Creatinine Ratio, Urine Once 09/12/2021   POCT INR     Basic Metabolic Panel Every 6 Months 9/10/2021       Next Visit Date:  Future Appointments   Date Time Provider Mark Junior   4/19/2021 10:00 AM BRODERICK De La Vega 5/3/2021  8:30 AM SCHEDULE, MHP HARINI PC [facility] [place] [facility]   6/30/2021  8:30 AM Trisha Mckeon, APRN - CNP [place] [facility]   10/11/2021  8:30 AM MD JUAN C Garcia None            Patient Active Problem List:     Essential hypertension     Hyperlipidemia     Osteoarthritis     Depression     Obesity     CKD (chronic kidney disease) stage 3, GFR 30-59 ml/min ([facility])     Proteinuria     Controlled type 2 diabetes mellitus with stage 3 chronic kidney disease, without long-term current use of insulin ([facility])     History of DVT of lower extremity     NARCISO on CPAP     Vitamin D deficiency     Major depressive disorder, recurrent episode, severe, with psychotic behavior ([address]     Major depressive disorder, recurrent, severe with psychotic symptoms ([address]     Multiple lung nodules on CT     Hypomagnesemia     SOB (shortness of breath)     Anxiety     Chronic obstructive pulmonary disease (HCC)     Precordial pain     History of pulmonary embolism     Family history of abdominal aortic aneurysm     Normal coronary arteries     Long term (current) use of anticoagulants     AAA (abdominal aortic aneurysm) without rupture ([address]

## 2021-04-19 ENCOUNTER — OFFICE VISIT (OUTPATIENT)
Dept: PODIATRY | Age: 61
End: 2021-04-19
Payer: MEDICARE

## 2021-04-19 VITALS — WEIGHT: 253 LBS | BODY MASS INDEX: 46.56 KG/M2 | HEIGHT: 62 IN

## 2021-04-19 DIAGNOSIS — M79.672 PAIN IN BOTH FEET: ICD-10-CM

## 2021-04-19 DIAGNOSIS — E11.51 TYPE II DIABETES MELLITUS WITH PERIPHERAL CIRCULATORY DISORDER (HCC): Primary | ICD-10-CM

## 2021-04-19 DIAGNOSIS — M79.671 PAIN IN BOTH FEET: ICD-10-CM

## 2021-04-19 DIAGNOSIS — I73.9 PVD (PERIPHERAL VASCULAR DISEASE) (HCC): ICD-10-CM

## 2021-04-19 DIAGNOSIS — B35.1 DERMATOPHYTOSIS OF NAIL: ICD-10-CM

## 2021-04-19 PROCEDURE — 11721 DEBRIDE NAIL 6 OR MORE: CPT | Performed by: PODIATRIST

## 2021-04-19 NOTE — PROGRESS NOTES
[address] [facility] [address] [id number] [address]  Dept: [phone]    DIABETIC PROGRESS NOTE  Date of patient's visit: 4/19/2021  Patient's Name:  Benita Flowers YOB: 1960            Patient Care Team:  BRAULIO Vazquez CNP as PCP - General (Nurse Practitioner Family)  BRAULIO Vazquez CNP as PCP - [facility] Empaneled Provider  Aileen Sy MD as Consulting Physician (Nephrology)  George Ochoa MD as Consulting Physician (Pulmonology)  Roma Chaudhary MD as Consulting Physician (Cardiology)  Jim Johnson DPM as Consulting Physician (Podiatry)          Chief Complaint   Patient presents with    Diabetes    Nail Problem    Peripheral Neuropathy       Subjective:   Benita Flowers comes to clinic for Diabetes, Nail Problem, and Peripheral Neuropathy    she is a diabetic and states that she is checking her feet daily. She also wears shoes more around the house. Chelsi Cuellar Pt currently has complaint of thickened, elongated nails that they cannot manage by themselves. Pt's primary care physician is BRAULIO Vazquez CNP last seen march 10 2021   Pt's last blood sugar was 113 . Pt has a new complaint of NONE. Lab Results   Component Value Date    LABA1C 7.5 (H) 09/14/2020      Complains of numbness in the feet bilat. Past Medical History:   Diagnosis Date    Anxiety     Chronic kidney disease     COPD [facility] obstructive [facility]) [facility]     Depression     Fracture of ankle 5/14/2020    Hyperlipidemia     Hypertension     Obesity     Osteoarthritis     Proteinuria     Pulmonary embolism (HCC)     Sleep apnea     c-pap.  Doesn't use everynight    Type 2 diabetes mellitus without complication (HCC)     Type II or unspecified type diabetes mellitus without mention of complication, not stated as uncontrolled     Von Willebrand disease ([facility]        No Known Allergies  Current Outpatient Medications on File Prior to Visit Medication Sig Dispense Refill    ONETOUCH ULTRA strip TEST THREE TIMES DAILY 100 strip 5    oxyCODONE-acetaminophen (PERCOCET) 5-325 MG per tablet Take 1 tablet by mouth 2 times daily as needed for Pain for up to 30 days. 40 tablet 0    tiotropium (SPIRIVA HANDIHALER) 18 MCG inhalation capsule Inhale 1 capsule into the lungs daily 90 capsule 1    allopurinol (ZYLOPRIM) 100 MG tablet Take 1 tablet by mouth daily 90 tablet 1    metFORMIN (GLUCOPHAGE-XR) 500 MG extended release tablet Take 2 tablets by mouth 2 times daily Take 2 tablets by mouth twice a day. 360 tablet 1    furosemide (LASIX) 20 MG tablet Take 1 tablet by mouth daily 90 tablet 1    albuterol sulfate  (90 Base) MCG/ACT inhaler Inhale 2 puffs into the lungs every 4 hours as needed for Wheezing or Shortness of Breath (AND/OR COUGH) 18 g 1    naloxone 4 MG/0.1ML LIQD nasal spray 1 spray by Nasal route as needed for Opioid Reversal 1 each 5    Handicap Placard MISC by Does not apply route Exp 2/3/2026 1 each 0    warfarin (COUMADIN) 2 MG tablet Take 2 mg Sunday Tuesday Thursday Saturday. Take 3 mg (one and half tabs) Monday Wednesday Friday 45 tablet 3    isosorbide mononitrate (IMDUR) 30 MG extended release tablet Take 1 tablet by mouth daily Do not crush or chew. 90 tablet 1    omeprazole (PRILOSEC) 20 MG delayed release capsule Take 1 capsule by mouth Daily Do not crush or break.  90 capsule 1    glimepiride (AMARYL) 4 MG tablet Take 1 tablet by mouth 2 times daily 180 tablet 1    atorvastatin (LIPITOR) 40 MG tablet Take 1 tablet by mouth daily 90 tablet 1    SITagliptin (JANUVIA) 100 MG tablet Take 1 tablet by mouth daily 90 tablet 1    lisinopril (PRINIVIL;ZESTRIL) 20 MG tablet Take 1 tablet by mouth daily 90 tablet 1    ipratropium-albuterol (DUONEB) 0.5-2.5 (3) MG/3ML SOLN nebulizer solution Inhale 3 mLs into the lungs every 6 hours as needed for Shortness of Breath 360 mL 0    RA VITAMIN D-3 50 MCG (2000 UT) CAPS Take 1 capsule by mouth daily 30 capsule 5    latanoprost (XALATAN) 0.005 % ophthalmic solution nightly      hydrALAZINE (APRESOLINE) 50 MG tablet Take 1 tablet by mouth 2 times daily 180 tablet 1    Misc. Devices MISC 1 PAIR OF DIABETIC SHOES (1 LEFT/ 1 RIGHT)  1-3 PAIRS OF INSERTS (LEFT/ RIGHT) 2 each 0    Respiratory Therapy Supplies (NEBULIZER/TUBING/MOUTHPIECE) KIT 1 kit by Does not apply route 4 times daily as needed (wheezing) 1 kit 0    Misc. Devices MISC 1 PAIR OF DIABETIC SHOES (1 LEFT/ 1 RIGHT)  1-3 PAIRS OF INSERTS (LEFT/ RIGHT) 2 each 0    Lancets (BD LANCET ULTRAFINE 30G) MISC Use to check sugars twice daily and as needed 120 each 3    Alcohol Swabs (ALCOHOL PREP) 70 % PADS Use twice daily and as needed to check blood sugars 120 each 3    Blood Pressure Monitoring KIT Use to check blood pressure daily and as needed 1 kit 0    risperiDONE (RISPERDAL) 2 MG tablet Take 1 mg by mouth nightly       fluticasone (FLONASE) 50 MCG/ACT nasal spray 1 spray by Nasal route daily 1 Bottle 3    buPROPion (WELLBUTRIN XL) 300 MG XL tablet Take 300 mg by mouth every morning.  buPROPion (WELLBUTRIN XL) 150 MG XL tablet Take 150 mg by mouth every morning. No current facility-administered medications on file prior to visit. Review of Systems    Review of Systems:   History obtained from chart review and the patient  General ROS: negative for - chills, fatigue, fever, night sweats or weight gain  Constitutional: Negative for chills, diaphoresis, fatigue, fever and unexpected weight change. Musculoskeletal: Positive for arthralgias, gait problem and joint swelling. Neurological ROS: negative for - behavioral changes, confusion, headaches or seizures. Negative for weakness and numbness. Dermatological ROS: negative for - mole changes, rash  Cardiovascular: Negative for leg swelling. Gastrointestinal: Negative for constipation, diarrhea, nausea and vomiting.         Objective:  Dermatologic Left    Incurvation/Ingrowin   [] [] [] [] [] [] [] [] [] []  5 4 3 2 1 1 2 3 4 5                         Right                                        Left    Inflammation/Pain   [x] [x] [x] [x] [x] [x] [x] [x] [x] [x]  5 4 3 2 1 1 2 3 4 5                         Right                                        Left        Visual inspection:  Deformity: hammertoe deformity sherman feet  amputation: absent  Skin lesions: absent  Edema: right- 2+ pitting edema, left- 2+ pitting edema    Sensory exam:  Monofilament sensation: abnormal - 6/10 via SW 5.07/10g monofilament to the plantar foot bilateral feet    Pulses: abnormal - 1/4 dorsalis pedis pulse and 1/4 Posterior tibial pulse,   Pinprick: Impaired  Proprioception: Impaired  Vibration (128 Hz): Impaired       DM with PVD       [x]Yes    []No      Assessment:  56 y.o. female with:   Diagnosis Orders   1. Type II diabetes mellitus with peripheral circulatory disorder ([facility])  68538 - [place] DEBRIDEMENT OF NAILS, 6 OR MORE    HM DIABETES FOOT EXAM   2. Dermatophytosis of nail  15633 - [place] DEBRIDEMENT OF NAILS, 6 OR MORE    HM DIABETES FOOT EXAM   3. PVD (peripheral vascular disease) ([facility])  42791 - [place] DEBRIDEMENT OF NAILS, 6 OR MORE    HM DIABETES FOOT EXAM   4. Pain in both feet  96852 - [place] DEBRIDEMENT OF NAILS, 6 OR MORE    HM DIABETES FOOT EXAM           Q7   []Yes    []No                Q8   [x]Yes    []No                     Q9   []Yes    []No    Plan:   Pt was evaluated and examined. Patient was given personalized discharge instructions. Nails 1-10 were debrided sharply in length and thickness with a nipper and , without incident. Pt will follow up in 9 weeks or sooner if any problems arise. Diagnosis was discussed with the pt and all of their questions were answered in detail. Proper foot hygiene and care was discussed with the pt. Informed patient on proper diabetic foot care and importance of tight glycemic control.   Patient to check feet daily and contact

## 2021-05-03 ENCOUNTER — ANTI-COAG VISIT (OUTPATIENT)
Dept: FAMILY MEDICINE CLINIC | Age: 61
End: 2021-05-03
Payer: MEDICARE

## 2021-05-03 DIAGNOSIS — Z79.01 LONG TERM (CURRENT) USE OF ANTICOAGULANTS: Primary | ICD-10-CM

## 2021-05-03 DIAGNOSIS — Z86.718 HISTORY OF DVT OF LOWER EXTREMITY: ICD-10-CM

## 2021-05-03 LAB
INTERNATIONAL NORMALIZATION RATIO, POC: 2.4
PROTHROMBIN TIME, POC: 28.8

## 2021-05-03 PROCEDURE — 85610 PROTHROMBIN TIME: CPT | Performed by: NURSE PRACTITIONER

## 2021-05-06 DIAGNOSIS — M15.9 PRIMARY OSTEOARTHRITIS INVOLVING MULTIPLE JOINTS: ICD-10-CM

## 2021-05-07 RX ORDER — OXYCODONE HYDROCHLORIDE AND ACETAMINOPHEN 5; 325 MG/1; MG/1
1 TABLET ORAL 2 TIMES DAILY PRN
Qty: 40 TABLET | Refills: 0 | Status: SHIPPED | OUTPATIENT
Start: 2021-05-08 | End: 2021-06-07

## 2021-05-07 NOTE — TELEPHONE ENCOUNTER
LOV 3/10/21  RTO 3 months; F/U scheduled  LRF 4/10/21  CSM 9/23/19    Health Maintenance   Topic Date Due    Diabetic retinal exam  07/24/2020    Lipid screen  05/07/2021    A1C test (Diabetic or Prediabetic)  09/14/2021    Potassium monitoring  09/14/2021    Creatinine monitoring  09/14/2021    Breast cancer screen  10/03/2021    Diabetic foot exam  04/26/2022    Cervical cancer screen  11/16/2022    DTaP/Tdap/Td vaccine (2 - Td) 08/04/2026    Colon cancer screen colonoscopy  02/16/2028    Flu vaccine  Completed    Shingles Vaccine  Completed    Pneumococcal 0-64 years Vaccine  Completed    COVID-19 Vaccine  Completed    Hepatitis C screen  Addressed    HIV screen  Addressed    Hepatitis A vaccine  Aged Out    Hib vaccine  Aged Out    Meningococcal (ACWY) vaccine  Aged Out             (applicable per patient's age: Cancer Screenings, Depression Screening, Fall Risk Screening, Immunizations)    Hemoglobin A1C (%)   Date Value   09/14/2020 7.5 (H)   05/06/2020 7.4 (H)   04/10/2020 6.8     Microalb/Crt.  Ratio (mcg/mg creat)   Date Value   04/08/2019 CANNOT BE CALCULATED     LDL Cholesterol (mg/dL)   Date Value   05/07/2020 68     LDL Calculated (mg/dL)   Date Value   04/10/2020 59     AST (U/L)   Date Value   09/14/2020 24     ALT (U/L)   Date Value   09/14/2020 31     BUN (mg/dL)   Date Value   09/14/2020 13   09/14/2020 13      (goal A1C is < 7)   (goal LDL is <100) need 30-50% reduction from baseline     BP Readings from Last 3 Encounters:   03/10/21 122/74   12/10/20 102/62   10/12/20 138/78    (goal /80)      All Future Testing planned in [facility]:  Lab Frequency Next Occurrence   CBC Once 09/12/2021   Protein / Creatinine Ratio, Urine Once 09/12/2021   POCT INR     Basic Metabolic Panel Every 6 Months 9/10/2021       Next Visit Date:  Future Appointments   Date Time Provider Mark Junior   6/1/2021  8:30 AM SCHEDULE, SILVANO HACKETTTOERNA   6/21/2021 10:00 AM

## 2021-05-23 DIAGNOSIS — Z79.01 LONG TERM (CURRENT) USE OF ANTICOAGULANTS: ICD-10-CM

## 2021-05-23 DIAGNOSIS — J41.1 MUCOPURULENT CHRONIC BRONCHITIS (HCC): ICD-10-CM

## 2021-05-23 DIAGNOSIS — Z86.718 HISTORY OF DVT OF LOWER EXTREMITY: ICD-10-CM

## 2021-05-24 RX ORDER — ALBUTEROL SULFATE 90 UG/1
2 AEROSOL, METERED RESPIRATORY (INHALATION) EVERY 4 HOURS PRN
Qty: 18 G | Refills: 1 | Status: SHIPPED | OUTPATIENT
Start: 2021-05-24 | End: 2022-05-01 | Stop reason: SDUPTHER

## 2021-05-24 NOTE — TELEPHONE ENCOUNTER
Lov: 3/10/21  Lrf: 5/3/21,3/10/21  Na: 6/30/21  Health Maintenance   Topic Date Due    Diabetic retinal exam  07/24/2020    Lipid screen  05/07/2021    A1C test (Diabetic or Prediabetic)  09/14/2021    Potassium monitoring  09/14/2021    Creatinine monitoring  09/14/2021    Breast cancer screen  10/03/2021    Diabetic foot exam  04/26/2022    Cervical cancer screen  11/16/2022    Pneumococcal 0-64 years Vaccine (2 of 2) 04/23/2023    DTaP/Tdap/Td vaccine (2 - Td) 08/04/2026    Colon cancer screen colonoscopy  02/16/2028    Flu vaccine  Completed    Shingles Vaccine  Completed    COVID-19 Vaccine  Completed    Hepatitis C screen  Addressed    HIV screen  Addressed    Hepatitis A vaccine  Aged Out    Hib vaccine  Aged Out    Meningococcal (ACWY) vaccine  Aged Out             (applicable per patient's age: Cancer Screenings, Depression Screening, Fall Risk Screening, Immunizations)    Hemoglobin A1C (%)   Date Value   09/14/2020 7.5 (H)   05/06/2020 7.4 (H)   04/10/2020 6.8     Microalb/Crt.  Ratio (mcg/mg creat)   Date Value   04/08/2019 CANNOT BE CALCULATED     LDL Cholesterol (mg/dL)   Date Value   05/07/2020 68     LDL Calculated (mg/dL)   Date Value   04/10/2020 59     AST (U/L)   Date Value   09/14/2020 24     ALT (U/L)   Date Value   09/14/2020 31     BUN (mg/dL)   Date Value   09/14/2020 13   09/14/2020 13      (goal A1C is < 7)   (goal LDL is <100) need 30-50% reduction from baseline     BP Readings from Last 3 Encounters:   03/10/21 122/74   12/10/20 102/62   10/12/20 138/78    (goal /80)      All Future Testing planned in [facility]:  Lab Frequency Next Occurrence   CBC Once 09/12/2021   Protein / Creatinine Ratio, Urine Once 09/12/2021   POCT INR     Basic Metabolic Panel Every 6 Months 9/10/2021       Next Visit Date:  Future Appointments   Date Time Provider Mark Junior   6/1/2021  8:30 AM SCHEDULE, SILVANO MORRIS [facility]   6/21/2021 10:00 AM Emily Victoria, D[facility]   6/30/2021  8:30 AM BRAULIO Coello - CNP [place] [facility]   10/11/2021  8:30 AM Dawit Leahy MD [facility] None            Patient Active Problem List:     Essential hypertension     Hyperlipidemia     Osteoarthritis     Depression     Obesity     CKD (chronic kidney disease) stage 3, GFR 30-59 ml/min ([facility])     Proteinuria     Controlled type 2 diabetes mellitus with stage 3 chronic kidney disease, without long-term current use of insulin ([facility])     History of DVT of lower extremity     NARCISO on CPAP     Vitamin D deficiency     Major depressive disorder, recurrent episode, severe, with psychotic behavior ([address]     Major depressive disorder, recurrent, severe with psychotic symptoms ([address]     Multiple lung nodules on CT     Hypomagnesemia     SOB (shortness of breath)     Anxiety     Chronic obstructive pulmonary disease (HCC)     Precordial pain     History of pulmonary embolism     Family history of abdominal aortic aneurysm     Normal coronary arteries     Long term (current) use of anticoagulants     AAA (abdominal aortic aneurysm) without rupture ([address]

## 2021-05-25 ENCOUNTER — HOSPITAL ENCOUNTER (EMERGENCY)
Age: 61
Discharge: HOME | End: 2021-05-25
Attending: EMERGENCY MEDICINE
Payer: MEDICARE

## 2021-05-25 ENCOUNTER — APPOINTMENT (OUTPATIENT)
Dept: GENERAL RADIOLOGY | Age: 61
End: 2021-05-25
Payer: MEDICARE

## 2021-05-25 VITALS
WEIGHT: 250 LBS | HEIGHT: 62 IN | RESPIRATION RATE: 14 BRPM | SYSTOLIC BLOOD PRESSURE: 137 MMHG | DIASTOLIC BLOOD PRESSURE: 74 MMHG | TEMPERATURE: 97.7 F | BODY MASS INDEX: 46.01 KG/M2 | HEART RATE: 84 BPM | OXYGEN SATURATION: 99 %

## 2021-05-25 DIAGNOSIS — S63.502A SPRAIN OF LEFT WRIST, INITIAL ENCOUNTER: Primary | ICD-10-CM

## 2021-05-25 PROCEDURE — 73110 X-RAY EXAM OF WRIST: CPT

## 2021-05-25 PROCEDURE — 99283 EMERGENCY DEPT VISIT LOW MDM: CPT

## 2021-05-25 ASSESSMENT — PAIN SCALES - GENERAL: PAINLEVEL_OUTOF10: 5

## 2021-05-25 NOTE — ED PROVIDER NOTES
[address]      Pt Name: Dionna Rm  MRN: [id number]  [place] 1960  Date of evaluation: 5/25/2021      CHIEF COMPLAINT       Chief Complaint   Patient presents with    Hand Injury     left wrist, s/p fall         HISTORY OF PRESENT ILLNESS      The patient presents with pain in her left wrist.  She fell onto the outstretched wrist yesterday. She says she was letting her dogs out and she tripped up the steps. This happened about 10 PM last night. She is now having pain in the distal wrist on the radial aspect. Pain is worse with movement and better with rest.  She denies numbness or tingling in the fingers. She denies elbow pain or other injury. She took some ibuprofen. She also takes Percocet but was driving herself so she did not take any this morning. REVIEW OF SYSTEMS       All systems reviewed and negative unless noted in HPI. The patient denies fever or constitutional symptoms. Denies vision change. Denies any sore throat or rhinorrhea. Denies any neck pain or stiffness. Denies chest pain or shortness of breath. No nausea,  vomiting or diarrhea. Denies any dysuria. Left wrist pain as noted in HPI. Denies any weakness, numbness or focal neurologic deficit. Denies any skin rash or edema. No recent psychiatric issues. Takes coumadin. History of DM. PAST MEDICAL HISTORY    has a past medical history of Anxiety, Chronic kidney disease, COPD (chronic obstructive pulmonary disease) ([address] Depression, Fracture of ankle, Hyperlipidemia, Hypertension, Obesity, Osteoarthritis, Proteinuria, Pulmonary embolism ([address] Sleep apnea, Type 2 diabetes mellitus without complication ([address] Type II or unspecified type diabetes mellitus without mention of complication, not stated as uncontrolled, and Von Willebrand disease ([address]     SURGICAL HISTORY      has a past surgical history that includes Tonsillectomy and adenoidectomy; eye surgery (Bilateral); eye surgery (Bilateral); Appendectomy;  section; Colonoscopy (2018); Colonoscopy (N/A, 2018); and Diagnostic Cardiac Cath Lab Procedure. CURRENT MEDICATIONS       Previous Medications    ALBUTEROL SULFATE  (90 BASE) MCG/ACT INHALER    Inhale 2 puffs into the lungs every 4 hours as needed for Wheezing or Shortness of Breath (AND/OR COUGH)    ALCOHOL SWABS (ALCOHOL PREP) 70 % PADS    Use twice daily and as needed to check blood sugars    ALLOPURINOL (ZYLOPRIM) 100 MG TABLET    Take 1 tablet by mouth daily    ATORVASTATIN (LIPITOR) 40 MG TABLET    Take 1 tablet by mouth daily    BLOOD PRESSURE MONITORING KIT    Use to check blood pressure daily and as needed    BUPROPION (WELLBUTRIN XL) 150 MG XL TABLET    Take 150 mg by mouth every morning. BUPROPION (WELLBUTRIN XL) 300 MG XL TABLET    Take 300 mg by mouth every morning. FLUTICASONE (FLONASE) 50 MCG/ACT NASAL SPRAY    1 spray by Nasal route daily    FUROSEMIDE (LASIX) 20 MG TABLET    Take 1 tablet by mouth daily    GLIMEPIRIDE (AMARYL) 4 MG TABLET    Take 1 tablet by mouth 2 times daily    HANDICAP PLACARD MISC    by Does not apply route Exp 2/3/2026    HYDRALAZINE (APRESOLINE) 50 MG TABLET    Take 1 tablet by mouth 2 times daily    IPRATROPIUM-ALBUTEROL (DUONEB) 0.5-2.5 (3) MG/3ML SOLN NEBULIZER SOLUTION    Inhale 3 mLs into the lungs every 6 hours as needed for Shortness of Breath    ISOSORBIDE MONONITRATE (IMDUR) 30 MG EXTENDED RELEASE TABLET    Take 1 tablet by mouth daily Do not crush or chew. LANCETS (BD LANCET ULTRAFINE 30G) MISC    Use to check sugars twice daily and as needed    LATANOPROST (XALATAN) 0.005 % OPHTHALMIC SOLUTION    nightly    LISINOPRIL (PRINIVIL;ZESTRIL) 20 MG TABLET    Take 1 tablet by mouth daily    METFORMIN (GLUCOPHAGE-XR) 500 MG EXTENDED RELEASE TABLET    Take 2 tablets by mouth 2 times daily Take 2 tablets by mouth twice a day. MISC.  DEVICES MISC    1 PAIR OF DIABETIC SHOES (1 LEFT/ 1 RIGHT)  1-3 PAIRS OF INSERTS (LEFT/ RIGHT)    MISC. DEVICES MISC    1 PAIR OF DIABETIC SHOES (1 LEFT/ 1 RIGHT)  1-3 PAIRS OF INSERTS (LEFT/ RIGHT)    NALOXONE 4 MG/0.1ML LIQD NASAL SPRAY    1 spray by Nasal route as needed for Opioid Reversal    OMEPRAZOLE (PRILOSEC) 20 MG DELAYED RELEASE CAPSULE    Take 1 capsule by mouth Daily Do not crush or break. ONETOUCH ULTRA STRIP    TEST THREE TIMES DAILY    OXYCODONE-ACETAMINOPHEN (PERCOCET) 5-325 MG PER TABLET    Take 1 tablet by mouth 2 times daily as needed for Pain for up to 30 days. PT/INR TEST STRP    1 each by Does not apply route every 14 days    PT/INR TESTING MONITOR KIT    1 kit by Does not apply route every 14 days    RA VITAMIN D-3 50 MCG (2000) CAPS    Take 1 capsule by mouth daily    RESPIRATORY THERAPY SUPPLIES (NEBULIZER/TUBING/MOUTHPIECE) KIT    1 kit by Does not apply route 4 times daily as needed (wheezing)    RISPERIDONE (RISPERDAL) 2 MG TABLET    Take 1 mg by mouth nightly     SITAGLIPTIN (JANUVIA) 100 MG TABLET    Take 1 tablet by mouth daily    TIOTROPIUM (SPIRIVA HANDIHALER) 18 MCG INHALATION CAPSULE    Inhale 1 capsule into the lungs daily    WARFARIN (COUMADIN) 2 MG TABLET    Take 2 mg . Take 3 mg (one and half tabs)        ALLERGIES     has No Known Allergies. FAMILY HISTORY     She indicated that her mother is . She indicated that her father is . She indicated that all of her four sisters are alive. She indicated that two of her four brothers are alive. She indicated that her maternal grandmother is . She indicated that her maternal grandfather is . She indicated that her paternal grandmother is . She indicated that her paternal grandfather is . family history includes Cancer in her brother and father; Diabetes in her sister;  Hypertension in her mother; Liver Disease in her mother; No Known Problems in her maternal grandfather, maternal grandmother, paternal grandfather, and paternal grandmother; Other in her brother. SOCIAL HISTORY      reports that she quit smoking about 38 years ago. Her smoking use included cigarettes. She started smoking about 43 years ago. She has a 1.75 pack-year smoking history. She has never used smokeless tobacco. She reports that she does not drink alcohol and does not use drugs. PHYSICAL EXAM     INITIAL VITALS:  height is 5' 2\" (1.575 m) and weight is 113.4 kg (250 lb). Her oral temperature is 97.7 °F (36.5 °C). Her blood pressure is 137/74 and her pulse is 84. Her respiration is 14 and oxygen saturation is 99%. The patient is alert and oriented, in no apparent distress. HEENT is atraumatic. Pupils are PERRL at 4 mm. Mucous membranes moist.    Neck is supple. Heart sounds regular rate and rhythm with no gallops, murmurs, or rubs. Lungs clear, no wheezes, rales or rhonchi. Abdomen: soft, nontender. Musculoskeletal exam: Examination of left upper extremity demonstrates no pain in the shoulder or elbow. Mild discomfort in the distal radius. No open wound. Normal radial pulse. Patient has pain with flexion and extension of the wrist but can supinate and pronate. Patient can open and close the fingers normally though with some mild discomfort. No crepitance. No pain in the anatomic snuffbox or with axial loading of the thumb. Normal capillary refill in the fingers. No swelling or ecchymosis. The remainder the musculoskeletal exam is unremarkable. Skin: no rash or edema. Neurological exam reveals cranial nerves 2 through 12 grossly intact. Patient has equal  and normal deep tendon reflexes. Psychiatric: Appropriate  Lymphatics.:  No lymphadenopathy.          DIFFERENTIAL DIAGNOSIS/ MDM:     Fracture, sprain    DIAGNOSTIC RESULTS         RADIOLOGY:   I reviewed the radiologist interpretations:  XR WRIST LEFT (MIN 3 VIEWS)   Final Result   No acute abnormality left wrist.  Mild degenerative findings, as above.               XR WRIST LEFT (MIN 3 VIEWS) (Final result)  Result time 05/25/21 09:07:25  Final result by Ilya Kent MD (05/25/21 09:07:25)                Impression:    No acute abnormality left wrist.  Mild degenerative findings, as above. Narrative:    EXAMINATION:   3 XRAY VIEWS OF THE LEFT WRIST     5/25/2021 8:52 am     COMPARISON:   None. HISTORY:   ORDERING SYSTEM PROVIDED HISTORY: fall   TECHNOLOGIST PROVIDED HISTORY:   fall   Reason for Exam: left wrist pain   Acuity: Acute   Type of Exam: Initial   Mechanism of Injury: fell trying to catch herself     FINDINGS:   No fracture.  No dislocation.  Pronator fat pad maintained. Carpal bone relationships maintained.  Mild mostly Triscaphe joint   degenerative changes. No destructive or blastic lesion. EMERGENCY DEPARTMENT COURSE:   Vitals:    Vitals:    05/25/21 0836   BP: 137/74   Pulse: 84   Resp: 14   Temp: 97.7 °F (36.5 °C)   TempSrc: Oral   SpO2: 99%   Weight: 113.4 kg (250 lb)   Height: 5' 2\" (1.575 m)     -------------------------  BP: 137/74, Temp: 97.7 °F (36.5 °C), Pulse: 84, Resp: 14      Re-evaluation Notes    The patient was provided a commercial splint. She may take her Percocet as directed. She can follow-up with her doctor. The patient is discharged in good condition. PROCEDURES:    A splint was applied by the nurse. The patient had good color, sensation, and motion of the fingertips after placement. FINAL IMPRESSION      1.  Sprain of left wrist, initial encounter          DISPOSITION/PLAN   DISPOSITION Discharge - Pending Orders Complete 05/25/2021 09:19:31 AM      Condition on Disposition    good    PATIENT REFERRED TO:  BRAULIO Dominguez - MARIA  [facility] [address] [id number] [phone]    In 1 week        DISCHARGE MEDICATIONS:  New Prescriptions    No medications on file       (Please note that portions of this note were completed with a voice recognition program.  Efforts were made to edit the dictations but occasionally words are mis-transcribed.)    Thor Trejo MD,, MD   Attending Emergency Physician         Dick Winter MD  05/25/21 0122

## 2021-05-26 ENCOUNTER — TELEPHONE (OUTPATIENT)
Dept: FAMILY MEDICINE CLINIC | Age: 61
End: 2021-05-26

## 2021-06-01 ENCOUNTER — ANTI-COAG VISIT (OUTPATIENT)
Dept: FAMILY MEDICINE CLINIC | Age: 61
End: 2021-06-01
Payer: MEDICARE

## 2021-06-01 DIAGNOSIS — Z86.718 HISTORY OF DVT OF LOWER EXTREMITY: Primary | ICD-10-CM

## 2021-06-01 LAB
INTERNATIONAL NORMALIZATION RATIO, POC: 3.3
PROTHROMBIN TIME, POC: 39.3

## 2021-06-01 PROCEDURE — G8428 CUR MEDS NOT DOCUMENT: HCPCS | Performed by: NURSE PRACTITIONER

## 2021-06-01 PROCEDURE — 85610 PROTHROMBIN TIME: CPT | Performed by: NURSE PRACTITIONER

## 2021-06-01 PROCEDURE — 99211 OFF/OP EST MAY X REQ PHY/QHP: CPT | Performed by: NURSE PRACTITIONER

## 2021-06-01 PROCEDURE — 93793 ANTICOAG MGMT PT WARFARIN: CPT | Performed by: NURSE PRACTITIONER

## 2021-06-01 PROCEDURE — G8417 CALC BMI ABV UP PARAM F/U: HCPCS | Performed by: NURSE PRACTITIONER

## 2021-06-01 NOTE — PROGRESS NOTES
Patients INR is elevated today at 3.3. No findings. Patient also wanted provider to know that its her palm that is is hurting, says the ED only took images of her wrist but asking if that makes a difference, If not she is asking to get an xray of her palm.

## 2021-06-04 ENCOUNTER — CARE COORDINATION (OUTPATIENT)
Dept: CARE COORDINATION | Age: 61
End: 2021-06-04

## 2021-06-04 NOTE — CARE COORDINATION
Spoke with pt who said she is doing well at home she follows up with pulmonologist and at the [facility] center she is able to afford her medications and she is able to get to her apt. She denies any care coordination needs at this time.

## 2021-06-08 DIAGNOSIS — M15.9 PRIMARY OSTEOARTHRITIS INVOLVING MULTIPLE JOINTS: ICD-10-CM

## 2021-06-08 RX ORDER — OXYCODONE HYDROCHLORIDE AND ACETAMINOPHEN 5; 325 MG/1; MG/1
1 TABLET ORAL 2 TIMES DAILY PRN
Qty: 40 TABLET | Refills: 0 | Status: SHIPPED | OUTPATIENT
Start: 2021-06-08 | End: 2021-07-12

## 2021-06-08 NOTE — TELEPHONE ENCOUNTER
LOV 3/10/21  RTO 3 months; F/U scheduled  LRF 5/08/21  CSM 9/23/19    Health Maintenance   Topic Date Due    Diabetic retinal exam  07/24/2020    Lipid screen  05/07/2021    A1C test (Diabetic or Prediabetic)  09/14/2021    Potassium monitoring  09/14/2021    Creatinine monitoring  09/14/2021    Breast cancer screen  10/03/2021    Diabetic foot exam  04/26/2022    Cervical cancer screen  11/16/2022    Pneumococcal 0-64 years Vaccine (2 of 2) 04/23/2023    DTaP/Tdap/Td vaccine (2 - Td or Tdap) 08/04/2026    Colon cancer screen colonoscopy  02/16/2028    Flu vaccine  Completed    Shingles Vaccine  Completed    COVID-19 Vaccine  Completed    Hepatitis C screen  Addressed    HIV screen  Addressed    Hepatitis A vaccine  Aged Out    Hib vaccine  Aged Out    Meningococcal (ACWY) vaccine  Aged Out             (applicable per patient's age: Cancer Screenings, Depression Screening, Fall Risk Screening, Immunizations)    Hemoglobin A1C (%)   Date Value   09/14/2020 7.5 (H)   05/06/2020 7.4 (H)   04/10/2020 6.8     Microalb/Crt.  Ratio (mcg/mg creat)   Date Value   04/08/2019 CANNOT BE CALCULATED     LDL Cholesterol (mg/dL)   Date Value   05/07/2020 68     LDL Calculated (mg/dL)   Date Value   04/10/2020 59     AST (U/L)   Date Value   09/14/2020 24     ALT (U/L)   Date Value   09/14/2020 31     BUN (mg/dL)   Date Value   09/14/2020 13   09/14/2020 13      (goal A1C is < 7)   (goal LDL is <100) need 30-50% reduction from baseline     BP Readings from Last 3 Encounters:   05/25/21 137/74   03/10/21 122/74   12/10/20 102/62    (goal /80)      All Future Testing planned in [facility]:  Lab Frequency Next Occurrence   CBC Once 09/12/2021   Protein / Creatinine Ratio, Urine Once 09/12/2021   POCT INR     Basic Metabolic Panel Every 6 Months 9/10/2021       Next Visit Date:  Future Appointments   Date Time Provider Mark Junior   6/14/2021  8:30 AM SCHEDULE, R[facility]

## 2021-06-14 ENCOUNTER — NURSE ONLY (OUTPATIENT)
Dept: FAMILY MEDICINE CLINIC | Age: 61
End: 2021-06-14
Payer: MEDICARE

## 2021-06-14 ENCOUNTER — HOSPITAL ENCOUNTER (OUTPATIENT)
Age: 61
Setting detail: SPECIMEN
Discharge: HOME | End: 2021-06-14
Payer: MEDICARE

## 2021-06-14 DIAGNOSIS — Z86.718 HISTORY OF DVT OF LOWER EXTREMITY: ICD-10-CM

## 2021-06-14 DIAGNOSIS — N18.31 STAGE 3A CHRONIC KIDNEY DISEASE (HCC): ICD-10-CM

## 2021-06-14 LAB
ANION GAP SERPL CALCULATED.3IONS-SCNC: 14 MMOL/L (ref 9–17)
BUN BLDV-MCNC: 14 MG/DL (ref 8–23)
BUN/CREAT BLD: ABNORMAL (ref 9–20)
CALCIUM SERPL-MCNC: 8.8 MG/DL (ref 8.6–10.4)
CHLORIDE BLD-SCNC: 102 MMOL/L (ref 98–107)
CO2: 25 MMOL/L (ref 20–31)
CREAT SERPL-MCNC: 0.99 MG/DL (ref 0.5–0.9)
CREATININE URINE: 92.9 MG/DL (ref 28–217)
GFR AFRICAN AMERICAN: >60 ML/MIN
GFR NON-AFRICAN AMERICAN: 57 ML/MIN
GFR SERPL CREATININE-BSD FRML MDRD: ABNORMAL ML/MIN/{1.73_M2}
GFR SERPL CREATININE-BSD FRML MDRD: ABNORMAL ML/MIN/{1.73_M2}
GLUCOSE BLD-MCNC: 114 MG/DL (ref 70–99)
HCT VFR BLD CALC: 36.9 % (ref 36.3–47.1)
HEMOGLOBIN: 11.6 G/DL (ref 11.9–15.1)
INTERNATIONAL NORMALIZATION RATIO, POC: 3
MCH RBC QN AUTO: 29.4 PG (ref 25.2–33.5)
MCHC RBC AUTO-ENTMCNC: 31.4 G/DL (ref 28.4–34.8)
MCV RBC AUTO: 93.7 FL (ref 82.6–102.9)
NRBC AUTOMATED: 0 PER 100 WBC
PDW BLD-RTO: 14.3 % (ref 11.8–14.4)
PLATELET # BLD: 266 K/UL (ref 138–453)
PMV BLD AUTO: 9.3 FL (ref 8.1–13.5)
POTASSIUM SERPL-SCNC: 4.3 MMOL/L (ref 3.7–5.3)
PROTHROMBIN TIME, POC: 36.2
RBC # BLD: 3.94 M/UL (ref 3.95–5.11)
SODIUM BLD-SCNC: 141 MMOL/L (ref 135–144)
TOTAL PROTEIN, URINE: 6 MG/DL
URINE TOTAL PROTEIN CREATININE RATIO: 0.06 (ref 0–0.2)
WBC # BLD: 8.5 K/UL (ref 3.5–11.3)

## 2021-06-14 PROCEDURE — 93793 ANTICOAG MGMT PT WARFARIN: CPT | Performed by: PEDIATRICS

## 2021-06-14 PROCEDURE — 85610 PROTHROMBIN TIME: CPT | Performed by: NURSE PRACTITIONER

## 2021-06-21 ENCOUNTER — OFFICE VISIT (OUTPATIENT)
Dept: PODIATRY | Age: 61
End: 2021-06-21
Payer: MEDICARE

## 2021-06-21 ENCOUNTER — ANTI-COAG VISIT (OUTPATIENT)
Dept: FAMILY MEDICINE CLINIC | Age: 61
End: 2021-06-21
Payer: MEDICARE

## 2021-06-21 VITALS — BODY MASS INDEX: 46.01 KG/M2 | HEIGHT: 62 IN | WEIGHT: 250 LBS

## 2021-06-21 DIAGNOSIS — E11.51 TYPE II DIABETES MELLITUS WITH PERIPHERAL CIRCULATORY DISORDER (HCC): ICD-10-CM

## 2021-06-21 DIAGNOSIS — I73.9 PERIPHERAL VASCULAR DISORDER (HCC): ICD-10-CM

## 2021-06-21 DIAGNOSIS — L85.3 XEROSIS OF SKIN: ICD-10-CM

## 2021-06-21 DIAGNOSIS — M79.671 PAIN IN BOTH FEET: ICD-10-CM

## 2021-06-21 DIAGNOSIS — M79.672 PAIN IN BOTH FEET: ICD-10-CM

## 2021-06-21 DIAGNOSIS — B35.1 DERMATOPHYTOSIS OF NAIL: Primary | ICD-10-CM

## 2021-06-21 DIAGNOSIS — Z86.718 HISTORY OF DVT OF LOWER EXTREMITY: Primary | ICD-10-CM

## 2021-06-21 LAB
INTERNATIONAL NORMALIZATION RATIO, POC: 2
PROTHROMBIN TIME, POC: 24

## 2021-06-21 PROCEDURE — G8428 CUR MEDS NOT DOCUMENT: HCPCS | Performed by: NURSE PRACTITIONER

## 2021-06-21 PROCEDURE — 3046F HEMOGLOBIN A1C LEVEL >9.0%: CPT | Performed by: PODIATRIST

## 2021-06-21 PROCEDURE — G8427 DOCREV CUR MEDS BY ELIG CLIN: HCPCS | Performed by: PODIATRIST

## 2021-06-21 PROCEDURE — 99211 OFF/OP EST MAY X REQ PHY/QHP: CPT | Performed by: NURSE PRACTITIONER

## 2021-06-21 PROCEDURE — G8417 CALC BMI ABV UP PARAM F/U: HCPCS | Performed by: PODIATRIST

## 2021-06-21 PROCEDURE — 1036F TOBACCO NON-USER: CPT | Performed by: PODIATRIST

## 2021-06-21 PROCEDURE — 11721 DEBRIDE NAIL 6 OR MORE: CPT | Performed by: PODIATRIST

## 2021-06-21 PROCEDURE — G8417 CALC BMI ABV UP PARAM F/U: HCPCS | Performed by: NURSE PRACTITIONER

## 2021-06-21 PROCEDURE — 93793 ANTICOAG MGMT PT WARFARIN: CPT | Performed by: NURSE PRACTITIONER

## 2021-06-21 PROCEDURE — 99213 OFFICE O/P EST LOW 20 MIN: CPT | Performed by: PODIATRIST

## 2021-06-21 PROCEDURE — 2022F DILAT RTA XM EVC RTNOPTHY: CPT | Performed by: PODIATRIST

## 2021-06-21 PROCEDURE — 3017F COLORECTAL CA SCREEN DOC REV: CPT | Performed by: PODIATRIST

## 2021-06-21 PROCEDURE — 85610 PROTHROMBIN TIME: CPT | Performed by: NURSE PRACTITIONER

## 2021-06-21 NOTE — PROGRESS NOTES
[address] [facility] [address] [address] [address]  Dept: [phone]    DIABETIC PROGRESS NOTE  Date of patient's visit: 6/21/2021  Patient's Name:  Dennis Grimes YOB: 1960            Patient Care Team:  BRAULIO Forrester CNP as PCP - General (Nurse Practitioner Family)  BRAULIO Forrester CNP as PCP - [facility] Empaneled Provider  Kayren Gosselin, MD as Consulting Physician (Nephrology)  Romayne Hillier, MD as Consulting Physician (Pulmonology)  Sarah Pappas MD as Consulting Physician (Cardiology)  Shayne Jones DPM as Consulting Physician (Podiatry)          Chief Complaint   Patient presents with    Diabetes    Nail Problem    Peripheral Neuropathy       Subjective:   Dennis Grimes comes to clinic for Diabetes, Nail Problem, and Peripheral Neuropathy    she is a diabetic and states that she has dry cracking feet to the bottom of the heels. Pt currently has complaint of thickened, elongated nails that they cannot manage by themselves. Pt's primary care physician is BRAULIO Forrester CNP last seen June 14 2021   Pt's last blood sugar was 145  Pt has a new complaint of dry scaly skin to the bottom of both of the feet. Pt states they have tried OTC cream but it isnt applied regularly. Pt has tried changing shoes but it has not helped the pain          Lab Results   Component Value Date    LABA1C 7.5 (H) 09/14/2020      Complains of numbness in the feet bilat. Past Medical History:   Diagnosis Date    Anxiety     Chronic kidney disease     COPD (chronic obstructive pulmonary disease) ([facility]     Depression     Fracture of ankle 5/14/2020    Hyperlipidemia     Hypertension     Obesity     Osteoarthritis     Proteinuria     Pulmonary embolism (HCC)     Sleep apnea     c-pap.  Doesn't use everynight    Type 2 diabetes mellitus without complication (HCC)     Type II or unspecified type diabetes mellitus without mention of complication, not stated as uncontrolled     Von Willebrand disease ([facility]        No Known Allergies  Current Outpatient Medications on File Prior to Visit   Medication Sig Dispense Refill    oxyCODONE-acetaminophen (PERCOCET) 5-325 MG per tablet Take 1 tablet by mouth 2 times daily as needed for Pain for up to 30 days. 40 tablet 0    albuterol sulfate  (90 Base) MCG/ACT inhaler Inhale 2 puffs into the lungs every 4 hours as needed for Wheezing or Shortness of Breath (AND/OR COUGH) 18 g 1    PT/INR Testing Monitor KIT 1 kit by Does not apply route every 14 days 1 kit 0    PT/INR Test STRP 1 each by Does not apply route every 14 days 48 strip 2    ONETOUCH ULTRA strip TEST THREE TIMES DAILY 100 strip 5    tiotropium (SPIRIVA HANDIHALER) 18 MCG inhalation capsule Inhale 1 capsule into the lungs daily 90 capsule 1    allopurinol (ZYLOPRIM) 100 MG tablet Take 1 tablet by mouth daily 90 tablet 1    metFORMIN (GLUCOPHAGE-XR) 500 MG extended release tablet Take 2 tablets by mouth 2 times daily Take 2 tablets by mouth twice a day. 360 tablet 1    furosemide (LASIX) 20 MG tablet Take 1 tablet by mouth daily 90 tablet 1    naloxone 4 MG/0.1ML LIQD nasal spray 1 spray by Nasal route as needed for Opioid Reversal 1 each 5    Handicap Placard MISC by Does not apply route Exp 2/3/2026 1 each 0    warfarin (COUMADIN) 2 MG tablet Take 2 mg Sunday Tuesday Thursday Saturday. Take 3 mg (one and half tabs) Monday Wednesday Friday 45 tablet 3    isosorbide mononitrate (IMDUR) 30 MG extended release tablet Take 1 tablet by mouth daily Do not crush or chew. 90 tablet 1    omeprazole (PRILOSEC) 20 MG delayed release capsule Take 1 capsule by mouth Daily Do not crush or break.  90 capsule 1    glimepiride (AMARYL) 4 MG tablet Take 1 tablet by mouth 2 times daily 180 tablet 1    atorvastatin (LIPITOR) 40 MG tablet Take 1 tablet by mouth daily 90 tablet 1    SITagliptin (JANUVIA) 100 MG tablet Take 1 tablet by mouth daily 90 tablet 1    lisinopril (PRINIVIL;ZESTRIL) 20 MG tablet Take 1 tablet by mouth daily 90 tablet 1    ipratropium-albuterol (DUONEB) 0.5-2.5 (3) MG/3ML SOLN nebulizer solution Inhale 3 mLs into the lungs every 6 hours as needed for Shortness of Breath 360 mL 0    RA VITAMIN D-3 50 MCG (2000 UT) CAPS Take 1 capsule by mouth daily 30 capsule 5    latanoprost (XALATAN) 0.005 % ophthalmic solution nightly      hydrALAZINE (APRESOLINE) 50 MG tablet Take 1 tablet by mouth 2 times daily 180 tablet 1    Misc. Devices MISC 1 PAIR OF DIABETIC SHOES (1 LEFT/ 1 RIGHT)  1-3 PAIRS OF INSERTS (LEFT/ RIGHT) 2 each 0    Respiratory Therapy Supplies (NEBULIZER/TUBING/MOUTHPIECE) KIT 1 kit by Does not apply route 4 times daily as needed (wheezing) 1 kit 0    Misc. Devices MISC 1 PAIR OF DIABETIC SHOES (1 LEFT/ 1 RIGHT)  1-3 PAIRS OF INSERTS (LEFT/ RIGHT) 2 each 0    Lancets (BD LANCET ULTRAFINE 30G) MISC Use to check sugars twice daily and as needed 120 each 3    Alcohol Swabs (ALCOHOL PREP) 70 % PADS Use twice daily and as needed to check blood sugars 120 each 3    Blood Pressure Monitoring KIT Use to check blood pressure daily and as needed 1 kit 0    risperiDONE (RISPERDAL) 2 MG tablet Take 1 mg by mouth nightly       fluticasone (FLONASE) 50 MCG/ACT nasal spray 1 spray by Nasal route daily 1 Bottle 3    buPROPion (WELLBUTRIN XL) 300 MG XL tablet Take 300 mg by mouth every morning.  buPROPion (WELLBUTRIN XL) 150 MG XL tablet Take 150 mg by mouth every morning. No current facility-administered medications on file prior to visit. Review of Systems    Review of Systems:   History obtained from chart review and the patient  General ROS: negative for - chills, fatigue, fever, night sweats or weight gain  Constitutional: Negative for chills, diaphoresis, fatigue, fever and unexpected weight change. Musculoskeletal: Positive for arthralgias, gait problem and joint swelling.   Neurological ROS: negative for - behavioral changes, confusion, headaches or seizures. Negative for weakness and numbness. Dermatological ROS: negative for - mole changes, rash  Cardiovascular: Negative for leg swelling. Gastrointestinal: Negative for constipation, diarrhea, nausea and vomiting. Objective:  Dermatologic Exam:   Dry scaly skin noted to the plantar surface of the right and left foot. Small superficial fissuring of the skin noted to the plantar heel bilateral    Skin is thin, with flaky sloughing skin as well as decreased hair growth to the lower leg  Small red hemosiderin deposits seen dorsal foot   Musculoskeletal:     1st MPJ ROM decreased, Bilateral.  Muscle strength 5/5, Bilateral.  Pain present upon palpation of toenails 1-5, Bilateral. decreased medial longitudinal arch, Bilateral.  Ankle ROM decreased,Bilateral.    Dorsally contracted digits present digits 2, Bilateral.     Vascular: DP pulses 1/4 bilateral.  PT pulses 0/4 bilateral.   CFT <5 seconds, Bilateral.  Hair growth absent to the level of the digits, Bilateral.  Edema present, Bilateral.  Varicosities absent, Bilateral. Erythema absent, Bilateral    Neurological: Sensation diminshed to light touch to level of digits, Bilateral.  Protective sensation intact 6/10 sites via 5.07/10g Claiborne-Shraddha Monofilament, Bilateral.  negative Tinel's, Bilateral.  negative Valleix sign, Bilateral.      Integument: Warm, dry, supple, Bilateral.  Open lesion absent, Bilateral.  Interdigital maceration absent to web spaces 4, Bilateral.  Nails 1-5 left and 1-5 right thickened > 3.0 mm, dystrophic and crumbly, discolored with subungual debris. Fissures absent, Bilateral.   General: AAO x 3 in NAD.     Derm  Toenail Description  Sites of Onychomycosis Involvement (Check affected area)  [x] [x] [x] [x] [x] [x] [x] [x] [x] [x]  5 4 3 2 1 1 2 3 4 5                          Right or Aquaphor to the area to be applied daily. Pt to use a pummuce stone to the plantar surface of the feet weekly    Nails 1-10 were debrided sharply in length and thickness with a nipper and , without incident. Pt will follow up in 9 weeks or sooner if any problems arise. Diagnosis was discussed with the pt and all of their questions were answered in detail. Proper foot hygiene and care was discussed with the pt. Informed patient on proper diabetic foot care and importance of tight glycemic control. Patient to check feet daily and contact the office with any questions/problems/concerns. Other comorbidity noted and will be managed by PCP. All labs were reviewed and all imagining including the above findings were reviewed PRIOR to the patients arrival and with the patient today. Previous patient encounter was reviewed. Encounters from the patients other medical providers were reviewed and noted. Time was spent educating the patient and their families/caregivers on proper care of the feet and ankles. All the above diagnosis were addressed at todays visit and all questions were answered.   A total of 20 minutes was spent with this patients encounter which included charting after the patients visit    6/21/2021    Electronically signed by Estefanía Garcia DPM on 6/21/2021 at 9:59 AM  6/21/2021

## 2021-06-21 NOTE — PROGRESS NOTES
Please let Yesica know that her INR is therapeutic at 2.0. I want her to continue her weekly regimen of 3 mg every Monday, Wednesday, and Friday, and then 2 mg on Tuesday, Thursday, Saturday and Sunday.   She may repeat her INR in 4 weeks

## 2021-06-23 NOTE — RESULT ENCOUNTER NOTE
Noted. Ordered (addressed/treated) per ordering provider. Available if needed for correlation of care.

## 2021-07-03 DIAGNOSIS — Z86.718 HISTORY OF DVT OF LOWER EXTREMITY: ICD-10-CM

## 2021-07-05 NOTE — TELEPHONE ENCOUNTER
Last anticaog visit: 6/21/21  Last Med refill: 2/1/21  Next Visit Date:  Future Appointments   Date Time Provider Mark Junior   7/19/2021  8:40 AM SCHEDULE, ROXANNP [facility]   7/19/2021  9:00 AM Trisha Cowart, APRN - CNP [place] [facility]   8/23/2021 10:00 AM Edmond Benites DPM [facility]   10/11/2021  8:30 AM Toñito Jackson MD [facility] None       Health Maintenance   Topic Date Due    Diabetic retinal exam  07/24/2020    Lipid screen  05/07/2021    Flu vaccine (1) 09/01/2021    A1C test (Diabetic or Prediabetic)  09/14/2021    Breast cancer screen  10/03/2021    Diabetic foot exam  04/26/2022    Potassium monitoring  06/14/2022    Creatinine monitoring  06/14/2022    Cervical cancer screen  11/16/2022    Pneumococcal 0-64 years Vaccine (2 of 2 - PPSV23) 04/23/2023    DTaP/Tdap/Td vaccine (2 - Td or Tdap) 08/04/2026    Colon cancer screen colonoscopy  02/16/2028    Shingles Vaccine  Completed    COVID-19 Vaccine  Completed    Hepatitis C screen  Addressed    HIV screen  Addressed    Hepatitis A vaccine  Aged Out    Hib vaccine  Aged Out    Meningococcal (ACWY) vaccine  Aged Out       Hemoglobin A1C (%)   Date Value   09/14/2020 7.5 (H)   05/06/2020 7.4 (H)   04/10/2020 6.8             ( goal A1C is < 7)   Microalb/Crt.  Ratio (mcg/mg creat)   Date Value   04/08/2019 CANNOT BE CALCULATED     LDL Cholesterol (mg/dL)   Date Value   05/07/2020 68   08/01/2019 51     LDL Calculated (mg/dL)   Date Value   04/10/2020 59       (goal LDL is <100)   AST (U/L)   Date Value   09/14/2020 24     ALT (U/L)   Date Value   09/14/2020 31     BUN (mg/dL)   Date Value   06/14/2021 14     BP Readings from Last 3 Encounters:   05/25/21 137/74   03/10/21 122/74   12/10/20 102/62          (goal 120/80)    All Future Testing planned in [facility]  Lab Frequency Next Occurrence   POCT INR     Basic Metabolic Panel Every 6 Months 9/10/2021               Patient Active Problem List:     Essential hypertension     Hyperlipidemia     Osteoarthritis     Depression     Obesity     CKD (chronic kidney disease) stage 3, GFR 30-59 ml/min ([facility])     Proteinuria     Controlled type 2 diabetes mellitus with stage 3 chronic kidney disease, without long-term current use of insulin ([facility])     History of DVT of lower extremity     NARCISO on CPAP     Vitamin D deficiency     Major depressive disorder, recurrent episode, severe, with psychotic behavior ([address]     Major depressive disorder, recurrent, severe with psychotic symptoms ([address]     Multiple lung nodules on CT     Hypomagnesemia     SOB (shortness of breath)     Anxiety     Chronic obstructive pulmonary disease (HCC)     Precordial pain     History of pulmonary embolism     Family history of abdominal aortic aneurysm     Normal coronary arteries     Long term (current) use of anticoagulants     AAA (abdominal aortic aneurysm) without rupture ([address]

## 2021-07-06 RX ORDER — WARFARIN SODIUM 2 MG/1
TABLET ORAL
Qty: 45 TABLET | Refills: 5 | Status: SHIPPED | OUTPATIENT
Start: 2021-07-06 | End: 2022-03-15

## 2021-07-11 DIAGNOSIS — M15.9 PRIMARY OSTEOARTHRITIS INVOLVING MULTIPLE JOINTS: ICD-10-CM

## 2021-07-12 NOTE — TELEPHONE ENCOUNTER
Last visit: 3/10/21  Last Med refill: 6/8/21    Next Visit Date:  Future Appointments   Date Time Provider Mark Junior   7/19/2021  8:40 AM SCHEDULE, SILVANO [facility]   7/19/2021  9:00 AM Trisha Vaughan Ser, APRN - CNP [place] [facility]   8/23/2021 10:00 AM Nelsy Pacheco DPM [facility]   10/11/2021  8:30 AM Samy Arora MD [facility] None       Health Maintenance   Topic Date Due    Lipid screen  05/07/2021    Flu vaccine (1) 09/01/2021    A1C test (Diabetic or Prediabetic)  09/14/2021    Breast cancer screen  10/03/2021    Diabetic foot exam  04/26/2022    Potassium monitoring  06/14/2022    Creatinine monitoring  06/14/2022    Diabetic retinal exam  07/09/2022    Cervical cancer screen  11/16/2022    Pneumococcal 0-64 years Vaccine (2 of 2 - PPSV23) 04/23/2023    DTaP/Tdap/Td vaccine (2 - Td or Tdap) 08/04/2026    Colon cancer screen colonoscopy  02/16/2028    Shingles Vaccine  Completed    COVID-19 Vaccine  Completed    Hepatitis C screen  Addressed    HIV screen  Addressed    Hepatitis A vaccine  Aged Out    Hib vaccine  Aged Out    Meningococcal (ACWY) vaccine  Aged Out       Hemoglobin A1C (%)   Date Value   09/14/2020 7.5 (H)   05/06/2020 7.4 (H)   04/10/2020 6.8             ( goal A1C is < 7)   Microalb/Crt.  Ratio (mcg/mg creat)   Date Value   04/08/2019 CANNOT BE CALCULATED     LDL Cholesterol (mg/dL)   Date Value   05/07/2020 68   08/01/2019 51     LDL Calculated (mg/dL)   Date Value   04/10/2020 59       (goal LDL is <100)   AST (U/L)   Date Value   09/14/2020 24     ALT (U/L)   Date Value   09/14/2020 31     BUN (mg/dL)   Date Value   06/14/2021 14     BP Readings from Last 3 Encounters:   05/25/21 137/74   03/10/21 122/74   12/10/20 102/62          (goal 120/80)    All Future Testing planned in [facility]  Lab Frequency Next Occurrence   POCT INR     Basic Metabolic Panel Every 6 Months 9/10/2021               Patient Active Problem List: Essential hypertension     Hyperlipidemia     Osteoarthritis     Depression     Obesity     CKD (chronic kidney disease) stage 3, GFR 30-59 ml/min ([facility])     Proteinuria     Controlled type 2 diabetes mellitus with stage 3 chronic kidney disease, without long-term current use of insulin ([facility])     History of DVT of lower extremity     NARCISO on CPAP     Vitamin D deficiency     Major depressive disorder, recurrent episode, severe, with psychotic behavior ([address]     Major depressive disorder, recurrent, severe with psychotic symptoms ([address]     Multiple lung nodules on CT     Hypomagnesemia     SOB (shortness of breath)     Anxiety     Chronic obstructive pulmonary disease (HCC)     Precordial pain     History of pulmonary embolism     Family history of abdominal aortic aneurysm     Normal coronary arteries     Long term (current) use of anticoagulants     AAA (abdominal aortic aneurysm) without rupture ([address]

## 2021-07-13 RX ORDER — OXYCODONE HYDROCHLORIDE AND ACETAMINOPHEN 5; 325 MG/1; MG/1
TABLET ORAL
Qty: 40 TABLET | Refills: 0 | Status: SHIPPED | OUTPATIENT
Start: 2021-07-13 | End: 2021-08-10

## 2021-07-19 ENCOUNTER — HOSPITAL ENCOUNTER (OUTPATIENT)
Age: 61
Setting detail: SPECIMEN
Discharge: HOME | End: 2021-07-19
Payer: MEDICARE

## 2021-07-19 ENCOUNTER — ANTI-COAG VISIT (OUTPATIENT)
Dept: FAMILY MEDICINE CLINIC | Age: 61
End: 2021-07-19
Payer: MEDICARE

## 2021-07-19 ENCOUNTER — OFFICE VISIT (OUTPATIENT)
Dept: FAMILY MEDICINE CLINIC | Age: 61
End: 2021-07-19
Payer: MEDICARE

## 2021-07-19 VITALS
SYSTOLIC BLOOD PRESSURE: 124 MMHG | TEMPERATURE: 97.7 F | BODY MASS INDEX: 45.87 KG/M2 | OXYGEN SATURATION: 98 % | RESPIRATION RATE: 24 BRPM | DIASTOLIC BLOOD PRESSURE: 74 MMHG | WEIGHT: 250.8 LBS | HEART RATE: 91 BPM

## 2021-07-19 DIAGNOSIS — G47.33 OSA ON CPAP: Chronic | ICD-10-CM

## 2021-07-19 DIAGNOSIS — Z86.718 HISTORY OF DVT OF LOWER EXTREMITY: Primary | ICD-10-CM

## 2021-07-19 DIAGNOSIS — F33.3 MAJOR DEPRESSIVE DISORDER, RECURRENT EPISODE, SEVERE, WITH PSYCHOTIC BEHAVIOR (HCC): Chronic | ICD-10-CM

## 2021-07-19 DIAGNOSIS — J41.1 MUCOPURULENT CHRONIC BRONCHITIS (HCC): Primary | ICD-10-CM

## 2021-07-19 DIAGNOSIS — N18.30 CONTROLLED TYPE 2 DIABETES MELLITUS WITH STAGE 3 CHRONIC KIDNEY DISEASE, WITHOUT LONG-TERM CURRENT USE OF INSULIN (HCC): ICD-10-CM

## 2021-07-19 DIAGNOSIS — E11.22 CONTROLLED TYPE 2 DIABETES MELLITUS WITH STAGE 3 CHRONIC KIDNEY DISEASE, WITHOUT LONG-TERM CURRENT USE OF INSULIN (HCC): ICD-10-CM

## 2021-07-19 DIAGNOSIS — I10 ESSENTIAL HYPERTENSION: ICD-10-CM

## 2021-07-19 DIAGNOSIS — Z99.89 OSA ON CPAP: Chronic | ICD-10-CM

## 2021-07-19 DIAGNOSIS — M15.9 OSTEOARTHRITIS OF MULTIPLE JOINTS, UNSPECIFIED OSTEOARTHRITIS TYPE: ICD-10-CM

## 2021-07-19 DIAGNOSIS — N18.30 STAGE 3 CHRONIC KIDNEY DISEASE, UNSPECIFIED WHETHER STAGE 3A OR 3B CKD (HCC): ICD-10-CM

## 2021-07-19 LAB
-: NORMAL
ALBUMIN SERPL-MCNC: 3.9 G/DL (ref 3.5–5.2)
ALBUMIN/GLOBULIN RATIO: 1.3 (ref 1–2.5)
ALP BLD-CCNC: 89 U/L (ref 35–104)
ALT SERPL-CCNC: 32 U/L (ref 5–33)
AMORPHOUS: NORMAL
ANION GAP SERPL CALCULATED.3IONS-SCNC: 16 MMOL/L (ref 9–17)
AST SERPL-CCNC: 24 U/L
BACTERIA: NORMAL
BILIRUB SERPL-MCNC: 0.2 MG/DL (ref 0.3–1.2)
BILIRUBIN URINE: NEGATIVE
BUN BLDV-MCNC: 12 MG/DL (ref 8–23)
BUN/CREAT BLD: ABNORMAL (ref 9–20)
CALCIUM SERPL-MCNC: 9 MG/DL (ref 8.6–10.4)
CASTS UA: NORMAL /LPF (ref 0–2)
CHLORIDE BLD-SCNC: 105 MMOL/L (ref 98–107)
CO2: 20 MMOL/L (ref 20–31)
COLOR: YELLOW
COMMENT UA: ABNORMAL
CREAT SERPL-MCNC: 0.83 MG/DL (ref 0.5–0.9)
CRYSTALS, UA: NORMAL /HPF
EPITHELIAL CELLS UA: NORMAL /HPF (ref 0–5)
FERRITIN: 24 UG/L (ref 13–150)
GFR AFRICAN AMERICAN: >60 ML/MIN
GFR NON-AFRICAN AMERICAN: >60 ML/MIN
GFR SERPL CREATININE-BSD FRML MDRD: ABNORMAL ML/MIN/{1.73_M2}
GFR SERPL CREATININE-BSD FRML MDRD: ABNORMAL ML/MIN/{1.73_M2}
GLUCOSE BLD-MCNC: 148 MG/DL (ref 70–99)
GLUCOSE URINE: NEGATIVE
HBA1C MFR BLD: 6.4 %
INTERNATIONAL NORMALIZATION RATIO, POC: 3.5
IRON SATURATION: 19 % (ref 20–55)
IRON: 58 UG/DL (ref 37–145)
KETONES, URINE: NEGATIVE
LEUKOCYTE ESTERASE, URINE: ABNORMAL
MUCUS: NORMAL
NITRITE, URINE: NEGATIVE
OTHER OBSERVATIONS UA: NORMAL
PH UA: 5.5 (ref 5–8)
POTASSIUM SERPL-SCNC: 4.4 MMOL/L (ref 3.7–5.3)
PROTEIN UA: NEGATIVE
PROTHROMBIN TIME, POC: ABNORMAL
RBC UA: NORMAL /HPF (ref 0–2)
RENAL EPITHELIAL, UA: NORMAL /HPF
SODIUM BLD-SCNC: 141 MMOL/L (ref 135–144)
SPECIFIC GRAVITY UA: 1.01 (ref 1–1.03)
TOTAL IRON BINDING CAPACITY: 312 UG/DL (ref 250–450)
TOTAL PROTEIN: 7 G/DL (ref 6.4–8.3)
TRICHOMONAS: NORMAL
TURBIDITY: CLEAR
UNSATURATED IRON BINDING CAPACITY: 254 UG/DL (ref 112–347)
URINE HGB: NEGATIVE
UROBILINOGEN, URINE: NORMAL
WBC UA: NORMAL /HPF (ref 0–5)
YEAST: NORMAL

## 2021-07-19 PROCEDURE — G8427 DOCREV CUR MEDS BY ELIG CLIN: HCPCS | Performed by: NURSE PRACTITIONER

## 2021-07-19 PROCEDURE — 3044F HG A1C LEVEL LT 7.0%: CPT | Performed by: NURSE PRACTITIONER

## 2021-07-19 PROCEDURE — G8926 SPIRO NO PERF OR DOC: HCPCS | Performed by: NURSE PRACTITIONER

## 2021-07-19 PROCEDURE — 1036F TOBACCO NON-USER: CPT | Performed by: NURSE PRACTITIONER

## 2021-07-19 PROCEDURE — 93793 ANTICOAG MGMT PT WARFARIN: CPT | Performed by: NURSE PRACTITIONER

## 2021-07-19 PROCEDURE — 83036 HEMOGLOBIN GLYCOSYLATED A1C: CPT | Performed by: NURSE PRACTITIONER

## 2021-07-19 PROCEDURE — 3023F SPIROM DOC REV: CPT | Performed by: NURSE PRACTITIONER

## 2021-07-19 PROCEDURE — 3017F COLORECTAL CA SCREEN DOC REV: CPT | Performed by: NURSE PRACTITIONER

## 2021-07-19 PROCEDURE — 99214 OFFICE O/P EST MOD 30 MIN: CPT | Performed by: NURSE PRACTITIONER

## 2021-07-19 PROCEDURE — G8417 CALC BMI ABV UP PARAM F/U: HCPCS | Performed by: NURSE PRACTITIONER

## 2021-07-19 PROCEDURE — 85610 PROTHROMBIN TIME: CPT | Performed by: NURSE PRACTITIONER

## 2021-07-19 PROCEDURE — 2022F DILAT RTA XM EVC RTNOPTHY: CPT | Performed by: NURSE PRACTITIONER

## 2021-07-19 RX ORDER — FLASH GLUCOSE SCANNING READER
1 EACH MISCELLANEOUS CONTINUOUS
Qty: 1 DEVICE | Refills: 1 | Status: SHIPPED | OUTPATIENT
Start: 2021-07-19 | End: 2022-01-24

## 2021-07-19 RX ORDER — LANCETS 30 GAUGE
EACH MISCELLANEOUS
Qty: 120 EACH | Refills: 3 | Status: SHIPPED | OUTPATIENT
Start: 2021-07-19 | End: 2021-07-22

## 2021-07-19 RX ORDER — FLASH GLUCOSE SENSOR
1 KIT MISCELLANEOUS CONTINUOUS
Qty: 2 EACH | Refills: 1 | Status: SHIPPED | OUTPATIENT
Start: 2021-07-19 | End: 2022-01-03 | Stop reason: RX

## 2021-07-19 RX ORDER — UBIQUINOL 100 MG
CAPSULE ORAL
Qty: 120 EACH | Refills: 3 | Status: SHIPPED | OUTPATIENT
Start: 2021-07-19 | End: 2022-04-18 | Stop reason: SDUPTHER

## 2021-07-19 SDOH — ECONOMIC STABILITY: FOOD INSECURITY: WITHIN THE PAST 12 MONTHS, YOU WORRIED THAT YOUR FOOD WOULD RUN OUT BEFORE YOU GOT MONEY TO BUY MORE.: NEVER TRUE

## 2021-07-19 SDOH — ECONOMIC STABILITY: FOOD INSECURITY: WITHIN THE PAST 12 MONTHS, THE FOOD YOU BOUGHT JUST DIDN'T LAST AND YOU DIDN'T HAVE MONEY TO GET MORE.: NEVER TRUE

## 2021-07-19 ASSESSMENT — ENCOUNTER SYMPTOMS
CHEST TIGHTNESS: 1
BLOOD IN STOOL: 0
BACK PAIN: 1
SHORTNESS OF BREATH: 1
NAUSEA: 0
WHEEZING: 1
DIARRHEA: 0
SINUS PRESSURE: 0
RHINORRHEA: 0
TROUBLE SWALLOWING: 0
CONSTIPATION: 0
ABDOMINAL DISTENTION: 0
ABDOMINAL PAIN: 0
SORE THROAT: 0
COUGH: 0

## 2021-07-19 ASSESSMENT — VISUAL ACUITY: OU: 1

## 2021-07-19 ASSESSMENT — SOCIAL DETERMINANTS OF HEALTH (SDOH): HOW HARD IS IT FOR YOU TO PAY FOR THE VERY BASICS LIKE FOOD, HOUSING, MEDICAL CARE, AND HEATING?: NOT HARD AT ALL

## 2021-07-19 NOTE — TELEPHONE ENCOUNTER
LOV today  RTO 3 months; F/U scheduled  [facility] 1/23/21    Health Maintenance   Topic Date Due    Lipid screen  05/07/2021    Flu vaccine (1) 09/01/2021    Breast cancer screen  10/03/2021    Diabetic foot exam  04/26/2022    Potassium monitoring  06/14/2022    Creatinine monitoring  06/14/2022    Diabetic retinal exam  07/09/2022    A1C test (Diabetic or Prediabetic)  07/19/2022    Cervical cancer screen  11/16/2022    Pneumococcal 0-64 years Vaccine (2 of 2 - PPSV23) 04/23/2023    DTaP/Tdap/Td vaccine (2 - Td or Tdap) 08/04/2026    Colon cancer screen colonoscopy  02/16/2028    Shingles Vaccine  Completed    COVID-19 Vaccine  Completed    Hepatitis C screen  Addressed    HIV screen  Addressed    Hepatitis A vaccine  Aged Out    Hib vaccine  Aged Out    Meningococcal (ACWY) vaccine  Aged Out             (applicable per patient's age: Cancer Screenings, Depression Screening, Fall Risk Screening, Immunizations)    Hemoglobin A1C (%)   Date Value   07/19/2021 6.4 (A)   09/14/2020 7.5 (H)   05/06/2020 7.4 (H)     Microalb/Crt.  Ratio (mcg/mg creat)   Date Value   04/08/2019 CANNOT BE CALCULATED     LDL Cholesterol (mg/dL)   Date Value   05/07/2020 68     LDL Calculated (mg/dL)   Date Value   04/10/2020 59     AST (U/L)   Date Value   09/14/2020 24     ALT (U/L)   Date Value   09/14/2020 31     BUN (mg/dL)   Date Value   06/14/2021 14      (goal A1C is < 7)   (goal LDL is <100) need 30-50% reduction from baseline     BP Readings from Last 3 Encounters:   07/19/21 124/74   05/25/21 137/74   03/10/21 122/74    (goal /80)      All Future Testing planned in [facility]:  Lab Frequency Next Occurrence   ECHO Complete 2D W Doppler W Color Once 07/19/2021   POCT INR     Basic Metabolic Panel Every 6 Months 9/10/2021       Next Visit Date:  Future Appointments   Date Time Provider Mark Junior   8/2/2021  8:30 AM SILVANO Shah   8/23/2021 10:00 AM Azalea English DPM [facility]   10/11/2021  8:30 AM Fernanda Farooq MD [facility] None   10/19/2021  8:30 AM Trisha Falk, APRN - CNP [place] [facility]            Patient Active Problem List:     Essential hypertension     Hyperlipidemia     Osteoarthritis     Depression     Obesity     CKD (chronic kidney disease) stage 3, GFR 30-59 ml/min ([facility])     Proteinuria     Controlled type 2 diabetes mellitus with stage 3 chronic kidney disease, without long-term current use of insulin ([facility])     History of DVT of lower extremity     NARCISO on CPAP     Vitamin D deficiency     Major depressive disorder, recurrent episode, severe, with psychotic behavior ([address]     Major depressive disorder, recurrent, severe with psychotic symptoms ([address]     Multiple lung nodules on CT     Hypomagnesemia     SOB (shortness of breath)     Anxiety     Chronic obstructive pulmonary disease (HCC)     Precordial pain     History of pulmonary embolism     Family history of abdominal aortic aneurysm     Normal coronary arteries     Long term (current) use of anticoagulants     AAA (abdominal aortic aneurysm) without rupture ([address]

## 2021-07-19 NOTE — PROGRESS NOTES
[address]  [phone]    7/19/21     Patient ID: Isabel Harley is a 59 y.o. female  Established patient    Chief Complaint  Isabel Harley presents today for Diabetes (BS ranging 110-160), Hypertension, COPD, and Chronic Kidney Disease      Have you seen any other physician or provider since your last visit? Yes - Records Obtained Podiatry, [place] [facility] 5/25/21- Wrist sprain  Have you had any other diagnostic tests since your last visit? Yes - Records Obtained  Have you been seen in the emergency room and/or had an admission to a hospital since we last saw you? Yes - Records Obtained    ASSESSMENT/PLAN    1. Mucopurulent chronic bronchitis ([address]  Assessment & Plan:   Monitored by specialist- no acute findings meriting change in the plan  2. NARCISO on CPAP  3. Controlled type 2 diabetes mellitus with stage 3 chronic kidney disease, without long-term current use of insulin (HCC)  -     Continuous Blood Gluc Sensor (FREESTYLE BARB 14 DAY SENSOR) MISC; 1 each by Does not apply route continuous, Disp-2 each, R-1Normal  -     Continuous Blood Gluc  (FREESTYLE BARB 14 DAY READER) LIZ; 1 each by Does not apply route continuous, Disp-1 Device, R-1Normal  -     Comprehensive Metabolic Panel; Future  -     Urinalysis Reflex to Culture; Future  -     POCT glycosylated hemoglobin (Hb A1C)  -     Alcohol Swabs (ALCOHOL PREP) 70 % PADS; Disp-120 each, R-3, NormalUse twice daily and as needed to check blood sugars  -     dapagliflozin (FARXIGA) 5 MG tablet; Take 1 tablet by mouth every morning, Disp-90 tablet, R-1Normal  4. Stage 3 chronic kidney disease, unspecified whether stage 3a or 3b CKD (HCC)  -     Iron and TIBC; Future  -     Ferritin; Future  5. Essential hypertension  -     ECHO Complete 2D W Doppler W Color; Future  6. Osteoarthritis of multiple joints, unspecified osteoarthritis type  7.  Major depressive disorder, recurrent episode, severe, with psychotic behavior ([facility]  Assessment & Plan:   Monitored by specialist- no acute findings meriting change in the plan       Return in about 3 months (around 10/19/2021) for DM. Patient Care Team:  BRAULIO Lombardo CNP as PCP - General (Nurse Practitioner Family)  BRAULIO Lombardo CNP as PCP - [facility] Empaneled Provider  Laurie Sam MD as Consulting Physician (Nephrology)  Óscar White MD as Consulting Physician (Pulmonology)  Pascual Henson MD as Consulting Physician (Cardiology)  Yung Fragoso DPM as Consulting Physician (Podiatry)    SUBJECTIVE/OBJECTIVE    History of Present illness / Visit Summary   Patient presents today for follow-up appointment. She is a known diabetic. She does check her blood sugars on a daily basis. She averages between 100-175. She denies any numbness or neuropathy pain. She does follow routinely with podiatry and has a new diabetic shoes. Her last appointment was 2 weeks ago. She also routinely follows with psychiatry at the [facility]. They recently decreased her Wellbutrin from 300 mg daily to 150. It is also noted they are starting to wean her Risperdal.  Patient overall would like to discontinue Risperdal she has been on it for 20+ years. She also complains of ongoing and worsening shaking to her bilateral hands. I am concerned this may be tardive dyskinesia. We will reach out to patient's psychiatrist regarding reading the Risperdal.  It is also noted the patient had a total of 4 falls since her last appointment. She does not lose consciousness nor does she have hypoglycemia. She comments overall she is feels generally weak. We will refer patient to physical therapy for unsteady balance and gait. She also follows with pulmonology in October, cardiology in December, and nephrology also in October. We will attempt to order patient a freestyle lupe again  CPAP per pulmonology if she has not been wearing it for approximately 3 weeks.   Patient oropharyngeal erythema or uvula swelling. Comments: No teeth, no dentures   Eyes:      General: Lids are normal. Vision grossly intact. No allergic shiner. Conjunctiva/sclera: Conjunctivae normal.      Comments: Glasses    Cardiovascular:      Rate and Rhythm: Normal rate and regular rhythm. No extrasystoles are present. Pulses: Normal pulses. Dorsalis pedis pulses are 2+ on the right side and 2+ on the left side. Heart sounds: S1 normal and S2 normal. Heart sounds are distant. No murmur heard. Pulmonary:      Effort: Tachypnea and accessory muscle usage present. No prolonged expiration or respiratory distress. Breath sounds: Normal air entry. Transmitted upper airway sounds present. No decreased air movement. Wheezing present. Abdominal:      General: There is no distension. Palpations: Abdomen is soft. Tenderness: There is no abdominal tenderness. Comments: Obese    Musculoskeletal:      Cervical back: Normal range of motion. No torticollis. No pain with movement. Lumbar back: Spasms present. Normal range of motion. Back:       Right lower leg: No edema. Left lower leg: No edema. Lymphadenopathy:      Cervical: No cervical adenopathy. Skin:     General: Skin is warm and dry. Coloration: Skin is not ashen, cyanotic, jaundiced or pale. Neurological:      General: No focal deficit present. Mental Status: She is alert and oriented to person, place, and time. Motor: Motor function is intact. Gait: Gait is intact. Psychiatric:         Attention and Perception: Attention and perception normal.         Mood and Affect: Mood and affect normal.         Speech: Speech normal.         Behavior: Behavior normal. Behavior is cooperative. Thought Content:  Thought content normal.         Cognition and Memory: Cognition and memory normal.         Judgment: Judgment normal.           Electronically signed by Greg Parson BRAULIO - CNP, APRN-CNP on 7/25/2021 at 4:28 PM    Please note that this chart was generated using voice recognition Dragon dictation software. Although every effort was made to ensure the accuracy of this automated transcription, some errors in transcription may have occurred.

## 2021-07-19 NOTE — PROGRESS NOTES
Patient's INR was 3.5 today. Patient continues to take 3 mg of coumadin on Monday, Wednesday, and Friday and 2 mg of coumadin on all other days. Patient has not made any changes.

## 2021-07-20 ENCOUNTER — TELEPHONE (OUTPATIENT)
Dept: FAMILY MEDICINE CLINIC | Age: 61
End: 2021-07-20

## 2021-07-20 RX ORDER — ISOSORBIDE MONONITRATE 30 MG/1
30 TABLET, EXTENDED RELEASE ORAL DAILY
Qty: 90 TABLET | Refills: 1 | Status: SHIPPED | OUTPATIENT
Start: 2021-07-20 | End: 2022-01-04

## 2021-07-20 NOTE — TELEPHONE ENCOUNTER
LM with [facility] center due to patients current sx's of tremors and possible tardive dyskinesia at OV appt. PCP wondering if she can wean of risperidone?

## 2021-07-20 NOTE — TELEPHONE ENCOUNTER
Danielle Fay from Dr. Clara Garcia office states she may break tablets in half for 10 days and then stop completely. If paranoia or major depressive disorder flares up she can call office. Please advise.

## 2021-07-22 RX ORDER — LANCETS
EACH MISCELLANEOUS
Qty: 120 EACH | Refills: 3 | Status: SHIPPED | OUTPATIENT
Start: 2021-07-22 | End: 2023-08-19

## 2021-07-22 RX ORDER — LANCING DEVICE/LANCETS
KIT MISCELLANEOUS
Qty: 1 KIT | Refills: 0 | Status: SHIPPED | OUTPATIENT
Start: 2021-07-22 | End: 2023-08-19

## 2021-07-22 NOTE — TELEPHONE ENCOUNTER
LOV 7/19/21  RTO 3 months; F/U scheduled  [facility]     Health Maintenance   Topic Date Due    Lipid screen  05/07/2021    Flu vaccine (1) 09/01/2021    Breast cancer screen  10/03/2021    Diabetic foot exam  04/26/2022    Diabetic retinal exam  07/09/2022    A1C test (Diabetic or Prediabetic)  07/19/2022    Potassium monitoring  07/19/2022    Creatinine monitoring  07/19/2022    Cervical cancer screen  11/16/2022    Pneumococcal 0-64 years Vaccine (2 of 2 - PPSV23) 04/23/2023    DTaP/Tdap/Td vaccine (2 - Td or Tdap) 08/04/2026    Colon cancer screen colonoscopy  02/16/2028    Shingles Vaccine  Completed    COVID-19 Vaccine  Completed    Hepatitis C screen  Addressed    HIV screen  Addressed    Hepatitis A vaccine  Aged Out    Hib vaccine  Aged Out    Meningococcal (ACWY) vaccine  Aged Out             (applicable per patient's age: Cancer Screenings, Depression Screening, Fall Risk Screening, Immunizations)    Hemoglobin A1C (%)   Date Value   07/19/2021 6.4 (A)   09/14/2020 7.5 (H)   05/06/2020 7.4 (H)     Microalb/Crt.  Ratio (mcg/mg creat)   Date Value   04/08/2019 CANNOT BE CALCULATED     LDL Cholesterol (mg/dL)   Date Value   05/07/2020 68     LDL Calculated (mg/dL)   Date Value   04/10/2020 59     AST (U/L)   Date Value   07/19/2021 24     ALT (U/L)   Date Value   07/19/2021 32     BUN (mg/dL)   Date Value   07/19/2021 12      (goal A1C is < 7)   (goal LDL is <100) need 30-50% reduction from baseline     BP Readings from Last 3 Encounters:   07/19/21 124/74   05/25/21 137/74   03/10/21 122/74    (goal /80)      All Future Testing planned in [facility]:  Lab Frequency Next Occurrence   ECHO Complete 2D W Doppler W Color Once 07/19/2021   POCT INR     Basic Metabolic Panel Every 6 Months 9/10/2021       Next Visit Date:  Future Appointments   Date Time Provider Mark Junior   8/2/2021  8:30 AM SILVANO Shah   8/23/2021 10:00 AM BRODERICK Finley

## 2021-07-22 NOTE — TELEPHONE ENCOUNTER
Patient notified of recommendations/risks and expressed understanding. She will call Dr. Jerry Kovacs office day she starts weaning off.

## 2021-07-24 DIAGNOSIS — I10 ESSENTIAL HYPERTENSION: Primary | ICD-10-CM

## 2021-07-25 PROBLEM — F33.3 MAJOR DEPRESSIVE DISORDER, RECURRENT, SEVERE WITH PSYCHOTIC SYMPTOMS (HCC): Status: RESOLVED | Noted: 2018-10-24 | Resolved: 2021-07-25

## 2021-07-26 RX ORDER — LISINOPRIL 20 MG/1
20 TABLET ORAL DAILY
Qty: 90 TABLET | Refills: 1 | Status: SHIPPED | OUTPATIENT
Start: 2021-07-26 | End: 2022-01-17

## 2021-07-26 NOTE — TELEPHONE ENCOUNTER
Lov: 7/19/21  Lrf: 1/23/21  Na: 10/19/21  Health Maintenance   Topic Date Due    Diabetic microalbuminuria test  04/08/2020    Lipid screen  05/07/2021    Flu vaccine (1) 09/01/2021    Breast cancer screen  10/03/2021    Diabetic foot exam  04/26/2022    Diabetic retinal exam  07/09/2022    A1C test (Diabetic or Prediabetic)  07/19/2022    Potassium monitoring  07/19/2022    Creatinine monitoring  07/19/2022    Cervical cancer screen  11/16/2022    Pneumococcal 0-64 years Vaccine (2 of 2 - PPSV23) 04/23/2023    DTaP/Tdap/Td vaccine (2 - Td or Tdap) 08/04/2026    Colon cancer screen colonoscopy  02/16/2028    Shingles Vaccine  Completed    COVID-19 Vaccine  Completed    Hepatitis C screen  Addressed    HIV screen  Addressed    Hepatitis A vaccine  Aged Out    Hib vaccine  Aged Out    Meningococcal (ACWY) vaccine  Aged Out             (applicable per patient's age: Cancer Screenings, Depression Screening, Fall Risk Screening, Immunizations)    Hemoglobin A1C (%)   Date Value   07/19/2021 6.4 (A)   09/14/2020 7.5 (H)   05/06/2020 7.4 (H)     Microalb/Crt.  Ratio (mcg/mg creat)   Date Value   04/08/2019 CANNOT BE CALCULATED     LDL Cholesterol (mg/dL)   Date Value   05/07/2020 68     LDL Calculated (mg/dL)   Date Value   04/10/2020 59     AST (U/L)   Date Value   07/19/2021 24     ALT (U/L)   Date Value   07/19/2021 32     BUN (mg/dL)   Date Value   07/19/2021 12      (goal A1C is < 7)   (goal LDL is <100) need 30-50% reduction from baseline     BP Readings from Last 3 Encounters:   07/19/21 124/74   05/25/21 137/74   03/10/21 122/74    (goal /80)      All Future Testing planned in [facility]:  Lab Frequency Next Occurrence   ECHO Complete 2D W Doppler W Color Once 07/19/2021   POCT INR     Basic Metabolic Panel Every 6 Months 9/10/2021       Next Visit Date:  Future Appointments   Date Time Provider Mark Junior   8/2/2021  8:30 AM SCHEDULE, [facility]VANO [facility] 8/23/2021 10:00 AM Rahul Bruno DPM [facility]   10/11/2021  8:30 AM Staci Syed MD [facility] None   10/19/2021  8:30 AM Trisha Rollins Cea, APRN - CNP [place] [facility]            Patient Active Problem List:     Essential hypertension     Hyperlipidemia     Osteoarthritis     Depression     Obesity     CKD (chronic kidney disease) stage 3, GFR 30-59 ml/min ([facility])     Proteinuria     Controlled type 2 diabetes mellitus with stage 3 chronic kidney disease, without long-term current use of insulin ([facility])     History of DVT of lower extremity     NARCISO on CPAP     Vitamin D deficiency     Major depressive disorder, recurrent episode, severe, with psychotic behavior ([address]     Multiple lung nodules on CT     Hypomagnesemia     SOB (shortness of breath)     Anxiety     Chronic obstructive pulmonary disease (HCC)     Precordial pain     History of pulmonary embolism     Family history of abdominal aortic aneurysm     Normal coronary arteries     Long term (current) use of anticoagulants     AAA (abdominal aortic aneurysm) without rupture ([address]

## 2021-07-27 DIAGNOSIS — N18.30 CONTROLLED TYPE 2 DIABETES MELLITUS WITH STAGE 3 CHRONIC KIDNEY DISEASE, WITHOUT LONG-TERM CURRENT USE OF INSULIN (HCC): ICD-10-CM

## 2021-07-27 DIAGNOSIS — K21.9 GASTROESOPHAGEAL REFLUX DISEASE: ICD-10-CM

## 2021-07-27 DIAGNOSIS — E11.22 CONTROLLED TYPE 2 DIABETES MELLITUS WITH STAGE 3 CHRONIC KIDNEY DISEASE, WITHOUT LONG-TERM CURRENT USE OF INSULIN (HCC): ICD-10-CM

## 2021-07-27 DIAGNOSIS — E78.5 HYPERLIPIDEMIA, UNSPECIFIED HYPERLIPIDEMIA TYPE: ICD-10-CM

## 2021-07-28 RX ORDER — OMEPRAZOLE 20 MG/1
20 CAPSULE, DELAYED RELEASE ORAL DAILY
Qty: 90 CAPSULE | Refills: 1 | Status: SHIPPED | OUTPATIENT
Start: 2021-07-28 | End: 2022-01-17

## 2021-07-28 RX ORDER — ATORVASTATIN CALCIUM 40 MG/1
40 TABLET, FILM COATED ORAL DAILY
Qty: 90 TABLET | Refills: 1 | Status: SHIPPED | OUTPATIENT
Start: 2021-07-28 | End: 2022-01-17

## 2021-07-28 NOTE — TELEPHONE ENCOUNTER
LOV 7/19/21  RTO 3 months; F/U scheduled  LRF 1/23/21    Health Maintenance   Topic Date Due    Lipid screen  05/07/2021    Flu vaccine (1) 09/01/2021    Breast cancer screen  10/03/2021    Diabetic foot exam  04/26/2022    Diabetic retinal exam  07/09/2022    A1C test (Diabetic or Prediabetic)  07/19/2022    Potassium monitoring  07/19/2022    Creatinine monitoring  07/19/2022    Cervical cancer screen  11/16/2022    Pneumococcal 0-64 years Vaccine (2 of 2 - PPSV23) 04/23/2023    DTaP/Tdap/Td vaccine (2 - Td or Tdap) 08/04/2026    Colon cancer screen colonoscopy  02/16/2028    Shingles Vaccine  Completed    COVID-19 Vaccine  Completed    Hepatitis C screen  Addressed    HIV screen  Addressed    Hepatitis A vaccine  Aged Out    Hib vaccine  Aged Out    Meningococcal (ACWY) vaccine  Aged Out             (applicable per patient's age: Cancer Screenings, Depression Screening, Fall Risk Screening, Immunizations)    Hemoglobin A1C (%)   Date Value   07/19/2021 6.4 (A)   09/14/2020 7.5 (H)   05/06/2020 7.4 (H)     Microalb/Crt.  Ratio (mcg/mg creat)   Date Value   04/08/2019 CANNOT BE CALCULATED     LDL Cholesterol (mg/dL)   Date Value   05/07/2020 68     LDL Calculated (mg/dL)   Date Value   04/10/2020 59     AST (U/L)   Date Value   07/19/2021 24     ALT (U/L)   Date Value   07/19/2021 32     BUN (mg/dL)   Date Value   07/19/2021 12      (goal A1C is < 7)   (goal LDL is <100) need 30-50% reduction from baseline     BP Readings from Last 3 Encounters:   07/19/21 124/74   05/25/21 137/74   03/10/21 122/74    (goal /80)      All Future Testing planned in [facility]:  Lab Frequency Next Occurrence   ECHO Complete 2D W Doppler W Color Once 07/19/2021   POCT INR     Basic Metabolic Panel Every 6 Months 9/10/2021       Next Visit Date:  Future Appointments   Date Time Provider Mark Junior   8/2/2021  8:30 AM SILVANO Shah   8/23/2021 10:00 AM Enid Wallace DPM [facility]   10/11/2021  8:30 AM Radha Sharma MD [facility] None   10/19/2021  8:30 AM BRAULIO Solorio - CNP [facility]            Patient Active Problem List:     Essential hypertension     Hyperlipidemia     Osteoarthritis     Depression     Obesity     CKD (chronic kidney disease) stage 3, GFR 30-59 ml/min (HCC)     Proteinuria     Controlled type 2 diabetes mellitus with stage 3 chronic kidney disease, without long-term current use of insulin ([facility])     History of DVT of lower extremity     NARCISO on CPAP     Vitamin D deficiency     Major depressive disorder, recurrent episode, severe, with psychotic behavior ([address]     Multiple lung nodules on CT     Hypomagnesemia     SOB (shortness of breath)     Anxiety     Chronic obstructive pulmonary disease (HCC)     Precordial pain     History of pulmonary embolism     Family history of abdominal aortic aneurysm     Normal coronary arteries     Long term (current) use of anticoagulants     AAA (abdominal aortic aneurysm) without rupture ([address]

## 2021-07-30 DIAGNOSIS — I10 ESSENTIAL HYPERTENSION: ICD-10-CM

## 2021-08-02 ENCOUNTER — HOSPITAL ENCOUNTER (OUTPATIENT)
Facility: CLINIC | Age: 61
Discharge: HOME | End: 2021-08-04
Payer: MEDICARE

## 2021-08-02 ENCOUNTER — HOSPITAL ENCOUNTER (OUTPATIENT)
Age: 61
Setting detail: SPECIMEN
Discharge: HOME | End: 2021-08-02
Payer: MEDICARE

## 2021-08-02 ENCOUNTER — HOSPITAL ENCOUNTER (OUTPATIENT)
Dept: GENERAL RADIOLOGY | Facility: CLINIC | Age: 61
Discharge: HOME | End: 2021-08-04
Payer: MEDICARE

## 2021-08-02 ENCOUNTER — PATIENT MESSAGE (OUTPATIENT)
Dept: FAMILY MEDICINE CLINIC | Age: 61
End: 2021-08-02

## 2021-08-02 ENCOUNTER — ANTI-COAG VISIT (OUTPATIENT)
Dept: FAMILY MEDICINE CLINIC | Age: 61
End: 2021-08-02
Payer: MEDICARE

## 2021-08-02 ENCOUNTER — TELEPHONE (OUTPATIENT)
Dept: FAMILY MEDICINE CLINIC | Age: 61
End: 2021-08-02

## 2021-08-02 VITALS
TEMPERATURE: 98.2 F | HEART RATE: 87 BPM | SYSTOLIC BLOOD PRESSURE: 143 MMHG | DIASTOLIC BLOOD PRESSURE: 83 MMHG | OXYGEN SATURATION: 99 %

## 2021-08-02 DIAGNOSIS — R06.2: ICD-10-CM

## 2021-08-02 DIAGNOSIS — R06.89 DYSPNEA AND RESPIRATORY ABNORMALITIES: ICD-10-CM

## 2021-08-02 DIAGNOSIS — R06.00 DYSPNEA AND RESPIRATORY ABNORMALITIES: ICD-10-CM

## 2021-08-02 DIAGNOSIS — R06.2 WHEEZING: ICD-10-CM

## 2021-08-02 DIAGNOSIS — J02.9 SORE THROAT: ICD-10-CM

## 2021-08-02 DIAGNOSIS — R06.00 DYSPNEA AND RESPIRATORY ABNORMALITIES: Primary | ICD-10-CM

## 2021-08-02 DIAGNOSIS — R06.89 DYSPNEA AND RESPIRATORY ABNORMALITIES: Primary | ICD-10-CM

## 2021-08-02 LAB
ABSOLUTE EOS #: 0.27 K/UL (ref 0–0.44)
ABSOLUTE IMMATURE GRANULOCYTE: 0.03 K/UL (ref 0–0.3)
ABSOLUTE LYMPH #: 3.33 K/UL (ref 1.1–3.7)
ABSOLUTE MONO #: 0.67 K/UL (ref 0.1–1.2)
ADENOVIRUS PCR: NOT DETECTED
BASOPHILS # BLD: 0 % (ref 0–2)
BASOPHILS ABSOLUTE: 0.03 K/UL (ref 0–0.2)
BNP INTERPRETATION: NORMAL
BORDETELLA PARAPERTUSSIS: NOT DETECTED
BORDETELLA PERTUSSIS PCR: NOT DETECTED
CHLAMYDIA PNEUMONIAE BY PCR: NOT DETECTED
CORONAVIRUS 229E PCR: NOT DETECTED
CORONAVIRUS HKU1 PCR: NOT DETECTED
CORONAVIRUS NL63 PCR: NOT DETECTED
CORONAVIRUS OC43 PCR: DETECTED
DIFFERENTIAL TYPE: ABNORMAL
EOSINOPHILS RELATIVE PERCENT: 3 % (ref 1–4)
HCT VFR BLD CALC: 39.5 % (ref 36.3–47.1)
HEMOGLOBIN: 12.5 G/DL (ref 11.9–15.1)
HUMAN METAPNEUMOVIRUS PCR: NOT DETECTED
IMMATURE GRANULOCYTES: 0 %
INFLUENZA A BY PCR: NOT DETECTED
INFLUENZA A H1 (2009) PCR: ABNORMAL
INFLUENZA A H1 PCR: ABNORMAL
INFLUENZA A H3 PCR: ABNORMAL
INFLUENZA B BY PCR: NOT DETECTED
INTERNATIONAL NORMALIZATION RATIO, POC: 4.4
LYMPHOCYTES # BLD: 42 % (ref 24–43)
MCH RBC QN AUTO: 29.3 PG (ref 25.2–33.5)
MCHC RBC AUTO-ENTMCNC: 31.6 G/DL (ref 28.4–34.8)
MCV RBC AUTO: 92.5 FL (ref 82.6–102.9)
MONOCYTES # BLD: 9 % (ref 3–12)
MYCOPLASMA PNEUMONIAE PCR: NOT DETECTED
NRBC AUTOMATED: 0 PER 100 WBC
PARAINFLUENZA 1 PCR: NOT DETECTED
PARAINFLUENZA 2 PCR: NOT DETECTED
PARAINFLUENZA 3 PCR: DETECTED
PARAINFLUENZA 4 PCR: NOT DETECTED
PDW BLD-RTO: 14.6 % (ref 11.8–14.4)
PLATELET # BLD: 261 K/UL (ref 138–453)
PLATELET ESTIMATE: ABNORMAL
PMV BLD AUTO: 9.3 FL (ref 8.1–13.5)
PRO-BNP: 51 PG/ML
PROTHROMBIN TIME, POC: 52.5
RBC # BLD: 4.27 M/UL (ref 3.95–5.11)
RBC # BLD: ABNORMAL 10*6/UL
RESP SYNCYTIAL VIRUS PCR: NOT DETECTED
RHINO/ENTEROVIRUS PCR: NOT DETECTED
SARS-COV-2, PCR: NOT DETECTED
SEDIMENTATION RATE, ERYTHROCYTE: 27 MM (ref 0–30)
SEG NEUTROPHILS: 46 % (ref 36–65)
SEGMENTED NEUTROPHILS ABSOLUTE COUNT: 3.55 K/UL (ref 1.5–8.1)
SPECIMEN DESCRIPTION: ABNORMAL
WBC # BLD: 7.9 K/UL (ref 3.5–11.3)
WBC # BLD: ABNORMAL 10*3/UL

## 2021-08-02 PROCEDURE — 71046 X-RAY EXAM CHEST 2 VIEWS: CPT

## 2021-08-02 PROCEDURE — 99214 OFFICE O/P EST MOD 30 MIN: CPT | Performed by: NURSE PRACTITIONER

## 2021-08-02 PROCEDURE — G8417 CALC BMI ABV UP PARAM F/U: HCPCS | Performed by: NURSE PRACTITIONER

## 2021-08-02 PROCEDURE — 3017F COLORECTAL CA SCREEN DOC REV: CPT | Performed by: NURSE PRACTITIONER

## 2021-08-02 PROCEDURE — G8428 CUR MEDS NOT DOCUMENT: HCPCS | Performed by: NURSE PRACTITIONER

## 2021-08-02 PROCEDURE — 1036F TOBACCO NON-USER: CPT | Performed by: NURSE PRACTITIONER

## 2021-08-02 RX ORDER — HYDRALAZINE HYDROCHLORIDE 50 MG/1
TABLET, FILM COATED ORAL
Qty: 180 TABLET | Refills: 1 | Status: SHIPPED | OUTPATIENT
Start: 2021-08-02 | End: 2021-11-22

## 2021-08-02 RX ORDER — DOXYCYCLINE HYCLATE 100 MG
100 TABLET ORAL 2 TIMES DAILY
Qty: 20 TABLET | Refills: 0 | Status: ON HOLD | OUTPATIENT
Start: 2021-08-02 | End: 2021-08-07 | Stop reason: HOSPADM

## 2021-08-02 ASSESSMENT — VISUAL ACUITY: OU: 1

## 2021-08-02 ASSESSMENT — ENCOUNTER SYMPTOMS
COUGH: 1
CHEST TIGHTNESS: 1
WHEEZING: 1
SHORTNESS OF BREATH: 1

## 2021-08-02 NOTE — PROGRESS NOTES
Charlie Carrasquillo (:  1960) is a 59 y.o. female,Established patient, here for evaluation of the following chief complaint(s):  Coagulation Disorder         ASSESSMENT/PLAN:  1. Wheezing  -     XR CHEST STANDARD (2 VW); Future  -     CBC With Auto Differential; Future  -     Culture, Blood 1; Future  -     Brain Natriuretic Peptide; Future  -     Respiratory Panel, Molecular, with COVID-19; Future  2. Dyspnea and respiratory abnormalities  -     XR CHEST STANDARD (2 VW); Future  -     CBC With Auto Differential; Future  -     Culture, Blood 1; Future  -     Brain Natriuretic Peptide; Future  -     Respiratory Panel, Molecular, with COVID-19; Future  3. Sibilant rhonchi  -     XR CHEST STANDARD (2 VW); Future  -     CBC With Auto Differential; Future  -     Culture, Blood 1; Future  -     Brain Natriuretic Peptide; Future  -     Respiratory Panel, Molecular, with COVID-19; Future  4. Sore throat  -     Strep A DNA probe, amplification; Future      No follow-ups on file. Subjective   SUBJECTIVE/OBJECTIVE:  HPI    Review of Systems       Objective   Physical Exam       On this date 2021 I have spent  minutes reviewing previous notes, test results and face to face with the patient discussing the diagnosis and importance of compliance with the treatment plan as well as documenting on the day of the visit. An electronic signature was used to authenticate this note.     --Amanda Yepez MA

## 2021-08-02 NOTE — RESULT ENCOUNTER NOTE
Discussed with patient and documented on her after visit summary. Also documentation on anticoagulation encounter. Patient to hold 3 mg tonight, may take 2 mg tomorrow, hold 3 mg again Wednesday, and INR repeat Thursday morning.

## 2021-08-02 NOTE — PROGRESS NOTES
[address]  [phone]    8/2/21     Patient ID: Julien Romero is a 59 y.o. female  Established patient    Chief Complaint  Julien Romero presents today for Coagulation Disorder, Cough, and Chest Congestion      ASSESSMENT/PLAN    1. Dyspnea and respiratory abnormalities  -     XR CHEST STANDARD (2 VW); Future  -     CBC With Auto Differential; Future  -     Culture, Blood 1; Future  -     Brain Natriuretic Peptide; Future  -     Respiratory Panel, Molecular, with COVID-19; Future  -     doxycycline hyclate (VIBRA-TABS) 100 MG tablet; Take 1 tablet by mouth 2 times daily for 10 days, Disp-20 tablet, R-0NO PRINT  2. Wheezing  -     XR CHEST STANDARD (2 VW); Future  -     CBC With Auto Differential; Future  -     Culture, Blood 1; Future  -     Brain Natriuretic Peptide; Future  -     Respiratory Panel, Molecular, with COVID-19; Future  -     doxycycline hyclate (VIBRA-TABS) 100 MG tablet; Take 1 tablet by mouth 2 times daily for 10 days, Disp-20 tablet, R-0NO PRINT  3. Sibilant rhonchi  -     XR CHEST STANDARD (2 VW); Future  -     CBC With Auto Differential; Future  -     Culture, Blood 1; Future  -     Brain Natriuretic Peptide; Future  -     Respiratory Panel, Molecular, with COVID-19; Future  -     Sedimentation Rate; Future  -     doxycycline hyclate (VIBRA-TABS) 100 MG tablet; Take 1 tablet by mouth 2 times daily for 10 days, Disp-20 tablet, R-0NO PRINT  4. Sore throat  -     Strep A DNA probe, amplification; Future      Return for Call if systems do not improve or get worse.     Patient Care Team:  BRAULIO Humphrey CNP as PCP - General (Nurse Practitioner Family)  BRAULIO Humphrey CNP as PCP - [address] [place] [address] Dr Grangerled Provider  Tamiko Garrison MD as Consulting Physician (Nephrology)  Wilder Galvan MD as Consulting Physician (Pulmonology)  Vincenzo Adan MD as Consulting Physician (Cardiology)  Myke Beckham DPM as Consulting Physician (Podiatry)  Mary Essex, MD (Psychiatry)    SUBJECTIVE/OBJECTIVE    History of Present illness / Visit Summary   Patient presents this morning for an INR check. Her INR is high and we discussed holding her Coumadin. I was asked to come to the room due to patient being in distress. She does appear to be in respiratory distress, respirations are labored, and audible wheezing. Patient commented started on Wednesday. Her whole house had a viral infection. She states she has been sleeping in the recliner due to into short of breath while laying flat. She is continuing her Spiriva daily. She also is doing her nebulizer treatments and taking over-the-counter cough suppressants. Review of Systems  Review of Systems   Constitutional: Positive for fatigue. HENT: Positive for sore throat and trouble swallowing. Respiratory: Positive for cough (non productive ), chest tightness, shortness of breath and wheezing. Cardiovascular: Positive for palpitations. Neurological: Positive for headaches. Negative for dizziness and light-headedness. Psychiatric/Behavioral: Positive for sleep disturbance. Physical exam   Physical Exam  Vitals and nursing note reviewed. Constitutional:       General: She is not in acute distress. Appearance: Normal appearance. She is well-developed and well-groomed. She is obese. She is ill-appearing. She is not toxic-appearing. HENT:      Head: Normocephalic. Mouth/Throat:      Lips: Pink. Mouth: Mucous membranes are moist.      Dentition: No dental tenderness. Pharynx: Oropharynx is clear. Posterior oropharyngeal erythema present. No oropharyngeal exudate or uvula swelling. Comments: Post nasal drip   No teeth   Eyes:      General: Lids are normal. Vision grossly intact. No allergic shiner. Conjunctiva/sclera: Conjunctivae normal.   Cardiovascular:      Rate and Rhythm: Normal rate and regular rhythm. No extrasystoles are present.      Heart sounds: Normal heart sounds, S1 normal and S2 normal. No murmur heard. Pulmonary:      Effort: Accessory muscle usage present. No prolonged expiration or respiratory distress. Breath sounds: Wheezing and rhonchi present. Comments: audible wheezes   Musculoskeletal:      Cervical back: Normal range of motion. No torticollis. No pain with movement. Right lower leg: No edema. Left lower leg: No edema. Skin:     General: Skin is warm and dry. Coloration: Skin is not ashen, cyanotic, jaundiced or pale. Neurological:      General: No focal deficit present. Mental Status: She is alert and oriented to person, place, and time. Motor: Motor function is intact. Gait: Gait is intact. Psychiatric:         Attention and Perception: Attention and perception normal.         Mood and Affect: Mood and affect normal.         Speech: Speech normal.         Behavior: Behavior normal. Behavior is cooperative. Thought Content: Thought content normal. Thought content does not include suicidal ideation. Thought content does not include suicidal plan. Cognition and Memory: Cognition and memory normal.         Judgment: Judgment normal.           Electronically signed by BRAULIO Brush CNP, APRN-CNP on 8/20/2021 at 10:42 PM    Please note that this chart was generated using voice recognition Dragon dictation software. Although every effort was made to ensure the accuracy of this automated transcription, some errors in transcription may have occurred.

## 2021-08-02 NOTE — PATIENT INSTRUCTIONS
No Coumadin tonight  May take 2 mg tomorrow  No coumadin Wednesday  Recheck INR thursday am    STAT chest Xray  Doxycycline for antibiotic    No more cough suppressants, I want you to use nebulizer

## 2021-08-02 NOTE — RESULT ENCOUNTER NOTE
Called and spoke with patient and daughter. No acute findings. If respiratory symptoms worsen will need to go to nearest ER. Respiratory panel showing two viral respiratory process. Neither acute.

## 2021-08-03 LAB
DIRECT EXAM: NORMAL
Lab: NORMAL
SPECIMEN DESCRIPTION: NORMAL

## 2021-08-05 ENCOUNTER — ANTI-COAG VISIT (OUTPATIENT)
Dept: FAMILY MEDICINE CLINIC | Age: 61
End: 2021-08-05
Payer: MEDICARE

## 2021-08-05 ENCOUNTER — HOSPITAL ENCOUNTER (INPATIENT)
Age: 61
LOS: 2 days | Discharge: HOME HEALTH | DRG: 140 | End: 2021-08-07
Attending: INTERNAL MEDICINE | Admitting: INTERNAL MEDICINE
Payer: MEDICARE

## 2021-08-05 VITALS — SYSTOLIC BLOOD PRESSURE: 155 MMHG | DIASTOLIC BLOOD PRESSURE: 83 MMHG

## 2021-08-05 DIAGNOSIS — Z86.718 HISTORY OF DVT OF LOWER EXTREMITY: ICD-10-CM

## 2021-08-05 PROBLEM — J20.9 ACUTE TRACHEOBRONCHITIS: Status: ACTIVE | Noted: 2021-08-05

## 2021-08-05 PROBLEM — J44.9 COPD (CHRONIC OBSTRUCTIVE PULMONARY DISEASE) (HCC): Status: ACTIVE | Noted: 2021-08-05

## 2021-08-05 PROBLEM — J44.1 COPD EXACERBATION (HCC): Status: ACTIVE | Noted: 2021-08-05

## 2021-08-05 LAB
ALLEN TEST: ABNORMAL
FIO2: 28
GLUCOSE BLD-MCNC: 116 MG/DL (ref 65–105)
GLUCOSE BLD-MCNC: 180 MG/DL (ref 65–105)
HCT VFR BLD CALC: 36.8 % (ref 36.3–47.1)
HEMOGLOBIN: 11.9 G/DL (ref 11.9–15.1)
INR BLD: 1.6
INTERNATIONAL NORMALIZATION RATIO, POC: 1.7
MCH RBC QN AUTO: 29.4 PG (ref 25.2–33.5)
MCHC RBC AUTO-ENTMCNC: 32.3 G/DL (ref 28.4–34.8)
MCV RBC AUTO: 90.9 FL (ref 82.6–102.9)
MODE: ABNORMAL
NEGATIVE BASE EXCESS, ART: ABNORMAL (ref 0–2)
NRBC AUTOMATED: 0 PER 100 WBC
O2 DEVICE/FLOW/%: ABNORMAL
PATIENT TEMP: 37
PDW BLD-RTO: 14.6 % (ref 11.8–14.4)
PLATELET # BLD: 260 K/UL (ref 138–453)
PMV BLD AUTO: 8.6 FL (ref 8.1–13.5)
POC HCO3: 25.4 MMOL/L (ref 21–28)
POC O2 SATURATION: 98 % (ref 94–98)
POC PCO2 TEMP: ABNORMAL MM HG
POC PCO2: 34.8 MM HG (ref 35–48)
POC PH TEMP: ABNORMAL
POC PH: 7.47 (ref 7.35–7.45)
POC PO2 TEMP: ABNORMAL MM HG
POC PO2: 90.5 MM HG (ref 83–108)
POSITIVE BASE EXCESS, ART: 2 (ref 0–3)
PROTHROMBIN TIME, POC: 20.2
PROTHROMBIN TIME: 18.6 SEC (ref 11.5–14.2)
RBC # BLD: 4.05 M/UL (ref 3.95–5.11)
SAMPLE SITE: ABNORMAL
SARS-COV-2, RAPID: NOT DETECTED
SPECIMEN DESCRIPTION: NORMAL
TCO2 (CALC), ART: ABNORMAL MMOL/L (ref 22–29)
WBC # BLD: 10 K/UL (ref 3.5–11.3)

## 2021-08-05 PROCEDURE — G8417 CALC BMI ABV UP PARAM F/U: HCPCS | Performed by: NURSE PRACTITIONER

## 2021-08-05 PROCEDURE — 36600 WITHDRAWAL OF ARTERIAL BLOOD: CPT

## 2021-08-05 PROCEDURE — 87040 BLOOD CULTURE FOR BACTERIA: CPT

## 2021-08-05 PROCEDURE — 83036 HEMOGLOBIN GLYCOSYLATED A1C: CPT

## 2021-08-05 PROCEDURE — 36415 COLL VENOUS BLD VENIPUNCTURE: CPT

## 2021-08-05 PROCEDURE — 85610 PROTHROMBIN TIME: CPT | Performed by: NURSE PRACTITIONER

## 2021-08-05 PROCEDURE — 2700000000 HC OXYGEN THERAPY PER DAY

## 2021-08-05 PROCEDURE — 99223 1ST HOSP IP/OBS HIGH 75: CPT | Performed by: INTERNAL MEDICINE

## 2021-08-05 PROCEDURE — 6370000000 HC RX 637 (ALT 250 FOR IP): Performed by: INTERNAL MEDICINE

## 2021-08-05 PROCEDURE — 94760 N-INVAS EAR/PLS OXIMETRY 1: CPT

## 2021-08-05 PROCEDURE — 87635 SARS-COV-2 COVID-19 AMP PRB: CPT

## 2021-08-05 PROCEDURE — 82803 BLOOD GASES ANY COMBINATION: CPT

## 2021-08-05 PROCEDURE — 1200000000 HC SEMI PRIVATE

## 2021-08-05 PROCEDURE — 2580000003 HC RX 258: Performed by: INTERNAL MEDICINE

## 2021-08-05 PROCEDURE — 3017F COLORECTAL CA SCREEN DOC REV: CPT | Performed by: NURSE PRACTITIONER

## 2021-08-05 PROCEDURE — G8428 CUR MEDS NOT DOCUMENT: HCPCS | Performed by: NURSE PRACTITIONER

## 2021-08-05 PROCEDURE — 94640 AIRWAY INHALATION TREATMENT: CPT

## 2021-08-05 PROCEDURE — 99212 OFFICE O/P EST SF 10 MIN: CPT | Performed by: NURSE PRACTITIONER

## 2021-08-05 PROCEDURE — 6360000002 HC RX W HCPCS: Performed by: INTERNAL MEDICINE

## 2021-08-05 PROCEDURE — 85027 COMPLETE CBC AUTOMATED: CPT

## 2021-08-05 PROCEDURE — 1036F TOBACCO NON-USER: CPT | Performed by: NURSE PRACTITIONER

## 2021-08-05 PROCEDURE — 94761 N-INVAS EAR/PLS OXIMETRY MLT: CPT

## 2021-08-05 PROCEDURE — 85610 PROTHROMBIN TIME: CPT

## 2021-08-05 PROCEDURE — 82947 ASSAY GLUCOSE BLOOD QUANT: CPT

## 2021-08-05 RX ORDER — FLUTICASONE PROPIONATE 50 MCG
1 SPRAY, SUSPENSION (ML) NASAL DAILY PRN
Status: DISCONTINUED | OUTPATIENT
Start: 2021-08-05 | End: 2021-08-06

## 2021-08-05 RX ORDER — ALLOPURINOL 100 MG/1
100 TABLET ORAL DAILY
Status: DISCONTINUED | OUTPATIENT
Start: 2021-08-06 | End: 2021-08-07 | Stop reason: HOSPADM

## 2021-08-05 RX ORDER — HYDRALAZINE HYDROCHLORIDE 50 MG/1
50 TABLET, FILM COATED ORAL 2 TIMES DAILY
Status: DISCONTINUED | OUTPATIENT
Start: 2021-08-05 | End: 2021-08-07 | Stop reason: HOSPADM

## 2021-08-05 RX ORDER — SODIUM CHLORIDE 9 MG/ML
25 INJECTION, SOLUTION INTRAVENOUS PRN
Status: DISCONTINUED | OUTPATIENT
Start: 2021-08-05 | End: 2021-08-07 | Stop reason: HOSPADM

## 2021-08-05 RX ORDER — SODIUM CHLORIDE 0.9 % (FLUSH) 0.9 %
5-40 SYRINGE (ML) INJECTION EVERY 12 HOURS SCHEDULED
Status: DISCONTINUED | OUTPATIENT
Start: 2021-08-05 | End: 2021-08-07 | Stop reason: HOSPADM

## 2021-08-05 RX ORDER — BUPROPION HYDROCHLORIDE 150 MG/1
150 TABLET ORAL EVERY MORNING
Status: DISCONTINUED | OUTPATIENT
Start: 2021-08-06 | End: 2021-08-07 | Stop reason: HOSPADM

## 2021-08-05 RX ORDER — NEBULIZER ACCESSORIES
1 KIT MISCELLANEOUS 4 TIMES DAILY PRN
Status: DISCONTINUED | OUTPATIENT
Start: 2021-08-05 | End: 2021-08-05

## 2021-08-05 RX ORDER — IPRATROPIUM BROMIDE AND ALBUTEROL SULFATE 2.5; .5 MG/3ML; MG/3ML
3 SOLUTION RESPIRATORY (INHALATION) EVERY 6 HOURS PRN
Status: DISCONTINUED | OUTPATIENT
Start: 2021-08-05 | End: 2021-08-05

## 2021-08-05 RX ORDER — METHYLPREDNISOLONE SODIUM SUCCINATE 40 MG/ML
40 INJECTION, POWDER, LYOPHILIZED, FOR SOLUTION INTRAMUSCULAR; INTRAVENOUS EVERY 6 HOURS
Status: DISCONTINUED | OUTPATIENT
Start: 2021-08-05 | End: 2021-08-07

## 2021-08-05 RX ORDER — LISINOPRIL 20 MG/1
20 TABLET ORAL DAILY
Status: DISCONTINUED | OUTPATIENT
Start: 2021-08-06 | End: 2021-08-07 | Stop reason: HOSPADM

## 2021-08-05 RX ORDER — ONDANSETRON 4 MG/1
4 TABLET, ORALLY DISINTEGRATING ORAL EVERY 8 HOURS PRN
Status: DISCONTINUED | OUTPATIENT
Start: 2021-08-05 | End: 2021-08-07 | Stop reason: HOSPADM

## 2021-08-05 RX ORDER — OXYCODONE HYDROCHLORIDE AND ACETAMINOPHEN 5; 325 MG/1; MG/1
1 TABLET ORAL EVERY 6 HOURS PRN
Status: DISCONTINUED | OUTPATIENT
Start: 2021-08-05 | End: 2021-08-07 | Stop reason: HOSPADM

## 2021-08-05 RX ORDER — ALOGLIPTIN 25 MG/1
25 TABLET, FILM COATED ORAL DAILY
Refills: 1 | Status: DISCONTINUED | OUTPATIENT
Start: 2021-08-06 | End: 2021-08-07 | Stop reason: HOSPADM

## 2021-08-05 RX ORDER — ONDANSETRON 2 MG/ML
4 INJECTION INTRAMUSCULAR; INTRAVENOUS EVERY 6 HOURS PRN
Status: DISCONTINUED | OUTPATIENT
Start: 2021-08-05 | End: 2021-08-07 | Stop reason: HOSPADM

## 2021-08-05 RX ORDER — IPRATROPIUM BROMIDE AND ALBUTEROL SULFATE 2.5; .5 MG/3ML; MG/3ML
1 SOLUTION RESPIRATORY (INHALATION)
Status: DISCONTINUED | OUTPATIENT
Start: 2021-08-05 | End: 2021-08-05

## 2021-08-05 RX ORDER — SODIUM CHLORIDE 0.9 % (FLUSH) 0.9 %
5-40 SYRINGE (ML) INJECTION PRN
Status: DISCONTINUED | OUTPATIENT
Start: 2021-08-05 | End: 2021-08-07 | Stop reason: HOSPADM

## 2021-08-05 RX ORDER — FUROSEMIDE 20 MG/1
20 TABLET ORAL DAILY
Status: DISCONTINUED | OUTPATIENT
Start: 2021-08-06 | End: 2021-08-07 | Stop reason: HOSPADM

## 2021-08-05 RX ORDER — NICOTINE POLACRILEX 4 MG
15 LOZENGE BUCCAL PRN
Status: DISCONTINUED | OUTPATIENT
Start: 2021-08-05 | End: 2021-08-07 | Stop reason: HOSPADM

## 2021-08-05 RX ORDER — ALBUTEROL SULFATE 2.5 MG/3ML
2.5 SOLUTION RESPIRATORY (INHALATION) EVERY 6 HOURS PRN
Status: DISCONTINUED | OUTPATIENT
Start: 2021-08-05 | End: 2021-08-05

## 2021-08-05 RX ORDER — DEXTROSE MONOHYDRATE 25 G/50ML
12.5 INJECTION, SOLUTION INTRAVENOUS PRN
Status: DISCONTINUED | OUTPATIENT
Start: 2021-08-05 | End: 2021-08-07 | Stop reason: HOSPADM

## 2021-08-05 RX ORDER — ATORVASTATIN CALCIUM 40 MG/1
40 TABLET, FILM COATED ORAL NIGHTLY
Status: DISCONTINUED | OUTPATIENT
Start: 2021-08-05 | End: 2021-08-07 | Stop reason: HOSPADM

## 2021-08-05 RX ORDER — ALBUTEROL SULFATE 2.5 MG/3ML
2.5 SOLUTION RESPIRATORY (INHALATION)
Status: DISCONTINUED | OUTPATIENT
Start: 2021-08-05 | End: 2021-08-07 | Stop reason: HOSPADM

## 2021-08-05 RX ORDER — WARFARIN SODIUM 2.5 MG/1
2.5 TABLET ORAL DAILY
Status: DISCONTINUED | OUTPATIENT
Start: 2021-08-06 | End: 2021-08-06 | Stop reason: DRUGHIGH

## 2021-08-05 RX ORDER — PREDNISONE 20 MG/1
40 TABLET ORAL DAILY
Status: DISCONTINUED | OUTPATIENT
Start: 2021-08-08 | End: 2021-08-07

## 2021-08-05 RX ORDER — ALBUTEROL SULFATE 2.5 MG/3ML
2.5 SOLUTION RESPIRATORY (INHALATION) EVERY 4 HOURS PRN
Status: DISCONTINUED | OUTPATIENT
Start: 2021-08-05 | End: 2021-08-05

## 2021-08-05 RX ORDER — METFORMIN HYDROCHLORIDE 500 MG/1
1000 TABLET, EXTENDED RELEASE ORAL 2 TIMES DAILY
Status: DISCONTINUED | OUTPATIENT
Start: 2021-08-05 | End: 2021-08-07 | Stop reason: HOSPADM

## 2021-08-05 RX ORDER — ALBUTEROL SULFATE 2.5 MG/3ML
2.5 SOLUTION RESPIRATORY (INHALATION) 4 TIMES DAILY
Status: DISCONTINUED | OUTPATIENT
Start: 2021-08-05 | End: 2021-08-07 | Stop reason: HOSPADM

## 2021-08-05 RX ORDER — ISOSORBIDE MONONITRATE 30 MG/1
30 TABLET, EXTENDED RELEASE ORAL DAILY
Status: DISCONTINUED | OUTPATIENT
Start: 2021-08-06 | End: 2021-08-07 | Stop reason: HOSPADM

## 2021-08-05 RX ORDER — RISPERIDONE 1 MG/1
1 TABLET, FILM COATED ORAL NIGHTLY
Status: DISCONTINUED | OUTPATIENT
Start: 2021-08-05 | End: 2021-08-06

## 2021-08-05 RX ORDER — DEXTROSE MONOHYDRATE 50 MG/ML
100 INJECTION, SOLUTION INTRAVENOUS PRN
Status: DISCONTINUED | OUTPATIENT
Start: 2021-08-05 | End: 2021-08-07 | Stop reason: HOSPADM

## 2021-08-05 RX ORDER — LEVOFLOXACIN 5 MG/ML
500 INJECTION, SOLUTION INTRAVENOUS EVERY 24 HOURS
Status: DISCONTINUED | OUTPATIENT
Start: 2021-08-05 | End: 2021-08-06

## 2021-08-05 RX ORDER — ACETAMINOPHEN 650 MG/1
650 SUPPOSITORY RECTAL EVERY 6 HOURS PRN
Status: DISCONTINUED | OUTPATIENT
Start: 2021-08-05 | End: 2021-08-07 | Stop reason: HOSPADM

## 2021-08-05 RX ORDER — SODIUM CHLORIDE 9 MG/ML
INJECTION, SOLUTION INTRAVENOUS CONTINUOUS
Status: DISCONTINUED | OUTPATIENT
Start: 2021-08-05 | End: 2021-08-07

## 2021-08-05 RX ORDER — LATANOPROST 50 UG/ML
1 SOLUTION/ DROPS OPHTHALMIC NIGHTLY
Status: DISCONTINUED | OUTPATIENT
Start: 2021-08-05 | End: 2021-08-05

## 2021-08-05 RX ORDER — POLYETHYLENE GLYCOL 3350 17 G/17G
17 POWDER, FOR SOLUTION ORAL DAILY PRN
Status: DISCONTINUED | OUTPATIENT
Start: 2021-08-05 | End: 2021-08-07 | Stop reason: HOSPADM

## 2021-08-05 RX ORDER — NALOXONE HYDROCHLORIDE 4 MG/.1ML
1 SPRAY NASAL PRN
Status: DISCONTINUED | OUTPATIENT
Start: 2021-08-05 | End: 2021-08-05

## 2021-08-05 RX ORDER — ALBUTEROL SULFATE 90 UG/1
2 AEROSOL, METERED RESPIRATORY (INHALATION) EVERY 4 HOURS PRN
Status: DISCONTINUED | OUTPATIENT
Start: 2021-08-05 | End: 2021-08-05

## 2021-08-05 RX ORDER — ALBUTEROL SULFATE 2.5 MG/3ML
2.5 SOLUTION RESPIRATORY (INHALATION)
Status: DISCONTINUED | OUTPATIENT
Start: 2021-08-05 | End: 2021-08-05

## 2021-08-05 RX ORDER — PANTOPRAZOLE SODIUM 40 MG/1
40 TABLET, DELAYED RELEASE ORAL
Status: DISCONTINUED | OUTPATIENT
Start: 2021-08-06 | End: 2021-08-07 | Stop reason: HOSPADM

## 2021-08-05 RX ORDER — LATANOPROST 50 UG/ML
1 SOLUTION/ DROPS OPHTHALMIC NIGHTLY
Status: DISCONTINUED | OUTPATIENT
Start: 2021-08-05 | End: 2021-08-06

## 2021-08-05 RX ORDER — ACETAMINOPHEN 325 MG/1
650 TABLET ORAL EVERY 6 HOURS PRN
Status: DISCONTINUED | OUTPATIENT
Start: 2021-08-05 | End: 2021-08-07 | Stop reason: HOSPADM

## 2021-08-05 RX ORDER — VITAMIN B COMPLEX
2000 TABLET ORAL DAILY
Status: DISCONTINUED | OUTPATIENT
Start: 2021-08-06 | End: 2021-08-07 | Stop reason: HOSPADM

## 2021-08-05 RX ADMIN — METHYLPREDNISOLONE SODIUM SUCCINATE 40 MG: 40 INJECTION, POWDER, FOR SOLUTION INTRAMUSCULAR; INTRAVENOUS at 18:39

## 2021-08-05 RX ADMIN — ACETAMINOPHEN 650 MG: 325 TABLET ORAL at 23:34

## 2021-08-05 RX ADMIN — ALBUTEROL SULFATE 2.5 MG: 2.5 SOLUTION RESPIRATORY (INHALATION) at 22:09

## 2021-08-05 RX ADMIN — METHYLPREDNISOLONE SODIUM SUCCINATE 40 MG: 40 INJECTION, POWDER, FOR SOLUTION INTRAMUSCULAR; INTRAVENOUS at 23:34

## 2021-08-05 RX ADMIN — INSULIN LISPRO 1 UNITS: 100 INJECTION, SOLUTION INTRAVENOUS; SUBCUTANEOUS at 20:59

## 2021-08-05 RX ADMIN — METFORMIN HYDROCHLORIDE 1000 MG: 500 TABLET, EXTENDED RELEASE ORAL at 20:58

## 2021-08-05 RX ADMIN — LEVOFLOXACIN 500 MG: 5 INJECTION, SOLUTION INTRAVENOUS at 18:39

## 2021-08-05 RX ADMIN — SODIUM CHLORIDE: 9 INJECTION, SOLUTION INTRAVENOUS at 18:32

## 2021-08-05 RX ADMIN — HYDRALAZINE HYDROCHLORIDE 50 MG: 50 TABLET, FILM COATED ORAL at 20:58

## 2021-08-05 RX ADMIN — ATORVASTATIN CALCIUM 40 MG: 40 TABLET, FILM COATED ORAL at 20:58

## 2021-08-05 ASSESSMENT — PAIN SCALES - GENERAL
PAINLEVEL_OUTOF10: 0
PAINLEVEL_OUTOF10: 0
PAINLEVEL_OUTOF10: 4

## 2021-08-05 NOTE — PROGRESS NOTES
Patient instructed to take 3 mg of her Coumadin this afternoon due to INR being 1.7. INR will be checked daily while admitted. We will have to monitor this closely as she is on antibiotic that can alter her INR levels. Daughter is with her at today's appointment due to respiratory distress. Patient's been taking doxycycline since prescribed Monday. Again reviewed chest x-ray showing no acute findings. Also discussed high concern of exacerbation of her COPD due to the fact that breathing treatments around-the-clock and no relief of her symptoms, even with antibiotics. Daughter commented she wanted her to go to emergency room last night patient wanted to wait until she saw me this morning her nurse visit. Her blood pressure is elevated and her heart rate is tachycardia in the 90s. Her lungs are still extremely congested with expiratory inspiratory wheezing. Her pulse oxygenation is 98% on room air. Her respirations are labored. We reached out to [facility]. regarding direct admission for patient. I did speak with the physician who felt that her symptoms, diagnoses, and past medical history was sufficient for direct admission for exacerbation of COPD. [facility]. will call me when a bed is available, and then I will notify patient at that time. I did call patient and spoke with her as well as her daughter. Notified them of the above findings. Encouraged her to take her breathing treatments throughout the day and I will call her when a bed is available for direct admission. If her symptoms worsen in the meantime she is to go directly to [facility] emergency room.   Both patient and daughter verbalized understanding

## 2021-08-05 NOTE — PROGRESS NOTES
Learning About the Safe Use of Antibiotics  Introduction  Antibiotics are drugs used to kill bacteria. Bacteria can cause infections. These include strep throat, ear infections, and pneumonia. These medicines can't cure everything. They don't kill viruses or help with allergies. They don't help illnesses such as the common cold, the flu, or a runny nose. And they can cause side effects. There are many types of antibiotics. Your doctor will decide which one will work best for your infection. Examples include:  · Amoxicillin. · Cephalexin (Keflex). · Ciprofloxacin (Cipro). What are the possible side effects? Side effects can include:  · Nausea. · Diarrhea. · Skin rash. · Yeast infection. · A severe allergic reaction. It may cause itching, swelling, and breathing problems. This is rare. You may have other side effects or reactions not listed here. Check the information that comes with your medicine. Should you take antibiotics just in case? Don't take antibiotics when you don't need them. If you do that, they may not work when you do need them. Each time you take antibiotics, you are more likely to have some bacteria that survive and aren't killed by the medicine. Bacteria that don't die can change and become even harder to kill. These are called antibiotic-resistant bacteria. They can cause longer and more serious infections. To treat them, you may need different, stronger antibiotics that have more side effects and may cost more. So always ask your doctor if antibiotics are the best treatment. Explain that you do not want antibiotics unless you need them. Help protect the community  Using antibiotics when they're not needed leads to the development of antibiotic-resistant bacteria. These tougher bacteria can spread to family members, children, and coworkers. People in your community will have a risk of getting an infection that is harder to cure and that costs more to treat.   How can you take antibiotics safely? Be safe with medicine. Take your antibiotics as directed. Do not stop taking them just because you feel better. You need to take the full course of medicine. This will help make sure your infection is cured. It will also help prevent the growth of antibiotic-resistant bacteria. Always take the exact amount that the label says to take. If the label says to take the medicine at a certain time, follow those directions. You might feel better after you take an antibiotic for a few days. But it is important to keep taking it for as long as prescribed. That will help you get rid of those bacteria that are a bit stronger and that survive the first few days of treatment. Where can you learn more? Go to https://[employer]. org and sign in to your [employer] account. Enter Q111 in the [place] box to learn more about \"Learning About the Safe Use of Antibiotics. \"     If you do not have an account, please click on the \"Sign Up Now\" link. Current as of: March 3, 2017  Content Version: 11.3  © 2467-7600 [employer]. Care instructions adapted under license by [facility] [place] ([facility] [place]). If you have questions about a medical condition or this instruction, always ask your healthcare professional. [place] [address] any warranty or liability for your use of this information. Antibiotics are powerful drugs that are generally safe and very helpful in fighting disease, but there are times when antibiotics can actually be harmful. Antibiotics can have side effects, including allergic reactions and a potentially deadly diarrhea caused by the bacteria Clostridium difficile (C. diff). Antibiotics can also interfere with the action of other drugs a patient may be taking for another condition. These unintended reactions to antibiotics are called adverse drug events.    When someone takes an antibiotic that they do not need, they are needlessly exposed to the side effects of the drug and do not get any benefit from it. Moreover, taking an antibiotic when it is not needed can lead to the development of antibiotic resistance. When resistance develops, antibiotics may not be able to stop future infections. Every time someone takes an antibiotic they dont need, they increase their risk of developing a resistant infection in the future. Types of Adverse Drug Events Related to Antibiotics  Allergic Reactions  Every year, there are more than 140,000 emergency department visits for reactions to antibiotics. Almost four out of five (79%) emergency department visits for antibiotic-related adverse drug events are due to an allergic reaction. These reactions can range from mild rashes and itching to serious blistering skin reactions swelling of the face and throat, and breathing problems. Minimizing unnecessary antibiotic use is the best way to reduce the risk of adverse drug events from antibiotics. Patients should tell their doctors about any past drug reactions or allergies. C. difficile  C. difficile causes diarrhea linked to at least 14,000 [place] deaths each year. When a person takes antibiotics, good bacteria that protect against infection are destroyed for several months. During this time, patients can get sick from C. difficile picked up from contaminated surfaces or spread from a healthcare providers hands. Those most at risk are people, especially older adults, who take antibiotics and also get medical care. Take antibiotics exactly and only as prescribed. Drug Interactions and Side Effects  Antibiotics can interact with other drugs patients take, making those drugs or the antibiotics less effective. Some drug combinations can worsen the side effects of the antibiotic or other drug. Common side effects of antibiotics include nausea, diarrhea, and stomach pain. Sometimes these symptoms can lead to dehydration and other problems.  Patients should ask their doctors about drug interactions and the potential side effects of antibiotics.  The doctor should be told immediately if a patient has any side effects from antibiotics  Page last updated: February 24, 2017 Content source:   Centers [employer] Disease Control and [employer] for Emerging and Zoonotic Infectious Diseases (Ignacia Jones)  Division of Healthcare Quality Promotion [facility]

## 2021-08-05 NOTE — H&P
[facility]  Office: [address], DO, Jim Platt, DO, Joshua Zamora, DO, Marianne Rasmussen, DO, Eric Medina MD, Jarrod Romano MD, Jimmy Caldera MD, Teetee Walls MD, Latonia Allen MD, Skylar Robert MD, Sunny Zamora MD, Kerline Rodriguez, DO, Franciso Schaumann, MD, Hattie Lee DO, Tal To MD,  Saundra Dorsey DO, Tashia Louise MD, Denman Najjar, MD, Garry Gutiérrez MD, Timmy Delcid MD, Audrey Augustine MD, Anitha Huddleston MD, Vida Rosenthal MD, Maria T [place], [facility], [facility], CNP, Brittany Linares, CNP, Susy Alves, CNS, Herve Bishop, CNP, Fitz Hankins, CNP, Ria Rosa, CNP, Delisa Cox, CNP, Rodriguez Jackson, CNP, Franklyn Berry PA-C, Nam Ferreira, [facility], Tomy Mckenna, CNP, Keena Kaufman, CNP, Nany Leonard, CNP, Claudio Santo, CNP, Candy Bray, CNP, Ramana Nicholson, CNP, Graeme Phipps, CNP, Hero Cronin, [address]    HISTORY AND PHYSICAL EXAMINATION            Date:   8/5/2021  Patient name:  Lyla Holbrook  Date of admission:  8/5/2021  4:22 PM  MRN:   [id number]  Account:  [de-identified]  YOB: 1960  PCP:    BRAULIO Madera CNP  Room:   2001/2001-02  Code Status:    Prior    Chief Complaint:     Shortness of breath, cough    History Obtained From:     patient, family member -daughter, electronic medical record    History of Present Illness:     Lyla Holbrook is a 59 y.o. Non- / non  female who presents with shortness of breath, cough with clear expectoration and failed outpatient antibiotic treatment with doxycycline. She is a known patient of COPD but does not use oxygen at home. She does have sleep apnea but uses CPAP machine at home intermittently. Patient has history of PE and DVT and she is on Coumadin.   Her INR today in PCP office was 1.7 and she was given 3 mg of Coumadin  Her chest x-ray done on 8/2/2021 was unremarkable  Respiratory virus panel was 7/26/21   BRAULIO Serrano CNP   Lancets [facility]. (ACCU-CHEK FASTCLIX LANCET) KIT use as directed PLEASE APPROVED NEW DEVICE WHILE WE WAIT Julissa Mancini [address].. ..  FASTCLIX MIGHT BE EAISER TO MANIPULATE 7/22/21 7/22/22  BRAULIO Serrano CNP   Accu-Chek FastClix Lancets MISC use 1 LANCET to TEST BLOOD SUGAR one to two times a day 7/22/21 7/22/22  BRAULIO Serrano CNP   isosorbide mononitrate (IMDUR) 30 MG extended release tablet Take 1 tablet by mouth daily 7/20/21 1/16/22  BRAULIO Serrano CNP   Continuous Blood Gluc Sensor (FREESTYLE BARB 14 DAY SENSOR) [facility] 1 each by Does not apply route continuous 7/19/21   BRAULIO Serrano CNP   Continuous Blood Gluc  (FREESTYLE BARB 14 DAY READER) LIZ 1 each by Does not apply route continuous 7/19/21   BRAULIO Serrano CNP   Alcohol Swabs (ALCOHOL PREP) 70 % PADS Use twice daily and as needed to check blood sugars 7/19/21 10/20/23  BRAULIO Serrano CNP   dapagliflozin (FARXIGA) 5 MG tablet Take 1 tablet by mouth every morning 7/19/21   BRAULIO Serrano CNP   oxyCODONE-acetaminophen (PERCOCET) 5-325 MG per tablet take 1 tablet by mouth twice a day if needed for pain for up to 30 DAYS 7/13/21 8/12/21  BRAULIO Serrano CNP   warfarin (COUMADIN) 2 MG tablet take 1 tablet by mouth SUNDAY, TUESDAY, THURSDAY, AND SATURDAY ( take 1 AND 1/2 tablets by mouth MONDAY, WEDNESDAY, FRIDAY )  Patient taking differently: take 1 tablet by mouth SUNDAY, TUESDAY, THURSDAY, AND SATURDAY ( take 1 AND 1/2 tablets by mouth MONDAY, WEDNESDAY, Friday) 7/6/21   BRAULIO Serrano CNP   albuterol sulfate  (90 Base) MCG/ACT inhaler Inhale 2 puffs into the lungs every 4 hours as needed for Wheezing or Shortness of Breath (AND/OR COUGH) 5/24/21 5/24/22  BRAULIO Serrano CNP   ONETOUCH ULTRA strip TEST THREE TIMES DAILY 4/12/21 4/12/22  BRAULIO Serrano CNP   tiotropium (SPIRIVA HANDIHALER) 18 MCG inhalation capsule Inhale 1 capsule into the lungs daily 3/12/21 9/8/21  BRAULIO Deleon CNP   allopurinol (ZYLOPRIM) 100 MG tablet Take 1 tablet by mouth daily 3/12/21 9/8/21  BRAULIO Deleon CNP   metFORMIN (GLUCOPHAGE-XR) 500 MG extended release tablet Take 2 tablets by mouth 2 times daily Take 2 tablets by mouth twice a day. 3/12/21 9/8/21  BRAULIO Deleon CNP   furosemide (LASIX) 20 MG tablet Take 1 tablet by mouth daily 3/12/21 9/8/21  BRAULIO Deleon CNP   naloxone 4 MG/0.1ML LIQD nasal spray 1 spray by Nasal route as needed for Opioid Reversal 2/16/21   BRAULIO Deleon CNP   Handicap Placard MISC by Does not apply route Exp 2/3/2026 2/3/21   BRAULIO Deleon CNP   ipratropium-albuterol (DUONEB) 0.5-2.5 (3) MG/3ML SOLN nebulizer solution Inhale 3 mLs into the lungs every 6 hours as needed for Shortness of Breath 1/7/21 1/7/22  BRAULIO Deleon CNP   RA VITAMIN D-3 50 MCG (2000 UT) CAPS Take 1 capsule by mouth daily 12/31/20 12/31/21  BRAULIO Deleon CNP   latanoprost (XALATAN) 0.005 % ophthalmic solution nightly 9/21/20   Historical Provider, MD   Misc. Devices MISC 1 PAIR OF DIABETIC SHOES (1 LEFT/ 1 RIGHT)  1-3 PAIRS OF INSERTS (LEFT/ RIGHT) 10/21/20   Ethel Settler, DPM   Respiratory Therapy Supplies (NEBULIZER/TUBING/MOUTHPIECE) KIT 1 kit by Does not apply route 4 times daily as needed (wheezing) 10/20/20   BRAULIO Deleon CNP. Devices MISC 1 PAIR OF DIABETIC SHOES (1 LEFT/ 1 RIGHT)  1-3 PAIRS OF INSERTS (LEFT/ RIGHT) 3/9/20   Ethel Settler, DPM   Blood Pressure Monitoring KIT Use to check blood pressure daily and as needed 9/9/19 7/19/22  BRAULIO Kay CNP   risperiDONE (RISPERDAL) 2 MG tablet Take 1 mg by mouth nightly  3/26/19 7/19/21  Historical Provider, MD   fluticasone Joaquina Lei) 50 MCG/ACT nasal spray 1 spray by Nasal route daily 11/18/16   BRAULIO Golden CNP   buPROPion (WELLBUTRIN XL) 150 MG XL tablet Take 150 mg by mouth every morning. Historical Provider, MD        Allergies:     Patient has no known allergies. Social History:     Tobacco:    reports that she quit smoking about 38 years ago. Her smoking use included cigarettes. She started smoking about 43 years ago. She has a 1.75 pack-year smoking history. She has never used smokeless tobacco.  Alcohol:      reports no history of alcohol use. Drug Use:  reports no history of drug use. Family History:     Family History   Problem Relation Age of Onset    Hypertension Mother     Liver Disease Mother     Cancer Father         stomach    Diabetes Sister     Cancer Brother         kidney    No Known Problems Maternal Grandmother     No Known Problems Maternal Grandfather     No Known Problems Paternal Grandmother     No Known Problems Paternal Grandfather     Other Brother         AIDS       Review of Systems:     Positive and Negative as described in HPI.     CONSTITUTIONAL:  negative for fevers, chills, sweats, fatigue, weight loss  HEENT:  negative for vision, hearing changes, runny nose, throat pain  RESPIRATORY:  + for shortness of breath, cough, congestion, wheezing  CARDIOVASCULAR:  negative for chest pain, palpitations  GASTROINTESTINAL:  negative for nausea, vomiting, diarrhea, constipation, change in bowel habits, abdominal pain   GENITOURINARY:  negative for difficulty of urination, burning with urination, frequency   INTEGUMENT:  negative for rash, skin lesions, easy bruising   HEMATOLOGIC/LYMPHATIC:  negative for swelling/edema   ALLERGIC/IMMUNOLOGIC:  negative for urticaria , itching  ENDOCRINE:  negative increase in drinking, increase in urination, hot or cold intolerance  MUSCULOSKELETAL:  negative joint pains, muscle aches, swelling of joints  NEUROLOGICAL:  negative for headaches, dizziness, lightheadedness, numbness, pain, tingling extremities  BEHAVIOR/PSYCH:  negative for depression, anxiety    Physical Exam:   BP (!) 153/76   Pulse 94   Temp 98.4 °F (36.9 °C) (Oral)   Resp 19   SpO2 (!) 89%   Temp (24hrs), Av.4 °F (36.9 °C), Min:98.4 °F (36.9 °C), Max:98.4 °F (36.9 °C)    No results for input(s): POCGLU in the last 72 hours. No intake or output data in the 24 hours ending 21 1709    General Appearance:  alert, well appearing, and in mild distress  Mental status: oriented to person, place, and time  Head:  normocephalic, atraumatic  Eye: no icterus, redness, pupils equal and reactive, extraocular eye movements intact, conjunctiva clear  Ear: normal external ear, no discharge, hearing intact  Nose:  no drainage noted  Mouth: mucous membranes moist  Neck: supple, no carotid bruits, thyroid not palpable  Lungs: Prolonged expiratory phase, scattered wheezing  Cardiovascular: normal rate, regular rhythm, no murmur, gallop, rub. Abdomen: Soft, nontender, nondistended, normal bowel sounds, no hepatomegaly or splenomegaly  Neurologic: There are no new focal motor or sensory deficits, normal muscle tone and bulk, no abnormal sensation, normal speech, cranial nerves II through XII grossly intact  Skin: No gross lesions, rashes, bruising or bleeding on exposed skin area  Extremities:  peripheral pulses palpable, no pedal edema or calf pain with palpation  Psych: normal affect     Investigations:      Laboratory Testing:  Recent Results (from the past 24 hour(s))   POCT INR    Collection Time: 21  8:49 AM   Result Value Ref Range    INR 1.7     Protime 20.2        Imaging/Diagnostics:  XR CHEST (2 VW)    Result Date: 2021  No radiologic evidence of acute cardiopulmonary disease.        Assessment :      Hospital Problems         Last Modified POA    * (Principal) COPD exacerbation ([address] 2021 Yes    Acute tracheobronchitis 2021 Yes    Essential hypertension 2021 Yes    Hyperlipidemia (Chronic) 2021 Yes    CKD (chronic kidney disease) stage 3, GFR 30-59 ml/min (HCC) (Chronic) 2021 Yes    Controlled type 2 diabetes mellitus with stage 3 chronic kidney disease, without long-term current use of insulin ([address] (Chronic) 8/5/2021 Yes    History of DVT of lower extremity (Chronic) 8/5/2021 Yes    Controlled type 2 diabetes mellitus with stage 2 chronic kidney disease, without long-term current use of insulin ([address] 8/5/2021 Yes    Multiple lung nodules on CT 8/5/2021 Yes    Anxiety 8/5/2021 Yes    History of pulmonary embolism 8/5/2021 Yes    AAA (abdominal aortic aneurysm) without rupture ([address] 8/5/2021 Yes          Plan:     Patient status inpatient in the Med/Surge    1. Start aerosol treatments  2. IV Levaquin and IV steroids  3. Resume home medicines including Coumadin  4. Check labs today including INR  5. Chest x-ray in morning  6. Replace electrolytes as needed  7. Cardiac and diabetic diet    Consultations:   None     Patient is admitted as inpatient status because of co-morbidities listed above, severity of signs and symptoms as outlined, requirement for current medical therapies and most importantly because of direct risk to patient if care not provided in a hospital setting. Expected length of stay > 48 hours.     Juanito Giordano MD  8/5/2021  5:09 PM    Copy sent to Dr. Fawad Forrest, APRN - CNP

## 2021-08-05 NOTE — PROGRESS NOTES
Pt admitted to room 2001-2 per wheelchair in stable condition as a direct admit. Oriented to room and surroundings  Bed in lowest position, wheels locked, 2/4 side rails up  Call light in reach, room free of clutter, adequate lighting provided  Denies any further questions at this time  Instructed to call out with any questions/concerns/new onset of pain and/or n/v   White board updated  Continue to monitor with hourly rounding  Non-skid socks on/at bedside  [address] in place for patient/family to view & ask questions.

## 2021-08-05 NOTE — RT PROTOCOL NOTE
of breathing using Per Protocol order mode. Repeat RT Therapy Protocol Assessment with second treatment then BID and as needed. If Albuterol Inhaler not tolerated or not effective, then discontinue the Albuterol Inhaler orders and enter two Albuterol Nebulizer orders with same frequencies and PRN reasons. 11-13 - discontinue any other Inpatient aerosolized bronchodilator medication orders and enter DuoNeb Nebulizer orders QID frequency and an Albuterol Nebulizer order every 2 hours PRN wheezing or increased work of breathing using Per Protocol order mode. Repeat RT Therapy Protocol Assessment with second treatment then QID and as needed. Greater than 13 - discontinue any other Inpatient bronchodilator aerosolized medication orders and enter DuoNeb Nebulizer order every 4 hours frequency and Albuterol Nebulizer every 2 hours PRN wheezing or increased work of breathing using Per Protocol order mode. Repeat RT Therapy Protocol Assessment with second treatment then every 4 hours and as needed. RT to enter RT Home Evaluation for COPD & MDI Assessment order using Per Protocol order mode.     Electronically signed by justina lee RCP on 8/5/2021 at 6:09 PM

## 2021-08-05 NOTE — PROGRESS NOTES
Pharmacy Routine Monitoring of Warfarin (INR)  Active order for anticoagulants/antiplatelets: Coumadin 4.2SE daily  Significant drug interactions: quinolones (incr INR)  Goal INR: 2 - 3  Recent Labs     08/05/21  0849   INR 1.7     Date INR Dose  8/5/21 1.7         Given 3mg PTA  Recent warfarin administrations        No warfarin orders with administrations found. Orders not given:            warfarin (COUMADIN) tablet 2.5 mg                  Assessment/Plan:       INR orders are placed. Dosing strategy is reasonable. No obvious intervention needed. Dori Scherer

## 2021-08-06 ENCOUNTER — APPOINTMENT (OUTPATIENT)
Dept: GENERAL RADIOLOGY | Age: 61
DRG: 140 | End: 2021-08-06
Attending: INTERNAL MEDICINE
Payer: MEDICARE

## 2021-08-06 LAB
ANION GAP SERPL CALCULATED.3IONS-SCNC: 15 MMOL/L (ref 9–17)
BUN BLDV-MCNC: 18 MG/DL (ref 8–23)
BUN/CREAT BLD: 18 (ref 9–20)
CALCIUM SERPL-MCNC: 9 MG/DL (ref 8.6–10.4)
CHLORIDE BLD-SCNC: 100 MMOL/L (ref 98–107)
CO2: 22 MMOL/L (ref 20–31)
CREAT SERPL-MCNC: 0.98 MG/DL (ref 0.5–0.9)
EKG ATRIAL RATE: 100 BPM
EKG P AXIS: 55 DEGREES
EKG P-R INTERVAL: 180 MS
EKG Q-T INTERVAL: 372 MS
EKG QRS DURATION: 64 MS
EKG QTC CALCULATION (BAZETT): 479 MS
EKG R AXIS: 37 DEGREES
EKG T AXIS: 62 DEGREES
EKG VENTRICULAR RATE: 100 BPM
ESTIMATED AVERAGE GLUCOSE: 160 MG/DL
GFR AFRICAN AMERICAN: >60 ML/MIN
GFR NON-AFRICAN AMERICAN: 57 ML/MIN
GFR SERPL CREATININE-BSD FRML MDRD: ABNORMAL ML/MIN/{1.73_M2}
GFR SERPL CREATININE-BSD FRML MDRD: ABNORMAL ML/MIN/{1.73_M2}
GLUCOSE BLD-MCNC: 195 MG/DL (ref 65–105)
GLUCOSE BLD-MCNC: 223 MG/DL (ref 65–105)
GLUCOSE BLD-MCNC: 234 MG/DL (ref 65–105)
GLUCOSE BLD-MCNC: 237 MG/DL (ref 70–99)
GLUCOSE BLD-MCNC: 257 MG/DL (ref 65–105)
HBA1C MFR BLD: 7.2 % (ref 4–6)
INR BLD: 1.7
POTASSIUM SERPL-SCNC: 4.3 MMOL/L (ref 3.7–5.3)
PROTHROMBIN TIME: 19.9 SEC (ref 11.5–14.2)
SODIUM BLD-SCNC: 137 MMOL/L (ref 135–144)

## 2021-08-06 PROCEDURE — 36415 COLL VENOUS BLD VENIPUNCTURE: CPT

## 2021-08-06 PROCEDURE — 97167 OT EVAL HIGH COMPLEX 60 MIN: CPT

## 2021-08-06 PROCEDURE — 6370000000 HC RX 637 (ALT 250 FOR IP): Performed by: INTERNAL MEDICINE

## 2021-08-06 PROCEDURE — 94761 N-INVAS EAR/PLS OXIMETRY MLT: CPT

## 2021-08-06 PROCEDURE — 6360000002 HC RX W HCPCS: Performed by: INTERNAL MEDICINE

## 2021-08-06 PROCEDURE — 82947 ASSAY GLUCOSE BLOOD QUANT: CPT

## 2021-08-06 PROCEDURE — 99232 SBSQ HOSP IP/OBS MODERATE 35: CPT | Performed by: INTERNAL MEDICINE

## 2021-08-06 PROCEDURE — 94640 AIRWAY INHALATION TREATMENT: CPT

## 2021-08-06 PROCEDURE — 71045 X-RAY EXAM CHEST 1 VIEW: CPT

## 2021-08-06 PROCEDURE — 94760 N-INVAS EAR/PLS OXIMETRY 1: CPT

## 2021-08-06 PROCEDURE — 2580000003 HC RX 258: Performed by: INTERNAL MEDICINE

## 2021-08-06 PROCEDURE — 85610 PROTHROMBIN TIME: CPT

## 2021-08-06 PROCEDURE — 93005 ELECTROCARDIOGRAM TRACING: CPT | Performed by: INTERNAL MEDICINE

## 2021-08-06 PROCEDURE — 97535 SELF CARE MNGMENT TRAINING: CPT

## 2021-08-06 PROCEDURE — 97162 PT EVAL MOD COMPLEX 30 MIN: CPT

## 2021-08-06 PROCEDURE — 80048 BASIC METABOLIC PNL TOTAL CA: CPT

## 2021-08-06 PROCEDURE — 2700000000 HC OXYGEN THERAPY PER DAY

## 2021-08-06 PROCEDURE — 1200000000 HC SEMI PRIVATE

## 2021-08-06 PROCEDURE — 97116 GAIT TRAINING THERAPY: CPT

## 2021-08-06 RX ORDER — WARFARIN SODIUM 2 MG/1
4 TABLET ORAL
Status: COMPLETED | OUTPATIENT
Start: 2021-08-06 | End: 2021-08-06

## 2021-08-06 RX ORDER — LEVOFLOXACIN 5 MG/ML
500 INJECTION, SOLUTION INTRAVENOUS EVERY 24 HOURS
Status: DISCONTINUED | OUTPATIENT
Start: 2021-08-07 | End: 2021-08-07

## 2021-08-06 RX ADMIN — INSULIN LISPRO 6 UNITS: 100 INJECTION, SOLUTION INTRAVENOUS; SUBCUTANEOUS at 09:18

## 2021-08-06 RX ADMIN — METHYLPREDNISOLONE SODIUM SUCCINATE 40 MG: 40 INJECTION, POWDER, FOR SOLUTION INTRAMUSCULAR; INTRAVENOUS at 06:19

## 2021-08-06 RX ADMIN — INSULIN LISPRO 6 UNITS: 100 INJECTION, SOLUTION INTRAVENOUS; SUBCUTANEOUS at 12:51

## 2021-08-06 RX ADMIN — ALBUTEROL SULFATE 2.5 MG: 2.5 SOLUTION RESPIRATORY (INHALATION) at 07:03

## 2021-08-06 RX ADMIN — ATORVASTATIN CALCIUM 40 MG: 40 TABLET, FILM COATED ORAL at 20:16

## 2021-08-06 RX ADMIN — INSULIN LISPRO 3 UNITS: 100 INJECTION, SOLUTION INTRAVENOUS; SUBCUTANEOUS at 17:56

## 2021-08-06 RX ADMIN — METHYLPREDNISOLONE SODIUM SUCCINATE 40 MG: 40 INJECTION, POWDER, FOR SOLUTION INTRAMUSCULAR; INTRAVENOUS at 12:51

## 2021-08-06 RX ADMIN — SODIUM CHLORIDE: 9 INJECTION, SOLUTION INTRAVENOUS at 17:56

## 2021-08-06 RX ADMIN — HYDRALAZINE HYDROCHLORIDE 50 MG: 50 TABLET, FILM COATED ORAL at 20:16

## 2021-08-06 RX ADMIN — ISOSORBIDE MONONITRATE 30 MG: 30 TABLET, EXTENDED RELEASE ORAL at 09:17

## 2021-08-06 RX ADMIN — ALLOPURINOL 100 MG: 100 TABLET ORAL at 09:16

## 2021-08-06 RX ADMIN — METHYLPREDNISOLONE SODIUM SUCCINATE 40 MG: 40 INJECTION, POWDER, FOR SOLUTION INTRAMUSCULAR; INTRAVENOUS at 23:49

## 2021-08-06 RX ADMIN — OXYCODONE AND ACETAMINOPHEN 1 TABLET: 5; 325 TABLET ORAL at 20:16

## 2021-08-06 RX ADMIN — LISINOPRIL 20 MG: 20 TABLET ORAL at 09:17

## 2021-08-06 RX ADMIN — METHYLPREDNISOLONE SODIUM SUCCINATE 40 MG: 40 INJECTION, POWDER, FOR SOLUTION INTRAMUSCULAR; INTRAVENOUS at 17:56

## 2021-08-06 RX ADMIN — ALOGLIPTIN 25 MG: 25 TABLET, FILM COATED ORAL at 09:17

## 2021-08-06 RX ADMIN — ALBUTEROL SULFATE 2.5 MG: 2.5 SOLUTION RESPIRATORY (INHALATION) at 11:20

## 2021-08-06 RX ADMIN — BUPROPION HYDROCHLORIDE 150 MG: 150 TABLET, EXTENDED RELEASE ORAL at 09:17

## 2021-08-06 RX ADMIN — LEVOFLOXACIN 500 MG: 5 INJECTION, SOLUTION INTRAVENOUS at 17:56

## 2021-08-06 RX ADMIN — METFORMIN HYDROCHLORIDE 1000 MG: 500 TABLET, EXTENDED RELEASE ORAL at 20:16

## 2021-08-06 RX ADMIN — ALBUTEROL SULFATE 2.5 MG: 2.5 SOLUTION RESPIRATORY (INHALATION) at 19:50

## 2021-08-06 RX ADMIN — HYDRALAZINE HYDROCHLORIDE 50 MG: 50 TABLET, FILM COATED ORAL at 09:17

## 2021-08-06 RX ADMIN — ALBUTEROL SULFATE 2.5 MG: 2.5 SOLUTION RESPIRATORY (INHALATION) at 15:05

## 2021-08-06 RX ADMIN — SODIUM CHLORIDE: 9 INJECTION, SOLUTION INTRAVENOUS at 07:56

## 2021-08-06 RX ADMIN — WARFARIN SODIUM 4 MG: 2 TABLET ORAL at 17:56

## 2021-08-06 RX ADMIN — PANTOPRAZOLE SODIUM 40 MG: 40 TABLET, DELAYED RELEASE ORAL at 06:20

## 2021-08-06 RX ADMIN — TIOTROPIUM BROMIDE INHALATION SPRAY 2 PUFF: 3.12 SPRAY, METERED RESPIRATORY (INHALATION) at 07:03

## 2021-08-06 RX ADMIN — Medication 2000 UNITS: at 09:16

## 2021-08-06 RX ADMIN — INSULIN LISPRO 5 UNITS: 100 INJECTION, SOLUTION INTRAVENOUS; SUBCUTANEOUS at 20:16

## 2021-08-06 RX ADMIN — FUROSEMIDE 20 MG: 20 TABLET ORAL at 09:17

## 2021-08-06 RX ADMIN — METFORMIN HYDROCHLORIDE 1000 MG: 500 TABLET, EXTENDED RELEASE ORAL at 09:17

## 2021-08-06 ASSESSMENT — PAIN SCALES - GENERAL
PAINLEVEL_OUTOF10: 3
PAINLEVEL_OUTOF10: 0
PAINLEVEL_OUTOF10: 0
PAINLEVEL_OUTOF10: 5
PAINLEVEL_OUTOF10: 0
PAINLEVEL_OUTOF10: 0

## 2021-08-06 ASSESSMENT — PAIN DESCRIPTION - DESCRIPTORS: DESCRIPTORS: ACHING

## 2021-08-06 ASSESSMENT — PAIN DESCRIPTION - FREQUENCY: FREQUENCY: CONTINUOUS

## 2021-08-06 ASSESSMENT — PAIN DESCRIPTION - LOCATION: LOCATION: BACK

## 2021-08-06 ASSESSMENT — PAIN DESCRIPTION - PAIN TYPE: TYPE: CHRONIC PAIN

## 2021-08-06 ASSESSMENT — PAIN DESCRIPTION - ONSET: ONSET: ON-GOING

## 2021-08-06 ASSESSMENT — PAIN - FUNCTIONAL ASSESSMENT: PAIN_FUNCTIONAL_ASSESSMENT: ACTIVITIES ARE NOT PREVENTED

## 2021-08-06 ASSESSMENT — PAIN DESCRIPTION - PROGRESSION: CLINICAL_PROGRESSION: NOT CHANGED

## 2021-08-06 NOTE — CARE COORDINATION
Case Management Initial Discharge Plan  Dariana Austin,         Readmission Risk              Risk of Unplanned Readmission:  20             Met with:patient to discuss discharge plans. Information verified: address, contacts, phone number, , insurance Yes  PCP: BRAULIO Horowitz CNP  Date of last visit: 21 and  for INR check     Insurance Provider: [employer]     Discharge Planning  Current Residence:  1 story   Living Arrangements:  Layne Barkley has 1 stories/3 stairs to climb  Support Systems:  Children  Current Services PTA:  Na  Agency: na   Patient able to perform ADL's:Independent  DME in home:  CPAP([facility]), nebulizer with meds, wc, walker, shower chair, glucometer  DME used to aid ambulation prior to admission:   Walker prn   DME used during admission:  tbd     Potential Assistance Needed:  N/A    Pharmacy: [facility]    Potential Assistance Purchasing Medications:  No  Does patient want to participate in local refill/ meds to beds program?  Yes    Patient agreeable to home care: No  [place] of choice provided:  n/a      Type of Home Care Services:  None  Patient expects to be discharged to:       Prior SNF/Rehab Placement and Facility: NO   Agreeable to SNF/Rehab: No  [place] of choice provided: n/a   Evaluation: n/a    Expected Discharge date:  21  Follow Up Appointment: Best Day/ Time: Monday AM    Transportation provider: family   Transportation arrangements needed for discharge: No    Discharge Plan: Pt is from home with family, independent, drives. DME-CPAP([facility]), nebulizer with meds, wc, walker, shower chair, glucometer. Watch for 02.  Declines vns. [facility] (coumadin hx dvt, pe)    Has pulmonologist Dr. Amarilys Cazares last saw about 1 yr ago, watch for 02 currently on 2l if qualifies for -ok to use [facility]    Steroids IV  Levaquin  IVF  + Parainfluenza, coronavirus [id number]        Electronically signed by Danette Quesada RN on 8/6/21 at 12:12 PM EDT

## 2021-08-06 NOTE — PLAN OF CARE
BRONCHOSPASM/BRONCHOCONSTRICTION    IMPROVE  AERATION/BREATHSOUNDS  ADMINISTER BRONCHODILATOR THERAPY AS APPROPRIATE  ASSESS BREATH SOUNDS  INITIATE AEROSOL PROTOCOL IF ORDERED TO DO SO  PATIENT EDUCATION AS NEEDED       NONINVASIVE VENTILATION    PROVIDE OPTIMAL VENTILATION/ACCEPTABLE SPO2   IMPLEMENT NONINVASIVE VENTILATION PROTOCOL   MAINTAIN ACCEPTABLE SPO2   ASSESS SKIN INTEGRITY/BREAKDOWN SCORE   PATIENT EDUCATION AS NEEDED   BIPAP AS NEEDED

## 2021-08-06 NOTE — PLAN OF CARE
Problem: Falls - Risk of:  Goal: Will remain free from falls  Description: Will remain free from falls  Outcome: Ongoing  Note: Patient is a fall risk during this admission. Fall risk assessment was performed. Patient is absent of falls. Bed is in the lowest position. Wheels on the bed are locked. Call light and bed side table are within reach. Clutter is removed. Patient was educated to call out when needing assistance or wanting to get out of bed. Patient offered toileting assistance during rounding. Hourly rounds have been performed. Problem: Activity Intolerance:  Goal: Ability to tolerate increased activity will improve  Description: Ability to tolerate increased activity will improve  Outcome: Ongoing     Problem: Airway Clearance - Ineffective:  Goal: Ability to maintain a clear airway will improve  Description: Ability to maintain a clear airway will improve  Outcome: Ongoing  Note: Lung sounds remain rhonchi t/o all fields. Respiratory treatments, IV antibiotics, and IV steroids continued as directed. Supplemental oxygen at 2 liters via NC also continued. Problem: Breathing Pattern - Ineffective:  Goal: Ability to achieve and maintain a regular respiratory rate will improve  Description: Ability to achieve and maintain a regular respiratory rate will improve  Outcome: Ongoing     Problem: Gas Exchange - Impaired:  Goal: Levels of oxygenation will improve  Description: Levels of oxygenation will improve  Outcome: Ongoing     Problem: Serum Glucose Level - Abnormal:  Goal: Ability to maintain appropriate glucose levels will improve  Description: Ability to maintain appropriate glucose levels will improve  Outcome: Ongoing  Note: Patient's blood sugars continue to be checked before meals and before bed. Sliding scale insulin available for blood sugars 140 or greater. Physician updated as needed.

## 2021-08-06 NOTE — ACP (ADVANCE CARE PLANNING)
Advance Care Planning     Advance Care Planning Activator (Inpatient)  Conversation Note      Date of ACP Conversation: 8/6/2021     Conversation Conducted with: Patient with Decision Making Capacity    ACP Activator: Rj Johnson RN        Health Care Decision Maker: self    Current Designated Health Care Decision Maker: self /3 adult children     Click here to complete [place] including section of the [place] Relationship (ie \"Primary\")  Today we documented Decision Maker(s) consistent with Legal Next of Kin hierarchy. Care Preferences    Ventilation: \"If you were in your present state of health and suddenly became very ill and were unable to breathe on your own, what would your preference be about the use of a ventilator (breathing machine) if it were available to you? \"      Would the patient desire the use of ventilator (breathing machine)?: yes    \"If your health worsens and it becomes clear that your chance of recovery is unlikely, what would your preference be about the use of a ventilator (breathing machine) if it were available to you? \"     Would the patient desire the use of ventilator (breathing machine)?: No      Resuscitation  \"CPR works best to restart the heart when there is a sudden event, like a heart attack, in someone who is otherwise healthy. Unfortunately, CPR does not typically restart the heart for people who have serious health conditions or who are very sick. \"    \"In the event your heart stopped as a result of an underlying serious health condition, would you want attempts to be made to restart your heart (answer \"yes\" for attempt to resuscitate) or would you prefer a natural death (answer \"no\" for do not attempt to resuscitate)? \" no       [] Yes   [] No   Educated Patient / Brennan Ruffin regarding differences between Advance Directives and portable DNR orders.     Length of ACP Conversation in minutes:  10    Conversation Outcomes:  [x] ACP discussion completed  [] Existing advance directive reviewed with patient; no changes to patient's previously recorded wishes  [] New Advance Directive completed  [] Portable Do Not Rescitate prepared for Provider review and signature  [] POLST/POST/MOLST/MOST prepared for Provider review and signature      Follow-up plan:    [] Schedule follow-up conversation to continue planning  [x] Referred individual to Provider for additional questions/concerns   [] Advised patient/agent/surrogate to review completed ACP document and update if needed with changes in condition, patient preferences or care setting    [] This note routed to one or more involved healthcare providers

## 2021-08-06 NOTE — PROGRESS NOTES
Transitions of Care Pharmacy Service   Medication Review    The patient's list of current home medications has been reviewed. Source(s) of information: Patient/ Surescripts    Based on information provided by the above source(s), I have updated the patient's home med list as described below. Please review the ACTION REQUESTED section of this note below for any discrepancies on current hospital orders. I changed or updated the following medications on the patient's home medication list:  Removed Latanoprost - DC'd by physician  Risperdal 1mg nightly - Dc'd by physician  Flonase nasal spray - pt choice     Added none     Adjusted   none   Other Notes Pt coumadin is managed by her PCP in [place]. PROVIDER ACTION REQUESTED  Medications that need to be addressed by a physician/nurse practitioner:    Medication Action Requested   Latanoprost  Risperdal  Flonase     Please DC as not current home meds. Please feel free to call me with any questions about this encounter. Thank you. Markie Vega, [facility]   Transitions of Care Pharmacy Service  Phone:  [phone]  Fax: [phone]      Electronically signed by Markie Vega, [facility] on 8/6/2021 at 10:59 AM         Medications Prior to Admission: hydrALAZINE (APRESOLINE) 50 MG tablet, take 1 tablet by mouth twice a day  doxycycline hyclate (VIBRA-TABS) 100 MG tablet, Take 1 tablet by mouth 2 times daily for 10 days  omeprazole (PRILOSEC) 20 MG delayed release capsule, Take 1 capsule by mouth Daily  atorvastatin (LIPITOR) 40 MG tablet, Take 1 tablet by mouth daily  SITagliptin (JANUVIA) 100 MG tablet, Take 1 tablet by mouth daily  lisinopril (PRINIVIL;ZESTRIL) 20 MG tablet, Take 1 tablet by mouth daily  Lancets Misc. (ACCU-CHEK FASTCLIX LANCET) KIT, use as directed PLEASE APPROVED NEW DEVICE WHILE WE WAIT FOR PRIOR AUTH. ..  FASTCLIX MIGHT BE EAISER TO MANIPULATE  Accu-Chek FastClix Lancets MISC, use 1 LANCET to TEST BLOOD SUGAR one to two times a day  isosorbide mononitrate (IMDUR) 30 MG extended release tablet, Take 1 tablet by mouth daily  Continuous Blood Gluc Sensor (FREESTYLE BARB 14 DAY SENSOR) MISC, 1 each by Does not apply route continuous  Continuous Blood Gluc  (FREESTYLE BARB 14 DAY READER) LIZ, 1 each by Does not apply route continuous  Alcohol Swabs (ALCOHOL PREP) 70 % PADS, Use twice daily and as needed to check blood sugars  dapagliflozin (FARXIGA) 5 MG tablet, Take 1 tablet by mouth every morning  oxyCODONE-acetaminophen (PERCOCET) 5-325 MG per tablet, take 1 tablet by mouth twice a day if needed for pain for up to 30 DAYS  warfarin (COUMADIN) 2 MG tablet, take 1 tablet by mouth SUNDAY, TUESDAY, THURSDAY, AND SATURDAY ( take 1 AND 1/2 tablets by mouth MONDAY, WEDNESDAY, FRIDAY ) (Patient taking differently: take 1 tablet by mouth SUNDAY, TUESDAY, THURSDAY, AND SATURDAY ( take 1 AND 1/2 tablets by mouth MONDAY, WEDNESDAY, Friday))  albuterol sulfate  (90 Base) MCG/ACT inhaler, Inhale 2 puffs into the lungs every 4 hours as needed for Wheezing or Shortness of Breath (AND/OR COUGH)  ONETOUCH ULTRA strip, TEST THREE TIMES DAILY  tiotropium (SPIRIVA HANDIHALER) 18 MCG inhalation capsule, Inhale 1 capsule into the lungs daily  allopurinol (ZYLOPRIM) 100 MG tablet, Take 1 tablet by mouth daily  metFORMIN (GLUCOPHAGE-XR) 500 MG extended release tablet, Take 2 tablets by mouth 2 times daily Take 2 tablets by mouth twice a day. furosemide (LASIX) 20 MG tablet, Take 1 tablet by mouth daily  naloxone 4 MG/0.1ML LIQD nasal spray, 1 spray by Nasal route as needed for Opioid Reversal  Handicap Placard MISC, by Does not apply route Exp 2/3/2026  ipratropium-albuterol (DUONEB) 0.5-2.5 (3) MG/3ML SOLN nebulizer solution, Inhale 3 mLs into the lungs every 6 hours as needed for Shortness of Breath  RA VITAMIN D-3 50 MCG (2000 UT) CAPS, Take 1 capsule by mouth daily  Misc.  Devices MISC, 1 PAIR OF DIABETIC SHOES (1 LEFT/ 1 RIGHT) 1-3 PAIRS OF INSERTS (LEFT/ RIGHT)  Respiratory Therapy Supplies (NEBULIZER/TUBING/MOUTHPIECE) KIT, 1 kit by Does not apply route 4 times daily as needed (wheezing)  Misc. Devices MISC, 1 PAIR OF DIABETIC SHOES (1 LEFT/ 1 RIGHT) 1-3 PAIRS OF INSERTS (LEFT/ RIGHT)  Blood Pressure Monitoring KIT, Use to check blood pressure daily and as needed  buPROPion (WELLBUTRIN XL) 150 MG XL tablet, Take 150 mg by mouth every morning.

## 2021-08-06 NOTE — PROGRESS NOTES
Occupational Therapy   Occupational Therapy Initial Assessment  Date: 2021   Patient Name: Dariana Austin  MRN: [id number]     : 1960    Date of Service: 2021    Discharge Recommendations:  Patient would benefit from continued therapy after discharge   Due to recent hospitalization and medical condition, pt would benefit from additional therapy at time of discharge to ensure safety. Please refer to the AM-PAC score for current functional status. RN reports patient is medically stable for therapy treatment this date. Chart reviewed prior to treatment and patient is agreeable for therapy. All lines intact and patient positioned comfortably at end of treatment. All patient needs addressed prior to ending therapy session. Per h&p   Dariana Austin is a 59 y.o. Non- / non  female who presents with shortness of breath, cough with clear expectoration and failed outpatient antibiotic treatment with doxycycline. She is a known patient of COPD but does not use oxygen at home. She does have sleep apnea but uses CPAP machine at home intermittently. Assessment   Performance deficits / Impairments: Decreased functional mobility ; Decreased safe awareness;Decreased balance;Decreased ADL status; Decreased ROM; Decreased strength;Decreased endurance;Decreased posture;Decreased sensation;Decreased high-level IADLs  Assessment: Skilled OT is indicated to improve overall safety awareness as well as endurance, balance, functional mob, and ADL status to improve functional outcomes and I when return home is approp  Prognosis: Fair  Decision Making: Medium Complexity  OT Education: OT Role;Plan of Care;ADL Adaptive Strategies;Transfer Training;Energy Conservation;IADL Safety; Family Education;Equipment  Patient Education: pt provided ed on benefits of pacing in function, EC/WS tech, pursed lip breathing tech, fall prevention tech, and benefits of equip rec  REQUIRES OT FOLLOW UP: Yes  Activity Tolerance  Activity Tolerance: Patient Tolerated treatment well;Patient limited by fatigue  Safety Devices  Safety Devices in place: Yes  Type of devices: Gait belt;Left in chair;Nurse notified;Call light within reach           Patient Diagnosis(es): There were no encounter diagnoses. has a past medical history of Anxiety, Chronic kidney disease, COPD (chronic obstructive pulmonary disease) ([facility] [address] Depression, Fracture of ankle, Hyperlipidemia, Hypertension, Obesity, Osteoarthritis, Proteinuria, Pulmonary embolism ([facility] [address] Sleep apnea, Type 2 diabetes mellitus without complication ([facility] [address] Type II or unspecified type diabetes mellitus without mention of complication, not stated as uncontrolled, and Von Willebrand disease ([facility] [address] has a past surgical history that includes Tonsillectomy and adenoidectomy; eye surgery (Bilateral); eye surgery (Bilateral); Appendectomy;  section; Colonoscopy (2018); Colonoscopy (N/A, 2018); and Diagnostic Cardiac Cath Lab Procedure.            Restrictions  Restrictions/Precautions  Restrictions/Precautions: Weight Bearing, General Precautions, Up as Tolerated  Position Activity Restriction  Other position/activity restrictions: O2, R UE IV    Subjective   General  Chart Reviewed: Yes  Patient assessed for rehabilitation services?: Yes  Family / Caregiver Present: Yes (daughter)     Social/Functional History  Social/Functional History  Lives With: Daughter (grandkids)  Type of Home: House  Home Layout: One level  Home Access: Stairs to enter with rails  Entrance Stairs - Number of Steps: 3  Entrance Stairs - Rails: None (having one installed)  Bathroom Shower/Tub: Walk-in shower  Bathroom Toilet: Standard  Bathroom Equipment: Shower chair, Grab bars in [facility]: [place], 4 wheeled walker, Rolling walker  ADL Assistance: [facility]: Independent  Homemaking Responsibilities: No (family does [de-identified])  Ambulation Assistance: Independent (no device)  Transfer Assistance: Independent  Active : Yes  Leisure & Hobbies: music  Additional Comments: fallen 4 times in 6 months, loses balance, fell up the steps x1, tripped over dogs x2       Objective        Orientation  Overall Orientation Status: Within Normal Limits  Observation/Palpation  Posture: Fair  Observation: sitting in recliner, O2, R UE IV  Balance  Sitting Balance: Independent  Standing Balance: Minimal assistance  Standing Balance  Time: ~2 min  Activity: for functional mob    Functional Mobility  Functional - Mobility Device: Rolling Walker  Activity: Other  Assist Level: Minimal assistance  Functional Mobility Comments: pt took several steps toward bathroom door and back to chair. Min A required for support and balance, pt had slight LOB to left side after turning with RW. pt completed task with increased time and noticable SOB throughout. ADL  Grooming: Minimal assistance  UE Bathing: Minimal assistance  LE Bathing: Moderate assistance  UE Dressing: Minimal assistance  LE Dressing: Moderate assistance (pt I doff/nuria socks with inc time)  Toileting: Moderate assistance  Additional Comments: pt limited by dec strength, endurance, balance, activity tolerance and ROM     Tone RUE  RUE Tone: Normotonic  Tone LUE  LUE Tone: Normotonic  Coordination  Movements Are Fluid And Coordinated: Yes     Bed mobility  Comment: N/T patient sitting up in recliner     Transfers  Sit to stand: Minimal assistance  Stand to sit: Minimal assistance  Transfer Comments: pt required min verbal/tactile cues for handplacement, RW safety/mgt, pursed lip breathing tech and controlled stand>sit     Cognition  Overall Cognitive Status: Exceptions  Arousal/Alertness: Appropriate responses to stimuli  Following Commands:  Follows all commands without difficulty  Attention Span: Appears intact  Memory: Appears intact  Safety Judgement: Decreased awareness of need for assistance  Problem Solving: Assistance required to implement solutions  Insights: Decreased awareness of deficits  Initiation: Requires cues for some  Sequencing: Requires cues for all  Perception  Overall Perceptual Status: WFL     Sensation  Overall Sensation Status: Impaired (B feet neuropathy)        LUE AROM (degrees)  LUE AROM : WFL  RUE PROM (degrees)  RUE PROM: WFL  LUE Strength  Gross LUE Strength: WFL  RUE Strength  Gross RUE Strength: WFL  RUE Strength Comment: B UE grossly tested 4/5                   Plan   Plan  Times per week: 4-5x/week, 1-2x/day  Current Treatment Recommendations: Strengthening, ROM, Balance Training, Functional Mobility Training, Endurance Training, Equipment Evaluation, Education, & procurement, Patient/Caregiver Education & Training, Self-Care / ADL, Positioning, Home Management Training                                   AM-PAC Score        AM-PAC Inpatient Daily Activity Raw Score: 17 (08/06/21 1348)  AM-PAC Inpatient ADL T-Scale Score : 37.26 (08/06/21 1348)  ADL Inpatient CMS 0-100% Score: 50.11 (08/06/21 1348)  ADL Inpatient CMS G-Code Modifier : CK (08/06/21 1348)    Goals  Short term goals  Time Frame for Short term goals: By discharge, pt will  Short term goal 1: demo SUP for ADL transfers using good tech and AD/DME as needed  Short term goal 2: demo SUP functional mob short distances with approp AD and good pacing  Short term goal 3: demo SUP with LB and mod I/I with UB ADLs using approp AD/AE/DME as needed  Short term goal 4: demo SUP with toilet routine  Short term goal 5: demo and verb good understanding of EC/WS tech, pursed lip breathing vanda, fall prevention tech, UE HEP, equip rec and d/c rec  Patient Goals   Patient goals :  To go home       Therapy Time   Individual Concurrent Group Co-treatment   Time In 1017         Time Out 1040 (+10 min chart review)         Minutes 33               Treatment min: [address]

## 2021-08-06 NOTE — PROGRESS NOTES
Physical Therapy    Facility/Department: [facility]  Initial Assessment    NAME: Aydin Casey  : 1960  MRN: [id number]    Date of Service: 2021    PER HPI: Aydin Casey is a 59 y.o. Non- / non  female who presents with shortness of breath, cough with clear expectoration and failed outpatient antibiotic treatment with doxycycline. She is a known patient of COPD but does not use oxygen at home. She does have sleep apnea but uses CPAP machine at home intermittently. Patient has history of PE and DVT and she is on Coumadin. Her INR today in PCP office was 1.7 and she was given 3 mg of Coumadin  Her chest x-ray done on 2021 was unremarkable  Respiratory virus panel was positive for Coronavirus OC43 PCR and Parainfluenza 3 PCR  Discharge Recommendations:  Patient would benefit from continued therapy after discharge        Assessment   Body structures, Functions, Activity limitations: Decreased functional mobility ; Decreased safe awareness;Decreased balance;Decreased endurance;Decreased strength  Assessment: Skilled therapy needed to maximize independence with functional mobility, improve strength, endurance and balance. Recommend further therapy after discharge. Prognosis: Good  Decision Making: Medium Complexity  Exam: AROM, strength, endurance, balance, AM-PAC, mobility  PT Education: Goals;PT Role;Plan of Care;Transfer Training;Gait Training;General Safety; Energy Conservation  REQUIRES PT FOLLOW UP: Yes  Activity Tolerance  Activity Tolerance: Patient limited by endurance; Patient limited by fatigue       Patient Diagnosis(es): There were no encounter diagnoses.      has a past medical history of Anxiety, Chronic kidney disease, COPD (chronic obstructive pulmonary disease) ([address] Depression, Fracture of ankle, Hyperlipidemia, Hypertension, Obesity, Osteoarthritis, Proteinuria, Pulmonary embolism ([address] Sleep apnea, Type 2 diabetes mellitus without complication ([address] Type II or unspecified type diabetes mellitus without mention of complication, not stated as uncontrolled, and Von Willebrand disease ([facility] [address] has a past surgical history that includes Tonsillectomy and adenoidectomy; eye surgery (Bilateral); eye surgery (Bilateral); Appendectomy;  section; Colonoscopy (2018); Colonoscopy (N/A, 2018); and Diagnostic Cardiac Cath Lab Procedure.     Restrictions  Restrictions/Precautions  Restrictions/Precautions: Weight Bearing, General Precautions, Up as Tolerated  Position Activity Restriction  Other position/activity restrictions: O2, R UE IV  Vision/Hearing  Vision: Impaired  Vision Exceptions: Wears glasses at all times  Hearing: Exceptions to [facility]  Hearing Exceptions: Bilateral hearing aid     Subjective  General  Chart Reviewed: Yes  Patient assessed for rehabilitation services?: Yes  Family / Caregiver Present: Yes (daughter present)  General Comment  Comments: RN states patient appropriate for therapy  Subjective  Subjective: patient pleasant and agreeable to work with therapy  Pain Screening  Patient Currently in Pain: Denies          Orientation  Orientation  Overall Orientation Status: Within Functional Limits  Social/Functional History  Social/Functional History  Lives With: Daughter (grandkids)  Type of Home: House  Home Layout: One level  Home Access: Stairs to enter with rails  Entrance Stairs - Number of Steps: 3  Entrance Stairs - Rails: None (having one installed)  Bathroom Shower/Tub: Walk-in shower  Bathroom Toilet: Standard  Bathroom Equipment: Shower chair, Grab bars in shower  Home Equipment: [employer], 4 wheeled walker, Rolling walker  ADL Assistance: [facility] [facility] [address]: Independent  Homemaking Responsibilities: No (family does majority)  Ambulation Assistance: Independent (no device)  Transfer Assistance: Independent  Active : Yes  Leisure & Hobbies: music  Additional Comments: fallen 4 times in 6 months, loses balance, fell up the steps x1, tripped over dogs x2  Cognition   Cognition  Overall Cognitive Status: Exceptions  Arousal/Alertness: Appropriate responses to stimuli  Following Commands:  Follows all commands without difficulty  Attention Span: Appears intact  Memory: Appears intact  Safety Judgement: Decreased awareness of need for assistance  Problem Solving: Assistance required to implement solutions  Insights: Decreased awareness of deficits  Initiation: Requires cues for some  Sequencing: Requires cues for all    Objective     Observation/Palpation  Observation: sitting in recliner, O2, R UE IV    AROM RLE (degrees)  RLE AROM: WFL  AROM LLE (degrees)  LLE AROM : WFL  AROM RUE (degrees)  RUE General AROM: refer to OT eval  AROM LUE (degrees)  LUE General AROM: refer to OT eval  Strength RLE  Comment: 4/5  Strength LLE  Comment: 4/5  Strength RUE  Comment: refer to OT eval  Strength LUE  Comment: refer to OT eval  Motor Control  Gross Motor?: WFL  Sensation  Overall Sensation Status: Impaired (B feet neuropathy)  Bed mobility  Comment: patient sitting up in recliner  Transfers  Sit to Stand: Minimal Assistance  Stand to sit: Minimal Assistance  Comment: cues for correct hand placement  Ambulation  Ambulation?: Yes  More Ambulation?: No  Ambulation 1  Surface: level tile  Device: Rolling Walker  Other Apparatus: O2  Assistance: Minimal assistance  Gait Deviations: Slow Ania  Distance: 30'  Comments: distance limited by endurance, SOB throughout ambulation     Balance  Sitting - Static: Good  Sitting - Dynamic: Good  Standing - Static: Fair;+  Standing - Dynamic: [address]  Times per week: 1-2x/day for 5-6 days/week  Current Treatment Recommendations: Strengthening, Transfer Training, Endurance Training, Patient/Caregiver Education & Training, Balance Training, Gait Training, Home Exercise Program, Safety Education & Training  Safety Devices  Type of devices: Left in chair, Call light within reach, Gait belt      -PAC Score  AM-[facility] Inpatient Mobility Raw Score : 17 (08/06/21 1249)  AM-PAC Inpatient T-Scale Score : 42.13 (08/06/21 1249)  Mobility Inpatient CMS 0-100% Score: 50.57 (08/06/21 1249)  Mobility Inpatient CMS G-Code Modifier : CK (08/06/21 1249)          Goals  Short term goals  Time Frame for Short term goals: 10 visits  Short term goal 1: Independent bed mobility  Short term goal 2:  Independent sit<>stand  Short term goal 3: Patient to ambulate [de-identified]' with roll walker modified independent  Short term goal 4: Pt to tolerate 25 minutes ther act to facilitate improving endurance and strength  Patient Goals   Patient goals : to go home       Therapy Time   Individual Concurrent Group Co-treatment   Time In 1019         Time Out 1041 (additional 10 minutes for chart review)         Minutes 22+10=32             Treatment Time: 15 minutes    Gaby Benitez, PT

## 2021-08-06 NOTE — PROGRESS NOTES
[facility]  Office: [address], DO, Marcela Wilkinson, DO, Kate Stout, DO, Amarilis Ojeda Blood, DO, Syd Manuel MD, Cm Mares MD, Nasra Gillis MD, Juan Jose Cavazos MD, Daxa Nichols MD, Dontae Zavaleta MD, Shayne Ellington MD, Eddie Rubio, DO, Jer Messina MD, Roro Carmona DO, Kee Graf MD,  Zabrina Branham, DO, Aleyda Rendon MD, Daquan Jha MD, Gretchen Salinas MD, Domingo Ya MD, Oleg Phillips MD, Chante Mendoza MD, Shanita Rouse MD, Damion Conway, [facility], [facility], [facility], Vicente Lamarippo, CNP, Leida Gaitan, CNS, Taco Smith, CNP, Betty Doyle, CNP, Alfreda Ibarra, CNP, Tomeka Del Castillo, CNP, Kyle Colin, CNP, Darin Teixeira PA-C, Yesika Kaur, [facility], Nevaeh Garrett, CNP, Lieutenant Hernandez, CNP, Lashawn Lovell, CNP, Kevin Graff, CNP, Jesus Manuel Edwards, CNP, Vickey Champagne, CNP, Justin Vela, [facility], [facility]    Progress Note    8/6/2021    2:48 PM    Name:   Madi Rico  MRN:     [id number]     [place]:      [de-identified]   Room:   2001/2001-02  IP Day:  1  Admit Date:  8/5/2021  4:22 PM    PCP:   BRAULIO Kramer CNP  Code Status:  Full Code    Subjective:     C/C: Shortness of breath, cough    Interval History Status:   Feeling little better, shortness of breath is improving  INR 1.7  Blood glucose 234  Saturating 97% on 2 L oxygen via nasal cannula  Brief History:   Madi Rico is a 59 y.o. Non- / non  female who presents with shortness of breath, cough with clear expectoration and failed outpatient antibiotic treatment with doxycycline. She is a known patient of COPD but does not use oxygen at home. She does have sleep apnea but uses CPAP machine at home intermittently. Patient has history of PE and DVT and she is on Coumadin.   Her INR today in PCP office was 1.7 and she was given 3 mg of Coumadin  Her chest x-ray done on 8/2/2021 was unremarkable  Respiratory virus panel was positive for Coronavirus OC43 PCR and Parainfluenza 3 PCR  She has quit smoking 35 years back      Review of Systems:     Constitutional:  negative for chills, fevers, sweats  Respiratory:  negative for cough,+ shortness of breath, denies wheezing  Cardiovascular:  negative for chest pain, chest pressure/discomfort, lower extremity edema, palpitations  Gastrointestinal:  negative for abdominal pain, constipation, diarrhea, nausea, vomiting  Neurological:  negative for dizziness, headache    Medications:      Allergies:  No Known Allergies    Current Meds:   Scheduled Meds:    insulin lispro  0-18 Units Subcutaneous TID WC    insulin lispro  0-9 Units Subcutaneous Nightly    buPROPion  150 mg Oral QAM    risperiDONE  1 mg Oral Nightly    Vitamin D  2,000 Units Oral Daily    allopurinol  100 mg Oral Daily    metFORMIN  1,000 mg Oral BID    furosemide  20 mg Oral Daily    warfarin  2.5 mg Oral Daily    isosorbide mononitrate  30 mg Oral Daily    lisinopril  20 mg Oral Daily    pantoprazole  40 mg Oral QAM AC    atorvastatin  40 mg Oral Nightly    hydrALAZINE  50 mg Oral BID    alogliptin  25 mg Oral Daily    sodium chloride flush  5-40 mL Intravenous 2 times per day    methylPREDNISolone  40 mg Intravenous Q6H    Followed by   Deana Britton ON 8/8/2021] predniSONE  40 mg Oral Daily    levofloxacin  500 mg Intravenous Q24H    albuterol  2.5 mg Nebulization 4x daily    latanoprost  1 drop Both Eyes Nightly    warfarin (COUMADIN) daily dosing (placeholder)   Other RX Placeholder    tiotropium  2 puff Inhalation Daily     Continuous Infusions:    sodium chloride 75 mL/hr at 08/06/21 0756    sodium chloride      dextrose       PRN Meds: fluticasone, oxyCODONE-acetaminophen, sodium chloride flush, sodium chloride, ondansetron **OR** ondansetron, polyethylene glycol, acetaminophen **OR** acetaminophen, glucose, dextrose, glucagon (rDNA), dextrose, albuterol    Data:     Past Medical History:   has a past medical history of Anxiety, Chronic kidney disease, COPD (chronic obstructive pulmonary disease) ([address] Depression, Fracture of ankle, Hyperlipidemia, Hypertension, Obesity, Osteoarthritis, Proteinuria, Pulmonary embolism ([facility] Sleep apnea, Type 2 diabetes mellitus without complication ([facility] Type II or unspecified type diabetes mellitus without mention of complication, not stated as uncontrolled, and Von Willebrand disease ([facility] Social History:   reports that she quit smoking about 38 years ago. Her smoking use included cigarettes. She started smoking about 43 years ago. She has a 1.75 pack-year smoking history. She has never used smokeless tobacco. She reports that she does not drink alcohol and does not use drugs. Family History:   Family History   Problem Relation Age of Onset    Hypertension Mother     Liver Disease Mother     Cancer Father         stomach    Diabetes Sister     Cancer Brother         kidney    No Known Problems Maternal Grandmother     No Known Problems Maternal Grandfather     No Known Problems Paternal Grandmother     No Known Problems Paternal Grandfather     Other Brother         AIDS       Vitals:  /63   Pulse 105   Temp 97.7 °F (36.5 °C) (Oral)   Resp 18   Ht 5' 2\" (1.575 m)   Wt 245 lb (111.1 kg)   SpO2 97%   BMI 44.81 kg/m²   Temp (24hrs), Av.9 °F (36.6 °C), Min:97.5 °F (36.4 °C), Max:98.4 °F (36.9 °C)    Recent Labs     21  1822 21  2049 21  0612 21  1128   POCGLU 116* 180* 234* 223*       I/O (24Hr):   No intake or output data in the 24 hours ending 21 1448    Labs:  Hematology:  Recent Labs     21  0849 21  1805 21  0539   WBC  --  10.0  --    RBC  --  4.05  --    HGB  --  11.9  --    HCT  --  36.8  --    MCV  --  90.9  --    MCH  --  29.4  --    MCHC  --  32.3  --    RDW  --  14.6*  --    PLT  --  260  --    MPV  --  8.6  --    INR 1.7 1.6 1.7     Chemistry:  Recent Labs     21  0573    K 4.3      CO2 22   GLUCOSE 237*   BUN 18   CREATININE 0.98*   ANIONGAP 15   LABGLOM 57*   GFRAA >60   CALCIUM 9.0     Recent Labs     08/05/21  1805 08/05/21  1822 08/05/21  2049 08/06/21  0612 08/06/21  1128   LABA1C 7.2*  --   --   --   --    POCGLU  --  116* 180* 234* 223*     ABG:  Lab Results   Component Value Date    POCPH 7.472 08/05/2021    PH 7.36 05/06/2020    POCPCO2 34.8 08/05/2021    PCO2 49 05/06/2020    POCPO2 90.5 08/05/2021    PO2 61 05/06/2020    POCHCO3 25.4 08/05/2021    HCO3 27.7 05/06/2020    NBEA NOT REPORTED 08/05/2021    PBEA 2 08/05/2021    HDB1CCJ NOT REPORTED 08/05/2021    JREJ8UVV 98 08/05/2021    O2SAT 90 05/06/2020    FIO2 28.0 08/05/2021     Lab Results   Component Value Date/Time    SPECIAL NOT REPORTED 08/05/2021 06:04 PM     Lab Results   Component Value Date/Time    CULTURE NO GROWTH 12 HOURS 08/05/2021 06:04 PM       Radiology:  XR CHEST (2 VW)    Result Date: 8/2/2021  No radiologic evidence of acute cardiopulmonary disease. XR CHEST PORTABLE    Result Date: 8/6/2021  Lower lung volumes with right basilar subsegmental atelectasis. No convincing lung consolidation.        Physical Examination:        General appearance:  alert, cooperative and no distress, sitting comfortably in chair  Mental Status:  oriented to person, place and time and normal affect  Lungs: Prolonged expiratory phase, no wheezing today  Heart:  regular rate and rhythm, no murmur  Abdomen:  soft, nontender, nondistended, normal bowel sounds, no masses, hepatomegaly, splenomegaly  Extremities:  no edema, redness, tenderness in the calves  Skin:  no gross lesions, rashes, induration    Assessment:        Hospital Problems         Last Modified POA    * (Principal) COPD exacerbation ([address] 8/5/2021 Yes    Acute tracheobronchitis-viral 8/6/2021 Yes    Essential hypertension 8/5/2021 Yes    Hyperlipidemia (Chronic) 8/5/2021 Yes    CKD (chronic kidney disease) stage 3, GFR 30-59 ml/min ([facility])

## 2021-08-07 ENCOUNTER — TELEPHONE (OUTPATIENT)
Dept: FAMILY MEDICINE CLINIC | Age: 61
End: 2021-08-07

## 2021-08-07 VITALS
TEMPERATURE: 97.5 F | DIASTOLIC BLOOD PRESSURE: 62 MMHG | HEART RATE: 84 BPM | WEIGHT: 245.7 LBS | SYSTOLIC BLOOD PRESSURE: 129 MMHG | RESPIRATION RATE: 20 BRPM | OXYGEN SATURATION: 99 % | HEIGHT: 62 IN | BODY MASS INDEX: 45.21 KG/M2

## 2021-08-07 LAB
GLUCOSE BLD-MCNC: 241 MG/DL (ref 65–105)
GLUCOSE BLD-MCNC: 258 MG/DL (ref 65–105)
INR BLD: 2.2
PROTHROMBIN TIME: 24 SEC (ref 11.5–14.2)

## 2021-08-07 PROCEDURE — 94640 AIRWAY INHALATION TREATMENT: CPT

## 2021-08-07 PROCEDURE — 97530 THERAPEUTIC ACTIVITIES: CPT

## 2021-08-07 PROCEDURE — 6360000002 HC RX W HCPCS: Performed by: INTERNAL MEDICINE

## 2021-08-07 PROCEDURE — 36415 COLL VENOUS BLD VENIPUNCTURE: CPT

## 2021-08-07 PROCEDURE — 99239 HOSP IP/OBS DSCHRG MGMT >30: CPT | Performed by: INTERNAL MEDICINE

## 2021-08-07 PROCEDURE — 97110 THERAPEUTIC EXERCISES: CPT

## 2021-08-07 PROCEDURE — 82947 ASSAY GLUCOSE BLOOD QUANT: CPT

## 2021-08-07 PROCEDURE — 97116 GAIT TRAINING THERAPY: CPT

## 2021-08-07 PROCEDURE — 85610 PROTHROMBIN TIME: CPT

## 2021-08-07 PROCEDURE — 2580000003 HC RX 258: Performed by: INTERNAL MEDICINE

## 2021-08-07 PROCEDURE — 6370000000 HC RX 637 (ALT 250 FOR IP): Performed by: INTERNAL MEDICINE

## 2021-08-07 RX ORDER — PREDNISONE 20 MG/1
40 TABLET ORAL DAILY
Status: DISCONTINUED | OUTPATIENT
Start: 2021-08-07 | End: 2021-08-07 | Stop reason: HOSPADM

## 2021-08-07 RX ORDER — PREDNISONE 10 MG/1
40 TABLET ORAL DAILY
Qty: 30 TABLET | Refills: 0 | Status: SHIPPED | OUTPATIENT
Start: 2021-08-08 | End: 2021-10-20

## 2021-08-07 RX ORDER — LEVOFLOXACIN 500 MG/1
500 TABLET, FILM COATED ORAL DAILY
Status: DISCONTINUED | OUTPATIENT
Start: 2021-08-08 | End: 2021-08-07 | Stop reason: HOSPADM

## 2021-08-07 RX ORDER — WARFARIN SODIUM 2 MG/1
2 TABLET ORAL
Status: DISCONTINUED | OUTPATIENT
Start: 2021-08-07 | End: 2021-08-07 | Stop reason: HOSPADM

## 2021-08-07 RX ORDER — LEVOFLOXACIN 500 MG/1
500 TABLET, FILM COATED ORAL DAILY
Qty: 5 TABLET | Refills: 0 | Status: SHIPPED | OUTPATIENT
Start: 2021-08-08 | End: 2021-08-13

## 2021-08-07 RX ADMIN — LISINOPRIL 20 MG: 20 TABLET ORAL at 08:48

## 2021-08-07 RX ADMIN — BUPROPION HYDROCHLORIDE 150 MG: 150 TABLET, EXTENDED RELEASE ORAL at 08:46

## 2021-08-07 RX ADMIN — HYDRALAZINE HYDROCHLORIDE 50 MG: 50 TABLET, FILM COATED ORAL at 08:48

## 2021-08-07 RX ADMIN — ALOGLIPTIN 25 MG: 25 TABLET, FILM COATED ORAL at 08:45

## 2021-08-07 RX ADMIN — ALBUTEROL SULFATE 2.5 MG: 2.5 SOLUTION RESPIRATORY (INHALATION) at 11:55

## 2021-08-07 RX ADMIN — PANTOPRAZOLE SODIUM 40 MG: 40 TABLET, DELAYED RELEASE ORAL at 06:06

## 2021-08-07 RX ADMIN — FUROSEMIDE 20 MG: 20 TABLET ORAL at 08:47

## 2021-08-07 RX ADMIN — LEVOFLOXACIN 500 MG: 5 INJECTION, SOLUTION INTRAVENOUS at 08:44

## 2021-08-07 RX ADMIN — METFORMIN HYDROCHLORIDE 1000 MG: 500 TABLET, EXTENDED RELEASE ORAL at 08:46

## 2021-08-07 RX ADMIN — METHYLPREDNISOLONE SODIUM SUCCINATE 40 MG: 40 INJECTION, POWDER, FOR SOLUTION INTRAMUSCULAR; INTRAVENOUS at 06:06

## 2021-08-07 RX ADMIN — ISOSORBIDE MONONITRATE 30 MG: 30 TABLET, EXTENDED RELEASE ORAL at 08:47

## 2021-08-07 RX ADMIN — TIOTROPIUM BROMIDE INHALATION SPRAY 2 PUFF: 3.12 SPRAY, METERED RESPIRATORY (INHALATION) at 07:35

## 2021-08-07 RX ADMIN — INSULIN LISPRO 9 UNITS: 100 INJECTION, SOLUTION INTRAVENOUS; SUBCUTANEOUS at 12:12

## 2021-08-07 RX ADMIN — ALBUTEROL SULFATE 2.5 MG: 2.5 SOLUTION RESPIRATORY (INHALATION) at 07:29

## 2021-08-07 RX ADMIN — PREDNISONE 40 MG: 20 TABLET ORAL at 08:47

## 2021-08-07 RX ADMIN — SODIUM CHLORIDE: 9 INJECTION, SOLUTION INTRAVENOUS at 06:06

## 2021-08-07 RX ADMIN — ALLOPURINOL 100 MG: 100 TABLET ORAL at 08:48

## 2021-08-07 RX ADMIN — INSULIN LISPRO 6 UNITS: 100 INJECTION, SOLUTION INTRAVENOUS; SUBCUTANEOUS at 08:44

## 2021-08-07 RX ADMIN — Medication 2000 UNITS: at 08:48

## 2021-08-07 ASSESSMENT — PAIN DESCRIPTION - PAIN TYPE: TYPE: CHRONIC PAIN

## 2021-08-07 ASSESSMENT — PAIN SCALES - GENERAL
PAINLEVEL_OUTOF10: 3
PAINLEVEL_OUTOF10: 3

## 2021-08-07 ASSESSMENT — PAIN DESCRIPTION - LOCATION: LOCATION: BACK

## 2021-08-07 ASSESSMENT — PAIN DESCRIPTION - ONSET: ONSET: ON-GOING

## 2021-08-07 ASSESSMENT — PAIN DESCRIPTION - FREQUENCY: FREQUENCY: CONTINUOUS

## 2021-08-07 ASSESSMENT — PAIN DESCRIPTION - PROGRESSION
CLINICAL_PROGRESSION: NOT CHANGED
CLINICAL_PROGRESSION: NOT CHANGED

## 2021-08-07 ASSESSMENT — PAIN DESCRIPTION - DESCRIPTORS: DESCRIPTORS: ACHING;SORE;DULL

## 2021-08-07 ASSESSMENT — PAIN - FUNCTIONAL ASSESSMENT: PAIN_FUNCTIONAL_ASSESSMENT: ACTIVITIES ARE NOT PREVENTED

## 2021-08-07 NOTE — PLAN OF CARE
Problem: IP MOBILITY  Goal: LTG - patient will ambulate household distance  8/7/2021 1231 by Taco Bruno RN  Outcome: Ongoing  8/7/2021 0334 by Nisha Lorenzo RN  Outcome: Ongoing     Problem: Falls - Risk of:  Goal: Will remain free from falls  Description: Will remain free from falls  8/7/2021 1231 by Taco Bruno RN  Outcome: Ongoing  8/7/2021 0334 by Nisha Lorenzo RN  Outcome: Ongoing  Goal: Absence of physical injury  Description: Absence of physical injury  8/7/2021 1231 by Taco Bruno RN  Outcome: Ongoing  8/7/2021 0334 by Nisha Lorenzo RN  Outcome: Ongoing     Problem: Discharge Planning:  Goal: Discharged to appropriate level of care  Description: Discharged to appropriate level of care  8/7/2021 1231 by Taco Bruno RN  Outcome: Ongoing  8/7/2021 0334 by Nisha Lorenzo RN  Outcome: Ongoing     Problem:  Activity Intolerance:  Goal: Ability to tolerate increased activity will improve  Description: Ability to tolerate increased activity will improve  8/7/2021 1231 by Taco Bruno RN  Outcome: Ongoing  8/7/2021 0334 by Nisha Lorenzo RN  Outcome: Ongoing     Problem: Airway Clearance - Ineffective:  Goal: Ability to maintain a clear airway will improve  Description: Ability to maintain a clear airway will improve  8/7/2021 1231 by Taco Bruno RN  Outcome: Ongoing  8/7/2021 0334 by Nisha Lorenzo RN  Outcome: Ongoing     Problem: Breathing Pattern - Ineffective:  Goal: Ability to achieve and maintain a regular respiratory rate will improve  Description: Ability to achieve and maintain a regular respiratory rate will improve  8/7/2021 1231 by Taco Bruno RN  Outcome: Ongoing  8/7/2021 0334 by Nisha Lorenzo RN  Outcome: Ongoing     Problem: Gas Exchange - Impaired:  Goal: Levels of oxygenation will improve  Description: Levels of oxygenation will improve  8/7/2021 1231 by Taco Bruno RN  Outcome: Ongoing  8/7/2021 0334 by Janine Lozano RN  Outcome: Ongoing     Problem: Serum Glucose Level - Abnormal:  Goal: Ability to maintain appropriate glucose levels will improve  Description: Ability to maintain appropriate glucose levels will improve  8/7/2021 1231 by Kerline Rodriguez RN  Outcome: Ongoing  8/7/2021 0334 by Janine Lozano RN  Outcome: Ongoing

## 2021-08-07 NOTE — PROGRESS NOTES
Patient discharged to home in good condition. Discharge instructions given and patient denies any further questions. All belongings in patient possession at time of discharge. Patient escorted to vehicle via wheelchair.

## 2021-08-07 NOTE — PROGRESS NOTES
Home Oxygen Evaluation    Home Oxygen Evaluation completed. Testing completed at [facility].     Resting SpO2 on room air =98%    SpO2 on room air with exercise = 96%        Arabella Coronado RCP  10:56 AM

## 2021-08-07 NOTE — PROGRESS NOTES
Procedure. Restrictions  Restrictions/Precautions  Restrictions/Precautions: Weight Bearing, General Precautions, Up as Tolerated  Position Activity Restriction  Other position/activity restrictions: O2, R UE IV  Subjective   General  Chart Reviewed: Yes  Family / Caregiver Present: Yes  Subjective  Subjective: patient pleasant and agreeable to work with therapy  General Comment  Comments: LISA Caraballo states patient appropriate for therapy          Orientation  Orientation  Overall Orientation Status: Within Functional Limits  Cognition      Objective   Bed mobility  Comment: Patient was up sitting in recliner upon therapists arrival  Transfers  Sit to Stand: Contact guard assistance  Stand to sit: Contact guard assistance  Lateral Transfers: Contact guard assistance  Comment: Patient requiers increased vc's for hand placement and progression techniques. Education on pursed lip breathing techniques with good carry over and demo. Patient requires education on the importance of controlled motions from standing to sittinging to maximize stability and support in transfer activities. Ambulation  Ambulation?: Yes  More Ambulation?: Yes  Ambulation 1  Surface: level tile  Device: No Device  Other Apparatus: O2  Assistance: Contact guard assistance  Quality of Gait: slow careful  Gait Deviations: Slow Ania  Distance: 30'  Comments: distance limited by endurance, SOB throughout ambulation patient uses IV pole for ambulation for support and balance  Ambulation 2  Surface - 2: level tile  Device 2: No device  Assistance 2: Contact guard assistance  Quality of Gait 2: slow careful decreased floor clearance ADAL  Gait Deviations: Decreased step length;Decreased step height  Distance: 50 feet x1 in room  Comments: Patient being assessed for O2 needs.  Patient SpO2 stayed above 90% during ambulation tasks patient requires vc's for breathing techniques with good demo  Neuromuscular Education  NDT Treatment: Gait ;Lower extremity; Sitting;Standing  Balance  Single Leg Stance R Le  Single Leg Stance L Le  Comments: Patient stood in front of room sink relying on counter for support during single leg stance attmepts. Exercises  Comments: Patient given HEP for both standing supported and seated AROM without resistance. Patient educated on techniques and sequencing with good understanding and patient demo's several of the exercises back to therapists displaying food form and posture. AM-PAC Score  AM-PAC Inpatient Mobility Raw Score : 17 (21)  AM-PAC Inpatient T-Scale Score : 42.13 (21)  Mobility Inpatient CMS 0-100% Score: 50.57 (21)  Mobility Inpatient CMS G-Code Modifier : CK (21)          Goals  Short term goals  Time Frame for Short term goals: 10 visits  Short term goal 1: Independent bed mobility  Short term goal 2:  Independent sit<>stand  Short term goal 3: Patient to ambulate [de-identified]' with roll walker modified independent  Short term goal 4: Pt to tolerate 25 minutes ther act to facilitate improving endurance and strength  Patient Goals   Patient goals : to go home    Plan    Plan  Times per week: 1-2x/day for 5-6 days/week  Current Treatment Recommendations: Strengthening, Transfer Training, Endurance Training, Patient/Caregiver Education & Training, Balance Training, Gait Training, Home Exercise Program, Safety Education & Training  Safety Devices  Type of devices: Left in chair, Call light within reach, Gait belt     Therapy Time   Individual Concurrent Group Co-treatment   Time In 936         Time Out 1015         Minutes [address] [place], [place]

## 2021-08-07 NOTE — PLAN OF CARE
Problem: ABCDS Injury Assessment  Goal: Absence of physical injury  8/6/2021 1418 by Nathaniel Mcgee RN  Outcome: Completed     Problem: ABCDS Injury Assessment  Goal: Absence of physical injury  8/6/2021 1418 by Nathaniel Mcgee RN  Outcome: Completed     Problem: Infection:  Goal: Will remain free from infection  Description: Will remain free from infection  8/6/2021 1418 by Nathaniel Mcgee RN  Outcome: Completed     Problem: Safety:  Goal: Free from accidental physical injury  Description: Free from accidental physical injury  8/6/2021 1418 by Nathaniel Mcgee RN  Outcome: Completed  Goal: Free from intentional harm  Description: Free from intentional harm  8/6/2021 1418 by Nathaniel Mcgee RN  Outcome: Completed     Problem: Daily Care:  Goal: Daily care needs are met  Description: Daily care needs are met  8/6/2021 1418 by Nathaniel Mcgee RN  Outcome: Completed     Problem: Pain:  Goal: Patient's pain/discomfort is manageable  Description: Patient's pain/discomfort is manageable  8/6/2021 1418 by Nathaniel Mcgee RN  Outcome: Completed     Problem: Skin Integrity:  Goal: Skin integrity will stabilize  Description: Skin integrity will stabilize  8/6/2021 1418 by Nathaniel Mcgee RN  Outcome: Completed     Problem: Discharge Planning:  Goal: Patients continuum of care needs are met  Description: Patients continuum of care needs are met  8/6/2021 1418 by Nathaniel Mcgee RN  Outcome: Completed     Problem: Discharge Planning:  Goal: Patients continuum of care needs are met  Description: Patients continuum of care needs are met  8/6/2021 1418 by Nathaniel Mcgee RN  Outcome: Completed     Problem: Falls - Risk of:  Goal: Will remain free from falls  Description: Will remain free from falls  8/7/2021 0334 by Negrita Yin RN  Outcome: Ongoing  8/6/2021 1847 by Nathaniel Mcgee RN  Outcome: Ongoing  Note: Patient is a fall risk during this admission. Fall risk assessment was performed. Patient is absent of falls. Bed is in the lowest position.  Wheels on the bed are locked. Call light and bed side table are within reach. Clutter is removed. Patient was educated to call out when needing assistance or wanting to get out of bed. Patient offered toileting assistance during rounding. Hourly rounds have been performed. Goal: Absence of physical injury  Description: Absence of physical injury  8/7/2021 0334 by Richard Stone RN  Outcome: Ongoing  8/6/2021 1847 by Elvis Hay RN  Outcome: Ongoing     Problem: Discharge Planning:  Goal: Discharged to appropriate level of care  Description: Discharged to appropriate level of care  8/7/2021 0334 by Richard Stone RN  Outcome: Ongoing  8/6/2021 1847 by Elvis Hay RN  Outcome: Ongoing     Problem: Discharge Planning:  Goal: Discharged to appropriate level of care  Description: Discharged to appropriate level of care  8/7/2021 0334 by Richard Stone RN  Outcome: Ongoing  8/6/2021 1847 by Elvis Hay RN  Outcome: Ongoing     Problem: Activity Intolerance:  Goal: Ability to tolerate increased activity will improve  Description: Ability to tolerate increased activity will improve  8/7/2021 0334 by Richard Stone RN  Outcome: Ongoing  8/6/2021 1847 by Elvis Hay RN  Outcome: Ongoing     Problem: Airway Clearance - Ineffective:  Goal: Ability to maintain a clear airway will improve  Description: Ability to maintain a clear airway will improve  8/7/2021 0334 by Richard Stone RN  Outcome: Ongoing  8/6/2021 1847 by Elvis Hay RN  Outcome: Ongoing  Note: Lung sounds remain rhonchi t/o all fields. Respiratory treatments, IV antibiotics, and IV steroids continued as directed. Supplemental oxygen at 2 liters via NC also continued.       Problem: Breathing Pattern - Ineffective:  Goal: Ability to achieve and maintain a regular respiratory rate will improve  Description: Ability to achieve and maintain a regular respiratory rate will improve  8/7/2021 0334 by Richard Stone RN  Outcome: Ongoing  8/6/2021 1847 by Elvis Hay RN  Outcome:

## 2021-08-07 NOTE — PROGRESS NOTES
[facility]  Office: [address], DO, Kim Melendrez, DO, Syed Hartmann, DO, Kailey Rodrigez Blood, DO, Brit Prieto MD, Mignon Meckel, MD, Edith Mcintyre MD, Kathleen Tomas MD, Jasmine Love MD, Haleigh Law MD, Karen Gambino MD, Zabrina Holt, DO, Bunny Huitron MD, Ezio Carmona, DO, Alesia Brock MD,  Venkat Jimenez DO, Abad Russell MD, Aileen Tirado MD, Belinda Scott MD, Tino Hernandez MD, Fatmata Bailey MD, Jerrica Alegre MD, Rocío Shoemaker MD, Kevin Eduardo, [facility], [facility], CNP, Marlen Herzog, CNP, Emelina Briceno, CNS, Jesus Alberto Jefferson, CNP, Maira Farfan, CNP, Sidney Caicedo, CNP, Maddie Cleveland, CNP, Marisa Sheehan, CNP, Riaz Jacobo PA-C, Amy Vaughn, [facility], Alma Paniagua, CNP, Aury Marshall, CNP, Pete Puente, CNP, Caryle Poet, CNP, Mylene Kidd, CNP, Navid Fierro, CNP, Mikael Marshall, CNP, Elias Douglas, [facility]    Progress Note    8/7/2021    12:34 PM    Name:   Kavin Burrell  MRN:     [id number]     [place]:      [de-identified]   Room:   2001/2001-02  IP Day:  2  Admit Date:  8/5/2021  4:22 PM    PCP:   BRAULIO Milian CNP  Code Status:  Full Code    Subjective:     C/C: Shortness of breath, cough    Interval History Status:   Feeling  better, weakness and shortness of breath is improving  INR 2.2  Blood glucose 241-203  Saturating 98% on room air and exertion  Did not qualify for home oxygen  Brief History:   Kavin Burrell is a 59 y.o. Non- / non  female who presents with shortness of breath, cough with clear expectoration and failed outpatient antibiotic treatment with doxycycline. She is a known patient of COPD but does not use oxygen at home. She does have sleep apnea but uses CPAP machine at home intermittently. Patient has history of PE and DVT and she is on Coumadin.   Her INR today in PCP office was 1.7 and she was given 3 mg of Coumadin  Her chest x-ray done on 8/2/2021 was unremarkable  Respiratory virus panel was positive for Coronavirus OC43 PCR and Parainfluenza 3 PCR  She has quit smoking 35 years back      Review of Systems:     Constitutional:  negative for chills, fevers, sweats  Respiratory:  negative for cough,+ shortness of breath, denies wheezing  Cardiovascular:  negative for chest pain, chest pressure/discomfort, lower extremity edema, palpitations  Gastrointestinal:  negative for abdominal pain, constipation, diarrhea, nausea, vomiting  Neurological:  negative for dizziness, headache    Medications:      Allergies:  No Known Allergies    Current Meds:   Scheduled Meds:    predniSONE  40 mg Oral Daily    warfarin  2 mg Oral Once    insulin lispro  0-18 Units Subcutaneous TID WC    insulin lispro  0-9 Units Subcutaneous Nightly    levofloxacin  500 mg Intravenous Q24H    buPROPion  150 mg Oral QAM    Vitamin D  2,000 Units Oral Daily    allopurinol  100 mg Oral Daily    metFORMIN  1,000 mg Oral BID    furosemide  20 mg Oral Daily    isosorbide mononitrate  30 mg Oral Daily    lisinopril  20 mg Oral Daily    pantoprazole  40 mg Oral QAM AC    atorvastatin  40 mg Oral Nightly    hydrALAZINE  50 mg Oral BID    alogliptin  25 mg Oral Daily    sodium chloride flush  5-40 mL Intravenous 2 times per day    albuterol  2.5 mg Nebulization 4x daily    warfarin (COUMADIN) daily dosing (placeholder)   Other RX Placeholder    tiotropium  2 puff Inhalation Daily     Continuous Infusions:    sodium chloride 75 mL/hr at 08/07/21 0606    sodium chloride      dextrose       PRN Meds: oxyCODONE-acetaminophen, sodium chloride flush, sodium chloride, ondansetron **OR** ondansetron, polyethylene glycol, acetaminophen **OR** acetaminophen, glucose, dextrose, glucagon (rDNA), dextrose, albuterol    Data:     Past Medical History:   has a past medical history of Anxiety, Chronic kidney disease, COPD (chronic obstructive pulmonary disease) ([facility] [address] Depression, Fracture of ankle, Hyperlipidemia, Hypertension, Obesity, Osteoarthritis, Proteinuria, Pulmonary embolism ([facility] [address] Sleep apnea, Type 2 diabetes mellitus without complication ([facility] [address] Type II or unspecified type diabetes mellitus without mention of complication, not stated as uncontrolled, and [place] disease [facility] Social History:   reports that she quit smoking about 38 years ago. Her smoking use included cigarettes. She started smoking about 43 years ago. She has a 1.75 pack-year smoking history. She has never used smokeless tobacco. She reports that she does not drink alcohol and does not use drugs. Family History:   Family History   Problem Relation Age of Onset    Hypertension Mother     Liver Disease Mother     Cancer Father         stomach    Diabetes Sister     Cancer Brother         kidney    No Known Problems Maternal Grandmother     No Known Problems Maternal Grandfather     No Known Problems Paternal Grandmother     No Known Problems Paternal Grandfather     Other Brother         AIDS       Vitals:  /62   Pulse 84   Temp 97.5 °F (36.4 °C) (Oral)   Resp 20   Ht 5' 2\" (1.575 m)   Wt 245 lb 11.2 oz (111.4 kg)   SpO2 99%   BMI 44.94 kg/m²   Temp (24hrs), Av.5 °F (36.4 °C), Min:97.3 °F (36.3 °C), Max:97.7 °F (36.5 °C)    Recent Labs     21  1622 21  0618 21  1110   POCGLU 195* 257* 241* 258*       I/O (24Hr):     Intake/Output Summary (Last 24 hours) at 2021 1234  Last data filed at 2021 1232  Gross per 24 hour   Intake 4015 ml   Output --   Net 4015 ml       Labs:  Hematology:  Recent Labs     21  1805 21  0539 21  0556   WBC 10.0  --   --    RBC 4.05  --   --    HGB 11.9  --   --    HCT 36.8  --   --    MCV 90.9  --   --    MCH 29.4  --   --    MCHC 32.3  --   --    RDW 14.6*  --   --      --   --    MPV 8.6  --   --    INR 1.6 1.7 2.2     Chemistry:  Recent Labs     21  0539      K 4.3      CO2 22 GLUCOSE 237*   BUN 18   CREATININE 0.98*   ANIONGAP 15   LABGLOM 57*   GFRAA >60   CALCIUM 9.0     Recent Labs     08/05/21  1805 08/05/21  1822 08/06/21  0612 08/06/21  1128 08/06/21  1622 08/06/21  2004 08/07/21  0618 08/07/21  1110   LABA1C 7.2*  --   --   --   --   --   --   --    POCGLU  --    < > 234* 223* 195* 257* 241* 258*    < > = values in this interval not displayed. ABG:  Lab Results   Component Value Date    POCPH 7.472 08/05/2021    PH 7.36 05/06/2020    POCPCO2 34.8 08/05/2021    PCO2 49 05/06/2020    POCPO2 90.5 08/05/2021    PO2 61 05/06/2020    POCHCO3 25.4 08/05/2021    HCO3 27.7 05/06/2020    NBEA NOT REPORTED 08/05/2021    PBEA 2 08/05/2021    LJK6PSP NOT REPORTED 08/05/2021    CJLU7UMF 98 08/05/2021    O2SAT 90 05/06/2020    FIO2 28.0 08/05/2021     Lab Results   Component Value Date/Time    SPECIAL NOT REPORTED 08/05/2021 06:04 PM     Lab Results   Component Value Date/Time    CULTURE NO GROWTH 2 DAYS 08/05/2021 06:04 PM       Radiology:  XR CHEST (2 VW)    Result Date: 8/2/2021  No radiologic evidence of acute cardiopulmonary disease. XR CHEST PORTABLE    Result Date: 8/6/2021  Lower lung volumes with right basilar subsegmental atelectasis. No convincing lung consolidation.        Physical Examination:        General appearance:  alert, cooperative and no distress, walking with physical therapy   mental Status:  oriented to person, place and time and normal affect  Lungs: Prolonged expiratory phase, no wheezing today  Heart:  regular rate and rhythm, no murmur  Abdomen:  soft, nontender, nondistended, normal bowel sounds, no masses, hepatomegaly, splenomegaly  Extremities:  no edema, redness, tenderness in the calves  Skin:  no gross lesions, rashes, induration    Assessment:        Hospital Problems         Last Modified POA    * (Principal) COPD exacerbation ([address] 8/5/2021 Yes    Acute tracheobronchitis-viral 8/6/2021 Yes    Essential hypertension 8/5/2021 Yes    Hyperlipidemia (Chronic) 8/5/2021 Yes    CKD (chronic kidney disease) stage 3, GFR 30-59 ml/min (HCC) (Chronic) 8/5/2021 Yes    Controlled type 2 diabetes mellitus with stage 3 chronic kidney disease, without long-term current use of insulin ([address] (Chronic) 8/5/2021 Yes    History of DVT of lower extremity (Chronic) 8/5/2021 Yes    Controlled type 2 diabetes mellitus with stage 2 chronic kidney disease, without long-term current use of insulin ([address] 8/5/2021 Yes    Multiple lung nodules on CT 8/5/2021 Yes    Anxiety 8/5/2021 Yes    History of pulmonary embolism 8/5/2021 Yes    AAA (abdominal aortic aneurysm) without rupture ([address] 8/5/2021 Yes          Plan:        1. Switch Levaquin and steroids to oral  2. Continue Coumadin, follow at Coumadin clinic (INR goal 2-3)  3. Use CPAP at night as before  4. Did not qualify for home oxygen  5.  Discharge home with home care      Patti Kaufman MD  8/7/2021  12:34 PM

## 2021-08-07 NOTE — PROGRESS NOTES
Warfarin Dosing - Pharmacy Consult Note  Consulting Provider: Dr. Kim Quinonez  Indication:  History of  VTE/PE  Warfarin Dose prior to admission: 2mg daily except 3mg Mon/Wed/Fri   Concurrent anticoagulants/antiplatelets: none  Significant Drug Interactions:  Levofloxacin, prednisone/methylprednisolone  Recent Labs     08/05/21  1805 08/06/21  0539 08/07/21  0556   INR 1.6 1.7 2.2   HGB 11.9  --   --      --   --      Recent warfarin administrations                     warfarin (COUMADIN) tablet 4 mg (mg) 4 mg Given 08/06/21 1756                   Date   INR    Dose  8/5/21    1.7         3mg  8/6/21     1.7        4mg  8/7/21     2.2         Assessment/Plan  (Goal INR: 2 - 3)    INR in range 2.2. Follow home dosing. Give 2mg tonight reassess INR in AM.     Active problem list reviewed. INR orders are placed. Chart reviewed for pertinent labs, drug/diet interactions, and past doses. Documentation of patient's clinical condition was reviewed. Pharmacy Dosing:  Pharmacy will continue to follow.

## 2021-08-07 NOTE — DISCHARGE SUMMARY
[facility]  Office: [address], DO, [facility], DO, Bhavik Meadows, DO, Xiomara Rasmussen, DO, Jayde Blanc MD, Gwen Arias MD, Britney Woodward MD, Melvin Robles MD, Urbano Jansen MD, Terrance Chen MD, Francois Ortiz MD, Guido Liu, DO, Jena Avila MD, Ratna Adams, DO, Citlali Willis MD,  Alee Hightower DO, Meg March MD, Milton Rubio MD, Janneth Lechuga MD, Yee Hopper MD, Anthony Avelar MD, Tita Wills MD, Tomasz Rendon MD, Dewey Peck, [facility], [facility], CNP, Claudio Faulkner, CNP, Andree Foley, CNS, Iris Linda, CNP, Luis Carlos Dhaliwal, CNP, Lincoln Ivory, CNP, Alyes Greer, CNP, Shaina Roy, CNP, Kiah Bowles PA-C, Tara Hardin, [facility], Emmanuel Marshall, CNP, Jae Badillo, CNP, Lupe Sena, CNP, Curtis Juan, CNP, Du Ruiz, CNP, Thuy Quarles, CNP, Mohini Turner, [facility], [facility], [facility]    Discharge Summary     Patient ID: Jaki Morrell  :  1960   MRN: [id number]     ACCOUNT:  [de-identified]   Patient's PCP: BRAULIO Turner CNP  Admit Date: 2021   Discharge Date: 2021     Length of Stay: 2  Code Status:  Full Code  Admitting Physician: Shanta Chacon MD  Discharge Physician: Shanta Chacon MD     Active Discharge Diagnoses:     Hospital Problem Lists:  Principal Problem:    COPD exacerbation [facility])  Active Problems:    Acute tracheobronchitis-viral    Essential hypertension    Hyperlipidemia    CKD (chronic kidney disease) stage 3, GFR 30-59 ml/min ([facility])    Controlled type 2 diabetes mellitus with stage 3 chronic kidney disease, without long-term current use of insulin [facility]    History of DVT of lower extremity    Controlled type 2 diabetes mellitus with stage 2 chronic kidney disease, without long-term current use of insulin (HCC)    Multiple lung nodules on CT    Anxiety    History of pulmonary embolism    AAA (abdominal aortic aneurysm) without rupture [facility])  Resolved Problems:    * No resolved hospital problems. *      Admission Condition:  poor     Discharged Condition: stable    Hospital Stay:   Admitting history:  Buck Garibay a 59 y.o. Non- / non  female who presents with shortness of breath, cough with clear expectoration and failed outpatient antibiotic treatment with doxycycline. She is a known patient of COPD but does not use oxygen at home. [facility] does have sleep apnea but uses CPAP machine at home intermittently. Patient has history of PE and DVT and she is on Coumadin.  Her INR today in PCP office was 1.7 and she was given 3 mg of Coumadin  Her chest x-ray done on 8/2/2021 was unremarkable  Respiratory virus panel was positive for Coronavirus OC43 PCR and Parainfluenza 3 PCR  She has quit smoking 35 years back    Hospital Course:   Feeling  better, weakness and shortness of breath is improving  INR 2.2  Blood glucose 241-203  Saturating 98% on room air and exertion  Did not qualify for home oxygen  Plan:         1. Switch Levaquin and steroids to oral  2. Continue Coumadin, follow at Coumadin clinic (INR goal 2-3)  3. Use CPAP at night as before  4. Did not qualify for home oxygen  5.  Discharge home with home care      Significant therapeutic interventions: IV antibiotics, steroids, supportive and symptomatic    Significant Diagnostic Studies:   Labs / Micro:  CBC:   Lab Results   Component Value Date    WBC 10.0 08/05/2021    RBC 4.05 08/05/2021    RBC 3.61 05/23/2012    HGB 11.9 08/05/2021    HCT 36.8 08/05/2021    MCV 90.9 08/05/2021    MCH 29.4 08/05/2021    MCHC 32.3 08/05/2021    RDW 14.6 08/05/2021     08/05/2021     05/23/2012     BMP:    Lab Results   Component Value Date    GLUCOSE 237 08/06/2021    GLUCOSE 84 05/23/2012     08/06/2021    K 4.3 08/06/2021     08/06/2021    CO2 22 08/06/2021    ANIONGAP 15 08/06/2021    BUN 18 08/06/2021    CREATININE 0.98 08/06/2021    BUNCRER 18 08/06/2021 CALCIUM 9.0 08/06/2021    LABGLOM 57 08/06/2021    GFRAA >60 08/06/2021    GFR      08/06/2021    GFR NOT REPORTED 08/06/2021     HFP:    Lab Results   Component Value Date    PROT 7.0 07/19/2021     CMP:    Lab Results   Component Value Date    GLUCOSE 237 08/06/2021    GLUCOSE 84 05/23/2012     08/06/2021    K 4.3 08/06/2021     08/06/2021    CO2 22 08/06/2021    BUN 18 08/06/2021    CREATININE 0.98 08/06/2021    ANIONGAP 15 08/06/2021    ALKPHOS 89 07/19/2021    ALT 32 07/19/2021    AST 24 07/19/2021    BILITOT 0.20 07/19/2021    LABALBU 3.9 07/19/2021    LABALBU 4.7 02/24/2012    ALBUMIN 1.3 07/19/2021    LABGLOM 57 08/06/2021    GFRAA >60 08/06/2021    GFR      08/06/2021    GFR NOT REPORTED 08/06/2021    PROT 7.0 07/19/2021    CALCIUM 9.0 08/06/2021     PT/INR:    Lab Results   Component Value Date    PROTIME 24.0 08/07/2021    PROTIME 20.2 08/05/2021    INR 2.2 08/07/2021     PTT:   Lab Results   Component Value Date    APTT 33.7 05/06/2020     FLP:    Lab Results   Component Value Date    CHOL 132 05/07/2020    CHOL 135 04/10/2020    TRIG 138 05/07/2020    HDL 36 05/07/2020     U/A:    Lab Results   Component Value Date    COLORU YELLOW 07/19/2021    TURBIDITY CLEAR 07/19/2021    SPECGRAV 1.008 07/19/2021    HGBUR NEGATIVE 07/19/2021    PHUR 5.5 07/19/2021    PROTEINU NEGATIVE 07/19/2021    GLUCOSEU NEGATIVE 07/19/2021    GLUCOSEU NEGATIVE 02/24/2012    KETUA NEGATIVE 07/19/2021    BILIRUBINUR NEGATIVE 07/19/2021    BILIRUBINUR NEGATIVE 02/24/2012    UROBILINOGEN Normal 07/19/2021    NITRU NEGATIVE 07/19/2021    LEUKOCYTESUR TRACE 07/19/2021     TSH:    Lab Results   Component Value Date    TSH 1.45 08/01/2019        Radiology:  XR CHEST (2 VW)    Result Date: 8/2/2021  No radiologic evidence of acute cardiopulmonary disease. XR CHEST PORTABLE    Result Date: 8/6/2021  Lower lung volumes with right basilar subsegmental atelectasis. No convincing lung consolidation.        Consultations: Consults:     Final Specialist Recommendations/Findings:   PHARMACY TO DOSE WARFARIN      The patient was seen and examined on day of discharge and this discharge summary is in conjunction with any daily progress note from day of discharge. Discharge plan:     Disposition: Home with home care    Physician Follow Up:   PCP within 1 week    Requiring Further Evaluation/Follow Up POST HOSPITALIZATION/Incidental Findings: Finish off antibiotics as directed    Diet: cardiac diet and diabetic diet    Activity: As tolerated    Instructions to Patient: Use CPAP at night as before  Follow-up Coumadin clinic for adjusting dose as per INR results    Discharge Medications:      Medication List      START taking these medications    levoFLOXacin 500 MG tablet  Commonly known as: LEVAQUIN  Take 1 tablet by mouth daily for 5 days  Start taking on: August 8, 2021     predniSONE 10 MG tablet  Commonly known as: DELTASONE  Take 4 tablets by mouth daily For 3 days then reduce by 10 mg every third day till gone  Start taking on: August 8, 2021        CHANGE how you take these medications    warfarin 2 MG tablet  Commonly known as: COUMADIN  Take as directed. If you are unsure how to take this medication, talk to your nurse or doctor. Original instructions: take 1 tablet by mouth SUNDAY, TUESDAY, THURSDAY, AND SATURDAY ( take 1 AND 1/2 tablets by mouth MONDAY, WEDNESDAY, FRIDAY )  What changed: additional instructions        CONTINUE taking these medications    Accu-Chek FastClix Lancet Kit  use as directed PLEASE APPROVED NEW DEVICE WHILE WE WAIT Julissa Proctor.. ..  FASTCLIX MIGHT BE EAISER TO MANIPULATE     Accu-Chek FastClix Lancets Misc  use 1 LANCET to TEST BLOOD SUGAR one to two times a day     albuterol sulfate  (90 Base) MCG/ACT inhaler  Inhale 2 puffs into the lungs every 4 hours as needed for Wheezing or Shortness of Breath (AND/OR COUGH)     Alcohol Prep 70 % Pads  Use twice daily and as needed to check blood sugars     allopurinol 100 MG tablet  Commonly known as: ZYLOPRIM  Take 1 tablet by mouth daily     atorvastatin 40 MG tablet  Commonly known as: LIPITOR  Take 1 tablet by mouth daily     Blood Pressure Monitoring Kit  Use to check blood pressure daily and as needed     buPROPion 150 MG extended release tablet  Commonly known as: WELLBUTRIN XL     dapagliflozin 5 MG tablet  Commonly known as: Farxiga  Take 1 tablet by mouth every morning     FreeStyle Daniella 14 Day [place] Hermelinda Cerise  1 each by Does not apply route continuous     FreeStyle Daniella 14 Day Sensor Misc  1 each by Does not apply route continuous     furosemide 20 MG tablet  Commonly known as: LASIX  Take 1 tablet by mouth daily     Handicap Placard Misc  by Does not apply route Exp 2/3/2026     hydrALAZINE 50 MG tablet  Commonly known as: APRESOLINE  take 1 tablet by mouth twice a day     ipratropium-albuterol 0.5-2.5 (3) MG/3ML Soln nebulizer solution  Commonly known as: DUONEB  Inhale 3 mLs into the lungs every 6 hours as needed for Shortness of Breath     isosorbide mononitrate 30 MG extended release tablet  Commonly known as: IMDUR  Take 1 tablet by mouth daily     lisinopril 20 MG tablet  Commonly known as: PRINIVIL;ZESTRIL  Take 1 tablet by mouth daily     metFORMIN 500 MG extended release tablet  Commonly known as: GLUCOPHAGE-XR  Take 2 tablets by mouth 2 times daily Take 2 tablets by mouth twice a day. * Misc. Devices Misc  1 PAIR OF DIABETIC SHOES (1 LEFT/ 1 RIGHT)  1-3 PAIRS OF INSERTS (LEFT/ RIGHT)     * Misc.  Devices Misc  1 PAIR OF DIABETIC SHOES (1 LEFT/ 1 RIGHT)  1-3 PAIRS OF INSERTS (LEFT/ RIGHT)     naloxone 4 MG/0.1ML Liqd nasal spray  1 spray by Nasal route as needed for Opioid Reversal     Nebulizer/Tubing/Mouthpiece Kit  1 kit by Does not apply route 4 times daily as needed (wheezing)     omeprazole 20 MG delayed release capsule  Commonly known as: PRILOSEC  Take 1 capsule by mouth Daily     OneTouch Ultra strip  Generic drug: blood glucose test strips  TEST THREE TIMES DAILY     oxyCODONE-acetaminophen 5-325 MG per tablet  Commonly known as: PERCOCET  take 1 tablet by mouth twice a day if needed for pain for up to 30 DAYS     RA Vitamin D-3 50 MCG (2000 UT) Caps  Generic drug: Cholecalciferol  Take 1 capsule by mouth daily     SITagliptin 100 MG tablet  Commonly known as: Januvia  Take 1 tablet by mouth daily     Spiriva HandiHaler 18 MCG inhalation capsule  Generic drug: tiotropium  Inhale 1 capsule into the lungs daily         * This list has 2 medication(s) that are the same as other medications prescribed for you. Read the directions carefully, and ask your doctor or other care provider to review them with you. STOP taking these medications    doxycycline hyclate 100 MG tablet  Commonly known as: VIBRA-TABS     fluticasone 50 MCG/ACT nasal spray  Commonly known as: Flonase     RisperDAL 2 MG tablet  Generic drug: risperiDONE           Where to Get Your Medications      These medications were sent to [address] [place] [address] [phone] - [address] [place] [id number]    Phone: [phone]   · levoFLOXacin 500 MG tablet  · predniSONE 10 MG tablet         No discharge procedures on file. Time Spent on discharge is  35 mins in patient examination, evaluation, counseling as well as medication reconciliation, prescriptions for required medications, discharge plan and follow up. Electronically signed by   Flako Campa MD  8/7/2021  4:36 PM      Thank you Dr. Liliam Denton, APRN - CNP for the opportunity to be involved in this patient's care.

## 2021-08-08 DIAGNOSIS — M15.9 PRIMARY OSTEOARTHRITIS INVOLVING MULTIPLE JOINTS: ICD-10-CM

## 2021-08-08 DIAGNOSIS — E55.9 VITAMIN D DEFICIENCY: ICD-10-CM

## 2021-08-08 LAB
CULTURE: NORMAL
Lab: NORMAL
SPECIMEN DESCRIPTION: NORMAL

## 2021-08-08 NOTE — TELEPHONE ENCOUNTER
Patient discharged from [facility] Saturday evening. Please call and schedule hospital follow up with me.

## 2021-08-09 ENCOUNTER — TELEPHONE (OUTPATIENT)
Dept: FAMILY MEDICINE CLINIC | Age: 61
End: 2021-08-09

## 2021-08-09 NOTE — TELEPHONE ENCOUNTER
[facility] Trans[facility] Initial Follow Up Call    Outreach made within 2 business days of discharge: Yes    Patient: Marycarmen Nix Patient : 1960   MRN: [id number]  Reason for Admission: COPD    Discharge Date: 21       Spoke with: Trina Mecrer    Discharge department/facility: [facility] Patient Contact:  Was patient able to fill all prescriptions: Yes  Was patient instructed to bring all medications to the follow-up visit: Yes  Is patient taking all medications as directed in the discharge summary?  Yes  Does patient understand their discharge instructions: Yes  Does patient have questions or concerns that need addressed prior to 7-14 day follow up office visit: no    Scheduled appointment with PCP within 7-14 days    Follow Up  Future Appointments   Date Time Provider Mark Junior   2021 10:00 AM Francisca Mari DPM [address] [place] [address]   10/11/2021  8:30 AM David Sullivan MD [facility] None   10/19/2021  8:30 AM BRAULIO Westbrook - CNP [facility]       Amy Castillo MA

## 2021-08-10 NOTE — TELEPHONE ENCOUNTER
Last visit: 7-13-21  Last Med refill: 8-5-21  Does patient have enough medication for 72 hours: yes    Next Visit Date:  Future Appointments   Date Time Provider Mark Junior   8/17/2021 12:30 PM BRAULIO Mendes CNP [facility]   8/23/2021 10:00 AM Christopher Ibarra DPM [facility]   10/11/2021  8:30 AM Med Adams MD [facility] None   10/19/2021  8:30 AM BRAULIO Mendes CNP [facility]       Health Maintenance   Topic Date Due    Lipid screen  05/07/2021    Flu vaccine (1) 09/01/2021    Breast cancer screen  10/03/2021    Diabetic foot exam  04/26/2022    Diabetic retinal exam  07/09/2022    A1C test (Diabetic or Prediabetic)  08/05/2022    Potassium monitoring  08/06/2022    Creatinine monitoring  08/06/2022    Cervical cancer screen  11/16/2022    Pneumococcal 0-64 years Vaccine (2 of 2 - PPSV23) 04/23/2023    DTaP/Tdap/Td vaccine (2 - Td or Tdap) 08/04/2026    Colon cancer screen colonoscopy  02/16/2028    Shingles Vaccine  Completed    COVID-19 Vaccine  Completed    Hepatitis C screen  Addressed    HIV screen  Addressed    Hepatitis A vaccine  Aged Out    Hib vaccine  Aged Out    Meningococcal (ACWY) vaccine  Aged Out       Hemoglobin A1C (%)   Date Value   08/05/2021 7.2 (H)   07/19/2021 6.4 (A)   09/14/2020 7.5 (H)             ( goal A1C is < 7)   Microalb/Crt.  Ratio (mcg/mg creat)   Date Value   04/08/2019 CANNOT BE CALCULATED     LDL Cholesterol (mg/dL)   Date Value   05/07/2020 68   08/01/2019 51     LDL Calculated (mg/dL)   Date Value   04/10/2020 59       (goal LDL is <100)   AST (U/L)   Date Value   07/19/2021 24     ALT (U/L)   Date Value   07/19/2021 32     BUN (mg/dL)   Date Value   08/06/2021 18     BP Readings from Last 3 Encounters:   08/07/21 129/62   08/05/21 (!) 155/83   08/02/21 (!) 143/83          (goal 120/80)    All Future Testing planned in [facility]  Lab Frequency Next Occurrence   ECHO Complete 2D W Doppler W Color Once 07/19/2021   POCT INR     Basic Metabolic Panel Every 6 Months 9/10/2021               Patient Active Problem List:     Essential hypertension     Hyperlipidemia     Osteoarthritis     Depression     Obesity     CKD (chronic kidney disease) stage 3, GFR 30-59 ml/min (HCC)     Proteinuria     Controlled type 2 diabetes mellitus with stage 3 chronic kidney disease, without long-term current use of insulin (HCC)     History of DVT of lower extremity     Controlled type 2 diabetes mellitus with stage 2 chronic kidney disease, without long-term current use of insulin (HCC)     NARCISO on CPAP     Vitamin D deficiency     Major depressive disorder, recurrent episode, severe, with psychotic behavior ([facility] [address]     Multiple lung nodules on CT     Hypomagnesemia     SOB (shortness of breath)     Anxiety     Chronic obstructive pulmonary disease (HCC)     Precordial pain     History of pulmonary embolism     Family history of abdominal aortic aneurysm     Normal coronary arteries     Long term (current) use of anticoagulants     AAA (abdominal aortic aneurysm) without rupture (HCC)     COPD exacerbation (HCC)     Acute tracheobronchitis-viral

## 2021-08-11 LAB
CULTURE: NORMAL
Lab: NORMAL
SPECIMEN DESCRIPTION: NORMAL

## 2021-08-11 RX ORDER — OXYCODONE HYDROCHLORIDE AND ACETAMINOPHEN 5; 325 MG/1; MG/1
1 TABLET ORAL 2 TIMES DAILY PRN
Qty: 40 TABLET | Refills: 0 | Status: SHIPPED | OUTPATIENT
Start: 2021-08-11 | End: 2021-09-08

## 2021-08-11 RX ORDER — GLUCOSAMINE/CHONDR SU A SOD 750-600 MG
1 TABLET ORAL DAILY
Qty: 30 CAPSULE | Refills: 5 | Status: SHIPPED | OUTPATIENT
Start: 2021-08-11 | End: 2022-02-08

## 2021-08-17 ENCOUNTER — OFFICE VISIT (OUTPATIENT)
Dept: FAMILY MEDICINE CLINIC | Age: 61
End: 2021-08-17
Payer: MEDICARE

## 2021-08-17 VITALS
OXYGEN SATURATION: 99 % | TEMPERATURE: 99 F | DIASTOLIC BLOOD PRESSURE: 78 MMHG | SYSTOLIC BLOOD PRESSURE: 132 MMHG | BODY MASS INDEX: 43.58 KG/M2 | WEIGHT: 236.8 LBS | HEIGHT: 62 IN | RESPIRATION RATE: 20 BRPM | HEART RATE: 98 BPM

## 2021-08-17 DIAGNOSIS — Z79.01 LONG TERM (CURRENT) USE OF ANTICOAGULANTS: ICD-10-CM

## 2021-08-17 DIAGNOSIS — G47.33 OSA ON CPAP: Chronic | ICD-10-CM

## 2021-08-17 DIAGNOSIS — J44.1 COPD EXACERBATION (HCC): Primary | ICD-10-CM

## 2021-08-17 DIAGNOSIS — N18.30 CONTROLLED TYPE 2 DIABETES MELLITUS WITH STAGE 3 CHRONIC KIDNEY DISEASE, WITHOUT LONG-TERM CURRENT USE OF INSULIN (HCC): Chronic | ICD-10-CM

## 2021-08-17 DIAGNOSIS — Z99.89 OSA ON CPAP: Chronic | ICD-10-CM

## 2021-08-17 DIAGNOSIS — E11.22 CONTROLLED TYPE 2 DIABETES MELLITUS WITH STAGE 3 CHRONIC KIDNEY DISEASE, WITHOUT LONG-TERM CURRENT USE OF INSULIN (HCC): Chronic | ICD-10-CM

## 2021-08-17 DIAGNOSIS — F33.3 MAJOR DEPRESSIVE DISORDER, RECURRENT EPISODE, SEVERE, WITH PSYCHOTIC BEHAVIOR (HCC): Chronic | ICD-10-CM

## 2021-08-17 DIAGNOSIS — Z86.718 HISTORY OF DVT OF LOWER EXTREMITY: ICD-10-CM

## 2021-08-17 PROBLEM — R06.02 SOB (SHORTNESS OF BREATH): Status: RESOLVED | Noted: 2020-05-07 | Resolved: 2021-08-17

## 2021-08-17 LAB
INTERNATIONAL NORMALIZATION RATIO, POC: 5.3
PROTHROMBIN TIME, POC: 64.1

## 2021-08-17 PROCEDURE — 1111F DSCHRG MED/CURRENT MED MERGE: CPT | Performed by: NURSE PRACTITIONER

## 2021-08-17 PROCEDURE — 99215 OFFICE O/P EST HI 40 MIN: CPT | Performed by: NURSE PRACTITIONER

## 2021-08-17 PROCEDURE — 85610 PROTHROMBIN TIME: CPT | Performed by: NURSE PRACTITIONER

## 2021-08-17 RX ORDER — ORAL SEMAGLUTIDE 3 MG/1
1 TABLET ORAL DAILY
Qty: 30 TABLET | Refills: 0 | COMMUNITY
Start: 2021-08-17 | End: 2021-09-09 | Stop reason: DRUGHIGH

## 2021-08-17 ASSESSMENT — ENCOUNTER SYMPTOMS
BLOOD IN STOOL: 0
NAUSEA: 0
SINUS PRESSURE: 0
RHINORRHEA: 0
CHEST TIGHTNESS: 0
SORE THROAT: 0
TROUBLE SWALLOWING: 0
CONSTIPATION: 0
BACK PAIN: 0
DIARRHEA: 1
SHORTNESS OF BREATH: 1
WHEEZING: 0
ABDOMINAL PAIN: 0
COUGH: 1

## 2021-08-17 NOTE — PATIENT INSTRUCTIONS
Start Farxiga - sugars and heart healthy  Start Rybelsus - sugars, heart healthy   Plan to potentially stop Metformin due to daily diarrhea     Change inhaler to Stiolio - 2 puffs daily.  This will replace Spiriva  No coumadin today or tomorrow   INR check thursday morning to decide doses     NO salads yet  Keep checking sugars  No nebulizer - only if sick   If SOB/wheezes use albuterol inhaler

## 2021-08-17 NOTE — PROGRESS NOTES
[address]  [phone]    8/17/21     Patient ID  Julien Romero is a 59 y.o. female  Established patient    Chief Complaint  Julien Romero presents today for Follow-Up from Hospital (COPD)      ASSESSMENT  {There are no diagnoses linked to this encounter. (Refresh or delete this SmartLink)}     PLAN    No follow-ups on file. {Time Documentation Optional:[id number]}    Patient Care Team:  BRAULIO Humphrey CNP as PCP - General (Nurse Practitioner Family)  BRAULIO Humphrey CNP as PCP - [address] [place] [address] [facility] [place] [facility]  Tamiko Garrison MD as Consulting Physician (Nephrology)  Wilder Galvan MD as Consulting Physician (Pulmonology)  Vincenzo Adan MD as Consulting Physician (Cardiology)  Myke Beckham DPM as Consulting Physician (Podiatry)    SUBJECTIVE/OBJECTIVE    History of Present illness / Visit Summary     Have you seen any other physician or provider since your last visit? Yes - Records Obtained  Have you had any other diagnostic tests since your last visit? Yes - Records Obtained  Have you been seen in the emergency room and/or had an admission to a hospital since we last saw you? Yes - Records Obtained    HPI    Review of Systems  Review of Systems    Physical exam   Physical Exam      Electronically signed by Marin Bailey LPN, APRN-CNP on 1/18/6244 at 12:31 PM    Please note that this chart was generated using voice recognition Dragon dictation software. Although every effort was made to ensure the accuracy of this automated transcription, some errors in transcription may have occurred.

## 2021-08-17 NOTE — RESULT ENCOUNTER NOTE
Noted. Patient is to hold Coumadin this evening and tomorrow evening. She is to have her INR rechecked Thursday morning to decipher Thursday dose.   Spoke to her directly at her office visit today

## 2021-08-17 NOTE — PROGRESS NOTES
Post-Discharge Transitional Care Management Services or Hospital Follow Up      Benjamin Gee   YOB: 1960    Date of Office Visit:  2021  Date of Hospital Admission: 21  Date of Hospital Discharge: 21  Readmission Risk Score(high >=14%. Medium >=10%):Readmission Risk Score: 17      Care management risk score Rising risk (score 2-5) and Complex Care (Scores >=6): 7     Non face to face  following discharge, date last encounter closed (first attempt may have been earlier): yes  Call initiated 2 business days of discharge: yes 2021    [facility] 45 Transitions Initial Follow Up Call     Outreach made within 2 business days of discharge: Yes     Patient: Benjamin Gee          Patient : 1960   MRN: [id number]         Reason for Admission: COPD     Discharge Date: 21                          Spoke with: Gaudencio Muñoz     Discharge department/facility: [facility] Interactive Patient Contact:  Was patient able to fill all prescriptions: Yes  Was patient instructed to bring all medications to the follow-up visit: Yes  Is patient taking all medications as directed in the discharge summary?  Yes  Does patient understand their discharge instructions: Yes  Does patient have questions or concerns that need addressed prior to 7-14 day follow up office visit: no    Patient Active Problem List   Diagnosis    Essential hypertension    Hyperlipidemia    Osteoarthritis    Obesity    CKD (chronic kidney disease) stage 3, GFR 30-59 ml/min (HCC)    Proteinuria    Controlled type 2 diabetes mellitus with stage 3 chronic kidney disease, without long-term current use of insulin (HCC)    History of DVT of lower extremity    NARCISO on CPAP    Vitamin D deficiency    Major depressive disorder, recurrent episode, severe, with psychotic behavior ([facility] [address]    Multiple lung nodules on CT    Hypomagnesemia    Anxiety    Chronic obstructive pulmonary disease (HCC)    Precordial pain    History of pulmonary embolism    Family history of abdominal aortic aneurysm    Normal coronary arteries    Long term (current) use of anticoagulants    AAA (abdominal aortic aneurysm) without rupture (HCC)    COPD exacerbation (HCC)    Acute tracheobronchitis-viral       No Known Allergies    Medications listed as ordered at the time of discharge from hospital   Andres, [address] Medication Instructions WEN:    Printed on:08/29/21 3537   Medication Information                      Accu-Chek FastClix Lancets MISC  use 1 LANCET to TEST BLOOD SUGAR one to two times a day             albuterol sulfate  (90 Base) MCG/ACT inhaler  Inhale 2 puffs into the lungs every 4 hours as needed for Wheezing or Shortness of Breath (AND/OR COUGH)             Alcohol Swabs (ALCOHOL PREP) 70 % PADS  Use twice daily and as needed to check blood sugars             allopurinol (ZYLOPRIM) 100 MG tablet  Take 1 tablet by mouth daily             atorvastatin (LIPITOR) 40 MG tablet  Take 1 tablet by mouth daily             Blood Pressure Monitoring KIT  Use to check blood pressure daily and as needed             buPROPion (WELLBUTRIN XL) 150 MG XL tablet  Take 150 mg by mouth every morning.              Cholecalciferol (RA VITAMIN D-3) 50 MCG (2000 UT) CAPS  Take 1 capsule by mouth daily             Continuous Blood Gluc  (FREESTYLE BARB 14 DAY READER) LIZ  1 each by Does not apply route continuous             Continuous Blood Gluc Sensor (FREESTYLE BARB 14 DAY SENSOR) MISC  1 each by Does not apply route continuous             dapagliflozin (FARXIGA) 5 MG tablet  Take 1 tablet by mouth every morning             diclofenac sodium (VOLTAREN) 1 % GEL  Apply 2 g topically 2 times daily for 21 days             doxycycline hyclate (VIBRA-TABS) 100 MG tablet  Take 1 tablet by mouth 2 times daily for 10 days             furosemide (LASIX) 20 MG tablet  take 1 tablet by mouth once daily             Handicap Placard MISC  by Does not apply route Exp 2/3/2026             hydrALAZINE (APRESOLINE) 50 MG tablet  take 1 tablet by mouth twice a day             ipratropium-albuterol (DUONEB) 0.5-2.5 (3) MG/3ML SOLN nebulizer solution  Inhale 3 mLs into the lungs every 6 hours as needed for Shortness of Breath             isosorbide mononitrate (IMDUR) 30 MG extended release tablet  Take 1 tablet by mouth daily             Lancets Misc. (ACCU-CHEK FASTCLIX LANCET) KIT  use as directed PLEASE APPROVED NEW DEVICE WHILE WE WAIT FOR PRIOR AUTH. .. FASTCLIX MIGHT BE EAISER TO MANIPULATE             lisinopril (PRINIVIL;ZESTRIL) 20 MG tablet  Take 1 tablet by mouth daily             metFORMIN (GLUCOPHAGE-XR) 500 MG extended release tablet  Take 2 tablets by mouth 2 times daily Take 2 tablets by mouth twice a day. Misc. Devices MISC  1 PAIR OF DIABETIC SHOES (1 LEFT/ 1 RIGHT)  1-3 PAIRS OF INSERTS (LEFT/ RIGHT)             Misc. Devices MISC  1 PAIR OF DIABETIC SHOES (1 LEFT/ 1 RIGHT)  1-3 PAIRS OF INSERTS (LEFT/ RIGHT)             naloxone 4 MG/0.1ML LIQD nasal spray  1 spray by Nasal route as needed for Opioid Reversal             omeprazole (PRILOSEC) 20 MG delayed release capsule  Take 1 capsule by mouth Daily             ONETOUCH ULTRA strip  TEST THREE TIMES DAILY             oxyCODONE-acetaminophen (PERCOCET) 5-325 MG per tablet  Take 1 tablet by mouth 2 times daily as needed for Pain.              predniSONE (DELTASONE) 10 MG tablet  Take 4 tablets by mouth daily For 3 days then reduce by 10 mg every third day till gone             Respiratory Therapy Supplies (NEBULIZER/TUBING/MOUTHPIECE) KIT  1 kit by Does not apply route 4 times daily as needed (wheezing)             Semaglutide (RYBELSUS) 3 MG TABS  Take 1 tablet by mouth daily             SITagliptin (JANUVIA) 100 MG tablet  Take 1 tablet by mouth daily             tiotropium (SPIRIVA HANDIHALER) 18 MCG inhalation capsule  Inhale 1 capsule into the lungs daily tiotropium-olodaterol (STIOLTO) 2.5-2.5 MCG/ACT AERS  Inhale 2 puffs into the lungs daily             warfarin (COUMADIN) 2 MG tablet  take 1 tablet by mouth SUNDAY, TUESDAY, THURSDAY, AND SATURDAY ( take 1 AND 1/2 tablets by mouth MONDAY, WEDNESDAY, FRIDAY )                   Medications marked \"taking\" at this time  Outpatient Medications Marked as Taking for the 8/17/21 encounter (Office Visit) with BRAULIO Ruby CNP   Medication Sig Dispense Refill    Semaglutide (RYBELSUS) 3 MG TABS Take 1 tablet by mouth daily 30 tablet 0    tiotropium-olodaterol (STIOLTO) 2.5-2.5 MCG/ACT AERS Inhale 2 puffs into the lungs daily 2 Inhaler 0    dapagliflozin (FARXIGA) 5 MG tablet Take 1 tablet by mouth every morning 14 tablet 0    Cholecalciferol (RA VITAMIN D-3) 50 MCG (2000 UT) CAPS Take 1 capsule by mouth daily 30 capsule 5    oxyCODONE-acetaminophen (PERCOCET) 5-325 MG per tablet Take 1 tablet by mouth 2 times daily as needed for Pain. 40 tablet 0    hydrALAZINE (APRESOLINE) 50 MG tablet take 1 tablet by mouth twice a day 180 tablet 1    omeprazole (PRILOSEC) 20 MG delayed release capsule Take 1 capsule by mouth Daily 90 capsule 1    atorvastatin (LIPITOR) 40 MG tablet Take 1 tablet by mouth daily 90 tablet 1    SITagliptin (JANUVIA) 100 MG tablet Take 1 tablet by mouth daily 90 tablet 1    lisinopril (PRINIVIL;ZESTRIL) 20 MG tablet Take 1 tablet by mouth daily 90 tablet 1    Lancets Misc. (ACCU-CHEK FASTCLIX LANCET) KIT use as directed PLEASE APPROVED NEW DEVICE WHILE WE WAIT FOR PRIOR AUTH. ..  FASTCLIX MIGHT BE EAISER TO MANIPULATE 1 kit 0    Accu-Chek FastClix Lancets MISC use 1 LANCET to TEST BLOOD SUGAR one to two times a day 120 each 3    isosorbide mononitrate (IMDUR) 30 MG extended release tablet Take 1 tablet by mouth daily 90 tablet 1    Continuous Blood Gluc Sensor (FREESTYLE BARB 14 DAY SENSOR) MISC 1 each by Does not apply route continuous 2 each 1    Continuous Blood Gluc  (FREESTYLE BARB 14 DAY READER) LIZ 1 each by Does not apply route continuous 1 Device 1    Alcohol Swabs (ALCOHOL PREP) 70 % PADS Use twice daily and as needed to check blood sugars 120 each 3    warfarin (COUMADIN) 2 MG tablet take 1 tablet by mouth SUNDAY, TUESDAY, THURSDAY, AND SATURDAY ( take 1 AND 1/2 tablets by mouth MONDAY, WEDNESDAY, FRIDAY ) 45 tablet 5    albuterol sulfate  (90 Base) MCG/ACT inhaler Inhale 2 puffs into the lungs every 4 hours as needed for Wheezing or Shortness of Breath (AND/OR COUGH) 18 g 1    ONETOUCH ULTRA strip TEST THREE TIMES DAILY 100 strip 5    tiotropium (SPIRIVA HANDIHALER) 18 MCG inhalation capsule Inhale 1 capsule into the lungs daily 90 capsule 1    allopurinol (ZYLOPRIM) 100 MG tablet Take 1 tablet by mouth daily 90 tablet 1    metFORMIN (GLUCOPHAGE-XR) 500 MG extended release tablet Take 2 tablets by mouth 2 times daily Take 2 tablets by mouth twice a day. 360 tablet 1    [DISCONTINUED] furosemide (LASIX) 20 MG tablet Take 1 tablet by mouth daily 90 tablet 1    naloxone 4 MG/0.1ML LIQD nasal spray 1 spray by Nasal route as needed for Opioid Reversal 1 each 5    Handicap Placard MISC by Does not apply route Exp 2/3/2026 1 each 0    ipratropium-albuterol (DUONEB) 0.5-2.5 (3) MG/3ML SOLN nebulizer solution Inhale 3 mLs into the lungs every 6 hours as needed for Shortness of Breath 360 mL 0    Misc. Devices MISC 1 PAIR OF DIABETIC SHOES (1 LEFT/ 1 RIGHT)  1-3 PAIRS OF INSERTS (LEFT/ RIGHT) 2 each 0    Respiratory Therapy Supplies (NEBULIZER/TUBING/MOUTHPIECE) KIT 1 kit by Does not apply route 4 times daily as needed (wheezing) 1 kit 0    Misc. Devices MISC 1 PAIR OF DIABETIC SHOES (1 LEFT/ 1 RIGHT)  1-3 PAIRS OF INSERTS (LEFT/ RIGHT) 2 each 0    Blood Pressure Monitoring KIT Use to check blood pressure daily and as needed 1 kit 0    buPROPion (WELLBUTRIN XL) 150 MG XL tablet Take 150 mg by mouth every morning.           Medications patient taking as of now reconciled against medications ordered at time of hospital discharge: Yes    Chief Complaint   Patient presents with    Follow-Up from Hospital     COPD       Patient presents to office today for hospital follow-up. She was in the office on Monday morning for her INR check and was in distress. That nurse visit turned into a same-day appointment and patient was prescribed antibiotics. She returned to the office on Thursday morning with her daughter for repeat INR check. Her symptoms have worsened. Patient was admitted to [facility] that afternoon, direct admission. She stayed for 2 nights. Was discharged home with no respiratory distress or need of oxygen. She also was discharged with steroids which are increasing her blood sugars. Previously we also discontinued her Amaryl due to cardiac and respiratory side effects. I started her on [place], which she has not started. Patient also brings to my attention that she has had daily loose stools since initiation of her Metformin years ago. She is also asking to introduce salads into her diet. Today overall she feels much better. I would like her to keep a daily log of her blood sugars. If they do not resolve back to her baseline, after completion of steroids, we will have to decipher a change in care moving forward. Also encouraged to follow-up with pulmonology. Patient states she has not been wearing her CPAP for approximately 1 month. She comments she feels that she is sleeping better in an upright position. Inpatient course: Discharge summary reviewed- see chart. Interval history/Current status: stable    Review of Systems   Constitutional: Positive for fatigue. Negative for activity change, appetite change, chills and fever. HENT: Negative for congestion, ear pain, postnasal drip, rhinorrhea, sinus pressure, sneezing, sore throat and trouble swallowing. Respiratory: Positive for cough and shortness of breath (improved). Negative for chest tightness and wheezing. Cardiovascular: Negative for chest pain, palpitations and leg swelling. Gastrointestinal: Positive for diarrhea (daily (since started Metformin) ). Negative for abdominal pain, blood in stool, constipation and nausea. Genitourinary: Negative for difficulty urinating, dysuria, frequency, hematuria and urgency. Musculoskeletal: Negative for arthralgias, back pain, gait problem, joint swelling and myalgias. Skin: Negative for rash and wound. Allergic/Immunologic: Negative for environmental allergies. Neurological: Negative for dizziness, syncope, light-headedness, numbness and headaches. Hematological: Does not bruise/bleed easily. Psychiatric/Behavioral: Positive for sleep disturbance (not wearing CPAP due to poor equipment ). Negative for agitation, decreased concentration, self-injury and suicidal ideas. The patient is not nervous/anxious. CPAP HS   Follows with pulmonology        Vitals:    08/17/21 1231   BP: 132/78   Site: Left Upper Arm   Position: Sitting   Cuff Size: Large Adult   Pulse: 98   Resp: 20   Temp: 99 °F (37.2 °C)   SpO2: 99%   Weight: 236 lb 12.8 oz (107.4 kg)   Height: 5' 2\" (1.575 m)     Body mass index is 43.31 kg/m². Wt Readings from Last 3 Encounters:   08/23/21 236 lb (107 kg)   08/17/21 236 lb 12.8 oz (107.4 kg)   08/07/21 245 lb 11.2 oz (111.4 kg)     BP Readings from Last 3 Encounters:   08/17/21 132/78   08/07/21 129/62   08/05/21 (!) 155/83       Physical Exam  Vitals and nursing note reviewed. Constitutional:       General: She is not in acute distress. Appearance: Normal appearance. She is well-developed and well-groomed. She is morbidly obese. She is not ill-appearing or toxic-appearing. Eyes:      Comments: Glasses    Cardiovascular:      Rate and Rhythm: Normal rate and regular rhythm. No extrasystoles are present. Heart sounds: Normal heart sounds, S1 normal and S2 normal. No murmur heard. Pulmonary:      Effort: Pulmonary effort is normal. Prolonged expiration present. No respiratory distress. Breath sounds: Normal breath sounds and air entry. Transmitted upper airway sounds present. No wheezing, rhonchi or rales. Skin:     General: Skin is warm and dry. Coloration: Skin is not ashen, cyanotic, jaundiced or pale. Neurological:      Mental Status: She is alert and oriented to person, place, and time. Motor: Motor function is intact. Gait: Gait is intact. Psychiatric:         Attention and Perception: Attention and perception normal.         Mood and Affect: Mood and affect normal.         Speech: Speech normal.         Behavior: Behavior normal. Behavior is cooperative. Thought Content: Thought content normal. Thought content does not include suicidal ideation. Thought content does not include suicidal plan. Cognition and Memory: Cognition and memory normal.         Judgment: Judgment normal.         Assessment/Plan:  1. [facility]  Assessment & Plan:   Monitored by specialist- no acute findings meriting change in the plan   Sample given of Stiolio. May have better benefits than Spiriva. Orders:  -     ID DISCHARGE MEDS RECONCILED W/ CURRENT OUTPATIENT MED LIST  2. History of DVT of lower extremity  -     POCT INR  3. Long term (current) use of anticoagulants  Assessment & Plan:   Well-controlled, continue current medications and continue current treatment plan  4. NARCISO on CPAP  Assessment & Plan:   Monitored by specialist- no acute findings meriting change in the plan   Would benefit from repeat sleep study for proper titration settings   encouraged to make follow up appointment   5. Controlled type 2 diabetes mellitus with stage 3 chronic kidney disease, without long-term current use of insulin ([facility] [address]  Assessment & Plan:   Uncontrolled, changes made today: Start the [place]. Amaryl discontinued previously.   Samples of Farxiga and Rybelsus given.   If blood sugars remain elevated may start lantus? 6. Major depressive disorder, recurrent episode, severe, with psychotic behavior ([facility] [address]  Assessment & Plan:   Monitored by specialist- no acute findings meriting change in the plan   Risperdal discontinued     Continue Metformin XR and Januvia. Samples given for Farxiga and Rybelsus 3 mg. Continue to monitor blood sugar. Make appointment with pulmonologist, repeat sleep study may be needed. [facility]anged [facility] to [facility]    Medical Decision Making: moderate complexity      An electronic signature was used to authenticate this note. --Cat De Jesus, APRN - CNP   Please note that this chart was generated using voice recognition Dragon dictation software. Although every effort was made to ensure the accuracy of this automated transcription, some errors in transcription may have occurred.

## 2021-08-19 ENCOUNTER — ANTI-COAG VISIT (OUTPATIENT)
Dept: FAMILY MEDICINE CLINIC | Age: 61
End: 2021-08-19
Payer: MEDICARE

## 2021-08-19 DIAGNOSIS — Z86.718 HISTORY OF DVT OF LOWER EXTREMITY: Primary | ICD-10-CM

## 2021-08-19 LAB
INTERNATIONAL NORMALIZATION RATIO, POC: 2.2
PROTHROMBIN TIME, POC: 26.2

## 2021-08-19 PROCEDURE — 85610 PROTHROMBIN TIME: CPT | Performed by: NURSE PRACTITIONER

## 2021-08-19 PROCEDURE — 93793 ANTICOAG MGMT PT WARFARIN: CPT | Performed by: NURSE PRACTITIONER

## 2021-08-19 NOTE — PROGRESS NOTES
Patient presents in the office today with self for INR recheck. Patient is alert, oriented, and dressed appropriately. Tolerated well. Patient has been holding coumadin as advised.      Previous Coumadin dose: Sig: take 1 tablet by mouth SUNDAY, TUESDAY, THURSDAY, AND SATURDAY ( take 1 AND 1/2 tablets by mouth MONDAY, WEDNESDAY, FRIDAY )

## 2021-08-19 NOTE — PROGRESS NOTES
INR therapeutic at 2.2. Continue same doses as prior.    3 mg Monday Wednesday and Friday   2 mg Tuesday Thursday Saturday Sunday  Repeat INR next Thursday

## 2021-08-20 RX ORDER — DOXYCYCLINE HYCLATE 100 MG
100 TABLET ORAL 2 TIMES DAILY
Qty: 20 TABLET | Refills: 0
Start: 2021-08-20 | End: 2021-08-30

## 2021-08-20 ASSESSMENT — ENCOUNTER SYMPTOMS
SORE THROAT: 1
TROUBLE SWALLOWING: 1

## 2021-08-23 ENCOUNTER — OFFICE VISIT (OUTPATIENT)
Dept: PODIATRY | Age: 61
End: 2021-08-23
Payer: MEDICARE

## 2021-08-23 VITALS — HEIGHT: 62 IN | BODY MASS INDEX: 43.43 KG/M2 | WEIGHT: 236 LBS

## 2021-08-23 DIAGNOSIS — M79.671 PAIN IN BOTH FEET: ICD-10-CM

## 2021-08-23 DIAGNOSIS — M76.71 PERONEAL TENDONITIS OF RIGHT LOWER EXTREMITY: ICD-10-CM

## 2021-08-23 DIAGNOSIS — E11.51 TYPE II DIABETES MELLITUS WITH PERIPHERAL CIRCULATORY DISORDER (HCC): Primary | ICD-10-CM

## 2021-08-23 DIAGNOSIS — M79.672 PAIN IN BOTH FEET: ICD-10-CM

## 2021-08-23 DIAGNOSIS — B35.1 DERMATOPHYTOSIS OF NAIL: ICD-10-CM

## 2021-08-23 DIAGNOSIS — I73.9 PVD (PERIPHERAL VASCULAR DISEASE) (HCC): ICD-10-CM

## 2021-08-23 PROCEDURE — 1036F TOBACCO NON-USER: CPT | Performed by: PODIATRIST

## 2021-08-23 PROCEDURE — 2022F DILAT RTA XM EVC RTNOPTHY: CPT | Performed by: PODIATRIST

## 2021-08-23 PROCEDURE — 1111F DSCHRG MED/CURRENT MED MERGE: CPT | Performed by: PODIATRIST

## 2021-08-23 PROCEDURE — G8427 DOCREV CUR MEDS BY ELIG CLIN: HCPCS | Performed by: PODIATRIST

## 2021-08-23 PROCEDURE — 11721 DEBRIDE NAIL 6 OR MORE: CPT | Performed by: PODIATRIST

## 2021-08-23 PROCEDURE — G8417 CALC BMI ABV UP PARAM F/U: HCPCS | Performed by: PODIATRIST

## 2021-08-23 PROCEDURE — 3051F HG A1C>EQUAL 7.0%<8.0%: CPT | Performed by: PODIATRIST

## 2021-08-23 PROCEDURE — 99214 OFFICE O/P EST MOD 30 MIN: CPT | Performed by: PODIATRIST

## 2021-08-23 PROCEDURE — 3017F COLORECTAL CA SCREEN DOC REV: CPT | Performed by: PODIATRIST

## 2021-08-23 NOTE — PROGRESS NOTES
[facility] [place] [id number] [address]  Dept: [phone]    DIABETIC PROGRESS NOTE  Date of patient's visit: 8/23/2021  Patient's Name:  Sharif Phillips YOB: 1960            Patient Care Team:  BRAULIO Nolen CNP as PCP - General (Nurse Practitioner Family)  BRAULIO Nolen CNP as PCP - [facility]  Parish Diaz MD as Consulting Physician (Nephrology)  Héctor Dumont MD as Consulting Physician (Pulmonology)  Aruna Garcia MD as Consulting Physician (Cardiology)  Fiona Son DPM as Consulting Physician (Jese Barnes)  Queen Marco MD (Psychiatry)          Chief Complaint   Patient presents with    Nail Problem    Diabetes    Peripheral Neuropathy       Subjective:   Sharif Phillips comes to clinic for Nail Problem, Diabetes, and Peripheral Neuropathy    she is a diabetic and states that she has pain to her rright ankle. Pt currently has complaint of thickened, elongated nails that they cannot manage by themselves. Pt's primary care physician is BRAULIO Nolen CNP last seen august 17 2021   Pt's last blood sugar was 294 . Pt has a new complaint of right foot pain on the lateral side of her foot. She states the pain has been present for 2 days. She states she thinks she may have stepped down wrong while walking. She states this has happened in the past. .  Pt has tried changing shoes but it has not helped the pain  Patient is taking over the counter medications with no relief. Lab Results   Component Value Date    LABA1C 7.2 (H) 08/05/2021      Complains of numbness in the feet bilat.   Past Medical History:   Diagnosis Date    Anxiety     Chronic kidney disease     [facility] obstructive [facility] disease) [facility] [address]     Depression     Fracture of ankle 5/14/2020    Hyperlipidemia     Hypertension     Obesity     Osteoarthritis     Proteinuria     Pulmonary embolism ([address]     Sleep apnea     c-pap. Doesn't use everynight    SOB (shortness of breath) 5/7/2020    Type 2 diabetes mellitus without complication ([facility])     Type II or unspecified type diabetes mellitus without mention of complication, not stated as uncontrolled     Von Willebrand disease ([facility] [address]        No Known Allergies  Current Outpatient Medications on File Prior to Visit   Medication Sig Dispense Refill    doxycycline hyclate (VIBRA-TABS) 100 MG tablet Take 1 tablet by mouth 2 times daily for 10 days 20 tablet 0    Semaglutide (RYBELSUS) 3 MG TABS Take 1 tablet by mouth daily 30 tablet 0    tiotropium-olodaterol (STIOLTO) 2.5-2.5 MCG/ACT AERS Inhale 2 puffs into the lungs daily 2 Inhaler 0    dapagliflozin (FARXIGA) 5 MG tablet Take 1 tablet by mouth every morning 14 tablet 0    Cholecalciferol (RA VITAMIN D-3) 50 MCG (2000 UT) CAPS Take 1 capsule by mouth daily 30 capsule 5    oxyCODONE-acetaminophen (PERCOCET) 5-325 MG per tablet Take 1 tablet by mouth 2 times daily as needed for Pain. 40 tablet 0    predniSONE (DELTASONE) 10 MG tablet Take 4 tablets by mouth daily For 3 days then reduce by 10 mg every third day till gone 30 tablet 0    hydrALAZINE (APRESOLINE) 50 MG tablet take 1 tablet by mouth twice a day 180 tablet 1    omeprazole (PRILOSEC) 20 MG delayed release capsule Take 1 capsule by mouth Daily 90 capsule 1    atorvastatin (LIPITOR) 40 MG tablet Take 1 tablet by mouth daily 90 tablet 1    SITagliptin (JANUVIA) 100 MG tablet Take 1 tablet by mouth daily 90 tablet 1    lisinopril (PRINIVIL;ZESTRIL) 20 MG tablet Take 1 tablet by mouth daily 90 tablet 1    Lancets Misc. (ACCU-CHEK FASTCLIX LANCET) KIT use as directed PLEASE APPROVED NEW DEVICE WHILE WE WAIT FOR PRIOR AUTH. ..  FASTCLIX MIGHT BE EAISER TO MANIPULATE 1 kit 0    Accu-Chek FastClix Lancets MISC use 1 LANCET to TEST BLOOD SUGAR one to two times a day 120 each 3    isosorbide mononitrate (IMDUR) 30 MG extended release tablet Take 1 tablet by mouth daily 90 tablet 1    Continuous Blood Gluc Sensor (FREESTYLE BARB 14 DAY SENSOR) MISC 1 each by Does not apply route continuous 2 each 1    Continuous Blood Gluc  (FREESTYLE BARB 14 DAY READER) LIZ 1 each by Does not apply route continuous 1 Device 1    Alcohol Swabs (ALCOHOL PREP) 70 % PADS Use twice daily and as needed to check blood sugars 120 each 3    warfarin (COUMADIN) 2 MG tablet take 1 tablet by mouth SUNDAY, TUESDAY, THURSDAY, AND SATURDAY ( take 1 AND 1/2 tablets by mouth MONDAY, WEDNESDAY, FRIDAY ) 45 tablet 5    albuterol sulfate  (90 Base) MCG/ACT inhaler Inhale 2 puffs into the lungs every 4 hours as needed for Wheezing or Shortness of Breath (AND/OR COUGH) 18 g 1    ONETOUCH ULTRA strip TEST THREE TIMES DAILY 100 strip 5    tiotropium (SPIRIVA HANDIHALER) 18 MCG inhalation capsule Inhale 1 capsule into the lungs daily 90 capsule 1    allopurinol (ZYLOPRIM) 100 MG tablet Take 1 tablet by mouth daily 90 tablet 1    metFORMIN (GLUCOPHAGE-XR) 500 MG extended release tablet Take 2 tablets by mouth 2 times daily Take 2 tablets by mouth twice a day. 360 tablet 1    furosemide (LASIX) 20 MG tablet Take 1 tablet by mouth daily 90 tablet 1    naloxone 4 MG/0.1ML LIQD nasal spray 1 spray by Nasal route as needed for Opioid Reversal 1 each 5    Handicap Placard MISC by Does not apply route Exp 2/3/2026 1 each 0    ipratropium-albuterol (DUONEB) 0.5-2.5 (3) MG/3ML SOLN nebulizer solution Inhale 3 mLs into the lungs every 6 hours as needed for Shortness of Breath 360 mL 0    Misc. Devices MISC 1 PAIR OF DIABETIC SHOES (1 LEFT/ 1 RIGHT)  1-3 PAIRS OF INSERTS (LEFT/ RIGHT) 2 each 0    Respiratory Therapy Supplies (NEBULIZER/TUBING/MOUTHPIECE) KIT 1 kit by Does not apply route 4 times daily as needed (wheezing) 1 kit 0    Misc.  Devices MISC 1 PAIR OF DIABETIC SHOES (1 LEFT/ 1 RIGHT)  1-3 PAIRS OF INSERTS (LEFT/ RIGHT) 2 each 0    Blood Pressure Monitoring KIT Use to check blood Integument: Warm, dry, supple, Bilateral.  Open lesion absent, Bilateral.  Interdigital maceration absent to web spaces 4, Bilateral.  Nails 1-5 left and 1-5 right thickened > 3.0 mm, dystrophic and crumbly, discolored with subungual debris. Fissures absent, Bilateral.   General: AAO x 3 in NAD. Derm  Toenail Description  Sites of Onychomycosis Involvement (Check affected area)  [x] [x] [x] [x] [x] [x] [x] [x] [x] [x]  5 4 3 2 1 1 2 3 4 5                          Right                                        Left    Thickness  [x] [x] [x] [x] [x] [x] [x] [x] [x] [x]  5 4 3 2 1 1 2 3 4 5                         Right                                        Left    Dystrophic Changes   [x] [x] [x] [x] [x] [x] [x] [x] [x] [x]  5 4 3 2 1 1 2 3 4 5                         Right                                        Left    Color   [x] [x] [x] [x] [x] [x] [x] [x] [x] [x]  5 4 3 2 1 1 2 3 4 5                          Right                                        Left    Incurvation/Ingrowin   [] [] [] [] [] [] [] [] [] []  5 4 3 2 1 1 2 3 4 5                         Right                                        Left    Inflammation/Pain   [x] [x] [x] [x] [x] [x] [x] [x] [x] [x]  5 4 3 2 1 1 2 3 4 5                         Right                                        Left        Visual inspection:  Deformity: hammertoe deformity sherman feet  amputation: absent  Skin lesions: absent  Edema: right- 2+ pitting edema, left- 2+ pitting edema    Sensory exam:  Monofilament sensation: abnormal - 6/10 via SW 5.07/10g monofilament to the plantar foot bilateral feet    Pulses: abnormal - 1/4 dorsalis pedis pulse and 1/4 Posterior tibial pulse,   Pinprick: Impaired  Proprioception: Impaired  Vibration (128 Hz): Impaired       DM with PVD       [x]Yes    []No      Assessment:  59 y.o. female with:   Diagnosis Orders   1.  Type II diabetes mellitus with peripheral circulatory disorder (HCC)  60261 - LA DEBRIDEMENT OF NAILS, 6 OR MORE HM DIABETES FOOT EXAM   2. Dermatophytosis of nail  27861 - AR DEBRIDEMENT OF NAILS, 6 OR MORE    HM DIABETES FOOT EXAM   3. PVD (peripheral vascular disease) ([facility] [address]  56856 - AR DEBRIDEMENT OF NAILS, 6 OR MORE    HM DIABETES FOOT EXAM   4. Peroneal tendonitis of right lower extremity  diclofenac sodium (VOLTAREN) 1 % GEL   5. Pain in both feet  28649 - AR DEBRIDEMENT OF NAILS, 6 OR MORE    HM DIABETES FOOT EXAM             Q7   []Yes    []No                Q8   [x]Yes    []No                     Q9   []Yes    []No    Plan:   Pt was evaluated and examined. Patient was given personalized discharge instructions. For the new pain to the right lateral ankle an rx was provided for voltaren gel to apply daily to the affected areas      Non-Weight Bearing Dorsiflexion     Ankle dorsiflexion is the motion of bending your ankle up towards your shin. Gaining this motion can help you regain the ability to walk normally again. Here's how to get more ankle dorsiflexion:   1. Moving only your ankle, point your foot back toward your nose (while keeping knees straight). Continue until you feel discomfort or can't tilt it back any further. 2. Hold this position for 15 seconds. 3. Return to neutral position and repeat 5 times. Non-Weight Bearing Plantar Flexion     Plantar flexion is the motion of pointing your ankle down and away from you. Here is how to gain ankle plantarflexion range of motion (ROM):   1. Moving only your ankle, point your foot forward (while keeping knees straight). Continue until you feel discomfort or can't move it any further. 2. Hold this position for 15 seconds. 3. Return to neutral position. Non-Weight Bearing Inversion     Inversion refers to the motion of pointing your ankle inwards towards the midline of your body. Here is how you gain more ankle inversion:   1. Moving only your ankle and keeping your toes pointed up, turn your foot inward, so the sole is facing your other leg.  Continue until either discomfort is felt or you can no longer turn your foot inward. 2. Hold this position for 15 seconds. 3. Return to neutral position. Non-Weight Bearing Eversion     Eversion is the motion of moving your ankle to the outside or lateral part of your leg. Perform this exercise to gain eversion motion in your ankle:   1. Moving only your ankle and keeping your toes pointed up, turn your foot outward, away from your other leg. Continue until either discomfort is felt or you can no longer turn your foot outward. 2. Hold this position for 15 seconds. 3. Return to neutral position. The Alphabet   A great way physical therapists help their patients gain ankle mobility in all directions is to perform the ankle alphabet. This can get your ankle moving in all directions. Here is how to do the exercise:   1. Sit on a chair with your foot dangling in the air or on a bed with your foot hanging off the edge. 2. Draw the alphabet one letter at a time by moving the injured ankle and using the great toe as your \"pencil. \"  Eversion Isometrics     Strengthening exercises are usually started with isometric contractions--no motion occurs around your ankle joint during the muscle contraction. They may be done early after injury or surgery to start to gently--and safely--add force to the muscles that support your ankle. To do the exericse:   1. While seated, place the outside of the injured foot against a table leg or closed door. 2. Push outward with your foot into the object your foot is against (your ankle joint should not move) causing a contraction of your muscles. 3. Hold this muscle contraction for 15 seconds. 4. Relax for 10 seconds. Inversion Isometrics     This isometric exercise focuses on inversion:   1. While seated, place the inside of the injured foot against a table leg or closed door.   2. Push inward with your foot into the object your foot is against (your ankle joint should not move) causing a contraction of your muscles. 3. Hold this muscle contraction for 15 seconds. 4. Relax for 10 seconds. Resisted Strengthening Dorsiflexion     Resisted strengthening exercises should be performed with a Theraband providing resistance to your movements. These exercises will also work to strengthen the muscles around your ankle. This will provide added support to the joint. Perform each exercise 10 to 15 times in a row. Never tie a Theraband (or anything else) around your foot, ankle, or leg in a way that would restrict blood flow. Ankle dorsiflexion with resistance helps to strengthen your anterior tibialis muscle. Here is how you do it:   1. Moving only your ankle, point your foot back toward your nose (while keeping knees straight). Continue until you feel discomfort or can't tilt it back any further. 2. Hold this position for 2 seconds and slowly release. 3. Return to the neutral position, and then repeat the exercise. Resisted Strengthening Plantar Flexion     Resisted ankle plantar flexion helps to strengthen your calf muscles and Achilles tendon. To do the exercise:   1. Moving only your ankle, point your foot forward (while keeping knees straight). You may feel tightness in your calf muscle behind your lower leg. Continue until you feel discomfort or can't move it any further. 2. Hold this position for 2 seconds. 3. Return to neutral position. Resisted Strengthening Inversion     This exercise will provide strengthening as well:   1. Moving only your ankle and keeping your toes pointed up, turn your foot inward, so the sole is facing your other leg. Continue until either discomfort is felt or you can no longer turn your foot inward. 2. Hold this position for 2 seconds. 3. Return to neutral position. Resisted Strengthening Eversion     Now strengthen in the other direction:   1. Moving only your ankle and keeping your toes pointed up, turn your foot outward, away from your other leg.  Continue until either discomfort is felt or you can no longer turn your foot outward. 2. Hold this position for 2 seconds. 3. Return to neutral position. Partial Weight-Bearing Seated Calf Raises     These partial weight-bearing exercises will help put more weight on the injured ankle as well as strengthen the muscles around it. Each one should be performed 10 times in a row:   1. Sit in a chair with the injured foot on the floor. 2. Lift your heel as far as possible while keeping your toes on the floor. 3. Return heel to the floor. Partial Weight-Bearing Standing Weight Shift     Sometimes after injury, your doctor will have you limit the amount of weight you can put through your lower extremity. This can help protect it as things are healing. As you heal, your PT may guide you in increasing weight bearing through your injured ankle. Weight shifts are the perfect exercise to do for this. To do the exercise:   1. Stand upright while holding onto a stable object. 2. Shift some of your weight onto the injured foot. 3. Hold the position for 15 seconds. 4. Relax and put your weight back onto your uninjured foot. Full Weight-Bearing Single Leg Stance     These exercises will help put more weight on the injured foot. You should be sure that your ankle can tolerate the pressure that you are putting upon it. Perform each one 10 times in a row:   1. Stand on the injured foot while lifting the uninjured foot off the ground. 2. Hold the position for 15 seconds. 3. Relax and put your weight back onto your uninjured foot. Checking in with your PT may be necessary to be sure you are doing the right exercises for your ankle. Full Weight-Bearing Standing Calf Raises     Once you are cleared for full weight bearing, you may do these calf raises:   1. Stand on the injured foot while lifting the uninjured foot off the ground.   2. Raise up, standing only on the ball of the injured foot and lifting your heel off the ground. 3. Hold the position for 15 seconds. 4. Relax and put your weight back onto your uninjured foot. Full Weight-Bearing Lateral Stepping     Increase the speed of this exercise as your healing progresses:   1. Place a rolled towel or short object on the ground to the side of your injured foot. 2. Step over the towel with the injured foot and remain on that foot. 3. Then bring the uninjured foot over the object and stand on both feet. 4. Step back over the towel with the uninjured foot and remain on that foot. 5. Then bring the injured foot back over the towel and stand on both feet. Full Weight-Bearing Lateral Jump     This exercise starts to incorporate plyometrics into your rehab routine, which can help you get back to running and sports. Increase the speed of this exercise as your healing progresses:   1. Place a rolled towel or short object on the ground to the side of your injured foot. 2. Hop over the towel and land on the injured foot. 3. Then hop back over the towel and land on the uninjured foot. Single Leg Stance on a Towel     Injury to ankles can often result in decreased balance ability. 2? Towards the end of rehabilitation, performing balance activities is an important way to prevent future injury. Perform this exercise 10 times in a row:   1. Fold a towel into a small rectangle and place on the ground. 2. Stand with the injured foot on the towel. 3. Lift the uninjured leg off the ground standing only on the towel with the injured leg. 4. Hold for 15 seconds. (As balance improves, increase stance time on injured leg up to 45 seconds.)  5. Return your uninjured foot to the floor. You can increase the challenge by standing on more unsteady surfaces like a BOSU or wobble board. Your PT may also have you use a BAPS board while working on balance exercises. Nails 1-10 were debrided sharply in length and thickness with a nipper and , without incident.  Pt will follow up in 9 weeks or sooner if any problems arise. Diagnosis was discussed with the pt and all of their questions were answered in detail. Proper foot hygiene and care was discussed with the pt. Informed patient on proper diabetic foot care and importance of tight glycemic control. Patient to check feet daily and contact the office with any questions/problems/concerns. Other comorbidity noted and will be managed by PCP. All labs were reviewed and all imagining including the above findings were reviewed PRIOR to the patients arrival and with the patient today. Previous patient encounter was reviewed. Encounters from the patients other medical providers were reviewed and noted. Time was spent educating the patient and their families/caregivers on proper care of the feet and ankles. All the above diagnosis were addressed at todays visit and all questions were answered.   A total of 30 minutes was spent with this patients encounter which included charting after the patients visit    8/23/2021    Electronically signed by Aurora Vega DPM on 8/23/2021 at 9:59 AM  8/23/2021

## 2021-08-26 ENCOUNTER — ANTI-COAG VISIT (OUTPATIENT)
Dept: FAMILY MEDICINE CLINIC | Age: 61
End: 2021-08-26
Payer: MEDICARE

## 2021-08-26 DIAGNOSIS — Z86.718 HISTORY OF DVT OF LOWER EXTREMITY: Primary | ICD-10-CM

## 2021-08-26 LAB
INTERNATIONAL NORMALIZATION RATIO, POC: 2.4
PROTHROMBIN TIME, POC: 28.7

## 2021-08-26 PROCEDURE — 85610 PROTHROMBIN TIME: CPT | Performed by: NURSE PRACTITIONER

## 2021-08-26 NOTE — PROGRESS NOTES
INR therapeutic.  continue same dose  3 mg Monday Wednesday and fridays  2 mg other days   Repeat INR in 2 weeks

## 2021-08-27 DIAGNOSIS — M79.89 LEG SWELLING: ICD-10-CM

## 2021-08-27 NOTE — TELEPHONE ENCOUNTER
Last visit: 7/19/21  Last Med refill: 3/12/21    Next Visit Date:  Future Appointments   Date Time Provider Mark Junior   9/9/2021  8:30 AM SCHEDULE, SILVANO MORRIS [facility]   10/11/2021  8:30 AM Rusty Dow MD [facility] None   10/19/2021  8:30 AM BRAULIO Fenton - CNP [facility]   10/25/2021 10:00 AM Marilu Flanagan DPM [address] Maintenance   Topic Date Due    Lipid screen  05/07/2021    Flu vaccine (1) 09/01/2021    Breast cancer screen  10/03/2021    Diabetic retinal exam  07/09/2022    A1C test (Diabetic or Prediabetic)  08/05/2022    Potassium monitoring  08/06/2022    Creatinine monitoring  08/06/2022    Diabetic foot exam  08/23/2022    Cervical cancer screen  11/16/2022    Pneumococcal 0-64 years Vaccine (2 of 2 - PPSV23) 04/23/2023    DTaP/Tdap/Td vaccine (2 - Td or Tdap) 08/04/2026    Colon cancer screen colonoscopy  02/16/2028    Shingles Vaccine  Completed    COVID-19 Vaccine  Completed    Hepatitis C screen  Addressed    HIV screen  Addressed    Hepatitis A vaccine  Aged Out    Hib vaccine  Aged Out    Meningococcal (ACWY) vaccine  Aged Out       Hemoglobin A1C (%)   Date Value   08/05/2021 7.2 (H)   07/19/2021 6.4 (A)   09/14/2020 7.5 (H)             ( goal A1C is < 7)   Microalb/Crt.  Ratio (mcg/mg creat)   Date Value   04/08/2019 CANNOT BE CALCULATED     LDL Cholesterol (mg/dL)   Date Value   05/07/2020 68   08/01/2019 51     LDL Calculated (mg/dL)   Date Value   04/10/2020 59       (goal LDL is <100)   AST (U/L)   Date Value   07/19/2021 24     ALT (U/L)   Date Value   07/19/2021 32     BUN (mg/dL)   Date Value   08/06/2021 18     BP Readings from Last 3 Encounters:   08/17/21 132/78   08/07/21 129/62   08/05/21 (!) 155/83          (goal 120/80)    All Future Testing planned in [facility]  Lab Frequency Next Occurrence   ECHO Complete 2D W Doppler W Color Once 07/19/2021   POCT INR     Basic Metabolic Panel Every 6 Months 9/10/2021               Patient Active Problem List:     Essential hypertension     Hyperlipidemia     Osteoarthritis     Obesity     CKD (chronic kidney disease) stage 3, GFR 30-59 ml/min (HCC)     Proteinuria     Controlled type 2 diabetes mellitus with stage 3 chronic kidney disease, without long-term current use of insulin (HCC)     History of DVT of lower extremity     NARCISO on CPAP     Vitamin D deficiency     Major depressive disorder, recurrent episode, severe, with psychotic behavior ([facility] [address]     Multiple lung nodules on CT     Hypomagnesemia     Anxiety     Chronic obstructive pulmonary disease (HCC)     Precordial pain     History of pulmonary embolism     Family history of abdominal aortic aneurysm     Normal coronary arteries     Long term (current) use of anticoagulants     AAA (abdominal aortic aneurysm) without rupture (HCC)     COPD exacerbation (HCC)     Acute tracheobronchitis-viral

## 2021-08-28 RX ORDER — FUROSEMIDE 20 MG/1
TABLET ORAL
Qty: 90 TABLET | Refills: 1 | Status: SHIPPED | OUTPATIENT
Start: 2021-08-28 | End: 2021-12-20

## 2021-08-29 DIAGNOSIS — E11.22 CONTROLLED TYPE 2 DIABETES MELLITUS WITH STAGE 3 CHRONIC KIDNEY DISEASE, WITHOUT LONG-TERM CURRENT USE OF INSULIN (HCC): ICD-10-CM

## 2021-08-29 DIAGNOSIS — M1A.9XX0 CHRONIC GOUT INVOLVING TOE WITHOUT TOPHUS, UNSPECIFIED CAUSE, UNSPECIFIED LATERALITY: ICD-10-CM

## 2021-08-29 DIAGNOSIS — N18.30 CONTROLLED TYPE 2 DIABETES MELLITUS WITH STAGE 3 CHRONIC KIDNEY DISEASE, WITHOUT LONG-TERM CURRENT USE OF INSULIN (HCC): ICD-10-CM

## 2021-08-29 NOTE — ASSESSMENT & PLAN NOTE
Monitored by specialist- no acute findings meriting change in the plan   Sample given of Stiolio. May have better benefits than Spiriva.

## 2021-08-29 NOTE — ASSESSMENT & PLAN NOTE
Uncontrolled, changes made today: Start the [place]. Amaryl discontinued previously. Samples of Farxiga and Rybelsus given. If blood sugars remain elevated may start lantus?

## 2021-08-29 NOTE — ASSESSMENT & PLAN NOTE
Monitored by specialist- no acute findings meriting change in the plan   Would benefit from repeat sleep study for proper titration settings   encouraged to make follow up appointment

## 2021-08-30 RX ORDER — METFORMIN HYDROCHLORIDE 500 MG/1
500 TABLET, EXTENDED RELEASE ORAL 2 TIMES DAILY
Qty: 360 TABLET | Refills: 1 | Status: SHIPPED | OUTPATIENT
Start: 2021-08-30 | End: 2022-08-19 | Stop reason: DRUGHIGH

## 2021-08-30 RX ORDER — ALLOPURINOL 100 MG/1
100 TABLET ORAL DAILY
Qty: 90 TABLET | Refills: 1 | Status: SHIPPED | OUTPATIENT
Start: 2021-08-30 | End: 2022-02-17

## 2021-08-30 NOTE — TELEPHONE ENCOUNTER
Last visit: 7/19/21  Last Med refill: 3/12/21  Does patient have enough medication for 72 hours: Yes    Next Visit Date:  Future Appointments   Date Time Provider Mark Junior   9/9/2021  8:30 AM [facility]   10/11/2021  8:30 AM Balta Brown MD [facility] None   10/19/2021  8:30 AM Trisha Blackmon, APRN - [facility] [place] [facility]   10/25/2021 10:00 AM Leyda Johnson DPM [address] Maintenance   Topic Date Due    Lipid screen  05/07/2021    Flu vaccine (1) 09/01/2021    Breast cancer screen  10/03/2021    Diabetic retinal exam  07/09/2022    A1C test (Diabetic or Prediabetic)  08/05/2022    Potassium monitoring  08/06/2022    Creatinine monitoring  08/06/2022    Diabetic foot exam  08/23/2022    Cervical cancer screen  11/16/2022    Pneumococcal 0-64 years Vaccine (2 of 2 - PPSV23) 04/23/2023    DTaP/Tdap/Td vaccine (2 - Td or Tdap) 08/04/2026    Colon cancer screen colonoscopy  02/16/2028    Shingles Vaccine  Completed    COVID-19 Vaccine  Completed    Hepatitis C screen  Addressed    HIV screen  Addressed    Hepatitis A vaccine  Aged Out    Hib vaccine  Aged Out    Meningococcal (ACWY) vaccine  Aged Out       Hemoglobin A1C (%)   Date Value   08/05/2021 7.2 (H)   07/19/2021 6.4 (A)   09/14/2020 7.5 (H)             ( goal A1C is < 7)   Microalb/Crt.  Ratio (mcg/mg creat)   Date Value   04/08/2019 CANNOT BE CALCULATED     LDL Cholesterol (mg/dL)   Date Value   05/07/2020 68   08/01/2019 51     LDL Calculated (mg/dL)   Date Value   04/10/2020 59       (goal LDL is <100)   AST (U/L)   Date Value   07/19/2021 24     ALT (U/L)   Date Value   07/19/2021 32     BUN (mg/dL)   Date Value   08/06/2021 18     BP Readings from Last 3 Encounters:   08/17/21 132/78   08/07/21 129/62   08/05/21 (!) 155/83          (goal 120/80)    All Future Testing planned in [facility]  Lab Frequency Next Occurrence   ECHO Complete 2D W Doppler W Color Once 07/19/2021   POCT INR     Basic Metabolic Panel Every 6 Months 9/10/2021               Patient Active Problem List:     Essential hypertension     Hyperlipidemia     Osteoarthritis     Obesity     CKD (chronic kidney disease) stage 3, GFR 30-59 ml/min (HCC)     Proteinuria     Controlled type 2 diabetes mellitus with stage 3 chronic kidney disease, without long-term current use of insulin (HCC)     History of DVT of lower extremity     NARCISO on CPAP     Vitamin D deficiency     Major depressive disorder, recurrent episode, severe, with psychotic behavior ([facility] [address]     Multiple lung nodules on CT     Hypomagnesemia     Anxiety     Chronic obstructive pulmonary disease (HCC)     Precordial pain     History of pulmonary embolism     Family history of abdominal aortic aneurysm     Normal coronary arteries     Long term (current) use of anticoagulants     AAA (abdominal aortic aneurysm) without rupture (HCC)     COPD exacerbation (HCC)     Acute tracheobronchitis-viral

## 2021-08-30 NOTE — TELEPHONE ENCOUNTER
Last visit: 08/19/21  Last Med refill: 08/17/21  Does patient have enough medication for 72 hours: NO. Pharmacy verified    Next Visit Date:  Future Appointments   Date Time Provider Mark Junior   9/9/2021  8:30 AM SCHEDULE, SILVANO [facility]ousa [facility]   10/11/2021  8:30 AM Tereza Arzola MD [facility] None   10/19/2021  8:30 AM Trisha Lena Tiana, APRN - [facility]NP [place] [facility]   10/25/2021 10:00 AM Azalea English DPM [address] Maintenance   Topic Date Due    Lipid screen  05/07/2021    Flu vaccine (1) 09/01/2021    Breast cancer screen  10/03/2021    Diabetic retinal exam  07/09/2022    A1C test (Diabetic or Prediabetic)  08/05/2022    Potassium monitoring  08/06/2022    Creatinine monitoring  08/06/2022    Diabetic foot exam  08/23/2022    Cervical cancer screen  11/16/2022    Pneumococcal 0-64 years Vaccine (2 of 2 - PPSV23) 04/23/2023    DTaP/Tdap/Td vaccine (2 - Td or Tdap) 08/04/2026    Colon cancer screen colonoscopy  02/16/2028    Shingles Vaccine  Completed    COVID-19 Vaccine  Completed    Hepatitis C screen  Addressed    HIV screen  Addressed    Hepatitis A vaccine  Aged Out    Hib vaccine  Aged Out    Meningococcal (ACWY) vaccine  Aged Out       Hemoglobin A1C (%)   Date Value   08/05/2021 7.2 (H)   07/19/2021 6.4 (A)   09/14/2020 7.5 (H)             ( goal A1C is < 7)   Microalb/Crt.  Ratio (mcg/mg creat)   Date Value   04/08/2019 CANNOT BE CALCULATED     LDL Cholesterol (mg/dL)   Date Value   05/07/2020 68   08/01/2019 51     LDL Calculated (mg/dL)   Date Value   04/10/2020 59       (goal LDL is <100)   AST (U/L)   Date Value   07/19/2021 24     ALT (U/L)   Date Value   07/19/2021 32     BUN (mg/dL)   Date Value   08/06/2021 18     BP Readings from Last 3 Encounters:   08/17/21 132/78   08/07/21 129/62   08/05/21 (!) 155/83          (goal 120/80)    All Future Testing planned in [facility]  Lab Frequency Next Occurrence   ECHO Complete 2D W Doppler W Color Once 07/19/2021   POCT INR     Basic Metabolic Panel Every 6 Months 9/10/2021               Patient Active Problem List:     Essential hypertension     Hyperlipidemia     Osteoarthritis     Obesity     CKD (chronic kidney disease) stage 3, GFR 30-59 ml/min (HCC)     Proteinuria     Controlled type 2 diabetes mellitus with stage 3 chronic kidney disease, without long-term current use of insulin (HCC)     History of DVT of lower extremity     NARCISO on CPAP     Vitamin D deficiency     Major depressive disorder, recurrent episode, severe, with psychotic behavior ([facility] [address]     Multiple lung nodules on CT     Hypomagnesemia     Anxiety     Chronic obstructive pulmonary disease (HCC)     Precordial pain     History of pulmonary embolism     Family history of abdominal aortic aneurysm     Normal coronary arteries     Long term (current) use of anticoagulants     AAA (abdominal aortic aneurysm) without rupture (HCC)     COPD exacerbation (HCC)     Acute tracheobronchitis-viral

## 2021-08-31 ENCOUNTER — TELEPHONE (OUTPATIENT)
Dept: FAMILY MEDICINE CLINIC | Age: 61
End: 2021-08-31

## 2021-08-31 DIAGNOSIS — Z12.31 BREAST CANCER SCREENING BY MAMMOGRAM: Primary | ICD-10-CM

## 2021-08-31 NOTE — TELEPHONE ENCOUNTER
Patient requesting a order for a mammo    Orders Pended for editing and approval.     Patient would like to be called when completed, she would like to pick it up.

## 2021-09-07 DIAGNOSIS — M15.9 PRIMARY OSTEOARTHRITIS INVOLVING MULTIPLE JOINTS: ICD-10-CM

## 2021-09-08 RX ORDER — OXYCODONE HYDROCHLORIDE AND ACETAMINOPHEN 5; 325 MG/1; MG/1
TABLET ORAL
Qty: 40 TABLET | Refills: 0 | Status: SHIPPED | OUTPATIENT
Start: 2021-09-08 | End: 2021-10-06

## 2021-09-08 NOTE — TELEPHONE ENCOUNTER
LOV 8/17/21  RTO 3 months; F/U scheduled  LRF 8/11/21  CSM 9/23/19    Health Maintenance   Topic Date Due    Cervical cancer screen  Never done    Lipid screen  05/07/2021    Flu vaccine (1) 09/01/2021    Breast cancer screen  10/03/2021    Diabetic retinal exam  07/09/2022    A1C test (Diabetic or Prediabetic)  08/05/2022    Potassium monitoring  08/06/2022    Creatinine monitoring  08/06/2022    Diabetic foot exam  08/23/2022    Pneumococcal 0-64 years Vaccine (2 of 2 - PPSV23) 04/23/2023    DTaP/Tdap/Td vaccine (2 - Td or Tdap) 08/04/2026    Colon cancer screen colonoscopy  02/16/2028    Shingles Vaccine  Completed    COVID-19 Vaccine  Completed    Hepatitis C screen  Addressed    HIV screen  Addressed    Hepatitis A vaccine  Aged Out    Hib vaccine  Aged Out    Meningococcal (ACWY) vaccine  Aged Out             (applicable per patient's age: Cancer Screenings, Depression Screening, Fall Risk Screening, Immunizations)    Hemoglobin A1C (%)   Date Value   08/05/2021 7.2 (H)   07/19/2021 6.4 (A)   09/14/2020 7.5 (H)     Microalb/Crt.  Ratio (mcg/mg creat)   Date Value   04/08/2019 CANNOT BE CALCULATED     LDL Cholesterol (mg/dL)   Date Value   05/07/2020 68     LDL Calculated (mg/dL)   Date Value   04/10/2020 59     AST (U/L)   Date Value   07/19/2021 24     ALT (U/L)   Date Value   07/19/2021 32     BUN (mg/dL)   Date Value   08/06/2021 18      (goal A1C is < 7)   (goal LDL is <100) need 30-50% reduction from baseline     BP Readings from Last 3 Encounters:   08/17/21 132/78   08/07/21 129/62   08/05/21 (!) 155/83    (goal /80)      All Future Testing planned in [facility]:  Lab Frequency Next Occurrence   ECHO Complete 2D W Doppler W Color Once 07/19/2021   JAIRO VASU DIGITAL SCREEN BILATERAL Once 08/31/2021   POCT INR     Basic Metabolic Panel Every 6 Months 9/10/2021       Next Visit Date:  Future Appointments   Date Time Provider Mark Junior   9/9/2021  8:30 AM SCHEDULE, SILVANO SCHULER PC [facility] [place] [facility]   10/19/2021  8:30 AM BRAULIO Otero - CNP [place] [facility]   10/25/2021 10:00 AM Aileen Kilgore DPM [facility]   11/22/2021  8:30 AM Maxx Carrasquillo MD [facility] None            Patient Active Problem List:     Essential hypertension     Hyperlipidemia     Osteoarthritis     Obesity     CKD (chronic kidney disease) stage 3, GFR 30-59 ml/min (HCC)     Proteinuria     Controlled type 2 diabetes mellitus with stage 3 chronic kidney disease, without long-term current use of insulin (HCC)     History of DVT of lower extremity     NARCISO on CPAP     Vitamin D deficiency     Major depressive disorder, recurrent episode, severe, with psychotic behavior ([address]     Multiple lung nodules on CT     Hypomagnesemia     Anxiety     Chronic obstructive pulmonary disease (HCC)     Precordial pain     History of pulmonary embolism     Family history of abdominal aortic aneurysm     Normal coronary arteries     Long term (current) use of anticoagulants     AAA (abdominal aortic aneurysm) without rupture (HCC)     COPD exacerbation ([address]     Acute tracheobronchitis-viral

## 2021-09-09 ENCOUNTER — ANTI-COAG VISIT (OUTPATIENT)
Dept: FAMILY MEDICINE CLINIC | Age: 61
End: 2021-09-09
Payer: MEDICARE

## 2021-09-09 DIAGNOSIS — J44.1 COPD EXACERBATION (HCC): ICD-10-CM

## 2021-09-09 DIAGNOSIS — J41.1 MUCOPURULENT CHRONIC BRONCHITIS (HCC): ICD-10-CM

## 2021-09-09 DIAGNOSIS — N18.30 CONTROLLED TYPE 2 DIABETES MELLITUS WITH STAGE 3 CHRONIC KIDNEY DISEASE, WITHOUT LONG-TERM CURRENT USE OF INSULIN (HCC): Primary | ICD-10-CM

## 2021-09-09 DIAGNOSIS — Z86.718 HISTORY OF DVT OF LOWER EXTREMITY: ICD-10-CM

## 2021-09-09 DIAGNOSIS — E11.22 CONTROLLED TYPE 2 DIABETES MELLITUS WITH STAGE 3 CHRONIC KIDNEY DISEASE, WITHOUT LONG-TERM CURRENT USE OF INSULIN (HCC): Primary | ICD-10-CM

## 2021-09-09 LAB
INTERNATIONAL NORMALIZATION RATIO, POC: 3
PROTHROMBIN TIME, POC: 36.1

## 2021-09-09 PROCEDURE — 85610 PROTHROMBIN TIME: CPT | Performed by: NURSE PRACTITIONER

## 2021-09-09 NOTE — PROGRESS NOTES
INR 3.0   no changes in coumadin dose  She can start having a small salad once a week. The greens can cause decline in INR. I want her to repeat INR in 1 week, and try a salad over weekend. I sent next dose of Rybelsus 7 mg to pharmacy and inhaler. How are her blood sugars?

## 2021-09-09 NOTE — PROGRESS NOTES
Patient notified of results/recommendations and expressed understanding. BS's stable around 100-180's.

## 2021-09-09 NOTE — PROGRESS NOTES
Patient presents in the office today with self for INR check. Patient is alert, oriented, and dressed appropriately. Tolerated well. INR therapeutic. Rybelsus 3mg running low only 7 tablets left- Sample. And Stiolto sample. Please advise on next steps.

## 2021-09-12 ENCOUNTER — TELEPHONE (OUTPATIENT)
Dept: FAMILY MEDICINE CLINIC | Age: 61
End: 2021-09-12

## 2021-09-12 NOTE — TELEPHONE ENCOUNTER
----- Message from Fanny Worthington sent at 9/9/2021  4:42 PM EDT -----  Subject: Appointment Request    Reason for Call: Routine Existing Condition Follow Up    QUESTIONS  Type of Appointment? Established Patient  Reason for appointment request? Available appointments did not meet   patient need  Additional Information for Provider? one week follow up appointment for   INR from 9/9/21, No appointments meet the one week from today.   ---------------------------------------------------------------------------  --------------  [address]  What is the best way for the office to contact you? OK to leave message on   voicemail  Preferred Call Back Phone Number? [phone]  ---------------------------------------------------------------------------  --------------  SCRIPT ANSWERS  Relationship to Patient? Self  (Is the patient requesting to be seen urgently for their symptoms?)? No  Is this follow up request related to routine Diabetes Management? No  Are you having any new concerns about your existing condition? No  Have you been diagnosed with, awaiting test results for, or told that you   are suspected of having COVID-19 (Coronavirus)? (If patient has tested   negative or was tested as a requirement for work, school, or travel and   not based on symptoms, answer no)? No  Do you currently have flu-like symptoms including fever or chills, cough,   shortness of breath, difficulty breathing, or new loss of taste or smell? No  Have you had close contact with someone with COVID-19 in the last 14 days? No  (Service Expert  click yes below to proceed with [employer] As Usual   Scheduling)?  Yes

## 2021-09-15 DIAGNOSIS — J41.1 MUCOPURULENT CHRONIC BRONCHITIS (HCC): ICD-10-CM

## 2021-09-15 DIAGNOSIS — E11.22 CONTROLLED TYPE 2 DIABETES MELLITUS WITH STAGE 3 CHRONIC KIDNEY DISEASE, WITHOUT LONG-TERM CURRENT USE OF INSULIN (HCC): ICD-10-CM

## 2021-09-15 DIAGNOSIS — N18.30 CONTROLLED TYPE 2 DIABETES MELLITUS WITH STAGE 3 CHRONIC KIDNEY DISEASE, WITHOUT LONG-TERM CURRENT USE OF INSULIN (HCC): ICD-10-CM

## 2021-09-15 NOTE — TELEPHONE ENCOUNTER
Last visit: 9/9/21  Last Med refill:9/9/21  Does patient have enough medication for 72 hours: No:     Next Visit Date:  Future Appointments   Date Time Provider Mark Junior   9/16/2021  8:30 AM SCHEDULE, ROXANNP HARINI [facility] [place] [facility]   10/19/2021  8:30 AM Trisha Gatica, APRN - CNP [place] [facility]   10/25/2021 10:00 AM Armond Ren DPM [facility]   11/22/2021  8:30 AM Brock Schwartz MD [facility] None       Health Maintenance   Topic Date Due    Lipid screen  05/07/2021    Flu vaccine (1) 09/01/2021    Breast cancer screen  10/03/2021    Diabetic retinal exam  07/09/2022    A1C test (Diabetic or Prediabetic)  08/05/2022    Potassium monitoring  08/06/2022    Creatinine monitoring  08/06/2022    Diabetic foot exam  08/23/2022    Cervical cancer screen  11/16/2022    Pneumococcal 0-64 years Vaccine (2 of 2 - PPSV23) 04/23/2023    DTaP/Tdap/Td vaccine (2 - Td or Tdap) 08/04/2026    Colon cancer screen colonoscopy  02/16/2028    Shingles Vaccine  Completed    COVID-19 Vaccine  Completed    Hepatitis C screen  Addressed    HIV screen  Addressed    Hepatitis A vaccine  Aged Out    Hib vaccine  Aged Out    Meningococcal (ACWY) vaccine  Aged Out       Hemoglobin A1C (%)   Date Value   08/05/2021 7.2 (H)   07/19/2021 6.4 (A)   09/14/2020 7.5 (H)             ( goal A1C is < 7)   Microalb/Crt.  Ratio (mcg/mg creat)   Date Value   04/08/2019 CANNOT BE CALCULATED     LDL Cholesterol (mg/dL)   Date Value   05/07/2020 68   08/01/2019 51     LDL Calculated (mg/dL)   Date Value   04/10/2020 59       (goal LDL is <100)   AST (U/L)   Date Value   07/19/2021 24     ALT (U/L)   Date Value   07/19/2021 32     BUN (mg/dL)   Date Value   08/06/2021 18     BP Readings from Last 3 Encounters:   08/17/21 132/78   08/07/21 129/62   08/05/21 (!) 155/83          (goal 120/80)    All Future Testing planned in [facility]  Lab Frequency Next Occurrence   ECHO Complete 2D W Doppler W Color Once 07/19/2021   JAIRO VAUS DIGITAL SCREEN BILATERAL Once 08/31/2021   POCT INR     Basic Metabolic Panel Every 6 Months                Patient Active Problem List:     Essential hypertension     Hyperlipidemia     Osteoarthritis     Obesity     CKD (chronic kidney disease) stage 3, GFR 30-59 ml/min (HCC)     Proteinuria     Controlled type 2 diabetes mellitus with stage 3 chronic kidney disease, without long-term current use of insulin (HCC)     History of DVT of lower extremity     NARCISO on CPAP     Vitamin D deficiency     Major depressive disorder, recurrent episode, severe, with psychotic behavior ([facility] [address]     Multiple lung nodules on CT     Hypomagnesemia     Anxiety     Chronic obstructive pulmonary disease (HCC)     Precordial pain     History of pulmonary embolism     Family history of abdominal aortic aneurysm     Normal coronary arteries     Long term (current) use of anticoagulants     AAA (abdominal aortic aneurysm) without rupture (HCC)     COPD exacerbation (HCC)     Acute tracheobronchitis-viral

## 2021-09-16 ENCOUNTER — ANTI-COAG VISIT (OUTPATIENT)
Dept: FAMILY MEDICINE CLINIC | Age: 61
End: 2021-09-16
Payer: MEDICARE

## 2021-09-16 DIAGNOSIS — Z86.718 HISTORY OF DVT OF LOWER EXTREMITY: ICD-10-CM

## 2021-09-16 LAB
INTERNATIONAL NORMALIZATION RATIO, POC: 3.2
PROTHROMBIN TIME, POC: 38.1

## 2021-09-16 PROCEDURE — 93793 ANTICOAG MGMT PT WARFARIN: CPT | Performed by: NURSE PRACTITIONER

## 2021-09-16 PROCEDURE — 85610 PROTHROMBIN TIME: CPT | Performed by: NURSE PRACTITIONER

## 2021-09-17 NOTE — PROGRESS NOTES
Notified patient she verbalized understanding. Notified patient that samples of rybelsus are at  for her to .

## 2021-09-17 NOTE — PROGRESS NOTES
Hold her dose today. Take coumadin 3mg every Monday and Thursday. 2mg all other days. Repeat INR on Monday.

## 2021-09-20 ENCOUNTER — ANTI-COAG VISIT (OUTPATIENT)
Dept: FAMILY MEDICINE CLINIC | Age: 61
End: 2021-09-20
Payer: MEDICARE

## 2021-09-20 DIAGNOSIS — Z86.718 HISTORY OF DVT OF LOWER EXTREMITY: Primary | ICD-10-CM

## 2021-09-20 LAB
INTERNATIONAL NORMALIZATION RATIO, POC: 2.6
PROTHROMBIN TIME, POC: NORMAL

## 2021-09-20 PROCEDURE — 99211 OFF/OP EST MAY X REQ PHY/QHP: CPT | Performed by: NURSE PRACTITIONER

## 2021-09-20 PROCEDURE — 93793 ANTICOAG MGMT PT WARFARIN: CPT | Performed by: NURSE PRACTITIONER

## 2021-09-20 PROCEDURE — 85610 PROTHROMBIN TIME: CPT | Performed by: NURSE PRACTITIONER

## 2021-09-20 PROCEDURE — G8417 CALC BMI ABV UP PARAM F/U: HCPCS | Performed by: NURSE PRACTITIONER

## 2021-09-20 PROCEDURE — G8428 CUR MEDS NOT DOCUMENT: HCPCS | Performed by: NURSE PRACTITIONER

## 2021-09-25 DIAGNOSIS — N18.30 CONTROLLED TYPE 2 DIABETES MELLITUS WITH STAGE 3 CHRONIC KIDNEY DISEASE, WITHOUT LONG-TERM CURRENT USE OF INSULIN (HCC): ICD-10-CM

## 2021-09-25 DIAGNOSIS — E11.22 CONTROLLED TYPE 2 DIABETES MELLITUS WITH STAGE 3 CHRONIC KIDNEY DISEASE, WITHOUT LONG-TERM CURRENT USE OF INSULIN (HCC): ICD-10-CM

## 2021-09-27 DIAGNOSIS — J41.1 MUCOPURULENT CHRONIC BRONCHITIS (HCC): ICD-10-CM

## 2021-09-27 NOTE — TELEPHONE ENCOUNTER
Last visit: 8/17/21hfu  Last Med refill:unk  Does patient have enough medication for 72 hours: Yes    Next Visit Date:  Future Appointments   Date Time Provider Mark Junior   10/4/2021  8:30 AM SCHEDULE, ROXANNP HARINI PC [facility] [place] [facility]   10/19/2021  8:30 AM Trisha Mariscal, APRN - CNP [place] [facility]   10/25/2021 10:00 AM Alberto Leahy DPM [facility]   11/22/2021  8:30 AM Evelin Mtichell MD [facility] None       Health Maintenance   Topic Date Due    Lipid screen  05/07/2021    Flu vaccine (1) 09/01/2021    Breast cancer screen  10/03/2021    Diabetic retinal exam  07/09/2022    A1C test (Diabetic or Prediabetic)  08/05/2022    Potassium monitoring  08/06/2022    Creatinine monitoring  08/06/2022    Diabetic foot exam  08/23/2022    Cervical cancer screen  11/16/2022    Pneumococcal 0-64 years Vaccine (2 of 2 - PPSV23) 04/23/2023    DTaP/Tdap/Td vaccine (2 - Td or Tdap) 08/04/2026    Colon cancer screen colonoscopy  02/16/2028    Shingles Vaccine  Completed    COVID-19 Vaccine  Completed    Hepatitis C screen  Addressed    HIV screen  Addressed    Hepatitis A vaccine  Aged Out    Hib vaccine  Aged Out    Meningococcal (ACWY) vaccine  Aged Out       Hemoglobin A1C (%)   Date Value   08/05/2021 7.2 (H)   07/19/2021 6.4 (A)   09/14/2020 7.5 (H)             ( goal A1C is < 7)   Microalb/Crt.  Ratio (mcg/mg creat)   Date Value   04/08/2019 CANNOT BE CALCULATED     LDL Cholesterol (mg/dL)   Date Value   05/07/2020 68   08/01/2019 51     LDL Calculated (mg/dL)   Date Value   04/10/2020 59       (goal LDL is <100)   AST (U/L)   Date Value   07/19/2021 24     ALT (U/L)   Date Value   07/19/2021 32     BUN (mg/dL)   Date Value   08/06/2021 18     BP Readings from Last 3 Encounters:   08/17/21 132/78   08/07/21 129/62   08/05/21 (!) 155/83          (goal 120/80)    All Future Testing planned in [facility]  Lab Frequency Next Occurrence   ECHO Complete 2D W Doppler W Color Once 07/19/2021   JAIRO VASU DIGITAL SCREEN BILATERAL Once 08/31/2021   POCT INR     Basic Metabolic Panel Every 6 Months                Patient Active Problem List:     Essential hypertension     Hyperlipidemia     Osteoarthritis     Obesity     CKD (chronic kidney disease) stage 3, GFR 30-59 ml/min (HCC)     Proteinuria     Controlled type 2 diabetes mellitus with stage 3 chronic kidney disease, without long-term current use of insulin (HCC)     History of DVT of lower extremity     NARCISO on CPAP     Vitamin D deficiency     Major depressive disorder, recurrent episode, severe, with psychotic behavior ([facility] [address]     Multiple lung nodules on CT     Hypomagnesemia     Anxiety     Chronic obstructive pulmonary disease (HCC)     Precordial pain     History of pulmonary embolism     Family history of abdominal aortic aneurysm     Normal coronary arteries     Long term (current) use of anticoagulants     AAA (abdominal aortic aneurysm) without rupture (HCC)     COPD exacerbation (HCC)     Acute tracheobronchitis-viral

## 2021-09-27 NOTE — TELEPHONE ENCOUNTER
Last visit: 9/9/2021  Last Med refill:9/9/2021 SAMPELS GIVEN  Does patient have enough medication for 72 hours: NO    Next Visit Date:  Future Appointments   Date Time Provider Mark Junior   10/4/2021  8:30 AM SCHEDULE, SILVANO SCHULER PC [facility] [place] [facility]   10/19/2021  8:30 AM Trisha Garza, APRN - CNP [place] [facility]   10/25/2021 10:00 AM [facility]ODERICK [facility]   11/22/2021  8:30 AM Socorro Agarwal MD [facility] None       Health Maintenance   Topic Date Due    Lipid screen  05/07/2021    Flu vaccine (1) 09/01/2021    Breast cancer screen  10/03/2021    Diabetic retinal exam  07/09/2022    A1C test (Diabetic or Prediabetic)  08/05/2022    Potassium monitoring  08/06/2022    Creatinine monitoring  08/06/2022    Diabetic foot exam  08/23/2022    Cervical cancer screen  11/16/2022    Pneumococcal 0-64 years Vaccine (2 of 2 - PPSV23) 04/23/2023    DTaP/Tdap/Td vaccine (2 - Td or Tdap) 08/04/2026    Colon cancer screen colonoscopy  02/16/2028    Shingles Vaccine  Completed    COVID-19 Vaccine  Completed    Hepatitis C screen  Addressed    HIV screen  Addressed    Hepatitis A vaccine  Aged Out    Hib vaccine  Aged Out    Meningococcal (ACWY) vaccine  Aged Out       Hemoglobin A1C (%)   Date Value   08/05/2021 7.2 (H)   07/19/2021 6.4 (A)   09/14/2020 7.5 (H)             ( goal A1C is < 7)   Microalb/Crt.  Ratio (mcg/mg creat)   Date Value   04/08/2019 CANNOT BE CALCULATED     LDL Cholesterol (mg/dL)   Date Value   05/07/2020 68   08/01/2019 51     LDL Calculated (mg/dL)   Date Value   04/10/2020 59       (goal LDL is <100)   AST (U/L)   Date Value   07/19/2021 24     ALT (U/L)   Date Value   07/19/2021 32     BUN (mg/dL)   Date Value   08/06/2021 18     BP Readings from Last 3 Encounters:   08/17/21 132/78   08/07/21 129/62   08/05/21 (!) 155/83          (goal 120/80)    All Future Testing planned in [facility]  Lab Frequency Next Occurrence   ECHO Complete 2D W Doppler

## 2021-09-28 RX ORDER — BLOOD SUGAR DIAGNOSTIC
STRIP MISCELLANEOUS
Qty: 100 STRIP | Refills: 11 | Status: SHIPPED | OUTPATIENT
Start: 2021-09-28 | End: 2022-01-24 | Stop reason: SDUPTHER

## 2021-10-03 ENCOUNTER — PATIENT MESSAGE (OUTPATIENT)
Dept: FAMILY MEDICINE CLINIC | Age: 61
End: 2021-10-03

## 2021-10-04 ENCOUNTER — HOSPITAL ENCOUNTER (OUTPATIENT)
Age: 61
Setting detail: SPECIMEN
Discharge: HOME | End: 2021-10-04
Payer: MEDICARE

## 2021-10-04 ENCOUNTER — ANTI-COAG VISIT (OUTPATIENT)
Dept: FAMILY MEDICINE CLINIC | Age: 61
End: 2021-10-04
Payer: MEDICARE

## 2021-10-04 DIAGNOSIS — Z86.718 HISTORY OF DVT OF LOWER EXTREMITY: Primary | ICD-10-CM

## 2021-10-04 DIAGNOSIS — N18.31 STAGE 3A CHRONIC KIDNEY DISEASE (HCC): ICD-10-CM

## 2021-10-04 LAB
ANION GAP SERPL CALCULATED.3IONS-SCNC: 18 MMOL/L (ref 9–17)
BUN BLDV-MCNC: 23 MG/DL (ref 8–23)
BUN/CREAT BLD: ABNORMAL (ref 9–20)
CALCIUM SERPL-MCNC: 9.6 MG/DL (ref 8.6–10.4)
CHLORIDE BLD-SCNC: 104 MMOL/L (ref 98–107)
CO2: 19 MMOL/L (ref 20–31)
CREAT SERPL-MCNC: 1.25 MG/DL (ref 0.5–0.9)
GFR AFRICAN AMERICAN: 52 ML/MIN
GFR NON-AFRICAN AMERICAN: 43 ML/MIN
GFR SERPL CREATININE-BSD FRML MDRD: ABNORMAL ML/MIN/{1.73_M2}
GFR SERPL CREATININE-BSD FRML MDRD: ABNORMAL ML/MIN/{1.73_M2}
GLUCOSE BLD-MCNC: 133 MG/DL (ref 70–99)
INTERNATIONAL NORMALIZATION RATIO, POC: 4
POTASSIUM SERPL-SCNC: 4 MMOL/L (ref 3.7–5.3)
PROTHROMBIN TIME, POC: NORMAL
SODIUM BLD-SCNC: 141 MMOL/L (ref 135–144)

## 2021-10-04 PROCEDURE — 85610 PROTHROMBIN TIME: CPT | Performed by: NURSE PRACTITIONER

## 2021-10-04 PROCEDURE — 93793 ANTICOAG MGMT PT WARFARIN: CPT | Performed by: NURSE PRACTITIONER

## 2021-10-04 NOTE — TELEPHONE ENCOUNTER
Patient asking if she should go back on the spriva inhaler as she is waiting for an insurance appeal on her stiolto inhaler. She has been out for 2 weeks. Please advise.

## 2021-10-04 NOTE — TELEPHONE ENCOUNTER
Patient is aware that there are samples here at the office for her. The PA is being appealed at this time.

## 2021-10-04 NOTE — RESULT ENCOUNTER NOTE
Noted. Ordered (to be addressed/treated) per ordering provider. Nephrology. Available if needed for correlation of care.

## 2021-10-04 NOTE — PROGRESS NOTES
Patient states that she had not been eating salads, she is not sure why her INR would be high today. Patient is aware or dose change and is scheduled for Friday to repeat her INR.

## 2021-10-04 NOTE — PROGRESS NOTES
Patient here for INR check. INR was 4.0. Patient states that she has not made any changes, has not missed doses or taken any extra doses.

## 2021-10-05 DIAGNOSIS — M15.9 PRIMARY OSTEOARTHRITIS INVOLVING MULTIPLE JOINTS: ICD-10-CM

## 2021-10-06 NOTE — TELEPHONE ENCOUNTER
Last visit: 7/19/21  Last Med refill: 9/8/21    Next Visit Date:  Future Appointments   Date Time Provider Mark Sandi   10/8/2021  8:30 AM SCHEDULE, SILVANO [facility] [place] [facility]   10/19/2021  8:30 AM Trisha Jorge Pedro, APRN - CNP [place] [facility]   10/25/2021 10:00 AM Ivan Gutierrez DPM [facility]   11/22/2021  8:30 AM Surendra Mugnuia MD [facility] None       Health Maintenance   Topic Date Due    Lipid screen  05/07/2021    Breast cancer screen  10/03/2021    Diabetic retinal exam  07/09/2022    A1C test (Diabetic or Prediabetic)  08/05/2022    Diabetic foot exam  08/23/2022    Potassium monitoring  10/04/2022    Creatinine monitoring  10/04/2022    Cervical cancer screen  11/16/2022    Pneumococcal 0-64 years Vaccine (2 of 2 - PPSV23) 04/23/2023    DTaP/Tdap/Td vaccine (2 - Td or Tdap) 08/04/2026    Colon cancer screen colonoscopy  02/16/2028    Flu vaccine  Completed    Shingles Vaccine  Completed    COVID-19 Vaccine  Completed    Hepatitis C screen  Addressed    HIV screen  Addressed    Hepatitis A vaccine  Aged Out    Hib vaccine  Aged Out    Meningococcal (ACWY) vaccine  Aged Out       Hemoglobin A1C (%)   Date Value   08/05/2021 7.2 (H)   07/19/2021 6.4 (A)   09/14/2020 7.5 (H)             ( goal A1C is < 7)   Microalb/Crt.  Ratio (mcg/mg creat)   Date Value   04/08/2019 CANNOT BE CALCULATED     LDL Cholesterol (mg/dL)   Date Value   05/07/2020 68   08/01/2019 51     LDL Calculated (mg/dL)   Date Value   04/10/2020 59       (goal LDL is <100)   AST (U/L)   Date Value   07/19/2021 24     ALT (U/L)   Date Value   07/19/2021 32     BUN (mg/dL)   Date Value   10/04/2021 23     BP Readings from Last 3 Encounters:   08/17/21 132/78   08/07/21 129/62   08/05/21 (!) 155/83          (goal 120/80)    All Future Testing planned in [facility]  Lab Frequency Next Occurrence   ECHO Complete 2D W Doppler W Color Once 07/19/2021   JAIRO VASU DIGITAL SCREEN BILATERAL Once

## 2021-10-07 RX ORDER — OXYCODONE HYDROCHLORIDE AND ACETAMINOPHEN 5; 325 MG/1; MG/1
TABLET ORAL
Qty: 40 TABLET | Refills: 0 | Status: SHIPPED | OUTPATIENT
Start: 2021-10-07 | End: 2021-11-08

## 2021-10-08 ENCOUNTER — ANTI-COAG VISIT (OUTPATIENT)
Dept: FAMILY MEDICINE CLINIC | Age: 61
End: 2021-10-08
Payer: MEDICARE

## 2021-10-08 DIAGNOSIS — Z86.718 HISTORY OF DVT OF LOWER EXTREMITY: Primary | ICD-10-CM

## 2021-10-08 LAB
INTERNATIONAL NORMALIZATION RATIO, POC: 2.8
PROTHROMBIN TIME, POC: 33.7

## 2021-10-08 PROCEDURE — 85610 PROTHROMBIN TIME: CPT | Performed by: NURSE PRACTITIONER

## 2021-10-08 PROCEDURE — 93793 ANTICOAG MGMT PT WARFARIN: CPT | Performed by: NURSE PRACTITIONER

## 2021-10-08 NOTE — PROGRESS NOTES
Patient notified of INR recommendations and expressed understanding. She will complete next INR at appt 10/19/21.

## 2021-10-19 ENCOUNTER — ANTI-COAG VISIT (OUTPATIENT)
Dept: FAMILY MEDICINE CLINIC | Age: 61
End: 2021-10-19
Payer: MEDICARE

## 2021-10-19 ENCOUNTER — OFFICE VISIT (OUTPATIENT)
Dept: FAMILY MEDICINE CLINIC | Age: 61
End: 2021-10-19
Payer: MEDICARE

## 2021-10-19 VITALS
TEMPERATURE: 97.7 F | DIASTOLIC BLOOD PRESSURE: 76 MMHG | BODY MASS INDEX: 39.03 KG/M2 | RESPIRATION RATE: 22 BRPM | OXYGEN SATURATION: 98 % | SYSTOLIC BLOOD PRESSURE: 124 MMHG | WEIGHT: 213.4 LBS | HEART RATE: 69 BPM

## 2021-10-19 DIAGNOSIS — G47.33 OSA ON CPAP: Chronic | ICD-10-CM

## 2021-10-19 DIAGNOSIS — Z79.01 LONG TERM (CURRENT) USE OF ANTICOAGULANTS: ICD-10-CM

## 2021-10-19 DIAGNOSIS — N18.30 CONTROLLED TYPE 2 DIABETES MELLITUS WITH STAGE 3 CHRONIC KIDNEY DISEASE, WITHOUT LONG-TERM CURRENT USE OF INSULIN (HCC): Primary | ICD-10-CM

## 2021-10-19 DIAGNOSIS — Z99.89 OSA ON CPAP: Chronic | ICD-10-CM

## 2021-10-19 DIAGNOSIS — F33.3 MAJOR DEPRESSIVE DISORDER, RECURRENT EPISODE, SEVERE, WITH PSYCHOTIC BEHAVIOR (HCC): Chronic | ICD-10-CM

## 2021-10-19 DIAGNOSIS — E11.22 CONTROLLED TYPE 2 DIABETES MELLITUS WITH STAGE 3 CHRONIC KIDNEY DISEASE, WITHOUT LONG-TERM CURRENT USE OF INSULIN (HCC): Primary | ICD-10-CM

## 2021-10-19 DIAGNOSIS — I10 ESSENTIAL HYPERTENSION: ICD-10-CM

## 2021-10-19 DIAGNOSIS — J41.1 MUCOPURULENT CHRONIC BRONCHITIS (HCC): ICD-10-CM

## 2021-10-19 DIAGNOSIS — Z86.718 HISTORY OF DVT OF LOWER EXTREMITY: Primary | ICD-10-CM

## 2021-10-19 LAB
INTERNATIONAL NORMALIZATION RATIO, POC: 2.6
PROTHROMBIN TIME, POC: 31.5

## 2021-10-19 PROCEDURE — 1036F TOBACCO NON-USER: CPT | Performed by: NURSE PRACTITIONER

## 2021-10-19 PROCEDURE — 3023F SPIROM DOC REV: CPT | Performed by: NURSE PRACTITIONER

## 2021-10-19 PROCEDURE — G8417 CALC BMI ABV UP PARAM F/U: HCPCS | Performed by: NURSE PRACTITIONER

## 2021-10-19 PROCEDURE — G8926 SPIRO NO PERF OR DOC: HCPCS | Performed by: NURSE PRACTITIONER

## 2021-10-19 PROCEDURE — G8482 FLU IMMUNIZE ORDER/ADMIN: HCPCS | Performed by: NURSE PRACTITIONER

## 2021-10-19 PROCEDURE — 2022F DILAT RTA XM EVC RTNOPTHY: CPT | Performed by: NURSE PRACTITIONER

## 2021-10-19 PROCEDURE — G8427 DOCREV CUR MEDS BY ELIG CLIN: HCPCS | Performed by: NURSE PRACTITIONER

## 2021-10-19 PROCEDURE — 3051F HG A1C>EQUAL 7.0%<8.0%: CPT | Performed by: NURSE PRACTITIONER

## 2021-10-19 PROCEDURE — 93793 ANTICOAG MGMT PT WARFARIN: CPT | Performed by: NURSE PRACTITIONER

## 2021-10-19 PROCEDURE — 99214 OFFICE O/P EST MOD 30 MIN: CPT | Performed by: NURSE PRACTITIONER

## 2021-10-19 PROCEDURE — 3017F COLORECTAL CA SCREEN DOC REV: CPT | Performed by: NURSE PRACTITIONER

## 2021-10-19 PROCEDURE — 85610 PROTHROMBIN TIME: CPT | Performed by: NURSE PRACTITIONER

## 2021-10-19 RX ORDER — BUPROPION HYDROCHLORIDE 300 MG/1
450 TABLET ORAL EVERY MORNING
Qty: 30 TABLET | Refills: 0 | Status: SHIPPED
Start: 2021-10-19 | End: 2022-08-19 | Stop reason: DRUGHIGH

## 2021-10-19 RX ORDER — TIOTROPIUM BROMIDE 18 UG/1
CAPSULE ORAL; RESPIRATORY (INHALATION)
COMMUNITY
Start: 2021-10-04 | End: 2021-10-19 | Stop reason: ALTCHOICE

## 2021-10-19 RX ORDER — BUPROPION HYDROCHLORIDE 300 MG/1
300 TABLET ORAL DAILY
COMMUNITY
Start: 2021-09-28 | End: 2021-10-19 | Stop reason: DRUGHIGH

## 2021-10-19 ASSESSMENT — ENCOUNTER SYMPTOMS
SINUS PRESSURE: 0
DIARRHEA: 0
SHORTNESS OF BREATH: 1
NAUSEA: 0
TROUBLE SWALLOWING: 0
RHINORRHEA: 0
ABDOMINAL PAIN: 0
BLOOD IN STOOL: 0
CHEST TIGHTNESS: 0
COUGH: 0
CONSTIPATION: 0
WHEEZING: 0
SORE THROAT: 0

## 2021-10-19 NOTE — PROGRESS NOTES
[address]  [phone]    10/19/21     Patient ID  Abbi Ocampo is a 59 y.o. female  Established patient    Chief Complaint  Abbi Ocampo presents today for Diabetes, Chronic Kidney Disease, Hypertension, and COPD    Have you seen any other physician or provider since your last visit? Yes - Records Obtained Pulmonary  Have you had any other diagnostic tests since your last visit? Yes - Records Obtained  Have you been seen in the emergency room and/or had an admission to a hospital since we last saw you? No     ASSESSMENT/PLAN  1. Controlled type 2 diabetes mellitus with stage 3 chronic kidney disease, without long-term current use of insulin (HCC)  Assessment & Plan:   Well-controlled, continue current medications and continue current treatment plan   Stable with current regimen of Metformin, Rybelsus, Farxiga and Januvia   Plan to wean off and discontinue Metformin due to diarrhea     Better sugar controlled could be accomplished with FreeStyle Daniella or Dexacom. 2. Essential hypertension  3. NARCISO on CPAP  4. Mucopurulent chronic bronchitis ([address]  Assessment & Plan:   Well-controlled, continue current medications and continue current treatment plan   Symptoms with significant improvement with Stiolio inhaler. 5. Major depressive disorder, recurrent episode, severe, with psychotic behavior ([address]  6. Long term (current) use of anticoagulants     Patients blood glucose well, controlled, stable with addition of Rybelsus samples  Will continue to wean of Metformin due to ongoing diarrhea, which is also better controlled with reduction of Metformin dose   Would have best outcome with Glennda Carrier or Dexcom for blood sugar control       Return in about 3 months (around 1/19/2022).       Patient Care Team:  BRAULIO Handy CNP as PCP - General (Nurse Practitioner Family)  BRAULIO Handy CNP as PCP [facility]  Melly Mendieta MD as other inhalers in the past with minimal to poor improvement of her symptoms. Chronic pain management   Patient presents today for evaluation of chronic pain management and medication refill. Patient is currently prescribed Percocet (oxycodone-acetaminophen. Patient reports pain has stable with current pain management plan of care. Patient denies constipation, headache, physical dependence and respiratory depression. Patient's pain contract and controlled medication contract has been been reviewed and signed within the last 12 months. Yes  - competed at visit yes  Patient has completed annual urine drug screen with the last 12 months. Yes   - completed at visit: NA  Patient verbalizes understanding that any indication of diversion or aberrant behavior may result in decreasing dosage of pain medication and possible discharge from practice. Yes    Diabetes  She presents for her follow-up diabetic visit. She has type 2 (would strongly benefit with lupe to monitor blood sugars more frequently as we are changing her pharmacology ) diabetes mellitus. Her disease course has been improving (with recent change in pharmacology. plan is to stop Metfromin due to ongoing diarrhea, added Farxiga and Rybelsus. ). Pertinent negatives for hypoglycemia include no dizziness, headaches, mood changes, nervousness/anxiousness, speech difficulty or sweats. Associated symptoms include fatigue. Pertinent negatives for diabetes include no blurred vision and no chest pain. (Follows routinely with podiatry ) There are no hypoglycemic complications. Risk factors for coronary artery disease include diabetes mellitus, dyslipidemia, family history, obesity, post-menopausal and sedentary lifestyle. Current diabetic treatment includes oral agent (triple therapy). She is compliant with treatment all of the time. Her weight is stable. She is following a generally healthy diet. She rarely participates in exercise.  Her overall blood glucose range is 140-180 mg/dl. An ACE inhibitor/angiotensin II receptor blocker is being taken. She sees a podiatrist.Eye exam is current. Review of Systems  Review of Systems   Constitutional: Positive for fatigue. Negative for activity change, appetite change, chills and fever. HENT: Negative for congestion, ear pain, postnasal drip, rhinorrhea, sinus pressure, sneezing, sore throat and trouble swallowing. Eyes: Negative for blurred vision. Respiratory: Positive for shortness of breath (improved). Negative for cough, chest tightness and wheezing. Cardiovascular: Negative for chest pain, palpitations and leg swelling. Gastrointestinal: Negative for abdominal distention, abdominal pain, blood in stool, constipation, diarrhea and nausea. Genitourinary: Negative for difficulty urinating, dysuria, frequency, hematuria and urgency. Musculoskeletal: Positive for arthralgias, back pain (chronic lumbar) and myalgias. Negative for gait problem and joint swelling. Skin: Negative for rash and wound. Allergic/Immunologic: Negative for environmental allergies. Neurological: Negative for dizziness, syncope, speech difficulty, light-headedness, numbness and headaches. Hematological: Does not bruise/bleed easily. Psychiatric/Behavioral: Positive for sleep disturbance (not wearing CPAP due to poor equipment ). Negative for agitation, decreased concentration, self-injury and suicidal ideas. The patient is not nervous/anxious. CPAP HS   Follows with pulmonology        Physical exam   Physical Exam  Vitals and nursing note reviewed. Constitutional:       General: She is not in acute distress. Appearance: Normal appearance. She is well-developed and well-groomed. She is morbidly obese. She is not ill-appearing or toxic-appearing. Eyes:      Comments: Glasses    Cardiovascular:      Rate and Rhythm: Normal rate and regular rhythm. No extrasystoles are present.      Heart sounds: Normal heart sounds, S1 normal and S2 normal. No murmur heard. Pulmonary:      Effort: Pulmonary effort is normal. No prolonged expiration or respiratory distress. Breath sounds: Normal breath sounds and air entry. Transmitted upper airway sounds present. Skin:     General: Skin is warm and dry. Coloration: Skin is not ashen, cyanotic, jaundiced or pale. Neurological:      Mental Status: She is alert and oriented to person, place, and time. Motor: Motor function is intact. Gait: Gait is intact. Psychiatric:         Attention and Perception: Attention and perception normal.         Mood and Affect: Mood and affect normal.         Speech: Speech normal.         Behavior: Behavior normal. Behavior is cooperative. Thought Content: Thought content normal. Thought content does not include suicidal ideation. Thought content does not include suicidal plan. Cognition and Memory: Cognition and memory normal.         Judgment: Judgment normal.           Electronically signed by BRAULIO Rosenbaum CNP, APRN-CNP on 10/20/2021 at 5:34 PM    Please note that this chart was generated using voice recognition Dragon dictation software. Although every effort was made to ensure the accuracy of this automated transcription, some errors in transcription may have occurred.

## 2021-10-20 PROBLEM — E11.21 DIABETIC NEPHROPATHY (HCC): Status: ACTIVE | Noted: 2021-09-10

## 2021-10-20 PROBLEM — R92.8 ABNORMAL MAMMOGRAM: Status: ACTIVE | Noted: 2021-09-10

## 2021-10-20 PROBLEM — I63.9 CEREBROVASCULAR ACCIDENT (CVA) (HCC): Status: ACTIVE | Noted: 2021-09-10

## 2021-10-20 RX ORDER — ORAL SEMAGLUTIDE 3 MG/1
1 TABLET ORAL DAILY
Qty: 30 TABLET | Refills: 0 | COMMUNITY
Start: 2021-10-20 | End: 2021-10-20 | Stop reason: DRUGHIGH

## 2021-10-20 RX ORDER — ORAL SEMAGLUTIDE 7 MG/1
TABLET ORAL
Qty: 30 TABLET | Refills: 0 | Status: CANCELLED | OUTPATIENT
Start: 2021-10-20 | End: 2022-10-20

## 2021-10-20 ASSESSMENT — ENCOUNTER SYMPTOMS
BACK PAIN: 1
BLURRED VISION: 0
ABDOMINAL DISTENTION: 0

## 2021-10-20 NOTE — ASSESSMENT & PLAN NOTE
Well-controlled, continue current medications and continue current treatment plan   Symptoms with significant improvement with Stiolio inhaler.

## 2021-10-20 NOTE — ASSESSMENT & PLAN NOTE
Well-controlled, continue current medications and continue current treatment plan   Stable with current regimen of Metformin, Rybelsus, Farxiga and Januvia   Plan to wean off and discontinue Metformin due to diarrhea     Better sugar controlled could be accomplished with FreeStyle Daniella or Dexacom.

## 2021-10-21 NOTE — TELEPHONE ENCOUNTER
[address]     Electrophysiology Procedure Note       Date of Procedure: 10/21/2021  Patient's Name: Theodore Starks  YOB: 1962   Medical Record Number: [id number]  Referring Physician: Liss Gonzalez MD  Procedure Performed by: Jennifer Gar MD    Procedure performed:  · Electrophysiology study with radiofrequency ablation of atrial fibrillation and pulmonary vein isolation   · Ablation of roof-dependent left atrial flutter with CL 194ms with straight up-down activation  · Ablation of mitral-isthmus dependent left atrial flutter with CL 217ms with distal-proximal activation  · Ablation of cavotricuspid isthmus dependent right atrial flutter (clinicaly manifested on EKGs)  · Additional ablation with creation of a roof line (for roof-dependent left atrial flutter), anterior line from mitral annulus to the roof (for mitral isthmus dependent left atrial flutter), and cavotricuspid isthmus (for right atrial flutter)  · Additional ablation of complex fractionated atrial electrograms (for atrial fibrillation)  · 3-D electroanatomical mapping of the left atrium    · Transseptal puncture through an intact septum x 2 under intracardiac ultrasound guidance without fluoroscopic guidance   · Intracardiac echocardiography  · Left ventricular pacing and recording  · Drug infusion with an attempt to induce atrial tachydysrhythmia  · Transesophageal echocardiogram  · External cardioversion of the atrial arrhythmias   · Anesthesia: General anesthesia provided by the Anesthesia service    Indications for procedure:    Theodore Starks is a 62 y.o. female who has a history of persistent atrial fibrillation and right atrial flutter who is symptomatic with symptoms of dyspnea with minimal exertion and fatigue who has failed antiarrhythmic therapy in the past is now here for an ablation for persistent atrial fibrillation and right atrial flutter. Details of Procedure:    The risks, benefits and LOV 12/10/20  RTO 3 months; F/U scheduled  LRF 12/13/19    Health Maintenance   Topic Date Due    Diabetic retinal exam  07/24/2020    Lipid screen  05/07/2021    Diabetic foot exam  07/27/2021    A1C test (Diabetic or Prediabetic)  09/14/2021    Potassium monitoring  09/14/2021    Creatinine monitoring  09/14/2021    Breast cancer screen  10/03/2021    Cervical cancer screen  11/16/2022    DTaP/Tdap/Td vaccine (2 - Td) 08/04/2026    Colon cancer screen colonoscopy  02/16/2028    Flu vaccine  Completed    Shingles Vaccine  Completed    Pneumococcal 0-64 years Vaccine  Completed    Hepatitis C screen  Addressed    HIV screen  Addressed    Hepatitis A vaccine  Aged Out    Hib vaccine  Aged Out    Meningococcal (ACWY) vaccine  Aged Out             (applicable per patient's age: Cancer Screenings, Depression Screening, Fall Risk Screening, Immunizations)    Hemoglobin A1C (%)   Date Value   09/14/2020 7.5 (H)   05/06/2020 7.4 (H)   04/10/2020 6.8     Microalb/Crt.  Ratio (mcg/mg creat)   Date Value   04/08/2019 CANNOT BE CALCULATED     LDL Cholesterol (mg/dL)   Date Value   05/07/2020 68     LDL Calculated (mg/dL)   Date Value   04/10/2020 59     AST (U/L)   Date Value   09/14/2020 24     ALT (U/L)   Date Value   09/14/2020 31     BUN (mg/dL)   Date Value   09/14/2020 13   09/14/2020 13      (goal A1C is < 7)   (goal LDL is <100) need 30-50% reduction from baseline     BP Readings from Last 3 Encounters:   12/10/20 102/62   10/12/20 138/78   09/09/20 120/80    (goal /80)      All Future Testing planned in [facility]:  Lab Frequency Next Occurrence   Basic Metabolic Panel Once 62/11/3930   CBC Once 09/12/2021   Protein / Creatinine Ratio, Urine Once 09/12/2021   POCT INR     Basic Metabolic Panel Every 6 Months 3/26/2021, 9/10/2021       Next Visit Date:  Future Appointments   Date Time Provider Mark Junior   1/11/2021  8:30 AM SCHEDULE, [facility]   3/10/2021  8:30 alternatives of the ablation procedure were discussed with the patient. The risks including, but not limited to, the risks of bleeding, infection, radiation exposure, injury to vascular, cardiac and surrounding structures (including pneumothorax), stroke, cardiac perforation, tamponade, need for emergent open heart surgery, need for pacemaker implantation, esophageal injury and fistula, myocardial infarction and death were discussed in detail. The patient was also counseled at length about the risks of melba Covid-19 in the lico-operative and post-operative states including the recovery window of their procedure. The patient was made aware that melba Covid-19 after a surgical procedure may worsen their prognosis for recovering from the virus and lend to a higher morbidity and or mortality risk. The patient was given the option of postponing their procedure. The patient was also presented reasonable alternatives to the proposed care, treatment, and services. The discussion I have had with the patient encompassed risks, benefits, and side effects related to the alternatives and the risks related to not receiving the proposed care, treatment and services. The patient opted to proceed with the ablation. Written informed consent was signed and placed in the chart. Patient was brought to the EP lab in a fasting non-sedate state. Patient underwent general anesthesia by anesthesia team. The patient was monitored continuously with ECG, pulse oximetry, blood pressure monitoring, and direct observation. No urinary trimble was placed in order to prevent any hematuria or urinary tract infection. We initially performed a transesophageal echo that showed no left atrial appendage clot/thrombus. Both groins were prepped in a sterile fashion. We gained access in the right femoral vein.  One 8 [place] short sheath for ICE and subsequently for CS catheters were placed in the right femoral vein using modified seldinger isolation via ablation. Wide area circumferential ablations for the left sided pulmonary veins were performed which resulted in electrical isolation of these pulmonary veins and eradication of the PV fascicles. Similarly, wide area circumferential ablations for the right sided pulmonary veins were performed which resulted in electrical isolation of these pulmonary veins and eradication of the PV fascicles. After isolating the pulmonary veins, the patient persisted in atrial fibrillation and the two different left atrial flutters mentioned above. We therefore ablated a linear line along the roof of the left atrium to rid the roof-dependent left atrial flutter, after which the patient continued with atrial fibrillation and mitral isthmus dependent left atrial flutter. Because the patient manifested mitral isthmus dependent left atrial flutter, we proceeded with a linear ablation on the anterior wall from the mitral annulus to the roof. The patient persisted in atrial fibrillation. Patient was then cardioverted with 200J of synchronized, biphasic shock which successfully converted the patient to sinus rhythm in order to evaluate for exit block across the L and R antrum. We then evaluated the left atrium for complex fractionated atrial electrogram, a separate mechanism that would initiate and/or perpetuate atrial fibrillation apart from the pulmonary vein fascicles and antral ablations. We found 3 sites on the septal aspect of the LA and ablated these foci. After ablation was complete, catheters were placed in the left and right atrium, His-position, right ventricle, and left ventricle for pacing and recording. Arrhythmia was attempted to be induced by rapid atrial and ventricular pacing, and there was no induction of atrial tachydysrhythmia. Maximum output (20mA) pacing in the L antrum and R antrum were also performed and showed dissociation from the rest of the left atrium.  This was checked circumferentially around the antrums and verified many times. We evaluated the roof line and found that there was complete, bidirectional block. The anterior line was evaluated and showed complete, bidirectional block, as evidenced by a long transit time on the medial aspect of the line as compared to the lateral aspect of this line when pacing from the LA appendage. Adenosine bolus of 18mg was also given for each of the 4 pulmonary veins to assess for acute re-connection and to attempt to induce atrial fibrillation. We had adequate adenosine effect (AV blocks of > 3 seconds) and there were no pulmonary fascicles seen in any of the veins nor were there any atrial tachydysrhythmia induced. We then administered dobutamine infusion up to 10 mcg/kg/min in order to achieve at least a 20% increase in heart rate from the basal heart rate to induce any atrial tachydysrhythmia, and there were no atrial tachydysrhythmia induced. Because the patient manifested right atrial flutter clinically on EKG, we proceeded with a cavotricuspid isthmus ablation on the RA. Radiofrequency lesions were delivered in a linear fashion to the cavotricuspid isthmus. Bidirectional block was confirmed by noting trans-isthmus conduction time of 163 ms from the CS to a focus just lateral to the line, and a conduction time of 108 ms from the CS to a focus more lateral to the ablated line. Similarly, when pacing from the high lateral RA, the conduction time to the CS was 142 ms compared to a transit time of 167 ms just lateral to the ablated line to the CS. We then performed an EP study and programmed stimulation using our CS catheter and ablation catheter to assess the cardiac conduction system and to attempt to induce atrial tachydysrhythmia. The ablation catheter were moved from the left atrium to the left ventricular apex and His bundle position.  His bundle potentials was recorded and pacing and recordings were performed from right atrium, coronary sinus and LV apex with the following results:     Sinus cycle length was 881 msec  NV interval was 195 msec  QRS duration was 73 msec  QT interval was 412 msec  AH interval was 91 msec  HV interval was 41 msec  Pacing from left atrium, 1:1 conduction over AV node with (AV wenckebach) was 390  msec  Pacing from left atrium, AV franklin ERP was <600/220 msec   Pacing from LV apex, 1:1 retrograde conduction over AV node (VA wenckebach) was 490 msec  Pacing from LV apex, showed VAERP of <600/280 msec. Then both the ablation, Pentarray, and CS catheters were removed from the body, and all 3 sheaths were removed from the right femoral vein with a figure-of-eight suture that was placed to provide hemostasis while awaiting the downtrending of the ACT. Protamine 150mg IV x 1 was administered to partially reverse the IV heparin that was administered during the atrial fibrillation ablation procedure. Under intracardiac ultrasound guidance, we evaluated for pericardial effusion, and there was no evidence of such. Specimen collected: none    Estimated blood loss: < 50 cc    The patient tolerated the procedure well and there were no complications. Patient was extubated and transferred to the floor in stable condition.      Conclusion:     - Pre- and post-procedure diagnoses were persistent atrial fibrillation, roof-dependent LA flutter with CL 194ms with straight up-down activation, mitral isthmus dependent LA flutter with CL 217ms with distal-proximal activation, and cavotricuspid isthmus dependent right atrial flutter   - Pulmonary vein isolations using wide area circumferential radiofrequency ablation   - Additional ablation with creation of roof line (for roof-dependent LA flutter), anterior line from mitral annulus to the roof (for mitral isthmus dependent LA flutter), and cavotricuspid isthmus (for RA flutter)  - Ablation of complex fractionated atrial electrogram ablations in the left atrium (for atrial fibrillation)    Plan:   The patient will be monitored and receive the usual post ablation care. If there are no complications, the patient will be discharged from the hospital either later today or tomorrow with a new prescription for Flecainide 50mg po BID, pre-admission Metoprolol but reduced from 50mg po BID to now 25mg po BID, and pre-admission Xarelto 20mg daily. The patient will follow-up with the EP NP in 3 month's time. Thank you for allowing us to participate in the care of your patient. If you have any questions or concerns, please do not hesitate to contact me.     Alyssa Paredes MD, MS, [facility] [place], [facility]  Cardiac Electrophysiology  [address]  [address] [facility], [address] [place] [address]  [address]  [phone]

## 2021-10-25 ENCOUNTER — OFFICE VISIT (OUTPATIENT)
Dept: PODIATRY | Age: 61
End: 2021-10-25
Payer: MEDICARE

## 2021-10-25 VITALS — HEIGHT: 62 IN | WEIGHT: 210 LBS | BODY MASS INDEX: 38.64 KG/M2

## 2021-10-25 DIAGNOSIS — M79.672 PAIN IN BOTH FEET: ICD-10-CM

## 2021-10-25 DIAGNOSIS — M19.072 DEGENERATIVE JOINT DISEASE OF BOTH ANKLES AND FEET: ICD-10-CM

## 2021-10-25 DIAGNOSIS — M19.071 DEGENERATIVE JOINT DISEASE OF BOTH ANKLES AND FEET: ICD-10-CM

## 2021-10-25 DIAGNOSIS — E11.51 TYPE II DIABETES MELLITUS WITH PERIPHERAL CIRCULATORY DISORDER (HCC): Primary | ICD-10-CM

## 2021-10-25 DIAGNOSIS — B35.1 DERMATOPHYTOSIS OF NAIL: ICD-10-CM

## 2021-10-25 DIAGNOSIS — I73.9 PVD (PERIPHERAL VASCULAR DISEASE) (HCC): ICD-10-CM

## 2021-10-25 DIAGNOSIS — M79.671 PAIN IN BOTH FEET: ICD-10-CM

## 2021-10-25 PROCEDURE — G8482 FLU IMMUNIZE ORDER/ADMIN: HCPCS | Performed by: PODIATRIST

## 2021-10-25 PROCEDURE — 2022F DILAT RTA XM EVC RTNOPTHY: CPT | Performed by: PODIATRIST

## 2021-10-25 PROCEDURE — 11721 DEBRIDE NAIL 6 OR MORE: CPT | Performed by: PODIATRIST

## 2021-10-25 PROCEDURE — 3051F HG A1C>EQUAL 7.0%<8.0%: CPT | Performed by: PODIATRIST

## 2021-10-25 PROCEDURE — 99213 OFFICE O/P EST LOW 20 MIN: CPT | Performed by: PODIATRIST

## 2021-10-25 PROCEDURE — 3017F COLORECTAL CA SCREEN DOC REV: CPT | Performed by: PODIATRIST

## 2021-10-25 PROCEDURE — G8427 DOCREV CUR MEDS BY ELIG CLIN: HCPCS | Performed by: PODIATRIST

## 2021-10-25 PROCEDURE — 1036F TOBACCO NON-USER: CPT | Performed by: PODIATRIST

## 2021-10-25 PROCEDURE — G8417 CALC BMI ABV UP PARAM F/U: HCPCS | Performed by: PODIATRIST

## 2021-10-25 NOTE — PROGRESS NOTES
[facility] [address] [id number] [address]  Dept: [phone]    DIABETIC PROGRESS NOTE  Date of patient's visit: 10/25/2021  Patient's Name:  Radha Holcomb YOB: 1960            Patient Care Team:  BRAULIO Espinoza CNP as PCP - General (Nurse Practitioner Family)  BRAULIO Espinoza CNP as PCP - [facility] Empaneled Provider  Elisabet Dunn MD as Consulting Physician (Nephrology)  Angelo Robledo MD as Consulting Physician (Pulmonology)  Andrey Washington MD as Consulting Physician (Cardiology)  Figueroa Bishop DPM as Consulting Physician ([facility])  Mary Salcedo MD (Psychiatry)          Chief Complaint   Patient presents with    Diabetes    Nail Problem    Peripheral Neuropathy       Subjective:   Radha Holcomb comes to clinic for Diabetes, Nail Problem, and Peripheral Neuropathy    she is a diabetic and states that she checks her feet daily . Pt currently has complaint of thickened, elongated nails that they cannot manage by themselves. Pt's primary care physician is BRAULIO Espinoza CNP last seen October 19 2021   Pt's last blood sugar was 108 . Pt has a new complaint of pain to the right and left foot. States they have been walking on her feet more. Relates to changing shoes but it has not helped       Lab Results   Component Value Date    LABA1C 7.2 (H) 08/05/2021      Complains of numbness in the feet bilat. Past Medical History:   Diagnosis Date    Anxiety     Chronic kidney disease     COPD (chronic obstructive pulmonary disease) ([facility]     Depression     Fracture of ankle 5/14/2020    Hyperlipidemia     Hypertension     Obesity     Osteoarthritis     Proteinuria     Pulmonary embolism (HCC)     Sleep apnea     c-pap.  Doesn't use everynight    SOB (shortness of breath) 5/7/2020    Type 2 diabetes mellitus without complication (HCC)     Type II or unspecified type diabetes mellitus without mention of complication, not stated as uncontrolled     Von Willebrand disease ([facility] [address]        No Known Allergies  Current Outpatient Medications on File Prior to Visit   Medication Sig Dispense Refill    buPROPion (WELLBUTRIN XL) 300 MG extended release tablet Take 2 tablets by mouth every morning 30 tablet 0    oxyCODONE-acetaminophen (PERCOCET) 5-325 MG per tablet take 1 tablet by mouth twice a day if needed for pain REDUCE DOSES TAKEN AS PAIN BECOMES MANAGEABLE 40 tablet 0    tiotropium-olodaterol (STIOLTO) 2.5-2.5 MCG/ACT AERS Sample Medication.     Lot [id number]  Exp 6/23  NDC: 3141-7197-54 2 each 0    blood glucose test strips (ONETOUCH ULTRA) strip TEST THREE TIMES DAILY 100 strip 11    Semaglutide 7 MG TABS Take 1 tablet by mouth daily (Patient taking differently: Take 1 tablet by mouth daily Only taking 3 mg daily.) 30 tablet 1    dapagliflozin (FARXIGA) 5 MG tablet Take 1 tablet by mouth every morning 30 tablet 2    allopurinol (ZYLOPRIM) 100 MG tablet Take 1 tablet by mouth daily 90 tablet 1    metFORMIN (GLUCOPHAGE-XR) 500 MG extended release tablet Take 1 tablet by mouth 2 times daily 360 tablet 1    furosemide (LASIX) 20 MG tablet take 1 tablet by mouth once daily 90 tablet 1    diclofenac sodium (VOLTAREN) 1 % GEL Apply 2 g topically 2 times daily for 21 days 84 g 0    Cholecalciferol (RA VITAMIN D-3) 50 MCG (2000 UT) CAPS Take 1 capsule by mouth daily 30 capsule 5    hydrALAZINE (APRESOLINE) 50 MG tablet take 1 tablet by mouth twice a day 180 tablet 1    omeprazole (PRILOSEC) 20 MG delayed release capsule Take 1 capsule by mouth Daily 90 capsule 1    atorvastatin (LIPITOR) 40 MG tablet Take 1 tablet by mouth daily 90 tablet 1    SITagliptin (JANUVIA) 100 MG tablet Take 1 tablet by mouth daily 90 tablet 1    lisinopril (PRINIVIL;ZESTRIL) 20 MG tablet Take 1 tablet by mouth daily 90 tablet 1    Lancets Misc. (ACCU-CHEK FASTCLIX LANCET) KIT use as directed PLEASE APPROVED NEW DEVICE WHILE WE WAIT FOR PRIOR AUTH. .. FASTCLIX MIGHT BE EAISER TO MANIPULATE 1 kit 0    Accu-Chek FastClix Lancets MISC use 1 LANCET to TEST BLOOD SUGAR one to two times a day 120 each 3    isosorbide mononitrate (IMDUR) 30 MG extended release tablet Take 1 tablet by mouth daily 90 tablet 1    Continuous Blood Gluc Sensor (FREESTYLE BARB 14 DAY SENSOR) MISC 1 each by Does not apply route continuous 2 each 1    Continuous Blood Gluc  (FREESTYLE BARB 14 DAY READER) LIZ 1 each by Does not apply route continuous 1 Device 1    Alcohol Swabs (ALCOHOL PREP) 70 % PADS Use twice daily and as needed to check blood sugars 120 each 3    warfarin (COUMADIN) 2 MG tablet take 1 tablet by mouth SUNDAY, TUESDAY, THURSDAY, AND SATURDAY ( take 1 AND 1/2 tablets by mouth MONDAY, WEDNESDAY, FRIDAY ) (Patient taking differently: take 1 tablet by mouth every day.) 45 tablet 5    albuterol sulfate  (90 Base) MCG/ACT inhaler Inhale 2 puffs into the lungs every 4 hours as needed for Wheezing or Shortness of Breath (AND/OR COUGH) 18 g 1    naloxone 4 MG/0.1ML LIQD nasal spray 1 spray by Nasal route as needed for Opioid Reversal 1 each 5    Handicap Placard MISC by Does not apply route Exp 2/3/2026 1 each 0    ipratropium-albuterol (DUONEB) 0.5-2.5 (3) MG/3ML SOLN nebulizer solution Inhale 3 mLs into the lungs every 6 hours as needed for Shortness of Breath 360 mL 0    Misc. Devices MISC 1 PAIR OF DIABETIC SHOES (1 LEFT/ 1 RIGHT)  1-3 PAIRS OF INSERTS (LEFT/ RIGHT) 2 each 0    Respiratory Therapy Supplies (NEBULIZER/TUBING/MOUTHPIECE) KIT 1 kit by Does not apply route 4 times daily as needed (wheezing) 1 kit 0    Misc. Devices MISC 1 PAIR OF DIABETIC SHOES (1 LEFT/ 1 RIGHT)  1-3 PAIRS OF INSERTS (LEFT/ RIGHT) 2 each 0    Blood Pressure Monitoring KIT Use to check blood pressure daily and as needed 1 kit 0     No current facility-administered medications on file prior to visit.        Review of Systems    Review of Systems:   History obtained from chart review and the patient  General ROS: negative for - chills, fatigue, fever, night sweats or weight gain  Constitutional: Negative for chills, diaphoresis, fatigue, fever and unexpected weight change. Musculoskeletal: Positive for arthralgias, gait problem and joint swelling. Neurological ROS: negative for - behavioral changes, confusion, headaches or seizures. Negative for weakness and numbness. Dermatological ROS: negative for - mole changes, rash  Cardiovascular: Negative for leg swelling. Gastrointestinal: Negative for constipation, diarrhea, nausea and vomiting. Objective:  Dermatologic Exam:  Skin lesion/ulceration Absent . Skin No rashes or nodules noted. .   Skin is thin, with flaky sloughing skin as well as decreased hair growth to the lower leg  Small red hemosiderin deposits seen dorsal foot   Musculoskeletal:       Degenerative joint disease to the right and left midfoot with crepitus on ROM    1st MPJ ROM decreased, Bilateral.  Muscle strength 5/5, Bilateral.  Pain present upon palpation of toenails 1-5, Bilateral. decreased medial longitudinal arch, Bilateral.  Ankle ROM decreased,Bilateral.    Dorsally contracted digits present digits 2, Bilateral.     Vascular: DP pulses 1/4 bilateral.  PT pulses 0/4 bilateral.   CFT <5 seconds, Bilateral.  Hair growth absent to the level of the digits, Bilateral.  Edema present, Bilateral.  Varicosities absent, Bilateral. Erythema absent, Bilateral    Neurological: Sensation diminshed to light touch to level of digits, Bilateral.  Protective sensation intact 6/10 sites via 5.07/10g Melrude-Shraddha Monofilament, Bilateral.  negative Tinel's, Bilateral.  negative Valleix sign, Bilateral.      Integument: Warm, dry, supple, Bilateral.  Open lesion absent, Bilateral.  Interdigital maceration absent to web spaces 4, Bilateral.  Nails 1-5 left and 1-5 right thickened > 3.0 mm, dystrophic and crumbly, discolored with subungual debris. Fissures absent, Bilateral.   General: AAO x 3 in NAD. Derm  Toenail Description  Sites of Onychomycosis Involvement (Check affected area)  [x] [x] [x] [x] [x] [x] [x] [x] [x] [x]  5 4 3 2 1 1 2 3 4 5                          Right                                        Left    Thickness  [x] [x] [x] [x] [x] [x] [x] [x] [x] [x]  5 4 3 2 1 1 2 3 4 5                         Right                                        Left    Dystrophic Changes   [x] [x] [x] [x] [x] [x] [x] [x] [x] [x]  5 4 3 2 1 1 2 3 4 5                         Right                                        Left    Color   [x] [x] [x] [x] [x] [x] [x] [x] [x] [x]  5 4 3 2 1 1 2 3 4 5                          Right                                        Left    Incurvation/Ingrowin   [] [] [] [] [] [] [] [] [] []  5 4 3 2 1 1 2 3 4 5                         Right                                        Left    Inflammation/Pain   [x] [x] [x] [x] [x] [x] [x] [x] [x] [x]  5 4 3 2 1 1 2 3 4 5                         Right                                        Left        Visual inspection:  Deformity: hammertoe deformity sherman feet  amputation: absent  Skin lesions: absent  Edema: right- 2+ pitting edema, left- 2+ pitting edema    Sensory exam:  Monofilament sensation: abnormal - 6/10 via SW 5.07/10g monofilament to the plantar foot bilateral feet    Pulses: abnormal - 1/4 dorsalis pedis pulse and 0/4 Posterior tibial pulse,   Pinprick: Impaired  Proprioception: Impaired  Vibration (128 Hz): Impaired       DM with PVD       [x]Yes    []No      Assessment:  59 y.o. female with:     Diagnosis Orders   1. Type II diabetes mellitus with peripheral circulatory disorder ([facility])  [address] - DE DEBRIDEMENT OF NAILS, 6 OR MORE    HM DIABETES FOOT EXAM   2. Dermatophytosis of nail  [address] - DE DEBRIDEMENT OF NAILS, 6 OR MORE    HM DIABETES FOOT EXAM   3.  PVD (peripheral vascular disease) ([facility])  [address] - DE DEBRIDEMENT OF NAILS, 6 OR MORE    HM DIABETES FOOT EXAM   4. Pain in both feet  [address] - [place] DEBRIDEMENT OF NAILS, 6 OR MORE     DIABETES FOOT EXAM   5. Degenerative joint disease of both ankles and feet           Q7   []Yes    []No                Q8   [x]Yes    []No                     Q9   []Yes    []No    Plan:   Pt was evaluated and examined. Patient was given personalized discharge instructions. For the DJD  X rays reviewed labs reviewed  Recommend OTC anti inflammatories. RICE therapy if pain worsens after activity   Recommend proper shoe gear with supportive archs. Nails 1-10 were debrided sharply in length and thickness with a nipper and , without incident. Pt will follow up in 9 weeks or sooner if any problems arise. Diagnosis was discussed with the pt and all of their questions were answered in detail. Proper foot hygiene and care was discussed with the pt. Informed patient on proper diabetic foot care and importance of tight glycemic control. Patient to check feet daily and contact the office with any questions/problems/concerns. Other comorbidity noted and will be managed by PCP. All labs were reviewed and all imagining including the above findings were reviewed PRIOR to the patients arrival and with the patient today. Previous patient encounter was reviewed. Encounters from the patients other medical providers were reviewed and noted. Time was spent educating the patient and their families/caregivers on proper care of the feet and ankles. All the above diagnosis were addressed at todays visit and all questions were answered.   A total of 22 minutes was spent with this patients encounter which included charting after the patients visit    10/25/2021    Electronically signed by Hermelinda Mireles DPM on 10/25/2021 at 9:47 AM  10/25/2021

## 2021-11-02 ENCOUNTER — ANTI-COAG VISIT (OUTPATIENT)
Dept: FAMILY MEDICINE CLINIC | Age: 61
End: 2021-11-02
Payer: MEDICARE

## 2021-11-02 DIAGNOSIS — Z86.718 HISTORY OF DVT OF LOWER EXTREMITY: Primary | ICD-10-CM

## 2021-11-02 LAB
INTERNATIONAL NORMALIZATION RATIO, POC: 2.4
PROTHROMBIN TIME, POC: NORMAL

## 2021-11-02 PROCEDURE — 93793 ANTICOAG MGMT PT WARFARIN: CPT | Performed by: NURSE PRACTITIONER

## 2021-11-02 PROCEDURE — 85610 PROTHROMBIN TIME: CPT | Performed by: NURSE PRACTITIONER

## 2021-11-02 NOTE — PROGRESS NOTES
Patient is aware of providers comments and recommendations patient is going to call to schedule 2 week INR check

## 2021-11-02 NOTE — PROGRESS NOTES
Patient here for INR check, INR is 2.4. Patient states that she has not changed, missed, or added any doses.

## 2021-11-06 DIAGNOSIS — M15.9 PRIMARY OSTEOARTHRITIS INVOLVING MULTIPLE JOINTS: ICD-10-CM

## 2021-11-08 NOTE — TELEPHONE ENCOUNTER
Last visit: 10/19/21  Last Med refill: 10/7/21  Does patient have enough medication for 72 hours: No:     Next Visit Date:  Future Appointments   Date Time Provider Mark Junior   11/15/2021  8:30 AM SCHEDULE, SILVANO [facility]   11/22/2021  8:30 AM Eddy Lubin MD [facility] None   1/24/2022  8:00 AM BRAULIO Calloway - MARIA Sousa [facility]   1/24/2022 10:00 AM Niesha Moreno DPM [address] Maintenance   Topic Date Due    Lipid screen  05/07/2021    Breast cancer screen  10/03/2021    Diabetic retinal exam  07/09/2022    A1C test (Diabetic or Prediabetic)  08/05/2022    Potassium monitoring  10/04/2022    Creatinine monitoring  10/04/2022    Diabetic foot exam  10/25/2022    Cervical cancer screen  11/16/2022    Pneumococcal 0-64 years Vaccine (2 of 2 - PPSV23) 04/23/2023    DTaP/Tdap/Td vaccine (2 - Td or Tdap) 08/04/2026    Colon cancer screen colonoscopy  02/16/2028    Flu vaccine  Completed    Shingles Vaccine  Completed    COVID-19 Vaccine  Completed    Hepatitis C screen  Addressed    HIV screen  Addressed    Hepatitis A vaccine  Aged Out    Hib vaccine  Aged Out    Meningococcal (ACWY) vaccine  Aged Out       Hemoglobin A1C (%)   Date Value   08/05/2021 7.2 (H)   07/19/2021 6.4 (A)   09/14/2020 7.5 (H)             ( goal A1C is < 7)   Microalb/Crt.  Ratio (mcg/mg creat)   Date Value   04/08/2019 CANNOT BE CALCULATED     LDL Cholesterol (mg/dL)   Date Value   05/07/2020 68   08/01/2019 51     LDL Calculated (mg/dL)   Date Value   04/10/2020 59       (goal LDL is <100)   AST (U/L)   Date Value   07/19/2021 24     ALT (U/L)   Date Value   07/19/2021 32     BUN (mg/dL)   Date Value   10/04/2021 23     BP Readings from Last 3 Encounters:   10/19/21 124/76   08/17/21 132/78   08/07/21 129/62          (goal 120/80)    All Future Testing planned in [facility]  Lab Frequency Next Occurrence   ECHO Complete 2D W Doppler W Color Once 07/19/2021   JAIRO VASU DIGITAL SCREEN BILATERAL Once 08/31/2021   POCT INR                 Patient Active Problem List:     Essential hypertension     Hyperlipidemia     Osteoarthritis     Obesity     CKD (chronic kidney disease) stage 3, GFR 30-59 ml/min (HCC)     Proteinuria     Controlled type 2 diabetes mellitus with stage 3 chronic kidney disease, without long-term current use of insulin (HCC)     History of DVT of lower extremity     NARCISO on CPAP     Vitamin D deficiency     Major depressive disorder, recurrent episode, severe, with psychotic behavior ([address]     Multiple lung nodules on CT     Hypomagnesemia     Anxiety     Chronic obstructive pulmonary disease (HCC)     Precordial pain     History of pulmonary embolism     Family history of abdominal aortic aneurysm     Normal coronary arteries     Long term (current) use of anticoagulants     AAA (abdominal aortic aneurysm) without rupture (HCC)     COPD exacerbation (HCC)     Acute tracheobronchitis-viral     Abnormal mammogram     Cerebrovascular accident (CVA) ([address]     Diabetic nephropathy ([address]

## 2021-11-09 DIAGNOSIS — Z12.31 BREAST CANCER SCREENING BY MAMMOGRAM: ICD-10-CM

## 2021-11-09 RX ORDER — OXYCODONE HYDROCHLORIDE AND ACETAMINOPHEN 5; 325 MG/1; MG/1
1 TABLET ORAL 2 TIMES DAILY PRN
Qty: 40 TABLET | Refills: 0 | Status: SHIPPED | OUTPATIENT
Start: 2021-11-09 | End: 2021-12-03

## 2021-11-15 ENCOUNTER — ANTI-COAG VISIT (OUTPATIENT)
Dept: FAMILY MEDICINE CLINIC | Age: 61
End: 2021-11-15
Payer: MEDICARE

## 2021-11-15 DIAGNOSIS — Z86.718 HISTORY OF DVT OF LOWER EXTREMITY: Primary | ICD-10-CM

## 2021-11-15 LAB
INTERNATIONAL NORMALIZATION RATIO, POC: 1.6
PROTHROMBIN TIME, POC: 18.7

## 2021-11-15 PROCEDURE — 85610 PROTHROMBIN TIME: CPT | Performed by: NURSE PRACTITIONER

## 2021-11-15 PROCEDURE — 93793 ANTICOAG MGMT PT WARFARIN: CPT | Performed by: NURSE PRACTITIONER

## 2021-11-15 NOTE — PROGRESS NOTES
Patient was advised to take 3 mg on Tuesday and Wednesday and for the other doses to stay the same. She is going to come back Friday to recheck, these instructions are per provider AD. Patient also stated she only had a salad on Wednesday.

## 2021-11-16 ENCOUNTER — PATIENT MESSAGE (OUTPATIENT)
Dept: FAMILY MEDICINE CLINIC | Age: 61
End: 2021-11-16

## 2021-11-17 NOTE — TELEPHONE ENCOUNTER
I let the patient know that we currently do not have any in the office and I will see if Patricia Rupertjean carlos can call the rep tomorrow to get some more for her.

## 2021-11-19 ENCOUNTER — ANTI-COAG VISIT (OUTPATIENT)
Dept: FAMILY MEDICINE CLINIC | Age: 61
End: 2021-11-19
Payer: MEDICARE

## 2021-11-19 DIAGNOSIS — Z86.718 HISTORY OF DVT OF LOWER EXTREMITY: Primary | ICD-10-CM

## 2021-11-19 LAB
INTERNATIONAL NORMALIZATION RATIO, POC: 2
PROTHROMBIN TIME, POC: 24.2

## 2021-11-19 PROCEDURE — 85610 PROTHROMBIN TIME: CPT | Performed by: NURSE PRACTITIONER

## 2021-11-19 NOTE — PROGRESS NOTES
Patient presents in the office today with self for INR check. She is alert, oriented, and dressed appropriatly for the day. Tolerated well. Took 3 mg Monday and Tuesday. 2 mg all other days. Pt aware Rybelsus appeal sent again through insurance.     INR normal 2.0

## 2021-11-23 ENCOUNTER — HOSPITAL ENCOUNTER (OUTPATIENT)
Age: 61
Setting detail: SPECIMEN
Discharge: HOME | End: 2021-11-23

## 2021-11-23 ENCOUNTER — PATIENT MESSAGE (OUTPATIENT)
Dept: FAMILY MEDICINE CLINIC | Age: 61
End: 2021-11-23

## 2021-11-23 DIAGNOSIS — N18.2 CKD (CHRONIC KIDNEY DISEASE), STAGE II: ICD-10-CM

## 2021-11-23 DIAGNOSIS — N17.9 AKI (ACUTE KIDNEY INJURY) (HCC): ICD-10-CM

## 2021-11-23 DIAGNOSIS — I10 PRIMARY HYPERTENSION: ICD-10-CM

## 2021-11-23 DIAGNOSIS — J41.1 MUCOPURULENT CHRONIC BRONCHITIS (HCC): ICD-10-CM

## 2021-11-23 LAB
ANION GAP SERPL CALCULATED.3IONS-SCNC: 18 MMOL/L (ref 9–17)
BUN BLDV-MCNC: 18 MG/DL (ref 8–23)
BUN/CREAT BLD: ABNORMAL (ref 9–20)
CALCIUM SERPL-MCNC: 10 MG/DL (ref 8.6–10.4)
CHLORIDE BLD-SCNC: 99 MMOL/L (ref 98–107)
CO2: 23 MMOL/L (ref 20–31)
CREAT SERPL-MCNC: 1.08 MG/DL (ref 0.5–0.9)
CREATININE URINE: 89.5 MG/DL (ref 28–217)
GFR AFRICAN AMERICAN: >60 ML/MIN
GFR NON-AFRICAN AMERICAN: 51 ML/MIN
GFR SERPL CREATININE-BSD FRML MDRD: ABNORMAL ML/MIN/{1.73_M2}
GFR SERPL CREATININE-BSD FRML MDRD: ABNORMAL ML/MIN/{1.73_M2}
GLUCOSE BLD-MCNC: 121 MG/DL (ref 70–99)
POTASSIUM SERPL-SCNC: 4.5 MMOL/L (ref 3.7–5.3)
SODIUM BLD-SCNC: 140 MMOL/L (ref 135–144)
TOTAL PROTEIN, URINE: 12 MG/DL
URINE TOTAL PROTEIN CREATININE RATIO: 0.13 (ref 0–0.2)

## 2021-11-23 NOTE — TELEPHONE ENCOUNTER
Please advise. We can dispose of in office if she wants, but she does not have to make a whole separate trip to return them since they were samples.

## 2021-11-24 NOTE — TELEPHONE ENCOUNTER
I do not see results of urgent appeal sent 9/2021??? For Rybelsus 7 mg??? Also freestyle lupe?      Please ensure PA done, appealed, etc.

## 2021-11-27 ENCOUNTER — PATIENT MESSAGE (OUTPATIENT)
Dept: FAMILY MEDICINE CLINIC | Age: 61
End: 2021-11-27

## 2021-11-27 DIAGNOSIS — K59.01 SLOW TRANSIT CONSTIPATION: Primary | ICD-10-CM

## 2021-11-28 ENCOUNTER — PATIENT MESSAGE (OUTPATIENT)
Dept: FAMILY MEDICINE CLINIC | Age: 61
End: 2021-11-28

## 2021-11-29 RX ORDER — DOCUSATE SODIUM 100 MG/1
100 CAPSULE, LIQUID FILLED ORAL 2 TIMES DAILY
Qty: 60 CAPSULE | Refills: 0 | Status: SHIPPED | OUTPATIENT
Start: 2021-11-29 | End: 2021-12-29

## 2021-11-29 NOTE — TELEPHONE ENCOUNTER
From: Sebastián Garza  To:  Trisha Wills  Sent: 11/27/2021 8:37 PM EST  Subject: Stools    Sorry I had to send another message to continue now I go about every 10 days or so but right now it's been 2 weeks since I've had a bowel movement I bought some stool softeners but it's not helping didn't know if I should be concerned do you have any suggestions please and ty

## 2021-11-29 NOTE — TELEPHONE ENCOUNTER
From: Jb Valle  To:  Trisha Church  Sent: 11/27/2021 8:32 PM EST  Subject: Stools    Trisha I have been on metformin for about 7 years 4 a day and I have had diarrhea everyday of my life 3 or 4 times a day never had a formed stool you just recently moved me to 2 a day

## 2021-11-29 NOTE — TELEPHONE ENCOUNTER
Please advise on patients sx's, review previous messages regarding stools and stomach pains from metformin.

## 2021-12-03 ENCOUNTER — PATIENT MESSAGE (OUTPATIENT)
Dept: FAMILY MEDICINE CLINIC | Age: 61
End: 2021-12-03

## 2021-12-03 ENCOUNTER — ANTI-COAG VISIT (OUTPATIENT)
Dept: FAMILY MEDICINE CLINIC | Age: 61
End: 2021-12-03
Payer: MEDICARE

## 2021-12-03 ENCOUNTER — HOSPITAL ENCOUNTER (OUTPATIENT)
Dept: GENERAL RADIOLOGY | Facility: CLINIC | Age: 61
Discharge: HOME | End: 2021-12-05
Payer: MEDICARE

## 2021-12-03 ENCOUNTER — HOSPITAL ENCOUNTER (OUTPATIENT)
Facility: CLINIC | Age: 61
Discharge: HOME | End: 2021-12-05
Payer: MEDICARE

## 2021-12-03 DIAGNOSIS — M15.9 PRIMARY OSTEOARTHRITIS INVOLVING MULTIPLE JOINTS: ICD-10-CM

## 2021-12-03 DIAGNOSIS — K59.01 SLOW TRANSIT CONSTIPATION: ICD-10-CM

## 2021-12-03 DIAGNOSIS — Z86.718 HISTORY OF DVT OF LOWER EXTREMITY: Primary | ICD-10-CM

## 2021-12-03 LAB
INTERNATIONAL NORMALIZATION RATIO, POC: 1.5
PROTHROMBIN TIME, POC: 18.5

## 2021-12-03 PROCEDURE — 93793 ANTICOAG MGMT PT WARFARIN: CPT | Performed by: NURSE PRACTITIONER

## 2021-12-03 PROCEDURE — 74018 RADEX ABDOMEN 1 VIEW: CPT

## 2021-12-03 PROCEDURE — 85610 PROTHROMBIN TIME: CPT | Performed by: NURSE PRACTITIONER

## 2021-12-03 RX ORDER — OXYCODONE HYDROCHLORIDE AND ACETAMINOPHEN 5; 325 MG/1; MG/1
TABLET ORAL
Qty: 40 TABLET | Refills: 0 | Status: SHIPPED | OUTPATIENT
Start: 2021-12-03 | End: 2022-01-02 | Stop reason: SDUPTHER

## 2021-12-03 NOTE — TELEPHONE ENCOUNTER
Patient notified of recommendations and expressed understanding. She has weaned down on metformin to BID. Taking Rybelsus 3 mg samples. BS today 107. Her highest was 155. Please order Abd XR.

## 2021-12-03 NOTE — TELEPHONE ENCOUNTER
LOV 10-19-21  LRF 11-9-21    Health Maintenance   Topic Date Due    Lipid screen  05/07/2021    COVID-19 Vaccine (3 - Booster for Moderna series) 09/30/2021    Diabetic retinal exam  07/09/2022    A1C test (Diabetic or Prediabetic)  08/05/2022    Diabetic foot exam  10/25/2022    Cervical cancer screen  11/16/2022    Potassium monitoring  11/23/2022    Creatinine monitoring  11/23/2022    Pneumococcal 0-64 years Vaccine (2 of 2 - PPSV23) 04/23/2023    Breast cancer screen  09/10/2023    DTaP/Tdap/Td vaccine (2 - Td or Tdap) 08/04/2026    Colon cancer screen colonoscopy  02/16/2028    Flu vaccine  Completed    Shingles Vaccine  Completed    Hepatitis C screen  Addressed    HIV screen  Addressed    Hepatitis A vaccine  Aged Out    Hib vaccine  Aged Out    Meningococcal (ACWY) vaccine  Aged Out             (applicable per patient's age: Cancer Screenings, Depression Screening, Fall Risk Screening, Immunizations)    Hemoglobin A1C (%)   Date Value   08/05/2021 7.2 (H)   07/19/2021 6.4 (A)   09/14/2020 7.5 (H)     Microalb/Crt.  Ratio (mcg/mg creat)   Date Value   04/08/2019 CANNOT BE CALCULATED     LDL Cholesterol (mg/dL)   Date Value   05/07/2020 68     LDL Calculated (mg/dL)   Date Value   04/10/2020 59     AST (U/L)   Date Value   07/19/2021 24     ALT (U/L)   Date Value   07/19/2021 32     BUN (mg/dL)   Date Value   11/23/2021 18      (goal A1C is < 7)   (goal LDL is <100) need 30-50% reduction from baseline     BP Readings from Last 3 Encounters:   11/22/21 96/62   10/19/21 124/76   08/17/21 132/78    (goal /80)      All Future Testing planned in [facility]:  Lab Frequency Next Occurrence   ECHO Complete 2D W Doppler W Color Once 12/41/3781   Basic Metabolic Panel Once 41/12/2293   POCT INR         Next Visit Date:  Future Appointments   Date Time Provider Mark Junior   1/24/2022  8:00 AM BRAULIO Marc - CNP [facility]   1/24/2022 10:00 AM BRODERICK Mckeon [facility]   2/14/2022  8:30 AM Eze Mckenzie MD [facility] None            Patient Active Problem List:     Essential hypertension     Hyperlipidemia     Osteoarthritis     Obesity     CKD (chronic kidney disease) stage 3, GFR 30-59 ml/min (HCC)     Proteinuria     Controlled type 2 diabetes mellitus with stage 3 chronic kidney disease, without long-term current use of insulin (HCC)     History of DVT of lower extremity     NARCISO on CPAP     Vitamin D deficiency     Major depressive disorder, recurrent episode, severe, with psychotic behavior ([address]     Multiple lung nodules on CT     Hypomagnesemia     Anxiety     Chronic obstructive pulmonary disease (HCC)     Precordial pain     History of pulmonary embolism     Family history of abdominal aortic aneurysm     Normal coronary arteries     Long term (current) use of anticoagulants     AAA (abdominal aortic aneurysm) without rupture (HCC)     COPD exacerbation (HCC)     Acute tracheobronchitis-viral     Abnormal mammogram     Cerebrovascular accident (CVA) ([address]     Diabetic nephropathy ([address]

## 2021-12-03 NOTE — TELEPHONE ENCOUNTER
Please call Yesica     Still are taking the Metformin? Our plan was to wean down and then off  Taking Rybelsus 3 mg or did insurance finally cover the 7 mg? How are your sugars?    Ordering abdominal x-ray   recommend OTC fleets enema

## 2021-12-07 ENCOUNTER — ANTI-COAG VISIT (OUTPATIENT)
Dept: FAMILY MEDICINE CLINIC | Age: 61
End: 2021-12-07
Payer: MEDICARE

## 2021-12-07 VITALS — SYSTOLIC BLOOD PRESSURE: 122 MMHG | DIASTOLIC BLOOD PRESSURE: 80 MMHG

## 2021-12-07 DIAGNOSIS — E11.22 CONTROLLED TYPE 2 DIABETES MELLITUS WITH STAGE 3 CHRONIC KIDNEY DISEASE, WITHOUT LONG-TERM CURRENT USE OF INSULIN (HCC): Primary | ICD-10-CM

## 2021-12-07 DIAGNOSIS — M62.838 MUSCLE SPASM: ICD-10-CM

## 2021-12-07 DIAGNOSIS — N18.30 CONTROLLED TYPE 2 DIABETES MELLITUS WITH STAGE 3 CHRONIC KIDNEY DISEASE, WITHOUT LONG-TERM CURRENT USE OF INSULIN (HCC): ICD-10-CM

## 2021-12-07 DIAGNOSIS — E11.22 CONTROLLED TYPE 2 DIABETES MELLITUS WITH STAGE 3 CHRONIC KIDNEY DISEASE, WITHOUT LONG-TERM CURRENT USE OF INSULIN (HCC): ICD-10-CM

## 2021-12-07 DIAGNOSIS — N18.30 CONTROLLED TYPE 2 DIABETES MELLITUS WITH STAGE 3 CHRONIC KIDNEY DISEASE, WITHOUT LONG-TERM CURRENT USE OF INSULIN (HCC): Primary | ICD-10-CM

## 2021-12-07 DIAGNOSIS — Z86.718 HISTORY OF DVT OF LOWER EXTREMITY: ICD-10-CM

## 2021-12-07 DIAGNOSIS — K59.01 SLOW TRANSIT CONSTIPATION: Primary | ICD-10-CM

## 2021-12-07 LAB
INTERNATIONAL NORMALIZATION RATIO, POC: 2.6
PROTHROMBIN TIME, POC: 31.3

## 2021-12-07 PROCEDURE — 85610 PROTHROMBIN TIME: CPT | Performed by: NURSE PRACTITIONER

## 2021-12-07 RX ORDER — POLYETHYLENE GLYCOL 3350 17 G/17G
17 POWDER, FOR SOLUTION ORAL DAILY
Qty: 1530 G | Refills: 1 | Status: SHIPPED | OUTPATIENT
Start: 2021-12-07 | End: 2022-01-24 | Stop reason: SDUPTHER

## 2021-12-07 RX ORDER — TIZANIDINE 2 MG/1
2 TABLET ORAL 3 TIMES DAILY PRN
Qty: 30 TABLET | Refills: 0 | Status: SHIPPED | OUTPATIENT
Start: 2021-12-07 | End: 2021-12-21 | Stop reason: DRUGHIGH

## 2021-12-07 RX ORDER — SENNA PLUS 8.6 MG/1
1 TABLET ORAL 2 TIMES DAILY
Qty: 60 TABLET | Refills: 1 | Status: SHIPPED | OUTPATIENT
Start: 2021-12-07 | End: 2022-01-24 | Stop reason: SDUPTHER

## 2021-12-07 RX ORDER — METHYLPREDNISOLONE 4 MG/1
TABLET ORAL
Qty: 21 TABLET | Refills: 0 | Status: SHIPPED | OUTPATIENT
Start: 2021-12-07 | End: 2021-12-13

## 2021-12-07 NOTE — PROGRESS NOTES
Plan to stay on only rybelsus 3 mg due to concern of constipations.    Farxiga, Metformin XR, Januvia   Fantastic BS control

## 2021-12-07 NOTE — PROGRESS NOTES
Patient presents in the office today with self for INR check. She is alert, oriented, and dressed appropriatly. No missed doses. Therapeutic today. Complaining of neck pain radiating to shoulders. Onset Saturday.

## 2021-12-08 ENCOUNTER — PATIENT MESSAGE (OUTPATIENT)
Dept: FAMILY MEDICINE CLINIC | Age: 61
End: 2021-12-08

## 2021-12-08 DIAGNOSIS — I10 ESSENTIAL HYPERTENSION: ICD-10-CM

## 2021-12-08 NOTE — RESULT ENCOUNTER NOTE
Noted. Discussed at [place] [id number] in anti-coagulation encounter
PROVIDER:[TOKEN:[[id number]],FOLLOWUP:[7-10 Days],ESTABLISHEDPATIENT:[T]]

## 2021-12-13 ENCOUNTER — TELEPHONE (OUTPATIENT)
Dept: PRIMARY CARE CLINIC | Age: 61
End: 2021-12-13

## 2021-12-16 DIAGNOSIS — M79.89 LEG SWELLING: ICD-10-CM

## 2021-12-17 ENCOUNTER — TELEPHONE (OUTPATIENT)
Dept: FAMILY MEDICINE CLINIC | Age: 61
End: 2021-12-17

## 2021-12-17 NOTE — TELEPHONE ENCOUNTER
Patient called into the office to ask if taking a low dose Asprin would be beneficial for her. Please advise.

## 2021-12-17 NOTE — TELEPHONE ENCOUNTER
----- Message from Maribeljuanita Wayur sent at 12/17/2021 12:06 PM EST -----  Subject: Medication Problem    QUESTIONS  Name of Medication? Semaglutide 3 MG TABS  Patient-reported dosage and instructions? 3mg   What question or problem do you have with the medication? Cover My Meds   needs additional clinical information. Ref #[id number]  Preferred Pharmacy? [facility]   [address]LO[address] [facility] [address]  Pharmacy phone number (if available)? [phone]  Additional Information for Provider?   ---------------------------------------------------------------------------  --------------  CALL BACK INFO  What is the best way for the office to contact you? OK to leave message on   voicemail  Preferred Call Back Phone Number? [phone]  ---------------------------------------------------------------------------  --------------  SCRIPT ANSWERS  Relationship to Patient? Third Party  Representative Name?  Jovanny [address] [facility]

## 2021-12-20 RX ORDER — FUROSEMIDE 20 MG/1
TABLET ORAL
Qty: 90 TABLET | Refills: 1 | Status: SHIPPED | OUTPATIENT
Start: 2021-12-20 | End: 2022-06-19 | Stop reason: ALTCHOICE

## 2021-12-20 NOTE — TELEPHONE ENCOUNTER
LOV 10/19/21  RTO F/U scheduled  LRF 8/28/21    Health Maintenance   Topic Date Due    Lipid screen  05/07/2021    COVID-19 Vaccine (3 - Booster for Moderna series) 09/30/2021    Diabetic retinal exam  07/09/2022    A1C test (Diabetic or Prediabetic)  08/05/2022    Diabetic foot exam  10/25/2022    Cervical cancer screen  11/16/2022    Potassium monitoring  11/23/2022    Creatinine monitoring  11/23/2022    Pneumococcal 0-64 years Vaccine (2 of 2 - PPSV23) 04/23/2023    Breast cancer screen  09/10/2023    DTaP/Tdap/Td vaccine (2 - Td or Tdap) 08/04/2026    Colon cancer screen colonoscopy  02/16/2028    Flu vaccine  Completed    Shingles Vaccine  Completed    Hepatitis C screen  Addressed    HIV screen  Addressed    Hepatitis A vaccine  Aged Out    Hib vaccine  Aged Out    Meningococcal (ACWY) vaccine  Aged Out             (applicable per patient's age: Cancer Screenings, Depression Screening, Fall Risk Screening, Immunizations)    Hemoglobin A1C (%)   Date Value   08/05/2021 7.2 (H)   07/19/2021 6.4 (A)   09/14/2020 7.5 (H)     Microalb/Crt.  Ratio (mcg/mg creat)   Date Value   04/08/2019 CANNOT BE CALCULATED     LDL Cholesterol (mg/dL)   Date Value   05/07/2020 68     LDL Calculated (mg/dL)   Date Value   04/10/2020 59     AST (U/L)   Date Value   07/19/2021 24     ALT (U/L)   Date Value   07/19/2021 32     BUN (mg/dL)   Date Value   11/23/2021 18      (goal A1C is < 7)   (goal LDL is <100) need 30-50% reduction from baseline     BP Readings from Last 3 Encounters:   12/07/21 122/80   11/22/21 96/62   10/19/21 124/76    (goal /80)      All Future Testing planned in [facility]:  Lab Frequency Next Occurrence   ECHO Complete 2D W Doppler W Color Once 26/48/1615   Basic Metabolic Panel Once 02/30/0830   POCT INR         Next Visit Date:  Future Appointments   Date Time Provider Mark Junior   12/21/2021  3:30 PM Trisha Childress, APRN - [facility]   1/24/2022  8:00 AM Trisha Patterson Galo Jones, APRN - CNP [facility] [place]   1/24/2022 10:00 AM Haris Graham DPM [facility] [place]   2/14/2022  8:30 AM Shaun Yoon MD [facility] None            Patient Active Problem List:     Essential hypertension     Hyperlipidemia     Osteoarthritis     Obesity     CKD (chronic kidney disease) stage 3, GFR 30-59 ml/min (HCC)     Proteinuria     Controlled type 2 diabetes mellitus with stage 3 chronic kidney disease, without long-term current use of insulin (HCC)     History of DVT of lower extremity     NARCISO on CPAP     Vitamin D deficiency     Major depressive disorder, recurrent episode, severe, with psychotic behavior ([address]     Multiple lung nodules on CT     Hypomagnesemia     Anxiety     Chronic obstructive pulmonary disease (HCC)     Precordial pain     History of pulmonary embolism     Family history of abdominal aortic aneurysm     Normal coronary arteries     Long term (current) use of anticoagulants     AAA (abdominal aortic aneurysm) without rupture (HCC)     COPD exacerbation (HCC)     Acute tracheobronchitis-viral     Abnormal mammogram     Cerebrovascular accident (CVA) ([address]     Diabetic nephropathy ([address]

## 2021-12-21 ENCOUNTER — ANTI-COAG VISIT (OUTPATIENT)
Dept: FAMILY MEDICINE CLINIC | Age: 61
End: 2021-12-21
Payer: MEDICARE

## 2021-12-21 ENCOUNTER — OFFICE VISIT (OUTPATIENT)
Dept: FAMILY MEDICINE CLINIC | Age: 61
End: 2021-12-21
Payer: MEDICARE

## 2021-12-21 VITALS
TEMPERATURE: 97.2 F | SYSTOLIC BLOOD PRESSURE: 112 MMHG | WEIGHT: 195.2 LBS | HEART RATE: 92 BPM | RESPIRATION RATE: 22 BRPM | DIASTOLIC BLOOD PRESSURE: 78 MMHG | BODY MASS INDEX: 35.7 KG/M2 | OXYGEN SATURATION: 98 %

## 2021-12-21 DIAGNOSIS — R91.8 MULTIPLE LUNG NODULES ON CT: ICD-10-CM

## 2021-12-21 DIAGNOSIS — Z86.711 HISTORY OF PULMONARY EMBOLISM: ICD-10-CM

## 2021-12-21 DIAGNOSIS — Z86.718 HISTORY OF DVT OF LOWER EXTREMITY: ICD-10-CM

## 2021-12-21 DIAGNOSIS — M54.50 CHRONIC MIDLINE LOW BACK PAIN WITHOUT SCIATICA: ICD-10-CM

## 2021-12-21 DIAGNOSIS — Z86.718 HISTORY OF DVT OF LOWER EXTREMITY: Chronic | ICD-10-CM

## 2021-12-21 DIAGNOSIS — G89.29 CHRONIC MIDLINE LOW BACK PAIN WITHOUT SCIATICA: ICD-10-CM

## 2021-12-21 DIAGNOSIS — F33.3 MAJOR DEPRESSIVE DISORDER, RECURRENT EPISODE, SEVERE, WITH PSYCHOTIC BEHAVIOR (HCC): Chronic | ICD-10-CM

## 2021-12-21 DIAGNOSIS — Z79.01 LONG TERM (CURRENT) USE OF ANTICOAGULANTS: ICD-10-CM

## 2021-12-21 DIAGNOSIS — E11.22 CONTROLLED TYPE 2 DIABETES MELLITUS WITH STAGE 3 CHRONIC KIDNEY DISEASE, WITHOUT LONG-TERM CURRENT USE OF INSULIN (HCC): Primary | Chronic | ICD-10-CM

## 2021-12-21 DIAGNOSIS — M62.838 MUSCLE SPASM: ICD-10-CM

## 2021-12-21 DIAGNOSIS — N18.30 CONTROLLED TYPE 2 DIABETES MELLITUS WITH STAGE 3 CHRONIC KIDNEY DISEASE, WITHOUT LONG-TERM CURRENT USE OF INSULIN (HCC): Primary | Chronic | ICD-10-CM

## 2021-12-21 DIAGNOSIS — R19.7 DIARRHEA, UNSPECIFIED TYPE: ICD-10-CM

## 2021-12-21 DIAGNOSIS — N18.30 STAGE 3 CHRONIC KIDNEY DISEASE, UNSPECIFIED WHETHER STAGE 3A OR 3B CKD (HCC): Chronic | ICD-10-CM

## 2021-12-21 LAB
INTERNATIONAL NORMALIZATION RATIO, POC: 2.7
PROTHROMBIN TIME, POC: 32

## 2021-12-21 PROCEDURE — 85610 PROTHROMBIN TIME: CPT | Performed by: NURSE PRACTITIONER

## 2021-12-21 PROCEDURE — G8427 DOCREV CUR MEDS BY ELIG CLIN: HCPCS | Performed by: NURSE PRACTITIONER

## 2021-12-21 PROCEDURE — G8482 FLU IMMUNIZE ORDER/ADMIN: HCPCS | Performed by: NURSE PRACTITIONER

## 2021-12-21 PROCEDURE — 2022F DILAT RTA XM EVC RTNOPTHY: CPT | Performed by: NURSE PRACTITIONER

## 2021-12-21 PROCEDURE — 3051F HG A1C>EQUAL 7.0%<8.0%: CPT | Performed by: NURSE PRACTITIONER

## 2021-12-21 PROCEDURE — 1036F TOBACCO NON-USER: CPT | Performed by: NURSE PRACTITIONER

## 2021-12-21 PROCEDURE — 99214 OFFICE O/P EST MOD 30 MIN: CPT | Performed by: NURSE PRACTITIONER

## 2021-12-21 PROCEDURE — 3017F COLORECTAL CA SCREEN DOC REV: CPT | Performed by: NURSE PRACTITIONER

## 2021-12-21 PROCEDURE — G8417 CALC BMI ABV UP PARAM F/U: HCPCS | Performed by: NURSE PRACTITIONER

## 2021-12-21 RX ORDER — BUPROPION HYDROCHLORIDE 150 MG/1
TABLET ORAL
Status: ON HOLD | COMMUNITY
Start: 2021-12-16 | End: 2022-03-01 | Stop reason: HOSPADM

## 2021-12-21 RX ORDER — TIZANIDINE 4 MG/1
4 TABLET ORAL 3 TIMES DAILY PRN
Qty: 90 TABLET | Refills: 0 | Status: SHIPPED | OUTPATIENT
Start: 2021-12-21 | End: 2022-05-06 | Stop reason: SDUPTHER

## 2021-12-21 NOTE — PROGRESS NOTES
[address]  [phone]    12/21/21     Patient ID  Godfrey Schaffer is a 59 y.o. female  Established patient    Chief Complaint  Godfrey Schaffer presents today for Neck Pain (Recheck neck and shoulder- chiropracter 3x), Coagulation Disorder (INR), and Constipation    Have you seen any other physician or provider since your last visit? Yes - Records Requested Chiropractor 3x  Have you had any other diagnostic tests since your last visit? No  Have you been seen in the emergency room and/or had an admission to a hospital since we last saw you? No     ASSESSMENT/PLAN  1. Controlled type 2 diabetes mellitus with stage 3 chronic kidney disease, without long-term current use of insulin (HCC)  2. Stage 3 chronic kidney disease, unspecified whether stage 3a or 3b CKD ([facility]  3. Diarrhea, unspecified type  -     Calprotectin Stool; Future  -     Clostridium Difficile Toxin/Antigen; Future  -     Fecal lactoferrin; Future  -     Blood Occult Stool #1; Future  -     Gastrointestinal Panel, Molecular; Future  -     H. pylori antigen; Future  -     Urinalysis Reflex to Culture; Future  4. Chronic midline low back pain without sciatica  -     Urinalysis Reflex to Culture; Future  -     External Referral To Physical Therapy  5. Muscle spasm  -     tiZANidine (ZANAFLEX) 4 MG tablet; Take 1 tablet by mouth 3 times daily as needed (muscle relaxer), Disp-90 tablet, R-0Normal  6. History of DVT of lower extremity  7. Long term (current) use of anticoagulants  8. Major depressive disorder, recurrent episode, severe, with psychotic behavior ([facility]  9. Multiple lung nodules on CT  10. History of pulmonary embolism     Start muscle relaxer's   referral to PT       Return in about 4 weeks (around 1/18/2022).     Patient Care Team:  BRAULIO Rocha CNP as PCP - General (Nurse Practitioner Family)  BRAULIO Rocha CNP as PCP - [facility] Empaneled Provider  Rafael Venegas MD as Consulting Physician (Nephrology)  Tara Dunn MD as Consulting Physician (Pulmonology)  Eddi Khan MD as Consulting Physician (Cardiology)  Nohemi Louise DPM as Consulting Physician ([facility])  Jenn Ulrich MD (Psychiatry)    SUBJECTIVE/OBJECTIVE  History of Present illness / Visit Summary     Back Pain  This is a new problem. The current episode started 1 to 4 weeks ago. The problem occurs constantly. The problem has been waxing and waning since onset. Pain location: cervical, neck. The quality of the pain is described as aching and shooting. The pain does not radiate. The pain is moderate. The symptoms are aggravated by position and twisting. Pertinent negatives include no abdominal pain, chest pain, dysuria, fever, headaches or numbness. She has tried analgesics, bed rest and chiropractic manipulation (chiropractor x3) for the symptoms. Review of Systems  Review of Systems   Constitutional: Negative for activity change, appetite change, chills, fatigue and fever. HENT: Negative for congestion, ear pain, postnasal drip, rhinorrhea, sinus pressure, sneezing, sore throat and trouble swallowing. Respiratory: Positive for shortness of breath (improved). Negative for cough, chest tightness and wheezing. Follows with pulmonology   CPAP HS    Cardiovascular: Negative for chest pain, palpitations and leg swelling. Follows with cardiology    Gastrointestinal: Negative for abdominal distention, abdominal pain, blood in stool, constipation, diarrhea and nausea. Endocrine:        Follows with podiatry    Genitourinary: Negative for difficulty urinating, dysuria, frequency, hematuria and urgency. Follows with nephrology    Musculoskeletal: Positive for arthralgias (cervical neck), back pain (chronic lumbar) and myalgias (muslce spasm to neck area?). Negative for gait problem and joint swelling. Following with chiropractic Dr Zachary Ryder?  Scheduled next 1/5/2022   Skin: Negative for rash and wound. Allergic/Immunologic: Negative for environmental allergies. Neurological: Negative for dizziness, syncope, speech difficulty, light-headedness, numbness and headaches. Hematological: Does not bruise/bleed easily. Psychiatric/Behavioral: Negative for agitation, decreased concentration, self-injury, sleep disturbance and suicidal ideas. The patient is not nervous/anxious. Follows with psychiatry        Physical exam   Physical Exam  Vitals and nursing note reviewed. Constitutional:       General: She is not in acute distress. Appearance: Normal appearance. She is well-developed and well-groomed. She is obese. She is not ill-appearing or toxic-appearing. Eyes:      Comments: Glasses    Neck:     Cardiovascular:      Rate and Rhythm: Normal rate and regular rhythm. No extrasystoles are present. Heart sounds: Normal heart sounds, S1 normal and S2 normal. No murmur heard. Pulmonary:      Effort: Pulmonary effort is normal. No prolonged expiration or respiratory distress. Breath sounds: Normal breath sounds and air entry. Musculoskeletal:      Right shoulder: Tenderness present. Decreased range of motion. Normal strength. Normal pulse. Right lower leg: No edema. Left lower leg: No edema. Skin:     General: Skin is warm and dry. Coloration: Skin is not ashen, cyanotic, jaundiced or pale. Neurological:      Mental Status: She is alert and oriented to person, place, and time. Motor: Motor function is intact. Gait: Gait is intact. Psychiatric:         Attention and Perception: Attention and perception normal.         Mood and Affect: Mood and affect normal.         Speech: Speech normal.         Behavior: Behavior normal. Behavior is cooperative. Thought Content: Thought content normal. Thought content does not include suicidal ideation. Thought content does not include suicidal plan.          Cognition and Memory: Cognition and memory normal.         Judgment: Judgment normal.           Electronically signed by BRAULIO Hsu CNP, APRN-CNP on 1/3/2022 at 2:04 PM    Please note that this chart was generated using voice recognition Dragon dictation software. Although every effort was made to ensure the accuracy of this automated transcription, some errors in transcription may have occurred.

## 2021-12-22 ENCOUNTER — HOSPITAL ENCOUNTER (OUTPATIENT)
Age: 61
Setting detail: SPECIMEN
Discharge: HOME | End: 2021-12-22

## 2021-12-22 DIAGNOSIS — G89.29 CHRONIC MIDLINE LOW BACK PAIN WITHOUT SCIATICA: ICD-10-CM

## 2021-12-22 DIAGNOSIS — R19.7 DIARRHEA, UNSPECIFIED TYPE: ICD-10-CM

## 2021-12-22 DIAGNOSIS — M54.50 CHRONIC MIDLINE LOW BACK PAIN WITHOUT SCIATICA: ICD-10-CM

## 2021-12-23 LAB
-: ABNORMAL
AMORPHOUS: ABNORMAL
BACTERIA: ABNORMAL
BILIRUBIN URINE: NEGATIVE
C DIFF AG + TOXIN: NEGATIVE
CAMPYLOBACTER PCR: NORMAL
CASTS UA: ABNORMAL /LPF (ref 0–2)
CASTS UA: ABNORMAL /LPF (ref 0–2)
COLOR: YELLOW
COMMENT UA: ABNORMAL
CRYSTALS, UA: ABNORMAL /HPF
CRYSTALS, UA: ABNORMAL /HPF
DATE, STOOL #1: NORMAL
DATE, STOOL #2: NORMAL
DATE, STOOL #3: NORMAL
DIRECT EXAM: NEGATIVE
E COLI ENTEROTOXIGENIC PCR: NORMAL
EPITHELIAL CELLS UA: ABNORMAL /HPF (ref 0–5)
GLUCOSE URINE: ABNORMAL
HEMOCCULT SP1 STL QL: NEGATIVE
HEMOCCULT SP2 STL QL: NORMAL
HEMOCCULT SP3 STL QL: NORMAL
KETONES, URINE: NEGATIVE
LACTOFERRIN, QUAL: NORMAL
LEUKOCYTE ESTERASE, URINE: ABNORMAL
Lab: NORMAL
MUCUS: ABNORMAL
NITRITE, URINE: NEGATIVE
OTHER OBSERVATIONS UA: ABNORMAL
PH UA: 5 (ref 5–8)
PLESIOMONAS SHIGELLOIDES PCR: NORMAL
PROTEIN UA: NEGATIVE
RBC UA: ABNORMAL /HPF (ref 0–2)
RENAL EPITHELIAL, UA: ABNORMAL /HPF
SALMONELLA PCR: NORMAL
SHIGATOXIN GENE PCR: NORMAL
SHIGELLA SP PCR: NORMAL
SPECIFIC GRAVITY UA: 1.01 (ref 1–1.03)
SPECIMEN DESCRIPTION: NORMAL
TIME, STOOL #1: NORMAL
TIME, STOOL #2: NORMAL
TIME, STOOL #3: NORMAL
TRICHOMONAS: ABNORMAL
TURBIDITY: CLEAR
URINE HGB: NEGATIVE
UROBILINOGEN, URINE: NORMAL
VIBRIO PCR: NORMAL
WBC UA: ABNORMAL /HPF (ref 0–5)
YEAST: ABNORMAL
YERSINIA ENTEROCOLITICA PCR: NORMAL

## 2021-12-24 LAB
CULTURE: ABNORMAL
Lab: ABNORMAL
SPECIMEN DESCRIPTION: ABNORMAL

## 2021-12-27 LAB — CALPROTECTIN, FECAL: 185 UG/G

## 2021-12-28 DIAGNOSIS — N30.00 ACUTE CYSTITIS WITHOUT HEMATURIA: ICD-10-CM

## 2021-12-28 DIAGNOSIS — E11.21 DIABETIC NEPHROPATHY ASSOCIATED WITH TYPE 2 DIABETES MELLITUS (HCC): Primary | ICD-10-CM

## 2021-12-28 DIAGNOSIS — R19.7 DIARRHEA, UNSPECIFIED TYPE: ICD-10-CM

## 2021-12-28 DIAGNOSIS — K59.01 SLOW TRANSIT CONSTIPATION: ICD-10-CM

## 2021-12-28 RX ORDER — NITROFURANTOIN 25; 75 MG/1; MG/1
100 CAPSULE ORAL 2 TIMES DAILY
Qty: 14 CAPSULE | Refills: 0 | Status: SHIPPED | OUTPATIENT
Start: 2021-12-28 | End: 2022-01-04

## 2021-12-30 ENCOUNTER — OFFICE VISIT (OUTPATIENT)
Dept: PRIMARY CARE CLINIC | Age: 61
End: 2021-12-30
Payer: MEDICARE

## 2021-12-30 ENCOUNTER — HOSPITAL ENCOUNTER (OUTPATIENT)
Age: 61
Setting detail: SPECIMEN
Discharge: HOME | End: 2021-12-30

## 2021-12-30 VITALS
SYSTOLIC BLOOD PRESSURE: 110 MMHG | DIASTOLIC BLOOD PRESSURE: 72 MMHG | TEMPERATURE: 97.9 F | OXYGEN SATURATION: 99 % | BODY MASS INDEX: 34.93 KG/M2 | HEART RATE: 96 BPM | WEIGHT: 191 LBS

## 2021-12-30 DIAGNOSIS — R51.9 ACUTE NONINTRACTABLE HEADACHE, UNSPECIFIED HEADACHE TYPE: ICD-10-CM

## 2021-12-30 DIAGNOSIS — Z20.822 EXPOSURE TO COVID-19 VIRUS: ICD-10-CM

## 2021-12-30 DIAGNOSIS — R53.83 FATIGUE, UNSPECIFIED TYPE: ICD-10-CM

## 2021-12-30 DIAGNOSIS — R05.9 COUGH: Primary | ICD-10-CM

## 2021-12-30 PROCEDURE — 99213 OFFICE O/P EST LOW 20 MIN: CPT | Performed by: PHYSICIAN ASSISTANT

## 2021-12-30 PROCEDURE — 1036F TOBACCO NON-USER: CPT | Performed by: PHYSICIAN ASSISTANT

## 2021-12-30 PROCEDURE — G8417 CALC BMI ABV UP PARAM F/U: HCPCS | Performed by: PHYSICIAN ASSISTANT

## 2021-12-30 PROCEDURE — G8482 FLU IMMUNIZE ORDER/ADMIN: HCPCS | Performed by: PHYSICIAN ASSISTANT

## 2021-12-30 PROCEDURE — 3017F COLORECTAL CA SCREEN DOC REV: CPT | Performed by: PHYSICIAN ASSISTANT

## 2021-12-30 PROCEDURE — G8427 DOCREV CUR MEDS BY ELIG CLIN: HCPCS | Performed by: PHYSICIAN ASSISTANT

## 2021-12-30 ASSESSMENT — ENCOUNTER SYMPTOMS
EYES NEGATIVE: 1
SHORTNESS OF BREATH: 0
COUGH: 1
SORE THROAT: 0
CHEST TIGHTNESS: 0
NAUSEA: 0

## 2021-12-30 NOTE — PROGRESS NOTES
[address]   [address] [place] [address]  Phone: [phone]  Fax: [phone]       [address] [place] [address] Name: Layne Mckenzie  MRN: [id number]  Merlygfjenniffer 1960  Date of evaluation: 12/30/2021  Provider: Waynetta Aase, [address]       Chief Complaint   Patient presents with    Cough     x 2 days    Headache           HISTORY OF PRESENT ILLNESS  (Location/Symptom, Timing/Onset, Context/Setting, Quality, Duration, Modifying Factors, Severity.)   Layne Mckenzie is a 59 y.o. White (non-) [1] female who presents to the office for evaluation of      COVID exposure    Fatigue  This is a new problem. The current episode started in the past 7 days. The problem occurs daily. The problem has been waxing and waning. Associated symptoms include congestion, coughing, fatigue and headaches. Pertinent negatives include no chest pain, chills, fever, myalgias, nausea or sore throat. Nursing Notes were reviewed. REVIEW OF SYSTEMS    (2-9 systems for level 4, 10 or more for level 5)     Review of Systems   Constitutional: Positive for fatigue. Negative for chills and fever. HENT: Positive for congestion and postnasal drip. Negative for ear discharge, ear pain and sore throat. Eyes: Negative. Respiratory: Positive for cough. Negative for chest tightness and shortness of breath. Cardiovascular: Negative for chest pain. Gastrointestinal: Negative for nausea. Genitourinary: Negative. Musculoskeletal: Negative for myalgias. Skin: Negative. Neurological: Positive for headaches. Except as noted above the remainder of the review of systems was reviewed andnegative. PAST MEDICAL HISTORY   History reviewed.     Past Medical History:   Diagnosis Date    Anxiety     Chronic kidney disease     [facility]     Depression     Fracture of ankle 5/14/2020    Hyperlipidemia     Hypertension     Obesity  Osteoarthritis     Proteinuria     Pulmonary embolism (HCC)     Sleep apnea     c-pap. Doesn't use everynight    SOB (shortness of breath) 2020    Type 2 diabetes mellitus without complication (HCC)     Type II or unspecified type diabetes mellitus without mention of complication, not stated as uncontrolled     Von Willebrand disease ([facility]          SURGICAL HISTORY     History reviewed.     Past Surgical History:   Procedure Laterality Date    APPENDECTOMY       SECTION      x3, 1977, 1979, 8543    COLONOSCOPY  2018    with biopsy    COLONOSCOPY N/A 2018    COLONOSCOPY performed by Flako Nuñez MD at [address] [facility] CATH LAB PROCEDURE      EYE SURGERY Bilateral     cataracts    EYE SURGERY Bilateral     glaucoma    TONSILLECTOMY AND ADENOIDECTOMY           CURRENT MEDICATIONS       Current Outpatient Medications   Medication Sig Dispense Refill    nitrofurantoin, macrocrystal-monohydrate, (MACROBID) 100 MG capsule Take 1 capsule by mouth 2 times daily for 7 days 14 capsule 0    buPROPion (WELLBUTRIN XL) 150 MG extended release tablet       tiZANidine (ZANAFLEX) 4 MG tablet Take 1 tablet by mouth 3 times daily as needed (muscle relaxer) 90 tablet 0    furosemide (LASIX) 20 MG tablet take 1 tablet by mouth once daily 90 tablet 1    Blood Pressure Monitoring KIT Use to check blood pressure daily and as needed 1 kit 0    Semaglutide 3 MG TABS Take 3 mg by mouth daily Lot [id number] exp 2023 30 tablet 0    dapagliflozin (FARXIGA) 10 MG tablet Take 0.5 tablets by mouth every morning for 7 days Lot [id number] exp 2024 7 tablet 0    Semaglutide 3 MG TABS Take 3 mg by mouth daily 90 tablet 1    polyethylene glycol (GLYCOLAX) 17 GM/SCOOP powder Take 17 g by mouth daily 1530 g 1    senna (SENOKOT) 8.6 MG tablet Take 1 tablet by mouth 2 times daily 60 tablet 1    dapagliflozin (FARXIGA) 5 MG tablet Take 1 tablet by mouth every morning 90 tablet 1    oxyCODONE-acetaminophen (PERCOCET) 5-325 MG per tablet take 1 tablet by mouth twice a day if needed for pain 40 tablet 0    tiotropium-olodaterol (STIOLTO) 2.5-2.5 MCG/ACT AERS Inhale 2 puffs into the lungs daily 1 each 2    buPROPion (WELLBUTRIN XL) 300 MG extended release tablet Take 2 tablets by mouth every morning (Patient taking differently: Take 300 mg by mouth every morning ) 30 tablet 0    blood glucose test strips (ONETOUCH ULTRA) strip TEST THREE TIMES DAILY 100 strip 11    allopurinol (ZYLOPRIM) 100 MG tablet Take 1 tablet by mouth daily 90 tablet 1    metFORMIN (GLUCOPHAGE-XR) 500 MG extended release tablet Take 1 tablet by mouth 2 times daily 360 tablet 1    diclofenac sodium (VOLTAREN) 1 % GEL Apply 2 g topically 2 times daily for 21 days (Patient not taking: Reported on 11/22/2021) 84 g 0    Cholecalciferol (RA VITAMIN D-3) 50 MCG (2000 UT) CAPS Take 1 capsule by mouth daily 30 capsule 5    omeprazole (PRILOSEC) 20 MG delayed release capsule Take 1 capsule by mouth Daily 90 capsule 1    atorvastatin (LIPITOR) 40 MG tablet Take 1 tablet by mouth daily 90 tablet 1    SITagliptin (JANUVIA) 100 MG tablet Take 1 tablet by mouth daily 90 tablet 1    lisinopril (PRINIVIL;ZESTRIL) 20 MG tablet Take 1 tablet by mouth daily 90 tablet 1    Lancets Misc. (ACCU-CHEK FASTCLIX LANCET) KIT use as directed PLEASE APPROVED NEW DEVICE WHILE WE WAIT FOR PRIOR AUTH. ..  FASTCLIX MIGHT BE EAISER TO MANIPULATE 1 kit 0    Accu-Chek FastClix Lancets MISC use 1 LANCET to TEST BLOOD SUGAR one to two times a day 120 each 3    isosorbide mononitrate (IMDUR) 30 MG extended release tablet Take 1 tablet by mouth daily 90 tablet 1    Continuous Blood Gluc Sensor (FREESTYLE BARB 14 DAY SENSOR) MISC 1 each by Does not apply route continuous 2 each 1    Continuous Blood Gluc  (FREESTYLE BARB 14 DAY READER) LIZ 1 each by Does not apply route continuous 1 Device 1    Alcohol Swabs (ALCOHOL PREP) 70 % PADS Use twice daily and as needed to check blood sugars 120 each 3    warfarin (COUMADIN) 2 MG tablet take 1 tablet by mouth , TUESDAY, THURSDAY, AND SATURDAY ( take 1 AND 1/2 tablets by mouth MONDAY, WEDNESDAY, FRIDAY ) (Patient taking differently: take 1 tablet by mouth every day. Took 3 mg Monday and Tuesday. 2 mg all other days. ) 45 tablet 5    albuterol sulfate  (90 Base) MCG/ACT inhaler Inhale 2 puffs into the lungs every 4 hours as needed for Wheezing or Shortness of Breath (AND/OR COUGH) 18 g 1    naloxone 4 MG/0.1ML LIQD nasal spray 1 spray by Nasal route as needed for Opioid Reversal 1 each 5    Handicap Placard MISC by Does not apply route Exp 2/3/2026 1 each 0    ipratropium-albuterol (DUONEB) 0.5-2.5 (3) MG/3ML SOLN nebulizer solution Inhale 3 mLs into the lungs every 6 hours as needed for Shortness of Breath 360 mL 0    Misc. Devices MISC 1 PAIR OF DIABETIC SHOES (1 LEFT/ 1 RIGHT)  1-3 PAIRS OF INSERTS (LEFT/ RIGHT) 2 each 0    Respiratory Therapy Supplies (NEBULIZER/TUBING/MOUTHPIECE) KIT 1 kit by Does not apply route 4 times daily as needed (wheezing) 1 kit 0    Misc. Devices MISC 1 PAIR OF DIABETIC SHOES (1 LEFT/ 1 RIGHT)  1-3 PAIRS OF INSERTS (LEFT/ RIGHT) 2 each 0     No current facility-administered medications for this visit. ALLERGIES     Patient has no known allergies.     FAMILY HISTORY           Problem Relation Age of Onset    Hypertension Mother     Liver Disease Mother     Cancer Father         stomach    Diabetes Sister     Cancer Brother         kidney    No Known Problems Maternal Grandmother     No Known Problems Maternal Grandfather     No Known Problems Paternal Grandmother     No Known Problems Paternal Grandfather     Other Brother         AIDS     Family Status   Relation Name Status    Mother     Flores Hedger Father      Sister Ashlyn Mitchell Alive    Brother roopa     MGM      MGF      PGM      PGF   results found for this visit on 12/30/21. FINALIMPRESSION      Visit Diagnoses and Associated Orders     ORDERS WITHOUT AN ASSOCIATED DIAGNOSIS    COVID-19 [[id number] Custom]   - Future Order, Pending Order            PLAN     Return if symptoms worsen or fail to improve. DISCHARGEMEDICATIONS:  No orders of the defined types were placed in this encounter. Plan:  Specimen sent for a culture. Possible treatment alteration based on the results. I believe that this is likely a viral illness based on the physical exam findings. Tylenol/Motrin for fever/discomfort. Patient agreeable to treatment plan. Educational materials provided on AVS.  Follow up if symptoms do not improve/worsen. Steps to help prevent the spread of COVID-19 if you are sick  SOURCE - [email or web address] html     Stay home except to get medical care   ; Stay home: People who are mildly ill with COVID-19 are able to isolate at home during their illness. You should restrict activities outside your home, except for getting medical care.   ; Avoid public areas: Do not go to work, school, or public areas.   ; Avoid public transportation: Avoid using public transportation, ride-sharing, or taxis.  ; Separate yourself from other people and animals in your home   ; Stay away from others: As much as possible, you should stay in a specific room and away from other people in your home. Also, you should use a separate bathroom, if available.   ; Limit contact with pets & animals: You should restrict contact with pets and other animals while you are sick with COVID-19, just like you would around other people. Although there have not been reports of pets or other animals becoming sick with COVID-19, it is still recommended that people sick with COVID-19 limit contact with animals until more information is known about the virus.    ; When possible, have another member of your household care for your animals while you are sick. If you are sick with COVID-19, avoid contact with your pet, including petting, snuggling, being kissed or licked, and sharing food. If you must care for your pet or be around animals while you are sick, wash your hands before and after you interact with pets and wear a facemask. See COVID-19 and Animals for more information. Other considerations   The ill person should eat/be fed in their room if possible. Non-disposable  items used should be handled with gloves and washed with hot water or in a . Clean hands after handling used  items.  If possible, dedicate a lined trash can for the ill person. Use gloves when removing garbage bags, handling, and disposing of trash. Wash hands after handling or disposing of trash.  Consider consulting with your local health department about trash disposal guidance if available. Information for Household Members and Caregivers of Someone who is Sick   Call ahead before visiting your doctor   Call ahead: If you have a medical appointment, call the healthcare provider and tell them that you have or may have COVID-19. This will help the healthcare provider's office take steps to keep other people from getting infected or exposed. Wear a facemask if you are sick   ; If you are sick: You should wear a facemask when you are around other people (e.g., sharing a room or vehicle) or pets and before you enter a healthcare provider's office. ; If you are caring for others: If the person who is sick is not able to wear a facemask (for example, because it causes trouble breathing), then people who live with the person who is sick should not stay in the same room with them, or they should wear a facemask if they enter a room with the person who is sick. Cover your coughs and sneezes   ; Cover: Cover your mouth and nose with a tissue when you cough or sneeze.   ; Dispose: Throw used tissues in a lined trash can. ; Wash hands: Immediately wash your hands with soap and water for at least 20 seconds or, if soap and water are not available, clean your hands with an alcohol-based hand  that contains at least 60% alcohol. Clean your hands often   ; Wash hands: Wash your hands often with soap and water for at least 20 seconds, especially after blowing your nose, coughing, or sneezing; going to the bathroom; and before eating or preparing food.   ; Hand : If soap and water are not readily available, use an alcohol-based hand  with at least 60% alcohol, covering all surfaces of your hands and rubbing them together until they feel dry.   ; Soap and water: Soap and water are the best option if hands are visibly dirty.   ; Avoid touching: Avoid touching your eyes, nose, and mouth with unwashed hands. Handwashing Tips   ; Wet your hands with clean, running water (warm or cold), turn off the tap, and apply soap.  ; Lather your hands by rubbing them together with the soap. Lather the backs of your hands, between your fingers, and under your nails. ; Scrub your hands for at least 20 seconds. Need a timer? Hum the [place] from beginning to end twice.  ; Rinse your hands well under clean, running water.  ; Dry your hands using a clean towel or air dry them. Avoid sharing personal household items   ; Do not share: You should not share dishes, drinking glasses, cups, eating utensils, towels, or bedding with other people or pets in your home.   ; Wash thoroughly after use: After using these items, they should be washed thoroughly with soap and water.   Clean all high-touch surfaces everyday   ; Clean and disinfect: Practice routine cleaning of high touch surfaces.  ; High touch surfaces include counters, tabletops, doorknobs, bathroom fixtures, toilets, phones, keyboards, tablets, and bedside tables.  ; Disinfect areas with bodily fluids: Also, clean any surfaces that may have blood, stool, or body fluids on them.   ; Household : Use a household cleaning spray or wipe, according to the label instructions. Labels contain instructions for safe and effective use of the cleaning product including precautions you should take when applying the product, such as wearing gloves and making sure you have good ventilation during use of the product. Monitor your symptoms   Seek medical attention: Seek prompt medical attention if your illness is worsening     (e.g., difficulty breathing).   ; Call your doctor: Before seeking care, call your healthcare provider and tell them that you have, or are being evaluated for, COVID-19.   ; Wear a facemask when sick: Put on a facemask before you enter the facility. These steps will help the healthcare provider's office to keep other people in the office or waiting room from getting infected or exposed. ; Alert health department: Ask your healthcare provider to call the local or [facility] health department. Persons who are placed under active monitoring or facilitated self-monitoring should follow instructions provided by their local health department or occupational health professionals, as appropriate.  ; Call 911 if you have a medical emergency: If you have a medical emergency and need to call 911, notify the dispatch personnel that you have, or are being evaluated for COVID-19. If possible, put on a facemask before emergency medical services arrive. Patient instructed to return to the office if symptoms worsen, return, or have any other concerns. Patient understands and is agreeable.          Hammad Martin PA-C 12/30/2021 3:30 PM

## 2021-12-31 DIAGNOSIS — R53.83 FATIGUE, UNSPECIFIED TYPE: ICD-10-CM

## 2021-12-31 DIAGNOSIS — Z20.822 EXPOSURE TO COVID-19 VIRUS: ICD-10-CM

## 2021-12-31 DIAGNOSIS — R05.9 COUGH: ICD-10-CM

## 2021-12-31 DIAGNOSIS — R51.9 ACUTE NONINTRACTABLE HEADACHE, UNSPECIFIED HEADACHE TYPE: ICD-10-CM

## 2021-12-31 LAB
SARS-COV-2: NORMAL
SARS-COV-2: NOT DETECTED
SOURCE: NORMAL

## 2022-01-02 DIAGNOSIS — J30.2 SEASONAL ALLERGIC RHINITIS, UNSPECIFIED TRIGGER: Primary | ICD-10-CM

## 2022-01-02 DIAGNOSIS — M15.9 PRIMARY OSTEOARTHRITIS INVOLVING MULTIPLE JOINTS: ICD-10-CM

## 2022-01-03 RX ORDER — NALOXONE HYDROCHLORIDE 4 MG/.1ML
1 SPRAY NASAL PRN
Qty: 1 EACH | Refills: 5 | Status: CANCELLED | OUTPATIENT
Start: 2022-01-03 | End: 2023-01-03

## 2022-01-03 ASSESSMENT — ENCOUNTER SYMPTOMS
DIARRHEA: 0
COUGH: 0
SHORTNESS OF BREATH: 1
ABDOMINAL PAIN: 0
WHEEZING: 0
CHEST TIGHTNESS: 0
RHINORRHEA: 0
SORE THROAT: 0
CONSTIPATION: 0
ABDOMINAL DISTENTION: 0
NAUSEA: 0
SINUS PRESSURE: 0
TROUBLE SWALLOWING: 0
BLOOD IN STOOL: 0

## 2022-01-04 DIAGNOSIS — I10 ESSENTIAL HYPERTENSION: Primary | ICD-10-CM

## 2022-01-04 RX ORDER — ISOSORBIDE MONONITRATE 30 MG/1
TABLET, EXTENDED RELEASE ORAL
Qty: 90 TABLET | Refills: 1 | Status: SHIPPED | OUTPATIENT
Start: 2022-01-04 | End: 2022-08-02

## 2022-01-04 RX ORDER — OXYCODONE HYDROCHLORIDE AND ACETAMINOPHEN 5; 325 MG/1; MG/1
1 TABLET ORAL EVERY 8 HOURS PRN
Qty: 40 TABLET | Refills: 0 | Status: SHIPPED | OUTPATIENT
Start: 2022-01-04 | End: 2022-02-04

## 2022-01-04 NOTE — TELEPHONE ENCOUNTER
Last visit:12-21-21  Last Med refill: 2-16-21  Does patient have enough medication for 72 hours: No:     Next Visit Date:  Future Appointments   Date Time Provider Mark Junior   1/10/2022  8:45 AM [facility] [place] NM ROOM [facility]   1/10/2022  9:45 AM [facility] [place] [facility] RO[facility]   1/10/2022 10:15 AM [facility] [place] NM ROOM [facility] NM [facility]   1/10/2022 11:15 AM [facility] [place] [facility] ROOM [facility]   1/10/2022 12:15 PM [facility] [place] [facility]   1/10/2022  1:15 PM [facility] [place] [facility] NM [facility]   1/24/2022  8:00 AM Trisha Greene APRN - CNP [facility]   1/24/2022 10:00 AM Samira Lunsford DPM [facility]   2/14/2022  8:30 AM Lexi Simon MD [facility] None       Health Maintenance   Topic Date Due    Depression Monitoring  Never done    Lipid screen  05/07/2021    COVID-19 Vaccine (3 - Booster for Moderna series) 09/30/2021    Diabetic retinal exam  07/09/2022    A1C test (Diabetic or Prediabetic)  08/05/2022    Diabetic foot exam  10/25/2022    Cervical cancer screen  11/16/2022    Potassium monitoring  11/23/2022    Creatinine monitoring  11/23/2022    Pneumococcal 0-64 years Vaccine (2 of 2 - PPSV23) 04/23/2023    Breast cancer screen  09/10/2023    DTaP/Tdap/Td vaccine (2 - Td or Tdap) 08/04/2026    Colon cancer screen colonoscopy  02/16/2028    Flu vaccine  Completed    Shingles Vaccine  Completed    Hepatitis C screen  Addressed    HIV screen  Addressed    Hepatitis A vaccine  Aged Out    Hib vaccine  Aged Out    Meningococcal (ACWY) vaccine  Aged Out       Hemoglobin A1C (%)   Date Value   08/05/2021 7.2 (H)   07/19/2021 6.4 (A)   09/14/2020 7.5 (H)             ( goal A1C is < 7)   Microalb/Crt.  Ratio (mcg/mg creat)   Date Value   04/08/2019 CANNOT BE CALCULATED     LDL Cholesterol (mg/dL)   Date Value   05/07/2020 68   08/01/2019 51     LDL Calculated (mg/dL)   Date Value 04/10/2020 59       (goal LDL is <100)   AST (U/L)   Date Value   07/19/2021 24     ALT (U/L)   Date Value   07/19/2021 32     BUN (mg/dL)   Date Value   11/23/2021 18     BP Readings from Last 3 Encounters:   12/30/21 110/72   12/21/21 112/78   12/07/21 122/80          (goal 120/80)    All Future Testing planned in [facility]  Lab Frequency Next Occurrence   ECHO Complete 2D W Doppler W Color Once 35/27/8706   Basic Metabolic Panel Once 56/32/6753   H. pylori antigen Once 12/21/2021   NM GASTRIC EMPTYING Once 12/29/2021   POCT INR                 Patient Active Problem List:     Essential hypertension     Hyperlipidemia     Osteoarthritis     Obesity     CKD (chronic kidney disease) stage 3, GFR 30-59 ml/min ([facility])     Proteinuria     Controlled type 2 diabetes mellitus with stage 3 chronic kidney disease, without long-term current use of insulin ([address]     History of DVT of lower extremity     NARCISO on CPAP     Vitamin D deficiency     Major depressive disorder, recurrent episode, severe, with psychotic behavior ([address]     Multiple lung nodules on CT     Hypomagnesemia     Anxiety     Chronic obstructive pulmonary disease (HCC)     Precordial pain     History of pulmonary embolism     Family history of abdominal aortic aneurysm     Normal coronary arteries     Long term (current) use of anticoagulants     AAA (abdominal aortic aneurysm) without rupture (HCC)     COPD exacerbation (HCC)     Acute tracheobronchitis-viral     Abnormal mammogram     Cerebrovascular accident (CVA) ([address]     Diabetic nephropathy ([address]

## 2022-01-04 NOTE — TELEPHONE ENCOUNTER
Mitch Brooks is calling to request a refill on the following medication(s):    Last Visit Date (If Applicable):  00/91/5346    Next Visit Date:    1/24/2022    Medication Request:  Requested Prescriptions     Pending Prescriptions Disp Refills    isosorbide mononitrate (IMDUR) 30 MG extended release tablet [Pharmacy Med Name: ISOSORBIDE MONONIT ER 30 MG TB] 90 tablet 1     Sig: take 1 tablet by mouth once daily

## 2022-01-05 RX ORDER — FLUTICASONE PROPIONATE 50 MCG
1 SPRAY, SUSPENSION (ML) NASAL DAILY
Qty: 1 EACH | Refills: 3 | Status: SHIPPED | OUTPATIENT
Start: 2022-01-05 | End: 2022-06-25 | Stop reason: SDUPTHER

## 2022-01-05 NOTE — TELEPHONE ENCOUNTER
No patient requesting flonase nasal spray she used previously due to her nasal drainage. Correct medication pending.

## 2022-01-10 ENCOUNTER — HOSPITAL ENCOUNTER (OUTPATIENT)
Dept: NUCLEAR MEDICINE | Age: 62
Discharge: HOME | End: 2022-01-12
Payer: MEDICARE

## 2022-01-10 ENCOUNTER — HOSPITAL ENCOUNTER (OUTPATIENT)
Dept: NUCLEAR MEDICINE | Age: 62
Discharge: HOME | End: 2022-01-12

## 2022-01-10 ENCOUNTER — PATIENT MESSAGE (OUTPATIENT)
Dept: FAMILY MEDICINE CLINIC | Age: 62
End: 2022-01-10

## 2022-01-10 DIAGNOSIS — E11.21 DIABETIC NEPHROPATHY ASSOCIATED WITH TYPE 2 DIABETES MELLITUS (HCC): ICD-10-CM

## 2022-01-10 DIAGNOSIS — R19.7 DIARRHEA, UNSPECIFIED TYPE: ICD-10-CM

## 2022-01-10 DIAGNOSIS — K59.01 SLOW TRANSIT CONSTIPATION: ICD-10-CM

## 2022-01-10 PROCEDURE — 3430000000 HC RX DIAGNOSTIC RADIOPHARMACEUTICAL: Performed by: NURSE PRACTITIONER

## 2022-01-10 PROCEDURE — 78264 GASTRIC EMPTYING IMG STUDY: CPT

## 2022-01-10 PROCEDURE — A9540 TC99M MAA: HCPCS | Performed by: NURSE PRACTITIONER

## 2022-01-10 RX ADMIN — Medication 0.81 MILLICURIE: at 08:46

## 2022-01-15 DIAGNOSIS — E11.22 CONTROLLED TYPE 2 DIABETES MELLITUS WITH STAGE 3 CHRONIC KIDNEY DISEASE, WITHOUT LONG-TERM CURRENT USE OF INSULIN (HCC): ICD-10-CM

## 2022-01-15 DIAGNOSIS — K21.9 GASTROESOPHAGEAL REFLUX DISEASE: ICD-10-CM

## 2022-01-15 DIAGNOSIS — N18.30 CONTROLLED TYPE 2 DIABETES MELLITUS WITH STAGE 3 CHRONIC KIDNEY DISEASE, WITHOUT LONG-TERM CURRENT USE OF INSULIN (HCC): ICD-10-CM

## 2022-01-15 DIAGNOSIS — E78.5 HYPERLIPIDEMIA, UNSPECIFIED HYPERLIPIDEMIA TYPE: ICD-10-CM

## 2022-01-16 DIAGNOSIS — I10 ESSENTIAL HYPERTENSION: ICD-10-CM

## 2022-01-17 RX ORDER — LISINOPRIL 20 MG/1
TABLET ORAL
Qty: 90 TABLET | Refills: 1 | Status: SHIPPED | OUTPATIENT
Start: 2022-01-17 | End: 2022-06-29 | Stop reason: SDUPTHER

## 2022-01-17 RX ORDER — SITAGLIPTIN 100 MG/1
TABLET, FILM COATED ORAL
Qty: 90 TABLET | Refills: 1 | Status: SHIPPED | OUTPATIENT
Start: 2022-01-17 | End: 2022-08-02

## 2022-01-17 RX ORDER — ATORVASTATIN CALCIUM 40 MG/1
TABLET, FILM COATED ORAL
Qty: 90 TABLET | Refills: 1 | Status: SHIPPED | OUTPATIENT
Start: 2022-01-17 | End: 2022-08-02

## 2022-01-17 RX ORDER — OMEPRAZOLE 20 MG/1
CAPSULE, DELAYED RELEASE ORAL
Qty: 90 CAPSULE | Refills: 1 | Status: SHIPPED | OUTPATIENT
Start: 2022-01-17 | End: 2022-08-02

## 2022-01-17 NOTE — TELEPHONE ENCOUNTER
LOV 12/21/21  RTO F/U scheduled  LRF 7/26/21    Health Maintenance   Topic Date Due    Depression Monitoring  Never done    Lipid screen  05/07/2021    COVID-19 Vaccine (3 - Booster for Moderna series) 09/30/2021    Diabetic retinal exam  07/09/2022    A1C test (Diabetic or Prediabetic)  08/05/2022    Diabetic foot exam  10/25/2022    Cervical cancer screen  11/16/2022    Potassium monitoring  11/23/2022    Creatinine monitoring  11/23/2022    Pneumococcal 0-64 years Vaccine (2 of 2 - PPSV23) 04/23/2023    Breast cancer screen  09/10/2023    DTaP/Tdap/Td vaccine (2 - Td or Tdap) 08/04/2026    Colon cancer screen colonoscopy  02/16/2028    Flu vaccine  Completed    Shingles Vaccine  Completed    Hepatitis C screen  Addressed    HIV screen  Addressed    Hepatitis A vaccine  Aged Out    Hib vaccine  Aged Out    Meningococcal (ACWY) vaccine  Aged Out             (applicable per patient's age: Cancer Screenings, Depression Screening, Fall Risk Screening, Immunizations)    Hemoglobin A1C (%)   Date Value   08/05/2021 7.2 (H)   07/19/2021 6.4 (A)   09/14/2020 7.5 (H)     Microalb/Crt.  Ratio (mcg/mg creat)   Date Value   04/08/2019 CANNOT BE CALCULATED     LDL Cholesterol (mg/dL)   Date Value   05/07/2020 68     LDL Calculated (mg/dL)   Date Value   04/10/2020 59     AST (U/L)   Date Value   07/19/2021 24     ALT (U/L)   Date Value   07/19/2021 32     BUN (mg/dL)   Date Value   11/23/2021 18      (goal A1C is < 7)   (goal LDL is <100) need 30-50% reduction from baseline     BP Readings from Last 3 Encounters:   12/30/21 110/72   12/21/21 112/78   12/07/21 122/80    (goal /80)      All Future Testing planned in [facility]:  Lab Frequency Next Occurrence   ECHO Complete 2D W Doppler W Color Once 78/24/3545   Basic Metabolic Panel Once 61/97/4815   H. pylori antigen Once 12/21/2021   POCT INR         Next Visit Date:  Future Appointments   Date Time Provider Mark Junior   1/24/2022  8:00 AM Trisha VAIL Shana Barrientos, APRN - CNP [facility]   1/24/2022 10:00 AM Simon Santos DPM [facility]   2/14/2022  8:30 AM Charly Hogue MD [facility] None            Patient Active Problem List:     Essential hypertension     Hyperlipidemia     Osteoarthritis     Obesity     CKD (chronic kidney disease) stage 3, GFR 30-59 ml/min (HCC)     Proteinuria     Controlled type 2 diabetes mellitus with stage 3 chronic kidney disease, without long-term current use of insulin (HCC)     History of DVT of lower extremity     NARCISO on CPAP     Vitamin D deficiency     Major depressive disorder, recurrent episode, severe, with psychotic behavior ([address]     Multiple lung nodules on CT     Hypomagnesemia     Anxiety     Chronic obstructive pulmonary disease (HCC)     Precordial pain     History of pulmonary embolism     Family history of abdominal aortic aneurysm     Normal coronary arteries     Long term (current) use of anticoagulants     AAA (abdominal aortic aneurysm) without rupture (HCC)     COPD exacerbation (HCC)     Acute tracheobronchitis-viral     Abnormal mammogram     Cerebrovascular accident (CVA) ([address]     Diabetic nephropathy ([address]

## 2022-01-17 NOTE — TELEPHONE ENCOUNTER
LOV 12/21/21  RTO F/U scheduled  LRF 7/28/21    Health Maintenance   Topic Date Due    Depression Monitoring  Never done    Lipid screen  05/07/2021    COVID-19 Vaccine (3 - Booster for Moderna series) 09/30/2021    Diabetic retinal exam  07/09/2022    A1C test (Diabetic or Prediabetic)  08/05/2022    Diabetic foot exam  10/25/2022    Cervical cancer screen  11/16/2022    Potassium monitoring  11/23/2022    Creatinine monitoring  11/23/2022    Pneumococcal 0-64 years Vaccine (2 of 2 - PPSV23) 04/23/2023    Breast cancer screen  09/10/2023    DTaP/Tdap/Td vaccine (2 - Td or Tdap) 08/04/2026    Colon cancer screen colonoscopy  02/16/2028    Flu vaccine  Completed    Shingles Vaccine  Completed    Hepatitis C screen  Addressed    HIV screen  Addressed    Hepatitis A vaccine  Aged Out    Hib vaccine  Aged Out    Meningococcal (ACWY) vaccine  Aged Out             (applicable per patient's age: Cancer Screenings, Depression Screening, Fall Risk Screening, Immunizations)    Hemoglobin A1C (%)   Date Value   08/05/2021 7.2 (H)   07/19/2021 6.4 (A)   09/14/2020 7.5 (H)     Microalb/Crt.  Ratio (mcg/mg creat)   Date Value   04/08/2019 CANNOT BE CALCULATED     LDL Cholesterol (mg/dL)   Date Value   05/07/2020 68     LDL Calculated (mg/dL)   Date Value   04/10/2020 59     AST (U/L)   Date Value   07/19/2021 24     ALT (U/L)   Date Value   07/19/2021 32     BUN (mg/dL)   Date Value   11/23/2021 18      (goal A1C is < 7)   (goal LDL is <100) need 30-50% reduction from baseline     BP Readings from Last 3 Encounters:   12/30/21 110/72   12/21/21 112/78   12/07/21 122/80    (goal /80)      All Future Testing planned in [facility]:  Lab Frequency Next Occurrence   ECHO Complete 2D W Doppler W Color Once 86/50/8520   Basic Metabolic Panel Once 81/19/7577   H. pylori antigen Once 12/21/2021   POCT INR         Next Visit Date:  Future Appointments   Date Time Provider Mark Junior   1/24/2022  8:00 AM Trsiha VAIL Karo Cea, APRN - CNP [facility]   1/24/2022 10:00 AM Jurgen Mack DPM [facility]   2/14/2022  8:30 AM Nancy Mukherjee MD [facility] None            Patient Active Problem List:     Essential hypertension     Hyperlipidemia     Osteoarthritis     Obesity     CKD (chronic kidney disease) stage 3, GFR 30-59 ml/min (HCC)     Proteinuria     Controlled type 2 diabetes mellitus with stage 3 chronic kidney disease, without long-term current use of insulin (HCC)     History of DVT of lower extremity     NARCISO on CPAP     Vitamin D deficiency     Major depressive disorder, recurrent episode, severe, with psychotic behavior ([address]     Multiple lung nodules on CT     Hypomagnesemia     Anxiety     Chronic obstructive pulmonary disease (HCC)     Precordial pain     History of pulmonary embolism     Family history of abdominal aortic aneurysm     Normal coronary arteries     Long term (current) use of anticoagulants     AAA (abdominal aortic aneurysm) without rupture (HCC)     COPD exacerbation (HCC)     Acute tracheobronchitis-viral     Abnormal mammogram     Cerebrovascular accident (CVA) ([address]     Diabetic nephropathy ([address]

## 2022-01-23 ASSESSMENT — ENCOUNTER SYMPTOMS
TROUBLE SWALLOWING: 0
SINUS PRESSURE: 0
BACK PAIN: 0
CONSTIPATION: 0
ABDOMINAL DISTENTION: 0
ABDOMINAL PAIN: 0
BLOOD IN STOOL: 0
COUGH: 0
RHINORRHEA: 0
WHEEZING: 0
SORE THROAT: 0
CHEST TIGHTNESS: 0
NAUSEA: 0
DIARRHEA: 0

## 2022-01-24 ENCOUNTER — OFFICE VISIT (OUTPATIENT)
Dept: FAMILY MEDICINE CLINIC | Age: 62
End: 2022-01-24
Payer: MEDICARE

## 2022-01-24 ENCOUNTER — OFFICE VISIT (OUTPATIENT)
Dept: PODIATRY | Age: 62
End: 2022-01-24
Payer: MEDICARE

## 2022-01-24 ENCOUNTER — HOSPITAL ENCOUNTER (OUTPATIENT)
Age: 62
Setting detail: SPECIMEN
Discharge: HOME | End: 2022-01-24

## 2022-01-24 VITALS
BODY MASS INDEX: 34.23 KG/M2 | OXYGEN SATURATION: 99 % | WEIGHT: 186 LBS | HEIGHT: 62 IN | TEMPERATURE: 97.1 F | RESPIRATION RATE: 16 BRPM | SYSTOLIC BLOOD PRESSURE: 100 MMHG | HEART RATE: 93 BPM | DIASTOLIC BLOOD PRESSURE: 60 MMHG

## 2022-01-24 VITALS — BODY MASS INDEX: 34.23 KG/M2 | HEIGHT: 62 IN | WEIGHT: 186 LBS

## 2022-01-24 DIAGNOSIS — E11.22 CONTROLLED TYPE 2 DIABETES MELLITUS WITH STAGE 3 CHRONIC KIDNEY DISEASE, WITHOUT LONG-TERM CURRENT USE OF INSULIN (HCC): Primary | Chronic | ICD-10-CM

## 2022-01-24 DIAGNOSIS — I73.9 PVD (PERIPHERAL VASCULAR DISEASE) (HCC): ICD-10-CM

## 2022-01-24 DIAGNOSIS — Z79.01 LONG TERM (CURRENT) USE OF ANTICOAGULANTS: ICD-10-CM

## 2022-01-24 DIAGNOSIS — F33.3 MAJOR DEPRESSIVE DISORDER, RECURRENT EPISODE, SEVERE, WITH PSYCHOTIC BEHAVIOR (HCC): Chronic | ICD-10-CM

## 2022-01-24 DIAGNOSIS — R91.8 MULTIPLE LUNG NODULES ON CT: ICD-10-CM

## 2022-01-24 DIAGNOSIS — Z86.711 HISTORY OF PULMONARY EMBOLISM: ICD-10-CM

## 2022-01-24 DIAGNOSIS — N17.9 AKI (ACUTE KIDNEY INJURY) (HCC): ICD-10-CM

## 2022-01-24 DIAGNOSIS — J41.1 MUCOPURULENT CHRONIC BRONCHITIS (HCC): ICD-10-CM

## 2022-01-24 DIAGNOSIS — Z99.89 OSA ON CPAP: Chronic | ICD-10-CM

## 2022-01-24 DIAGNOSIS — N18.30 STAGE 3 CHRONIC KIDNEY DISEASE, UNSPECIFIED WHETHER STAGE 3A OR 3B CKD (HCC): Chronic | ICD-10-CM

## 2022-01-24 DIAGNOSIS — G47.33 OSA ON CPAP: Chronic | ICD-10-CM

## 2022-01-24 DIAGNOSIS — F41.9 ANXIETY: ICD-10-CM

## 2022-01-24 DIAGNOSIS — N18.30 CONTROLLED TYPE 2 DIABETES MELLITUS WITH STAGE 3 CHRONIC KIDNEY DISEASE, WITHOUT LONG-TERM CURRENT USE OF INSULIN (HCC): Primary | Chronic | ICD-10-CM

## 2022-01-24 DIAGNOSIS — E11.21 DIABETIC NEPHROPATHY ASSOCIATED WITH TYPE 2 DIABETES MELLITUS (HCC): ICD-10-CM

## 2022-01-24 DIAGNOSIS — E11.51 TYPE II DIABETES MELLITUS WITH PERIPHERAL CIRCULATORY DISORDER (HCC): Primary | ICD-10-CM

## 2022-01-24 DIAGNOSIS — M79.672 PAIN IN BOTH FEET: ICD-10-CM

## 2022-01-24 DIAGNOSIS — K59.01 SLOW TRANSIT CONSTIPATION: ICD-10-CM

## 2022-01-24 DIAGNOSIS — B35.1 DERMATOPHYTOSIS OF NAIL: ICD-10-CM

## 2022-01-24 DIAGNOSIS — Z86.718 HISTORY OF DVT OF LOWER EXTREMITY: ICD-10-CM

## 2022-01-24 DIAGNOSIS — N18.2 CKD (CHRONIC KIDNEY DISEASE), STAGE II: ICD-10-CM

## 2022-01-24 DIAGNOSIS — M79.671 PAIN IN BOTH FEET: ICD-10-CM

## 2022-01-24 LAB
ANION GAP SERPL CALCULATED.3IONS-SCNC: 22 MMOL/L (ref 9–17)
BUN BLDV-MCNC: 17 MG/DL (ref 8–23)
BUN/CREAT BLD: ABNORMAL (ref 9–20)
CALCIUM SERPL-MCNC: 9.8 MG/DL (ref 8.6–10.4)
CHLORIDE BLD-SCNC: 103 MMOL/L (ref 98–107)
CO2: 21 MMOL/L (ref 20–31)
CREAT SERPL-MCNC: 1.12 MG/DL (ref 0.5–0.9)
GFR AFRICAN AMERICAN: 60 ML/MIN
GFR NON-AFRICAN AMERICAN: 49 ML/MIN
GFR SERPL CREATININE-BSD FRML MDRD: ABNORMAL ML/MIN/{1.73_M2}
GFR SERPL CREATININE-BSD FRML MDRD: ABNORMAL ML/MIN/{1.73_M2}
GLUCOSE BLD-MCNC: 100 MG/DL (ref 70–99)
INTERNATIONAL NORMALIZATION RATIO, POC: 2.7
POTASSIUM SERPL-SCNC: 4.1 MMOL/L (ref 3.7–5.3)
PROTHROMBIN TIME, POC: 32.6
SODIUM BLD-SCNC: 146 MMOL/L (ref 135–144)

## 2022-01-24 PROCEDURE — 3023F SPIROM DOC REV: CPT | Performed by: NURSE PRACTITIONER

## 2022-01-24 PROCEDURE — G8427 DOCREV CUR MEDS BY ELIG CLIN: HCPCS | Performed by: NURSE PRACTITIONER

## 2022-01-24 PROCEDURE — 99214 OFFICE O/P EST MOD 30 MIN: CPT | Performed by: NURSE PRACTITIONER

## 2022-01-24 PROCEDURE — 1036F TOBACCO NON-USER: CPT | Performed by: NURSE PRACTITIONER

## 2022-01-24 PROCEDURE — 85610 PROTHROMBIN TIME: CPT | Performed by: NURSE PRACTITIONER

## 2022-01-24 PROCEDURE — 3017F COLORECTAL CA SCREEN DOC REV: CPT | Performed by: NURSE PRACTITIONER

## 2022-01-24 PROCEDURE — 3046F HEMOGLOBIN A1C LEVEL >9.0%: CPT | Performed by: NURSE PRACTITIONER

## 2022-01-24 PROCEDURE — 93793 ANTICOAG MGMT PT WARFARIN: CPT | Performed by: NURSE PRACTITIONER

## 2022-01-24 PROCEDURE — 2022F DILAT RTA XM EVC RTNOPTHY: CPT | Performed by: NURSE PRACTITIONER

## 2022-01-24 PROCEDURE — G8417 CALC BMI ABV UP PARAM F/U: HCPCS | Performed by: NURSE PRACTITIONER

## 2022-01-24 PROCEDURE — 11721 DEBRIDE NAIL 6 OR MORE: CPT | Performed by: PODIATRIST

## 2022-01-24 PROCEDURE — G8482 FLU IMMUNIZE ORDER/ADMIN: HCPCS | Performed by: NURSE PRACTITIONER

## 2022-01-24 PROCEDURE — 99999 PR OFFICE/OUTPT VISIT,PROCEDURE ONLY: CPT | Performed by: PODIATRIST

## 2022-01-24 RX ORDER — BLOOD SUGAR DIAGNOSTIC
STRIP MISCELLANEOUS
Qty: 100 STRIP | Refills: 11 | Status: SHIPPED | OUTPATIENT
Start: 2022-01-24 | End: 2022-06-25 | Stop reason: SDUPTHER

## 2022-01-24 RX ORDER — SENNA PLUS 8.6 MG/1
1 TABLET ORAL 2 TIMES DAILY
Qty: 180 TABLET | Refills: 3 | Status: SHIPPED | OUTPATIENT
Start: 2022-01-24 | End: 2022-10-03 | Stop reason: SDUPTHER

## 2022-01-24 RX ORDER — POLYETHYLENE GLYCOL 3350 17 G/17G
17 POWDER, FOR SOLUTION ORAL DAILY
Qty: 90 EACH | Refills: 3 | Status: SHIPPED | OUTPATIENT
Start: 2022-01-24 | End: 2022-10-03 | Stop reason: SDUPTHER

## 2022-01-24 ASSESSMENT — PATIENT HEALTH QUESTIONNAIRE - PHQ9
SUM OF ALL RESPONSES TO PHQ QUESTIONS 1-9: 0
3. TROUBLE FALLING OR STAYING ASLEEP: 0
2. FEELING DOWN, DEPRESSED OR HOPELESS: 0
1. LITTLE INTEREST OR PLEASURE IN DOING THINGS: 0
5. POOR APPETITE OR OVEREATING: 0
4. FEELING TIRED OR HAVING LITTLE ENERGY: 0
SUM OF ALL RESPONSES TO PHQ QUESTIONS 1-9: 0
10. IF YOU CHECKED OFF ANY PROBLEMS, HOW DIFFICULT HAVE THESE PROBLEMS MADE IT FOR YOU TO DO YOUR WORK, TAKE CARE OF THINGS AT HOME, OR GET ALONG WITH OTHER PEOPLE: 0
6. FEELING BAD ABOUT YOURSELF - OR THAT YOU ARE A FAILURE OR HAVE LET YOURSELF OR YOUR FAMILY DOWN: 0
8. MOVING OR SPEAKING SO SLOWLY THAT OTHER PEOPLE COULD HAVE NOTICED. OR THE OPPOSITE, BEING SO FIGETY OR RESTLESS THAT YOU HAVE BEEN MOVING AROUND A LOT MORE THAN USUAL: 0
SUM OF ALL RESPONSES TO PHQ QUESTIONS 1-9: 0
7. TROUBLE CONCENTRATING ON THINGS, SUCH AS READING THE NEWSPAPER OR WATCHING TELEVISION: 0
9. THOUGHTS THAT YOU WOULD BE BETTER OFF DEAD, OR OF HURTING YOURSELF: 0
SUM OF ALL RESPONSES TO PHQ QUESTIONS 1-9: 0
SUM OF ALL RESPONSES TO PHQ9 QUESTIONS 1 & 2: 0

## 2022-01-24 ASSESSMENT — ENCOUNTER SYMPTOMS
BLURRED VISION: 0
SHORTNESS OF BREATH: 0

## 2022-01-24 NOTE — PROGRESS NOTES
Known Allergies  Current Outpatient Medications on File Prior to Visit   Medication Sig Dispense Refill    polyethylene glycol (GLYCOLAX) 17 GM/SCOOP powder Take 17 g by mouth daily 90 each 3    senna (SENOKOT) 8.6 MG tablet Take 1 tablet by mouth 2 times daily 180 tablet 3    blood glucose test strips (ONETOUCH ULTRA) strip TEST THREE TIMES DAILY 100 strip 11    omeprazole (PRILOSEC) 20 MG delayed release capsule take 1 capsule by mouth once daily 90 capsule 1    atorvastatin (LIPITOR) 40 MG tablet take 1 tablet by mouth once daily 90 tablet 1    JANUVIA 100 MG tablet take 1 tablet by mouth once daily 90 tablet 1    lisinopril (PRINIVIL;ZESTRIL) 20 MG tablet take 1 tablet by mouth once daily 90 tablet 1    fluticasone (FLONASE) 50 MCG/ACT nasal spray 1 spray by Nasal route daily 1 each 3    oxyCODONE-acetaminophen (PERCOCET) 5-325 MG per tablet Take 1 tablet by mouth every 8 hours as needed for Pain for up to 30 days.  40 tablet 0    isosorbide mononitrate (IMDUR) 30 MG extended release tablet take 1 tablet by mouth once daily 90 tablet 1    buPROPion (WELLBUTRIN XL) 150 MG extended release tablet       tiZANidine (ZANAFLEX) 4 MG tablet Take 1 tablet by mouth 3 times daily as needed (muscle relaxer) 90 tablet 0    furosemide (LASIX) 20 MG tablet take 1 tablet by mouth once daily 90 tablet 1    Blood Pressure Monitoring KIT Use to check blood pressure daily and as needed 1 kit 0    Semaglutide 3 MG TABS Take 3 mg by mouth daily 90 tablet 1    dapagliflozin (FARXIGA) 5 MG tablet Take 1 tablet by mouth every morning 90 tablet 1    tiotropium-olodaterol (STIOLTO) 2.5-2.5 MCG/ACT AERS Inhale 2 puffs into the lungs daily 1 each 2    buPROPion (WELLBUTRIN XL) 300 MG extended release tablet Take 2 tablets by mouth every morning (Patient taking differently: Take 300 mg by mouth every morning ) 30 tablet 0    allopurinol (ZYLOPRIM) 100 MG tablet Take 1 tablet by mouth daily 90 tablet 1    metFORMIN (GLUCOPHAGE-XR) 500 MG extended release tablet Take 1 tablet by mouth 2 times daily 360 tablet 1    Cholecalciferol (RA VITAMIN D-3) 50 MCG (2000 UT) CAPS Take 1 capsule by mouth daily 30 capsule 5    Lancets Misc. (ACCU-CHEK FASTCLIX LANCET) KIT use as directed PLEASE APPROVED NEW DEVICE WHILE WE WAIT [address] .. FASTCLIX MIGHT BE EAISER TO MANIPULATE 1 kit 0    Accu-Chek FastClix Lancets MISC use 1 LANCET to TEST BLOOD SUGAR one to two times a day 120 each 3    Alcohol Swabs (ALCOHOL PREP) 70 % PADS Use twice daily and as needed to check blood sugars 120 each 3    warfarin (COUMADIN) 2 MG tablet take 1 tablet by mouth SUNDAY, TUESDAY, THURSDAY, AND SATURDAY ( take 1 AND 1/2 tablets by mouth MONDAY, WEDNESDAY, FRIDAY ) (Patient taking differently: take 1 tablet by mouth every day. Took 3 mg Monday and Tuesday. 2 mg all other days. ) 45 tablet 5    albuterol sulfate  (90 Base) MCG/ACT inhaler Inhale 2 puffs into the lungs every 4 hours as needed for Wheezing or Shortness of Breath (AND/OR COUGH) 18 g 1    naloxone 4 MG/0.1ML LIQD nasal spray 1 spray by Nasal route as needed for Opioid Reversal 1 each 5    Handicap Placard MISC by Does not apply route Exp 2/3/2026 1 each 0    Respiratory Therapy Supplies (NEBULIZER/TUBING/MOUTHPIECE) KIT 1 kit by Does not apply route 4 times daily as needed (wheezing) 1 kit 0    ipratropium-albuterol (DUONEB) 0.5-2.5 (3) MG/3ML SOLN nebulizer solution Inhale 3 mLs into the lungs every 6 hours as needed for Shortness of Breath 360 mL 0     No current facility-administered medications on file prior to visit. Review of Systems    Review of Systems:   History obtained from chart review and the patient  General ROS: negative for - chills, fatigue, fever, night sweats or weight gain  Constitutional: Negative for chills, diaphoresis, fatigue, fever and unexpected weight change.   Musculoskeletal: Positive for arthralgias, gait problem and joint swelling. Neurological ROS: negative for - behavioral changes, confusion, headaches or seizures. Negative for weakness and numbness. Dermatological ROS: negative for - mole changes, rash  Cardiovascular: Negative for leg swelling. Gastrointestinal: Negative for constipation, diarrhea, nausea and vomiting. Objective:  Dermatologic Exam:  Skin lesion/ulceration Absent . Skin No rashes or nodules noted. .   Skin is thin, with flaky sloughing skin as well as decreased hair growth to the lower leg  Small red hemosiderin deposits seen dorsal foot   Musculoskeletal:     1st MPJ ROM decreased, Bilateral.  Muscle strength 5/5, Bilateral.  Pain present upon palpation of toenails 1-5, Bilateral. decreased medial longitudinal arch, Bilateral.  Ankle ROM decreased,Bilateral.    Dorsally contracted digits present digits 2, Bilateral.     Vascular: DP pulses 1/4 bilateral.  PT pulses 0/4 bilateral.   CFT <5 seconds, Bilateral.  Hair growth absent to the level of the digits, Bilateral.  Edema present, Bilateral.  Varicosities absent, Bilateral. Erythema absent, Bilateral    Neurological: Sensation diminshed to light touch to level of digits, Bilateral.  Protective sensation intact 6/10 sites via 5.07/10g [place] Monofilament, Bilateral.  negative Tinel's, Bilateral.  negative Valleix sign, Bilateral.      Integument: Warm, dry, supple, Bilateral.  Open lesion absent, Bilateral.  Interdigital maceration absent to web spaces 4, Bilateral.  Nails 1-5 left and 1-5 right thickened > 3.0 mm, dystrophic and crumbly, discolored with subungual debris. Fissures absent, Bilateral.   General: AAO x 3 in NAD.     Derm  Toenail Description  Sites of Onychomycosis Involvement (Check affected area)  [x] [x] [x] [x] [x] [x] [x] [x] [x] [x]  5 4 3 2 1 1 2 3 4 5                          Right                                        Left    Thickness  [x] [x] [x] [x] [x] [x] [x] [x] [x] [x]  5 4 3 2 1 1 2 3 4 5 Right                                        Left    Dystrophic Changes   [x] [x] [x] [x] [x] [x] [x] [x] [x] [x]  5 4 3 2 1 1 2 3 4 5                         Right                                        Left    Color   [x] [x] [x] [x] [x] [x] [x] [x] [x] [x]  5 4 3 2 1 1 2 3 4 5                          Right                                        Left    Incurvation/Ingrowin   [] [] [] [] [] [] [] [] [] []  5 4 3 2 1 1 2 3 4 5                         Right                                        Left    Inflammation/Pain   [x] [x] [x] [x] [x] [x] [x] [x] [x] [x]  5 4 3 2 1 1 2 3 4 5                         Right                                        Left        Visual inspection:  Deformity: hammertoe deformity sherman feet  amputation: absent  Skin lesions: absent  Edema: right- 2+ pitting edema, left- 2+ pitting edema    Sensory exam:  Monofilament sensation: abnormal - 6/10 via SW 5.07/10g monofilament to the plantar foot bilateral feet    Pulses: abnormal - 1/4 dorsalis pedis pulse and 0/4 Posterior tibial pulse,   Pinprick: Impaired  Proprioception: Impaired  Vibration (128 Hz): Impaired       DM with PVD       [x]Yes    []No      Assessment:  59 y.o. female with:   Diagnosis Orders   1. Type II diabetes mellitus with peripheral circulatory disorder ([facility])  88618 - AR DEBRIDEMENT OF NAILS, 6 OR MORE    HM DIABETES FOOT EXAM   2. Dermatophytosis of nail  90632 - AR DEBRIDEMENT OF NAILS, 6 OR MORE    HM DIABETES FOOT EXAM   3. PVD (peripheral vascular disease) ([facility])  32948 - AR DEBRIDEMENT OF NAILS, 6 OR MORE    HM DIABETES FOOT EXAM   4. Pain in both feet  84109 - AR DEBRIDEMENT OF NAILS, 6 OR MORE    HM DIABETES FOOT EXAM           Q7   []Yes    []No                Q8   [x]Yes    []No                     Q9   []Yes    []No    Plan:   Pt was evaluated and examined. Patient was given personalized discharge instructions.   Nails 1-10 were debrided sharply in length and thickness with a nipper and , without incident. Pt will follow up in 9 weeks or sooner if any problems arise. Diagnosis was discussed with the pt and all of their questions were answered in detail. Proper foot hygiene and care was discussed with the pt. Informed patient on proper diabetic foot care and importance of tight glycemic control. Patient to check feet daily and contact the office with any questions/problems/concerns.    Other comorbidity noted and will be managed by PCP.  1/24/2022    Electronically signed by Jonn Landon DPM on 1/24/2022 at 9:59 AM  1/24/2022

## 2022-01-24 NOTE — PROGRESS NOTES
[address]  [phone]    1/23/22     Patient ID  Jose Juan Garcia is a 59 y.o. female  Established patient    Chief Complaint  Jose Juan Garcia presents today for Diabetes      ASSESSMENT/PLAN  1. Controlled type 2 diabetes mellitus with stage 3 chronic kidney disease, without long-term current use of insulin ([facility]  Assessment & Plan:   At goal, continue current medications and continue current treatment plan  Orders:  -     blood glucose test strips (ONETOUCH ULTRA) strip; TEST THREE TIMES DAILY, Disp-100 strip, R-11Normal  2. Diabetic nephropathy associated with type 2 diabetes mellitus ([address]  Assessment & Plan:   Well-controlled, continue current medications and continue current treatment plan  3. Stage 3 chronic kidney disease, unspecified whether stage 3a or 3b CKD ([address]  Assessment & Plan:   Monitored by specialist- no acute findings meriting change in the plan  4. Multiple lung nodules on CT  -     CT CHEST WO CONTRAST; Future  5. Mucopurulent chronic bronchitis ([address]  Assessment & Plan:   Borderline controlled, continue current medications and continue current treatment plan   Correlate care with pulmonology   6. NARCISO on CPAP  7. Major depressive disorder, recurrent episode, severe, with psychotic behavior ([address]  Assessment & Plan:   Monitored by specialist- no acute findings meriting change in the plan  8. Anxiety  9. History of pulmonary embolism  10. History of DVT of lower extremity  -     POCT INR  11. Long term (current) use of anticoagulants  12. Slow transit constipation  -     polyethylene glycol (GLYCOLAX) 17 GM/SCOOP powder; Take 17 g by mouth daily, Disp-90 each, R-3Normal  -     senna (SENOKOT) 8.6 MG tablet; Take 1 tablet by mouth 2 times daily, Disp-180 tablet, R-3Normal     Labs next visit   Correlate with pulmonology, are they following with COPD? Inhalers? Lung nodules?    Repeat CT chest- nodules   Continue stool softeners daily      Return in about 3 months (around 4/24/2022), or INR 4 weeks, for DM, Back pain. Patient Care Team:  BRAULIO Alas CNP as PCP - General (Nurse Practitioner Family)  BRAULIO Alas CNP as PCP - [facility] Empaneled Provider  Rudy Cantu MD as Consulting Physician (Nephrology)  Shimon Koch MD as Consulting Physician (Pulmonology)  Gabi Gil MD as Consulting Physician (Cardiology)  Clarita Retana DPM as Consulting Physician ([address] [place] [address])  Gabi Horner MD (Psychiatry)    SUBJECTIVE/OBJECTIVE  History of Present illness / Visit Summary     Patient presents office today for a follow-up. We did review recent imaging of gastric emptying study. She still complains of constipation however it has improved. She is going a few times a week, still has diarrhea at times. Will check labs including A1c at next office visit. Diabetes  She presents for her follow-up diabetic visit. She has type 2 diabetes mellitus. Her disease course has been stable. Pertinent negatives for hypoglycemia include no dizziness, headaches, nervousness/anxiousness or speech difficulty. Pertinent negatives for diabetes include no blurred vision, no chest pain and no fatigue. There are no hypoglycemic complications. Symptoms are stable. There are no diabetic complications. Risk factors for coronary artery disease include diabetes mellitus, dyslipidemia, family history, hypertension, obesity, sedentary lifestyle and post-menopausal. Current diabetic treatment includes oral agent (triple therapy). She is compliant with treatment all of the time. Her weight is stable. She is following a generally healthy diet. When asked about meal planning, she reported none. She has not had a previous visit with a dietitian. She rarely participates in exercise. Her home blood glucose trend is decreasing steadily. Her overall blood glucose range is 110-130 mg/dl. She sees a podiatrist.Eye exam is current.      Chronic pain management   Patient presents today for evaluation of chronic pain management and medication refill. Patient is currently prescribed Percocet (oxycodone-acetaminophen. Patient reports pain has stable with current pain management plan of care. Patient denies constipation, headache, physical dependence and respiratory depression.      Patient's pain contract and controlled medication contract has been been reviewed and signed within the last 12 months. Yes  - competed at visit yes  Patient has completed annual urine drug screen with the last 12 months. Yes   - completed at visit: NA  Patient verbalizes understanding that any indication of diversion or aberrant behavior may result in decreasing dosage of pain medication and possible discharge from practice. Yes    11/22/2021 Nephrology -   Ms. Hal Winter was in my clinic today for ongoing nephrological evaluation her current nephrological and   associated diagnoses include:     #1.  Chronic kidney disease stage III based on GFR estimation. Yvonne Adams baseline creatinine is now somewhere   in the range of 1-1.3 mg/dL. #2Bfrancisco Mcelroy of essential hypertension. #3.  History of diabetes mellitus without diabetic retinopathy. #4.  History of nonsteroidal anti-inflammatory drug use.  For an extended period of time and was also on   Celebrex 1 tablet once a day for at least 2 years. #5Claretha Viridiana and sleep apnea, patient uses her CPAP on a fairly regular basis. #6. Diagnosed von Willebrand disease by Dr. Ambar Presley. #7.  Significant weight loss after changing diet. Patient has lost close to 60 pounds in the last 12 months     Patient was in my clinic today for an ongoing nephrological evaluation. Patient states that she made a major changes in her diet. She stopped drinking soda but she used to drink 6 to 7 cans a day as well as stop snacking. That resulted in drastic reduction in her weight and she had lost 60 pounds.   Patient is taking all her medication for blood pressure which includes hydralazine, angiotensin-converting enzyme inhibitor and Lasix. She states that she is getting off-and-on unsteady on her feet. Review of Systems  Review of Systems   Constitutional: Negative for activity change, appetite change, chills, fatigue and fever. HENT: Negative for congestion, ear pain, postnasal drip, rhinorrhea, sinus pressure, sneezing, sore throat and trouble swallowing. Dentures, upper and lower - does not routinely wear    Eyes: Negative for blurred vision. Glasses, recent exam    Respiratory: Negative for cough, chest tightness, shortness of breath (improved) and wheezing. Follows with pulmonology - repeat sleep study scheduled on March 8. Needs new mask for CPAP, not currently wearing  Concern if following for lung nodules? COPD? Cardiovascular: Negative for chest pain, palpitations and leg swelling. Follows with cardiology, 3/23/2022 appointment    Gastrointestinal: Negative for abdominal distention, abdominal pain, blood in stool, constipation, diarrhea and nausea. Endocrine:        Follows with podiatry for diabetic foot exams, appointment today    Genitourinary: Negative for difficulty urinating, dysuria, frequency, hematuria and urgency. Follows with nephrology    Musculoskeletal: Negative for arthralgias, back pain (chronic lumbar), gait problem, joint swelling and myalgias. Skin: Negative for rash and wound. Allergic/Immunologic: Negative for environmental allergies. Neurological: Negative for dizziness, syncope, speech difficulty, light-headedness, numbness and headaches. Hematological: Does not bruise/bleed easily. Psychiatric/Behavioral: Negative for agitation, decreased concentration, self-injury, sleep disturbance and suicidal ideas. The patient is not nervous/anxious. Follows with psychiatry        Physical exam   Physical Exam  Vitals and nursing note reviewed.    Constitutional:       General: She is not in acute distress. Appearance: Normal appearance. She is well-developed and well-groomed. She is obese. She is not ill-appearing or toxic-appearing. HENT:      Head: Normocephalic. Right Ear: Tympanic membrane, ear canal and external ear normal.      Left Ear: Tympanic membrane, ear canal and external ear normal.      Nose: Mucosal edema present. No rhinorrhea. Right Turbinates: Not enlarged or swollen. Left Turbinates: Not enlarged or swollen. Mouth/Throat:      Lips: Pink. Mouth: Mucous membranes are dry. Pharynx: Oropharynx is clear. Uvula midline. No posterior oropharyngeal erythema. Eyes:      Comments: glasses    Cardiovascular:      Rate and Rhythm: Normal rate and regular rhythm. No extrasystoles are present. Heart sounds: Normal heart sounds, S1 normal and S2 normal. No murmur heard. Pulmonary:      Effort: Pulmonary effort is normal. No prolonged expiration or respiratory distress. Breath sounds: Normal breath sounds and air entry. Transmitted upper airway sounds present. Musculoskeletal:      Right lower leg: No edema. Left lower leg: No edema. Skin:     General: Skin is warm and dry. Coloration: Skin is not ashen, cyanotic, jaundiced or pale. Neurological:      Mental Status: She is alert and oriented to person, place, and time. Motor: Motor function is intact. Gait: Gait is intact. Psychiatric:         Attention and Perception: Attention and perception normal.         Mood and Affect: Mood and affect normal.         Speech: Speech normal.         Behavior: Behavior normal. Behavior is cooperative. Thought Content: Thought content normal. Thought content does not include suicidal ideation. Thought content does not include suicidal plan.          Cognition and Memory: Cognition and memory normal.         Judgment: Judgment normal.           Electronically signed by Harvin Dakins, APRN - CNP, ALINECNP on 1/30/2022 at 6:28 PM    Please note that this chart was generated using voice recognition Dragon dictation software. Although every effort was made to ensure the accuracy of this automated transcription, some errors in transcription may have occurred.

## 2022-01-30 PROBLEM — K59.01 SLOW TRANSIT CONSTIPATION: Status: ACTIVE | Noted: 2022-01-30

## 2022-01-30 ASSESSMENT — ENCOUNTER SYMPTOMS: BACK PAIN: 1

## 2022-01-30 NOTE — ASSESSMENT & PLAN NOTE
Borderline controlled, continue current medications and continue current treatment plan   Correlate care with pulmonology

## 2022-01-31 ENCOUNTER — HOSPITAL ENCOUNTER (OUTPATIENT)
Dept: CT IMAGING | Facility: CLINIC | Age: 62
Discharge: HOME | End: 2022-02-02
Payer: MEDICARE

## 2022-01-31 DIAGNOSIS — R91.8 MULTIPLE LUNG NODULES ON CT: ICD-10-CM

## 2022-01-31 PROCEDURE — 71250 CT THORAX DX C-: CPT

## 2022-01-31 NOTE — RESULT ENCOUNTER NOTE
CT chest reviewed.  No new nodules, and previous are stable, no changes     Please fax to pulmonologist

## 2022-02-04 DIAGNOSIS — M15.9 PRIMARY OSTEOARTHRITIS INVOLVING MULTIPLE JOINTS: ICD-10-CM

## 2022-02-04 RX ORDER — OXYCODONE HYDROCHLORIDE AND ACETAMINOPHEN 5; 325 MG/1; MG/1
1 TABLET ORAL EVERY 8 HOURS PRN
Qty: 40 TABLET | Refills: 0 | Status: SHIPPED | OUTPATIENT
Start: 2022-02-04 | End: 2022-03-02

## 2022-02-04 NOTE — TELEPHONE ENCOUNTER
Last visit: 1-24-22  Last Med refill: 1-4-22  Does patient have enough medication for 72 hours: No:     Next Visit Date:  Future Appointments   Date Time Provider Mark Junior   2/14/2022  8:30 AM Lexi Simon MD [facility] None   2/22/2022  8:00 AM SCHEDULE, [facility]   4/25/2022 10:00 AM Samira Lunsford DPM [facility]   4/26/2022  8:00 AM Trisha Greene, APRN - CNP [facility] [address] Maintenance   Topic Date Due    Lipid screen  05/07/2021    Diabetic retinal exam  07/09/2022    A1C test (Diabetic or Prediabetic)  08/05/2022    Cervical cancer screen  11/16/2022    Depression Monitoring  01/24/2023    Potassium monitoring  01/24/2023    Creatinine monitoring  01/24/2023    Diabetic foot exam  01/27/2023    Pneumococcal 0-64 years Vaccine (2 of 2 - PPSV23) 04/23/2023    Breast cancer screen  09/10/2023    DTaP/Tdap/Td vaccine (2 - Td or Tdap) 08/04/2026    Colon cancer screen colonoscopy  02/16/2028    Flu vaccine  Completed    Shingles Vaccine  Completed    COVID-19 Vaccine  Completed    Hepatitis C screen  Addressed    HIV screen  Addressed    Hepatitis A vaccine  Aged Out    Hib vaccine  Aged Out    Meningococcal (ACWY) vaccine  Aged Out       Hemoglobin A1C (%)   Date Value   08/05/2021 7.2 (H)   07/19/2021 6.4 (A)   09/14/2020 7.5 (H)             ( goal A1C is < 7)   Microalb/Crt.  Ratio (mcg/mg creat)   Date Value   04/08/2019 CANNOT BE CALCULATED     LDL Cholesterol (mg/dL)   Date Value   05/07/2020 68   08/01/2019 51     LDL Calculated (mg/dL)   Date Value   04/10/2020 59       (goal LDL is <100)   AST (U/L)   Date Value   07/19/2021 24     ALT (U/L)   Date Value   07/19/2021 32     BUN (mg/dL)   Date Value   01/24/2022 17     BP Readings from Last 3 Encounters:   01/24/22 100/60   12/30/21 110/72   12/21/21 112/78          (goal 120/80)    All Future Testing planned in [facility]  Lab Frequency Next Occurrence   ECHO Complete 2D W Doppler W Color Once 07/19/2021   H. pylori antigen Once 12/21/2021   POCT INR                 Patient Active Problem List:     Essential hypertension     Hyperlipidemia     Osteoarthritis     Obesity     CKD (chronic kidney disease) stage 3, GFR 30-59 ml/min (HCC)     Proteinuria     Controlled type 2 diabetes mellitus with stage 3 chronic kidney disease, without long-term current use of insulin (HCC)     History of DVT of lower extremity     NARCISO on CPAP     Vitamin D deficiency     Major depressive disorder, recurrent episode, severe, with psychotic behavior ([address]     Multiple lung nodules on CT     Hypomagnesemia     Anxiety     Chronic obstructive pulmonary disease (HCC)     Precordial pain     History of pulmonary embolism     Family history of abdominal aortic aneurysm     Normal coronary arteries     Long term (current) use of anticoagulants     AAA (abdominal aortic aneurysm) without rupture (HCC)     COPD exacerbation (HCC)     Acute tracheobronchitis-viral     Abnormal mammogram     Cerebrovascular accident (CVA) ([address]     Diabetic nephropathy (HCC)     Slow transit constipation

## 2022-02-06 DIAGNOSIS — E55.9 VITAMIN D DEFICIENCY: ICD-10-CM

## 2022-02-07 NOTE — TELEPHONE ENCOUNTER
Last visit: 01/24/22  Last Med refill: 08/11/21  Does patient have enough medication for 72 hours: yes. Next Visit Date:  Future Appointments   Date Time Provider Mark Jnuior   2/14/2022  8:30 AM MD JUAN C [facility] None   2/22/2022  8:00 AM SILVANO Shah [facility]   4/25/2022 10:00 AM Jerad Louis DPM [facility]   4/26/2022  8:00 AM Trisha Edward, APRN - CNP [facility] [address] Maintenance   Topic Date Due    Lipid screen  05/07/2021    Diabetic retinal exam  07/09/2022    A1C test (Diabetic or Prediabetic)  08/05/2022    Cervical cancer screen  11/16/2022    Depression Monitoring  01/24/2023    Potassium monitoring  01/24/2023    Creatinine monitoring  01/24/2023    Diabetic foot exam  01/27/2023    Pneumococcal 0-64 years Vaccine (2 of 2 - PPSV23) 04/23/2023    Breast cancer screen  09/10/2023    DTaP/Tdap/Td vaccine (2 - Td or Tdap) 08/04/2026    Colon cancer screen colonoscopy  02/16/2028    Flu vaccine  Completed    Shingles Vaccine  Completed    COVID-19 Vaccine  Completed    Hepatitis C screen  Addressed    HIV screen  Addressed    Hepatitis A vaccine  Aged Out    Hib vaccine  Aged Out    Meningococcal (ACWY) vaccine  Aged Out       Hemoglobin A1C (%)   Date Value   08/05/2021 7.2 (H)   07/19/2021 6.4 (A)   09/14/2020 7.5 (H)             ( goal A1C is < 7)   Microalb/Crt.  Ratio (mcg/mg creat)   Date Value   04/08/2019 CANNOT BE CALCULATED     LDL Cholesterol (mg/dL)   Date Value   05/07/2020 68   08/01/2019 51     LDL Calculated (mg/dL)   Date Value   04/10/2020 59       (goal LDL is <100)   AST (U/L)   Date Value   07/19/2021 24     ALT (U/L)   Date Value   07/19/2021 32     BUN (mg/dL)   Date Value   01/24/2022 17     BP Readings from Last 3 Encounters:   01/24/22 100/60   12/30/21 110/72   12/21/21 112/78          (goal 120/80)    All Future Testing planned in [facility]  Lab Frequency Next Occurrence   ECHO Complete 2D W Doppler W Color Once 07/19/2021   H. pylori antigen Once 12/21/2021   POCT INR                 Patient Active Problem List:     Essential hypertension     Hyperlipidemia     Osteoarthritis     Obesity     CKD (chronic kidney disease) stage 3, GFR 30-59 ml/min (HCC)     Proteinuria     Controlled type 2 diabetes mellitus with stage 3 chronic kidney disease, without long-term current use of insulin (HCC)     History of DVT of lower extremity     NARCISO on CPAP     Vitamin D deficiency     Major depressive disorder, recurrent episode, severe, with psychotic behavior ([address]     Multiple lung nodules on CT     Hypomagnesemia     Anxiety     Chronic obstructive pulmonary disease (HCC)     Precordial pain     History of pulmonary embolism     Family history of abdominal aortic aneurysm     Normal coronary arteries     Long term (current) use of anticoagulants     AAA (abdominal aortic aneurysm) without rupture (HCC)     COPD exacerbation (HCC)     Acute tracheobronchitis-viral     Abnormal mammogram     Cerebrovascular accident (CVA) ([address]     Diabetic nephropathy (HCC)     Slow transit constipation

## 2022-02-08 RX ORDER — GLUCOSAMINE/CHONDR SU A SOD 750-600 MG
TABLET ORAL
Qty: 90 CAPSULE | Refills: 3 | Status: SHIPPED | OUTPATIENT
Start: 2022-02-08 | End: 2022-08-19 | Stop reason: ALTCHOICE

## 2022-02-15 DIAGNOSIS — M1A.9XX0 CHRONIC GOUT INVOLVING TOE WITHOUT TOPHUS, UNSPECIFIED CAUSE, UNSPECIFIED LATERALITY: ICD-10-CM

## 2022-02-15 NOTE — TELEPHONE ENCOUNTER
LOV 1/24/22  RTO 3 months; F/U scheduled  LRF 8/30/21     Health Maintenance   Topic Date Due    Lipid screen  05/07/2021    Diabetic retinal exam  07/09/2022    A1C test (Diabetic or Prediabetic)  08/05/2022    Cervical cancer screen  11/16/2022    Depression Monitoring  01/24/2023    Potassium monitoring  01/24/2023    Creatinine monitoring  01/24/2023    Diabetic foot exam  01/27/2023    Pneumococcal 0-64 years Vaccine (2 of 2 - PPSV23) 04/23/2023    Breast cancer screen  09/10/2023    DTaP/Tdap/Td vaccine (2 - Td or Tdap) 08/04/2026    Colon cancer screen colonoscopy  02/16/2028    Flu vaccine  Completed    Shingles Vaccine  Completed    COVID-19 Vaccine  Completed    Hepatitis C screen  Addressed    HIV screen  Addressed    Hepatitis A vaccine  Aged Out    Hib vaccine  Aged Out    Meningococcal (ACWY) vaccine  Aged Out             (applicable per patient's age: Cancer Screenings, Depression Screening, Fall Risk Screening, Immunizations)    Hemoglobin A1C (%)   Date Value   08/05/2021 7.2 (H)   07/19/2021 6.4 (A)   09/14/2020 7.5 (H)     Microalb/Crt.  Ratio (mcg/mg creat)   Date Value   04/08/2019 CANNOT BE CALCULATED     LDL Cholesterol (mg/dL)   Date Value   05/07/2020 68     LDL Calculated (mg/dL)   Date Value   04/10/2020 59     AST (U/L)   Date Value   07/19/2021 24     ALT (U/L)   Date Value   07/19/2021 32     BUN (mg/dL)   Date Value   01/24/2022 17      (goal A1C is < 7)   (goal LDL is <100) need 30-50% reduction from baseline     BP Readings from Last 3 Encounters:   02/14/22 138/88   01/24/22 100/60   12/30/21 110/72    (goal /80)      All Future Testing planned in [facility]:  Lab Frequency Next Occurrence   ECHO Complete 2D W Doppler W Color Once 07/19/2021   H. pylori antigen Once 85/69/0990   Basic Metabolic Panel Once 68/41/6768   CBC Once 08/14/2022   Protein / Creatinine Ratio, Urine Once 08/14/2022   Magnesium Once 08/14/2022   POCT INR         Next Visit Date:  Future Appointments   Date Time Provider Mark Sandi   2/22/2022  8:00 AM SILVANO Shah [facility]  [facility] [place] [facility]   4/25/2022 10:00 AM Faith Velasquez DPM [facility]   4/26/2022  8:00 AM BRAULIO Coello - CNP [facility]   8/15/2022  8:30 AM Dawit Leahy MD [facility] None            Patient Active Problem List:     Essential hypertension     Hyperlipidemia     Osteoarthritis     Obesity     CKD (chronic kidney disease) stage 3, GFR 30-59 ml/min (HCC)     Proteinuria     Controlled type 2 diabetes mellitus with stage 3 chronic kidney disease, without long-term current use of insulin (HCC)     History of DVT of lower extremity     NARCISO on CPAP     Vitamin D deficiency     Major depressive disorder, recurrent episode, severe, with psychotic behavior ([address]     Multiple lung nodules on CT     Hypomagnesemia     Anxiety     Chronic obstructive pulmonary disease (HCC)     Precordial pain     History of pulmonary embolism     Family history of abdominal aortic aneurysm     Normal coronary arteries     Long term (current) use of anticoagulants     AAA (abdominal aortic aneurysm) without rupture (HCC)     COPD exacerbation (HCC)     Acute tracheobronchitis-viral     Abnormal mammogram     Cerebrovascular accident (CVA) ([address]     Diabetic nephropathy (HCC)     Slow transit constipation

## 2022-02-17 RX ORDER — ALLOPURINOL 100 MG/1
TABLET ORAL
Qty: 90 TABLET | Refills: 1 | Status: ON HOLD | OUTPATIENT
Start: 2022-02-17 | End: 2022-03-01 | Stop reason: HOSPADM

## 2022-02-22 ENCOUNTER — ANTI-COAG VISIT (OUTPATIENT)
Dept: FAMILY MEDICINE CLINIC | Age: 62
End: 2022-02-22
Payer: MEDICARE

## 2022-02-22 DIAGNOSIS — Z86.718 HISTORY OF DVT OF LOWER EXTREMITY: Primary | ICD-10-CM

## 2022-02-22 LAB
INTERNATIONAL NORMALIZATION RATIO, POC: 1.8
PROTHROMBIN TIME, POC: 21.9

## 2022-02-22 PROCEDURE — 85610 PROTHROMBIN TIME: CPT | Performed by: NURSE PRACTITIONER

## 2022-02-27 ENCOUNTER — APPOINTMENT (OUTPATIENT)
Dept: MRI IMAGING | Age: 62
DRG: 058 | End: 2022-02-27
Payer: MEDICARE

## 2022-02-27 ENCOUNTER — APPOINTMENT (OUTPATIENT)
Dept: CT IMAGING | Age: 62
DRG: 058 | End: 2022-02-27
Payer: MEDICARE

## 2022-02-27 ENCOUNTER — APPOINTMENT (OUTPATIENT)
Dept: GENERAL RADIOLOGY | Age: 62
DRG: 058 | End: 2022-02-27
Payer: MEDICARE

## 2022-02-27 ENCOUNTER — HOSPITAL ENCOUNTER (INPATIENT)
Age: 62
LOS: 1 days | Discharge: HOME | DRG: 058 | End: 2022-03-01
Attending: EMERGENCY MEDICINE | Admitting: PSYCHIATRY & NEUROLOGY
Payer: MEDICARE

## 2022-02-27 DIAGNOSIS — R90.0 INTRACRANIAL MASS: ICD-10-CM
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
## 2022-02-27 PROBLEM — G93.89 FRONTAL MASS OF BRAIN: Status: ACTIVE | Noted: 2022-02-27

## 2022-02-27 LAB
ABSOLUTE EOS #: 0.1 K/UL (ref 0–0.4)
ABSOLUTE LYMPH #: 2.3 K/UL (ref 1–4.8)
ABSOLUTE MONO #: 0.6 K/UL (ref 0.1–1.2)
ANION GAP SERPL CALCULATED.3IONS-SCNC: 14 MMOL/L (ref 9–17)
BASOPHILS # BLD: 0 % (ref 0–2)
BASOPHILS ABSOLUTE: 0 K/UL (ref 0–0.2)
BUN BLDV-MCNC: 13 MG/DL (ref 8–23)
CALCIUM SERPL-MCNC: 9.7 MG/DL (ref 8.6–10.4)
CHLORIDE BLD-SCNC: 99 MMOL/L (ref 98–107)
CO2: 23 MMOL/L (ref 20–31)
CREAT SERPL-MCNC: 0.94 MG/DL (ref 0.5–0.9)
EOSINOPHILS RELATIVE PERCENT: 2 % (ref 1–4)
GFR AFRICAN AMERICAN: >60 ML/MIN
GFR NON-AFRICAN AMERICAN: >60 ML/MIN
GFR SERPL CREATININE-BSD FRML MDRD: ABNORMAL ML/MIN/{1.73_M2}
GLUCOSE BLD-MCNC: 103 MG/DL (ref 65–105)
GLUCOSE BLD-MCNC: 145 MG/DL (ref 70–99)
HCT VFR BLD CALC: 39.1 % (ref 36–46)
HEMOGLOBIN: 13.4 G/DL (ref 12–16)
INR BLD: 2.8
LYMPHOCYTES # BLD: 27 % (ref 24–44)
MCH RBC QN AUTO: 32.2 PG (ref 26–34)
MCHC RBC AUTO-ENTMCNC: 34.2 G/DL (ref 31–37)
MCV RBC AUTO: 94.4 FL (ref 80–100)
MONOCYTES # BLD: 7 % (ref 2–11)
PDW BLD-RTO: 16.5 % (ref 12.5–15.4)
PLATELET # BLD: 240 K/UL (ref 140–450)
PMV BLD AUTO: 6.9 FL (ref 6–12)
POTASSIUM SERPL-SCNC: 4.1 MMOL/L (ref 3.7–5.3)
PROTHROMBIN TIME: 26.6 SEC (ref 9.4–12.6)
RBC # BLD: 4.14 M/UL (ref 4–5.2)
SARS-COV-2, RAPID: NOT DETECTED
SEG NEUTROPHILS: 64 % (ref 36–66)
SEGMENTED NEUTROPHILS ABSOLUTE COUNT: 5.3 K/UL (ref 1.8–7.7)
SODIUM BLD-SCNC: 136 MMOL/L (ref 135–144)
SPECIMEN DESCRIPTION: NORMAL
WBC # BLD: 8.3 K/UL (ref 3.5–11)

## 2022-02-27 PROCEDURE — 82947 ASSAY GLUCOSE BLOOD QUANT: CPT

## 2022-02-27 PROCEDURE — 73610 X-RAY EXAM OF ANKLE: CPT

## 2022-02-27 PROCEDURE — 99283 EMERGENCY DEPT VISIT LOW MDM: CPT

## 2022-02-27 PROCEDURE — 72125 CT NECK SPINE W/O DYE: CPT

## 2022-02-27 PROCEDURE — 2060000000 HC ICU INTERMEDIATE R&B

## 2022-02-27 PROCEDURE — 80048 BASIC METABOLIC PNL TOTAL CA: CPT

## 2022-02-27 PROCEDURE — 73562 X-RAY EXAM OF KNEE 3: CPT

## 2022-02-27 PROCEDURE — 71046 X-RAY EXAM CHEST 2 VIEWS: CPT

## 2022-02-27 PROCEDURE — 94761 N-INVAS EAR/PLS OXIMETRY MLT: CPT

## 2022-02-27 PROCEDURE — 70450 CT HEAD/BRAIN W/O DYE: CPT

## 2022-02-27 PROCEDURE — A9579 GAD-BASE MR CONTRAST NOS,1ML: HCPCS | Performed by: NEUROLOGICAL SURGERY

## 2022-02-27 PROCEDURE — 70553 MRI BRAIN STEM W/O & W/DYE: CPT

## 2022-02-27 PROCEDURE — 73030 X-RAY EXAM OF SHOULDER: CPT

## 2022-02-27 PROCEDURE — 73502 X-RAY EXAM HIP UNI 2-3 VIEWS: CPT

## 2022-02-27 PROCEDURE — 6360000004 HC RX CONTRAST MEDICATION: Performed by: NEUROLOGICAL SURGERY

## 2022-02-27 PROCEDURE — 1200000000 HC SEMI PRIVATE

## 2022-02-27 PROCEDURE — 36415 COLL VENOUS BLD VENIPUNCTURE: CPT

## 2022-02-27 PROCEDURE — 85610 PROTHROMBIN TIME: CPT

## 2022-02-27 PROCEDURE — 6370000000 HC RX 637 (ALT 250 FOR IP): Performed by: STUDENT IN AN ORGANIZED HEALTH CARE EDUCATION/TRAINING PROGRAM

## 2022-02-27 PROCEDURE — 87635 SARS-COV-2 COVID-19 AMP PRB: CPT

## 2022-02-27 PROCEDURE — 85025 COMPLETE CBC W/AUTO DIFF WBC: CPT

## 2022-02-27 RX ORDER — SODIUM CHLORIDE 0.9 % (FLUSH) 0.9 %
10 SYRINGE (ML) INJECTION PRN
Status: DISCONTINUED | OUTPATIENT
Start: 2022-02-27 | End: 2022-03-01 | Stop reason: HOSPADM

## 2022-02-27 RX ORDER — OXYCODONE HYDROCHLORIDE AND ACETAMINOPHEN 5; 325 MG/1; MG/1
1 TABLET ORAL EVERY 4 HOURS PRN
Status: DISCONTINUED | OUTPATIENT
Start: 2022-02-27 | End: 2022-03-01 | Stop reason: HOSPADM

## 2022-02-27 RX ADMIN — GADOTERIDOL 8 ML: 279.3 INJECTION, SOLUTION INTRAVENOUS at 17:31

## 2022-02-27 RX ADMIN — OXYCODONE HYDROCHLORIDE AND ACETAMINOPHEN 1 TABLET: 5; 325 TABLET ORAL at 23:18

## 2022-02-27 ASSESSMENT — PAIN DESCRIPTION - ONSET: ONSET: GRADUAL

## 2022-02-27 ASSESSMENT — ENCOUNTER SYMPTOMS
VOMITING: 0
RHINORRHEA: 0
COUGH: 0
CONSTIPATION: 0
DIARRHEA: 0
ANAL BLEEDING: 0
ABDOMINAL PAIN: 0
NAUSEA: 0
PHOTOPHOBIA: 0
BACK PAIN: 0
CHEST TIGHTNESS: 0
SHORTNESS OF BREATH: 0
ABDOMINAL DISTENTION: 0
SORE THROAT: 0

## 2022-02-27 ASSESSMENT — PAIN SCALES - GENERAL
PAINLEVEL_OUTOF10: 3
PAINLEVEL_OUTOF10: 8

## 2022-02-27 ASSESSMENT — PAIN DESCRIPTION - PROGRESSION: CLINICAL_PROGRESSION: GRADUALLY IMPROVING

## 2022-02-27 ASSESSMENT — PAIN DESCRIPTION - PAIN TYPE: TYPE: ACUTE PAIN

## 2022-02-27 ASSESSMENT — PAIN DESCRIPTION - ORIENTATION: ORIENTATION: RIGHT

## 2022-02-27 ASSESSMENT — PAIN DESCRIPTION - DESCRIPTORS: DESCRIPTORS: ACHING

## 2022-02-27 ASSESSMENT — PAIN DESCRIPTION - LOCATION: LOCATION: ARM;KNEE;SHOULDER

## 2022-02-27 ASSESSMENT — PAIN DESCRIPTION - FREQUENCY: FREQUENCY: INTERMITTENT

## 2022-02-27 NOTE — ED PROVIDER NOTES
[facility]  ED  Emergency Department Encounter  EmergencyMedicine Resident     Pt Name:Adrianne Quezada  MRN: [id number]  Merlygfjenniffer 1960  Date of evaluation: 22  PCP:  BRAULIO Hsu CNP    This patient was evaluated in the Emergency Department for symptoms described in the history of present illness. The patient was evaluated in the context of the global COVID-19 pandemic, which necessitated consideration that the patient might be at risk for infection with the SARS-CoV-2 virus that causes COVID-19. Institutional protocols and algorithms that pertain to the evaluation of patients at risk for COVID-19 are in a state of rapid change based on information released by regulatory bodies including the CDC and federal and state organizations. These policies and algorithms were followed during the patient's care in the ED. CHIEF COMPLAINT       Chief Complaint   Patient presents with    Fall     Friday, onto right side. Rt shoulder and knee pain        HISTORY OF PRESENT ILLNESS  (Location/Symptom, Timing/Onset, Context/Setting, Quality, Duration, Modifying Factors, Severity.)      Jimmy Khan is a 59 y.o. female who presents as a transfer from [facility] emergency department. Patient states she fell from standing height while walking in her kitchen she tripped over her dog this occurred on Friday. She is unsure if she hit her head but she denies any loss of consciousness. She states she was having worsening right knee pain which prompted by the emergency department. At [facility] she had a CT head done that showed concern for mass versus hemorrhage. Here in the department patient is having some right shoulder pain right hip pain right knee pain. She denies any dysarthria, diplopia, dysphagia, dysmetria, loss of strength or sensation.   She denies any prodromal symptoms such as dizziness or chest pain prior to her fall    PAST MEDICAL / SURGICAL / SOCIAL / FAMILY HISTORY Stress:     Feeling of Stress : Not on file   Social Connections:     Frequency of Communication with Friends and Family: Not on file    Frequency of Social Gatherings with Friends and Family: Not on file    Attends Nondenominational Services: Not on file    Active Member of Clubs or Organizations: Not on file    Attends Club or Organization Meetings: Not on file    Marital Status: Not on file   Intimate Partner Violence:     Fear of Current or Ex-Partner: Not on file    Emotionally Abused: Not on file    Physically Abused: Not on file    Sexually Abused: Not on file   Housing Stability:     Unable to Pay for Housing in the Last Year: Not on file    Number of [place] in the Last Year: Not on file    Unstable Housing in the Last Year: Not on file       Family History   Problem Relation Age of Onset    Hypertension Mother     Liver Disease Mother     Cancer Father         stomach    Diabetes Sister     Cancer Brother         kidney    No Known Problems Maternal Grandmother     No Known Problems Maternal Grandfather     No Known Problems Paternal Grandmother     No Known Problems Paternal Grandfather     Other Brother         AIDS       Allergies:  Patient has no known allergies. Home Medications:  Prior to Admission medications    Medication Sig Start Date End Date Taking? Authorizing Provider   allopurinol (ZYLOPRIM) 100 MG tablet take 1 tablet by mouth once daily 2/17/22   BRAULIO Rubin CNP   zinc gluconate 50 MG tablet Take 50 mg by mouth daily    Historical Provider, MD   vitamin C (ASCORBIC ACID) 500 MG tablet Take 500 mg by mouth daily    Historical Provider, MD MARAVILLA VITAMIN D-3 50 MCG (2000 UT) CAPS take 1 capsule by mouth once daily 2/8/22 2/8/23  BRAULIO Rubin CNP   oxyCODONE-acetaminophen (PERCOCET) 5-325 MG per tablet Take 1 tablet by mouth every 8 hours as needed for Pain for up to 30 days.  2/4/22 3/6/22  BRAULIO Rubin CNP   polyethylene glycol [facility]) 17 GM/SCOOP powder Take 17 g by mouth daily 1/24/22 1/24/23  BRAULIO Coello CNP   senna (SENOKOT) 8.6 MG tablet Take 1 tablet by mouth 2 times daily 1/24/22 1/24/23  BRAULIO Coello CNP   blood glucose test strips (ONETOUCH ULTRA) strip TEST THREE TIMES DAILY 1/24/22 1/23/23  BRAULIO Coello CNP   omeprazole (PRILOSEC) 20 MG delayed release capsule take 1 capsule by mouth once daily 1/17/22 1/17/23  BRAULIO Coello CNP   atorvastatin (LIPITOR) 40 MG tablet take 1 tablet by mouth once daily 1/17/22 1/17/23  BRAULIO Coello CNP   JANUVIA 100 MG tablet take 1 tablet by mouth once daily 1/17/22 1/17/23  BRAULIO Coello CNP   lisinopril (PRINIVIL;ZESTRIL) 20 MG tablet take 1 tablet by mouth once daily 1/17/22   BRAULIO Bellamy CNP   fluticasone [facility]) 50 MCG/ACT nasal spray 1 spray by Nasal route daily 1/5/22 1/5/23  BRAULIO Coello CNP   isosorbide mononitrate (IMDUR) 30 MG extended release tablet take 1 tablet by mouth once daily 1/4/22   BRAULIO Coello CNP   buPROPion (WELLBUTRIN XL) 150 MG extended release tablet  12/16/21   Historical Provider, MD   tiZANidine (ZANAFLEX) 4 MG tablet Take 1 tablet by mouth 3 times daily as needed (muscle relaxer)  Patient not taking: Reported on 2/14/2022 12/21/21   BRAULIO Coello CNP   furosemide (LASIX) 20 MG tablet take 1 tablet by mouth once daily 12/20/21   BRAULIO Coello CNP   Blood Pressure Monitoring KIT Use to check blood pressure daily and as needed 12/10/21 10/17/24  BRAULIO Coello CNP   Semaglutide 3 MG TABS Take 3 mg by mouth daily 12/7/21   BRAULIO Coello CNP   dapagliflozin (FARXIGA) 5 MG tablet Take 1 tablet by mouth every morning 12/7/21 3/7/22  BRAULIO Coello CNP   buPROPion (WELLBUTRIN XL) 300 MG extended release tablet Take 2 tablets by mouth every morning  Patient taking differently: Take 300 mg by mouth every morning  10/19/21   Trisha Patricio APRN - CNP   metFORMIN (GLUCOPHAGE-XR) 500 MG extended release tablet Take 1 tablet by mouth 2 times daily 8/30/21 8/30/22  BRAULIO Kimbrough CNP   Lancets [facility]. (ACCU-CHEK FASTCLIX LANCET) KIT use as directed Anabel Hoover. FASTCLIX MIGHT BE EAISER TO MANIPULATE 7/22/21 7/22/22  BRALUIO Kimbrough CNP   Accu-Chek FastClix Lancets MISC use 1 LANCET to TEST BLOOD SUGAR one to two times a day 7/22/21 7/22/22  BRAULIO Kimbrough CNP   Alcohol Swabs (ALCOHOL PREP) 70 % PADS Use twice daily and as needed to check blood sugars 7/19/21 10/20/23  BRAULIO Kimbrough CNP   warfarin (COUMADIN) 2 MG tablet take 1 tablet by mouth SUNDAY, TUESDAY, THURSDAY, AND SATURDAY ( take 1 AND 1/2 tablets by mouth MONDAY, WEDNESDAY, FRIDAY )  Patient taking differently: take 1 tablet by mouth every day. Took 3 mg Monday and Tuesday. 2 mg all other days. 7/6/21   BRAULIO Kimbrough CNP   albuterol sulfate  (90 Base) MCG/ACT inhaler Inhale 2 puffs into the lungs every 4 hours as needed for Wheezing or Shortness of Breath (AND/OR COUGH) 5/24/21 5/24/22  BRAULIO Kimbrough CNP   naloxone 4 MG/0.1ML LIQD nasal spray 1 spray by Nasal route as needed for Opioid Reversal 2/16/21   BRAULIO Kimbrough CNP   Handicap Placard MISC by Does not apply route Exp 2/3/2026 2/3/21   BRAULIO Kimbrough CNP   ipratropium-albuterol (DUONEB) 0.5-2.5 (3) MG/3ML SOLN nebulizer solution Inhale 3 mLs into the lungs every 6 hours as needed for Shortness of Breath 1/7/21 1/7/22  BRAULIO Kimbrough CNP   Respiratory Therapy Supplies (NEBULIZER/TUBING/MOUTHPIECE) KIT 1 kit by Does not apply route 4 times daily as needed (wheezing) 10/20/20   Trisha Guzman, APRN - CNP       REVIEW OF SYSTEMS    (2-9 systems for level 4, 10 or more for level 5)      Review of Systems   Constitutional: Negative for chills and fever. HENT: Negative for rhinorrhea and sore throat.     Eyes: Negative for photophobia. Respiratory: Negative for chest tightness and shortness of breath. Cardiovascular: Negative for chest pain. Gastrointestinal: Negative for abdominal distention, anal bleeding, constipation, diarrhea, nausea and vomiting. Endocrine: Negative for polyuria. Genitourinary: Negative for difficulty urinating and flank pain. Musculoskeletal: Negative for arthralgias. Skin: Negative for rash. Neurological: Negative for dizziness, tremors, seizures, syncope, facial asymmetry, weakness, light-headedness and numbness. PHYSICAL EXAM   (up to 7 for level 4, 8 or more for level 5)      INITIAL VITALS:   /88   Pulse 95   Temp 98.2 °F (36.8 °C) (Oral)   Resp 17   Ht 5' 2\" (1.575 m)   Wt 180 lb (81.6 kg)   SpO2 95%   BMI 32.92 kg/m²     Physical Exam  Constitutional:       General: She is not in acute distress. Appearance: She is not ill-appearing. HENT:      Head: Normocephalic and atraumatic. Right Ear: Tympanic membrane normal.      Left Ear: Tympanic membrane normal.      Nose: Nose normal.      Mouth/Throat:      Mouth: Mucous membranes are moist.      Pharynx: No posterior oropharyngeal erythema. Eyes:      General: No scleral icterus. Extraocular Movements: Extraocular movements intact. Pupils: Pupils are equal, round, and reactive to light. Neck:      Comments: No midline cervical tenderness. Mild right paraspinal muscle tenderness  Cardiovascular:      Rate and Rhythm: Normal rate. Heart sounds: No murmur heard. No friction rub. No gallop. Comments: No chest wall crepitus or deformity  Pulmonary:      Effort: No respiratory distress. Breath sounds: No wheezing or rhonchi. Abdominal:      General: Abdomen is flat. There is no distension. Palpations: Abdomen is soft. Tenderness: There is no abdominal tenderness. There is no guarding or rebound. Musculoskeletal:         General: Tenderness present. No deformity. Cervical back: No rigidity. Comments: No thoracic or lumbar midline spinal tenderness. Right shoulder tenderness, right hip tenderness, right knee tenderness. +2 radial, femoral, DP/TP pulses bilaterally. Skin:     Capillary Refill: Capillary refill takes less than 2 seconds. Findings: No bruising, erythema or rash. Neurological:      Mental Status: She is oriented to person, place, and time. Comments: Cranial nerves II through XII intact, no pronator drift upper or lower extremities bilaterally, strength and sensation intact upper and lower extremities bilaterally, negative heel-to-shin test, negative finger-nose test, no dysarthria         DIFFERENTIAL  DIAGNOSIS     PLAN (LABS / IMAGING / EKG):  Orders Placed This Encounter   Procedures    COVID-19, Rapid    CT HEAD WO CONTRAST    CT CERVICAL SPINE WO CONTRAST    XR SHOULDER RIGHT (MIN 2 VIEWS)    XR CHEST (2 VW)    XR HIP 2-3 VW W PELVIS RIGHT    XR KNEE RIGHT (3 VIEWS)    XR ANKLE RIGHT (MIN 3 VIEWS)    MRI BRAIN W WO CONTRAST    Basic Metabolic Panel    CBC with Auto Differential    Protime-INR    Inpatient consult to Neurosurgery    Inpatient consult to Trauma Surgery    Inpatient consult to Hospitalist    Insert peripheral IV    [employer] Knee Immobilizer, Right; 14\"       MEDICATIONS ORDERED:  Orders Placed This Encounter   Medications    gadoteridol (PROHANCE) injection 8 mL    sodium chloride flush 0.9 % injection 10 mL       DDX: Intracranial hemorrhage, intracranial mass, right knee sprain    DIAGNOSTIC RESULTS / EMERGENCY DEPARTMENT COURSE / MDM   LAB RESULTS:  Results for orders placed or performed during the hospital encounter of 02/27/22   COVID-19, Rapid    Specimen: Nasopharyngeal Swab   Result Value Ref Range    Specimen Description . NASOPHARYNGEAL SWAB     SARS-CoV-2, Rapid Not Detected Not Detected   Basic Metabolic Panel   Result Value Ref Range    Glucose 145 (H) 70 - 99 mg/dL BUN 13 8 - 23 mg/dL    CREATININE 0.94 (H) 0.50 - 0.90 mg/dL    Calcium 9.7 8.6 - 10.4 mg/dL    Sodium 136 135 - 144 mmol/L    Potassium 4.1 3.7 - 5.3 mmol/L    Chloride 99 98 - 107 mmol/L    CO2 23 20 - 31 mmol/L    Anion Gap 14 9 - 17 mmol/L    GFR Non-African American >60 >60 mL/min    GFR African American >60 >60 mL/min    GFR Comment         CBC with Auto Differential   Result Value Ref Range    WBC 8.3 3.5 - 11.0 k/uL    RBC 4.14 4.0 - 5.2 m/uL    Hemoglobin 13.4 12.0 - 16.0 g/dL    Hematocrit 39.1 36 - 46 %    MCV 94.4 80 - 100 fL    MCH 32.2 26 - 34 pg    MCHC 34.2 31 - 37 g/dL    RDW 16.5 (H) 12.5 - 15.4 %    Platelets 225 894 - 522 k/uL    MPV 6.9 6.0 - 12.0 fL    Seg Neutrophils 64 36 - 66 %    Lymphocytes 27 24 - 44 %    Monocytes 7 2 - 11 %    Eosinophils % 2 1 - 4 %    Basophils 0 0 - 2 %    Segs Absolute 5.30 1.8 - 7.7 k/uL    Absolute Lymph # 2.30 1.0 - 4.8 k/uL    Absolute Mono # 0.60 0.1 - 1.2 k/uL    Absolute Eos # 0.10 0.0 - 0.4 k/uL    Basophils Absolute 0.00 0.0 - 0.2 k/uL   Protime-INR   Result Value Ref Range    Protime 26.6 (H) 9.4 - 12.6 sec    INR 2.8        RADIOLOGY:  XR CHEST (2 VW)    Result Date: 2/27/2022  No acute cardiopulmonary disease. XR SHOULDER RIGHT (MIN 2 VIEWS)    Result Date: 2/27/2022  No acute abnormality right shoulder. XR KNEE RIGHT (3 VIEWS)    Result Date: 2/27/2022  No fracture or dislocation. Moderate joint effusion. Degenerative changes, most severe patellofemoral joint space, with additional findings as above. XR ANKLE RIGHT (MIN 3 VIEWS)    Result Date: 2/27/2022  No acute abnormality right ankle. Small joint effusion possible. CT HEAD WO CONTRAST    Result Date: 2/27/2022  Incompletely assessed heterogeneous hyperattenuating 1.6 cm left frontal periventricular lesion. Differential considerations include benign vascular lesion, primary brain mass or metastasis. Acute intracranial hemorrhage less favored.   Prompt contrast enhanced MRI is recommended for further evaluation. Degenerative changes in the cervical spine without evidence for acute fracture or subluxation. Findings were discussed with Umesh Faith at 12:54 pm on 2/27/2022. RECOMMENDATIONS: Unavailable     CT CERVICAL SPINE WO CONTRAST    Result Date: 2/27/2022  Incompletely assessed heterogeneous hyperattenuating 1.6 cm left frontal periventricular lesion. Differential considerations include benign vascular lesion, primary brain mass or metastasis. Acute intracranial hemorrhage less favored. Prompt contrast enhanced MRI is recommended for further evaluation. Degenerative changes in the cervical spine without evidence for acute fracture or subluxation. Findings were discussed with Umesh Faith at 12:54 pm on 2/27/2022. RECOMMENDATIONS: Unavailable     MRI BRAIN W WO CONTRAST    Result Date: 2/27/2022  1. No acute intracranial abnormality. 2. 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 No associated enhancement. Intrinsic T1 hyperintensity is noted in the lesion, which may suggest intralesional hemorrhage. There is no associated edema or mass effect. XR HIP 2-3 VW W PELVIS RIGHT    Result Date: 2/27/2022  No right hip fracture or dislocation. EKG Interpretation    none    EMERGENCY DEPARTMENT COURSE:  ED Course as of 02/27/22 Nataliya Slot Feb 27, 2022   1533 Patient evaluated bedside. Neurologically intact. VSS. Given fall will consult trauma surgery. Will consult with neurosurgery for mass versus hemorrhage [ZE]   1636 Patient to get stat MRI. To assess for space-occupying lesion of brain. If MRI shows evidence of acute traumatic event or trauma will admit. If MRI more consistent with mass patient will be admitted to medicine. Per neurosurgery no indication for reversal at this point of INR [ZE]   1820 MRI consistent with brain mass.   Will admit patient to medicine service for metastasis work-up [ZE]      ED Course User Index  [ZE] Bebe Bello DO       PROCEDURES:  Procedures     CONSULTS:  IP CONSULT TO NEUROSURGERY  IP CONSULT TO TRAUMA SURGERY  IP CONSULT TO HOSPITALIST    CRITICAL CARE:  none     [facility]   Patient presents as a transfer from [facility] for evaluation of abnormal head CT findings following a fall from standing height. Head CT concerning for mass versus intracranial hemorrhage. MRI obtained and consistent with mass. Trauma evaluated patient and no further work-up on their end. Neurosurgery evaluated patient and ordered MRI. Neurosurgery will conduct metastasis work-up. Patient to be admitted to medicine service. Patient neurologically intact throughout her entire stay with stable vital signs      FINAL IMPRESSION      1. Fall, initial encounter    2. Intracranial mass      DISPOSITION / PLAN     DISPOSITION Decision To Admit 02/27/2022 06:03:43 PM      PATIENT REFERRED TO:  No follow-up provider specified.     DISCHARGE MEDICATIONS:  New Prescriptions    No medications on file       Edwedennise Chavez DO  Emergency Medicine Resident    (Please note that portions of thisnote were completed with a voice recognition program.  Efforts were made to edit the dictations but occasionally words are mis-transcribed.)        Bebe Bello DO  Resident  02/27/22 [id number]

## 2022-02-27 NOTE — CONSULTS
Apple  22.    Department of Neurosurgery  Resident Consult Note      Reason for Consult: Mechanical Fall with head contusion   Requesting Physician:  Dr. Sabina Naik:   [x]Dr. Ivie Schlatter  []Dr. Phuong Corona  []Dr. Nixon Palomo  []Dr. Lisa Sue  []Dr. Yani Jeronimo  []Dr. Freeman [facility]    History Obtained From:  patient, electronic medical record    CHIEF COMPLAINT:     Mechanical fall while walking dog with frontal head contusion   HISTORY OF PRESENT ILLNESS:       The patient is a 59 y.o.  female who presents following accidental mechanical fall over her dog earlier today 2/27/22 around 10AM. PMH significant for CKD, COPD, HTN, PE and DVT hypercoagulable disorder on coumadin for >12 years, NARCISO non-compliant with CPAP, DM2 and obesity. Patient states she was walking in her house reaching for a fan when she stepped backwards tripping over her dog falling to the ground. Patient denies hitting her head or any LOC although endorses right knee pain thus went to [facility] emergency [facility]. Patient pan Scanned there with CT head concerning for left frontal hyperintensity questionable IPH vs underlying brain mass or venous anomaly. Patient transferred here for neurosurgical and trauma evaluation. Patient denies any focal neurologic weakness or sensory deficits and fully alert oriented to person place and time. No headache at this time. On arrival vitals stable 128/76, , tmax 98.4. Labs significant for INR 2.8, platelets 600, glucose 145, BUN 13, Cr .94 otherwise unremarkable. Reviewed CT head WO from [facility] facility and left frontal hyperintensity appears more likely underlying incidental mass with very minimal surrounding edema. Plan for stat MRI brain W/WO with SWI and GRE sequences to better evaluate lesion. Will guide INR reversal based on MRI given significant risk off AC from her previous history of PE and multiple DVT.      PAST MEDICAL HISTORY :       Past Medical History:        Diagnosis Date    Anxiety     Chronic kidney disease     COPD [facility]chronic obstructive pulmonary disease) ([facility]     Depression     Fracture of ankle 2020    Hyperlipidemia     Hypertension     Obesity     Osteoarthritis     Proteinuria     Pulmonary embolism (HCC)     Sleep apnea     c-pap.  Doesn't use everynight    SOB (shortness of breath) 2020    Type 2 diabetes mellitus without complication (HCC)     Type II or unspecified type diabetes mellitus without mention of complication, not stated as uncontrolled     Von Willebrand disease [facility])        Past Surgical History:        Procedure Laterality Date    APPENDECTOMY       SECTION      x3, , ,     COLONOSCOPY  2018    with biopsy    COLONOSCOPY N/A 2018    COLONOSCOPY performed by Amirah Smith MD at [address] CATH LAB PROCEDURE      EYE SURGERY Bilateral     cataracts    EYE SURGERY Bilateral     glaucoma    TONSILLECTOMY AND ADENOIDECTOMY         Social History:   Social History     Socioeconomic History    Marital status:      Spouse name: Not on file    Number of children: 3    Years of education: Not on file    Highest education level: Not on file   Occupational History    Not on file   Tobacco Use    Smoking status: Former Smoker     Packs/day: 0.25     Years: 7.00     Pack years: 1.75     Types: Cigarettes     Start date: 10/16/1977     Quit date: 1983     Years since quittin.1    Smokeless tobacco: Never Used   Substance and Sexual Activity    Alcohol use: No    Drug use: No    Sexual activity: Not Currently   Other Topics Concern    Not on file   Social History Narrative    Not on file     Social Determinants of Health     Financial Resource Strain: Low Risk     Difficulty of Paying Living Expenses: Not hard at all   Food Insecurity: No Food Insecurity    Worried About [address] in the Last Year: Never true    Ran Out of Food in the Last Year: Never true   Transportation Needs:     Lack of Transportation (Medical): Not on file    Lack of Transportation (Non-Medical): Not on file   Physical Activity:     Days of Exercise per Week: Not on file    Minutes of Exercise per Session: Not on file   Stress:     Feeling of Stress : Not on file   Social Connections:     Frequency of Communication with Friends and Family: Not on file    Frequency of Social Gatherings with Friends and Family: Not on file    Attends Mandaeism Services: Not on file    Active Member of [address] [place] [address] [employer] or Organizations: Not on file    Attends Club or Organization Meetings: Not on file    Marital Status: Not on file   Intimate Partner Violence:     Fear of Current or Ex-Partner: Not on file    Emotionally Abused: Not on file    Physically Abused: Not on file    Sexually Abused: Not on file   Housing Stability:     Unable to Pay for Housing in the Last Year: Not on file    Number of [place] in the Last Year: Not on file    Unstable Housing in the Last Year: Not on file       Family History:       Problem Relation Age of Onset    Hypertension Mother     Liver Disease Mother     Cancer Father         stomach    Diabetes Sister     Cancer Brother         kidney    No Known Problems Maternal Grandmother     No Known Problems Maternal Grandfather     No Known Problems Paternal Grandmother     No Known Problems Paternal Grandfather     Other Brother         AIDS       Allergies:   Patient has no known allergies. Home Medications:  Prior to Admission medications    Medication Sig Start Date End Date Taking?  Authorizing Provider   allopurinol (ZYLOPRIM) 100 MG tablet take 1 tablet by mouth once daily 2/17/22   BRAULIO Prakash - CNP   zinc gluconate 50 MG tablet Take 50 mg by mouth daily    Historical Provider, MD   vitamin C (ASCORBIC ACID) 500 MG tablet Take 500 mg by mouth daily    Historical Provider, MD MARAVILLA VITAMIN D-3 50 MCG (2000 UT) CAPS take 1 capsule by mouth once daily 2/8/22 2/8/23  Amy Johnella Koyanagi, APRN - CNP   oxyCODONE-acetaminophen (PERCOCET) 5-325 MG per tablet Take 1 tablet by mouth every 8 hours as needed for Pain for up to 30 days.  2/4/22 3/6/22  Amy Johnella Koyanagi, APRN - CNP   polyethylene glycol [facility]) 17 GM/SCOOP powder Take 17 g by mouth daily 1/24/22 1/24/23  Amy Johnella Koyanagi, APRN - CNP   senna (SENOKOT) 8.6 MG tablet Take 1 tablet by mouth 2 times daily 1/24/22 1/24/23  Amy Johnella Koyanagi, APRN - CNP   blood glucose test strips (ONETOUCH ULTRA) strip TEST THREE TIMES DAILY 1/24/22 1/23/23  Amy Johnella Koyanagi, APRN - CNP   omeprazole (PRILOSEC) 20 MG delayed release capsule take 1 capsule by mouth once daily 1/17/22 1/17/23  Amy Johnella Koyanagi, APRN - CNP   atorvastatin (LIPITOR) 40 MG tablet take 1 tablet by mouth once daily 1/17/22 1/17/23  Amy Johnella Koyanagi, APRN - CNP   JANUVIA 100 MG tablet take 1 tablet by mouth once daily 1/17/22 1/17/23  Amy Johnella Koyanagi, APRN - CNP   lisinopril (PRINIVIL;ZESTRIL) 20 MG tablet take 1 tablet by mouth once daily 1/17/22   BRAULIO Mcdowell CNP   fluticasone [facility]) 50 MCG/ACT nasal spray 1 spray by Nasal route daily 1/5/22 1/5/23  Amy Johnella Koyanagi, APRN - CNP   isosorbide mononitrate (IMDUR) 30 MG extended release tablet take 1 tablet by mouth once daily 1/4/22   Amy Johnella Koyanagi, APRN - CNP   buPROPion (WELLBUTRIN XL) 150 MG extended release tablet  12/16/21   Historical Provider, MD   tiZANidine (ZANAFLEX) 4 MG tablet Take 1 tablet by mouth 3 times daily as needed (muscle relaxer)  Patient not taking: Reported on 2/14/2022 12/21/21   Amy Johnella Koyanagi, APRN - CNP   furosemide (LASIX) 20 MG tablet take 1 tablet by mouth once daily 12/20/21   Amy Johnella Koyanagi, APRN - CNP   Blood Pressure Monitoring KIT Use to check blood pressure daily and as needed 12/10/21 10/17/24  Amy Johnella Koyanagi, APRN - CNP   Semaglutide 3 MG TABS Take 3 mg by mouth daily 12/7/21   Amy Johnella Koyanagi, APRN - CNP dapagliflozin (FARXIGA) 5 MG tablet Take 1 tablet by mouth every morning 12/7/21 3/7/22  BRAULIO Deshpande CNP   buPROPion (WELLBUTRIN XL) 300 MG extended release tablet Take 2 tablets by mouth every morning  Patient taking differently: Take 300 mg by mouth every morning  10/19/21   BRAULIO Deshpande CNP   metFORMIN (GLUCOPHAGE-XR) 500 MG extended release tablet Take 1 tablet by mouth 2 times daily 8/30/21 8/30/22  BRAULIO Deshpande CNP   Lancets Misc. (ACCU-CHEK FASTCLIX LANCET) KIT use as directed [address]. FASTCLIX MIGHT BE EAISER TO MANIPULATE 7/22/21 7/22/22  BRAULIO Deshpande CNP   Accu-Chek FastClix Lancets MISC use 1 LANCET to TEST BLOOD SUGAR one to two times a day 7/22/21 7/22/22  BRAULIO Deshpande CNP   Alcohol Swabs (ALCOHOL PREP) 70 % PADS Use twice daily and as needed to check blood sugars 7/19/21 10/20/23  BRAULIO Deshpande CNP   warfarin (COUMADIN) 2 MG tablet take 1 tablet by mouth SUNDAY, TUESDAY, THURSDAY, AND SATURDAY ( take 1 AND 1/2 tablets by mouth MONDAY, WEDNESDAY, FRIDAY )  Patient taking differently: take 1 tablet by mouth every day. Took 3 mg Monday and Tuesday. 2 mg all other days.  7/6/21   BRAULIO Deshpande CNP   albuterol sulfate  (90 Base) MCG/ACT inhaler Inhale 2 puffs into the lungs every 4 hours as needed for Wheezing or Shortness of Breath (AND/OR COUGH) 5/24/21 5/24/22  BRAULIO Deshpande CNP   naloxone 4 MG/0.1ML LIQD nasal spray 1 spray by Nasal route as needed for Opioid Reversal 2/16/21   BRAULIO Deshpande CNP   Handicap Placard MISC by Does not apply route Exp 2/3/2026 2/3/21   BRAULIO Deshpande CNP   ipratropium-albuterol (DUONEB) 0.5-2.5 (3) MG/3ML SOLN nebulizer solution Inhale 3 mLs into the lungs every 6 hours as needed for Shortness of Breath 1/7/21 1/7/22  BRAULIO Deshpande - CNP   Respiratory Therapy Supplies (NEBULIZER/TUBING/MOUTHPIECE) KIT 1 kit by Kimo not apply route 4 times daily as needed (wheezing) 10/20/20   Trisha Trinidad, APRN - CNP       Current Medications:   Current Facility-Administered Medications: sodium chloride flush 0.9 % injection 10 mL, 10 mL, IntraVENous, PRN    REVIEW OF SYSTEMS:       General ROS - No fevers, no chills  Ophthalmic ROS - No changes in vision from baseline  ENT ROS -  No sore throat, no rhinorrhea  Respiratory ROS - baseline COPD non-productive cough while examining her and SOB near her baseline per patient. Cardiovascular ROS - No chest pain  Gastrointestinal ROS - No abdominal pain, no nausea, no vomiting  Genito-Urinary ROS - No dysuria  Musculoskeletal ROS - right knee pain   Neurological ROS - No focal weakness or loss of sensation, no headache  Dermatological ROS - No lesions, no rash  Vascular/Lymphatic ROS - No edema    PHYSICAL EXAM:       Vitals:    02/27/22 1516   BP: 128/76   Pulse: 95   Resp: 17   Temp: 98.2 °F (36.8 °C)   SpO2: 99%       CONSTITUTIONAL:  Well developed, well nourished, alert and oriented x 3, in no acute distress, nontoxic. No dysarthria, no aphasia. EOMI.     HEAD:  normocephalic, atraumatic    EYES:  PERRLA, EOMI   ENT:  moist mucous membranes, no stridor, no tracheal deviation   NECK:  no midline tenderness to palpation, no step-offs or deformities   BACK:  no midline tenderness to palpation, no step-offs or deformities    LUNGS:  CTAB, equal air entry bilaterally, no wheezes or rales   CARDIOVASCULAR:  RRR, no murmur   ABDOMEN:  Soft, no rigidity   NEUROLOGIC:  EYE OPENING     Spontaneous - 4 [x]       To voice - 3 []       To pain - 2 []       None - 1 []    VERBAL RESPONSE     Appropriate, oriented - 5 [x]       Dazed or confused - 4 []       Syllables, expletives - 3 []       Grunts - 2 []       None - 1 []    MOTOR RESPONSE     Spontaneous, command - 6 [x]       Localizes pain - 5 []       Withdraws pain - 4 []       Abnormal flexion - 3 []       Abnormal extension - 2 []       None - 1 [] Total GCS: 15    Mental Status:  A & O x3, awake             Cranial Nerves:    cranial nerves II-XII are grossly intact    Motor Exam:    Drift:  absent  Tone:  normal    Motor exam is symmetrical 5 out of 5 all extremities bilaterally    Sensory:    Touch:    Right Upper Extremity:  normal  Left Upper Extremity:  normal  Right Lower Extremity:  normal  Left Lower Extremity:  normal    Deep Tendon Reflexes:    Right Bicep:  1+  Left Bicep:  1+  Right Knee:  1+  Left Knee:  1+    Plantar Response:    Right:  downgoing  Left:  downgoing    Clonus:  absent  Allen's:  absent    Coordination/Dysmetria:  Heel to Shin:  Right:  normal  Left:  normal  Finger to Nose:   Right:  normal  Left:  normal   Dysdiadochokinesia:  absent    Gait:  normal   SKIN:  no rash      LABS AND IMAGING:     Labs:  CBC with Differential:    Lab Results   Component Value Date    WBC 8.3 02/27/2022    RBC 4.14 02/27/2022    RBC 3.61 05/23/2012    HGB 13.4 02/27/2022    HCT 39.1 02/27/2022     02/27/2022     05/23/2012    MCV 94.4 02/27/2022    MCH 32.2 02/27/2022    MCHC 34.2 02/27/2022    RDW 16.5 02/27/2022    LYMPHOPCT 27 02/27/2022    MONOPCT 7 02/27/2022    BASOPCT 0 02/27/2022    MONOSABS 0.60 02/27/2022    LYMPHSABS 2.30 02/27/2022    EOSABS 0.10 02/27/2022    BASOSABS 0.00 02/27/2022    DIFFTYPE NOT REPORTED 08/02/2021     BMP:    Lab Results   Component Value Date     02/27/2022    K 4.1 02/27/2022    CL 99 02/27/2022    CO2 23 02/27/2022    BUN 13 02/27/2022    LABALBU 3.9 07/19/2021    LABALBU 4.7 02/24/2012    CREATININE 0.94 02/27/2022    CALCIUM 9.7 02/27/2022    GFRAA >60 02/27/2022    LABGLOM >60 02/27/2022    GLUCOSE 145 02/27/2022    GLUCOSE 84 05/23/2012       Radiology Review:    MRI brain W/WO 2/27/22  -GRE positive left frontal brain mass with contrast enhancement 1.4CM X 1.3CM X 1.5CM   -Given no evidence for surrounding edema and with initial CT head WO hyperintensity Cavernous angioma would also be in the differential           CT head WO 2/27/22  Impression   Incompletely assessed heterogeneous hyperattenuating 1.6 cm left frontal   periventricular lesion.  Differential considerations include benign vascular   lesion, primary brain mass or metastasis.  Acute intracranial hemorrhage less   favored.  Prompt contrast enhanced MRI is recommended for further evaluation.       Degenerative changes in the cervical spine without evidence for acute   fracture or subluxation. CT Cervical spine 2/27/22  Impression   Incompletely assessed heterogeneous hyperattenuating 1.6 cm left frontal   periventricular lesion.  Differential considerations include benign vascular   lesion, primary brain mass or metastasis.  Acute intracranial hemorrhage less   favored.  Prompt contrast enhanced MRI is recommended for further evaluation.       Degenerative changes in the cervical spine without evidence for acute   fracture or subluxation.            ASSESSMENT AND PLAN:       Patient Active Problem List   Diagnosis    Essential hypertension    Hyperlipidemia    Osteoarthritis    Obesity    CKD (chronic kidney disease) stage 3, GFR 30-59 ml/min ([facility])    Proteinuria    Controlled type 2 diabetes mellitus with stage 3 chronic kidney disease, without long-term current use of insulin (HCC)    History of DVT of lower extremity    NARCISO on CPAP    Vitamin D deficiency    Major depressive disorder, recurrent episode, severe, with psychotic behavior ([address]    Multiple lung nodules on CT    Hypomagnesemia    Anxiety    Chronic obstructive pulmonary disease (HCC)    Precordial pain    History of pulmonary embolism    Family history of abdominal aortic aneurysm    Normal coronary arteries    Long term (current) use of anticoagulants    AAA (abdominal aortic aneurysm) without rupture (HCC)    COPD exacerbation (HCC)    Acute tracheobronchitis-viral    Abnormal mammogram    Cerebrovascular accident (CVA) ([address]  Diabetic nephropathy (HCC)    Slow transit constipation    Frontal mass of brain       A/P:  Skyler Crenshaw is a 59 y.o. female who presents with mechanical fall therapeutic INR 2.8 on Coumadin for history of hypercoagulable disorder and PE/DVT 12 years ago. CT head WO concerning for underlying left frontal IPH vs brain mass thus transferred here for further workup and MRI brain W/WO. Stat MRI brain W/WO concerning for underlying mass lesion vs cavernous angioma     1. Left frontal brain mass concerning for cavernous angioma vs primary brain mass vs metastatic lesion      Patient care will be discussed with attending, will reevaluate patient along with attending    - Stat MRI brain W/WO reveals left frontal contrast enhancing Gordy mass    - Hold Coumadin    - repeat CT head WO 6 hours from MRI scan   - CTLS recommendations: Clear   - HOB: 30 degrees   - Neuro checks per protocol  - Hold all antiplatelets and anticoagulants  - recommend admission to Neurology with metastatic workup including CT chest/abdomen/pelvis, oncology, medicine and neurosurgery consults   - We recommend SBP < 140        Additional recommendations may follow    Please contact neurosurgery with any changes in patient's neurologic status. Thank you for your consult.        Jaren Jarvis MD    PGY-3 Neurology Resident   Neurosurgery Team  Israel Huerta  2/27/2022 5:59 PM

## 2022-02-27 NOTE — ED NOTES
Pt to restroom via wc. Pt able to stand-pivot using handrails.       Gustavo Grace RN  02/27/22 9624

## 2022-02-27 NOTE — ED PROVIDER NOTES
[id number] [facility] ED  [id number] [address] [facility] [address] [facility] [place] [address]  Phone: [phone]  Fax: [phone]        Pt Name: Mirlande Alicea  MRN: [id number]  [place] 1960  Date of evaluation: 2/27/22      CHIEF COMPLAINT     Chief Complaint   Patient presents with    Fall     Friday, onto right side. Rt shoulder and knee pain          HISTORY OF PRESENT ILLNESS  (Location/Symptom, Timing/Onset, Context/Setting, Quality, Duration, Modifying Factors, Severity.)    Mirlande Alicea is a 59 y.o. female who presents after sustaining a fall on Friday. She states she was walking in the dark, trying to get to a light switch, when she tripped over her dog. She doesn't think she struck her head. No LOC. She does complain of neck pain. No back pain or abdominal pain. She is on Coumadin due to history of PE in the past. The patient reports that she fell on her right side, and her right shoulder, right hip, and right knee are painful. She has pain with ambulation and her knee is quite swollen. REVIEW OF SYSTEMS    (2-9 systems for level 4, 10 or more for level 5)     Review of Systems   Constitutional: Negative for chills and fever. HENT: Negative for congestion, rhinorrhea and sore throat. Eyes: Negative for photophobia and visual disturbance. Respiratory: Negative for cough and shortness of breath. Cardiovascular: Negative for chest pain and palpitations. Gastrointestinal: Negative for abdominal pain, constipation, diarrhea, nausea and vomiting. Genitourinary: Negative for dysuria, frequency and urgency. Musculoskeletal: Positive for neck pain. Negative for back pain. Skin: Negative for rash and wound. Neurological: Negative for dizziness, light-headedness and headaches. Hematological: Negative for adenopathy. Does not bruise/bleed easily.        PAST MEDICAL HISTORY    has a past medical history of Anxiety, Chronic kidney disease, COPD (chronic obstructive pulmonary disease) ([facility] [address] Depression, Fracture of ankle, Hyperlipidemia, Hypertension, Obesity, Osteoarthritis, Proteinuria, Pulmonary embolism ([facility] [address] Sleep apnea, SOB (shortness of breath), Type 2 diabetes mellitus without complication ([facility] [address] Type II or unspecified type diabetes mellitus without mention of complication, not stated as uncontrolled, and Nacho Gamaliel disease ([facility] [address] SURGICAL HISTORY      has a past surgical history that includes Tonsillectomy and adenoidectomy; eye surgery (Bilateral); eye surgery (Bilateral); Appendectomy;  section; Colonoscopy (2018); Colonoscopy (N/A, 2018); and Diagnostic Cardiac Cath Lab Procedure.     CURRENTMEDICATIONS       Previous Medications    ACCU-CHEK FASTCLIX LANCETS MISC    use 1 LANCET to TEST BLOOD SUGAR one to two times a day    ALBUTEROL SULFATE  (90 BASE) MCG/ACT INHALER    Inhale 2 puffs into the lungs every 4 hours as needed for Wheezing or Shortness of Breath (AND/OR COUGH)    ALCOHOL SWABS (ALCOHOL PREP) 70 % PADS    Use twice daily and as needed to check blood sugars    ALLOPURINOL (ZYLOPRIM) 100 MG TABLET    take 1 tablet by mouth once daily    ATORVASTATIN (LIPITOR) 40 MG TABLET    take 1 tablet by mouth once daily    BLOOD GLUCOSE TEST STRIPS (ONETOUCH ULTRA) STRIP    TEST THREE TIMES DAILY    BLOOD PRESSURE MONITORING KIT    Use to check blood pressure daily and as needed    BUPROPION (WELLBUTRIN XL) 150 MG EXTENDED RELEASE TABLET        BUPROPION (WELLBUTRIN XL) 300 MG EXTENDED RELEASE TABLET    Take 2 tablets by mouth every morning    DAPAGLIFLOZIN (FARXIGA) 5 MG TABLET    Take 1 tablet by mouth every morning    FLUTICASONE (FLONASE) 50 MCG/ACT NASAL SPRAY    1 spray by Nasal route daily    FUROSEMIDE (LASIX) 20 MG TABLET    take 1 tablet by mouth once daily    HANDICAP PLACARD MISC    by Does not apply route Exp 2/3/2026    IPRATROPIUM-ALBUTEROL (DUONEB) 0.5-2.5 (3) MG/3ML SOLN NEBULIZER SOLUTION    Inhale 3 mLs into the lungs every 6 hours as needed for Shortness of Breath    ISOSORBIDE MONONITRATE (IMDUR) 30 MG EXTENDED RELEASE TABLET    take 1 tablet by mouth once daily    JANUVIA 100 MG TABLET    take 1 tablet by mouth once daily    LANCETS MISC. (ACCU-CHEK FASTCLIX LANCET) KIT    use as directed PLEASE APPROVED NEW DEVICE WHILE WE WAIT FOR PRIOR [facility]. .. FASTCLIX MIGHT BE EAISER TO MANIPULATE    LISINOPRIL (PRINIVIL;ZESTRIL) 20 MG TABLET    take 1 tablet by mouth once daily    METFORMIN (GLUCOPHAGE-XR) 500 MG EXTENDED RELEASE TABLET    Take 1 tablet by mouth 2 times daily    NALOXONE 4 MG/0.1ML LIQD NASAL SPRAY    1 spray by Nasal route as needed for Opioid Reversal    OMEPRAZOLE (PRILOSEC) 20 MG DELAYED RELEASE CAPSULE    take 1 capsule by mouth once daily    OXYCODONE-ACETAMINOPHEN (PERCOCET) 5-325 MG PER TABLET    Take 1 tablet by mouth every 8 hours as needed for Pain for up to 30 days. POLYETHYLENE GLYCOL (GLYCOLAX) 17 GM/SCOOP POWDER    Take 17 g by mouth daily    RA VITAMIN D-3 50 MCG (2000) CAPS    take 1 capsule by mouth once daily    RESPIRATORY THERAPY SUPPLIES (NEBULIZER/TUBING/MOUTHPIECE) KIT    1 kit by Does not apply route 4 times daily as needed (wheezing)    SEMAGLUTIDE 3 MG TABS    Take 3 mg by mouth daily    SENNA (SENOKOT) 8.6 MG TABLET    Take 1 tablet by mouth 2 times daily    TIZANIDINE (ZANAFLEX) 4 MG TABLET    Take 1 tablet by mouth 3 times daily as needed (muscle relaxer)    VITAMIN C (ASCORBIC ACID) 500 MG TABLET    Take 500 mg by mouth daily    WARFARIN (COUMADIN) 2 MG TABLET    take 1 tablet by mouth , TUESDAY, THURSDAY, AND SATURDAY ( take 1 AND 1/2 tablets by mouth MONDAY, WEDNESDAY, FRIDAY )    ZINC GLUCONATE 50 MG TABLET    Take 50 mg by mouth daily       ALLERGIES     has No Known Allergies. FAMILY HISTORY     She indicated that her mother is . She indicated that her father is . She indicated that all of her four sisters are alive. She indicated that two of her four brothers are alive. She indicated that her maternal grandmother is . She indicated that her maternal grandfather is . She indicated that her paternal grandmother is . She indicated that her paternal grandfather is . family history includes Cancer in her brother and father; Diabetes in her sister; Hypertension in her mother; Liver Disease in her mother; No Known Problems in her maternal grandfather, maternal grandmother, paternal grandfather, and paternal grandmother; Other in her brother. SOCIAL HISTORY      reports that she quit smoking about 39 years ago. Her smoking use included cigarettes. She started smoking about 44 years ago. She has a 1.75 pack-year smoking history. She has never used smokeless tobacco. She reports that she does not drink alcohol and does not use drugs. PHYSICAL EXAM    (up to 7 for level 4, 8 or more for level 5)   INITIAL VITALS:  height is 5' 2\" (1.575 m) and weight is 81.6 kg (180 lb). Her oral temperature is 98.2 °F (36.8 °C). Her blood pressure is 146/92 (abnormal) and her pulse is 102. Her respiration is 16 and oxygen saturation is 98%. Physical Exam  Vitals and nursing note reviewed. Constitutional:       Appearance: Normal appearance. She is not ill-appearing. HENT:      Head: Normocephalic and atraumatic. Eyes:      Extraocular Movements: Extraocular movements intact. Conjunctiva/sclera: Conjunctivae normal.      Pupils: Pupils are equal, round, and reactive to light. Neck:      Comments: Paraspinal tenderness noted at the C3-C4. Cardiovascular:      Rate and Rhythm: Regular rhythm. Tachycardia present. Pulses: Normal pulses. Heart sounds: Normal heart sounds. No murmur heard. No friction rub. No gallop. Pulmonary:      Effort: Pulmonary effort is normal.      Breath sounds: Normal breath sounds. No wheezing, rhonchi or rales. Abdominal:      General: Abdomen is flat. Bowel sounds are normal.      Palpations: Abdomen is soft. Tenderness: There is no abdominal tenderness. There is no guarding or rebound. Musculoskeletal:         General: Tenderness present. Right shoulder: Tenderness and bony tenderness present. No swelling, deformity, effusion, laceration or crepitus. Normal range of motion. Normal strength. Normal pulse. Left shoulder: Normal.      Right elbow: Normal.      Right forearm: Normal.      Right wrist: Normal.      Cervical back: Normal range of motion. Tenderness present. Right hip: Tenderness and bony tenderness present. No crepitus. Decreased range of motion. Left hip: Normal.      Right knee: Swelling, effusion, ecchymosis and bony tenderness present. No deformity or crepitus. Decreased range of motion. Tenderness present over the medial joint line and lateral joint line. Normal pulse. Comments: Tender over the proximal left humerus. No deformity. Skin:     General: Skin is warm and dry. Capillary Refill: Capillary refill takes less than 2 seconds. Neurological:      Mental Status: She is alert and oriented to person, place, and time. Mental status is at baseline. Psychiatric:         Mood and Affect: Mood normal.         Behavior: Behavior normal.         Thought Content: Thought content normal.         DIFFERENTIAL DIAGNOSIS/ MDM:     S/p fall. Intracranial lesion noted. Urgent MRI recommended. Transfer to [facility]. DIAGNOSTIC RESULTS     EKG: All EKG's are interpreted by the Emergency Department Physician who either signs or Co-signs this chart in the absence of a cardiologist.    none    RADIOLOGY:        Interpretation per the Radiologist below, if available at the time of this note:    XR CHEST (2 VW)    Result Date: 2/27/2022  EXAMINATION: TWO XRAY VIEWS OF THE CHEST 2/27/2022 11:59 am COMPARISON: AP chest from 08/06/2021; two-view study from 08/02/2021.  HISTORY: ORDERING SYSTEM PROVIDED HISTORY: fall, chest wall pain TECHNOLOGIST PROVIDED HISTORY: fall, chest wall pain findings as above. XR ANKLE RIGHT (MIN 3 VIEWS)    Result Date: 2/27/2022  EXAMINATION: THREE XRAY VIEWS OF THE RIGHT ANKLE 2/27/2022 11:59 am COMPARISON: None. HISTORY: ORDERING SYSTEM PROVIDED HISTORY: fall, ankle pain TECHNOLOGIST PROVIDED HISTORY: fall, ankle pain Reason for Exam: fall, right knee, ankle and hip pain FINDINGS: No right ankle fracture. No dislocation. Ankle mortise maintained. No marked soft tissue swelling. Small ankle joint effusion possible. Degenerative hypertrophic change posterior talocalcaneal joint. Vascular calcifications noted anteriorly. Mild intertarsal degenerative changes. No acute abnormality right ankle. Small joint effusion possible. CT HEAD WO CONTRAST    Result Date: 2/27/2022  EXAMINATION: CT OF THE HEAD WITHOUT CONTRAST; CT OF THE CERVICAL SPINE WITHOUT CONTRAST 2/27/2022 11:29 am TECHNIQUE: CT of the head was performed without the administration of intravenous contrast. Dose modulation, iterative reconstruction, and/or weight based adjustment of the mA/kV was utilized to reduce the radiation dose to as low as reasonably achievable.; CT of the cervical spine was performed without the administration of intravenous contrast. Multiplanar reformatted images are provided for review. Dose modulation, iterative reconstruction, and/or weight based adjustment of the mA/kV was utilized to reduce the radiation dose to as low as reasonably achievable. COMPARISON: No priors HISTORY: ORDERING SYSTEM PROVIDED HISTORY: fall TECHNOLOGIST PROVIDED HISTORY: fall Reason for Exam: Pt c/o head and neck pain post fall x 3 days ago FINDINGS: CT head: BRAIN/VENTRICLES: Ventricles normal in size and location. Adjacent to the frontal horn left lateral ventricle in the left frontal lobe there is heterogeneous hyperattenuating mass 1.6 x 1.3 x 1.3 cm. Could be benign vascular lesion but other etiologies such as a primary mass or metastasis not excluded.   Contrast enhanced MRI is recommended. .  Less favored is intracranial hemorrhage. No acute infarct. ORBITS: The visualized portion of the orbits demonstrate no acute abnormality. SINUSES: The visualized paranasal sinuses and mastoid air cells demonstrate no acute abnormality. SOFT TISSUES/SKULL:  No acute abnormality of the visualized skull or soft tissues. CT CERVICAL SPINE: BONES/ALIGNMENT: There is no evidence of an acute cervical spine fracture. There is normal alignment of the cervical spine. DEGENERATIVE CHANGES: Mild degenerative changes. SOFT TISSUES: There is no prevertebral soft tissue swelling. Incompletely assessed heterogeneous hyperattenuating 1.6 cm left frontal periventricular lesion. Differential considerations include benign vascular lesion, primary brain mass or metastasis. Acute intracranial hemorrhage less favored. Prompt contrast enhanced MRI is recommended for further evaluation. Degenerative changes in the cervical spine without evidence for acute fracture or subluxation. Findings were discussed with Jesús Shipley at 12:54 pm on 2/27/2022. RECOMMENDATIONS: Unavailable     CT CHEST WO CONTRAST    Result Date: 1/31/2022  EXAMINATION: CT OF THE CHEST WITHOUT CONTRAST 1/31/2022 10:59 am TECHNIQUE: CT of the chest was performed without the administration of intravenous contrast. Multiplanar reformatted images are provided for review. Dose modulation, iterative reconstruction, and/or weight based adjustment of the mA/kV was utilized to reduce the radiation dose to as low as reasonably achievable. COMPARISON: 05/06/2020 HISTORY: ORDERING SYSTEM PROVIDED HISTORY: Multiple lung nodules on CT TECHNOLOGIST PROVIDED HISTORY: follow up for nodules Reason for Exam: patient states difficulty breathing x1 year, f/u lung nodule FINDINGS: Mediastinum: Thyroid is homogeneous in appearance. No mediastinal mass or adenopathy. The cardiac chambers are within normal limits. No pericardial effusion.   No bulky hilar or axillary lymphadenopathy. Lungs/pleura: The lung parenchyma is grossly clear. No parenchymal consolidation, pulmonary mass. 2 3 mm noncalcified nodules identified of the periphery of the left lower lobe. These are stable and unchanged when compared to the prior study. No pleural effusion or pneumothorax. Upper Abdomen: Visualized upper abdomen is unremarkable. There is stool seen within the colon. Food material identified within the stomach. Soft Tissues/Bones: Minimal multilevel degenerative changes identified within the spine. No chest wall abnormality. Stable 3 mm noncalcified nodules identified in the periphery of the left lower lobe. These are unchanged with no superimposed acute infectious process. No new pulmonary mass or nodule. CT CERVICAL SPINE WO CONTRAST    Result Date: 2/27/2022  EXAMINATION: CT OF THE HEAD WITHOUT CONTRAST; CT OF THE CERVICAL SPINE WITHOUT CONTRAST 2/27/2022 11:29 am TECHNIQUE: CT of the head was performed without the administration of intravenous contrast. Dose modulation, iterative reconstruction, and/or weight based adjustment of the mA/kV was utilized to reduce the radiation dose to as low as reasonably achievable.; CT of the cervical spine was performed without the administration of intravenous contrast. Multiplanar reformatted images are provided for review. Dose modulation, iterative reconstruction, and/or weight based adjustment of the mA/kV was utilized to reduce the radiation dose to as low as reasonably achievable. COMPARISON: No priors HISTORY: ORDERING SYSTEM PROVIDED HISTORY: fall TECHNOLOGIST PROVIDED HISTORY: fall Reason for Exam: Pt c/o head and neck pain post fall x 3 days ago FINDINGS: CT head: BRAIN/VENTRICLES: Ventricles normal in size and location. Adjacent to the frontal horn left lateral ventricle in the left frontal lobe there is heterogeneous hyperattenuating mass 1.6 x 1.3 x 1.3 cm.   Could be benign vascular lesion but other etiologies such as a primary mass or metastasis not excluded. Contrast enhanced MRI is recommended. .  Less favored is intracranial hemorrhage. No acute infarct. ORBITS: The visualized portion of the orbits demonstrate no acute abnormality. SINUSES: The visualized paranasal sinuses and mastoid air cells demonstrate no acute abnormality. SOFT TISSUES/SKULL:  No acute abnormality of the visualized skull or soft tissues. CT CERVICAL SPINE: BONES/ALIGNMENT: There is no evidence of an acute cervical spine fracture. There is normal alignment of the cervical spine. DEGENERATIVE CHANGES: Mild degenerative changes. SOFT TISSUES: There is no prevertebral soft tissue swelling. Incompletely assessed heterogeneous hyperattenuating 1.6 cm left frontal periventricular lesion. Differential considerations include benign vascular lesion, primary brain mass or metastasis. Acute intracranial hemorrhage less favored. Prompt contrast enhanced MRI is recommended for further evaluation. Degenerative changes in the cervical spine without evidence for acute fracture or subluxation. Findings were discussed with Michael Lobo at 12:54 pm on 2/27/2022. RECOMMENDATIONS: Unavailable     XR HIP 2-3 VW W PELVIS RIGHT    Result Date: 2/27/2022  EXAMINATION: ONE XRAY VIEW OF THE PELVIS AND TWO XRAY VIEWS RIGHT HIP 2/27/2022 11:59 am COMPARISON: None. HISTORY: ORDERING SYSTEM PROVIDED HISTORY: fall, hip pain TECHNOLOGIST PROVIDED HISTORY: fall, hip pain Reason for Exam: fall, right knee, ankle and hip pain FINDINGS: No fracture. No dislocation. Scattered high density material within large bowel, best seen cecum. Mild degenerative changes visualized lumbosacral spine and left SI joint. Hip joints maintained with mild degenerative findings mild degenerative change pubic symphysis. No right hip fracture or dislocation.        LABS:  Results for orders placed or performed during the hospital encounter of 47/01/21   Basic Metabolic Panel   Result Value Ref Range Glucose 145 (H) 70 - 99 mg/dL    BUN 13 8 - 23 mg/dL    CREATININE 0.94 (H) 0.50 - 0.90 mg/dL    Calcium 9.7 8.6 - 10.4 mg/dL    Sodium 136 135 - 144 mmol/L    Potassium 4.1 3.7 - 5.3 mmol/L    Chloride 99 98 - 107 mmol/L    CO2 23 20 - 31 mmol/L    Anion Gap 14 9 - 17 mmol/L    GFR Non-African American >60 >60 mL/min    GFR African American >60 >60 mL/min    GFR Comment         CBC with Auto Differential   Result Value Ref Range    WBC 8.3 3.5 - 11.0 k/uL    RBC 4.14 4.0 - 5.2 m/uL    Hemoglobin 13.4 12.0 - 16.0 g/dL    Hematocrit 39.1 36 - 46 %    MCV 94.4 80 - 100 fL    MCH 32.2 26 - 34 pg    MCHC 34.2 31 - 37 g/dL    RDW 16.5 (H) 12.5 - 15.4 %    Platelets 898 200 - 267 k/uL    MPV 6.9 6.0 - 12.0 fL    Seg Neutrophils 64 36 - 66 %    Lymphocytes 27 24 - 44 %    Monocytes 7 2 - 11 %    Eosinophils % 2 1 - 4 %    Basophils 0 0 - 2 %    Segs Absolute 5.30 1.8 - 7.7 k/uL    Absolute Lymph # 2.30 1.0 - 4.8 k/uL    Absolute Mono # 0.60 0.1 - 1.2 k/uL    Absolute Eos # 0.10 0.0 - 0.4 k/uL    Basophils Absolute 0.00 0.0 - 0.2 k/uL   Protime-INR   Result Value Ref Range    Protime 26.6 (H) 9.4 - 12.6 sec    INR 2.8        Mildly elevated Cr  INR therapeutic     EMERGENCY DEPARTMENT COURSE:   Vitals:    Vitals:    02/27/22 1105   BP: (!) 146/92   Pulse: 102   Resp: 16   Temp: 98.2 °F (36.8 °C)   TempSrc: Oral   SpO2: 98%   Weight: 81.6 kg (180 lb)   Height: 5' 2\" (1.575 m)     -------------------------  BP: (!) 146/92, Temp: 98.2 °F (36.8 °C), Pulse: 102, Resp: 16      RE-EVALUATION:  1:17 PM EST  Patient updated on test results and plan of care. Agreeable to transfer. CONSULTS:  Emergency Medicine  I discussed the patient with Dr Sonali Valle. He agrees to accept the patient in the ER. PROCEDURES:  None    FINAL IMPRESSION      1. Fall, initial encounter    2.  Intracranial space-occupying lesion          DISPOSITION/PLAN   DISPOSITION        CONDITION ON DISPOSITION:   Stable     PATIENT REFERRED TO:  No follow-up provider specified.     DISCHARGE MEDICATIONS:  New Prescriptions    No medications on file       (Please note that portions of this note were completed with a voicerecognition program.  Efforts were made to edit the dictations but occasionally words are mis-transcribed.)    Darling Enriquez MD  Attending Emergency Medicine Physician        Darling Enriquez MD  02/27/22 100 Jessica Reyes MD  02/27/22 [id number]

## 2022-02-27 NOTE — ED NOTES
Ice pack to right shoulder/right knee- warm blanket provided- call light in reach     Miranda Hemphill RN  02/27/22 1956

## 2022-02-27 NOTE — CONSULTS
Trauma Surgery Consult    PATIENT NAME: Ivonne Jin  AGE: 59 y.o. MEDICAL RECORD NO. [id number]  DATE: 2/27/2022  SURGEON: Dr. Santoyo Roof: Trisha Zapata, BRAULIO - CNP    Patient evaluated at the request of  Dr. Yaritza Anderson  Reason for evaluation: fall, intracranial lesion    Patient information was obtained from patient. History/Exam limitations: none. IMPRESSION:     Patient Active Problem List   Diagnosis    Essential hypertension    Hyperlipidemia    Osteoarthritis    Obesity    CKD (chronic kidney disease) stage 3, GFR 30-59 ml/min (HCC)    Proteinuria    Controlled type 2 diabetes mellitus with stage 3 chronic kidney disease, without long-term current use of insulin (HCC)    History of DVT of lower extremity    NARCISO on CPAP    Vitamin D deficiency    Major depressive disorder, recurrent episode, severe, with psychotic behavior ([address]    Multiple lung nodules on CT    Hypomagnesemia    Anxiety    Chronic obstructive pulmonary disease (HCC)    Precordial pain    History of pulmonary embolism    Family history of abdominal aortic aneurysm    Normal coronary arteries    Long term (current) use of anticoagulants    AAA (abdominal aortic aneurysm) without rupture (HCC)    COPD exacerbation (HCC)    Acute tracheobronchitis-viral    Abnormal mammogram    Cerebrovascular accident (CVA) ([address]    Diabetic nephropathy (HCC)    Slow transit constipation     1. Fall, standing height  2. Intracranial lesion  3. Right knee pain  4. Right shoulder pain      PLAN:   1. Stat MRI brain wo contrast per neurosurgery  2. No need for coumadin reversal at this time per neurosurgery  3. If MRI reveals a potential traumatic cause for the patient's lesion we will admit the patient to our service   4. If MRI does not reveal a traumatic cause, would recommend admission to medicine with neurosurgery consult  5. If patient is admitted, we will perform a tertiary trauma examination   6.  No additional imaging warranted from a trauma standpoint at this time      HISTORY:   History of Chief Complaint:    Alyce Xiao is a 59 y.o. female with past medical history of COPD, CKD, PE on coumadin, diabetes, von willebrand disease, HTN who presents as a transfer from [facility] ED for concern for intracranial lesion. Patient reports that she had a mechanical ground level fall Friday (). Patient was reaching for the string to her ceiling fan when she tripped over her dog that she did not realize was behind her. Patient landed on her right side and denies hitting her head or losing consciousness. She denies chest pain, shortness of breath or palpitations prior to the fall. She denies lightheadedness, dizziness, numbness, tingling or weakness. She states she landed on her right shoulder and knee and is reporting pain in these areas. Patient is taking coumadin for history of PE. At [facility], patient had xrays of her right shoudler, chest, right hip, knee and ankle which were all negative. She also had a CT of her cervical spine which was negative. Her CT head showed a heterogeneous hyperattenuating 1.6 cm left frontal periventricular lesion. She was transferred to [facility] for trauma and neurosurgery evaluation. Past Medical History   has a past medical history of Anxiety, Chronic kidney disease, COPD (chronic obstructive pulmonary disease) ([address] Depression, Fracture of ankle, Hyperlipidemia, Hypertension, Obesity, Osteoarthritis, Proteinuria, Pulmonary embolism ([address] Sleep apnea, SOB (shortness of breath), Type 2 diabetes mellitus without complication ([address] Type II or unspecified type diabetes mellitus without mention of complication, not stated as uncontrolled, and Von Willebrand disease ([address] Past Surgical History   has a past surgical history that includes Tonsillectomy and adenoidectomy; eye surgery (Bilateral); eye surgery (Bilateral); Appendectomy;   section; Colonoscopy (02/16/2018); Colonoscopy (N/A, 2/16/2018); and Diagnostic Cardiac Cath Lab Procedure. Medications  Prior to Admission medications    Medication Sig Start Date End Date Taking? Authorizing Provider   allopurinol (ZYLOPRIM) 100 MG tablet take 1 tablet by mouth once daily 2/17/22   BRAULIO Sheffield CNP   zinc gluconate 50 MG tablet Take 50 mg by mouth daily    Historical Provider, MD   vitamin C (ASCORBIC ACID) 500 MG tablet Take 500 mg by mouth daily    Historical Provider, MD MARAVLILA VITAMIN D-3 50 MCG (2000 UT) CAPS take 1 capsule by mouth once daily 2/8/22 2/8/23  BRAULIO Sheffield CNP   oxyCODONE-acetaminophen (PERCOCET) 5-325 MG per tablet Take 1 tablet by mouth every 8 hours as needed for Pain for up to 30 days.  2/4/22 3/6/22  BRAULIO Sheffield CNP   polyethylene glycol [facility]) 17 GM/SCOOP powder Take 17 g by mouth daily 1/24/22 1/24/23  BRAULIO Sheffield CNP   senna (SENOKOT) 8.6 MG tablet Take 1 tablet by mouth 2 times daily 1/24/22 1/24/23  BRAULIO Sheffield CNP   blood glucose test strips (ONETOUCH ULTRA) strip TEST THREE TIMES DAILY 1/24/22 1/23/23  BRAULIO Sheffield CNP   omeprazole (PRILOSEC) 20 MG delayed release capsule take 1 capsule by mouth once daily 1/17/22 1/17/23  BRAULIO Sheffield CNP   atorvastatin (LIPITOR) 40 MG tablet take 1 tablet by mouth once daily 1/17/22 1/17/23  BRAULIO Sheffield CNP   JANUVIA 100 MG tablet take 1 tablet by mouth once daily 1/17/22 1/17/23  BRAULIO Sheffield CNP   lisinopril (PRINIVIL;ZESTRIL) 20 MG tablet take 1 tablet by mouth once daily 1/17/22   BRAULIO Jacobson CNP   fluticasone [facility]) 50 MCG/ACT nasal spray 1 spray by Nasal route daily 1/5/22 1/5/23  BRAULIO Sheffield CNP   isosorbide mononitrate (IMDUR) 30 MG extended release tablet take 1 tablet by mouth once daily 1/4/22   BRAULIO Sheffield CNP   buPROPion (WELLBUTRIN XL) 150 MG extended release tablet  12/16/21   Historical Provider, MD tiZANidine (ZANAFLEX) 4 MG tablet Take 1 tablet by mouth 3 times daily as needed (muscle relaxer)  Patient not taking: Reported on 2/14/2022 12/21/21   BRAULIO Hernandez CNP   furosemide (LASIX) 20 MG tablet take 1 tablet by mouth once daily 12/20/21   BRAULIO Hernandez CNP   Blood Pressure Monitoring KIT Use to check blood pressure daily and as needed 12/10/21 10/17/24  BRAULIO Hernandez CNP   Semaglutide 3 MG TABS Take 3 mg by mouth daily 12/7/21   BRAULIO Hernandez CNP   dapagliflozin (FARXIGA) 5 MG tablet Take 1 tablet by mouth every morning 12/7/21 3/7/22  BRAULIO Hernandez CNP   buPROPion (WELLBUTRIN XL) 300 MG extended release tablet Take 2 tablets by mouth every morning  Patient taking differently: Take 300 mg by mouth every morning  10/19/21   BRAULIO Hernandez CNP   metFORMIN (GLUCOPHAGE-XR) 500 MG extended release tablet Take 1 tablet by mouth 2 times daily 8/30/21 8/30/22  BRAULIO Hernandez CNP   Lancets Misc. (ACCU-CHEK FASTCLIX LANCET) KIT use as directed PLEASE APPROVED NEW DEVICE WHILE WE WAIT Julissa Proctor.. .. FASTCLIX MIGHT BE EAISER TO MANIPULATE 7/22/21 7/22/22  BRAULIO Hernandez CNP   Accu-Chek FastClix Lancets MISC use 1 LANCET to TEST BLOOD SUGAR one to two times a day 7/22/21 7/22/22  BRAULIO Hernandez CNP   Alcohol Swabs (ALCOHOL PREP) 70 % PADS Use twice daily and as needed to check blood sugars 7/19/21 10/20/23  BRAULIO Hernandez CNP   warfarin (COUMADIN) 2 MG tablet take 1 tablet by mouth SUNDAY, TUESDAY, THURSDAY, AND SATURDAY ( take 1 AND 1/2 tablets by mouth MONDAY, WEDNESDAY, FRIDAY )  Patient taking differently: take 1 tablet by mouth every day. Took 3 mg Monday and Tuesday. 2 mg all other days.  7/6/21   BRAULIO Hernandez CNP   albuterol sulfate  (90 Base) MCG/ACT inhaler Inhale 2 puffs into the lungs every 4 hours as needed for Wheezing or Shortness of Breath (AND/OR COUGH) 5/24/21 5/24/22  BRAULIO Hernandez CNP naloxone 4 MG/0.1ML LIQD nasal spray 1 spray by Nasal route as needed for Opioid Reversal 2/16/21   BRAULIO Lindquist CNP   Handicap Placard MISC by Does not apply route Exp 2/3/2026 2/3/21   BRAULIO Lindquist CNP   ipratropium-albuterol (DUONEB) 0.5-2.5 (3) MG/3ML SOLN nebulizer solution Inhale 3 mLs into the lungs every 6 hours as needed for Shortness of Breath 1/7/21 1/7/22  BRAULIO Lindquist CNP   Respiratory Therapy Supplies (NEBULIZER/TUBING/MOUTHPIECE) KIT 1 kit by Does not apply route 4 times daily as needed (wheezing) 10/20/20   BRAULIO Lindquist CNP    Scheduled Meds:  Continuous Infusions:  PRN Meds:. Allergies  has No Known Allergies. Family History  family history includes Cancer in her brother and father; Diabetes in her sister; Hypertension in her mother; Liver Disease in her mother; No Known Problems in her maternal grandfather, maternal grandmother, paternal grandfather, and paternal grandmother; Other in her brother. Social History   reports that she quit smoking about 39 years ago. Her smoking use included cigarettes. She started smoking about 44 years ago. She has a 1.75 pack-year smoking history. She has never used smokeless tobacco.   reports no history of alcohol use. reports no history of drug use. Review of Systems  General Denies any fever or chills  HEENT  Denies any diplopia, tinnitus or vertigo  Resp Denies any shortness of breath, cough or wheezing  Cardiac Denies any chest pain, palpitations, claudication or edema  GI Denies any melena, hematochezia, hematemesis or pyrosis   Denies any frequency, urgency, hesitancy or incontinence  Heme Denies bruising or bleeding easily  Endocrine Denies any history of diabetes or thyroid disease  Neuro Denies any focal motor or sensory deficits    PHYSICAL:   VITALS:  height is 5' 2\" (1.575 m) and weight is 180 lb (81.6 kg). Her oral temperature is 98.2 °F (36.8 °C). Her blood pressure is 128/76 and her pulse is 95.  Her respiration is 17 and oxygen saturation is 99%. CONSTITUTIONAL: Alert and oriented times 3, no acute distress and cooperative to examination. HEENT: Head is normocephalic, atraumatic. EOMI, PERRLA  NECK: Soft, trachea midline and straight  LUNGS: Chest expands equally bilaterally upon respiration, no accessory muscle used. Ausculation reveals no wheezes, rales or rhonchi. CARDIOVASCULAR: Heart sounds are normal.  Regular rate and rhythm without murmur, gallop or rub. ABDOMEN: soft, nontender, nondistended, no masses or organomegaly, no hernias palpable, no guarding or peritoneal signs. NEUROLOGIC: CN II-XII are grossly intact. There are no focalizing motor or sensory deficits  EXTREMITIES: tenderness to palpation of right knee with moderate joint effusion, knee immobilizer in place    LABS:     Recent Labs     02/27/22  1125   WBC 8.3   HGB 13.4   HCT 39.1         K 4.1   CL 99   CO2 23   BUN 13   CREATININE 0.94*   CALCIUM 9.7   INR 2.8     No results for input(s): ALKPHOS, ALT, AST, BILITOT, BILIDIR, LABALBU, AMYLASE, LIPASE in the last 72 hours. RADIOLOGY:     XR CHEST (2 VW)    Result Date: 2/27/2022  EXAMINATION: TWO XRAY VIEWS OF THE CHEST 2/27/2022 11:59 am COMPARISON: AP chest from 08/06/2021; two-view study from 08/02/2021. HISTORY: ORDERING SYSTEM PROVIDED HISTORY: fall, chest wall pain TECHNOLOGIST PROVIDED HISTORY: fall, chest wall pain Reason for Exam: Fall, right shoulder and chest wall pain History of diabetes, osteoarthritis, obesity, and COPD. FINDINGS: Cardiac silhouette upper limits of normal in size but unchanged. Mediastinal structures midline and stable. Localized pulmonary opacity or blunting of the costophrenic angles. Moderate kyphoscoliosis lower thoracic spine, similar to prior studies. Mild degenerative changes spine. No acute cardiopulmonary disease.      XR SHOULDER RIGHT (MIN 2 VIEWS)    Result Date: 2/27/2022  EXAMINATION: THREE XRAY VIEWS OF THE RIGHT calcifications noted anteriorly. Mild intertarsal degenerative changes. No acute abnormality right ankle. Small joint effusion possible. CT HEAD WO CONTRAST    Result Date: 2/27/2022  EXAMINATION: CT OF THE HEAD WITHOUT CONTRAST; CT OF THE CERVICAL SPINE WITHOUT CONTRAST 2/27/2022 11:29 am TECHNIQUE: CT of the head was performed without the administration of intravenous contrast. Dose modulation, iterative reconstruction, and/or weight based adjustment of the mA/kV was utilized to reduce the radiation dose to as low as reasonably achievable.; CT of the cervical spine was performed without the administration of intravenous contrast. Multiplanar reformatted images are provided for review. Dose modulation, iterative reconstruction, and/or weight based adjustment of the mA/kV was utilized to reduce the radiation dose to as low as reasonably achievable. COMPARISON: No priors HISTORY: ORDERING SYSTEM PROVIDED HISTORY: fall TECHNOLOGIST PROVIDED HISTORY: fall Reason for Exam: Pt c/o head and neck pain post fall x 3 days ago FINDINGS: CT head: BRAIN/VENTRICLES: Ventricles normal in size and location. Adjacent to the frontal horn left lateral ventricle in the left frontal lobe there is heterogeneous hyperattenuating mass 1.6 x 1.3 x 1.3 cm. Could be benign vascular lesion but other etiologies such as a primary mass or metastasis not excluded. Contrast enhanced MRI is recommended. .  Less favored is intracranial hemorrhage. No acute infarct. ORBITS: The visualized portion of the orbits demonstrate no acute abnormality. SINUSES: The visualized paranasal sinuses and mastoid air cells demonstrate no acute abnormality. SOFT TISSUES/SKULL:  No acute abnormality of the visualized skull or soft tissues. CT CERVICAL SPINE: BONES/ALIGNMENT: There is no evidence of an acute cervical spine fracture. There is normal alignment of the cervical spine. DEGENERATIVE CHANGES: Mild degenerative changes.  SOFT TISSUES: There is no nurse.  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

## 2022-02-27 NOTE — ED NOTES
Pt to unit via private vehicle- to room via . Pt states she tripped over dog Friday- landing on right knee. Pt denies striking head-LOC. Pt c/o right knee/right shoulder/right ankle pain- edema noted to right knee. Pt AOx4.       Lindy Salcedo RN  02/27/22 1125

## 2022-02-28 ENCOUNTER — APPOINTMENT (OUTPATIENT)
Dept: MRI IMAGING | Age: 62
DRG: 058 | End: 2022-02-28
Payer: MEDICARE

## 2022-02-28 ENCOUNTER — APPOINTMENT (OUTPATIENT)
Dept: GENERAL RADIOLOGY | Age: 62
DRG: 058 | End: 2022-02-28
Payer: MEDICARE

## 2022-02-28 DIAGNOSIS — M15.9 PRIMARY OSTEOARTHRITIS INVOLVING MULTIPLE JOINTS: ICD-10-CM

## 2022-02-28 LAB
C-REACTIVE PROTEIN: 82.8 MG/L (ref 0–5)
GLUCOSE BLD-MCNC: 123 MG/DL (ref 65–105)
GLUCOSE BLD-MCNC: 136 MG/DL (ref 65–105)
GLUCOSE BLD-MCNC: 144 MG/DL (ref 65–105)
GLUCOSE BLD-MCNC: 154 MG/DL (ref 65–105)
SEDIMENTATION RATE, ERYTHROCYTE: 49 MM (ref 0–30)

## 2022-02-28 PROCEDURE — 0S9C3ZX DRAINAGE OF RIGHT KNEE JOINT, PERCUTANEOUS APPROACH, DIAGNOSTIC: ICD-10-PCS | Performed by: ORTHOPAEDIC SURGERY

## 2022-02-28 PROCEDURE — 97535 SELF CARE MNGMENT TRAINING: CPT

## 2022-02-28 PROCEDURE — 85652 RBC SED RATE AUTOMATED: CPT

## 2022-02-28 PROCEDURE — 2580000003 HC RX 258: Performed by: STUDENT IN AN ORGANIZED HEALTH CARE EDUCATION/TRAINING PROGRAM

## 2022-02-28 PROCEDURE — 99223 1ST HOSP IP/OBS HIGH 75: CPT | Performed by: PSYCHIATRY & NEUROLOGY

## 2022-02-28 PROCEDURE — 87205 SMEAR GRAM STAIN: CPT

## 2022-02-28 PROCEDURE — 6370000000 HC RX 637 (ALT 250 FOR IP): Performed by: STUDENT IN AN ORGANIZED HEALTH CARE EDUCATION/TRAINING PROGRAM

## 2022-02-28 PROCEDURE — 97116 GAIT TRAINING THERAPY: CPT

## 2022-02-28 PROCEDURE — 36415 COLL VENOUS BLD VENIPUNCTURE: CPT

## 2022-02-28 PROCEDURE — G0378 HOSPITAL OBSERVATION PER HR: HCPCS

## 2022-02-28 PROCEDURE — 86140 C-REACTIVE PROTEIN: CPT

## 2022-02-28 PROCEDURE — 82947 ASSAY GLUCOSE BLOOD QUANT: CPT

## 2022-02-28 PROCEDURE — 87075 CULTR BACTERIA EXCEPT BLOOD: CPT

## 2022-02-28 PROCEDURE — 97530 THERAPEUTIC ACTIVITIES: CPT

## 2022-02-28 PROCEDURE — 87070 CULTURE OTHR SPECIMN AEROBIC: CPT

## 2022-02-28 PROCEDURE — 73721 MRI JNT OF LWR EXTRE W/O DYE: CPT

## 2022-02-28 PROCEDURE — 97162 PT EVAL MOD COMPLEX 30 MIN: CPT

## 2022-02-28 PROCEDURE — 89051 BODY FLUID CELL COUNT: CPT

## 2022-02-28 PROCEDURE — 89060 EXAM SYNOVIAL FLUID CRYSTALS: CPT

## 2022-02-28 PROCEDURE — 99252 IP/OBS CONSLTJ NEW/EST SF 35: CPT | Performed by: ORTHOPAEDIC SURGERY

## 2022-02-28 PROCEDURE — 97166 OT EVAL MOD COMPLEX 45 MIN: CPT

## 2022-02-28 PROCEDURE — 73020 X-RAY EXAM OF SHOULDER: CPT

## 2022-02-28 PROCEDURE — 99222 1ST HOSP IP/OBS MODERATE 55: CPT | Performed by: NEUROLOGICAL SURGERY

## 2022-02-28 PROCEDURE — 20610 DRAIN/INJ JOINT/BURSA W/O US: CPT | Performed by: ORTHOPAEDIC SURGERY

## 2022-02-28 RX ORDER — 0.9 % SODIUM CHLORIDE 0.9 %
500 INTRAVENOUS SOLUTION INTRAVENOUS ONCE
Status: COMPLETED | OUTPATIENT
Start: 2022-02-28 | End: 2022-02-28

## 2022-02-28 RX ORDER — SENNA PLUS 8.6 MG/1
1 TABLET ORAL 2 TIMES DAILY
Status: DISCONTINUED | OUTPATIENT
Start: 2022-02-28 | End: 2022-03-01 | Stop reason: HOSPADM

## 2022-02-28 RX ORDER — NICOTINE POLACRILEX 4 MG
15 LOZENGE BUCCAL PRN
Status: DISCONTINUED | OUTPATIENT
Start: 2022-02-28 | End: 2022-03-01 | Stop reason: HOSPADM

## 2022-02-28 RX ORDER — FUROSEMIDE 20 MG/1
20 TABLET ORAL DAILY
Status: DISCONTINUED | OUTPATIENT
Start: 2022-02-28 | End: 2022-03-01 | Stop reason: HOSPADM

## 2022-02-28 RX ORDER — WARFARIN SODIUM 3 MG/1
3 TABLET ORAL
Status: DISCONTINUED | OUTPATIENT
Start: 2022-03-02 | End: 2022-03-01 | Stop reason: DRUGHIGH

## 2022-02-28 RX ORDER — WARFARIN SODIUM 2 MG/1
2 TABLET ORAL
Status: DISCONTINUED | OUTPATIENT
Start: 2022-03-01 | End: 2022-03-01 | Stop reason: DRUGHIGH

## 2022-02-28 RX ORDER — ALLOPURINOL 100 MG/1
100 TABLET ORAL DAILY
Status: DISCONTINUED | OUTPATIENT
Start: 2022-02-28 | End: 2022-03-01 | Stop reason: HOSPADM

## 2022-02-28 RX ORDER — ATORVASTATIN CALCIUM 40 MG/1
40 TABLET, FILM COATED ORAL NIGHTLY
Status: DISCONTINUED | OUTPATIENT
Start: 2022-02-28 | End: 2022-03-01 | Stop reason: HOSPADM

## 2022-02-28 RX ORDER — POLYETHYLENE GLYCOL 3350 17 G/17G
17 POWDER, FOR SOLUTION ORAL DAILY
Status: DISCONTINUED | OUTPATIENT
Start: 2022-02-28 | End: 2022-03-01 | Stop reason: HOSPADM

## 2022-02-28 RX ORDER — PANTOPRAZOLE SODIUM 40 MG/1
40 TABLET, DELAYED RELEASE ORAL
Status: DISCONTINUED | OUTPATIENT
Start: 2022-02-28 | End: 2022-03-01 | Stop reason: HOSPADM

## 2022-02-28 RX ORDER — IPRATROPIUM BROMIDE AND ALBUTEROL SULFATE 2.5; .5 MG/3ML; MG/3ML
3 SOLUTION RESPIRATORY (INHALATION) EVERY 6 HOURS PRN
Status: DISCONTINUED | OUTPATIENT
Start: 2022-02-28 | End: 2022-03-01 | Stop reason: HOSPADM

## 2022-02-28 RX ORDER — BUPROPION HYDROCHLORIDE 150 MG/1
300 TABLET ORAL EVERY MORNING
Status: DISCONTINUED | OUTPATIENT
Start: 2022-02-28 | End: 2022-03-01 | Stop reason: HOSPADM

## 2022-02-28 RX ORDER — LISINOPRIL 20 MG/1
20 TABLET ORAL DAILY
Status: DISCONTINUED | OUTPATIENT
Start: 2022-02-28 | End: 2022-03-01 | Stop reason: HOSPADM

## 2022-02-28 RX ORDER — DEXTROSE MONOHYDRATE 25 G/50ML
12.5 INJECTION, SOLUTION INTRAVENOUS PRN
Status: DISCONTINUED | OUTPATIENT
Start: 2022-02-28 | End: 2022-03-01 | Stop reason: HOSPADM

## 2022-02-28 RX ORDER — DEXTROSE MONOHYDRATE 50 MG/ML
100 INJECTION, SOLUTION INTRAVENOUS PRN
Status: DISCONTINUED | OUTPATIENT
Start: 2022-02-28 | End: 2022-03-01 | Stop reason: HOSPADM

## 2022-02-28 RX ADMIN — INSULIN LISPRO 1 UNITS: 100 INJECTION, SOLUTION INTRAVENOUS; SUBCUTANEOUS at 20:46

## 2022-02-28 RX ADMIN — OXYCODONE HYDROCHLORIDE AND ACETAMINOPHEN 1 TABLET: 5; 325 TABLET ORAL at 23:29

## 2022-02-28 RX ADMIN — OXYCODONE HYDROCHLORIDE AND ACETAMINOPHEN 1 TABLET: 5; 325 TABLET ORAL at 14:55

## 2022-02-28 RX ADMIN — PANTOPRAZOLE SODIUM 40 MG: 40 TABLET, DELAYED RELEASE ORAL at 11:47

## 2022-02-28 RX ADMIN — ALLOPURINOL 100 MG: 100 TABLET ORAL at 11:48

## 2022-02-28 RX ADMIN — DESMOPRESSIN ACETATE 40 MG: 0.2 TABLET ORAL at 20:44

## 2022-02-28 RX ADMIN — INSULIN LISPRO 1 UNITS: 100 INJECTION, SOLUTION INTRAVENOUS; SUBCUTANEOUS at 13:47

## 2022-02-28 RX ADMIN — POLYETHYLENE GLYCOL 3350 17 G: 17 POWDER, FOR SOLUTION ORAL at 16:21

## 2022-02-28 RX ADMIN — FUROSEMIDE 20 MG: 20 TABLET ORAL at 11:48

## 2022-02-28 RX ADMIN — LISINOPRIL 20 MG: 20 TABLET ORAL at 11:48

## 2022-02-28 RX ADMIN — OXYCODONE HYDROCHLORIDE AND ACETAMINOPHEN 1 TABLET: 5; 325 TABLET ORAL at 19:16

## 2022-02-28 RX ADMIN — OXYCODONE HYDROCHLORIDE AND ACETAMINOPHEN 1 TABLET: 5; 325 TABLET ORAL at 09:21

## 2022-02-28 RX ADMIN — SENNOSIDES 8.6 MG: 8.6 TABLET, COATED ORAL at 11:48

## 2022-02-28 RX ADMIN — BUPROPION HYDROCHLORIDE 300 MG: 150 TABLET, EXTENDED RELEASE ORAL at 11:48

## 2022-02-28 RX ADMIN — SENNOSIDES 8.6 MG: 8.6 TABLET, COATED ORAL at 20:44

## 2022-02-28 RX ADMIN — SODIUM CHLORIDE 500 ML: 9 INJECTION, SOLUTION INTRAVENOUS at 03:06

## 2022-02-28 ASSESSMENT — PAIN - FUNCTIONAL ASSESSMENT
PAIN_FUNCTIONAL_ASSESSMENT: ACTIVITIES ARE NOT PREVENTED
PAIN_FUNCTIONAL_ASSESSMENT: PREVENTS OR INTERFERES SOME ACTIVE ACTIVITIES AND ADLS
PAIN_FUNCTIONAL_ASSESSMENT: ACTIVITIES ARE NOT PREVENTED
PAIN_FUNCTIONAL_ASSESSMENT: ACTIVITIES ARE NOT PREVENTED

## 2022-02-28 ASSESSMENT — PAIN DESCRIPTION - LOCATION
LOCATION: KNEE
LOCATION: KNEE
LOCATION: ARM;SHOULDER;KNEE
LOCATION: KNEE

## 2022-02-28 ASSESSMENT — PAIN DESCRIPTION - PAIN TYPE
TYPE: ACUTE PAIN

## 2022-02-28 ASSESSMENT — PAIN DESCRIPTION - PROGRESSION
CLINICAL_PROGRESSION: GRADUALLY IMPROVING

## 2022-02-28 ASSESSMENT — PAIN DESCRIPTION - ORIENTATION
ORIENTATION: RIGHT

## 2022-02-28 ASSESSMENT — PAIN SCALES - GENERAL
PAINLEVEL_OUTOF10: 7
PAINLEVEL_OUTOF10: 3
PAINLEVEL_OUTOF10: 6
PAINLEVEL_OUTOF10: 6
PAINLEVEL_OUTOF10: 2
PAINLEVEL_OUTOF10: 6
PAINLEVEL_OUTOF10: 6
PAINLEVEL_OUTOF10: 7

## 2022-02-28 ASSESSMENT — PAIN DESCRIPTION - DESCRIPTORS
DESCRIPTORS: ACHING
DESCRIPTORS: DISCOMFORT
DESCRIPTORS: ACHING;DISCOMFORT
DESCRIPTORS: DISCOMFORT
DESCRIPTORS: ACHING;DISCOMFORT

## 2022-02-28 ASSESSMENT — PAIN DESCRIPTION - FREQUENCY
FREQUENCY: CONTINUOUS
FREQUENCY: CONTINUOUS

## 2022-02-28 NOTE — PLAN OF CARE
NO ACUTE NEUROSURGICAL INTERVENTION AT THIS TIME    NEUROSURGERY TO SIGN OFF     Please contact Neurosurgery with any questions or acute changes in neurological exam    PATIENT TO FOLLOW UP IN CLINIC:  Follow-up with Neurosurgery  [address]/[facility] (Medical Office Building 2)  [address]  Call [phone] for an appointment.     Patient can follow up in the office in 8 weeks with repeat MRI brain     Electronically signed by BRAULIO Quinones NP on 2/28/2022 at 1:13 PM

## 2022-02-28 NOTE — PROGRESS NOTES
Physical Therapy    Facility/Department: [address] NEURO  Initial Assessment    NAME: Fernandez Carrion  : 1960  MRN: [id number]    Date of Service: 2022  Chief Complaint   Patient presents with    Fall     Friday, onto right side. Rt shoulder and knee pain       Discharge Recommendations:  Patient would benefit from continued therapy after discharge   PT Equipment Recommendations  Equipment Needed: No (pt reports owning a RW, rollator, manual wheelchair, and SPC, writer recommends  use of RW)    Assessment   Body structures, Functions, Activity limitations: Decreased functional mobility ; Decreased posture;Decreased endurance;Decreased ROM; Decreased strength;Decreased balance;Decreased safe awareness  Assessment: Pt with mobility deficits requiring min-A to ambulate 20 feet with a RW. Pt with significantly decreased RLE weight acceptance this date secondary to increased knee pain. Pt demonstrates unsteady step-to gait pattern this date. Pt would be unsafe to return to prior living arrangements upon discharge. Pt would benefit from additional therapy upon discharge to assist in return to prior level of functional independence. Prognosis: Good  Decision Making: Medium Complexity  PT Education: Goals;Transfer Training;PT Role;Functional Mobility Training;General Safety;Plan of Care;Gait Training  REQUIRES PT FOLLOW UP: Yes  Activity Tolerance  Activity Tolerance: Patient limited by pain; Patient limited by endurance       Patient Diagnosis(es): The primary encounter diagnosis was Fall, initial encounter. A diagnosis of Intracranial mass was also pertinent to this visit.      has a past medical history of Anxiety, Chronic kidney disease, COPD (chronic obstructive pulmonary disease) ([address] Depression, Fracture of ankle, Hyperlipidemia, Hypertension, Obesity, Osteoarthritis, Proteinuria, Pulmonary embolism ([address] Sleep apnea, SOB (shortness of breath), Type 2 diabetes mellitus without complication ([address] Type II or unspecified type diabetes mellitus without mention of complication, not stated as uncontrolled, and Von Willebrand disease ([facility] has a past surgical history that includes Tonsillectomy and adenoidectomy; eye surgery (Bilateral); eye surgery (Bilateral); Appendectomy;  section; Colonoscopy (2018); Colonoscopy (N/A, 2018); and Diagnostic Cardiac Cath Lab Procedure. Restrictions  Restrictions/Precautions  Restrictions/Precautions: Fall Risk  Required Braces or Orthoses?: Yes  Required Braces or Orthoses  Right Lower Extremity Brace: Knee Immobilizer  Position Activity Restriction  Other position/activity restrictions: Per neurosurgery, CTLS clear  and SBP <140, per pt was given knee immobilizer at [facility] [place] pod \[facility]\"" ED  Vision/Hearing  Vision: Impaired  Vision Exceptions: Wears glasses at all times  Hearing: Exceptions to [facility]  Hearing Exceptions: Bilateral hearing aid (B hearing aids not present at time of evaluation)     Subjective  General  Patient assessed for rehabilitation services?: Yes  Response To Previous Treatment: Not applicable  Family / Caregiver Present: Yes (dtr)  Follows Commands: Within Functional Limits  Subjective  Subjective: Pt up in a chair and agreeable to therapy, RN agreeable to therapy. Pain Screening  Patient Currently in Pain: Yes  Pain Assessment  Pain Assessment: 0-10  Pain Level: 7  Pain Type: Acute pain  Pain Location: Knee  Pain Orientation: Right  Pain Descriptors: Discomfort  Functional Pain Assessment: Prevents or interferes some active activities and ADLs  Non-Pharmaceutical Pain Intervention(s): Ambulation/Increased Activity; Distraction;Repositioned; Emotional support  Response to Pain Intervention: Patient Satisfied  Vital Signs  Patient Currently in Pain: Yes       Social/Functional History  Social/Functional History  Lives With: Family (daughter and son-in-law and 3 grandchildren (12, 15, 15 YO))  Type of Home: House  Home Layout: One level  Home Access: Stairs to enter with rails  Entrance Stairs - Number of Steps: 4  Entrance Stairs - Rails: Left  Bathroom Shower/Tub: Walk-in shower  Bathroom Toilet: Standard  Bathroom Equipment: Grab bars in shower,Shower chair  Home Equipment: Rolling walker,Cane,Wheelchair-manual,4 wheeled walker (pt reports not using any DME at a baseline)  ADL Assistance: [address]: Independent  Homemaking Responsibilities: Yes  Ambulation Assistance: Independent  Transfer Assistance: Independent  Active : Yes  Mode of Transportation: [facility]  Occupation: Unemployed  [address]: listen to music, spend time with grandchildren  Additional Comments: Daughter is home at all time and can provide assistance 24/7 if needed  [employer]  Overall Cognitive Status: WFL    Objective     Observation/Palpation  Posture: Fair    AROM RLE (degrees)  RLE AROM: Exceptions  RLE General AROM: WFL apart from knee flexion  R Knee Flexion 0-145: 0-30 degrees  AROM LLE (degrees)  LLE AROM : WFL  AROM RUE (degrees)  RUE AROM : WFL  AROM LUE (degrees)  LUE AROM : WFL  Strength RLE  Strength RLE: Exception  R Knee Extension: 2-/5  R Ankle Dorsiflexion: 3+/5  R Ankle Plantar flexion: 3+/5  Strength LLE  Strength LLE: WFL  Comment: Grossly 4-/5  Strength RUE  Strength RUE: WFL  Comment: Grossly 4-/5  Strength LUE  Strength LUE: WFL  Comment: Grossly 4-/5     Sensation  Overall Sensation Status: WFL (reports no numbness/tingling)  Bed mobility  Comment: Pt began today's session up in a chair, retired up to a chair at the conclusion of today's session. Transfers  Sit to Stand: Contact guard assistance  Stand to sit: Contact guard assistance  Comment: Increased time/effort to perform. Ambulation  Ambulation?: Yes  More Ambulation?: No  Ambulation 1  Surface: level tile  Device: Rolling Walker  Assistance: Minimal assistance  Quality of Gait: unsteady, step-to gait, decreased stride length, decreased RLE stance phase.  no Verduzco Earing, PT

## 2022-02-28 NOTE — CARE COORDINATION
Met with pt to complete an SBIRT. Pt is alert and oriented. She denies drinking or drug use. She also denies any depression in the last 2 weeks. SBIRT is negative. Alcohol Screening and Brief Intervention        No results for input(s): ALC in the last 72 hours. Alcohol Pre-screening     (WOMEN ONLY) How many times in the past year have you had 4 or more drinks in a day?: None    Alcohol Screening Audit       Drug Pre-Screening   How many times in the past year have you used a recreational drug or used a prescription medication for nonmedical reasons?: None    Drug Screening DAST       Mood Pre-Screening (PHQ-2)  During the past two weeks, have you been bothered by little interest or pleasure in doing things?: No  During the past two weeks, have you been bothered by feeling down, depressed, or hopeless?: No    Mood Pre-Screening (PHQ-9)         I have interviewed Hilda Montano, [id number] regarding  Her alcohol consumption/drug use and risk for excessive use. Screenings were negative. Patient  N/A intervention at this time.    Deferred []    Completed on: 2/28/2022   STACEY Sims

## 2022-02-28 NOTE — PROGRESS NOTES
Occupational Therapy   Occupational Therapy Initial Assessment  Date: 2022   Patient Name: Ivonne Jin  MRN: [id number]     : 1960    Chief Complaint   Patient presents with   Shobha Verma Fall     Friday, onto right side. Rt shoulder and knee pain      Date of Service: 2022    Discharge Recommendations:  Patient would benefit from continued therapy after discharge  Patient is currently unsafe to return to prior living arrangements without 24 hour assistance. OT Equipment Recommendations  Equipment Needed: Yes  Equipment recommendations listed below are based on what the patient would need if they were able to return to prior living arrangements at the time of discharge. Mobility Devices: ADL Assistive Devices  ADL Assistive Devices: Transfer Tub Bench;Grab Bars - toilet; Reacher;Sock-Aid Hard;Long-handled Shoe Horn;Long-handled Sponge    Assessment   Performance deficits / Impairments: Decreased functional mobility ; Decreased ADL status; Decreased balance;Decreased endurance;Decreased high-level IADLs;Decreased ROM; Decreased strength  Assessment: Pt supine in bed upon arrival. Pt completed sup<>sit with SBA. Pt completed x3 sit<>stand. First attempt at EOB required Mod A d/t decreased balance with R knee brace restricting. Second attempt at toilet required Min A d/t improved balance with RLE and utilized toilet grab bars, third attempt from EOB also requiring Min A. Stand>sit required Min A for slow, safe descent d/t R knee brace restricting. Pt completed functional mobility to/from bathroom with CGA using RW. Pt completed slow steps, often hopping with LLE d/t pain in RLE and unable to tolerate full weight. Pt demonstrated good walker navigation/management. Pt completed toilet transfer with Min A d/t decreased balance with R knee brace and for safe descent. Pt completed toileting tasks, bottom hygiene and clothing management with SBA.  Pt doffed underwear in standing with CGA, pt donned underwear to thigh level sitting on toilet with SBA, donned fully in standing with CGA after toilet transfer. Pt returned to sitting EOB, donned deodorant with Mod IND and doffed/donned gown overhead with SBA. Pt transferred to recliner chair with Min A and retired in [facility] chair at end of session and feeding self with Mod IND, all needs met and RN notified. Pt is limited by pain, decreased balance and endurance and would benefit from continued OT services to improve IND in ADLs/IADLs and functional transfers/mobility. Prognosis: Good  Decision Making: Medium Complexity  Patient Education: Pt educated on OT role, OT POC, adaptive strategies, equipment, transfer training and proper hand placement, energy conservation, activity promotion- good return  REQUIRES OT FOLLOW UP: Yes  Activity Tolerance  Activity Tolerance: Patient Tolerated treatment well;Patient limited by pain  Safety Devices  Safety Devices in place: Yes  Type of devices: Gait belt;Nurse notified;Call light within reach; Left in chair;Chair alarm in place  Restraints  Initially in place: No           Patient Diagnosis(es): The primary encounter diagnosis was Fall, initial encounter. A diagnosis of Intracranial mass was also pertinent to this visit. has a past medical history of Anxiety, Chronic kidney disease, COPD (chronic obstructive pulmonary disease) ([address] Depression, Fracture of ankle, Hyperlipidemia, Hypertension, Obesity, Osteoarthritis, Proteinuria, Pulmonary embolism ([address] Sleep apnea, SOB (shortness of breath), Type 2 diabetes mellitus without complication ([address] Type II or unspecified type diabetes mellitus without mention of complication, not stated as uncontrolled, and Von Willebrand disease ([address] has a past surgical history that includes Tonsillectomy and adenoidectomy; eye surgery (Bilateral); eye surgery (Bilateral); Appendectomy;  section; Colonoscopy (2018);  Colonoscopy (N/A, 2018); and Diagnostic Cardiac Cath Lab Procedure. Restrictions  Restrictions/Precautions  Restrictions/Precautions: Fall Risk  Required Braces or Orthoses?: Yes  Required Braces or Orthoses  Right Lower Extremity Brace: Knee Immobilizer  Position Activity Restriction  Other position/activity restrictions: Per neurosurgery, CTLS clear 2/27 and SBP <140. Knee immobilizer provided [facility] pt at [facility], pt stated no restrictions for weight bearing. Subjective   General  Patient assessed for rehabilitation services?: Yes  Family / Caregiver Present: Yes (daughter present)  General Comment  Comments: RN ok'd pt for OT eval this date. Pt agreeable to session and pleasant/cooperative throughout  Patient Currently in Pain: Yes  Pain Assessment  Pain Assessment: 0-10  Pain Level: 7  Pain Type: Acute pain  Pain Location: Knee  Pain Orientation: Right  Pain Descriptors: Aching;Discomfort  Pain Frequency: Continuous  Functional Pain Assessment: Activities are not prevented  Non-Pharmaceutical Pain Intervention(s): Ambulation/Increased Activity; Distraction;Repositioned; Therapeutic presence  Response to Pain Intervention: Patient Satisfied  Pre Treatment Pain Screening  Intervention List: Patient able to continue with treatment    Social/Functional History  Social/Functional History  Lives With: Family (daughter and son-in-law and 3 grandchildren (12, 15, 15 YO))  Type of Home: House  Home Layout: One level  Home Access: Stairs to enter with rails  Entrance Stairs - Number of Steps: 4  Entrance Stairs - Rails: Left  Bathroom Shower/Tub: Walk-in shower  Bathroom Toilet: Standard  Bathroom Equipment: Grab bars in shower,Shower chair  Home Equipment: Rolling walker,Cane,Wheelchair-manual,4 wheeled walker (pt reports not using any DME at a baseline)  ADL Assistance: Independent  Homemaking Assistance: Independent  Homemaking Responsibilities: Yes  Ambulation Assistance: Independent  Transfer Assistance: Independent  Active : Yes  Mode of Transportation: SUV  Occupation: Unemployed  Leisure & Hobbies: listen to music, spend time with grandchildren  Additional Comments: Daughter is home at all time and can provide assistance 24/7 if needed       Objective   Vision: Impaired  Vision Exceptions: Wears glasses at all times  Hearing: Exceptions to [facility]  Hearing Exceptions: Bilateral hearing aid (B hearing aids not present at time of evaluation)      Orientation  Overall Orientation Status: Within Functional Limits    Balance  Sitting Balance: Stand by assistance (Pt sat unsupported with SBA ~35 minutes, sitting EOB and toilet. Static and dynamic sitting during ROM/MMT and ADLs. Pt demonstrated leaning onto L hip to guard RLE d/t pain.)  Standing Balance: Contact guard assistance  Standing Balance  Time: ~3 minutes  Activity: standing at EOB and toilet  Comment: w/RW    Functional Mobility  Functional - Mobility Device: Rolling Walker  Activity: To/from bathroom  Assist Level: Contact guard assistance  Functional Mobility Comments: Pt completed functional mobility to/from bathroom with CGA using RW. Pt completed slow steps, often hopping with LLE d/t pain in RLE and unable to tolerate full weight. Pt demonstrated good walker navigation/management. Toilet Transfers  Toilet - Technique: Stand step  Equipment Used: Standard toilet  Toilet Transfer: Minimal assistance  Toilet Transfers Comments: Pt completed toilet transfer with Min A for safety with RW. ADL  Feeding: Modified independent ;Setup (Pt engaged in feeding self at end of session with Mod IND.)  Grooming: Modified independent ;Setup (Pt donned deodorant sitting at EOB with Mod IND.)  UE Bathing: Stand by assistance; Increased time to complete;Setup  LE Bathing: Increased time to complete;Setup;Minimal assistance  UE Dressing: Stand by assistance;Setup; Increased time to complete (Pt doffed/donned gown overhead with SBA, demonstrating good functional reach above.)  LE Dressing: Setup; Increased time to complete;Minimal assistance  Toileting: Minimal assistance;Setup; Increased time to complete  Additional Comments: Pt completed toilet transfer with Min A d/t decreased balance with R knee brace and for safe descent. Pt completed toileting tasks, bottom hygiene and clothing management with SBA. Pt doffed underwear in standing with CGA, pt donned underwear to thigh level sitting on toilet with SBA, donned fully in standing with CGA after toilet transfer. Increased time required for all activities completed this date d/t decreased endurance and pain. Tone RUE  RUE Tone: Normotonic  Tone LUE  LUE Tone: Normotonic  Coordination  Movements Are Fluid And Coordinated: Yes     Bed mobility  Supine to Sit: Stand by assistance  Sit to Supine:  (Pt retired in recliner chair at end of session.)  Scooting: Stand by assistance  Comment: HOB elevated ~30 degrees, required increased time and effort d/t R knee pain & knee brace restricting     Transfers  Sit to stand: Moderate assistance  Stand to sit: Minimal assistance  Transfer Comments: Pt completed x3 sit<>stand. First attempt at EOB required Mod A d/t decreased balance with R knee brace restricting. Second attempt at toilet required Min A d/t improved balance with RLE and utilized toilet grab bars, third attempt also requiring Min A. Stand<>sit required Min A for slow, safe descent d/t R knee brace restricting.      Cognition  Overall Cognitive Status: WFL        Sensation  Overall Sensation Status: WFL (reports no numbness/tingling)        LUE AROM (degrees)  LUE AROM : WFL  Left Hand AROM (degrees)  Left Hand AROM: WFL  RUE PROM (degrees)  RUE PROM: Exceptions  R Shoulder Flex  0-180: 0-130  RUE AROM (degrees)  RUE AROM : Exceptions  RUE General AROM: all other joints WFL  R Shoulder Flexion 0-180: 0-100 d/t pain  Right Hand AROM (degrees)  Right Hand AROM: WFL  LUE Strength  Gross LUE Strength: Exceptions to [facility]  L Shoulder Flex: 3+/5  L Shoulder Ext: 4-/5  L Elbow Flex: 4-/5  L Elbow Ext: 4-/5  L Hand General: 4-/5  RUE Strength  Gross RUE Strength: Exceptions to [facility]  R Shoulder Flex: 3-/5  R Shoulder Ext: 4-/5  R Elbow Flex: 4-/5  R Elbow Ext: 4-/5  R Hand General: 4-/5      Plan   Plan  Times per week: 3-4x/wk  Current Treatment Recommendations: Balance Training,Functional Mobility Training,Endurance Training,Patient/Caregiver Education & Training,Safety Education & Training,Pain Management,Home Management Training,Self-Care / ADL,Equipment Evaluation, Education, & procurement,ROM,Strengthening    AM-PAC Score        AM-PAC Inpatient Daily Activity Raw Score: 20 (02/28/22 1615)  AM-PAC Inpatient ADL T-Scale Score : 42.03 (02/28/22 1615)  ADL Inpatient CMS 0-100% Score: 38.32 (02/28/22 1615)  ADL Inpatient CMS G-Code Modifier : Enio Banda (02/28/22 1615)    Goals  Short term goals  Time Frame for Short term goals: By discharge, pt will:  Short term goal 1: Demo functional transfers with CGA and LRD PRN to improve IND in ADLs/IADLs  Short term goal 2: Demo functional mobility with SBA and LRD PRN to increase engagement in ADLs/IADLs  Short term goal 3: Demo +5 minutes of dynamic standing balance and reaching outside FARIHA with SBA and LRD PRN to improve IND in ADLs/IADLs  Short term goal 4: Demo UB ADLs with Mod IND  Short term goal 5: Demo LB ADLs with SBA and AE PRN       Therapy Time   Individual Concurrent Group Co-treatment   Time In 0840         Time Out 0939         Minutes 59         Timed Code Treatment Minutes: [facility]

## 2022-02-28 NOTE — PROGRESS NOTES
Trauma Tertiary Survey    Admit Date: 2/27/2022  Hospital day 0    [facility]       Past Medical History:   Diagnosis Date    Anxiety     Chronic kidney disease     COPD (chronic obstructive pulmonary disease) ([address]     Depression     Fracture of ankle 5/14/2020    Hyperlipidemia     Hypertension     Obesity     Osteoarthritis     Proteinuria     Pulmonary embolism (HCC)     Sleep apnea     c-pap. Doesn't use everynight    SOB (shortness of breath) 5/7/2020    Type 2 diabetes mellitus without complication (HCC)     Type II or unspecified type diabetes mellitus without mention of complication, not stated as uncontrolled     Von Willebrand disease ([address]        Scheduled Meds:  Continuous Infusions:  PRN Meds:sodium chloride flush    Subjective:     Patient has some pain in her right shoulder that was already imaged. Patient does not have any new weakness. No double or blurred vision, no headaches or nausea/vomiting    Objective:     Patient Vitals for the past 8 hrs:   BP Temp Temp src Pulse Resp SpO2   02/27/22 2116 138/88 -- -- -- -- 98 %   02/27/22 1623 132/88 -- -- -- -- --   02/27/22 1542 -- -- -- -- -- 95 %   02/27/22 1516 128/76 98.2 °F (36.8 °C) Oral 95 17 99 %       No intake/output data recorded. No intake/output data recorded. Radiology:XR CHEST (2 VW)    Result Date: 2/27/2022  No acute cardiopulmonary disease. XR SHOULDER RIGHT (MIN 2 VIEWS)    Result Date: 2/27/2022  No acute abnormality right shoulder. XR KNEE RIGHT (3 VIEWS)    Result Date: 2/27/2022  No fracture or dislocation. Moderate joint effusion. Degenerative changes, most severe patellofemoral joint space, with additional findings as above. XR ANKLE RIGHT (MIN 3 VIEWS)    Result Date: 2/27/2022  No acute abnormality right ankle. Small joint effusion possible. CT HEAD WO CONTRAST    Result Date: 2/27/2022  1. No acute intracranial abnormality.  2. 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 CT HEAD WO CONTRAST    Result Date: 2/27/2022  Incompletely assessed heterogeneous hyperattenuating 1.6 cm left frontal periventricular lesion. Differential considerations include benign vascular lesion, primary brain mass or metastasis. Acute intracranial hemorrhage less favored. Prompt contrast enhanced MRI is recommended for further evaluation. Degenerative changes in the cervical spine without evidence for acute fracture or subluxation. Findings were discussed with Treasure Gandara at 12:54 pm on 2/27/2022. RECOMMENDATIONS: Unavailable     CT CHEST WO CONTRAST    Result Date: 1/31/2022  Stable 3 mm noncalcified nodules identified in the periphery of the left lower lobe. These are unchanged with no superimposed acute infectious process. No new pulmonary mass or nodule. CT CERVICAL SPINE WO CONTRAST    Result Date: 2/27/2022  Incompletely assessed heterogeneous hyperattenuating 1.6 cm left frontal periventricular lesion. Differential considerations include benign vascular lesion, primary brain mass or metastasis. Acute intracranial hemorrhage less favored. Prompt contrast enhanced MRI is recommended for further evaluation. Degenerative changes in the cervical spine without evidence for acute fracture or subluxation. Findings were discussed with Treasure Gandara at 12:54 pm on 2/27/2022. RECOMMENDATIONS: Unavailable     MRI BRAIN W WO CONTRAST    Result Date: 2/27/2022  1. No acute intracranial abnormality. 2. 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 No associated enhancement. Intrinsic T1 hyperintensity is noted in the lesion, which may suggest intralesional hemorrhage. There is no associated edema or mass effect. XR HIP 2-3 VW W PELVIS RIGHT    Result Date: 2/27/2022  No right hip fracture or dislocation.        PHYSICAL EXAM:   GCS:  4 - Opens eyes on own   6 - Follows simple motor commands  5 - Alert and oriented    Pupil size:  Left 3 disorder, recurrent episode, severe, with psychotic behavior ([address]    Multiple lung nodules on CT    Hypomagnesemia    Anxiety    Chronic obstructive pulmonary disease (HCC)    Precordial pain    History of pulmonary embolism    Family history of abdominal aortic aneurysm    Normal coronary arteries    Long term (current) use of anticoagulants    AAA (abdominal aortic aneurysm) without rupture (HCC)    COPD exacerbation (HCC)    Acute tracheobronchitis-viral    Abnormal mammogram    C[address]brovascular accident ([address]A) ([facility]    Diabetic nephropathy (HCC)    Slow transit constipation    Frontal mass of brain    Intracranial mass         Assessment/Plan:     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 Management per neurology/neurosurgery for incidental brain mass. -Trauma Surgery will Sign off at this time.      Jerrica Kelly  Trauma Surgery PGY-1

## 2022-02-28 NOTE — CONSULTS
[id number] [facility] Neurology   IN-PATIENT SERVICE      NEUROLOGY  NOTE            Date:   2/28/2022  Patient name:  Guido Lunsford  Date of admission:  2/27/2022  YOB: 1960      Chief Complaint:     Chief Complaint   Patient presents with    Fall     Friday, onto right side. Rt shoulder and knee pain        Reason for Consult:      Abnormal CT scan of head mechanical fall    History of Present Illness: The patient is a 59 y.o. female who presents with Fall (Friday, onto right side. Rt shoulder and knee pain )    This patient was seen in room with her daughter and 2 nurses present. This patient tells me that she tripped over a dog on the day of admission. The room was dark. This is a mechanical fall. Patient presented to the hospital had a CT scan of the head performed. CT scan demonstrated a left frontal lesion of uncertain etiology. An MRI of the brain was performed which documented the presence of a cavernoma. Follow-up CT with contrast confirmed the presence of a cavernoma. No acute intracranial hemorrhage identified. I personally reviewed MRI and follow-up CT of head. Patient states that many years ago she was said to have had a mini stroke on the basis of some slurring of speech. Patient had been on Coumadin for an extended period time for treatment of DVT and pulmonary emboli which had occurred in the past. She is also known diabetic. She also has chronic kidney disease. Patient states that she has had an 80 pound weight loss clinically over the past 6 months. As a result her glucose control has improved. Patient currently has an injury to her right knee enhancer right knee in a brace. She also has some right arm pain from falling. This is currently under management of the internal medicine.       Past Medical History:     Past Medical History:   Diagnosis Date    Anxiety     Chronic kidney disease     COPD (chronic obstructive pulmonary disease) ([facility] [address]     Depression     Fracture of ankle 2020    Hyperlipidemia     Hypertension     Obesity     Osteoarthritis     Proteinuria     Pulmonary embolism (HCC)     Sleep apnea     c-pap. Doesn't use everynight    SOB (shortness of breath) 2020    Type 2 diabetes mellitus without complication (HCC)     Type II or unspecified type diabetes mellitus without mention of complication, not stated as uncontrolled     Von Willebrand disease [facility])         Past Surgical History:     Past Surgical History:   Procedure Laterality Date    APPENDECTOMY       SECTION      x3, 1977, , 1983    COLONOSCOPY  2018    with biopsy    COLONOSCOPY N/A 2018    COLONOSCOPY performed by Cassius Winn MD at [address] [facility] CATH LAB PROCEDURE      EYE SURGERY Bilateral     cataracts    EYE SURGERY Bilateral     glaucoma    TONSILLECTOMY AND ADENOIDECTOMY          Medications Prior to Admission:     Prior to Admission medications    Medication Sig Start Date End Date Taking? Authorizing Provider   vitamin C (ASCORBIC ACID) 500 MG tablet Take 500 mg by mouth daily   Yes Historical Provider, MD MARAVILLA VITAMIN D-3 50 MCG (2000) CAPS take 1 capsule by mouth once daily 22 Yes BRAULIO Rubin CNP   oxyCODONE-acetaminophen (PERCOCET) 5-325 MG per tablet Take 1 tablet by mouth every 8 hours as needed for Pain for up to 30 days.  2/4/22 3/6/22 Yes BRAULIO Rubin CNP   polyethylene glycol [facility]) 17 GM/SCOOP powder Take 17 g by mouth daily 22 Yes BRAULIO Rubin CNP   senna (SENOKOT) 8.6 MG tablet Take 1 tablet by mouth 2 times daily 22 Yes BRAULIO Rubin CNP   omeprazole (PRILOSEC) 20 MG delayed release capsule take 1 capsule by mouth once daily 22 Yes BRAULIO Rubin CNP   atorvastatin (LIPITOR) 40 MG tablet take 1 tablet by mouth once daily 22 Yes BRAULIO Rubin CNP   JANUVIA 100 MG tablet take 1 tablet by mouth once daily 1/17/22 1/17/23 Yes BRAULIO Milner CNP   lisinopril (PRINIVIL;ZESTRIL) 20 MG tablet take 1 tablet by mouth once daily 1/17/22  Yes BRAULIO Milner CNP   isosorbide mononitrate (IMDUR) 30 MG extended release tablet take 1 tablet by mouth once daily 1/4/22  Yes BRAULIO Milner CNP   buPROPion (WELLBUTRIN XL) 150 MG extended release tablet  12/16/21  Yes Historical Provider, MD   furosemide (LASIX) 20 MG tablet take 1 tablet by mouth once daily 12/20/21  Yes BRAULIO Milner CNP   Semaglutide 3 MG TABS Take 3 mg by mouth daily 12/7/21  Yes BRAULIO Milner CNP   dapagliflozin (FARXIGA) 5 MG tablet Take 1 tablet by mouth every morning 12/7/21 3/7/22 Yes BRAULIO Milner CNP   buPROPion (WELLBUTRIN XL) 300 MG extended release tablet Take 2 tablets by mouth every morning  Patient taking differently: Take 300 mg by mouth every morning  10/19/21  Yes BRAULIO Lipscomb CNP   metFORMIN (GLUCOPHAGE-XR) 500 MG extended release tablet Take 1 tablet by mouth 2 times daily 8/30/21 8/30/22 Yes BRAULIO Milner CNP   warfarin (COUMADIN) 2 MG tablet take 1 tablet by mouth SUNDAY, TUESDAY, THURSDAY, AND SATURDAY ( take 1 AND 1/2 tablets by mouth MONDAY, WEDNESDAY, FRIDAY )  Patient taking differently: take 1 tablet by mouth every day. Took 3 mg Monday and Tuesday. 2 mg all other days.  7/6/21  Yes BRAULIO Milner CNP   Respiratory Therapy Supplies (NEBULIZER/TUBING/MOUTHPIECE) KIT 1 kit by Does not apply route 4 times daily as needed (wheezing) 10/20/20  Yes BRAULIO Milner CNP   allopurinol (ZYLOPRIM) 100 MG tablet take 1 tablet by mouth once daily 2/17/22   BRAULIO Milner CNP   zinc gluconate 50 MG tablet Take 50 mg by mouth daily    Historical Provider, MD   blood glucose test strips (ONETOUCH ULTRA) strip TEST THREE TIMES DAILY 1/24/22 1/23/23  Trisha Larson APRN - CNP   fluticasone (FLONASE) 50 MCG/ACT nasal spray 1 spray by Nasal route daily 1/5/22 1/5/23  BRAULIO Cabrales CNP   tiZANidine (ZANAFLEX) 4 MG tablet Take 1 tablet by mouth 3 times daily as needed (muscle relaxer)  Patient not taking: Reported on 2/14/2022 12/21/21   BRAULIO Cabrales CNP   Blood Pressure Monitoring KIT Use to check blood pressure daily and as needed 12/10/21 10/17/24  BRAULIO Cabrales CNP   Lancets Misc. (ACCU-CHEK FASTCLIX LANCET) KIT use as directed [address] .. FASTCLIX MIGHT BE EAISER TO MANIPULATE 7/22/21 7/22/22  BRAULIO Cabrales CNP   Accu-Chek FastClix Lancets MISC use 1 LANCET to TEST BLOOD SUGAR one to two times a day 7/22/21 7/22/22  BRAULIO Cabrales CNP   Alcohol Swabs (ALCOHOL PREP) 70 % PADS Use twice daily and as needed to check blood sugars 7/19/21 10/20/23  BARULIO Cabrales CNP   albuterol sulfate  (90 Base) MCG/ACT inhaler Inhale 2 puffs into the lungs every 4 hours as needed for Wheezing or Shortness of Breath (AND/OR COUGH) 5/24/21 5/24/22  BRAULIO Cabrales CNP   naloxone 4 MG/0.1ML LIQD nasal spray 1 spray by Nasal route as needed for Opioid Reversal 2/16/21   BRAULIO Cabrales CNP   Handicap Placard MISC by Does not apply route Exp 2/3/2026 2/3/21   BRAULIO Cabrales CNP   ipratropium-albuterol (DUONEB) 0.5-2.5 (3) MG/3ML SOLN nebulizer solution Inhale 3 mLs into the lungs every 6 hours as needed for Shortness of Breath 1/7/21 1/7/22  BRAULIO Cabrales CNP        Allergies:     Patient has no known allergies. Social History:     Tobacco:    reports that she quit smoking about 39 years ago. Her smoking use included cigarettes. She started smoking about 44 years ago. She has a 1.75 pack-year smoking history. She has never used smokeless tobacco.  Alcohol:      reports no history of alcohol use. Drug Use:  reports no history of drug use.     Family History:     Family History   Problem Relation Age of Onset    Hypertension Mother     Liver Disease Mother     Cancer Father         stomach    Diabetes Sister     Cancer Brother         kidney    No Known Problems Maternal Grandmother     No Known Problems Maternal Grandfather     No Known Problems Paternal Grandmother     No Known Problems Paternal Grandfather     Other Brother         AIDS         Review of Systems:       Constitutional Negative for fever and chills   HEENT Negative for ear discharge, ear pain, nosebleed. Negative for photophobia, headache. Musculoskeletal  positive for right knee and right arm pain status post fall. Respiratory Negative for cough, dyspnea. Negative for hemoptysis and sputum. Cardiovascular Negative for palpitations, chest pain. Gastrointestinal Negative for nausea, vomiting. Negative for abdominal pain, diarrhea, blood in stool   Genitourinary  Negative for dysuria, hematuria. Skin Negative for rash or itching   Hematology Negative for ecchymosis, anemia   Psychiatric Negative for anxiety, depression. Negative for suicidal ideation, hallucinations   Patient reports some tingling in her toes when his glucoses are high. Otherwise no diabetic peripheral neuropathy.     Physical Exam:   /78   Pulse 88   Temp 97.8 °F (36.6 °C) (Oral)   Resp 18   Ht 5' 2\" (1.575 m)   Wt 180 lb (81.6 kg)   SpO2 98%   BMI 32.92 kg/m²   Temp (24hrs), Av.2 °F (36.8 °C), Min:97.8 °F (36.6 °C), Max:98.4 °F (36.9 °C)    General examination:      General Appearance:  alert  HEENT: Normocephalic  Neck: supple, no carotid bruits, (-) nuchal rigidity  Lungs:  Respirations unlabored, chest wall no deformity, BS normal  Cardiovascular: normal rate, regular rhythm  Abdomen: Soft, nontender, nondistended, normal bowel sounds  Skin: No gross lesions, rashes, bruising or bleeding on exposed skin area  Extremities:  peripheral pulses palpable, no cyanosis, clubbing or edema  Psych: normal affect      Neurological examination:    Mental status   Alert and oriented x 3; following all commands; speech is fluent, no dysarthria, aphasia. Memory testing was 1/3. Language shows normal reception expression repetition. Insight and judgment appears adequate. Cranial nerves   II - visual fields intact to confrontation; pupils reactive. Pupil 4mm  III, IV, VI - extraocular muscles intact; no LULU; no nystagmus; no ptosis mild saccadic pursuit bilaterally With horizontal gaze. V - normal facial sensation                                                               VII - normal facial symmetry                                                             VIII - intact hearing                                                                             IX, X - symm normal phonation                                               XI -normal head turning                                                      XII - midline tongue      Motor function  Strength: 5/5 RUE, right leg with knee splint on RLE, 5/5 LUE, 5/5  LLE  Normal bulk and tone. No tremors                      Sensory function Intact to touch, vibration throughout     Cerebellar  no cogwheeling rigidity tremor or ataxia. Reflex function 2/4 symmetric throughout except for right leg which has a knee splint on. It is not testable. Downgoing plantar response bilaterally.     Gait                   deferred         Diagnostics:      Laboratory Testing:  CBC:   Recent Labs     02/27/22  1125   WBC 8.3   HGB 13.4        BMP:    Recent Labs     02/27/22  1125      K 4.1   CL 99   CO2 23   BUN 13   CREATININE 0.94*   GLUCOSE 145*         Lab Results   Component Value Date    CHOL 132 05/07/2020    LDLCHOLESTEROL 68 05/07/2020    HDL 36 (L) 05/07/2020    TRIG 138 05/07/2020    ALT 32 07/19/2021    AST 24 07/19/2021    TSH 1.45 08/01/2019    INR 2.8 02/27/2022    LABA1C 7.2 (H) 08/05/2021    LABMICR CANNOT BE CALCULATED 04/08/2019       No results found for: PHENYTOIN, PHENYTOIN, VALPROATE, SHERITA      I personally reviewed all of the above medications, clinical laboratory, imaging and other diagnostic tests. Impression:      1. Abnormal CT/MRI of head consistent with cavernoma. Left frontal.  2. Mechanical trip and fall. 3. Possible recent memory defect  4. History of DM 2  5. History of PE DVT on warfarin  6. Right knee and arm injury    Plan:      Continue present medications   Consult neurosurgery to follow-up on fall and head bump.  Consult internal medicine for diabetes blood pressure anticoagulant management       Thank you for this very interesting consultation.       Electronically signed by Melissa Burks MD on 2/28/2022 at 9:06 AM      MD Apple [address]  Neurology

## 2022-02-28 NOTE — CONSULTS
Orthopedic Surgery Consult  (Dr. Ganga Arredondo)                   CC/Reason for consult:  Right knee pain    HPI:    The patient is a 59 y.o. female who sustained a fall after tripping over her pitbull while at home on Friday, 2/25/2022. She had significant right knee pain with associated effusion after the fall. She also has significant right shoulder pain after the fall. She states she fell on her right side on these 2 regions. She states she is only been able to mobilize with a limp due to her right lower extremity pain. She had no pain to her right knee prior to the fall on Friday. She presented to the [facility] ED on 2/27/2022 for evaluation. Further imaging was done including head and neck imaging given the fact that she had neck pain which showed concerning results on imaging suggestive of a cavernoma. She was transferred to our hospital for further neurological and neurosurgery evaluation. Neurosurgery has evaluated the patient and is not planning for any acute surgical treatment and will follow outpatient. We were consulted today for evaluation management given her continued right knee pain. The patient does have a significant past medical history including CKD, COPD, PE for which she takes chronic warfarin, depression, hypertension, obesity, NARCISO, diabetes mellitus, von Willebrand disease, and gout. Of note she states she has had gouty attacks only to her right great toe with the most recent attack being 2 years prior. Past Medical History:    Past Medical History:   Diagnosis Date    Anxiety     Chronic kidney disease     COPD (chronic obstructive pulmonary disease) ([facility] [address]     Depression     Fracture of ankle 5/14/2020    Hyperlipidemia     Hypertension     Obesity     Osteoarthritis     Proteinuria     Pulmonary embolism (HCC)     Sleep apnea     c-pap.  Doesn't use everynight    SOB (shortness of breath) 5/7/2020    Type 2 diabetes mellitus without complication (HCC)     Type II or unspecified type diabetes mellitus without mention of complication, not stated as uncontrolled     Von Willebrand disease [facility])        Past Surgical History:    Past Surgical History:   Procedure Laterality Date    APPENDECTOMY       SECTION      x3, 1977, , 1983    COLONOSCOPY  2018    with biopsy    COLONOSCOPY N/A 2018    COLONOSCOPY performed by Dannie West MD at [address] [facility] CATH LAB PROCEDURE      EYE SURGERY Bilateral     cataracts    EYE SURGERY Bilateral     glaucoma    TONSILLECTOMY AND ADENOIDECTOMY         Medications Prior to Admission:   Prior to Admission medications    Medication Sig Start Date End Date Taking? Authorizing Provider   vitamin C (ASCORBIC ACID) 500 MG tablet Take 500 mg by mouth daily   Yes Historical Provider, MD MARAVILLA VITAMIN D-3 50 MCG (2000) CAPS take 1 capsule by mouth once daily 22 Yes BRAULIO Farrar CNP   oxyCODONE-acetaminophen (PERCOCET) 5-325 MG per tablet Take 1 tablet by mouth every 8 hours as needed for Pain for up to 30 days.  2/4/22 3/6/22 Yes BRAULIO Farrar CNP   polyethylene glycol [facility]) 17 GM/SCOOP powder Take 17 g by mouth daily 22 Yes BRAULIO Farrar CNP   senna (SENOKOT) 8.6 MG tablet Take 1 tablet by mouth 2 times daily 22 Yes BRAULIO Farrar CNP   omeprazole (PRILOSEC) 20 MG delayed release capsule take 1 capsule by mouth once daily 22 Yes BRAULIO Farrar CNP   atorvastatin (LIPITOR) 40 MG tablet take 1 tablet by mouth once daily 22 Yes BRAULIO Farrar CNP   JANUVIA 100 MG tablet take 1 tablet by mouth once daily 22 Yes BRAULIO Farrar CNP   lisinopril (PRINIVIL;ZESTRIL) 20 MG tablet take 1 tablet by mouth once daily 22  Yes BRAULIO Farrar CNP   isosorbide mononitrate (IMDUR) 30 MG extended release tablet take 1 tablet by mouth once daily 22  Yes Trisha VAIL BRAULIO Hale CNP   buPROPion (WELLBUTRIN XL) 150 MG extended release tablet  12/16/21  Yes Historical Provider, MD   furosemide (LASIX) 20 MG tablet take 1 tablet by mouth once daily 12/20/21  Yes BRAULIO Shipley CNP   Semaglutide 3 MG TABS Take 3 mg by mouth daily 12/7/21  Yes BRAULIO Shipley CNP   dapagliflozin (FARXIGA) 5 MG tablet Take 1 tablet by mouth every morning 12/7/21 3/7/22 Yes BRAULIO Shipley CNP   buPROPion (WELLBUTRIN XL) 300 MG extended release tablet Take 2 tablets by mouth every morning  Patient taking differently: Take 300 mg by mouth every morning  10/19/21  Yes BRAULIO Shipley CNP   metFORMIN (GLUCOPHAGE-XR) 500 MG extended release tablet Take 1 tablet by mouth 2 times daily 8/30/21 8/30/22 Yes BRAULIO Shipley CNP   warfarin (COUMADIN) 2 MG tablet take 1 tablet by mouth SUNDAY, TUESDAY, THURSDAY, AND SATURDAY ( take 1 AND 1/2 tablets by mouth MONDAY, WEDNESDAY, FRIDAY )  Patient taking differently: take 1 tablet by mouth every day. Took 3 mg Monday and Tuesday. 2 mg all other days.  7/6/21  Yes BRAULIO Shipley CNP   Respiratory Therapy Supplies (NEBULIZER/TUBING/MOUTHPIECE) KIT 1 kit by Does not apply route 4 times daily as needed (wheezing) 10/20/20  Yes BRAULIO Shipley CNP   allopurinol (ZYLOPRIM) 100 MG tablet take 1 tablet by mouth once daily 2/17/22   BRAULIO Shipley CNP   zinc gluconate 50 MG tablet Take 50 mg by mouth daily    Historical Provider, MD   blood glucose test strips (ONETOUCH ULTRA) strip TEST THREE TIMES DAILY 1/24/22 1/23/23  BRAULIO Shipley CNP   fluticasone (FLONASE) 50 MCG/ACT nasal spray 1 spray by Nasal route daily 1/5/22 1/5/23  BRAULIO Shipley CNP   tiZANidine (ZANAFLEX) 4 MG tablet Take 1 tablet by mouth 3 times daily as needed (muscle relaxer)  Patient not taking: Reported on 2/14/2022 12/21/21   BRAULIO Shipley CNP   Blood Pressure Monitoring KIT Use to check blood pressure daily and as needed 12/10/21 10/17/24  BRAULIO Prakash CNP   Lancets [facility]. (ACCU-CHEK FASTCLIX LANCET) KIT use as directed PLEASE APPROVED NEW DEVICE WHILE WE WAIT [address] .. FASTCLIX MIGHT BE EAISER TO MANIPULATE 21  BRAULIO Prakash CNP   Accu-Chek FastClix Lancets MISC use 1 LANCET to TEST BLOOD SUGAR one to two times a day 21  BRAULIO Prakash CNP   Alcohol Swabs (ALCOHOL PREP) 70 % PADS Use twice daily and as needed to check blood sugars 7/19/21 10/20/23  BRAULIO Prakash CNP   albuterol sulfate  (90 Base) MCG/ACT inhaler Inhale 2 puffs into the lungs every 4 hours as needed for Wheezing or Shortness of Breath (AND/OR COUGH) 21  BRAULIO Prakash CNP   naloxone 4 MG/0.1ML LIQD nasal spray 1 spray by Nasal route as needed for Opioid Reversal 21   BRAULOI Prakash CNP   Handicap Placard MISC by Does not apply route Exp 2/3/2026 2/3/21   BRAULIO Prakash CNP   ipratropium-albuterol (DUONEB) 0.5-2.5 (3) MG/3ML SOLN nebulizer solution Inhale 3 mLs into the lungs every 6 hours as needed for Shortness of Breath 21  BRAULIO Prakash CNP       Allergies:    Patient has no known allergies.     Social History:   Social History     Socioeconomic History    Marital status:      Spouse name: None    Number of children: 3    Years of education: None    Highest education level: None   Occupational History    None   Tobacco Use    Smoking status: Former Smoker     Packs/day: 0.25     Years: 7.00     Pack years: 1.75     Types: Cigarettes     Start date: 10/16/1977     Quit date: 1983     Years since quittin.1    Smokeless tobacco: Never Used   Substance and Sexual Activity    Alcohol use: No    Drug use: No    Sexual activity: Not Currently   Other Topics Concern    None   Social History Narrative    None     Social Determinants of Health     Financial Resource Strain: Low Risk     Difficulty of Paying Living Expenses: Not hard at all   Food Insecurity: No Food Insecurity    Worried About [address] [facility] in the Last Year: Never true    Ran Out of Food in the Last Year: Never true   Transportation Needs:     Lack of Transportation (Medical): Not on file    Lack of Transportation (Non-Medical): Not on file   Physical Activity:     Days of Exercise per Week: Not on file    Minutes of Exercise per Session: Not on file   Stress:     Feeling of Stress : Not on file   Social Connections:     Frequency of Communication with Friends and Family: Not on file    Frequency of Social Gatherings with Friends and Family: Not on file    Attends Jewish Services: Not on file    Active Member of [address] [place] [address] or Organizations: Not on file    Attends Club or Organization Meetings: Not on file    Marital Status: Not on file   Intimate Partner Violence:     Fear of Current or Ex-Partner: Not on file    Emotionally Abused: Not on file    Physically Abused: Not on file    Sexually Abused: Not on file   Housing Stability:     Unable to Pay for Housing in the Last Year: Not on file    Number of [place] in the Last Year: Not on file    Unstable Housing in the Last Year: Not on file       Family History:  Family History   Problem Relation Age of Onset    Hypertension Mother     Liver Disease Mother     Cancer Father         stomach    Diabetes Sister     Cancer Brother         kidney    No Known Problems Maternal Grandmother     No Known Problems Maternal Grandfather     No Known Problems Paternal Grandmother     No Known Problems Paternal Grandfather     Other Brother         AIDS       REVIEW OF SYSTEMS:    General: Negative for fever and chills. Cardiovascular: Negative for chest pain and palpitations. Musculoskeletal: Positive for right knee pain and right shoulder pain. See HPI   Neurological: Negative for numbness & tingling.   10 remaining systems reviewed and negative    PHYSICAL EXAM:  /69   Pulse 91   Temp 98.1 °F (36.7 °C) (Oral)   Resp 18   Ht 5' 2\" (1.575 m)   Wt 180 lb (81.6 kg)   SpO2 98%   BMI 32.92 kg/m²     Gen: AAOx3, NAD, cooperative   Head: Normocephalic, atraumatic   Neck: Supple   Chest: Non labored breathing  Cardiovascular: Regular rate, no dependent edema, distal pulses 2+  Respiratory: Chest symmetric, no accessory muscle use, normal respirations     RUE: Tenderness palpation over the scapula. No ecchymosis or deformities. Skin intact. Nontender to palpation over the [facility] [place] joint, clavicle, or remainder of the shoulder. Full AROM without pain elbow/wrist/fingers. Able to forward flex her right shoulder to 170 degrees and touch the top of her head with minimal discomfort. Compartments soft and compressible. Ulnar/Median/AIN/PIN motor intact. Median/Radial/Ulnar nerve SILT. Hand and fingers warm and well-perfused w/ BCR; radial pulse 2+. RLE: Moderate effusion to the right knee noted. No significant erythema or warmth noted. Range of motion of the right knee from 5 to 30 degrees with pain. Skin intact. Tender to palpation circumferentially about the right knee. Compartments soft and compressible. EHL/FHL/TA/GSC gross motor intact. Sural, saphenous, superificial/deep peroneal, and plantar nerve distribution SILT. Foot and toes warm and well-perfused w/ BCR; DP pulses 2+. -log roll. Slight laxity noted on Lachman examination. No instability noted in varus/valgus stress exam at 0 and 30 degrees. Unable to perform an anterior or posterior drawer exam.    LABS:  Recent Labs     02/27/22  1125   WBC 8.3   HGB 13.4   HCT 39.1      INR 2.8      K 4.1   BUN 13   CREATININE 0.94*   GLUCOSE 145*        Radiology:   X-ray imaging of the right knee was reviewed which demonstrated no acute fractures, dislocations, or subluxations. There was degenerative changes noted with osteophytosis.     X-ray imaging of the right shoulder was reviewed which

## 2022-02-28 NOTE — H&P
[address] [facility] Neurology   IN-PATIENT SERVICE      NEUROLOGY CONSULT  NOTE            Date:   2/27/2022  Patient name:  Arianne Llamas  Date of admission:  2/27/2022  YOB: 1960      Chief Complaint:     Chief Complaint   Patient presents with    Fall     Friday, onto right side. Rt shoulder and knee pain      Reason for Consult:      Left  frontal issa mass       History of Present Illness: The patient is a 59 y.o. female with past medical history of CKD, COPD, hypertension, PE, DVT and hypercoagulable disorder on Coumadin for greater than 12 years, NARCISO, diabetes, morbid obesity. Patient with above past medical history presented with mechanical fall where she tripped over her dog. Patient daughter was by her side. Patient does not remember whether she lost consciousness or hit her head. Per chart review patient did not lose consciousness or hit her head. Patient was taken to [facility] and CT head showed left frontal \opacity concerning for IPH versus angioma versus malignancy. Patient denies any focal neurological deficit, vision changes, nystagmus,. Patient was shifted to [facility] for neurosurgical and trauma evaluation. On my evaluation. Patient was alert oriented x4. Serum electrolyte unremarkable, CBC unremarkable, hemodynamically stable, afebrile, saturating normally at room air. INR 2.8    MRI was showing 1.4 cm left frontal lesion consistent with cavernoma, T1 hyperintensity showing concerns for intralesional hemorrhage. We will repeat CT head in 6 hours to look for any increase in bleed. Past Medical History:     Past Medical History:   Diagnosis Date    Anxiety     Chronic kidney disease     COPD (chronic obstructive pulmonary disease) [facility] [address]     Depression     Fracture of ankle 5/14/2020    Hyperlipidemia     Hypertension     Obesity     Osteoarthritis     Proteinuria     Pulmonary embolism (HCC)     Sleep apnea     c-pap.  Doesn't use everynight    SOB (shortness of breath) 2020    Type 2 diabetes mellitus without complication (HCC)     Type II or unspecified type diabetes mellitus without mention of complication, not stated as uncontrolled     Von Willebrand disease [facility])         Past Surgical History:     Past Surgical History:   Procedure Laterality Date    APPENDECTOMY       SECTION      x3, 1977, ,     COLONOSCOPY  2018    with biopsy    COLONOSCOPY N/A 2018    COLONOSCOPY performed by Amirah Smith MD at [address] [facility] CATH LAB PROCEDURE      EYE SURGERY Bilateral     cataracts    EYE SURGERY Bilateral     glaucoma    TONSILLECTOMY AND ADENOIDECTOMY          Medications Prior to Admission:     Prior to Admission medications    Medication Sig Start Date End Date Taking? Authorizing Provider   allopurinol (ZYLOPRIM) 100 MG tablet take 1 tablet by mouth once daily 22   BRAULIO Prakash CNP   zinc gluconate 50 MG tablet Take 50 mg by mouth daily    Historical Provider, MD   vitamin C (ASCORBIC ACID) 500 MG tablet Take 500 mg by mouth daily    Historical Provider, MD MARAVILLA VITAMIN D-3 50 MCG (2000) CAPS take 1 capsule by mouth once daily 22  BRAULIO Prakash CNP   oxyCODONE-acetaminophen (PERCOCET) 5-325 MG per tablet Take 1 tablet by mouth every 8 hours as needed for Pain for up to 30 days.  2/4/22 3/6/22  BRAULIO Prakash CNP   polyethylene glycol [facility]) 17 GM/SCOOP powder Take 17 g by mouth daily 22  BRAULIO Prakash CNP   senna (SENOKOT) 8.6 MG tablet Take 1 tablet by mouth 2 times daily 22  BRAULIO Prakash CNP   blood glucose test strips (ONETOUCH ULTRA) strip TEST THREE TIMES DAILY 22  BRAULIO Prakash CNP   omeprazole (PRILOSEC) 20 MG delayed release capsule take 1 capsule by mouth once daily 22  BRAULIO Prakash CNP   atorvastatin (LIPITOR) 40 MG tablet take 1 tablet by mouth once daily 1/17/22 1/17/23  BRAULIO Chirstianson CNP   JANUVIA 100 MG tablet take 1 tablet by mouth once daily 1/17/22 1/17/23  BRAULIO Christianson CNP   lisinopril (PRINIVIL;ZESTRIL) 20 MG tablet take 1 tablet by mouth once daily 1/17/22   BRAULIO Hughes CNP   fluticasone [facility]) 50 MCG/ACT nasal spray 1 spray by Nasal route daily 1/5/22 1/5/23  BRAULIO Christianson CNP   isosorbide mononitrate (IMDUR) 30 MG extended release tablet take 1 tablet by mouth once daily 1/4/22   BRAULIO Christianson CNP   buPROPion (WELLBUTRIN XL) 150 MG extended release tablet  12/16/21   Historical Provider, MD   tiZANidine (ZANAFLEX) 4 MG tablet Take 1 tablet by mouth 3 times daily as needed (muscle relaxer)  Patient not taking: Reported on 2/14/2022 12/21/21   BRAUILO Christianson CNP   furosemide (LASIX) 20 MG tablet take 1 tablet by mouth once daily 12/20/21   BRAULIO Christianson CNP   Blood Pressure Monitoring KIT Use to check blood pressure daily and as needed 12/10/21 10/17/24  BRAULIO Christianson CNP   Semaglutide 3 MG TABS Take 3 mg by mouth daily 12/7/21   BRAULIO Christianson CNP   dapagliflozin (FARXIGA) 5 MG tablet Take 1 tablet by mouth every morning 12/7/21 3/7/22  BRAULIO Christianson CNP   buPROPion (WELLBUTRIN XL) 300 MG extended release tablet Take 2 tablets by mouth every morning  Patient taking differently: Take 300 mg by mouth every morning  10/19/21   BRAULIO Christianson CNP   metFORMIN (GLUCOPHAGE-XR) 500 MG extended release tablet Take 1 tablet by mouth 2 times daily 8/30/21 8/30/22  BRAULIO Christianson CNP   Lancets Misc. (ACCU-CHEK FASTCLIX LANCET) KIT use as directed PLEASE APPROVED NEW DEVICE WHILE WE WAIT Julissa Proctor.. ..  FASTCLIX MIGHT BE EAISER TO MANIPULATE 7/22/21 7/22/22  BRAULIO Christianson CNP   Accu-Chek FastClix Lancets MISC use 1 LANCET to TEST BLOOD SUGAR one to two times a day 7/22/21 7/22/22  BRAULIO Christianson CNP   Alcohol Swabs (ALCOHOL PREP) 70 % PADS Use twice daily and as needed to check blood sugars 7/19/21 10/20/23  BRAULIO Farrar CNP   warfarin (COUMADIN) 2 MG tablet take 1 tablet by mouth SUNDAY, TUESDAY, THURSDAY, AND SATURDAY ( take 1 AND 1/2 tablets by mouth MONDAY, WEDNESDAY, FRIDAY )  Patient taking differently: take 1 tablet by mouth every day. Took 3 mg Monday and Tuesday. 2 mg all other days. 7/6/21   BRAULIO Farrar CNP   albuterol sulfate  (90 Base) MCG/ACT inhaler Inhale 2 puffs into the lungs every 4 hours as needed for Wheezing or Shortness of Breath (AND/OR COUGH) 5/24/21 5/24/22  BRAULIO Farrar CNP   naloxone 4 MG/0.1ML LIQD nasal spray 1 spray by Nasal route as needed for Opioid Reversal 2/16/21   BRAULIO Farrar CNP   Handicap Placard MISC by Does not apply route Exp 2/3/2026 2/3/21   BRAULIO Farrar CNP   ipratropium-albuterol (DUONEB) 0.5-2.5 (3) MG/3ML SOLN nebulizer solution Inhale 3 mLs into the lungs every 6 hours as needed for Shortness of Breath 1/7/21 1/7/22  BRAULIO Farrar CNP   Respiratory Therapy Supplies (NEBULIZER/TUBING/MOUTHPIECE) KIT 1 kit by Does not apply route 4 times daily as needed (wheezing) 10/20/20   BRAULIO Farrar CNP        Allergies:     Patient has no known allergies. Social History:     Tobacco:    reports that she quit smoking about 39 years ago. Her smoking use included cigarettes. She started smoking about 44 years ago. She has a 1.75 pack-year smoking history. She has never used smokeless tobacco.  Alcohol:      reports no history of alcohol use. Drug Use:  reports no history of drug use.     Family History:     Family History   Problem Relation Age of Onset    Hypertension Mother     Liver Disease Mother     Cancer Father         stomach    Diabetes Sister     Cancer Brother         kidney    No Known Problems Maternal Grandmother     No Known Problems Maternal Grandfather     No Known Problems Paternal Grandmother     No Known Problems Paternal Grandfather     Other Brother         AIDS       Review of Systems:       Constitutional Negative for fever and chills   HEENT Negative for ear discharge, ear pain, nosebleed   Eyes Negative for photophobia, pain and discharge   Respiratory Negative for hemoptysis and sputum   Cardiovascular Negative for orthopnea, claudication and PND   Gastrointestinal Constipation positive    Musculoskeletal Negative for joint pain, negative for myalgia   Skin Negative for rash or itching   hematology Negative for ecchymosis, anemia   Psychiatric Negative for suicidal ideation, anxiety, depression, hallucinations       Physical Exam:   /88   Pulse 95   Temp 98.2 °F (36.8 °C) (Oral)   Resp 17   Ht 5' 2\" (1.575 m)   Wt 180 lb (81.6 kg)   SpO2 95%   BMI 32.92 kg/m²   Temp (24hrs), Av.3 °F (36.8 °C), Min:98.2 °F (36.8 °C), Max:98.4 °F (36.9 °C)        General examination:      General Appearance:  alert, well appearing, and in no acute distress  HEENT: Normocephalic, atraumatic, moist mucus membranes  Neck: supple, no carotid bruits, (-) nuchal rigidity  Lungs:  Respirations unlabored, chest wall no deformity, BS normal  Cardiovascular: normal rate, regular rhythm  Abdomen: Soft, nontender, nondistended, normal bowel sounds  Skin: No gross lesions, rashes, bruising or bleeding on exposed skin area  Extremities:  peripheral pulses palpable, clubbing or edema  Psych: normal affect    NEUROLOGIC EXAMINATION    Mental status   Alert and oriented x 3; following all commands;   speech is fluent, no dysarthria, aphasia.       Cranial nerves   II - visual fields intact to confrontation; pupils reactive  III, IV, VI - extraocular muscles intact; no LULU; no nystagmus; no ptosis   V - normal facial sensation                                                               VII - normal facial symmetry                                                             VIII - intact hearing IX, X - symmetrical palate elevation                                               XI - symmetrical shoulder shrug                                                       XII - midline tongue without atrophy or fasciculation     Motor function  Strength:   5/5 RUE, 5/5 RLE  5/5 LUE, 5/5  LLE  Normal bulk and tone. Sensory function Intact to touch, pin, vibration, proprioception throughout     Cerebellar Intact finger-nose-finger testing. Intact heel-shin testing. No dysdiadochokinesia present. No tremors                        Reflex function 2/4 symmetric throughout . Downgoing plantar response bilaterally. (-)Allen's sign bilaterally      Gait                  Normal station and gait. Normal Tandem, tip toes and heel walking             Diagnostics:      Laboratory Testing:  CBC:   Recent Labs     02/27/22  1125   WBC 8.3   HGB 13.4        BMP:    Recent Labs     02/27/22  1125      K 4.1   CL 99   CO2 23   BUN 13   CREATININE 0.94*   GLUCOSE 145*         Lab Results   Component Value Date    CHOL 132 05/07/2020    LDLCHOLESTEROL 68 05/07/2020    HDL 36 (L) 05/07/2020    TRIG 138 05/07/2020    ALT 32 07/19/2021    AST 24 07/19/2021    TSH 1.45 08/01/2019    INR 2.8 02/27/2022    LABA1C 7.2 (H) 08/05/2021    LABMICR CANNOT BE CALCULATED 04/08/2019       No results found for: PHENYTOIN, PHENYTOIN, VALPROATE, CBMZ      Imaging/Diagnostics:                Assessment and plan:   1. Left frontal brain mass  2. COPD  3. NARCISO  4. History of DVT on Coumadin  5. Type 2 diabetes  6. Hypertension  7. Morbid obesity  8. Plan:  1. We will repeat CT in 6 hours to look for any possibility of increasing bleed. 2. No anticoagulation for now  3. Neurosurgery consult already done.     Electronically signed by Aaliyah Alberto MD on 2/27/2022 at 8:02 PM      Aaliyah Alberto MD  Internal Medicine Resident, PGY2  [facility]. [address]  2/27/2022,8:10 PM

## 2022-02-28 NOTE — FLOWSHEET NOTE
[facility]     Emergency/Trauma Note    PATIENT NAME: Mirlande Alicea    Shift date: 2/27/22  Shift day: Sunday   Shift # 2    Room # 27/27   Name: Mirlande Alicea            Age: 59 y.o. Gender: female          Baptism: Other   Place of Holiness:     Trauma/Incident type: Adult Trauma Consult  Admit Date & Time: 2/27/2022 11:01 AM  TRAUMA NAME: N/A    ADVANCE DIRECTIVES IN CHART? No    NAME OF DECISION MAKER: N/A    RELATIONSHIP OF DECISION MAKER TO PATIENT: N/A    PATIENT/EVENT DESCRIPTION:  Mirlande Alicea is a 59 y.o. female who arrived at [facility] Pt to be admitted to 27/27. SPIRITUAL ASSESSMENT/INTERVENTION:   appeared welcome by patient and daughter Dane Patrick. Patient and daughter explained to  medical news given. Both appeared to be tearful, anxious, and coping.  was a ministry of presence to help patient and daughter express emotions. Patient asked for prayer and gave specific requests.  gave prayer with specific requests for spiritual comfort and support. Patient and daughter expressed gratitude for  visit. PATIENT BELONGINGS:  With Patient    ANY BELONGINGS OF SIGNIFICANT VALUE NOTED:  N/A    REGISTRATION STAFF NOTIFIED? Yes      WHAT IS YOUR SPIRITUAL CARE PLAN FOR THIS PATIENT?:   Chaplains will remain available to offer spiritual and emotional support as needed. Electronically signed by Shania Macdonald, on 2/27/2022 at 7:00 PM.  [facility]  [phone]       02/27/22 1110   Encounter Summary   Services provided to: Patient and family together   Referral/Consult From: Multi-disciplinary team   Support System Children   Continue Visiting   (2/27/22)   Complexity of Encounter High   Length of Encounter 30 minutes   Spiritual Assessment Completed Yes   Crisis   Type Trauma  (Consult)   Assessment Tearful; Anxious   Intervention Nurtured hope;Prayer; Active listening;Sustaining presence/ Ministry of presence   Outcome Expressed gratitude

## 2022-02-28 NOTE — RESULT ENCOUNTER NOTE
Noted. Completed (addressed/treated) during hospital admission. Available if needed for correlation of care.

## 2022-02-28 NOTE — PROGRESS NOTES
Physical Therapy        Physical Therapy Cancel Note      DATE: 2022    NAME: Chari Reynoso  MRN: [id number]   : 1960      Patient not seen this date for Physical Therapy due to:    Patient Declined: Pt states that she is in too much pain and has too much RLE swelling. Pt was educated on the importance of therapy and mobility in the hospital but continued to refuse, will check back in the afternoon as time permits or on 3/1.       Electronically signed by Antonieta Ma PT on 2022 at 10:13 AM

## 2022-02-28 NOTE — CARE COORDINATION
Case Management Initial Discharge Plan  Edith Erazo,             Met with:patient & daughter to discuss discharge plans. Information verified: address, contacts, phone number, , insurance Yes  Insurance Provider: [address] [place] [address]    Emergency Contact/Next of Kin name & number: Hafsa Matos, cathy at [phone]  Who are involved in patient's support system? Hafsa Matos, her  and 3 kids    PCP: BRAULIO Mcdowell CNP  Date of last visit: 2 weeks ago      Discharge Planning    Living Arrangements:  [address] 1 [address]  4 stairs to climb to get into front door,   Location of bedroom/bathroom in home main    Patient able to perform ADL's:Assisted    Current Services (outpatient & in home) none  DME equipment: glucometer, Cpap, nebulizer, RW, SC  DME provider: unknown    Is patient receiving oral anticoagulation therapy? Yes    If indicated:   Physician managing anticoagulation treatment: Ernestine Naylor NP  Where does patient obtain lab work for ATC treatment? [facility]      Potential Assistance Needed:  Home Care    Patient agreeable to home care: Yes  Freedom of choice provided:  no    Prior SNF/Rehab Placement and Facility: Yes, 10 years ago for \"1 day\" does not want to return  Agreeable to SNF/Rehab: No  [place] of choice provided: no     Evaluation: no    Expected Discharge date:       Patient expects to be discharged to: If home: is the family and/or caregiver wiling & able to provide support at home? yes  Who will be providing this support? Daughter and her family    Follow Up Appointment: Best Day/ Time:      Transportation provider: cathy Louie  Transportation arrangements needed for discharge: No    Readmission Risk              Risk of Unplanned Readmission:  10             Does patient have a readmission risk score greater than 14?: No  If yes, follow-up appointment must be made within 7 days of discharge.      Goals of Care: Educated Pt and daughter Evelin Saxena on transitional options, provided freedom of choice and are agreeable with plan      Discharge Plan: Home with daughter. Agreeable to [facility]. if needed. Has DME and support.      [address] registration and notified of need for IMM      Electronically signed by Mouna Cohen RN on 2/28/22 at 8:18 AM EST

## 2022-03-01 ENCOUNTER — APPOINTMENT (OUTPATIENT)
Dept: MRI IMAGING | Age: 62
DRG: 058 | End: 2022-03-01
Payer: MEDICARE

## 2022-03-01 VITALS
DIASTOLIC BLOOD PRESSURE: 78 MMHG | RESPIRATION RATE: 16 BRPM | TEMPERATURE: 98.1 F | WEIGHT: 180 LBS | HEIGHT: 62 IN | HEART RATE: 86 BPM | OXYGEN SATURATION: 95 % | SYSTOLIC BLOOD PRESSURE: 130 MMHG | BODY MASS INDEX: 33.13 KG/M2

## 2022-03-01 PROBLEM — D18.02 FAMILIAL CAVERNOUS CEREBRAL ANGIOMA (HCC): Status: ACTIVE | Noted: 2022-03-01

## 2022-03-01 PROBLEM — Z87.39 HISTORY OF GOUT: Status: ACTIVE | Noted: 2022-03-01

## 2022-03-01 PROBLEM — M11.20 PSEUDOGOUT: Status: ACTIVE | Noted: 2022-03-01

## 2022-03-01 LAB
CRYSTALS, FLUID: POSITIVE
GLUCOSE BLD-MCNC: 123 MG/DL (ref 65–105)
GLUCOSE BLD-MCNC: 169 MG/DL (ref 65–105)
INR BLD: 1.7
LYMPHOCYTES, BODY FLUID: 5 %
NEUTROPHIL, FLUID: 92 %
OTHER CELLS FLUID: NORMAL %
PROTHROMBIN TIME: 17.7 SEC (ref 9.1–12.3)
RBC FLUID: NORMAL /MM3
SPECIMEN TYPE: ABNORMAL
SPECIMEN TYPE: NORMAL
URIC ACID: 4.3 MG/DL (ref 2.4–5.7)
WBC FLUID: NORMAL /MM3

## 2022-03-01 PROCEDURE — 84550 ASSAY OF BLOOD/URIC ACID: CPT

## 2022-03-01 PROCEDURE — 82947 ASSAY GLUCOSE BLOOD QUANT: CPT

## 2022-03-01 PROCEDURE — G0378 HOSPITAL OBSERVATION PER HR: HCPCS

## 2022-03-01 PROCEDURE — 99239 HOSP IP/OBS DSCHRG MGMT >30: CPT | Performed by: PSYCHIATRY & NEUROLOGY

## 2022-03-01 PROCEDURE — 99232 SBSQ HOSP IP/OBS MODERATE 35: CPT | Performed by: ORTHOPAEDIC SURGERY

## 2022-03-01 PROCEDURE — 97530 THERAPEUTIC ACTIVITIES: CPT

## 2022-03-01 PROCEDURE — 70544 MR ANGIOGRAPHY HEAD W/O DYE: CPT

## 2022-03-01 PROCEDURE — 6370000000 HC RX 637 (ALT 250 FOR IP): Performed by: PSYCHIATRY & NEUROLOGY

## 2022-03-01 PROCEDURE — 2580000003 HC RX 258: Performed by: NEUROLOGICAL SURGERY

## 2022-03-01 PROCEDURE — 97116 GAIT TRAINING THERAPY: CPT

## 2022-03-01 PROCEDURE — 6370000000 HC RX 637 (ALT 250 FOR IP): Performed by: STUDENT IN AN ORGANIZED HEALTH CARE EDUCATION/TRAINING PROGRAM

## 2022-03-01 PROCEDURE — 85610 PROTHROMBIN TIME: CPT

## 2022-03-01 PROCEDURE — 99232 SBSQ HOSP IP/OBS MODERATE 35: CPT | Performed by: PSYCHIATRY & NEUROLOGY

## 2022-03-01 PROCEDURE — 97110 THERAPEUTIC EXERCISES: CPT

## 2022-03-01 PROCEDURE — 36415 COLL VENOUS BLD VENIPUNCTURE: CPT

## 2022-03-01 PROCEDURE — 6370000000 HC RX 637 (ALT 250 FOR IP): Performed by: INTERNAL MEDICINE

## 2022-03-01 PROCEDURE — 99253 IP/OBS CNSLTJ NEW/EST LOW 45: CPT | Performed by: INTERNAL MEDICINE

## 2022-03-01 RX ORDER — COLCHICINE 0.6 MG/1
0.6 TABLET ORAL 2 TIMES DAILY
Qty: 28 TABLET | Refills: 0 | Status: SHIPPED | OUTPATIENT
Start: 2022-03-01 | End: 2022-04-04 | Stop reason: ALTCHOICE

## 2022-03-01 RX ORDER — ALOGLIPTIN 12.5 MG/1
25 TABLET, FILM COATED ORAL DAILY
Refills: 1 | Status: DISCONTINUED | OUTPATIENT
Start: 2022-03-01 | End: 2022-03-01 | Stop reason: HOSPADM

## 2022-03-01 RX ORDER — INDOMETHACIN 25 MG/1
25 CAPSULE ORAL ONCE
Status: COMPLETED | OUTPATIENT
Start: 2022-03-01 | End: 2022-03-01

## 2022-03-01 RX ORDER — COLCHICINE 0.6 MG/1
0.6 TABLET ORAL 2 TIMES DAILY
Status: DISCONTINUED | OUTPATIENT
Start: 2022-03-01 | End: 2022-03-01 | Stop reason: HOSPADM

## 2022-03-01 RX ORDER — ALLOPURINOL 100 MG/1
100 TABLET ORAL 2 TIMES DAILY
Qty: 60 TABLET | Refills: 0 | Status: SHIPPED | OUTPATIENT
Start: 2022-03-01 | End: 2022-04-06 | Stop reason: SDUPTHER

## 2022-03-01 RX ORDER — WARFARIN SODIUM 2 MG/1
4 TABLET ORAL
Status: DISCONTINUED | OUTPATIENT
Start: 2022-03-01 | End: 2022-03-01 | Stop reason: HOSPADM

## 2022-03-01 RX ADMIN — INSULIN LISPRO 1 UNITS: 100 INJECTION, SOLUTION INTRAVENOUS; SUBCUTANEOUS at 12:25

## 2022-03-01 RX ADMIN — PANTOPRAZOLE SODIUM 40 MG: 40 TABLET, DELAYED RELEASE ORAL at 06:11

## 2022-03-01 RX ADMIN — ALOGLIPTIN 25 MG: 12.5 TABLET, FILM COATED ORAL at 14:00

## 2022-03-01 RX ADMIN — BUPROPION HYDROCHLORIDE 300 MG: 150 TABLET, EXTENDED RELEASE ORAL at 09:30

## 2022-03-01 RX ADMIN — LISINOPRIL 20 MG: 20 TABLET ORAL at 09:29

## 2022-03-01 RX ADMIN — OXYCODONE HYDROCHLORIDE AND ACETAMINOPHEN 1 TABLET: 5; 325 TABLET ORAL at 04:36

## 2022-03-01 RX ADMIN — SENNOSIDES 8.6 MG: 8.6 TABLET, COATED ORAL at 09:30

## 2022-03-01 RX ADMIN — SODIUM CHLORIDE, PRESERVATIVE FREE 10 ML: 5 INJECTION INTRAVENOUS at 09:31

## 2022-03-01 RX ADMIN — POLYETHYLENE GLYCOL 3350 17 G: 17 POWDER, FOR SOLUTION ORAL at 09:30

## 2022-03-01 RX ADMIN — OXYCODONE HYDROCHLORIDE AND ACETAMINOPHEN 1 TABLET: 5; 325 TABLET ORAL at 18:50

## 2022-03-01 RX ADMIN — OXYCODONE HYDROCHLORIDE AND ACETAMINOPHEN 1 TABLET: 5; 325 TABLET ORAL at 09:31

## 2022-03-01 RX ADMIN — WARFARIN SODIUM 2 MG: 2 TABLET ORAL at 09:30

## 2022-03-01 RX ADMIN — ALLOPURINOL 100 MG: 100 TABLET ORAL at 09:30

## 2022-03-01 RX ADMIN — COLCHICINE 0.6 MG: 0.6 TABLET, FILM COATED ORAL at 12:26

## 2022-03-01 RX ADMIN — OXYCODONE HYDROCHLORIDE AND ACETAMINOPHEN 1 TABLET: 5; 325 TABLET ORAL at 14:00

## 2022-03-01 RX ADMIN — INDOMETHACIN 25 MG: 25 CAPSULE ORAL at 09:30

## 2022-03-01 RX ADMIN — FUROSEMIDE 20 MG: 20 TABLET ORAL at 09:30

## 2022-03-01 ASSESSMENT — PAIN DESCRIPTION - DESCRIPTORS: DESCRIPTORS: ACHING

## 2022-03-01 ASSESSMENT — PAIN SCALES - GENERAL
PAINLEVEL_OUTOF10: 6
PAINLEVEL_OUTOF10: 4

## 2022-03-01 ASSESSMENT — PAIN DESCRIPTION - ORIENTATION
ORIENTATION: RIGHT
ORIENTATION: RIGHT

## 2022-03-01 ASSESSMENT — PAIN DESCRIPTION - LOCATION
LOCATION: KNEE
LOCATION: KNEE

## 2022-03-01 ASSESSMENT — PAIN DESCRIPTION - PROGRESSION
CLINICAL_PROGRESSION: GRADUALLY IMPROVING

## 2022-03-01 ASSESSMENT — PAIN DESCRIPTION - FREQUENCY
FREQUENCY: CONTINUOUS
FREQUENCY: CONTINUOUS

## 2022-03-01 ASSESSMENT — PAIN DESCRIPTION - PAIN TYPE
TYPE: ACUTE PAIN
TYPE: ACUTE PAIN

## 2022-03-01 ASSESSMENT — PAIN DESCRIPTION - ONSET: ONSET: ON-GOING

## 2022-03-01 NOTE — TELEPHONE ENCOUNTER
Last visit: 02/27/22  Last Med refill: 02/27/22  Does patient have enough medication for 72 hours: Yes. Next Visit Date:  Future Appointments   Date Time Provider Mark Junior   3/8/2022  8:00 AM SCHEDULE, SILVANO Sousa PC [facility]   4/25/2022 10:00 AM Kalyan Syed DPM [facility]   4/25/2022 12:00 PM BRAULIO Arreola - CNP [place]   8/15/2022  8:30 AM Randy Donovan MD [facility] None       Health Maintenance   Topic Date Due    Lipid screen  05/07/2021    Diabetic retinal exam  07/09/2022    A1C test (Diabetic or Prediabetic)  08/05/2022    Cervical cancer screen  11/16/2022    Depression Monitoring  01/24/2023    Diabetic foot exam  01/27/2023    Potassium monitoring  02/27/2023    Creatinine monitoring  02/27/2023    Pneumococcal 0-64 years Vaccine (2 of 2 - PPSV23) 04/23/2023    Breast cancer screen  09/10/2023    DTaP/Tdap/Td vaccine (2 - Td or Tdap) 08/04/2026    Colorectal Cancer Screen  02/16/2028    Flu vaccine  Completed    Shingles Vaccine  Completed    COVID-19 Vaccine  Completed    Hepatitis C screen  Addressed    HIV screen  Addressed    Hepatitis A vaccine  Aged Out    Hib vaccine  Aged Out    Meningococcal (ACWY) vaccine  Aged Out       Hemoglobin A1C (%)   Date Value   08/05/2021 7.2 (H)   07/19/2021 6.4 (A)   09/14/2020 7.5 (H)             ( goal A1C is < 7)   Microalb/Crt.  Ratio (mcg/mg creat)   Date Value   04/08/2019 CANNOT BE CALCULATED     LDL Cholesterol (mg/dL)   Date Value   05/07/2020 68   08/01/2019 51     LDL Calculated (mg/dL)   Date Value   04/10/2020 59       (goal LDL is <100)   AST (U/L)   Date Value   07/19/2021 24     ALT (U/L)   Date Value   07/19/2021 32     BUN (mg/dL)   Date Value   02/27/2022 13     BP Readings from Last 3 Encounters:   03/01/22 130/78   02/14/22 138/88   01/24/22 100/60          (goal 120/80)    All Future Testing planned in [facility]  Lab Frequency Next Occurrence   ECHO Complete 2D W Doppler W Color Once 07/19/2021   H. pylori antigen Once 75/81/6178   Basic Metabolic Panel Once 22/75/0850   CBC Once 08/14/2022   Protein / Creatinine Ratio, Urine Once 08/14/2022   Magnesium Once 08/14/2022   MRI BRAIN W WO CONTRAST Once 04/25/2022   POCT INR     POCT Glucose 4 TIMES DAILY BEFORE MEALS & AT BEDTIME    HYPOGLYCEMIA TREATMENT: blood glucose less than 50 mg/dL and patient  ALERT and TOLERATING PO PRN    HYPOGLYCEMIA TREATMENT: blood glucose less than 70 mg/dL and patient ALERT and TOLERATING PO PRN    HYPOGLYCEMIA TREATMENT: blood glucose less than 70 mg/dL and patient NOT ALERT or NPO PRN    POCT Glucose PRN    Protime-INR DAILY    Diet NPO Exceptions are: Sips of Water with Meds DIET EFFECTIVE MIDNIGHT                Patient Active Problem List:     Essential hypertension     Hyperlipidemia     Osteoarthritis     Obesity     CKD (chronic kidney disease) stage 3, GFR 30-59 ml/min ([facility])     Proteinuria     Controlled type 2 diabetes mellitus with complication, without long-term current use of insulin (HCC)     History of DVT of lower extremity     NARCISO on CPAP     Vitamin D deficiency     Major depressive disorder, recurrent episode, severe, with psychotic behavior ([address]     Multiple lung nodules on CT     Hypomagnesemia     Anxiety     Chronic obstructive pulmonary disease (HCC)     Precordial pain     History of pulmonary embolism     Family history of abdominal aortic aneurysm     Normal coronary arteries     Long term (current) use of anticoagulants     AAA (abdominal aortic aneurysm) without rupture (HCC)     COPD exacerbation (HCC)     Acute tracheobronchitis-viral     Abnormal mammogram     Cerebrovascular accident (CVA) ([address]     Diabetic nephropathy (HCC)     Slow transit constipation     Frontal mass of brain     Intracranial mass     Cerebral cavernoma     Fall     Pseudogout     History of gout

## 2022-03-01 NOTE — PROGRESS NOTES
Pharmacy Note  Warfarin Consult    Jose Juan Garcia is a 59 y.o. female for whom pharmacy has been consulted to manage warfarin therapy. Consulting Physician: Kerry Mcgee MD  Reason for Admission: Fall    Warfarin dose prior to admission: 3mg on Monday, Wednesday, and Friday and 2mg on Tuesday, Thursday, Saturday, and Sunday  Warfarin indication: PE  Target INR range: 2 to 3     Past Medical History:   Diagnosis Date    Anxiety     Chronic kidney disease     COPD (chronic obstructive pulmonary disease) ([facility] [address]     Depression     Fracture of ankle 5/14/2020    Hyperlipidemia     Hypertension     Obesity     Osteoarthritis     Proteinuria     Pulmonary embolism (HCC)     Sleep apnea     c-pap. Doesn't use everynight    SOB (shortness of breath) 5/7/2020    Type 2 diabetes mellitus without complication ([facility]     Type II or unspecified type diabetes mellitus without mention of complication, not stated as uncontrolled     Von Willebrand disease ([facility]                 Recent Labs     02/27/22  1125   INR 2.8     Recent Labs     02/27/22  1125   HGB 13.4   HCT 39.1          Current warfarin drug-drug interactions: allopurinol      Date             INR        Dose   2/28/2022            2.8       3mg    Daily PT/INR while inpatient. PT/INR ordered to start 3/1/22. Thank you for the consult. Will continue to follow.

## 2022-03-01 NOTE — DISCHARGE SUMMARY
[facility]     Department of Neurology    INPATIENT DISCHARGE SUMMARY        Patient Identification:  Chari Reynoso is a 59 y.o. female. :  1960  MRN: [id number]     Acct: [de-identified]   Admit Date:  2022  Discharge date and time: No discharge date for patient encounter. Attending Provider: Ashvin Petersen MD                                     Admission Diagnoses:   Intracranial mass [R90.0]  Fall, initial encounter [W19. XXXA]  Frontal mass of brain [G93.89]  Familial cavernous cerebral angioma (HCC) [D18.02]    Discharge Diagnoses:   Principal Problem:    Frontal mass of brain  Active Problems:    Controlled type 2 diabetes mellitus with complication, without long-term current use of insulin (HCC)    History of DVT of lower extremity    History of pulmonary embolism    Intracranial mass    Cerebral cavernoma    Fall    Pseudogout    History of gout    Familial cavernous cerebral angioma (HCC)  Resolved Problems:    * No resolved hospital problems. *       Consults:   Internal medicine, Orthopedic surgery, PT/OT     Brief Inpatient course:  Navdeep Hong is a 67-year-old  female who presents with accidental mechanical fall over her dog 2022 around 10 AM.  Past medical history significant for CKD, COPD, hypertension, PE and DVT hypercoagulable disorder on Coumadin for greater than 12 years, NARCISO noncompliant with CPAP, diabetes mellitus type 2 and obesity. Patient initially presented to [facility] following her fall due to right knee pain. Patient underwent initial trauma evaluation occluders CT head without demonstrating hyperdense left frontal lesion of unknown ideology although concern for intraparenchymal hemorrhage thus transferred to our facility for further neurosurgical evaluation.   Stat MRI brain with and without contrast completed in the emergency department consistent with cavernoma however given patient on anticoagulation with Coumadin INR 2.8 therapeutic decision was made to admit for close neurologic monitoring and repeat scan for stability. Given patient's right knee pain orthopedic surgery was consulted recommending diagnostic right knee aspiration to evaluate for acute gouty flareup. Aspirate studies significant for WBC 72,000, neutrophil 92% consistent with gout flare Gram stain negative along with cultures. Orthopedic surgery and internal medicine recommending start colchicine 0.6 mg twice daily until pain is improved along with increasing allopurinol to 100 mg twice daily. Patient neurologically stable and recommending to continue her Coumadin at discharge with outpatient neurosurgery to follow her cavernous angioma. Procedures: Right knee aspiration,    Any Hospital Acquired Infections: none    Discharge Functional Status:  stable    Disposition: home    Patient Instructions:   Current Discharge Medication List      START taking these medications    Details   colchicine (COLCRYS) 0.6 MG tablet Take 1 tablet by mouth 2 times daily for 14 days  Qty: 28 tablet, Refills: 0         CONTINUE these medications which have CHANGED    Details   allopurinol (ZYLOPRIM) 100 MG tablet Take 1 tablet by mouth 2 times daily  Qty: 60 tablet, Refills: 0         CONTINUE these medications which have NOT CHANGED    Details   vitamin C (ASCORBIC ACID) 500 MG tablet Take 500 mg by mouth daily      RA VITAMIN D-3 50 MCG (2000 UT) CAPS take 1 capsule by mouth once daily  Qty: 90 capsule, Refills: 3    Associated Diagnoses: Vitamin D deficiency      oxyCODONE-acetaminophen (PERCOCET) 5-325 MG per tablet Take 1 tablet by mouth every 8 hours as needed for Pain for up to 30 days.   Qty: 40 tablet, Refills: 0    Comments: Reduce doses taken as pain becomes manageable  Associated Diagnoses: Primary osteoarthritis involving multiple joints      polyethylene glycol (GLYCOLAX) 17 GM/SCOOP powder Take 17 g by mouth daily  Qty: 90 each, Refills: 3    Associated Diagnoses: Slow transit constipation      senna (SENOKOT) 8.6 MG tablet Take 1 tablet by mouth 2 times daily  Qty: 180 tablet, Refills: 3    Associated Diagnoses: Slow transit constipation      omeprazole (PRILOSEC) 20 MG delayed release capsule take 1 capsule by mouth once daily  Qty: 90 capsule, Refills: 1    Associated Diagnoses: Gastroesophageal reflux disease      atorvastatin (LIPITOR) 40 MG tablet take 1 tablet by mouth once daily  Qty: 90 tablet, Refills: 1    Associated Diagnoses: Hyperlipidemia, unspecified hyperlipidemia type      JANUVIA 100 MG tablet take 1 tablet by mouth once daily  Qty: 90 tablet, Refills: 1    Associated Diagnoses: Controlled type 2 diabetes mellitus with stage 3 chronic kidney disease, without long-term current use of insulin ([facility])      lisinopril (PRINIVIL;ZESTRIL) 20 MG tablet take 1 tablet by mouth once daily  Qty: 90 tablet, Refills: 1    Associated Diagnoses: Essential hypertension      isosorbide mononitrate (IMDUR) 30 MG extended release tablet take 1 tablet by mouth once daily  Qty: 90 tablet, Refills: 1    Associated Diagnoses: Essential hypertension      furosemide (LASIX) 20 MG tablet take 1 tablet by mouth once daily  Qty: 90 tablet, Refills: 1    Associated Diagnoses: Leg swelling      Semaglutide 3 MG TABS Take 3 mg by mouth daily  Qty: 90 tablet, Refills: 1    Associated Diagnoses: Controlled type 2 diabetes mellitus with stage 3 chronic kidney disease, without long-term current use of insulin ([facility])      dapagliflozin (FARXIGA) 5 MG tablet Take 1 tablet by mouth every morning  Qty: 90 tablet, Refills: 1    Associated Diagnoses: Controlled type 2 diabetes mellitus with stage 3 chronic kidney disease, without long-term current use of insulin ([facility])      buPROPion (WELLBUTRIN XL) 300 MG extended release tablet Take 2 tablets by mouth every morning  Qty: 30 tablet, Refills: 0      metFORMIN (GLUCOPHAGE-XR) 500 MG extended release tablet Take 1 tablet by mouth 2 times daily  Qty: 360 tablet, Refills: 1    Associated Diagnoses: Controlled type 2 diabetes mellitus with stage 3 chronic kidney disease, without long-term current use of insulin ([facility])      warfarin (COUMADIN) 2 MG tablet take 1 tablet by mouth SUNDAY, TUESDAY, THURSDAY, AND SATURDAY ( take 1 AND 1/2 tablets by mouth MONDAY, WEDNESDAY, FRIDAY )  Qty: 45 tablet, Refills: 5    Associated Diagnoses: History of DVT of lower extremity      Respiratory Therapy Supplies (NEBULIZER/TUBING/MOUTHPIECE) KIT 1 kit by Does not apply route 4 times daily as needed (wheezing)  Qty: 1 kit, Refills: 0    Associated Diagnoses: Mucopurulent chronic bronchitis (HCC)      zinc gluconate 50 MG tablet Take 50 mg by mouth daily      blood glucose test strips (ONETOUCH ULTRA) strip TEST THREE TIMES DAILY  Qty: 100 strip, Refills: 11    Associated Diagnoses: Controlled type 2 diabetes mellitus with stage 3 chronic kidney disease, without long-term current use of insulin ([facility])      fluticasone (FLONASE) 50 MCG/ACT nasal spray 1 spray by Nasal route daily  Qty: 1 each, Refills: 3    Associated Diagnoses: Seasonal allergic rhinitis, unspecified trigger      tiZANidine (ZANAFLEX) 4 MG tablet Take 1 tablet by mouth 3 times daily as needed (muscle relaxer)  Qty: 90 tablet, Refills: 0    Associated Diagnoses: Muscle spasm      Blood Pressure Monitoring KIT Use to check blood pressure daily and as needed  Qty: 1 kit, Refills: 0    Associated Diagnoses: Essential hypertension      Lancets Misc. (ACCU-CHEK FASTCLIX LANCET) KIT use as directed PLEASE APPROVED NEW DEVICE WHILE WE WAIT FOR PRIOR AUTH. ..  FASTCLIX MIGHT BE EAISER TO MANIPULATE  Qty: 1 kit, Refills: 0      Accu-Chek FastClix Lancets MISC use 1 LANCET to TEST BLOOD SUGAR one to two times a day  Qty: 120 each, Refills: 3    Associated Diagnoses: Controlled type 2 diabetes mellitus with stage 3 chronic kidney disease, without long-term current use of insulin ([facility])      Alcohol Swabs (ALCOHOL PREP) 70 % PADS Use twice daily and as needed to check blood sugars  Qty: 120 each, Refills: 3    Associated Diagnoses: Controlled type 2 diabetes mellitus with stage 3 chronic kidney disease, without long-term current use of insulin (HCC)      albuterol sulfate  (90 Base) MCG/ACT inhaler Inhale 2 puffs into the lungs every 4 hours as needed for Wheezing or Shortness of Breath (AND/OR COUGH)  Qty: 18 g, Refills: 1    Associated Diagnoses: Mucopurulent chronic bronchitis (HCC)      naloxone 4 MG/0.1ML LIQD nasal spray 1 spray by Nasal route as needed for Opioid Reversal  Qty: 1 each, Refills: 5      Handicap Placard MISC by Does not apply route Exp 2/3/2026  Qty: 1 each, Refills: 0    Comments: Expires: 2/02/2026  Associated Diagnoses: Osteoarthritis of multiple joints, unspecified osteoarthritis type; SOB (shortness of breath)      ipratropium-albuterol (DUONEB) 0.5-2.5 (3) MG/3ML SOLN nebulizer solution Inhale 3 mLs into the lungs every 6 hours as needed for Shortness of Breath  Qty: 360 mL, Refills: 0    Associated Diagnoses: SOB (shortness of breath)             Activity: activity as tolerated with fall precautions    Diet: diabetic diet    Follow-up:    BRAULIO Meek CNP  [address]    Schedule an appointment as soon as possible for a visit in 1 week  For hospital follow-up    [facility], [address] [facility]  [address] [facility]  [phone]    In 4 weeks  FU cavernoma       Follow up labs: continue to follow INR as she has been prior to admission     Follow up imaging: MRA head and MRI brain W/WO cavernous angioma per neurosurgery recommendation can consider repeat in 6-12 months depending if she has any symptoms related to it     Note that over 36 minutes was spent in preparing discharge papers, discussing discharge with patient, medication review, etc.      Michael Paris MD,  PGY 3 Neurology Resident  Department of Neurology  [address], [place]  3/1/2022, 4:39 PM

## 2022-03-01 NOTE — PROGRESS NOTES
Pharmacy Note  Warfarin Consult follow-up      Recent Labs     03/01/22  0415   INR 1.7     Recent Labs     02/27/22  1125   HGB 13.4   HCT 39.1          Significant Drug-Drug Interactions:  New warfarin drug-drug interactions: none  Discontinued drug-drug interactions: none  Current warfarin drug-drug interactions: allopurinol, bupropion      Date INR Dose   2/27 2.8    2/28 No dose ordered for today   3/1 1.7 4 mg       Notes: Will increase warfarin from 2 mg home dose to 4 mg x 1 dose today. Daily PT/INR while inpatient. [address] [place] [address]  Ph., CACP, Clinical Pharmacist  Anticoagulation Services, [facility]  3/1/2022  9:38 AM

## 2022-03-01 NOTE — FLOWSHEET NOTE
Bedside commode delivered, patient given all discharge paperwork/instructions. All questions/concerns addressed.     03/01/22 6817   Discharge Event   Meds to [facility] Delivered  Yes   Lay Caregiver notified of pending discharge/transfer and educated on post discharge care Yes

## 2022-03-01 NOTE — CONSULTS
[facility]  Office: [address], DO, Cynthiaedith Maria, DO, Chris Marmolejo, DO, Priti Rasmussen, DO, Jeff Chatman MD, Keaton Lang MD, Jadiel Tobar MD, Hoang Weinberg MD, Dulce Dallas MD, Lucio August MD, Sahil Johnson MD, Bernabe Shah, DO, Lennie Lewis, DO, Luanne Huang MD,  Teresa Khalil, DO, Kailyn Arteaga MD, Víctor Patricia MD, Ade Gomes MD, Sondra Hair MD, Memo Ornelas MD, Kishore Mojica, [facility], [facility], CNP, Marcella Peoples, CNP, Brandi Morocho, CNS, Lissette Gonzalez, CNP, Patrick Matamoros, CNP, Trudi Correia, CNP, Benjie Thomas, CNP, Víctor Pineda, CNP, Jluis Jeffery PA-C, Jasen Merchant, [facility], Gabby Asif, [facility], Monica Felix, CNP, Destinee Lowe, CNP, Nicolle Grady, CNP, Maira Álvarez, CNP, Mary Burks, [facility], Richie Wahl, [address] [facility] [address] / HISTORY AND PHYSICAL EXAMINATION            Date:   3/1/2022  Patient name:  Fernandez Carrion  Date of admission:  2/27/2022 11:01 AM  MRN:   [id number]  Account:  [de-identified]  YOB: 1960  PCP:    BRAULIO Guzman CNP  Room:   [address] [place] [address]  Code Status:    Prior    Physician Requesting Consult: Franko Owens MD    Reason for Consult: Management of diabetes, pseudogout and other medical problems    Chief Complaint:     Chief Complaint   Patient presents with    Fall     Friday, onto right side. Rt shoulder and knee pain        History Obtained From:     patient, family member -daughter    History of Present Illness: The patient is a 59 y.o. female who presents with Fall (Friday, onto right side.  Rt shoulder and knee pain )  Patient was admitted to neurology service due to abnormal CT of the head, follow-up MRI and follow-up CT document that this is a left hemispheric cavernoma near the vertex, this is a benign finding per neurology  Patient had fallen to the right and had painful right knee, orthopedic consult was taken polyethylene glycol (GLYCOLAX) 17 GM/SCOOP powder Take 17 g by mouth daily 1/24/22 1/24/23 Yes BRAULIO Coello CNP   senna (SENOKOT) 8.6 MG tablet Take 1 tablet by mouth 2 times daily 1/24/22 1/24/23 Yes BRAULIO Coello CNP   omeprazole (PRILOSEC) 20 MG delayed release capsule take 1 capsule by mouth once daily 1/17/22 1/17/23 Yes BRAULIO Coello CNP   atorvastatin (LIPITOR) 40 MG tablet take 1 tablet by mouth once daily 1/17/22 1/17/23 Yes BRAULIO Coello CNP   JANUVIA 100 MG tablet take 1 tablet by mouth once daily 1/17/22 1/17/23 Yes BRAULIO Coello CNP   lisinopril (PRINIVIL;ZESTRIL) 20 MG tablet take 1 tablet by mouth once daily 1/17/22  Yes BRAULIO Coello CNP   isosorbide mononitrate (IMDUR) 30 MG extended release tablet take 1 tablet by mouth once daily 1/4/22  Yes BRAULIO Coello CNP   buPROPion (WELLBUTRIN XL) 150 MG extended release tablet  12/16/21  Yes Historical Provider, MD   furosemide (LASIX) 20 MG tablet take 1 tablet by mouth once daily 12/20/21  Yes BRAULIO Coello CNP   Semaglutide 3 MG TABS Take 3 mg by mouth daily 12/7/21  Yes BRAULIO Coello CNP   dapagliflozin (FARXIGA) 5 MG tablet Take 1 tablet by mouth every morning 12/7/21 3/7/22 Yes BRAULIO Coello CNP   buPROPion (WELLBUTRIN XL) 300 MG extended release tablet Take 2 tablets by mouth every morning  Patient taking differently: Take 300 mg by mouth every morning  10/19/21  Yes BRAULIO Bellamy CNP   metFORMIN (GLUCOPHAGE-XR) 500 MG extended release tablet Take 1 tablet by mouth 2 times daily 8/30/21 8/30/22 Yes BRAULIO Coello CNP   warfarin (COUMADIN) 2 MG tablet take 1 tablet by mouth SUNDAY, TUESDAY, THURSDAY, AND SATURDAY ( take 1 AND 1/2 tablets by mouth MONDAY, WEDNESDAY, FRIDAY )  Patient taking differently: take 1 tablet by mouth every day. Took 3 mg Monday and Tuesday. 2 mg all other days.  7/6/21  Yes BRAULIO Coello - AMRIA   Respiratory Therapy Supplies (NEBULIZER/TUBING/MOUTHPIECE) KIT 1 kit by Does not apply route 4 times daily as needed (wheezing) 10/20/20  Yes BRAULIO Christianson CNP   allopurinol (ZYLOPRIM) 100 MG tablet take 1 tablet by mouth once daily 2/17/22   BRAULIO Christianson CNP   zinc gluconate 50 MG tablet Take 50 mg by mouth daily    Historical Provider, MD   blood glucose test strips (ONETOUCH ULTRA) strip TEST THREE TIMES DAILY 1/24/22 1/23/23  BRAULIO Christianson CNP   fluticasone (FLONASE) 50 MCG/ACT nasal spray 1 spray by Nasal route daily 1/5/22 1/5/23  BRAULIO Christianson CNP   tiZANidine (ZANAFLEX) 4 MG tablet Take 1 tablet by mouth 3 times daily as needed (muscle relaxer)  Patient not taking: Reported on 2/14/2022 12/21/21   BRAULIO Christianson CNP   Blood Pressure Monitoring KIT Use to check blood pressure daily and as needed 12/10/21 10/17/24  BRAULIO Christianson CNP   Lancets Misc. (ACCU-CHEK FASTCLIX LANCET) KIT use as directed PLEASE APPROVED NEW DEVICE WHILE WE WAIT Bem Addis Proctor.. ..  FASTCLIX MIGHT BE EAISER TO MANIPULATE 7/22/21 7/22/22  BRAULIO Christianson CNP   Accu-Chek FastClix Lancets MISC use 1 LANCET to TEST BLOOD SUGAR one to two times a day 7/22/21 7/22/22  BRAULIO Christianson CNP   Alcohol Swabs (ALCOHOL PREP) 70 % PADS Use twice daily and as needed to check blood sugars 7/19/21 10/20/23  BRAULIO Christianson CNP   albuterol sulfate  (90 Base) MCG/ACT inhaler Inhale 2 puffs into the lungs every 4 hours as needed for Wheezing or Shortness of Breath (AND/OR COUGH) 5/24/21 5/24/22  BRAULIO Christianson CNP   naloxone 4 MG/0.1ML LIQD nasal spray 1 spray by Nasal route as needed for Opioid Reversal 2/16/21   BRAULIO Christianson CNP   Handicap Placard MISC by Does not apply route Exp 2/3/2026 2/3/21   BRAULIO Christianson CNP   ipratropium-albuterol (DUONEB) 0.5-2.5 (3) MG/3ML SOLN nebulizer solution Inhale 3 mLs into the lungs every 6 hours as needed for Shortness of Breath 1/7/21 1/7/22  Trisha Alejandre, APRN - CNP        Allergies:     Patient has no known allergies. Social History:     Tobacco:    reports that she quit smoking about 39 years ago. Her smoking use included cigarettes. She started smoking about 44 years ago. She has a 1.75 pack-year smoking history. She has never used smokeless tobacco.  Alcohol:      reports no history of alcohol use. Drug Use:  reports no history of drug use. Family History:     Family History   Problem Relation Age of Onset    Hypertension Mother     Liver Disease Mother     Cancer Father         stomach    Diabetes Sister     Cancer Brother         kidney    No Known Problems Maternal Grandmother     No Known Problems Maternal Grandfather     No Known Problems Paternal Grandmother     No Known Problems Paternal Grandfather     Other Brother         AIDS       Review of Systems:     Positive and Negative as described in HPI. CONSTITUTIONAL:  negative for fevers, chills, sweats, fatigue, weight loss  HEENT:  negative for vision, hearing changes, runny nose, throat pain  RESPIRATORY:  negative for shortness of breath, cough, congestion, wheezing. CARDIOVASCULAR:  negative for chest pain, palpitations.   GASTROINTESTINAL:  negative for nausea, vomiting, diarrhea, constipation, change in bowel habits, abdominal pain   GENITOURINARY:  negative for difficulty of urination, burning with urination, frequency   INTEGUMENT:  negative for rash, skin lesions, easy bruising   HEMATOLOGIC/LYMPHATIC:  negative for swelling/edema   ALLERGIC/IMMUNOLOGIC:  negative for urticaria , itching  ENDOCRINE:  negative increase in drinking, increase in urination, hot or cold intolerance  MUSCULOSKELETAL:  + Right knee pain with swelling of joint  NEUROLOGICAL:  negative for headaches, dizziness, lightheadedness, numbness, pain, tingling extremities  BEHAVIOR/PSYCH:  negative for depression, anxiety    Physical Exam:     /78   Pulse 86   Temp 98.1 °F (36.7 °C) (Oral)   Resp 16   Ht 5' 2\" (1.575 m)   Wt 180 lb (81.6 kg)   SpO2 95%   BMI 32.92 kg/m²   Temp (24hrs), Av.4 °F (36.9 °C), Min:97.9 °F (36.6 °C), Max:99.4 °F (37.4 °C)    Recent Labs     22  1704 22  0653 22  1146   POCGLU 136* 144* 123* 169*       Intake/Output Summary (Last 24 hours) at 3/1/2022 1246  Last data filed at 3/1/2022 1828  Gross per 24 hour   Intake --   Output 500 ml   Net -500 ml       General Appearance:  alert, well appearing, and in no acute distress  Mental status: oriented to person, place, and time with normal affect  Head:  normocephalic, atraumatic. Eye: no icterus, redness, pupils equal and reactive, extraocular eye movements intact, conjunctiva clear  Ear: normal external ear, no discharge, hearing intact  Nose:  no drainage noted  Mouth: mucous membranes moist  Neck: supple, no carotid bruits, thyroid not palpable  Lungs: Bilateral equal air entry, clear to ausculation, no wheezing, rales or rhonchi, normal effort  Cardiovascular: normal rate, regular rhythm, no murmur, gallop, rub. Abdomen: Soft, nontender, nondistended, normal bowel sounds, no hepatomegaly or splenomegaly  Neurologic: There are no new focal motor or sensory deficits, normal muscle tone and bulk, no abnormal sensation, normal speech, cranial nerves II through XII grossly intact  Skin: No gross lesions, rashes, bruising or bleeding on exposed skin area  Extremities: + Right knee brace in place,+ swelling right knee, peripheral pulses palpable, no pedal edema or calf pain with palpation  Psych: normal affect    Investigations:      Laboratory Testing:  Recent Results (from the past 24 hour(s))   Crystals, Body Fluid    Collection Time: 22  4:39 PM   Result Value Ref Range    Specimen Type . ASPIRATE     Crystals, Fluid POSITIVE (A) NEGATIVE   Cell Count with Differential, Body Fluid    Collection Time: 22  4:39 PM   Result Value Ref Range    WBC, Fluid 72,560 /mm3    RBC, Fluid 68,000 /mm3    Specimen Type . ASPIRATE     Neutrophil Count, Fluid 92 %    Lymphocytes, Body Fluid 5 %    Other Cells, Fluid MONOCYTES %   Culture, Anaerobic and Aerobic    Collection Time: 02/28/22  4:39 PM    Specimen: Leg   Result Value Ref Range    Specimen Description . LEG RIGHT KNEE ASPI     Direct Exam MANY NEUTROPHILS     Direct Exam NO ORGANISMS SEEN     Culture NO GROWTH 16 HOURS    POC Glucose Fingerstick    Collection Time: 02/28/22  5:04 PM   Result Value Ref Range    POC Glucose 136 (H) 65 - 105 mg/dL   C-Reactive Protein    Collection Time: 02/28/22  7:12 PM   Result Value Ref Range    CRP 82.8 (H) 0.0 - 5.0 mg/L   Sedimentation Rate    Collection Time: 02/28/22  7:12 PM   Result Value Ref Range    Sed Rate 49 (H) 0 - 30 mm   POC Glucose Fingerstick    Collection Time: 02/28/22  8:17 PM   Result Value Ref Range    POC Glucose 144 (H) 65 - 105 mg/dL   Protime-INR    Collection Time: 03/01/22  4:15 AM   Result Value Ref Range    Protime 17.7 (H) 9.1 - 12.3 sec    INR 1.7    POC Glucose Fingerstick    Collection Time: 03/01/22  6:53 AM   Result Value Ref Range    POC Glucose 123 (H) 65 - 105 mg/dL   POC Glucose Fingerstick    Collection Time: 03/01/22 11:46 AM   Result Value Ref Range    POC Glucose 169 (H) 65 - 105 mg/dL       Imaging/Diagonstics:  XR CHEST (2 VW)    Result Date: 2/27/2022  No acute cardiopulmonary disease. XR SHOULDER RIGHT 1 VW    Result Date: 2/28/2022  No acute fracture. Mild glenohumeral degenerative changes. XR SHOULDER RIGHT (MIN 2 VIEWS)    Result Date: 2/27/2022  No acute abnormality right shoulder. XR KNEE RIGHT (3 VIEWS)    Result Date: 2/27/2022  No fracture or dislocation. Moderate joint effusion. Degenerative changes, most severe patellofemoral joint space, with additional findings as above. XR ANKLE RIGHT (MIN 3 VIEWS)    Result Date: 2/27/2022  No acute abnormality right ankle. Small joint effusion possible.      CT HEAD WO CONTRAST    Result Date: 2/27/2022  1. No acute intracranial abnormality. 2. 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 CT HEAD WO CONTRAST    Result Date: 2/27/2022  Incompletely assessed heterogeneous hyperattenuating 1.6 cm left frontal periventricular lesion. Differential considerations include benign vascular lesion, primary brain mass or metastasis. Acute intracranial hemorrhage less favored. Prompt contrast enhanced MRI is recommended for further evaluation. Degenerative changes in the cervical spine without evidence for acute fracture or subluxation. Findings were discussed with Jesús Shipley at 12:54 pm on 2/27/2022. RECOMMENDATIONS: Unavailable     CT CERVICAL SPINE WO CONTRAST    Result Date: 2/27/2022  Incompletely assessed heterogeneous hyperattenuating 1.6 cm left frontal periventricular lesion. Differential considerations include benign vascular lesion, primary brain mass or metastasis. Acute intracranial hemorrhage less favored. Prompt contrast enhanced MRI is recommended for further evaluation. Degenerative changes in the cervical spine without evidence for acute fracture or subluxation. Findings were discussed with Jesús Shipley at 12:54 pm on 2/27/2022. RECOMMENDATIONS: Unavailable     MRI KNEE RIGHT WO CONTRAST    Result Date: 2/28/2022  1. Tricompartmental osteoarthritis of the knee which is severe within the patellofemoral compartment with near diffuse grade 4 chondromalacia present within the patellofemoral compartment. 2. Horizontal tear of the posterior horn medial meniscus. 3. Large knee effusion. 4. Tendinosis of the intact extensor mechanism. MRI BRAIN W WO CONTRAST    Result Date: 2/27/2022  1. No acute intracranial abnormality. 2. 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 No associated enhancement. Intrinsic T1 hyperintensity is noted in the lesion, which may suggest intralesional hemorrhage. There is no associated edema or mass effect. XR HIP 2-3 VW W PELVIS RIGHT    Result Date: 2/27/2022  No right hip fracture or dislocation. Assessment :      Hospital Problems           Last Modified POA    * (Principal) Frontal mass of brain 2/27/2022 Yes    Controlled type 2 diabetes mellitus with complication, without long-term current use of insulin ([facility] [address] 3/1/2022 Yes    History of DVT of lower extremity (Chronic) 3/1/2022 Yes    History of pulmonary embolism 3/1/2022 Yes    Intracranial mass 2/27/2022 Yes    Cerebral cavernoma 2/28/2022 Yes    Fall 2/28/2022 Yes    Pseudogout 3/1/2022 Yes    History of gout 3/1/2022 Yes          Plan:     1. Start colchicine 0.6 mg 2 times daily for 5 days or till pain is improved  2. Increase allopurinol to 100 mg 2 times daily after 2 days  3. Check uric acid  4. Discontinue indomethacin as patient is on anticoagulation  5. Low-dose insulin sliding scale  6. Continue home diabetes medicines or equivalent as per hospital formulary  7. Coumadin is being managed by pharmacy  8. Needs to follow-up with PCP for gout/pseudogout and diabetes  9.  Can be discharged from medical standpoint    Thanks for the consult    Consultations:   IP CONSULT TO NEUROSURGERY  IP CONSULT TO TRAUMA SURGERY  IP CONSULT TO NEUROSURGERY  IP CONSULT TO [address] [facility] TO DOSE WARFARIN  IP CONSULT TO HOSPITALIST      Addis Christopher MD  3/1/2022  12:46 PM    Copy sent to Dr. Lu Toussaint, APRN - CNP

## 2022-03-01 NOTE — PLAN OF CARE
Discussed with attending Dr. Jeff Jones and neurosurgery. Patient is cleared to restart warfarin anticoagulation.   Pharmacy to dose warfarin

## 2022-03-01 NOTE — PROGRESS NOTES
Physical Therapy  Facility/Department: [address] NEURO  Daily Treatment Note  NAME: Gail Salgado  : 1960  MRN: [id number]    Date of Service: 3/1/2022    Discharge Recommendations:  Patient would benefit from continued therapy after discharge        Assessment   Body structures, Functions, Activity limitations: Decreased functional mobility ; Decreased posture;Decreased endurance;Decreased ROM; Decreased strength;Decreased balance;Decreased safe awareness  Assessment: Pt. ambulated 20' this date with RW and CGA, but required maximal UE support through RW and needed significantly increased time to complete. Pt. was educated with step to gait pattern which she performed with good evidence of learning, needing minimal cueing to maintain. Pt. additionally ascended/descended x3 steps with 1HR and moderate assistance. Pt. demonstrated fear of falling, required significantly increased time to complete, and showed decreased functional strength as seen through instability with stair descent. Pt. is currently unsafe to ambulate or negotiate stairs without assistance and should continue to utilize a RW for mobility. This pt. did however demo increased strength from eval as seen through needing only CGA to complete sit<>stand transfers. She will continue to benefit from skilled PT in order to improve functional endurance and strength needed to D/C with safety. PT Education: Goals;Transfer Training;PT Role;Functional Mobility Training;General Safety;Plan of Care;Gait Training;Equipment;Home Exercise Program;Weight-bearing Education  Patient Education: provided HEP including: ankle pumps, PROM of R knee, quad sets, straight leg raises, and short arc quads  Activity Tolerance  Activity Tolerance: Patient limited by pain; Patient limited by endurance     Patient Diagnosis(es): The primary encounter diagnosis was Fall, initial encounter. A diagnosis of Intracranial mass was also pertinent to this visit.      has a past medical Orientation  Orientation  Overall Orientation Status: Within Functional Limits  Cognition      Objective   Bed mobility  Scooting: Stand by assistance  Comment: Pt began today's session up in chair, and retired sitting up in chair following treatment. Transfers  Sit to Stand: Contact guard assistance  Stand to sit: Contact guard assistance  Comment: Increased time/effort to perform. Ambulation  Ambulation?: Yes  More Ambulation?: No  Ambulation 1  Surface: level tile  Device: Rolling Walker  Assistance: Contact guard assistance  Quality of Gait: required maximal UE assistance through RW, step-to gait with RLE lead, decreased stride length, decreased RLE stance phase. no LOB. Gait Deviations: Slow Ania;Decreased step length;Decreased step height  Distance: 20 feet  Comments: Significantly increased time/effort to perform. minimum SPO2 noted during ambulation was 82%. After observing this value and noting increased work of and requency of breathing per patient, educated patient on diaphragmatic breathing and had her take a seated rest break. After ~1 minute of rest, SPO2 returned to >93%. Stairs/Curb  Stairs?: Yes  Stairs  # Steps : 3  Stairs Height: 6\"  Rails: Left ascending  Device: Hand Held Assist  Assistance: Moderate assistance  Comment: pt required increased time to complete, up with LLE lead, down facing forward with RLE leading. no LOB but required maximal UE support through HR on this date. Exercises  Straight Leg Raise: attempted x2 trials, pt unable to complete secondary to RLE pain and quadricep weakness  Quad Sets: completed x10 with 3 second holds, R LE only  Knee Short Arc Quad: completed x10 with wedge placed posterior to R knee  Knee Passive Range of Motion: R knee flexion at best to 45 degrees.  completed x2 trials  Ankle Pumps: completed B x30 reps, x2 sets                        AM-PAC Score  AM-PAC Inpatient Mobility Raw Score : 16 (03/01/22 6024)  AM-PAC Inpatient T-Scale Score : 40.78 (03/01/22 1642)  Mobility Inpatient CMS 0-100% Score: 54.16 (03/01/22 1642)  Mobility Inpatient CMS G-Code Modifier : CK (03/01/22 1642)          Goals  Short term goals  Time Frame for Short term goals: 14 visits  Short term goal 1: Pt will perform sit<>stand transfer independently to increase functional independence. Short term goal 2: Pt will ambulate 150 feet with least restrictive device and SBA to increase functional independence. Short term goal 3: Pt will negotiate 4 stairs with a L handrail and SBA to allow the pt to enter prior living arrangements. Short term goal 4: Pt will tolerate a 35 minute therapy session to promote increased endurance. Short term goal 5: Pt will demonstrate good- standing balance to decrease fall risk. Plan    Plan  Times per week: 5-6x  Times per day: Daily  Current Treatment Recommendations: Strengthening,Transfer Training,Endurance Training,ROM,Balance Training,Gait Training,Functional Mobility Training,Stair training,Safety Education & Training,Home Exercise Program,Equipment Evaluation, Education, & procurement,Patient/Caregiver Education & Training  Safety Devices  Type of devices: Left in chair,Call light within reach,Gait belt,Nurse notified,Patient at risk for falls  Restraints  Initially in place: No     Therapy Time   Individual Concurrent Group Co-treatment   Time In 0335         Time Out 0420         Minutes 45         Timed Code Treatment Minutes: 39 [facility]  This treatment/evaluation completed by signing SPT. Signing PT agrees with treatment and documentation.

## 2022-03-01 NOTE — CARE COORDINATION
Transitional planning-talked with daughter Lizzeth Erazo is to take her mother home with her. Not wanting any home care at this time. Has ride. Faxed face sheet, BSC order and Face to face to [facility]. Talked with Peng Coe.

## 2022-03-01 NOTE — PROGRESS NOTES
[facility] Neurology   IN-PATIENT SERVICE      NEUROLOGY PROGRESS  NOTE            Date:   3/1/2022  Patient name:  Guido Lunsford  Date of admission:  2/27/2022  YOB: 1960      Interval History:     Orthopedics has seen the patient. Patient had MRI of the knee performed yesterday and had aspiration of the knee. Patient has pyrophosphate crystals identified in the knee aspirate. She also has an elevated sed rate and CRP. MRI 2/28/2022 reported as follows:    Impression   1. Tricompartmental osteoarthritis of the knee which is severe within the   patellofemoral compartment with near diffuse grade 4 chondromalacia present   within the patellofemoral compartment. 2. Horizontal tear of the posterior horn medial meniscus. 3. Large knee effusion. 4. Tendinosis of the intact extensor mechanism. Orthopedic is aware of MRI results. Their verbal recommendation is to remove the brace, move the knee and provide patient with Indocin. Advanced internal medicine consult reviewed patient's findings. I will provide patient with a single dose of Indocin at this time and then defer further treatment to internal medicine. Potential complicating features include use of Indocin and patient who is on Coumadin. Patient is seen in the room with one of her daughters present. Patient has no new complaints other than the knee pain. She is otherwise doing well. History of Present Illness: The patient is a 59 y.o. female who presents with Fall (Friday, onto right side. Rt shoulder and knee pain )  . The patient was seen and examined and the chart was reviewed. This patient was admitted to neurology abnormal CT of head. Follow-up MRI and follow-up CT document that this is a left hemispheric cavernoma near the vertex. This is a benign finding. The patient had fallen to the right. This was the initial presentation in the ED. Patient has a very painful right knee. Ortho consult was placed. Consult internal medicine was placed. Past Medical History:     Past Medical History:   Diagnosis Date    Anxiety     Chronic kidney disease     COPD [facility]ronic obstructive pulmonary disease) [facility] [address]     Depression     Fracture of ankle 2020    Hyperlipidemia     Hypertension     Obesity     Osteoarthritis     Proteinuria     Pulmonary embolism (HCC)     Sleep apnea     c-pap.  Doesn't use everynight    SOB (shortness of breath) 2020    Type 2 diabetes mellitus without complication (HCC)     Type II or unspecified type diabetes mellitus without mention of complication, not stated as uncontrolled     Von Willebrand disease ([facility] [address]         Past Surgical History:     Past Surgical History:   Procedure Laterality Date    APPENDECTOMY       SECTION      x3, , ,     COLONOSCOPY  2018    with biopsy    COLONOSCOPY N/A 2018    COLONOSCOPY performed by Roxi Montejo MD at [address] [facility] CATH LAB PROCEDURE      EYE SURGERY Bilateral     cataracts    EYE SURGERY Bilateral     glaucoma    TONSILLECTOMY AND ADENOIDECTOMY          Medications during admission:      senna  1 tablet Oral BID    pantoprazole  40 mg Oral QAM AC    lisinopril  20 mg Oral Daily    furosemide  20 mg Oral Daily    buPROPion  300 mg Oral QAM    atorvastatin  40 mg Oral Nightly    allopurinol  100 mg Oral Daily    insulin lispro  0-6 Units SubCUTAneous TID WC    insulin lispro  0-3 Units SubCUTAneous Nightly    polyethylene glycol  17 g Oral Daily    warfarin placeholder: dosing by pharmacy   Other RX Placeholder    warfarin  2 mg Oral Once per day on Sun Tue Thu Sat    [START ON 3/2/2022] warfarin  3 mg Oral Once per day on          Physical Exam:   /60   Pulse 88   Temp 98.5 °F (36.9 °C) (Oral)   Resp 18   Ht 5' 2\" (1.575 m)   Wt 180 lb (81.6 kg)   SpO2 95%   BMI 32.92 kg/m²   Temp (24hrs), Av.5 °F (36.9 °C), Min:97.9 °F (36.6 °C), Max:99.4 °F (37.4 °C)    Neurological examination:    Mental status   Alert and oriented to person place and knows the month and year.; following all commands; speech is fluent, no dysarthria, aphasia. Patient does have a mild recent memory defect. Cranial nerves   II -vision intact. III, IV, VI - extraocular muscles intact                                                   VII - normal facial symmetry                                                             VIII - intact hearing                                                                             IX, X -normal phonation                                            XI -normal head turning                                                      XII - midline tongue      Motor function  Strength: 5/5 RUE, right leg limited by knee pain, 5/5 LUE, 5/5  LLE  Normal bulk and tone. No tremors                      Sensory function  no sensory changes noted     Cerebellar  no ataxia or tremor     Reflex function  no change in reflexes. (Right knee Stehlin painful condition)   Gait                   not tested at this time         Diagnostics:      Laboratory Testing:  CBC:   Recent Labs     02/27/22  1125   WBC 8.3   HGB 13.4        BMP:    Recent Labs     02/27/22  1125      K 4.1   CL 99   CO2 23   BUN 13   CREATININE 0.94*   GLUCOSE 145*         Lab Results   Component Value Date    CHOL 132 05/07/2020    LDLCHOLESTEROL 68 05/07/2020    HDL 36 (L) 05/07/2020    TRIG 138 05/07/2020    ALT 32 07/19/2021    AST 24 07/19/2021    TSH 1.45 08/01/2019    INR 1.7 03/01/2022    LABA1C 7.2 (H) 08/05/2021    LABMICR CANNOT BE CALCULATED 04/08/2019       Impression:      1. Left hemisphere cavernoma as noted above. Benign finding. 2. Mild recent memory defect. 3. Patient on Coumadin for recurrent DVT and pulmonary emboli. 4. Patient has a history of gout.   5. Right knee has a combination of pyrophosphate crystal arthritic disease and medial meniscus tear on MRI. Orthopedics has seen the patient. 6. Patient has a history of gout. Plan:      Orthopedic recommends removal of knee brace and administration of Indocin.  I will order 1 dose of Indocin 25 mg  for the patient at this time.  I will ask internal medicine to evaluate the patient with regard to further treatment and management of right knee arthritis.  Int Med has cleared for discharge.  Patient needs a bedside commode because she is non ambulatory due to right knee  Pain.  Patient understands what a commode is and how to use it.          Electronically signed by Melissa Burks MD on 3/1/2022 at 8:37 AM      Melissa Burks MD  [facility]  Neurology

## 2022-03-01 NOTE — PROGRESS NOTES
Attending Physician Statement  I have discussed the case, including pertinent history and exam findings with the resident. I have seen and examined the patient on 3/1/2022 and the key elements of the encounter have been performed by me. I agree with the assessment, plan and orders as documented by the resident. Orthopedic Progress Note    Patient:  Mitch Brooks  YOB: 1960     59 y.o. female    Subjective:  Patient seen and examined. No complaints, concerns or issues overnight. Pain controlled. Patient states she has been able to ambulate to the bathroom  Independently overnight. Denies fever, CP, SOB. Vitals reviewed, afebrile    Objective:   Vitals:    03/01/22 0000   BP: 118/60   Pulse: 88   Resp: 18   Temp: 98.5 °F (36.9 °C)   SpO2: 95%     General: NAD, cooperative   Cardiovascular: Regular rate   Respiratory: Chest symmetric, no accessory muscle use, normal respirations, no audible wheezes  Musculoskeletal  RLE: Knee immobilizer on. ROM 5-30 deg with pain at extremes. Minimal to no effusion present at right knee. No erythema or excessive warmth compared to contralateral extremity. EHL/FHL/TA/GS complex motor intact. Sural, saphenous, superificial/deep peroneal, and plantar nerve distribution SILT. Dorsalis pedis pulse 2+ with BCR. Recent Labs     02/27/22  1125 02/27/22  1125 03/01/22  0415   WBC 8.3  --   --    HGB 13.4  --   --    HCT 39.1  --   --      --   --    INR 2.8   < > 1.7     --   --    K 4.1  --   --    BUN 13  --   --    CREATININE 0.94*  --   --    GLUCOSE 145*  --   --     < > = values in this interval not displayed. Meds: Has not received steroids, NSAIDs, or abx in past 24h based on chart review. See rec for complete list.    Impression 59 y.o. female with likely right knee gout flare-up. Plan  - Aspirate with high WBC count (72K) and neutrophil percentage (92%). Likely related to gout, as Gram stain was negative.    - Will monitor cultures/gram stain, but recommend treating with NSAIDs/steroids at primary's discretion for gout flare up.  - NPO, will adjust diet after discussion with attending  - Ice for 20 minutes an hour and keep elevated to reduce swelling and throbbing pain  - Medical management per primary     Electronically signed by Randy Hobbs MD at 5:34 AM 03/01/22     Discussed with patient presence of crystals. Discussed also the MRI findings which were mainly degenerative in nature. No surgical indication and management by medication.

## 2022-03-02 ENCOUNTER — TELEPHONE (OUTPATIENT)
Dept: FAMILY MEDICINE CLINIC | Age: 62
End: 2022-03-02

## 2022-03-02 RX ORDER — OXYCODONE HYDROCHLORIDE AND ACETAMINOPHEN 5; 325 MG/1; MG/1
TABLET ORAL
Qty: 40 TABLET | Refills: 0 | Status: SHIPPED | OUTPATIENT
Start: 2022-03-02 | End: 2022-03-07 | Stop reason: SDUPTHER

## 2022-03-02 NOTE — TELEPHONE ENCOUNTER
Pt scheduled 3/16/22 for hospital follow up and would like blayne to give her a phone call to discuss

## 2022-03-02 NOTE — TELEPHONE ENCOUNTER
Patient was admitted for fall.    Discharged yesterday  Please all to schedule hospital follow up  Will discuss plan with patient and daughter then

## 2022-03-03 DIAGNOSIS — M15.9 PRIMARY OSTEOARTHRITIS INVOLVING MULTIPLE JOINTS: ICD-10-CM

## 2022-03-03 NOTE — TELEPHONE ENCOUNTER
Last visit:01/24/2022   Last Med refill: [id number]  Does patient have enough medication for 72 hours: no  Patient is currently taking 5-6 tablets daily per the hospital    Next Visit Date:  Future Appointments   Date Time Provider Mark Junior   3/8/2022  8:00 AM SCHEDULE, [facility] [place] [facility]   3/16/2022 11:15 AM Trisha Carrera Net, APRN - CNP [place] [facility]   4/25/2022 10:00 AM Alberto Leahy DPM [facility]   4/25/2022 12:00 PM Trisha Carrera Net, APRN - CNP [place] [facility]   5/9/2022  1:30 PM Rodolfo Marr DO [facility]   8/15/2022  8:30 AM Evelin Mitchell MD [facility]       Health Maintenance   Topic Date Due    Lipid screen  05/07/2021    Diabetic retinal exam  07/09/2022    A1C test (Diabetic or Prediabetic)  08/05/2022    Cervical cancer screen  11/16/2022    Depression Monitoring  01/24/2023    Diabetic foot exam  01/27/2023    Potassium monitoring  02/27/2023    Creatinine monitoring  02/27/2023    Pneumococcal 0-64 years Vaccine (2 of 2 - PPSV23) 04/23/2023    Breast cancer screen  09/10/2023    DTaP/Tdap/Td vaccine (2 - Td or Tdap) 08/04/2026    Colorectal Cancer Screen  02/16/2028    Flu vaccine  Completed    Shingles Vaccine  Completed    COVID-19 Vaccine  Completed    Hepatitis C screen  Addressed    HIV screen  Addressed    Hepatitis A vaccine  Aged Out    Hib vaccine  Aged Out    Meningococcal (ACWY) vaccine  Aged Out       Hemoglobin A1C (%)   Date Value   08/05/2021 7.2 (H)   07/19/2021 6.4 (A)   09/14/2020 7.5 (H)             ( goal A1C is < 7)   Microalb/Crt.  Ratio (mcg/mg creat)   Date Value   04/08/2019 CANNOT BE CALCULATED     LDL Cholesterol (mg/dL)   Date Value   05/07/2020 68   08/01/2019 51     LDL Calculated (mg/dL)   Date Value   04/10/2020 59       (goal LDL is <100)   AST (U/L)   Date Value   07/19/2021 24     ALT (U/L)   Date Value   07/19/2021 32     BUN (mg/dL)   Date Value   02/27/2022 13     BP Readings from Last 3 Encounters:   03/01/22 130/78   02/14/22 138/88   01/24/22 100/60          (goal 120/80)    All Future Testing planned in [facility]  Lab Frequency Next Occurrence   ECHO Complete 2D W Doppler W Color Once 07/19/2021   H. pylori antigen Once 91/51/2316   Basic Metabolic Panel Once 36/43/8569   CBC Once 08/14/2022   Protein / Creatinine Ratio, Urine Once 08/14/2022   Magnesium Once 08/14/2022   MRI BRAIN W WO CONTRAST Once 04/25/2022   POCT INR                 Patient Active Problem List:     Essential hypertension     Hyperlipidemia     Osteoarthritis     Obesity     CKD (chronic kidney disease) stage 3, GFR 30-59 ml/min (HCC)     Proteinuria     Controlled type 2 diabetes mellitus with complication, without long-term current use of insulin (HCC)     History of DVT of lower extremity     NARCISO on CPAP     Vitamin D deficiency     Major depressive disorder, recurrent episode, severe, with psychotic behavior ([address]     Multiple lung nodules on CT     Hypomagnesemia     Anxiety     Chronic obstructive pulmonary disease (HCC)     Precordial pain     History of pulmonary embolism     Family history of abdominal aortic aneurysm     Normal coronary arteries     Long term (current) use of anticoagulants     AAA (abdominal aortic aneurysm) without rupture (HCC)     COPD exacerbation (HCC)     Acute tracheobronchitis-viral     Abnormal mammogram     Cerebrovascular accident (CVA) ([address]     Diabetic nephropathy ([address]     Slow transit constipation     Frontal mass of brain     Intracranial mass     Cerebral cavernoma     Fall     Pseudogout     History of gout     Familial cavernous cerebral angioma ([address]     Cavernoma

## 2022-03-04 RX ORDER — OXYCODONE HYDROCHLORIDE AND ACETAMINOPHEN 5; 325 MG/1; MG/1
TABLET ORAL
Qty: 40 TABLET | Refills: 0 | OUTPATIENT
Start: 2022-03-04 | End: 2022-04-03

## 2022-03-04 NOTE — TELEPHONE ENCOUNTER
I sent refill 2 days ago for 40 tabs   I did send her previous prescription which is 1 tab every 8 hours if needed. I reviewed hospital discharge. They did not increase this to 5-6 tabs daily. She should have plenty with 40 tabs sent Wednesday for the weekend.    We will discuss Monday at her appointment

## 2022-03-05 LAB
CULTURE: NORMAL
DIRECT EXAM: NORMAL
DIRECT EXAM: NORMAL
SPECIMEN DESCRIPTION: NORMAL

## 2022-03-07 ENCOUNTER — OFFICE VISIT (OUTPATIENT)
Dept: FAMILY MEDICINE CLINIC | Age: 62
End: 2022-03-07
Payer: MEDICARE

## 2022-03-07 ENCOUNTER — ANTI-COAG VISIT (OUTPATIENT)
Dept: FAMILY MEDICINE CLINIC | Age: 62
End: 2022-03-07

## 2022-03-07 VITALS
SYSTOLIC BLOOD PRESSURE: 116 MMHG | OXYGEN SATURATION: 98 % | TEMPERATURE: 97.6 F | DIASTOLIC BLOOD PRESSURE: 84 MMHG | RESPIRATION RATE: 24 BRPM | WEIGHT: 178.2 LBS | BODY MASS INDEX: 32.59 KG/M2 | HEART RATE: 100 BPM
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
## 2022-03-07 DIAGNOSIS — R06.89 DYSPNEA AND RESPIRATORY ABNORMALITIES: ICD-10-CM

## 2022-03-07 DIAGNOSIS — M23.311 MENISCUS, MEDIAL, ANTERIOR HORN DERANGEMENT, RIGHT: ICD-10-CM

## 2022-03-07 DIAGNOSIS — R91.8 MULTIPLE LUNG NODULES ON CT: ICD-10-CM

## 2022-03-07 DIAGNOSIS — D18.02 FAMILIAL CAVERNOUS CEREBRAL ANGIOMA (HCC): Primary | ICD-10-CM

## 2022-03-07 DIAGNOSIS — D18.00 CAVERNOMA: ICD-10-CM

## 2022-03-07 DIAGNOSIS — J44.1 COPD EXACERBATION (HCC): ICD-10-CM

## 2022-03-07 DIAGNOSIS — G47.33 OSA ON CPAP: ICD-10-CM

## 2022-03-07 DIAGNOSIS — M15.9 PRIMARY OSTEOARTHRITIS INVOLVING MULTIPLE JOINTS: ICD-10-CM

## 2022-03-07 DIAGNOSIS — Z86.718 HISTORY OF DVT OF LOWER EXTREMITY: ICD-10-CM

## 2022-03-07 DIAGNOSIS — Z86.718 HISTORY OF DVT OF LOWER EXTREMITY: Primary | Chronic | ICD-10-CM

## 2022-03-07 DIAGNOSIS — M25.461 EFFUSION OF RIGHT KNEE: ICD-10-CM

## 2022-03-07 DIAGNOSIS — R06.00 DYSPNEA AND RESPIRATORY ABNORMALITIES: ICD-10-CM

## 2022-03-07 DIAGNOSIS — Z99.89 OSA ON CPAP: ICD-10-CM

## 2022-03-07 LAB
INTERNATIONAL NORMALIZATION RATIO, POC: 2.8
PROTHROMBIN TIME, POC: 33.7

## 2022-03-07 PROCEDURE — 1036F TOBACCO NON-USER: CPT | Performed by: NURSE PRACTITIONER

## 2022-03-07 PROCEDURE — 85610 PROTHROMBIN TIME: CPT | Performed by: NURSE PRACTITIONER

## 2022-03-07 PROCEDURE — G8482 FLU IMMUNIZE ORDER/ADMIN: HCPCS | Performed by: NURSE PRACTITIONER

## 2022-03-07 PROCEDURE — G8427 DOCREV CUR MEDS BY ELIG CLIN: HCPCS | Performed by: NURSE PRACTITIONER

## 2022-03-07 PROCEDURE — 3023F SPIROM DOC REV: CPT | Performed by: NURSE PRACTITIONER

## 2022-03-07 PROCEDURE — G8417 CALC BMI ABV UP PARAM F/U: HCPCS | Performed by: NURSE PRACTITIONER

## 2022-03-07 PROCEDURE — 93793 ANTICOAG MGMT PT WARFARIN: CPT | Performed by: NURSE PRACTITIONER

## 2022-03-07 PROCEDURE — 3017F COLORECTAL CA SCREEN DOC REV: CPT | Performed by: NURSE PRACTITIONER

## 2022-03-07 PROCEDURE — 1111F DSCHRG MED/CURRENT MED MERGE: CPT | Performed by: NURSE PRACTITIONER

## 2022-03-07 PROCEDURE — 99215 OFFICE O/P EST HI 40 MIN: CPT | Performed by: NURSE PRACTITIONER

## 2022-03-07 RX ORDER — OXYCODONE HYDROCHLORIDE AND ACETAMINOPHEN 5; 325 MG/1; MG/1
1 TABLET ORAL EVERY 6 HOURS PRN
Qty: 120 TABLET | Refills: 0 | Status: SHIPPED | OUTPATIENT
Start: 2022-03-09 | End: 2022-04-11

## 2022-03-07 ASSESSMENT — ENCOUNTER SYMPTOMS
SHORTNESS OF BREATH: 0
BACK PAIN: 0
ABDOMINAL PAIN: 0
WHEEZING: 0
DIARRHEA: 0
ABDOMINAL DISTENTION: 0
CONSTIPATION: 0

## 2022-03-07 NOTE — PROGRESS NOTES
[address]  [phone]    3/7/22     Patient ID  Cecilia Oliveros is a 59 y.o. female  Established patient    Chief Complaint  Cecilia Oliveros presents today for Follow-Up from Hospital (2/27/22-3/01/22 [facility] Fall, Brain mass)      ASSESSMENT/PLAN  1. Familial cavernous cerebral angioma [facility])  Assessment & Plan:   Monitored by specialist- no acute findings meriting change in the plan   5/9/2022 Neurosurg appt, referral ordered for Neurology  2. Cavernoma  -     External Referral To Neurosurgery  3. Effusion of right knee  -     Mylene Cesar MD, Orthopedic Surgery, [facility]  4. Meniscus, medial, anterior horn derangement, right  -     Mylene Cesar MD, Orthopedic Surgery, [facility]  5. Fall, sequela  -     Mo Goldman MD, Orthopedic Surgery, [facility]  6. NARCISO on CPAP  -     Morris Carbajal MD, Pulmonology, [place]  7. COPD exacerbation ([facility] [address]  Assessment & Plan:   Unclear control, continue current medications and referral ordered for pulmonary and PFT orders, recent chest xray and CT scan  Orders:  -     Morris Carbajal MD, Pulmonology, [place]  -     Full PFT Study With Bronchodilator; Future  8. Multiple lung nodules on CT  -     Morris Carbajal MD, Pulmonology, [place]  9. Dyspnea and respiratory abnormalities  -     Morris Carbajal MD, Pulmonology, [place]  -     Full PFT Study With Bronchodilator; Future  10. Primary osteoarthritis involving multiple joints  -     oxyCODONE-acetaminophen (PERCOCET) 5-325 MG per tablet; Take 1 tablet by mouth every 6 hours as needed for Pain for up to 30 days. , Disp-120 tablet, R-0Normal       Percocet increased to every 6 hours from every 8   Schedule with ortho  Follow with neurology/neuro surgeon for monitoring     Return in about 4 weeks (around 4/4/2022) for DM, HTN.   On this date 3/7/2022 I have spent 45+ minutes reviewing previous notes, test results and face to face with the patient discussing the diagnosis and importance of compliance with the treatment plan as well as documenting on the day of the visit.     Patient Care Team:  BRAULIO Connell CNP as PCP - General (Nurse Practitioner Family)  BRAULIO Connell CNP as PCP - REHABILITATION [facility] Empaneled Provider  Love Ventura MD as Consulting Physician (Nephrology)  Chet Davison MD as Consulting Physician (Pulmonology)  Anjel Avitia MD as Consulting Physician (Cardiology)  Kristopher Marcelino DPM as Consulting Physician ([facility])  Yuki Sahni MD (Psychiatry)  Alesia Bustos MD as Consulting Physician (Neurology)  Anant Hickey DO as Surgeon (Neurosurgery)    SUBJECTIVE/OBJECTIVE  History of Present illness / Visit Summary   Paula Jones presents with her daugher  Encounter of [address]  ED and hospital admission included below  She experienced a fall at home  Her entire right side was painful, especially her right knee  Her right knee was aspirated - showed no infection, but had an acute gout flare up  She was prescribed Colcrys BID for 14 days and Allopurinol   She has have severe right knee pain after the fall  She is currently using a walk to ambulate and has a brace for her knee  Yesica has been taking Percocets Q4 PRN due to the knee pain  She does take her stool softeners daily and has not been having any issues with bowel movements  I will change her Percocet from Q8 to Q6, but I informed her not to take the Percocet Q4  The impression of her MRA brain, MRI right knee, and right shoulder xray are documented below  An urgent referral was sent to Orthopedics due to MRI findings and continuation of pain  A referral was placed for Neurology, the same physician who saw Paula Jones in the hospital  She had a Neurosurg apt 5/9/2022 for cavernous malformation found in MRA of brain during hospital admission  She has been checking her sugars at home  They range from , with an average of 130    I want to see Yesica in 1 month when she does her INR draw  Yesica was provided with anticoagulant information at the last visit  It is potential to change her from coumadin to another anticoagulant  I advised her to contact her insurance company if she wants to try Eliquis to see the cost      Knee Pain   The incident occurred more than 1 week ago (she fell over her dog at home on 2/27/2022). The incident occurred at home. The injury mechanism was a fall. The pain is present in the right leg, right knee and right hip. The quality of the pain is described as aching. The pain is at a severity of 10/10. The pain is severe. The pain has been worsening since onset. Associated symptoms include an inability to bear weight and a loss of motion. Pertinent negatives include no numbness. She reports no foreign bodies present. The symptoms are aggravated by movement and palpation. She has tried elevation, ice, non-weight bearing and rest (percocet) for the symptoms. The treatment provided mild relief. Brief Inpatient course:  2/27/2022 - 3/1/2022   Jennifer Triplett is a 67-year-old  female who presents with accidental mechanical fall over her dog 2/27/2022 around 10 AM.  Past medical history significant for CKD, COPD, hypertension, PE and DVT hypercoagulable disorder on Coumadin for greater than 12 years, NARCISO noncompliant with CPAP, diabetes mellitus type 2 and obesity. Patient initially presented to [facility] following her fall due to right knee pain. Patient underwent initial trauma evaluation occluders CT head without demonstrating hyperdense left frontal lesion of unknown ideology although concern for intraparenchymal hemorrhage thus transferred to our facility for further neurosurgical evaluation.   Stat MRI brain with and without contrast completed in the emergency department consistent with cavernoma however given patient on anticoagulation with Coumadin INR 2.8 therapeutic decision was made to admit for close neurologic monitoring and repeat scan for stability. Given patient's right knee pain orthopedic surgery was consulted recommending diagnostic right knee aspiration to evaluate for acute gouty flareup. Aspirate studies significant for WBC 72,000, neutrophil 92% consistent with gout flare Gram stain negative along with cultures. Orthopedic surgery and internal medicine recommending start colchicine 0.6 mg twice daily until pain is improved along with increasing allopurinol to 100 mg twice daily. Patient neurologically stable and recommending to continue her Coumadin at discharge with outpatient neurosurgery to follow her cavernous angioma. 3/1/2022 MRA brain -   Impression   1. Normal MR angiogram of the brain. 2. Redemonstration of the patient's known cavernous malformation,   incompletely imaged. 2/28/2022 right shoulder x-ray -   Impression   No acute fracture.  Mild glenohumeral degenerative changes. 2/28/2022 MRI Right Knee -   Impression   1. Tricompartmental osteoarthritis of the knee which is severe within the   patellofemoral compartment with near diffuse grade 4 chondromalacia present   within the patellofemoral compartment. 2. Horizontal tear of the posterior horn medial meniscus. 3. Large knee effusion. 4. Tendinosis of the intact extensor mechanism. Review of Systems  Review of Systems   Constitutional: Negative for activity change, appetite change, fatigue and fever. HENT:        Dentures, upper and lower - does not routinely wear    Eyes:        Glasses, recent exam    Respiratory: Negative for shortness of breath (improved) and wheezing. Does not wear CPAP, new referral sent to pulmonogist, PFTs ordered, recent xray and CT scan   Cardiovascular: Negative for chest pain, palpitations and leg swelling. Follows with cardiology   Gastrointestinal: Negative for abdominal distention, abdominal pain, constipation and diarrhea.    Endocrine:        Follows with podiatry for diabetic foot exams   Genitourinary: Negative for difficulty urinating, dysuria, frequency, hematuria and urgency. Follows with nephrology    Musculoskeletal: Positive for arthralgias (right knee and side pain post fall), gait problem (using walker), joint swelling and myalgias. Negative for back pain (chronic lumbar). Ortho referral, brace/walker   Skin: Negative for rash and wound. Allergic/Immunologic: Negative for environmental allergies. Neurological: Negative for dizziness, syncope, speech difficulty, light-headedness, numbness and headaches. 5/9 neurosurg appt, follow up with neurology   Hematological: Does not bruise/bleed easily. Psychiatric/Behavioral: Negative for sleep disturbance. The patient is not nervous/anxious. Follows with psychiatry        Physical exam   Physical Exam  Vitals and nursing note reviewed. Constitutional:       General: She is not in acute distress. Appearance: Normal appearance. She is well-developed and well-groomed. She is not ill-appearing or toxic-appearing. Cardiovascular:      Rate and Rhythm: Normal rate and regular rhythm. No extrasystoles are present. Heart sounds: Normal heart sounds, S1 normal and S2 normal. No murmur heard. Pulmonary:      Effort: Pulmonary effort is normal. No prolonged expiration or respiratory distress. Breath sounds: Normal breath sounds and air entry. Transmitted upper airway sounds present. Musculoskeletal:      Right shoulder: Tenderness present. Decreased range of motion. Right knee: Swelling present. Decreased range of motion. Tenderness present. Right lower leg: No edema. Left lower leg: No edema. Skin:     General: Skin is warm and dry. Coloration: Skin is not ashen, cyanotic, jaundiced or pale. Neurological:      Mental Status: She is alert and oriented to person, place, and time. Motor: Motor function is intact.       Gait: Gait abnormal.      Comments: Ambulates with walker   Psychiatric:         Attention and Perception: Attention and perception normal.         Mood and Affect: Mood and affect normal.         Speech: Speech normal.         Behavior: Behavior normal. Behavior is cooperative. Thought Content: Thought content normal. Thought content does not include suicidal ideation. Thought content does not include suicidal plan. Cognition and Memory: Cognition and memory normal.         Judgment: Judgment normal.           Electronically signed by BRAULIO Jacobson CNP, APRN-CNP on 3/14/2022 at 8:46 AM    Please note that this chart was generated using voice recognition Dragon dictation software. Although every effort was made to ensure the accuracy of this automated transcription, some errors in transcription may have occurred.     Chart review, assessment findings, and documentation completed with assistance of  of Nurse Practitioner program.

## 2022-03-07 NOTE — ASSESSMENT & PLAN NOTE
Monitored by specialist- no acute findings meriting change in the plan   5/9/2022 Neurosurg appt, referral ordered for Neurology

## 2022-03-07 NOTE — PROGRESS NOTES
Patient presents in the office today for INR check. Patient was admitted into the hospital and was taken off of coumadin. She restarted day of D/C.     INR normal at 2.8 today

## 2022-03-10 ENCOUNTER — OFFICE VISIT (OUTPATIENT)
Dept: ORTHOPEDIC SURGERY | Age: 62
End: 2022-03-10
Payer: MEDICARE

## 2022-03-10 VITALS — HEIGHT: 62 IN | WEIGHT: 178 LBS | BODY MASS INDEX: 32.76 KG/M2

## 2022-03-10 DIAGNOSIS — M17.11 PATELLOFEMORAL ARTHRITIS OF RIGHT KNEE: Primary | ICD-10-CM

## 2022-03-10 PROCEDURE — 20610 DRAIN/INJ JOINT/BURSA W/O US: CPT | Performed by: ORTHOPAEDIC SURGERY

## 2022-03-10 PROCEDURE — 99213 OFFICE O/P EST LOW 20 MIN: CPT | Performed by: ORTHOPAEDIC SURGERY

## 2022-03-11 ENCOUNTER — OFFICE VISIT (OUTPATIENT)
Dept: PRIMARY CARE CLINIC | Age: 62
End: 2022-03-11
Payer: MEDICARE

## 2022-03-11 VITALS
WEIGHT: 175.4 LBS | SYSTOLIC BLOOD PRESSURE: 130 MMHG | HEART RATE: 112 BPM | BODY MASS INDEX: 32.08 KG/M2 | DIASTOLIC BLOOD PRESSURE: 84 MMHG | OXYGEN SATURATION: 99 % | TEMPERATURE: 97.9 F

## 2022-03-11 DIAGNOSIS — L89.311 PRESSURE INJURY OF RIGHT BUTTOCK, STAGE 1: Primary | ICD-10-CM

## 2022-03-11 PROCEDURE — 1036F TOBACCO NON-USER: CPT | Performed by: FAMILY MEDICINE

## 2022-03-11 PROCEDURE — G8427 DOCREV CUR MEDS BY ELIG CLIN: HCPCS | Performed by: FAMILY MEDICINE

## 2022-03-11 PROCEDURE — G8417 CALC BMI ABV UP PARAM F/U: HCPCS | Performed by: FAMILY MEDICINE

## 2022-03-11 PROCEDURE — G8482 FLU IMMUNIZE ORDER/ADMIN: HCPCS | Performed by: FAMILY MEDICINE

## 2022-03-11 PROCEDURE — 3017F COLORECTAL CA SCREEN DOC REV: CPT | Performed by: FAMILY MEDICINE

## 2022-03-11 PROCEDURE — 99214 OFFICE O/P EST MOD 30 MIN: CPT | Performed by: FAMILY MEDICINE

## 2022-03-11 PROCEDURE — 1111F DSCHRG MED/CURRENT MED MERGE: CPT | Performed by: FAMILY MEDICINE

## 2022-03-11 RX ORDER — OSTOMY SUPPLY 2 3/4"
1 EACH MISCELLANEOUS
Qty: 4 EACH | Refills: 0 | Status: SHIPPED | OUTPATIENT
Start: 2022-03-11 | End: 2022-06-19 | Stop reason: ALTCHOICE

## 2022-03-11 RX ORDER — TIOTROPIUM BROMIDE AND OLODATEROL 3.124; 2.736 UG/1; UG/1
SPRAY, METERED RESPIRATORY (INHALATION)
COMMUNITY
Start: 2022-03-10 | End: 2022-06-28

## 2022-03-11 RX ORDER — METHYLPREDNISOLONE ACETATE 40 MG/ML
40 INJECTION, SUSPENSION INTRA-ARTICULAR; INTRALESIONAL; INTRAMUSCULAR; SOFT TISSUE ONCE
Status: SHIPPED | OUTPATIENT
Start: 2022-03-11

## 2022-03-11 RX ORDER — LIDOCAINE HYDROCHLORIDE 10 MG/ML
5 INJECTION, SOLUTION INFILTRATION; PERINEURAL ONCE
Status: SHIPPED | OUTPATIENT
Start: 2022-03-11

## 2022-03-11 NOTE — PROGRESS NOTES
Subjective:  Vicki Parson presents for   Chief Complaint   Patient presents with    Wound Infection     Pressure sore on bottom x 1 week. Pt is diabteic and is concerned about infection. She had a fall 3-4 weeks ago and has been almost non ambulatory since then. Sitting in her recliner most of the time. This week she noticed some discomfort on the right buttock cheek and thought she had a skin breaking down. Has put nothing on it. Patient Active Problem List   Diagnosis    Essential hypertension    Hyperlipidemia    Osteoarthritis    Obesity    CKD (chronic kidney disease) stage 3, GFR 30-59 ml/min (HCC)    Proteinuria    Controlled type 2 diabetes mellitus with complication, without long-term current use of insulin (HCC)    History of DVT of lower extremity    NARCISO on CPAP    Vitamin D deficiency    Major depressive disorder, recurrent episode, severe, with psychotic behavior ([address]    Multiple lung nodules on CT    Hypomagnesemia    Anxiety    Chronic obstructive pulmonary disease (HCC)    Precordial pain    History of pulmonary embolism    Family history of abdominal aortic aneurysm    Normal coronary arteries    Long term (current) use of anticoagulants    AAA (abdominal aortic aneurysm) without rupture (HCC)    COPD exacerbation (HCC)    Acute tracheobronchitis-viral    Abnormal mammogram    Cerebrovascular accident (CVA) ([address]    Diabetic nephropathy (HCC)    Slow transit constipation    Frontal mass of brain    Intracranial mass    Cerebral cavernoma    Fall    Pseudogout    History of gout    Familial cavernous cerebral angioma (HCC)    Cavernoma    Effusion of right knee    Meniscus, medial, anterior horn derangement, right       Objective:  Physical Exam   Vitals:   Wt Readings from Last 3 Encounters:   03/11/22 175 lb 6.4 oz (79.6 kg)   03/10/22 178 lb (80.7 kg)   03/07/22 178 lb 3.2 oz (80.8 kg)     Ht Readings from Last 3 Encounters:   03/10/22 5' 2\" (1.575 m)   02/27/22 5' 2\" (1.575 m)   01/24/22 5' 2\" (1.575 m)     Body mass index is 32.08 kg/m². Vitals:    03/11/22 1700   BP: 130/84   Site: Right Upper Arm   Position: Sitting   Cuff Size: Medium Adult   Pulse: 112   Temp: 97.9 °F (36.6 °C)   SpO2: 99%   Weight: 175 lb 6.4 oz (79.6 kg)       Constitutional: She is oriented to person, place, and time. She appears well-developed and well-nourished and in no acute distress. Answers all my questions appropriately. Skin - on right buttocks, right on the edge of her butt crakc, she has a1.5cm of erythema. The skin is intact. No vesicles, dishcrage or inderuation is noted. Assessment:   Encounter Diagnosis   Name Primary?  Pressure injury of right buttock, stage 1 Yes         Plan:   There are no discontinued medications. THE ABOVE NOTED DISCONTINUED MEDS MAY ONLY BE FROM 'CLEANING UP' THE MED LIST AND WERE NOT ACTUALLY CANCELED;  SEE CHART FOR DETAILS! Orders Placed This Encounter   Medications    Hydroactive Dressings (DUODERM HYDROACTIVE) MISC     Sig: Apply 1 each topically every 72 hours     Dispense:  4 each     Refill:  0     No orders of the defined types were placed in this encounter. Return in about 2 weeks (around 3/25/2022). There are no Patient Instructions on file for this visit. Follow up with your provider        emphasized the importance of keeping all weight off this area of her buttocks. Explained the pathophysiology of decubs. Do not scrub it or put anything on it. Take a picture of it every time they change the dressing.

## 2022-03-12 NOTE — PROGRESS NOTES
This 15-year-old patient is seen here for pain in the knee. She was initially seen at [facility] emergency room. She had tripped over her dog on 2/27/2022. She had undergone a CT X examination of the head and was found to have a lesion in her brain and therefore was transferred here. She was evaluated by neurologist and had an MRI carried out which showed a cavernoma. Because of the patient being on the anticoagulation she was admitted here for observation and will stabilize she was discharged to be followed up by neurologist and neurosurgeon. While she was hospitalized she was we were asked to see her for the knee and was found to have significant effusion in the knee joint. The knee joint was aspirated and found to have a very high white cell count. Further analysis revealed birefringent crystals as of gouty arthritis. She was started on gout medication. Today she complains of pain mainly in the anterior aspect of the knee joint. She has had an MRI of the knee in the past which showed tricompartmental osteoarthritis as well. Diagnosis: Gouty arthritis right knee. Treatment: Under sterile condition I injected 40 mg Depo-Medrol and 5 cc of 1% plain lidocaine in the right knee via thuan-lateral portal.  Her gait immediately improved. Her pain also improved. She does wear a brace which I told her she could discard. We will see her again in 2 weeks time but if she is better she should call up and cancel the appointment.

## 2022-03-13 DIAGNOSIS — Z86.718 HISTORY OF DVT OF LOWER EXTREMITY: ICD-10-CM

## 2022-03-14 NOTE — TELEPHONE ENCOUNTER
Last visit: 03/07/22  Last Med refill: 07/06/21  Does patient have enough medication for 72 hours: yes    Next Visit Date:  Future Appointments   Date Time Provider Mark Junior   3/23/2022  9:30 AM Eileen Orantes MD [facility]   3/28/2022 11:00 AM Trisha Jack APRN - CNP [facility]   4/4/2022  8:30 AM BRAULIO Prakash - CNP [facility]   4/25/2022 10:00 AM Clarita Retana DPM [facility]   4/25/2022 12:00 PM [facility]ash [facility]   5/9/2022  1:30 PM Celine Liu DO [facility]   8/15/2022  8:30 AM Rudy Cantu MD [facility] None       Health Maintenance   Topic Date Due    Lipid screen  05/07/2021    Diabetic retinal exam  07/09/2022    A1C test (Diabetic or Prediabetic)  08/05/2022    Cervical cancer screen  11/16/2022    Depression Monitoring  01/24/2023    Diabetic foot exam  01/27/2023    Potassium monitoring  02/27/2023    Creatinine monitoring  02/27/2023    Pneumococcal 0-64 years Vaccine (2 of 2 - PPSV23) 04/23/2023    Breast cancer screen  09/10/2023    DTaP/Tdap/Td vaccine (2 - Td or Tdap) 08/04/2026    Colorectal Cancer Screen  02/16/2028    Flu vaccine  Completed    Shingles Vaccine  Completed    COVID-19 Vaccine  Completed    Hepatitis C screen  Addressed    HIV screen  Addressed    Hepatitis A vaccine  Aged Out    Hib vaccine  Aged Out    Meningococcal (ACWY) vaccine  Aged Out       Hemoglobin A1C (%)   Date Value   08/05/2021 7.2 (H)   07/19/2021 6.4 (A)   09/14/2020 7.5 (H)             ( goal A1C is < 7)   Microalb/Crt.  Ratio (mcg/mg creat)   Date Value   04/08/2019 CANNOT BE CALCULATED     LDL Cholesterol (mg/dL)   Date Value   05/07/2020 68   08/01/2019 51     LDL Calculated (mg/dL)   Date Value   04/10/2020 59       (goal LDL is <100)   AST (U/L)   Date Value   07/19/2021 24     ALT (U/L)   Date Value   07/19/2021 32     BUN (mg/dL)   Date Value   02/27/2022 13     BP Readings from Last 3 Encounters:   03/11/22 130/84   03/07/22 116/84   03/01/22 130/78          (goal 120/80)    All Future Testing planned in [facility]  Lab Frequency Next Occurrence   ECHO Complete 2D W Doppler W Color Once 07/19/2021   H. pylori antigen Once 57/69/5100   Basic Metabolic Panel Once 30/18/0450   CBC Once 08/14/2022   Protein / Creatinine Ratio, Urine Once 08/14/2022   Magnesium Once 08/14/2022   MRI BRAIN W WO CONTRAST Once 04/25/2022   Full PFT Study With Bronchodilator Once 04/07/2022               Patient Active Problem List:     Essential hypertension     Hyperlipidemia     Osteoarthritis     Obesity     CKD (chronic kidney disease) stage 3, GFR 30-59 ml/min ([facility])     Proteinuria     Controlled type 2 diabetes mellitus with complication, without long-term current use of insulin (HCC)     History of DVT of lower extremity     NARCISO on CPAP     Vitamin D deficiency     Major depressive disorder, recurrent episode, severe, with psychotic behavior ([address]     Multiple lung nodules on CT     Hypomagnesemia     Anxiety     Chronic obstructive pulmonary disease (HCC)     Precordial pain     History of pulmonary embolism     Family history of abdominal aortic aneurysm     Normal coronary arteries     Long term (current) use of anticoagulants     AAA (abdominal aortic aneurysm) without rupture (HCC)     COPD exacerbation (HCC)     Acute tracheobronchitis-viral     Abnormal mammogram     Cerebrovascular accident (CVA) ([address]     Diabetic nephropathy ([address]     Slow transit constipation     Frontal mass of brain     Intracranial mass     Cerebral cavernoma     Fall     Pseudogout     History of gout     Familial cavernous cerebral angioma (HCC)     Cavernoma     Effusion of right knee     Meniscus, medial, anterior horn derangement, right

## 2022-03-15 RX ORDER — WARFARIN SODIUM 2 MG/1
TABLET ORAL
Qty: 115 TABLET | Refills: 1 | Status: SHIPPED | OUTPATIENT
Start: 2022-03-15 | End: 2022-05-31 | Stop reason: SDUPTHER

## 2022-03-21 ENCOUNTER — TELEPHONE (OUTPATIENT)
Dept: FAMILY MEDICINE CLINIC | Age: 62
End: 2022-03-21

## 2022-03-21 NOTE — TELEPHONE ENCOUNTER
Patient called into the office and stated that she is in need of a pulmonary function test, prior to being seen at [facility] office. She would like to have you order that test for her.

## 2022-03-24 ENCOUNTER — HOSPITAL ENCOUNTER (OUTPATIENT)
Dept: NEUROLOGY | Age: 62
Discharge: HOME | End: 2022-03-24
Payer: MEDICARE

## 2022-03-24 DIAGNOSIS — J44.1 COPD EXACERBATION (HCC): ICD-10-CM

## 2022-03-24 DIAGNOSIS — R06.89 DYSPNEA AND RESPIRATORY ABNORMALITIES: ICD-10-CM

## 2022-03-24 DIAGNOSIS — R06.00 DYSPNEA AND RESPIRATORY ABNORMALITIES: ICD-10-CM

## 2022-03-24 PROCEDURE — 94729 DIFFUSING CAPACITY: CPT

## 2022-03-24 PROCEDURE — 94060 EVALUATION OF WHEEZING: CPT

## 2022-03-24 PROCEDURE — 94726 PLETHYSMOGRAPHY LUNG VOLUMES: CPT

## 2022-03-24 PROCEDURE — 6370000000 HC RX 637 (ALT 250 FOR IP): Performed by: NURSE PRACTITIONER

## 2022-03-24 RX ORDER — ALBUTEROL SULFATE 90 UG/1
2 AEROSOL, METERED RESPIRATORY (INHALATION) ONCE
Status: COMPLETED | OUTPATIENT
Start: 2022-03-24 | End: 2022-03-24

## 2022-03-24 RX ADMIN — ALBUTEROL SULFATE 2 PUFF: 90 AEROSOL, METERED RESPIRATORY (INHALATION) at 15:27

## 2022-03-28 ENCOUNTER — OFFICE VISIT (OUTPATIENT)
Dept: FAMILY MEDICINE CLINIC | Age: 62
End: 2022-03-28
Payer: MEDICARE

## 2022-03-28 ENCOUNTER — HOSPITAL ENCOUNTER (OUTPATIENT)
Age: 62
Setting detail: SPECIMEN
Discharge: HOME | End: 2022-03-28

## 2022-03-28 ENCOUNTER — ANTI-COAG VISIT (OUTPATIENT)
Dept: FAMILY MEDICINE CLINIC | Age: 62
End: 2022-03-28
Payer: MEDICARE

## 2022-03-28 VITALS — HEART RATE: 108 BPM | SYSTOLIC BLOOD PRESSURE: 150 MMHG | OXYGEN SATURATION: 99 % | DIASTOLIC BLOOD PRESSURE: 92 MMHG

## 2022-03-28 VITALS
SYSTOLIC BLOOD PRESSURE: 150 MMHG | OXYGEN SATURATION: 99 % | DIASTOLIC BLOOD PRESSURE: 92 MMHG | HEART RATE: 108 BPM | WEIGHT: 171 LBS | HEIGHT: 62 IN | BODY MASS INDEX: 31.47 KG/M2

## 2022-03-28 DIAGNOSIS — E11.8 CONTROLLED TYPE 2 DIABETES MELLITUS WITH COMPLICATION, WITHOUT LONG-TERM CURRENT USE OF INSULIN (HCC): ICD-10-CM

## 2022-03-28 DIAGNOSIS — J44.1 COPD EXACERBATION (HCC): ICD-10-CM

## 2022-03-28 DIAGNOSIS — I10 ESSENTIAL HYPERTENSION: ICD-10-CM

## 2022-03-28 DIAGNOSIS — M11.20 PSEUDOGOUT: ICD-10-CM

## 2022-03-28 DIAGNOSIS — F41.9 ANXIETY: ICD-10-CM

## 2022-03-28 DIAGNOSIS — N18.30 STAGE 3 CHRONIC KIDNEY DISEASE, UNSPECIFIED WHETHER STAGE 3A OR 3B CKD (HCC): ICD-10-CM

## 2022-03-28 DIAGNOSIS — R70.0 ELEVATED SED RATE: ICD-10-CM

## 2022-03-28 DIAGNOSIS — D68.318 COAGULATION DISORDER DUE TO CIRCULATING ANTICOAGULANTS (HCC): Primary | ICD-10-CM

## 2022-03-28 DIAGNOSIS — E78.5 HYPERLIPIDEMIA, UNSPECIFIED HYPERLIPIDEMIA TYPE: ICD-10-CM

## 2022-03-28 DIAGNOSIS — Z86.718 HISTORY OF DVT OF LOWER EXTREMITY: ICD-10-CM

## 2022-03-28 DIAGNOSIS — F41.0 PANIC ATTACK: Primary | ICD-10-CM

## 2022-03-28 DIAGNOSIS — F33.3 MAJOR DEPRESSIVE DISORDER, RECURRENT EPISODE, SEVERE, WITH PSYCHOTIC BEHAVIOR (HCC): ICD-10-CM

## 2022-03-28 LAB
ABSOLUTE EOS #: 0.12 K/UL (ref 0–0.44)
ABSOLUTE IMMATURE GRANULOCYTE: 0.04 K/UL (ref 0–0.3)
ABSOLUTE LYMPH #: 4.01 K/UL (ref 1.1–3.7)
ABSOLUTE MONO #: 0.71 K/UL (ref 0.1–1.2)
ALBUMIN SERPL-MCNC: 4.7 G/DL (ref 3.5–5.2)
ALBUMIN/GLOBULIN RATIO: 1.7 (ref 1–2.5)
ALP BLD-CCNC: 77 U/L (ref 35–104)
ALT SERPL-CCNC: 15 U/L (ref 5–33)
ANION GAP SERPL CALCULATED.3IONS-SCNC: 19 MMOL/L (ref 9–17)
AST SERPL-CCNC: 16 U/L
BASOPHILS # BLD: 0 % (ref 0–2)
BASOPHILS ABSOLUTE: 0.04 K/UL (ref 0–0.2)
BILIRUB SERPL-MCNC: 0.3 MG/DL (ref 0.3–1.2)
BUN BLDV-MCNC: 14 MG/DL (ref 8–23)
C-REACTIVE PROTEIN: <3 MG/L (ref 0–5)
CALCIUM SERPL-MCNC: 10.3 MG/DL (ref 8.6–10.4)
CHLORIDE BLD-SCNC: 100 MMOL/L (ref 98–107)
CHOLESTEROL, FASTING: 124 MG/DL
CHOLESTEROL/HDL RATIO: 3.4
CO2: 21 MMOL/L (ref 20–31)
CREAT SERPL-MCNC: 0.95 MG/DL (ref 0.5–0.9)
EOSINOPHILS RELATIVE PERCENT: 1 % (ref 1–4)
ESTIMATED AVERAGE GLUCOSE: 131 MG/DL
GFR AFRICAN AMERICAN: >60 ML/MIN
GFR NON-AFRICAN AMERICAN: 59 ML/MIN
GFR SERPL CREATININE-BSD FRML MDRD: ABNORMAL ML/MIN/{1.73_M2}
GLUCOSE BLD-MCNC: 91 MG/DL (ref 70–99)
HBA1C MFR BLD: 6.2 % (ref 4–6)
HCT VFR BLD CALC: 42.6 % (ref 36.3–47.1)
HDLC SERPL-MCNC: 36 MG/DL
HEMOGLOBIN: 14.1 G/DL (ref 11.9–15.1)
IMMATURE GRANULOCYTES: 0 %
INTERNATIONAL NORMALIZATION RATIO, POC: 1.9
LDL CHOLESTEROL: 62 MG/DL (ref 0–130)
LYMPHOCYTES # BLD: 39 % (ref 24–43)
MCH RBC QN AUTO: 31.4 PG (ref 25.2–33.5)
MCHC RBC AUTO-ENTMCNC: 33.1 G/DL (ref 28.4–34.8)
MCV RBC AUTO: 94.9 FL (ref 82.6–102.9)
MONOCYTES # BLD: 7 % (ref 3–12)
NRBC AUTOMATED: 0 PER 100 WBC
PDW BLD-RTO: 15 % (ref 11.8–14.4)
PHOSPHORUS: 3.2 MG/DL (ref 2.6–4.5)
PLATELET # BLD: 231 K/UL (ref 138–453)
PMV BLD AUTO: 9.7 FL (ref 8.1–13.5)
POTASSIUM SERPL-SCNC: 4.7 MMOL/L (ref 3.7–5.3)
PROTHROMBIN TIME, POC: 23.2
PTH INTACT: 61.67 PG/ML (ref 15–65)
RBC # BLD: 4.49 M/UL (ref 3.95–5.11)
RBC # BLD: ABNORMAL 10*6/UL
SEDIMENTATION RATE, ERYTHROCYTE: 5 MM/HR (ref 0–30)
SEG NEUTROPHILS: 53 % (ref 36–65)
SEGMENTED NEUTROPHILS ABSOLUTE COUNT: 5.42 K/UL (ref 1.5–8.1)
SODIUM BLD-SCNC: 140 MMOL/L (ref 135–144)
THYROXINE, FREE: 1.55 NG/DL (ref 0.93–1.7)
TOTAL PROTEIN: 7.4 G/DL (ref 6.4–8.3)
TRIGLYCERIDE, FASTING: 130 MG/DL
TSH SERPL DL<=0.05 MIU/L-ACNC: 1.55 MIU/L (ref 0.3–5)
URIC ACID: 2.8 MG/DL (ref 2.4–5.7)
WBC # BLD: 10.3 K/UL (ref 3.5–11.3)

## 2022-03-28 PROCEDURE — 2022F DILAT RTA XM EVC RTNOPTHY: CPT | Performed by: NURSE PRACTITIONER

## 2022-03-28 PROCEDURE — 1111F DSCHRG MED/CURRENT MED MERGE: CPT | Performed by: NURSE PRACTITIONER

## 2022-03-28 PROCEDURE — G8428 CUR MEDS NOT DOCUMENT: HCPCS | Performed by: NURSE PRACTITIONER

## 2022-03-28 PROCEDURE — 3017F COLORECTAL CA SCREEN DOC REV: CPT | Performed by: NURSE PRACTITIONER

## 2022-03-28 PROCEDURE — 85610 PROTHROMBIN TIME: CPT | Performed by: NURSE PRACTITIONER

## 2022-03-28 PROCEDURE — G8482 FLU IMMUNIZE ORDER/ADMIN: HCPCS | Performed by: NURSE PRACTITIONER

## 2022-03-28 PROCEDURE — G8417 CALC BMI ABV UP PARAM F/U: HCPCS | Performed by: NURSE PRACTITIONER

## 2022-03-28 PROCEDURE — 3044F HG A1C LEVEL LT 7.0%: CPT | Performed by: NURSE PRACTITIONER

## 2022-03-28 PROCEDURE — 99215 OFFICE O/P EST HI 40 MIN: CPT | Performed by: NURSE PRACTITIONER

## 2022-03-28 PROCEDURE — 1036F TOBACCO NON-USER: CPT | Performed by: NURSE PRACTITIONER

## 2022-03-28 PROCEDURE — 3023F SPIROM DOC REV: CPT | Performed by: NURSE PRACTITIONER

## 2022-03-28 RX ORDER — ALPRAZOLAM 0.25 MG/1
0.25 TABLET ORAL NIGHTLY PRN
Qty: 7 TABLET | Refills: 0 | Status: SHIPPED | OUTPATIENT
Start: 2022-03-28 | End: 2022-04-06 | Stop reason: SDUPTHER

## 2022-03-28 NOTE — PROGRESS NOTES
NR 1.9   Discussed with patient at OV   Take 3 mg Tuesday.   Normal 3 mg today and Wednesday  Repeat 1 week

## 2022-03-28 NOTE — PROGRESS NOTES
[address]  [phone]    3/28/22     Patient ID  Cindy White is a 59 y.o. female  Established patient    Chief Complaint  Cindy White presents today for Anxiety and Depression      ASSESSMENT/PLAN  1. Panic attack  2. Anxiety  -     TSH; Future  -     T4, Free; Future  -     Thyroid Antibodies; Future  -     [facility] ([address] [place] [address])  3. Major depressive disorder, recurrent episode, severe, with psychotic behavior ([facility]  -     [facility] ([address] [place] [address])  4. Controlled type 2 diabetes mellitus with complication, without long-term current use of insulin ([facility])  -     Comprehensive Metabolic Panel; Future  -     Hemoglobin A1C; Future  5. Stage 3 chronic kidney disease, unspecified whether stage 3a or 3b CKD ([facility]  -     Comprehensive Metabolic Panel; Future  -     PTH, Intact; Future  -     Phosphorus; Future  6. Pseudogout  -     Uric Acid; Future  7. Elevated sed rate  -     C-Reactive Protein; Future  -     Sedimentation Rate; Future  -     CBC with Auto Differential; Future  8. COPD exacerbation ([facility]  9. Essential hypertension  -     PTH, Intact; Future  -     Phosphorus; Future  10. Hyperlipidemia, unspecified hyperlipidemia type  -     Lipid, Fasting; Future  11. History of DVT of lower extremity     Short term Xanax, low dose, sent   Scheduled with    No change in daily pharmacology - prescribed per Dr Cyndee Matthews  Does not have established therapist at [facility] - on 8 week wait list   Discussed daily coping mechanisms such as deep breathing, journaling, talking       Return in about 1 week (around 4/4/2022) for Anxiety, Depression. On this date 3/28/2022 I have spent 45+ minutes reviewing previous notes, test results and face to face with the patient discussing the diagnosis and importance of compliance with the treatment plan as well as documenting on the day of the visit.     Patient Care Team:  Ran Davis APRN - CNP as PCP - General (Nurse Practitioner Family)  Eugune Dancer APRN - CNP as PCP - [facility] Empaneled Provider  Peter Ny MD as Consulting Physician (Nephrology)  Wilian Mcgee MD as Consulting Physician (Pulmonology)  Calvin Delgado MD as Consulting Physician (Cardiology)  Mi Alcazar DPM as Consulting Physician (Carissa Johns)  Li Carson MD (Psychiatry)  Elke Pritchett MD as Consulting Physician (Neurology)  Clint Paredes DO as Surgeon (Neurosurgery)    SUBJECTIVE/OBJECTIVE  History of Present illness / Visit Summary   Chinedu Spencer - rajiv   4/26  orentaton wed   Maybe get therapist 8 weeeks? ?? Review of Systems  Review of Systems   Respiratory: Positive for chest tightness and shortness of breath. Negative for cough and wheezing. Psychiatric/Behavioral: Positive for agitation, decreased concentration and sleep disturbance. Negative for self-injury and suicidal ideas. The patient is nervous/anxious. Physical exam   Physical Exam  Vitals and nursing note reviewed. Constitutional:       General: She is not in acute distress. Appearance: Normal appearance. She is well-developed, well-groomed and overweight. She is not ill-appearing or toxic-appearing. Cardiovascular:      Rate and Rhythm: Normal rate and regular rhythm. Heart sounds: Normal heart sounds, S1 normal and S2 normal.   Pulmonary:      Effort: Pulmonary effort is normal.      Breath sounds: Normal breath sounds and air entry. Musculoskeletal:      Right lower leg: No edema. Left lower leg: No edema. Skin:     General: Skin is warm and dry. Neurological:      General: No focal deficit present. Mental Status: She is alert. Motor: Motor function is intact. Gait: Gait is intact. Psychiatric:         Attention and Perception: Perception normal. She is attentive. Mood and Affect: Mood is anxious and elated. Affect is tearful. Speech: Speech is rapid and pressured. Behavior: Behavior is cooperative. Cognition and Memory: Cognition and memory normal.         Judgment: Judgment normal.      Comments: Patient very anxious upon entering room. Difficult for her to find words, upper extremities shaking? After deep breathing, and conversation, she calmed. Neurological assessment completed, normal            Electronically signed by BRAULIO Kee - MARIA, BRAULIO-CNP on 4/10/2022 at 2:34 PM    Please note that this chart was generated using voice recognition Dragon dictation software. Although every effort was made to ensure the accuracy of this automated transcription, some errors in transcription may have occurred.

## 2022-03-29 LAB
THYROGLOBULIN AB: 15 IU/ML (ref 0–40)
THYROID PEROXIDASE (TPO) AB: <4 IU/ML (ref 0–25)

## 2022-03-30 LAB
DLCO %PRED: 107 %
DLCO PRED: NORMAL
DLCO/VA %PRED: NORMAL
DLCO/VA PRED: NORMAL
DLCO/VA: NORMAL
DLCO: NORMAL
EXPIRATORY TIME-POST: NORMAL
EXPIRATORY TIME: NORMAL
FEF 25-75% %CHNG: NORMAL
FEF 25-75% %PRED-POST: NORMAL
FEF 25-75% %PRED-PRE: NORMAL
FEF 25-75% PRED: NORMAL
FEF 25-75%-POST: NORMAL
FEF 25-75%-PRE: NORMAL
FEV1 %PRED-POST: 116 %
FEV1 %PRED-PRE: 112 %
FEV1 PRED: NORMAL
FEV1-POST: NORMAL
FEV1-PRE: NORMAL
FEV1/FVC %PRED-POST: NORMAL
FEV1/FVC %PRED-PRE: NORMAL
FEV1/FVC PRED: NORMAL
FEV1/FVC-POST: 87 %
FEV1/FVC-PRE: 87 %
FVC %PRED-POST: NORMAL
FVC %PRED-PRE: NORMAL
FVC PRED: NORMAL
FVC-POST: NORMAL
FVC-PRE: NORMAL
GAW %PRED: NORMAL
GAW PRED: NORMAL
GAW: NORMAL
IC %PRED: NORMAL
IC PRED: NORMAL
IC: NORMAL
MEP: NORMAL
MIP: NORMAL
MVV %PRED-PRE: NORMAL
MVV PRED: NORMAL
MVV-PRE: NORMAL
PEF %PRED-POST: NORMAL
PEF %PRED-PRE: NORMAL
PEF PRED: NORMAL
PEF%CHNG: NORMAL
PEF-POST: NORMAL
PEF-PRE: NORMAL
RAW %PRED: NORMAL
RAW PRED: NORMAL
RAW: NORMAL
RV %PRED: NORMAL
RV PRED: NORMAL
RV: NORMAL
SVC %PRED: NORMAL
SVC PRED: NORMAL
SVC: NORMAL
TLC %PRED: 101 %
TLC PRED: NORMAL
TLC: NORMAL
VA %PRED: NORMAL
VA PRED: NORMAL
VA: NORMAL
VTG %PRED: NORMAL
VTG PRED: NORMAL
VTG: NORMAL

## 2022-03-30 ASSESSMENT — PULMONARY FUNCTION TESTS
FEV1/FVC_POST: 87
FEV1_PERCENT_PREDICTED_PRE: 112
FEV1/FVC_PRE: 87
FEV1_PERCENT_PREDICTED_POST: 116

## 2022-03-31 ENCOUNTER — TELEPHONE (OUTPATIENT)
Dept: NEUROLOGY | Age: 62
End: 2022-03-31

## 2022-03-31 NOTE — TELEPHONE ENCOUNTER
Writer spoke with Dr Turner Mott. Per  pt is to follow up with ophthalmology. No neurology conerns at this point.

## 2022-03-31 NOTE — TELEPHONE ENCOUNTER
Patient daughter states that colton is having double vision that started two weeks ago, and having issue focusing on objects. Patient report right eye pain.

## 2022-03-31 NOTE — PROCEDURES
[id number] [facility] [address]                [address] [place] [address]                               PULMONARY FUNCTION    PATIENT NAME: Mohan Cheema                     :        1960  MED REC NO:   [id number]                             ROOM:  ACCOUNT NO:   [de-identified]                           ADMIT DATE: 2022  PROVIDER:     Alexis Bone MD    DATE OF PROCEDURE:  2022    TYPE OF STUDY:  Complete PFT. RESULTS:  The patient had FEV1 of 2.54, which is normal at 112%  predicted. FVC was 2.91, which is normal at 101% predicted. FEV1/FVC  was 87. The mid-flows were increased at 144% predicted. Total lung  capacity was normal at 101% predicted. The residual volume was normal  at 98% predicted. Diffusion was normal at 107% predicted. Following  bronchodilators, there was no significant change in the flows. IMPRESSION:  Normal study with no significant change in the flows  following bronchodilators.         Adonis Nieves MD    D: 2022 17:09:09       T: 2022 17:11:30     [email or web address]  Job#: [id number]     Doc#: [id number]    CC:

## 2022-04-04 ENCOUNTER — OFFICE VISIT (OUTPATIENT)
Dept: FAMILY MEDICINE CLINIC | Age: 62
End: 2022-04-04
Payer: MEDICARE

## 2022-04-04 ENCOUNTER — ANTI-COAG VISIT (OUTPATIENT)
Dept: FAMILY MEDICINE CLINIC | Age: 62
End: 2022-04-04

## 2022-04-04 ENCOUNTER — TELEPHONE (OUTPATIENT)
Dept: FAMILY MEDICINE CLINIC | Age: 62
End: 2022-04-04

## 2022-04-04 VITALS
RESPIRATION RATE: 24 BRPM | HEART RATE: 79 BPM | OXYGEN SATURATION: 97 % | BODY MASS INDEX: 30.73 KG/M2 | DIASTOLIC BLOOD PRESSURE: 62 MMHG | WEIGHT: 168 LBS | TEMPERATURE: 97.7 F | SYSTOLIC BLOOD PRESSURE: 112 MMHG

## 2022-04-04 DIAGNOSIS — F33.3 MAJOR DEPRESSIVE DISORDER, RECURRENT EPISODE, SEVERE, WITH PSYCHOTIC BEHAVIOR (HCC): Chronic | ICD-10-CM

## 2022-04-04 DIAGNOSIS — E11.21 DIABETIC NEPHROPATHY ASSOCIATED WITH TYPE 2 DIABETES MELLITUS (HCC): ICD-10-CM

## 2022-04-04 DIAGNOSIS — Z86.718 HISTORY OF DVT OF LOWER EXTREMITY: ICD-10-CM

## 2022-04-04 DIAGNOSIS — F41.9 ANXIETY: Primary | ICD-10-CM

## 2022-04-04 DIAGNOSIS — K59.01 SLOW TRANSIT CONSTIPATION: ICD-10-CM

## 2022-04-04 DIAGNOSIS — Z79.01 LONG TERM (CURRENT) USE OF ANTICOAGULANTS: ICD-10-CM

## 2022-04-04 DIAGNOSIS — J41.1 MUCOPURULENT CHRONIC BRONCHITIS (HCC): ICD-10-CM

## 2022-04-04 DIAGNOSIS — M11.20 PSEUDOGOUT: ICD-10-CM

## 2022-04-04 DIAGNOSIS — E11.8 CONTROLLED TYPE 2 DIABETES MELLITUS WITH COMPLICATION, WITHOUT LONG-TERM CURRENT USE OF INSULIN (HCC): ICD-10-CM

## 2022-04-04 DIAGNOSIS — D68.318 COAGULATION DISORDER DUE TO CIRCULATING ANTICOAGULANTS (HCC): Primary | ICD-10-CM

## 2022-04-04 LAB
INTERNATIONAL NORMALIZATION RATIO, POC: 2
PROTHROMBIN TIME, POC: 24

## 2022-04-04 PROCEDURE — 3017F COLORECTAL CA SCREEN DOC REV: CPT | Performed by: NURSE PRACTITIONER

## 2022-04-04 PROCEDURE — 93793 ANTICOAG MGMT PT WARFARIN: CPT | Performed by: NURSE PRACTITIONER

## 2022-04-04 PROCEDURE — 99214 OFFICE O/P EST MOD 30 MIN: CPT | Performed by: NURSE PRACTITIONER

## 2022-04-04 PROCEDURE — 3023F SPIROM DOC REV: CPT | Performed by: NURSE PRACTITIONER

## 2022-04-04 PROCEDURE — 3044F HG A1C LEVEL LT 7.0%: CPT | Performed by: NURSE PRACTITIONER

## 2022-04-04 PROCEDURE — 2022F DILAT RTA XM EVC RTNOPTHY: CPT | Performed by: NURSE PRACTITIONER

## 2022-04-04 PROCEDURE — G8417 CALC BMI ABV UP PARAM F/U: HCPCS | Performed by: NURSE PRACTITIONER

## 2022-04-04 PROCEDURE — 1036F TOBACCO NON-USER: CPT | Performed by: NURSE PRACTITIONER

## 2022-04-04 PROCEDURE — 85610 PROTHROMBIN TIME: CPT | Performed by: NURSE PRACTITIONER

## 2022-04-04 PROCEDURE — G8427 DOCREV CUR MEDS BY ELIG CLIN: HCPCS | Performed by: NURSE PRACTITIONER

## 2022-04-04 NOTE — TELEPHONE ENCOUNTER
Patient called into the office and said that she forgot to talk about possibly going to water therapy try to help with her back and leg pain. She would like to know if she would be able to get a referral for that. Please advise.

## 2022-04-04 NOTE — PROGRESS NOTES
Patient presents in the office today with self for INR check. Patient is alert, oriented, and dressed appropriately. Tolerated well with no reactions. INR therapeutic today.

## 2022-04-04 NOTE — PROGRESS NOTES
[address]  [phone]    4/4/22     Patient ID  Lyla Holbrook is a 59 y.o. female  Established patient    Chief Complaint  Lyla Holbrook presents today for Anxiety, Depression, and Medication Check    Have you seen any other physician or provider since your last visit? No  Have you had any other diagnostic tests since your last visit? Blood work   Have you been seen in the emergency room and/or had an admission to a hospital since we last saw you? No    ASSESSMENT/PLAN  1. Anxiety  Assessment & Plan:   Uncontrolled, changes made today: short supply Xanax  and lifestyle modifications recommended  2. Major depressive disorder, recurrent episode, severe, with psychotic behavior ([facility]  Assessment & Plan:   Uncontrolled, continue current medications, continue current treatment plan and lifestyle modifications recommended   Keep scheduled appointment with psychiatry and new patient appointment with [facility]   3. Mucopurulent chronic bronchitis ([facility]  Assessment & Plan:   Well-controlled, continue current medications and continue current treatment plan   Follow with pulmonology - CPAP  4. Controlled type 2 diabetes mellitus with complication, without long-term current use of insulin (HCC)  Assessment & Plan:   Well-controlled, continue current medications and continue current treatment plan  5. Pseudogout  -     allopurinol (ZYLOPRIM) 100 MG tablet; Take 1 tablet by mouth 2 times daily, Disp-180 tablet, R-1Normal  6. Diabetic nephropathy associated with type 2 diabetes mellitus ([facility]  7. Slow transit constipation  8. Long term (current) use of anticoagulants     1 week supply Xanax prescribed  Appointment scheduled with [facility] for therapy  Keep scheduled appointment with Dr Doug Guzman, not changing daily depression medications   INR checked - Addressed in anticoagulation encounter. Refills sent   Therapy for right knee?      Return today (on 4/4/2022) for Anxiety. Patient Care Team:  BRAULIO Madera CNP as PCP - General (Nurse Practitioner Family)  BRAULIO Madera CNP as PCP - [facility] Empaneled Provider  Shasha Kessler MD as Consulting Physician (Nephrology)  Gaby Castillo MD as Consulting Physician (Pulmonology)  Candice Alejo MD as Consulting Physician (Cardiology)  Yoana Haas DPM as Consulting Physician (Tara Buerger)  Jaden Patrick MD (Psychiatry)  Marina Morrell MD as Consulting Physician (Neurology)  Andrea Valerio DO as Surgeon (Neurosurgery)    SUBJECTIVE/OBJECTIVE  History of Present illness / Visit Summary   Patient presents for follow up   Reviewed health maintenance and any recent labs/imaging   Upon entry to room, Yesica was very anxious. She does have history of panic attacks. She is stuttering her words (already has stutter due to no teeth)   She is restless and very fidgety  After talking and encouragement to take time, she does calm. Her assessment was negative neurologically. She is concerned that she can not see her psychiatrist for over 1 month and is on a wait list for therapist?  She agrees to see [facility] within office, less wait times. She is going through a lot within her home. Concerns with car/transportation? Needing to move. She lives with her daughter and her family. She is worried her daughters are fighting because of her. Discussed self coping mechanisms     Anxiety  Patient complains of evaluation of anxiety disorder, bipolar disorder, panic attacks, post traumatic stress  disorder and sleep disturbance. Complains of the following anxiety symptoms: difficulty concentrating, feelings of losing control, insomnia, irritable, palpitations, psychomotor agitation. Denies current suicidal and homicidal ideation. Complains of the following side effects from the treatment: none. Review of Systems  Review of Systems   Constitutional: Positive for appetite change and fatigue. Negative for activity change. Respiratory: Positive for chest tightness (w/anxiety). Negative for shortness of breath (w/exertion). Cardiovascular: Positive for palpitations (w/anxiety). Gastrointestinal: Negative for abdominal distention, abdominal pain, constipation (improving ) and diarrhea. Musculoskeletal: Positive for arthralgias (right knee. improving, PT?) and back pain (chronic lumbar). Psychiatric/Behavioral: Positive for agitation, decreased concentration, dysphoric mood and sleep disturbance. Negative for behavioral problems, self-injury and suicidal ideas. The patient is nervous/anxious. Physical exam   Physical Exam  Vitals and nursing note reviewed. Constitutional:       General: She is not in acute distress. Appearance: Normal appearance. She is well-developed, well-groomed and overweight. She is not ill-appearing or toxic-appearing. Eyes:      Comments: Glasses    Cardiovascular:      Rate and Rhythm: Normal rate and regular rhythm. Heart sounds: Normal heart sounds, S1 normal and S2 normal.   Pulmonary:      Effort: Pulmonary effort is normal.      Breath sounds: Normal breath sounds and air entry. Skin:     General: Skin is warm and dry. Coloration: Skin is not pale. Neurological:      Mental Status: She is alert and oriented to person, place, and time. Motor: Motor function is intact. Gait: Gait is intact. Psychiatric:         Attention and Perception: Attention and perception normal.         Mood and Affect: Mood is anxious and depressed. Affect is tearful. Affect is not flat. Speech: Speech is rapid and pressured. Behavior: Behavior is withdrawn and hyperactive. Behavior is cooperative. Thought Content: Thought content normal. Thought content is not paranoid or delusional. Thought content does not include homicidal or suicidal ideation. Thought content does not include homicidal or suicidal plan.          Cognition and Memory: Cognition and memory normal.         Judgment: Judgment normal.           Electronically signed by BRAULIO Fritz CNP, APRN-CNP on 4/17/2022 at 11:27 AM    Please note that this chart was generated using voice recognition Dragon dictation software. Although every effort was made to ensure the accuracy of this automated transcription, some errors in transcription may have occurred.

## 2022-04-05 DIAGNOSIS — F41.9 ANXIETY: ICD-10-CM

## 2022-04-06 RX ORDER — ALLOPURINOL 100 MG/1
TABLET ORAL
Qty: 90 TABLET | OUTPATIENT
Start: 2022-04-06

## 2022-04-06 RX ORDER — ALLOPURINOL 100 MG/1
100 TABLET ORAL 2 TIMES DAILY
Qty: 180 TABLET | Refills: 1 | Status: SHIPPED | OUTPATIENT
Start: 2022-04-06 | End: 2022-09-29

## 2022-04-06 RX ORDER — ALPRAZOLAM 0.25 MG/1
TABLET ORAL
Qty: 7 TABLET | OUTPATIENT
Start: 2022-04-06

## 2022-04-06 RX ORDER — ALPRAZOLAM 0.25 MG/1
0.25 TABLET ORAL NIGHTLY PRN
Qty: 7 TABLET | Refills: 0 | Status: SHIPPED | OUTPATIENT
Start: 2022-04-06 | End: 2022-04-13 | Stop reason: SDUPTHER

## 2022-04-06 NOTE — TELEPHONE ENCOUNTER
Last visit: 04/04/22  Last Med refill: allopurinol 03/01/22, Xanax 03/28/22  Does patient have enough medication for 72 hours: yes    Next Visit Date:  Future Appointments   Date Time Provider Mark Sandi   4/13/2022 12:00 PM BRAULIO Mccartney CNP PC [facility]   4/13/2022  4:00 PM Bossman Aretha,  [facility]   4/13/2022  5:00 PM Tricia Khanna, [address] [place] [address]   4/25/2022 10:00 AM Edmond Benites DPM [facility]   4/25/2022 12:00 PM BRAULIO Mccartney CNP [facility]   4/28/2022  1:30 PM MARIBELL Cabrera psyc [facility]   5/9/2022  1:30 PM Felicia Briones DO [facility]   8/15/2022  8:30 AM Toñito Jackson MD [facility] None       Health Maintenance   Topic Date Due    Diabetic retinal exam  07/09/2022    Cervical cancer screen  11/16/2022    Depression Monitoring  01/24/2023    Diabetic foot exam  01/27/2023    A1C test (Diabetic or Prediabetic)  03/28/2023    Lipid screen  03/28/2023    Potassium monitoring  03/28/2023    Creatinine monitoring  03/28/2023    Pneumococcal 0-64 years Vaccine (2 of 2 - PPSV23) 04/23/2023    Breast cancer screen  09/10/2023    DTaP/Tdap/Td vaccine (2 - Td or Tdap) 08/04/2026    Colorectal Cancer Screen  02/16/2028    Flu vaccine  Completed    Shingles Vaccine  Completed    COVID-19 Vaccine  Completed    Hepatitis C screen  Addressed    HIV screen  Addressed    Hepatitis A vaccine  Aged Out    Hib vaccine  Aged Out    Meningococcal (ACWY) vaccine  Aged Out       Hemoglobin A1C (%)   Date Value   03/28/2022 6.2 (H)   08/05/2021 7.2 (H)   07/19/2021 6.4 (A)             ( goal A1C is < 7)   Microalb/Crt.  Ratio (mcg/mg creat)   Date Value   04/08/2019 CANNOT BE CALCULATED     LDL Cholesterol (mg/dL)   Date Value   03/28/2022 62   05/07/2020 68     LDL Calculated (mg/dL)   Date Value   04/10/2020 59       (goal LDL is <100)   AST (U/L)   Date Value   03/28/2022 16     ALT (U/L)   Date Value 03/28/2022 15     BUN (mg/dL)   Date Value   03/28/2022 14     BP Readings from Last 3 Encounters:   04/04/22 112/62   03/28/22 (!) 150/92   03/28/22 (!) 150/92          (goal 120/80)    All Future Testing planned in [facility]  Lab Frequency Next Occurrence   ECHO Complete 2D W Doppler W Color Once 07/19/2021   H. pylori antigen Once 08/73/1214   Basic Metabolic Panel Once 83/68/2495   CBC Once 08/14/2022   Protein / Creatinine Ratio, Urine Once 08/14/2022   Magnesium Once 08/14/2022   MRI BRAIN W WO CONTRAST Once 04/25/2022   POCT INR                 Patient Active Problem List:     Essential hypertension     Hyperlipidemia     Osteoarthritis     Obesity     CKD (chronic kidney disease) stage 3, GFR 30-59 ml/min (HCC)     Proteinuria     Controlled type 2 diabetes mellitus with complication, without long-term current use of insulin (HCC)     History of DVT of lower extremity     NARCISO on CPAP     Vitamin D deficiency     Major depressive disorder, recurrent episode, severe, with psychotic behavior ([address]     Multiple lung nodules on CT     Hypomagnesemia     Anxiety     Chronic obstructive pulmonary disease (HCC)     Precordial pain     History of pulmonary embolism     Family history of abdominal aortic aneurysm     Normal coronary arteries     Long term (current) use of anticoagulants     AAA (abdominal aortic aneurysm) without rupture (HCC)     COPD exacerbation (HCC)     Acute tracheobronchitis-viral     Abnormal mammogram     Cerebrovascular accident (CVA) ([address]     Diabetic nephropathy (HCC)     Slow transit constipation     Frontal mass of brain     Intracranial mass     Cerebral cavernoma     Pseudogout     History of gout     Familial cavernous cerebral angioma (HCC)     Cavernoma     Effusion of right knee     Meniscus, medial, anterior horn derangement, right

## 2022-04-10 DIAGNOSIS — M15.9 PRIMARY OSTEOARTHRITIS INVOLVING MULTIPLE JOINTS: ICD-10-CM

## 2022-04-10 ASSESSMENT — ENCOUNTER SYMPTOMS
WHEEZING: 0
SHORTNESS OF BREATH: 1
COUGH: 0
CHEST TIGHTNESS: 1

## 2022-04-11 RX ORDER — OXYCODONE HYDROCHLORIDE AND ACETAMINOPHEN 5; 325 MG/1; MG/1
1 TABLET ORAL EVERY 8 HOURS PRN
Qty: 40 TABLET | Refills: 0 | Status: SHIPPED | OUTPATIENT
Start: 2022-04-11 | End: 2022-05-09 | Stop reason: SDUPTHER

## 2022-04-11 NOTE — TELEPHONE ENCOUNTER
Last visit: 04/04/22  Last Med refill: 03/09/22  Does patient have enough medication for 72 hours: Yes    Next Visit Date:  Future Appointments   Date Time Provider Mark Sandi   4/13/2022 12:00 PM BRAULIO Kyle CNP [facility]   4/13/2022  4:00 PM Vanessa Silvana,  [facility]   4/13/2022  5:00 PM Rory Rao, [address] [place] [address]   4/25/2022 10:00 AM Yoana Haas DPM [facility]   4/25/2022 12:00 PM BRAUILO Kyle CNP [facility]   4/28/2022  1:30 PM MARIBELL Cabrera psyc [facility]   5/9/2022  1:30 PM Andrea Valerio DO [facility]   8/15/2022  8:30 AM Shasha Kessler MD [facility] None       Health Maintenance   Topic Date Due    Diabetic retinal exam  07/09/2022    Cervical cancer screen  11/16/2022    Depression Monitoring  01/24/2023    Diabetic foot exam  01/27/2023    A1C test (Diabetic or Prediabetic)  03/28/2023    Lipid screen  03/28/2023    Potassium monitoring  03/28/2023    Creatinine monitoring  03/28/2023    Pneumococcal 0-64 years Vaccine (2 of 2 - PPSV23) 04/23/2023    Breast cancer screen  09/10/2023    DTaP/Tdap/Td vaccine (2 - Td or Tdap) 08/04/2026    Colorectal Cancer Screen  02/16/2028    Flu vaccine  Completed    Shingles Vaccine  Completed    COVID-19 Vaccine  Completed    Hepatitis C screen  Addressed    HIV screen  Addressed    Hepatitis A vaccine  Aged Out    Hib vaccine  Aged Out    Meningococcal (ACWY) vaccine  Aged Out       Hemoglobin A1C (%)   Date Value   03/28/2022 6.2 (H)   08/05/2021 7.2 (H)   07/19/2021 6.4 (A)             ( goal A1C is < 7)   Microalb/Crt.  Ratio (mcg/mg creat)   Date Value   04/08/2019 CANNOT BE CALCULATED     LDL Cholesterol (mg/dL)   Date Value   03/28/2022 62   05/07/2020 68     LDL Calculated (mg/dL)   Date Value   04/10/2020 59       (goal LDL is <100)   AST (U/L)   Date Value   03/28/2022 16     ALT (U/L)   Date Value   03/28/2022 15     BUN (mg/dL)   Date Value   03/28/2022 14     BP Readings from Last 3 Encounters:   04/04/22 112/62   03/28/22 (!) 150/92   03/28/22 (!) 150/92          (goal 120/80)    All Future Testing planned in [facility]  Lab Frequency Next Occurrence   ECHO Complete 2D W Doppler W Color Once 07/19/2021   H. pylori antigen Once 98/44/7950   Basic Metabolic Panel Once 11/32/6181   CBC Once 08/14/2022   Protein / Creatinine Ratio, Urine Once 08/14/2022   Magnesium Once 08/14/2022   MRI BRAIN W WO CONTRAST Once 04/25/2022   POCT INR                 Patient Active Problem List:     Essential hypertension     Hyperlipidemia     Osteoarthritis     Obesity     CKD (chronic kidney disease) stage 3, GFR 30-59 ml/min (HCC)     Proteinuria     Controlled type 2 diabetes mellitus with complication, without long-term current use of insulin (HCC)     History of DVT of lower extremity     NARCISO on CPAP     Vitamin D deficiency     Major depressive disorder, recurrent episode, severe, with psychotic behavior ([address]     Multiple lung nodules on CT     Hypomagnesemia     Anxiety     Chronic obstructive pulmonary disease (HCC)     Precordial pain     History of pulmonary embolism     Family history of abdominal aortic aneurysm     Normal coronary arteries     Long term (current) use of anticoagulants     AAA (abdominal aortic aneurysm) without rupture (HCC)     COPD exacerbation (HCC)     Acute tracheobronchitis-viral     Abnormal mammogram     Cerebrovascular accident (CVA) ([address]     Diabetic nephropathy (HCC)     Slow transit constipation     Frontal mass of brain     Intracranial mass     Cerebral cavernoma     Pseudogout     History of gout     Familial cavernous cerebral angioma (HCC)     Cavernoma     Effusion of right knee     Meniscus, medial, anterior horn derangement, right

## 2022-04-13 ENCOUNTER — OFFICE VISIT (OUTPATIENT)
Dept: FAMILY MEDICINE CLINIC | Age: 62
End: 2022-04-13
Payer: MEDICARE

## 2022-04-13 ENCOUNTER — ANTI-COAG VISIT (OUTPATIENT)
Dept: FAMILY MEDICINE CLINIC | Age: 62
End: 2022-04-13
Payer: MEDICARE

## 2022-04-13 ENCOUNTER — HOSPITAL ENCOUNTER (OUTPATIENT)
Dept: PHYSICAL THERAPY | Facility: CLINIC | Age: 62
Setting detail: THERAPIES SERIES
Discharge: HOME | End: 2022-04-13
Payer: MEDICARE

## 2022-04-13 VITALS
WEIGHT: 170 LBS | SYSTOLIC BLOOD PRESSURE: 130 MMHG | DIASTOLIC BLOOD PRESSURE: 84 MMHG | HEART RATE: 88 BPM | RESPIRATION RATE: 24 BRPM | TEMPERATURE: 97.3 F | BODY MASS INDEX: 31.09 KG/M2

## 2022-04-13 DIAGNOSIS — F41.9 ANXIETY: Primary | ICD-10-CM

## 2022-04-13 DIAGNOSIS — Z86.718 HISTORY OF DVT OF LOWER EXTREMITY: Primary | ICD-10-CM

## 2022-04-13 DIAGNOSIS — F33.3 MAJOR DEPRESSIVE DISORDER, RECURRENT EPISODE, SEVERE, WITH PSYCHOTIC BEHAVIOR (HCC): Chronic | ICD-10-CM

## 2022-04-13 DIAGNOSIS — F43.10 POST TRAUMATIC STRESS DISORDER: ICD-10-CM

## 2022-04-13 LAB
INTERNATIONAL NORMALIZATION RATIO, POC: 2
PROTHROMBIN TIME, POC: 24.5

## 2022-04-13 PROCEDURE — 1036F TOBACCO NON-USER: CPT | Performed by: NURSE PRACTITIONER

## 2022-04-13 PROCEDURE — 85610 PROTHROMBIN TIME: CPT | Performed by: NURSE PRACTITIONER

## 2022-04-13 PROCEDURE — 99215 OFFICE O/P EST HI 40 MIN: CPT | Performed by: NURSE PRACTITIONER

## 2022-04-13 PROCEDURE — 3017F COLORECTAL CA SCREEN DOC REV: CPT | Performed by: NURSE PRACTITIONER

## 2022-04-13 PROCEDURE — 93793 ANTICOAG MGMT PT WARFARIN: CPT | Performed by: NURSE PRACTITIONER

## 2022-04-13 PROCEDURE — 97113 AQUATIC THERAPY/EXERCISES: CPT

## 2022-04-13 PROCEDURE — G8427 DOCREV CUR MEDS BY ELIG CLIN: HCPCS | Performed by: NURSE PRACTITIONER

## 2022-04-13 PROCEDURE — 97162 PT EVAL MOD COMPLEX 30 MIN: CPT

## 2022-04-13 PROCEDURE — G8417 CALC BMI ABV UP PARAM F/U: HCPCS | Performed by: NURSE PRACTITIONER

## 2022-04-13 RX ORDER — ALPRAZOLAM 0.25 MG/1
0.25 TABLET ORAL NIGHTLY PRN
Qty: 7 TABLET | Refills: 0 | Status: SHIPPED | OUTPATIENT
Start: 2022-04-16 | End: 2022-04-25 | Stop reason: SDUPTHER

## 2022-04-13 NOTE — CONSULTS
[] [address]  [address]  [phone]  F: [phone] [] [address]  [address]  P: [phone]  F: [phone] [] [place]  [address]  P: [phone]  F: [phone] [] [address]  P: [phone]  F: [phone] [] [address]  [facility]   [address]   [place]: [phone]  F: [phone]      Physical Therapy Lower Extremity Evaluation    Date:  2022  Patient: Eugenio Lornezo  : 1960  MRN: [id number]  Physician: BRAULIO South CNP  Insurance: [employer]  Medical Diagnosis: R knee effusion  Rehab Codes: M25.561, M25.661  Onset date: 22 (fall)  Next 's appt. :  (neuro)    Subjective:   CC: R knee effusion  HPI: she tripped on her dog and fell. She went to [facility] to [facility]. Vs.  She was in the hospital March 3. She could barely walk due to having arthritis all over\". Getting better. Still having issues. She reports she has fallen several times as she has lost her balance on stairs. She used a walker when she came home from the hospital and transitioned to without for past 6 days. She attended today's session 45 min late. Able to perform eval    PMHx: [] Unremarkable [] Diabetes [] HTN  [] Pacemaker   [] MI/Heart Problems [] Cancer [] Arthritis [x] Other: COPD, \"very bad depression\", anxiety only been outside 3x in 25 years. [x] Refer to full medical chart  In EPIC       Comorbidities:   [x] Obesity [] Dialysis  [] N/A   [x] COPD [] Dementia [] Other:   [] Stroke [] Sleep apnea [] Other:   [] Vascular disease [] Rheumatic disease [] Other:     Tests: [] X-Ray:   [x] MRI:   Impression   1.  Tricompartmental osteoarthritis of the knee which is severe within the   patellofemoral compartment with near diffuse grade 4 chondromalacia present   within the patellofemoral compartment. 2. Horizontal tear of the posterior horn medial meniscus. 3. Large knee effusion. 4. Tendinosis of the intact extensor mechanism. Medications: [x] Refer to full medical record [] None [] Other:  Allergies:      [x] Refer to full medical record [] None [] Other:    Function:  Hand Dominance  [] Right  [] Left  Patient lives with: W/ her daughter   In what type of home [x]  One story   [] Two story   [] Split level   Number of stairs to enter    With handrail on the []  Right to enter   [x] Left to enter-4   Bathroom has a []  Tub only  [] Tub/shower combo   [x] Walk in shower    [x]  Grab bars on one side of the shower   Washing machine is on []  Main level   [] Second level   [] Basement   Employer    Job Status []  Normal duty   [] Light duty   [] Off due to condition    []  Retired   [] Not employed   [x] Disability  [] Other:  []  Return to work:    Work activities/duties        ADL/IADL Previous level of function Current level of function Who currently assists the patient with task   Bathing  [] Independent  [] Assist [x] Independent  [] Assist    Dress/grooming [] Independent  [] Assist [x] Independent  [] Assist    Transfer/mobility [] Independent  [] Assist [x] Independent  [] Assist    Feeding [] Independent  [] Assist [x] Independent  [] Assist    Toileting [] Independent  [] Assist [x] Independent  [] Assist    Driving [] Independent  [] Assist [x] Independent  [] Assist    Housekeeping [] Independent  [] Assist [x] Independent  [] Assist    Grocery shop/meal prep [] Independent  [] Assist [] Independent  [] Assist      Gait Prior level of function Current level of function    [] Independent  [] Assist [x] Independent  [] Assist   Device: [] Independent [x] Independent    [] Straight Cane [] Quad cane [] Straight Cane [] Quad cane    [] Standard walker [] Rolling walker   [] 4 wheeled walker [] Standard walker [] Rolling walker   [] 4 wheeled walker    [] Wheelchair [] Wheelchair     Pain:  [x] Yes  [] No Location: R knee Pain Rating: (0-10 scale) 7/10  Pain altered Tx:  [] Yes  [x] No  Action:    Symptoms:  [] Improving [x] Worsening [] Same  Better:  [] AM    [] PM    [] Sit    [] Rise/Sit    []Stand    [] Walk    [] Lying    [] Other:  Worse: [] AM    [] PM    [] Sit    [] Rise/Sit    [x]Stand    [x] Walk    [] Lying    [] Bend                      [] Valsalva    [] Other:  Sleep: [x] OK    [] Disturbed    Objective:    ROM  ° A/P STRENGTH TESTS (+/-) Left Right Not Tested    Left Right Left Right Ant.  Drawer   []   Hip Flex     Post. Drawer   []   Ext     Lachmans   []   ER     Valgus Stress   []   IR     Varus Stress   []   ABD     Yasirs   []   ADD     Apleys Comp.   []   Knee Flex 130 110   Apleys Dist.   []   Ext 0 10   Hip Scouring   []   Ankle DF     NUs   []   PF     Piriformis   []   INV     Abis   []   EVER     Talor Tilt   []        Pat-Fem Grind   []       OBSERVATION No Deficit Deficit Not Tested Comments   Posture       Forward Head [] [] []    Rounded Shoulders [] [] []    Kyphosis [] [] []    Lordosis [] [] []    Lateral Shift [] [] []    Scoliosis [] [] []    Iliac Crest [] [] []    PSIS [] [] []    ASIS [] [] []    Genu Valgus [] [] []    Genu Varus [] [] []    Genu Recurvatum [] [] []    Pronation [] [] []    Supination [] [] []    Leg Length Discrp [] [] []    Slumped Sitting [] [] []    Palpation [] [] []    Sensation [] [] []    Edema [] [] []    Neurological [] [] []    Patellar Mobility [] [] []    Patellar Orientation [] [] []    Gait [] [x] [] Analysis: limited knee extension         FUNCTION Normal Difficult Unable   Sitting [] [] []   Standing [] [x] []   Ambulation [] [x] []   Groom/Dress [] [] []   Lift/Carry [] [] []   Stairs [] [] []   Bending [] [] []   Squat [] [] []   Kneel [] [] [] BALANCE/PROPRIOCEPTION              [] Not tested   Single leg stance       R                     L                                PAIN   Eyes open                             Sec. Sec                  . []    Eyes closed                          Sec. Sec                  . []      Functional Test: LEFI Score: 78% functionally impaired     Comments:    Assessment:  Patient would benefit from skilled physical therapy services in order to: increase R knee ROM, strength, and function as well as we can considering OA present in knee    Problems:    [x] ? Pain:  [x] ? ROM:  [x] ? Strength:  [x] ? Function:  [] Other:       STG: (to be met in 8 treatments)  1. ? Pain:<3/10  2. ? ROM:achieve 0 deg. 3. ? Strength:   4. ? Function: LEFI >45/80  5. Patient to be independent with home exercise program as demonstrated by performance with correct form without cues. 6. Demonstrate Knowledge of fall prevention  LTG: (to be met in 16 treatments)  1. Pain to 2/10 at the worst  2. Achieve 0-120 deg in R knee  3. LEFI >53/80                   Patient goals:\"get my knee going again and try to relieve back pain\"    Rehab Potential:  [] Good  [x] Fair  [] Poor   Suggested Professional Referral:  [x] No  [] Yes:  Barriers to Goal Achievement:  [x] No  [] Yes:  Domestic Concerns:  [x] No  [] Yes:    Pt. Education:  [x] Plans/Goals, Risks/Benefits discussed  [] Home exercise program    Method of Education: [x] Verbal  [] Demo  [] Written  Comprehension of Education:  [x] Verbalizes understanding. [] Demonstrates understanding. [] Needs Review. [] Demonstrates/verbalizes understanding of HEP/Ed previously given.     Treatment Plan:  [x] Therapeutic Exercise   13827  [] Iontophoresis: 4 mg/mL Dexamethasone Sodium Phosphate  mAmin  01610   [x] Therapeutic Activity  96489 [x] Vasopneumatic cold with compression  73768    [] Gait Training   31462 [] Ultrasound   33627   [x] Neuromuscular Re-education  86331 [] Electrical Stimulation Unattended  81845   [] Manual Therapy  45590 [] Electrical Stimulation Attended  [id number]   [x] Instruction in HEP  [] Lumbar/Cervical Traction  [id number]   [x] Aquatic Therapy   [id number] [] Cold/hotpack    [] Massage   [id number]      [] Dry Needling, 1 or 2 muscles  48489   [] Biofeedback, first 15 minutes   22289  [] Biofeedback, additional 15 minutes   40794 [] Dry Needling, 3 or more muscles  74885     []  Medication allergies reviewed for use of    Dexamethasone Sodium Phosphate 4mg/ml     with iontophoresis treatments. Pt is not allergic. Frequency:  2 x/week for 16 visits        Todays Treatment:  Modalities:   Precautions: severe depression  Exercises:  Exercise Reps/ Time Weight/ Level Comments                                 Other:    Specific Instructions for next treatment:  1. Initiate an aquatics program      Evaluation Complexity:  History (Personal factors, comorbidities) [] 0 [x] 1-2 [] 3+   Exam (limitations, restrictions) [x] 1-2 [] 3 [] 4+   Clinical presentation (progression) [] Stable [x] Evolving  [] Unstable   Decision Making [] Low [x] Moderate [] High    [] Low Complexity [x] Moderate Complexity [] High Complexity       Treatment Charges: Mins Units   [x] Evaluation       []  Low       [x]  Moderate       []  High  1   []  Modalities     []  Ther Exercise     []  Manual Therapy     []  Ther Activities     []  Aquatics     []  Vasocompression     []  Other       TOTAL TREATMENT TIME: 20    Time in: 1640  Time Out:1700    Electronically signed by: Karen Carpenter PT        Physician Signature:________________________________Date:__________________  By signing above or cosigning this note, I have reviewed this plan of care and certify a need for medically necessary rehabilitation services.      *PLEASE SIGN ABOVE AND FAX BACK ALL PAGES*

## 2022-04-13 NOTE — PROGRESS NOTES
[address]  [phone]    4/13/22     Patient ID   Chuck Tolentino is a 57 y.o. female  Established patient    Chief Complaint  Chuck Tolentino presents today for Anxiety (F/U )    Have you seen any other physician or provider since your last visit? No  Have you had any other diagnostic tests since your last visit? No  Have you been seen in the emergency room and/or had an admission to a hospital since we last saw you? No     ASSESSMENT/PLAN  1. Anxiety  2. Major depressive disorder, recurrent episode, severe, with psychotic behavior ([facility]  Assessment & Plan:   Monitored by specialist- no acute findings meriting change in the plan  3. Post traumatic stress disorder     No pharmacology changes  Appointment scheduled with psychiatrist  Refill short supply Xanax     Return in about 2 weeks (around 4/27/2022) for Anxiety, As needed. On this date 4/13/2022 I have spent 40+ minutes reviewing previous notes, test results and face to face with the patient discussing the diagnosis and importance of compliance with the treatment plan as well as documenting on the day of the visit. Patient Care Team:  BRAULIO Lindquist - CNP as PCP - General (Nurse Practitioner Family)  BRAULIO Lindquist CNP as PCP - [facility] Empaneled Provider  Jacobo Sotelo MD as Consulting Physician (Nephrology)  Nery Joseph MD as Consulting Physician (Pulmonology)  Kaylee Pearson MD as Consulting Physician (Cardiology)  Verduzco , DPM as Consulting Physician ([place])  Lisa Martínez MD (Psychiatry)  Roseann Galo MD as Consulting Physician (Neurology)  Marshall Jeff DO as Surgeon (Neurosurgery)    SUBJECTIVE/OBJECTIVE  History of Present illness / Visit Summary   Patient presents for follow up   Reviewed health maintenance and any recent labs/imaging   Doing well with Xanax if needed for anxiety, only taking at night if needed.   Utilizing self coping mechanisms well  Talking with family more regarding her concerns with move  Still looking else where. Anxiety  Patient complains of evaluation of anxiety disorder, bipolar disorder, panic attacks, post traumatic stress  disorder and sleep disturbance. Complains of the following anxiety symptoms: chest pain, difficulty concentrating, feelings of losing control, insomnia, irritable, racing thoughts. Denies current suicidal and homicidal ideation. Complains of the following side effects from the treatment: none. Review of Systems  Review of Systems   Constitutional: Negative for activity change, appetite change and fatigue. Respiratory: Positive for chest tightness. Negative for shortness of breath. Cardiovascular: Positive for palpitations. Gastrointestinal: Negative for abdominal distention, abdominal pain, constipation and diarrhea. Psychiatric/Behavioral: Positive for decreased concentration and sleep disturbance. Negative for agitation, behavioral problems, dysphoric mood, self-injury and suicidal ideas. The patient is nervous/anxious. Appointment scheduled with psychiatrist. Referral for therapy        Physical exam   Physical Exam  Vitals and nursing note reviewed. Constitutional:       General: She is not in acute distress. Appearance: Normal appearance. She is well-developed, well-groomed and overweight. She is not ill-appearing or toxic-appearing. Cardiovascular:      Rate and Rhythm: Normal rate and regular rhythm. Heart sounds: Normal heart sounds, S1 normal and S2 normal.   Pulmonary:      Effort: Pulmonary effort is normal.      Breath sounds: Normal breath sounds and air entry. Skin:     General: Skin is warm and dry. Coloration: Skin is not pale. Neurological:      Mental Status: She is alert and oriented to person, place, and time. Motor: Motor function is intact. Gait: Gait is intact.    Psychiatric:         Attention and Perception: Attention and perception normal.         Mood and Affect: Mood is anxious. Mood is not depressed. Affect is tearful. Affect is not flat. Speech: Speech is rapid and pressured. Behavior: Behavior is agitated. Behavior is cooperative. Thought Content: Thought content normal. Thought content does not include homicidal or suicidal ideation. Thought content does not include homicidal or suicidal plan. Cognition and Memory: Cognition and memory normal.         Judgment: Judgment normal.           Electronically signed by BRAULIO Ordonez CNP, APRN-CNP on 5/1/2022 at 7:56 PM    Please note that this chart was generated using voice recognition Dragon dictation software. Although every effort was made to ensure the accuracy of this automated transcription, some errors in transcription may have occurred.

## 2022-04-13 NOTE — PROGRESS NOTES
Patient presents in the office today with self for INR check. Patient is alert, oriented, and dressed appropriately.      INR therapeutic at 2.0 today

## 2022-04-17 ASSESSMENT — ENCOUNTER SYMPTOMS
SHORTNESS OF BREATH: 0
ABDOMINAL PAIN: 0
DIARRHEA: 0
ABDOMINAL DISTENTION: 0
BACK PAIN: 1
CONSTIPATION: 0
CHEST TIGHTNESS: 1

## 2022-04-17 NOTE — ASSESSMENT & PLAN NOTE
Uncontrolled, continue current medications, continue current treatment plan and lifestyle modifications recommended   Keep scheduled appointment with psychiatry and new patient appointment with [address] [place] [address]

## 2022-04-17 NOTE — ASSESSMENT & PLAN NOTE
Well-controlled, continue current medications and continue current treatment plan   Follow with pulmonology - CPAP

## 2022-04-18 DIAGNOSIS — E11.22 CONTROLLED TYPE 2 DIABETES MELLITUS WITH STAGE 3 CHRONIC KIDNEY DISEASE, WITHOUT LONG-TERM CURRENT USE OF INSULIN (HCC): ICD-10-CM

## 2022-04-18 DIAGNOSIS — N18.30 CONTROLLED TYPE 2 DIABETES MELLITUS WITH STAGE 3 CHRONIC KIDNEY DISEASE, WITHOUT LONG-TERM CURRENT USE OF INSULIN (HCC): ICD-10-CM

## 2022-04-18 RX ORDER — UBIQUINOL 100 MG
CAPSULE ORAL
Qty: 120 EACH | Refills: 3 | Status: SHIPPED | OUTPATIENT
Start: 2022-04-18 | End: 2022-06-25 | Stop reason: SDUPTHER

## 2022-04-18 NOTE — TELEPHONE ENCOUNTER
Last visit: 04/13/22  Last Med refill: 07/19/21  Does patient have enough medication for 72 hours: Yes    Next Visit Date:  Future Appointments   Date Time Provider Mark Sandi   4/20/2022  4:00 PM Rhonda Bowling,  [facility]   4/22/2022  4:00 PM Rhonda Bowling, PTA [facility] [address]   4/25/2022 10:00 AM Marilu Flanagan DPM [facility]   4/25/2022 12:00 PM Trisha Choi, APRN - CNP [facility]   4/28/2022  1:30 PM KIRSTEN Cabrera-S swterence psyc [facility]   5/9/2022  1:30 PM Evelin Claire DO [facility]   7/6/2022  2:00 PM Natalie Julian MD Resp Spec [address] [place] [address]   8/15/2022  8:30 AM Rusty Dow MD [facility] None       Health Maintenance   Topic Date Due    Diabetic retinal exam  07/09/2022    Cervical cancer screen  11/16/2022    Depression Monitoring  01/24/2023    Diabetic foot exam  01/27/2023    A1C test (Diabetic or Prediabetic)  03/28/2023    Lipid screen  03/28/2023    Potassium monitoring  03/28/2023    Creatinine monitoring  03/28/2023    Pneumococcal 0-64 years Vaccine (3 - PPSV23 or PCV20) 04/23/2023    Breast cancer screen  09/10/2023    DTaP/Tdap/Td vaccine (2 - Td or Tdap) 08/04/2026    Colorectal Cancer Screen  02/16/2028    Flu vaccine  Completed    Shingles Vaccine  Completed    COVID-19 Vaccine  Completed    Hepatitis C screen  Addressed    HIV screen  Addressed    Hepatitis A vaccine  Aged Out    Hib vaccine  Aged Out    Meningococcal (ACWY) vaccine  Aged Out       Hemoglobin A1C (%)   Date Value   03/28/2022 6.2 (H)   08/05/2021 7.2 (H)   07/19/2021 6.4 (A)             ( goal A1C is < 7)   Microalb/Crt.  Ratio (mcg/mg creat)   Date Value   04/08/2019 CANNOT BE CALCULATED     LDL Cholesterol (mg/dL)   Date Value   03/28/2022 62   05/07/2020 68     LDL Calculated (mg/dL)   Date Value   04/10/2020 59       (goal LDL is <100)   AST (U/L)   Date Value   03/28/2022 16     ALT (U/L)   Date Value   03/28/2022 15 BUN (mg/dL)   Date Value   03/28/2022 14     BP Readings from Last 3 Encounters:   04/13/22 130/84   04/04/22 112/62   03/28/22 (!) 150/92          (goal 120/80)    All Future Testing planned in [facility]  Lab Frequency Next Occurrence   ECHO Complete 2D W Doppler W Color Once 07/19/2021   H. pylori antigen Once 66/73/7185   Basic Metabolic Panel Once 16/62/6180   CBC Once 08/14/2022   Protein / Creatinine Ratio, Urine Once 08/14/2022   Magnesium Once 08/14/2022   MRI BRAIN W WO CONTRAST Once 04/25/2022   POCT INR                 Patient Active Problem List:     Essential hypertension     Hyperlipidemia     Osteoarthritis     Obesity     CKD (chronic kidney disease) stage 3, GFR 30-59 ml/min (HCC)     Proteinuria     Controlled type 2 diabetes mellitus with complication, without long-term current use of insulin (HCC)     History of DVT of lower extremity     NARCISO on CPAP     Vitamin D deficiency     Major depressive disorder, recurrent episode, severe, with psychotic behavior ([address]     Multiple lung nodules on CT     Hypomagnesemia     Anxiety     Chronic obstructive pulmonary disease (HCC)     Precordial pain     History of pulmonary embolism     Family history of abdominal aortic aneurysm     Normal coronary arteries     Long term (current) use of anticoagulants     AAA (abdominal aortic aneurysm) without rupture (HCC)     COPD exacerbation (HCC)     Acute tracheobronchitis-viral     Abnormal mammogram     Cerebrovascular accident (CVA) ([address]     Diabetic nephropathy (HCC)     Slow transit constipation     Frontal mass of brain     Intracranial mass     Cerebral cavernoma     Pseudogout     History of gout     Familial cavernous cerebral angioma (HCC)     Cavernoma     Effusion of right knee     Meniscus, medial, anterior horn derangement, right

## 2022-04-18 NOTE — FLOWSHEET NOTE
[] [address]  [address]  [phone]  F: [phone] [] [address]  [address]   [address]  P: [phone]  F: [phone] [x] [address]  [address]  P: [phone]  F: [phone] [] [address]  P: [phone]  F: [phone] [] [address]  [facility]   [address]   Esteban Curling: [phone]  F: [phone]      Physical Therapy Daily Treatment Note    Date:  2022  Patient Name:  Joann Orantes"  :  1960  MRN: [id number]  Physician: BRAULIO Logan CNP                  Insurance: [employer]  Medical Diagnosis: R knee effusion             Rehab Codes: M25.561, M25.661  Onset date: 22 (fall)                  Next Dr's appt. :  (neuro)       Visit# / total visits:     Cancels/No Shows:     Subjective:    Pain:  [x] Yes  [] No Location: R Knee Pain Rating: (0-10 scale) 7/10  Pain altered Tx:  [] No  [] Yes  Action:  Comments: Pt arrived reporting pain is \"all over\" due to arthritis. Pt stated she has not been in the water in about \"40 years\". Presents with slowed gait and no AD.      Objective:  Modalities:   Precautions: Severe depression   Exercises:      KEY  B = Belt G = Gloves P= Paddles   C = Collar K = Kickboard T = Theratube   DB = Dumbells N = Noodle W = Weights     Exercises/Activities  Warm-up/Amb  Noodle Dynamic Exercises    Forward 2L March    Sideways 2L Squat    Backwards  Retro HS curls      Retro SLR    Stretches  Braiding    Gastroc/Soleus  Heel to Toe amb    Hamstring  Toe amb    Hip flexor  Heel amb    Piriformis      SKTC      Pec Stretch      Post Deltoid  Static Exercises UE      Shoulder flex/ext    Static Exercises LE Shoulder abd/add    Heel/toe raises 10 Shoulder H.  abd/add    Marches 10 Shoulder IR/ER    Mini-squats  Rowing    4-way hip  Hip Flexion only-10 Arm Circles    Hamstring curls 10 UT shrugs/rolls    Hip Circles/Fig 8  Scap squeezes    Ankle ROM  Diagonals 1/2    Lunges   Elbow flex/ext      Pron/Sup    Functional Exercise  Wrist AROM    Step      Wall Push-ups  Deep H20/    SLS  Bike    Breast Stroke on Noodle  Hip abd/add    Noodle Twist  Hip flex/ext    Noodle Push down  Hip IR/ER    Kickboard push/pull  Knee flex/ext      Push/pull on [employer]    Other:    Treatment Charges: Mins Units   []  Modalities     []  Ther Exercise     []  Manual Therapy     []  Ther Activities     [x]  Aquatics 25 2   []  Vasocompression     []  Other     Total Treatment time 25 2       Assessment: [x] Progressing toward goals. No assist required for entry into pool, provided pt with noodle to ensure safety and comfort in the water. Initiated tx with dynamic warmup in the shallow water with instruction given to stand tall and relax the shoulders. No LOB displayed, only slowed gait performance. Added gentle LE strengthening with pt utilizing heavy UE reliance upon hand rails. Encouraged pt to reduce support and relax the shoulders with fair carryover. Pt stated she should have brought her inhaler and water, provided pt with water from clinic and encouraged bringing both to future sessions. Did not further progress as pt stated she was fatigued and would like to end the session early. Plan for one on one with therapist for future sessions. [] No change. [] Other:  [x] Patient would continue to benefit from skilled physical therapy services in order to:  increase R knee ROM, strength, and function as well as we can considering OA present in knee. STG: (to be met in 8 treatments)  ? Pain:<3/10  ? ROM:achieve 0 deg.  ? Strength:   ?  Function: LEFI >45/80  Patient to be independent with home exercise program as demonstrated by performance with correct form without cues. Demonstrate Knowledge of fall prevention  LTG: (to be met in 16 treatments)  Pain to 2/10 at the worst  Achieve 0-120 deg in R knee  LEFI >53/80      Pt. Education:  [x] Yes  [] No  [x] Reviewed Prior HEP/Ed  Method of Education: [x] Verbal  [x] Demo  [] Written  Comprehension of Education:  [x] Verbalizes understanding. [] Demonstrates understanding. [x] Needs review. [] Demonstrates/verbalizes HEP/Ed previously given. Plan: [x] Continue current frequency toward long and short term goals. [x] Specific Instructions for subsequent treatments: Initiate an aquatics program      Time In: 5:10 pm       Time Out: 5:45 pm    Electronically signed by:   Onur Rivas

## 2022-04-20 ENCOUNTER — APPOINTMENT (OUTPATIENT)
Dept: PHYSICAL THERAPY | Facility: CLINIC | Age: 62
End: 2022-04-20
Payer: MEDICARE

## 2022-04-22 ENCOUNTER — APPOINTMENT (OUTPATIENT)
Dept: PHYSICAL THERAPY | Facility: CLINIC | Age: 62
End: 2022-04-22
Payer: MEDICARE

## 2022-04-25 ENCOUNTER — OFFICE VISIT (OUTPATIENT)
Dept: FAMILY MEDICINE CLINIC | Age: 62
End: 2022-04-25
Payer: MEDICARE

## 2022-04-25 ENCOUNTER — OFFICE VISIT (OUTPATIENT)
Dept: PODIATRY | Age: 62
End: 2022-04-25
Payer: MEDICARE

## 2022-04-25 VITALS — HEIGHT: 62 IN | BODY MASS INDEX: 31.28 KG/M2 | WEIGHT: 170 LBS

## 2022-04-25 VITALS
DIASTOLIC BLOOD PRESSURE: 84 MMHG | OXYGEN SATURATION: 93 % | HEART RATE: 130 BPM | TEMPERATURE: 97.8 F | RESPIRATION RATE: 26 BRPM | SYSTOLIC BLOOD PRESSURE: 116 MMHG

## 2022-04-25 DIAGNOSIS — E11.51 TYPE II DIABETES MELLITUS WITH PERIPHERAL CIRCULATORY DISORDER (HCC): Primary | ICD-10-CM

## 2022-04-25 DIAGNOSIS — R23.4 FISSURE IN SKIN OF FOOT: ICD-10-CM

## 2022-04-25 DIAGNOSIS — I73.9 PVD (PERIPHERAL VASCULAR DISEASE) (HCC): ICD-10-CM

## 2022-04-25 DIAGNOSIS — B35.1 DERMATOPHYTOSIS OF NAIL: ICD-10-CM

## 2022-04-25 DIAGNOSIS — M79.671 PAIN IN BOTH FEET: ICD-10-CM

## 2022-04-25 DIAGNOSIS — M79.672 PAIN IN BOTH FEET: ICD-10-CM

## 2022-04-25 DIAGNOSIS — F41.9 ANXIETY: ICD-10-CM

## 2022-04-25 DIAGNOSIS — M10.9 ACUTE GOUT OF RIGHT KNEE, UNSPECIFIED CAUSE: Primary | ICD-10-CM

## 2022-04-25 PROCEDURE — 99215 OFFICE O/P EST HI 40 MIN: CPT | Performed by: NURSE PRACTITIONER

## 2022-04-25 PROCEDURE — G8427 DOCREV CUR MEDS BY ELIG CLIN: HCPCS | Performed by: PODIATRIST

## 2022-04-25 PROCEDURE — G8417 CALC BMI ABV UP PARAM F/U: HCPCS | Performed by: NURSE PRACTITIONER

## 2022-04-25 PROCEDURE — G8417 CALC BMI ABV UP PARAM F/U: HCPCS | Performed by: PODIATRIST

## 2022-04-25 PROCEDURE — 3017F COLORECTAL CA SCREEN DOC REV: CPT | Performed by: PODIATRIST

## 2022-04-25 PROCEDURE — G8427 DOCREV CUR MEDS BY ELIG CLIN: HCPCS | Performed by: NURSE PRACTITIONER

## 2022-04-25 PROCEDURE — 3017F COLORECTAL CA SCREEN DOC REV: CPT | Performed by: NURSE PRACTITIONER

## 2022-04-25 PROCEDURE — 1036F TOBACCO NON-USER: CPT | Performed by: PODIATRIST

## 2022-04-25 PROCEDURE — 99213 OFFICE O/P EST LOW 20 MIN: CPT | Performed by: PODIATRIST

## 2022-04-25 PROCEDURE — 3044F HG A1C LEVEL LT 7.0%: CPT | Performed by: PODIATRIST

## 2022-04-25 PROCEDURE — 2022F DILAT RTA XM EVC RTNOPTHY: CPT | Performed by: PODIATRIST

## 2022-04-25 PROCEDURE — 11721 DEBRIDE NAIL 6 OR MORE: CPT | Performed by: PODIATRIST

## 2022-04-25 PROCEDURE — 1036F TOBACCO NON-USER: CPT | Performed by: NURSE PRACTITIONER

## 2022-04-25 RX ORDER — COLCHICINE 0.6 MG/1
0.6 TABLET ORAL 2 TIMES DAILY PRN
Qty: 30 TABLET | Refills: 0 | Status: SHIPPED | OUTPATIENT
Start: 2022-04-25 | End: 2023-08-12

## 2022-04-25 RX ORDER — BUPROPION HYDROCHLORIDE 150 MG/1
TABLET ORAL
COMMUNITY
Start: 2022-04-10 | End: 2022-09-19 | Stop reason: ALTCHOICE

## 2022-04-25 RX ORDER — ALPRAZOLAM 0.25 MG/1
0.25 TABLET ORAL NIGHTLY PRN
Qty: 7 TABLET | Refills: 0 | Status: SHIPPED | OUTPATIENT
Start: 2022-04-25 | End: 2022-05-09 | Stop reason: SDUPTHER

## 2022-04-25 NOTE — PROGRESS NOTES
[address]  [phone]    4/25/22     Patient ID  Justin Kaur is a 57 y.o. female  Established patient    Chief Complaint  Jusitn Kaur presents today for Anxiety    Have you seen any other physician or provider since your last visit? Yes - Sharifa Allred  Have you had any other diagnostic tests since your last visit? No  Have you been seen in the emergency room and/or had an admission to a hospital since we last saw you? No    ASSESSMENT/PLAN  1. Acute gout of right knee, unspecified cause  -     colchicine (COLCRYS) 0.6 MG tablet; Take 1 tablet by mouth 2 times daily as needed for Pain (gout pain) Can repeat with 0.6 mg in 1 hour, max 1.8mg daily for acute pain, Disp-30 tablet, R-0Normal  2. Anxiety  -     ALPRAZolam (XANAX) 0.25 MG tablet; Take 1 tablet by mouth nightly as needed for Sleep or Anxiety for up to 7 days. , Disp-7 tablet, R-0Normal     Prescription for gout flare up  Schedule follow up with orthopedic     Return in about 2 weeks (around 5/9/2022) for Call if systems do not improve or get worse, Anxiety. On this date 4/25/2022 I have spent 40+ minutes reviewing previous notes, test results and face to face with the patient discussing the diagnosis and importance of compliance with the treatment plan as well as documenting on the day of the visit.     Patient Care Team:  BRAULIO Bridges CNP as PCP - General (Nurse Practitioner Family)  BRAULIO Bridges CNP as PCP - [facility]  Kori Mcdaniels MD as Consulting Physician (Nephrology)  David Presley MD as Consulting Physician (Pulmonology)  Karol Gould MD as Consulting Physician (Cardiology)  Randy Turner DPM as Consulting Physician ([place] [place])  Zane Orozco MD (Psychiatry)  Fredis Joseph MD as Consulting Physician (Neurology)  Bulmaro Chanel DO as Surgeon (Neurosurgery)    SUBJECTIVE/OBJECTIVE  History of Present illness / Visit Summary   Patient presents for follow up   Reviewed health maintenance and any recent labs/imaging    Still looking for somewhere to live, directed to [address]  Patient complains of evaluation of anxiety disorder, bipolar disorder, panic attacks, post traumatic stress  disorder and sleep disturbance. Complains of the following anxiety symptoms: difficulty concentrating, feelings of losing control, insomnia, irritable. Denies current suicidal and homicidal ideation. Complains of the following side effects from the treatment: none. Review of Systems  Review of Systems   Constitutional: Negative for activity change, appetite change, fatigue and fever. HENT: Negative for congestion. Dentures, upper and lower - does not routinely wear    Eyes:        Glasses, recent exam    Respiratory: Negative for chest tightness, shortness of breath and wheezing. Follows with pulmonology   Know lung nodules  NARCISO - no longer wearing CPAP   Cardiovascular: Negative for chest pain, palpitations and leg swelling. Follows with cardiology   Gastrointestinal: Negative for abdominal distention, abdominal pain, constipation and diarrhea. Endocrine:        Monitors BS daily. BS stable   Follows with podiatry    Genitourinary: Negative for difficulty urinating, dysuria, frequency, hematuria and urgency. Follows with nephrology    Musculoskeletal: Positive for arthralgias (right knee), back pain (chronic lumbar ), gait problem (using walker), joint swelling (right knee) and myalgias. Tried water therapy, went to only one session    Skin: Negative for rash and wound. Allergic/Immunologic: Negative for environmental allergies. Neurological: Negative for dizziness, syncope, speech difficulty, light-headedness, numbness and headaches. Following with neurology    Hematological: Does not bruise/bleed easily.    Psychiatric/Behavioral: Positive for agitation, decreased concentration, hallucinations and sleep disturbance. Negative for behavioral problems, dysphoric mood, self-injury and suicidal ideas. The patient is nervous/anxious. Follows with psychiatry and now [facility] within office        Physical exam   Physical Exam  Vitals and nursing note reviewed. Constitutional:       General: She is not in acute distress. Appearance: Normal appearance. She is well-developed and well-groomed. She is not ill-appearing or toxic-appearing. Cardiovascular:      Rate and Rhythm: Normal rate and regular rhythm. No extrasystoles are present. Heart sounds: Normal heart sounds, S1 normal and S2 normal. No murmur heard. Pulmonary:      Effort: Pulmonary effort is normal. No prolonged expiration or respiratory distress. Breath sounds: Normal breath sounds and air entry. Musculoskeletal:      Right lower leg: No edema. Left lower leg: No edema. Skin:     General: Skin is warm and dry. Coloration: Skin is not ashen, cyanotic, jaundiced or pale. Neurological:      Mental Status: She is alert and oriented to person, place, and time. Motor: Motor function is intact. Gait: Gait is intact. Psychiatric:         Attention and Perception: Attention and perception normal.         Mood and Affect: Mood is anxious and elated. Affect is tearful. Speech: Speech is rapid and pressured. Behavior: Behavior is not withdrawn. Behavior is cooperative. Thought Content: Thought content normal. Thought content does not include suicidal ideation. Thought content does not include suicidal plan. Cognition and Memory: Cognition and memory normal.         Judgment: Judgment normal.           Electronically signed by BRAULIO Sanchez CNP, APRN-CNP on 5/1/2022 at 8:04 PM    Please note that this chart was generated using voice recognition Dragon dictation software.   Although every effort was made to ensure the accuracy of this automated transcription, some errors in transcription may have occurred.

## 2022-04-25 NOTE — PROGRESS NOTES
[address]  Dept: [phone]    DIABETIC PROGRESS NOTE  Date of patient's visit: 4/25/2022  Patient's Name:  Madi Rico YOB: 1960            Patient Care Team:  BRAULIO Kramer CNP as PCP - General (Nurse Practitioner Family)  BRAULIO Kramer CNP as PCP - [facility] Empaneled Provider  Med Adams MD as Consulting Physician (Nephrology)  Damian Miguel MD as Consulting Physician (Pulmonology)  Yossi Florence MD as Consulting Physician (Cardiology)  Christopher Ibarra DPM as Consulting Physician ([facility] Chavez)  Lakhwinder Jerez MD (Psychiatry)  Nati Sierra MD as Consulting Physician (Neurology)  Yarelis Mckeon DO as Surgeon (Neurosurgery)          Chief Complaint   Patient presents with    Diabetes    Peripheral Neuropathy    Nail Problem       Subjective:   Madi Rico comes to clinic for Diabetes, Peripheral Neuropathy, and Nail Problem    she is a diabetic and states that she checks her feet daily and notcied a crack to the heel . Pt currently has complaint of thickened, elongated nails that they cannot manage by themselves. Pt's primary care physician is BRAULIO Kramer CNP last seen April 13 2022   Pt's last blood sugar was 91 . Pt has a new complaint of dry scaly skin to the bottom of both of the feet. Pt states they have tried OTC cream but it isnt applied regularly. Pt has tried changing shoes but it has not helped the pain     Pt has lost a lot of weight reently due to diet      Lab Results   Component Value Date    LABA1C 6.2 (H) 03/28/2022      Complains of numbness in the feet bilat.   Past Medical History:   Diagnosis Date    Anxiety     Chronic kidney disease     COPD (chronic obstructive pulmonary disease) ([facility] [address]     Depression     Fracture of ankle 5/14/2020    Hyperlipidemia     Hypertension     Obesity     Osteoarthritis     Proteinuria     Pulmonary embolism (HCC)     Sleep apnea     c-pap. Doesn't use everynight    SOB (shortness of breath) 5/7/2020    Type 2 diabetes mellitus without complication (HCC)     Type II or unspecified type diabetes mellitus without mention of complication, not stated as uncontrolled     Von Willebrand disease ([facility] [address]        No Known Allergies  Current Outpatient Medications on File Prior to Visit   Medication Sig Dispense Refill    buPROPion (WELLBUTRIN XL) 150 MG extended release tablet take 1 tablet by mouth every morning ( take with 300 milligram tablet )      Alcohol Swabs (ALCOHOL PREP) 70 % PADS Use twice daily and as needed to check blood sugars 120 each 3    oxyCODONE-acetaminophen (PERCOCET) 5-325 MG per tablet Take 1 tablet by mouth every 8 hours as needed for Pain for up to 30 days.  40 tablet 0    allopurinol (ZYLOPRIM) 100 MG tablet Take 1 tablet by mouth 2 times daily 180 tablet 1    warfarin (COUMADIN) 2 MG tablet take 1 tablet by mouth SUNDAY, TUESDAY, THURSDAY, AND SATURDAY (take 1 AND 1/2 tablets by mouth MONDAY, WEDNESDAY, FRIDAY ) 115 tablet 1    STIOLTO RESPIMAT 2.5-2.5 MCG/ACT AERS       Hydroactive Dressings (DUODERM HYDROACTIVE) MISC Apply 1 each topically every 72 hours 4 each 0    zinc gluconate 50 MG tablet Take 50 mg by mouth daily      vitamin C (ASCORBIC ACID) 500 MG tablet Take 500 mg by mouth daily      RA VITAMIN D-3 50 MCG (2000 UT) CAPS take 1 capsule by mouth once daily 90 capsule 3    polyethylene glycol (GLYCOLAX) 17 GM/SCOOP powder Take 17 g by mouth daily 90 each 3    senna (SENOKOT) 8.6 MG tablet Take 1 tablet by mouth 2 times daily 180 tablet 3    blood glucose test strips (ONETOUCH ULTRA) strip TEST THREE TIMES DAILY 100 strip 11    omeprazole (PRILOSEC) 20 MG delayed release capsule take 1 capsule by mouth once daily 90 capsule 1    atorvastatin (LIPITOR) 40 MG tablet take 1 tablet by mouth once daily 90 tablet 1    JANUVIA 100 MG tablet take 1 tablet by mouth once daily 90 tablet 1    lisinopril (PRINIVIL;ZESTRIL) 20 MG tablet take 1 tablet by mouth once daily 90 tablet 1    fluticasone (FLONASE) 50 MCG/ACT nasal spray 1 spray by Nasal route daily 1 each 3    isosorbide mononitrate (IMDUR) 30 MG extended release tablet take 1 tablet by mouth once daily 90 tablet 1    tiZANidine (ZANAFLEX) 4 MG tablet Take 1 tablet by mouth 3 times daily as needed (muscle relaxer) 90 tablet 0    furosemide (LASIX) 20 MG tablet take 1 tablet by mouth once daily 90 tablet 1    Blood Pressure Monitoring KIT Use to check blood pressure daily and as needed 1 kit 0    Semaglutide 3 MG TABS Take 3 mg by mouth daily 90 tablet 1    dapagliflozin (FARXIGA) 5 MG tablet Take 1 tablet by mouth every morning 90 tablet 1    buPROPion (WELLBUTRIN XL) 300 MG extended release tablet Take 2 tablets by mouth every morning (Patient taking differently: Take 300 mg by mouth every morning ) 30 tablet 0    metFORMIN (GLUCOPHAGE-XR) 500 MG extended release tablet Take 1 tablet by mouth 2 times daily 360 tablet 1    Lancets Misc. (ACCU-CHEK FASTCLIX LANCET) KIT use as directed PLEASE APPROVED NEW DEVICE WHILE WE WAIT FOR PRIOR AUTH. ..  FASTCLIX MIGHT BE EAISER TO MANIPULATE 1 kit 0    Accu-Chek FastClix Lancets MISC use 1 LANCET to TEST BLOOD SUGAR one to two times a day 120 each 3    albuterol sulfate  (90 Base) MCG/ACT inhaler Inhale 2 puffs into the lungs every 4 hours as needed for Wheezing or Shortness of Breath (AND/OR COUGH) 18 g 1    naloxone 4 MG/0.1ML LIQD nasal spray 1 spray by Nasal route as needed for Opioid Reversal 1 each 5    Handicap Placard MISC by Does not apply route Exp 2/3/2026 1 each 0    ipratropium-albuterol (DUONEB) 0.5-2.5 (3) MG/3ML SOLN nebulizer solution Inhale 3 mLs into the lungs every 6 hours as needed for Shortness of Breath 360 mL 0    Respiratory Therapy Supplies (NEBULIZER/TUBING/MOUTHPIECE) KIT 1 kit by Does not apply route 4 times daily as needed (wheezing) 1 kit 0     Current Facility-Administered Medications on File Prior to Visit   Medication Dose Route Frequency Provider Last Rate Last Admin    methylPREDNISolone acetate (DEPO-MEDROL) injection 40 mg  40 mg Intra-artICUlar Once Angela Coley MD        lidocaine 1 % injection 5 mL  5 mL Intra-artICUlar Once Angela Coley MD           Review of Systems    Review of Systems:   History obtained from chart review and the patient  General ROS: negative for - chills, fatigue, fever, night sweats or weight gain  Constitutional: Negative for chills, diaphoresis, fatigue, fever and unexpected weight change. Musculoskeletal: Positive for arthralgias, gait problem and joint swelling. Neurological ROS: negative for - behavioral changes, confusion, headaches or seizures. Negative for weakness and numbness. Dermatological ROS: negative for - mole changes, rash  Cardiovascular: Negative for leg swelling. Gastrointestinal: Negative for constipation, diarrhea, nausea and vomiting. Objective:  Dermatologic Exam:   Dry scaly skin noted to the plantar surface of the right and left foot.   Small superficial fissuring of the skin noted to the plantar heel bilateral      Skin is thin, with flaky sloughing skin as well as decreased hair growth to the lower leg  Small red hemosiderin deposits seen dorsal foot   Musculoskeletal:     1st MPJ ROM decreased, Bilateral.  Muscle strength 5/5, Bilateral.  Pain present upon palpation of toenails 1-5, Bilateral. decreased medial longitudinal arch, Bilateral.  Ankle ROM decreased,Bilateral.    Dorsally contracted digits present digits 2, Bilateral.     Vascular: DP pulses 1/4 bilateral.  PT pulses 0/4 bilateral.   CFT <5 seconds, Bilateral.  Hair growth absent to the level of the digits, Bilateral.  Edema present, Bilateral.  Varicosities absent, Bilateral. Erythema absent, Bilateral    Neurological: Sensation diminshed to light touch to level of digits, Bilateral. Protective sensation intact 6/10 sites via 5.07/10g [place] Monofilament, Bilateral.  negative Tinel's, Bilateral.  negative Valleix sign, Bilateral.      Integument: Warm, dry, supple, Bilateral.  Open lesion absent, Bilateral.  Interdigital maceration absent to web spaces 4, Bilateral.  Nails 1-5 left and 1-5 right thickened > 3.0 mm, dystrophic and crumbly, discolored with subungual debris. Fissures absent, Bilateral.   General: AAO x 3 in NAD. Derm  Toenail Description  Sites of Onychomycosis Involvement (Check affected area)  [x] [x] [x] [x] [x] [x] [x] [x] [x] [x]  5 4 3 2 1 1 2 3 4 5                          Right                                        Left    Thickness  [x] [x] [x] [x] [x] [x] [x] [x] [x] [x]  5 4 3 2 1 1 2 3 4 5                         Right                                        Left    Dystrophic Changes   [x] [x] [x] [x] [x] [x] [x] [x] [x] [x]  5 4 3 2 1 1 2 3 4 5                         Right                                        Left    Color   [x] [x] [x] [x] [x] [x] [x] [x] [x] [x]  5 4 3 2 1 1 2 3 4 5                          Right                                        Left    Incurvation/Ingrowin   [] [] [] [] [] [] [] [] [] []  5 4 3 2 1 1 2 3 4 5                         Right                                        Left    Inflammation/Pain   [x] [x] [x] [x] [x] [x] [x] [x] [x] [x]  5 4 3 2 1 1 2 3 4 5                         Right                                        Left        Visual inspection:  Deformity: hammertoe deformity sherman feet  amputation: absent  Skin lesions: present - as above  Edema: right- 2+ pitting edema, left- 2+ pitting edema    Sensory exam:  Monofilament sensation: abnormal - 6/10 via SW 5.07/10g monofilament to the plantar foot bilateral feet    Pulses: abnormal - 1/4 dorsalis pedis pulse and 0/4 Posterior tibial pulse,   Pinprick: Impaired  Proprioception: Impaired  Vibration (128 Hz):  Impaired       DM with PVD       [x]Yes []No      Assessment:  57 y.o. female with:   Diagnosis Orders   1. Type II diabetes mellitus with peripheral circulatory disorder ([facility])  32786 - SD DEBRIDEMENT OF NAILS, 6 OR MORE    HM DIABETES FOOT EXAM   2. Dermatophytosis of nail  73784 - SD DEBRIDEMENT OF NAILS, 6 OR MORE    HM DIABETES FOOT EXAM   3. PVD (peripheral vascular disease) ([facility])  38298 - SD DEBRIDEMENT OF NAILS, 6 OR MORE    HM DIABETES FOOT EXAM   4. Pain in both feet  81049 - SD DEBRIDEMENT OF NAILS, 6 OR MORE    HM DIABETES FOOT EXAM   5. Fissure in skin of foot  HM DIABETES FOOT EXAM           Q7   []Yes    []No                Q8   [x]Yes    []No                     Q9   []Yes    []No    Plan:   Pt was evaluated and examined. Patient was given personalized discharge instructions. For patients xerosis of skin pt to apply OTC foot cream or Aquaphor to the area to be applied daily. Pt to use a pummuce stone to the plantar surface of the feet weekly        Nails 1-10 were debrided sharply in length and thickness with a nipper and , without incident. Pt will follow up in 9 weeks or sooner if any problems arise. Diagnosis was discussed with the pt and all of their questions were answered in detail. Proper foot hygiene and care was discussed with the pt. Informed patient on proper diabetic foot care and importance of tight glycemic control. Patient to check feet daily and contact the office with any questions/problems/concerns. Other comorbidity noted and will be managed by PCP. All labs were reviewed and all imagining including the above findings were reviewed PRIOR to the patients arrival and with the patient today. Previous patient encounter was reviewed. Encounters from the patients other medical providers were reviewed and noted. Time was spent educating the patient and their families/caregivers on proper care of the feet and ankles. All the above diagnosis were addressed at todays visit and all questions were answered. A total of 20 minutes was spent with this patients encounter which included charting after the patients visit    4/25/2022    Electronically signed by Nany Epstein DPM on 4/25/2022 at 11:15 AM  4/25/2022

## 2022-04-26 ENCOUNTER — TELEPHONE (OUTPATIENT)
Dept: FAMILY MEDICINE CLINIC | Age: 62
End: 2022-04-26

## 2022-04-26 NOTE — TELEPHONE ENCOUNTER
Patient states the pain medication for Gout requires a PA. Is there an alternate medication? Is she ok to take additional Percocet?    R/A [employer]

## 2022-04-28 ENCOUNTER — OFFICE VISIT (OUTPATIENT)
Dept: BEHAVIORAL/MENTAL HEALTH CLINIC | Age: 62
End: 2022-04-28
Payer: MEDICARE

## 2022-04-28 ENCOUNTER — HOSPITAL ENCOUNTER (OUTPATIENT)
Dept: MRI IMAGING | Facility: CLINIC | Age: 62
Discharge: HOME | End: 2022-04-30
Payer: MEDICARE

## 2022-04-28 ENCOUNTER — TELEPHONE (OUTPATIENT)
Dept: NEUROLOGY | Age: 62
End: 2022-04-28

## 2022-04-28 ENCOUNTER — TELEPHONE (OUTPATIENT)
Dept: ORTHOPEDIC SURGERY | Age: 62
End: 2022-04-28

## 2022-04-28 DIAGNOSIS — R90.0 INTRACRANIAL MASS: ICD-10-CM

## 2022-04-28 DIAGNOSIS — F43.10 POST TRAUMATIC STRESS DISORDER: Primary | ICD-10-CM

## 2022-04-28 LAB — POC CREATININE: 1.1 MG/DL (ref 0.6–1.4)

## 2022-04-28 PROCEDURE — 90791 PSYCH DIAGNOSTIC EVALUATION: CPT | Performed by: SOCIAL WORKER

## 2022-04-28 PROCEDURE — 70553 MRI BRAIN STEM W/O & W/DYE: CPT

## 2022-04-28 PROCEDURE — 6360000004 HC RX CONTRAST MEDICATION: Performed by: NURSE PRACTITIONER

## 2022-04-28 PROCEDURE — A9579 GAD-BASE MR CONTRAST NOS,1ML: HCPCS | Performed by: NURSE PRACTITIONER

## 2022-04-28 PROCEDURE — 82565 ASSAY OF CREATININE: CPT

## 2022-04-28 RX ADMIN — GADOTERIDOL 15 ML: 279.3 INJECTION, SOLUTION INTRAVENOUS at 09:09
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
## 2022-04-28 NOTE — TELEPHONE ENCOUNTER
Patient suffers from gout in her knee. Here pcp gave her a prescription for two gout medications. 1.  Allopurinol 100mg twice a day   2. colchicine (COLCRYS) 0.6 twice a day(waiting for prior auth, has not started this yet)    However, she is asking if she should keep her appt with you on 05/9 or if you can recommend anything for the meantime.      Please call her with advice  Thank you

## 2022-04-28 NOTE — TELEPHONE ENCOUNTER
Patient called in to report that she had completed her MRI and is inquring if she still needs to keep her follow up 5.9.22 appointment. Impression   Focus within the medial aspect of the left frontal lobe that has   characteristics most consistent with a cavernoma.  Short-term follow-up MRI   of the brain is recommended 3-6 months to assess stability.       No acute intracranial abnormality.

## 2022-05-01 DIAGNOSIS — J41.1 MUCOPURULENT CHRONIC BRONCHITIS (HCC): ICD-10-CM

## 2022-05-01 ASSESSMENT — ENCOUNTER SYMPTOMS
SHORTNESS OF BREATH: 0
CONSTIPATION: 0
DIARRHEA: 0
CONSTIPATION: 0
WHEEZING: 0
BACK PAIN: 1
ABDOMINAL DISTENTION: 0
CHEST TIGHTNESS: 1
ABDOMINAL PAIN: 0
SHORTNESS OF BREATH: 0
ABDOMINAL DISTENTION: 0
CHEST TIGHTNESS: 0
DIARRHEA: 0
ABDOMINAL PAIN: 0

## 2022-05-02 DIAGNOSIS — R06.02 SOB (SHORTNESS OF BREATH): ICD-10-CM

## 2022-05-02 RX ORDER — ALBUTEROL SULFATE 90 UG/1
2 AEROSOL, METERED RESPIRATORY (INHALATION) EVERY 4 HOURS PRN
Qty: 18 G | Refills: 1 | Status: SHIPPED | OUTPATIENT
Start: 2022-05-02 | End: 2022-08-19 | Stop reason: SDUPTHER

## 2022-05-02 NOTE — TELEPHONE ENCOUNTER
Last visit: 04/25/22  Last Med refill: 05/24/22  Does patient have enough medication for 72 hours: Yes    Next Visit Date:  Future Appointments   Date Time Provider Mark Sandi   5/9/2022  9:20 AM Yobany Gaitan MD ORTHO SPECIA [facility]   5/12/2022 12:30 PM Hallie Vasquez, LISW-S [place] [facility]   5/25/2022  3:00 PM Trisha Childs, APRN - CNP [facility]   6/27/2022  3:30 PM Christiane Heard DO [facility]   7/6/2022  2:00 PM Delores Elmore MD Resp Spec [facility]   7/25/2022 10:00 AM Yung Fragoso DPM [facility]   8/15/2022  8:30 AM Laurie Sam MD [facility] None       Health Maintenance   Topic Date Due    Diabetic retinal exam  07/09/2022    Cervical cancer screen  11/16/2022    Depression Monitoring  01/24/2023    A1C test (Diabetic or Prediabetic)  03/28/2023    Lipids  03/28/2023    Potassium  03/28/2023    Creatinine  03/28/2023    Pneumococcal 0-64 years Vaccine (3 - PPSV23 or PCV20) 04/23/2023    Diabetic foot exam  05/02/2023    Breast cancer screen  09/10/2023    DTaP/Tdap/Td vaccine (2 - Td or Tdap) 08/04/2026    Colorectal Cancer Screen  02/16/2028    Flu vaccine  Completed    Shingles vaccine  Completed    COVID-19 Vaccine  Completed    Hepatitis C screen  Addressed    HIV screen  Addressed    Hepatitis A vaccine  Aged Out    Hib vaccine  Aged Out    Meningococcal (ACWY) vaccine  Aged Out       Hemoglobin A1C (%)   Date Value   03/28/2022 6.2 (H)   08/05/2021 7.2 (H)   07/19/2021 6.4 (A)             ( goal A1C is < 7)   Microalb/Crt.  Ratio (mcg/mg creat)   Date Value   04/08/2019 CANNOT BE CALCULATED     LDL Cholesterol (mg/dL)   Date Value   03/28/2022 62   05/07/2020 68     LDL Calculated (mg/dL)   Date Value   04/10/2020 59       (goal LDL is <100)   AST (U/L)   Date Value   03/28/2022 16     ALT (U/L)   Date Value   03/28/2022 15     BUN (mg/dL)   Date Value   03/28/2022 14     BP Readings from Last 3 Encounters:   04/25/22 116/84   04/13/22 130/84   04/04/22 112/62          (goal 120/80)    All Future Testing planned in [facility]  Lab Frequency Next Occurrence   ECHO Complete 2D W Doppler W Color Once 07/19/2021   H. pylori antigen Once 65/37/7819   Basic Metabolic Panel Once 63/38/4515   CBC Once 08/14/2022   Protein / Creatinine Ratio, Urine Once 08/14/2022   Magnesium Once 08/14/2022   POCT INR                 Patient Active Problem List:     Essential hypertension     Hyperlipidemia     Osteoarthritis     Obesity     CKD (chronic kidney disease) stage 3, GFR 30-59 ml/min (HCC)     Proteinuria     Controlled type 2 diabetes mellitus with complication, without long-term current use of insulin (HCC)     History of DVT of lower extremity     NARCISO on CPAP     Vitamin D deficiency     Major depressive disorder, recurrent episode, severe, with psychotic behavior ([address]     Multiple lung nodules on CT     Hypomagnesemia     Anxiety     Chronic obstructive pulmonary disease (HCC)     Precordial pain     History of pulmonary embolism     Family history of abdominal aortic aneurysm     Normal coronary arteries     Long term (current) use of anticoagulants     AAA (abdominal aortic aneurysm) without rupture (HCC)     COPD exacerbation (HCC)     Acute tracheobronchitis-viral     Abnormal mammogram     Cerebrovascular accident (CVA) ([address]     Diabetic nephropathy (HCC)     Slow transit constipation     Frontal mass of brain     Intracranial mass     Cerebral cavernoma     Pseudogout     History of gout     Familial cavernous cerebral angioma (HCC)     Cavernoma     Effusion of right knee     Meniscus, medial, anterior horn derangement, right     Post traumatic stress disorder

## 2022-05-03 DIAGNOSIS — J41.1 MUCOPURULENT CHRONIC BRONCHITIS (HCC): ICD-10-CM

## 2022-05-03 RX ORDER — IPRATROPIUM BROMIDE AND ALBUTEROL SULFATE 2.5; .5 MG/3ML; MG/3ML
SOLUTION RESPIRATORY (INHALATION)
Qty: 360 ML | Refills: 0 | Status: SHIPPED | OUTPATIENT
Start: 2022-05-03 | End: 2022-06-25 | Stop reason: SDUPTHER

## 2022-05-03 RX ORDER — NEBULIZER ACCESSORIES
1 KIT MISCELLANEOUS 4 TIMES DAILY PRN
Qty: 1 KIT | Refills: 0 | Status: SHIPPED | OUTPATIENT
Start: 2022-05-03 | End: 2022-05-18 | Stop reason: SDUPTHER

## 2022-05-03 NOTE — TELEPHONE ENCOUNTER
Last visit: 04/25/22  Last Med refill: 10/20/20  Does patient have enough medication for 72 hours: Yes    Next Visit Date:  Future Appointments   Date Time Provider Mark Junior   5/9/2022  9:20 AM Jabier Castillo MD ORTHO SPECIA [facility]   5/12/2022 12:30 PM SOCORRO CabreraW-S [place] [facility]   5/25/2022  3:00 PM Trisha Falk, APRN - CNP [facility]   6/27/2022  3:30 PM Faiza Mandel DO [facility]   7/6/2022  2:00 PM Marinell Boeck, MD Resp Spec [facility]   7/25/2022 10:00 AM [address] [place] [address], [facility]   8/15/2022  8:30 AM Fernanda Farooq MD [facility] None       Health Maintenance   Topic Date Due    Diabetic retinal exam  07/09/2022    Cervical cancer screen  11/16/2022    Depression Monitoring  01/24/2023    A1C test (Diabetic or Prediabetic)  03/28/2023    Lipids  03/28/2023    Potassium  03/28/2023    Creatinine  03/28/2023    Pneumococcal 0-64 years Vaccine (3 - PPSV23 or PCV20) 04/23/2023    Diabetic foot exam  05/02/2023    Breast cancer screen  09/10/2023    DTaP/Tdap/Td vaccine (2 - Td or Tdap) 08/04/2026    Colorectal Cancer Screen  02/16/2028    Flu vaccine  Completed    Shingles vaccine  Completed    COVID-19 Vaccine  Completed    Hepatitis C screen  Addressed    HIV screen  Addressed    Hepatitis A vaccine  Aged Out    Hib vaccine  Aged Out    Meningococcal (ACWY) vaccine  Aged Out       Hemoglobin A1C (%)   Date Value   03/28/2022 6.2 (H)   08/05/2021 7.2 (H)   07/19/2021 6.4 (A)             ( goal A1C is < 7)   Microalb/Crt.  Ratio (mcg/mg creat)   Date Value   04/08/2019 CANNOT BE CALCULATED     LDL Cholesterol (mg/dL)   Date Value   03/28/2022 62   05/07/2020 68     LDL Calculated (mg/dL)   Date Value   04/10/2020 59       (goal LDL is <100)   AST (U/L)   Date Value   03/28/2022 16     ALT (U/L)   Date Value   03/28/2022 15     BUN (mg/dL)   Date Value   03/28/2022 14     BP Readings from Last 3 Encounters:   04/25/22 116/84   04/13/22 130/84   04/04/22 112/62          (goal 120/80)    All Future Testing planned in [facility]  Lab Frequency Next Occurrence   ECHO Complete 2D W Doppler W Color Once 07/19/2021   H. pylori antigen Once 84/15/5055   Basic Metabolic Panel Once 12/35/7456   CBC Once 08/14/2022   Protein / Creatinine Ratio, Urine Once 08/14/2022   Magnesium Once 08/14/2022   POCT INR                 Patient Active Problem List:     Essential hypertension     Hyperlipidemia     Osteoarthritis     Obesity     CKD (chronic kidney disease) stage 3, GFR 30-59 ml/min (HCC)     Proteinuria     Controlled type 2 diabetes mellitus with complication, without long-term current use of insulin (HCC)     History of DVT of lower extremity     NARCISO on CPAP     Vitamin D deficiency     Major depressive disorder, recurrent episode, severe, with psychotic behavior ([address]     Multiple lung nodules on CT     Hypomagnesemia     Anxiety     Chronic obstructive pulmonary disease (HCC)     Precordial pain     History of pulmonary embolism     Family history of abdominal aortic aneurysm     Normal coronary arteries     Long term (current) use of anticoagulants     AAA (abdominal aortic aneurysm) without rupture (HCC)     COPD exacerbation (HCC)     Acute tracheobronchitis-viral     Abnormal mammogram     Cerebrovascular accident (CVA) ([address]     Diabetic nephropathy (HCC)     Slow transit constipation     Frontal mass of brain     Intracranial mass     Cerebral cavernoma     Pseudogout     History of gout     Familial cavernous cerebral angioma (HCC)     Cavernoma     Effusion of right knee     Meniscus, medial, anterior horn derangement, right     Post traumatic stress disorder

## 2022-05-06 DIAGNOSIS — M62.838 MUSCLE SPASM: ICD-10-CM

## 2022-05-06 NOTE — TELEPHONE ENCOUNTER
I will refer Geraldine LOV 4/25/22  LRF 12/21/21    Pt asking for it to be sent by 3 PM so she can pick it up before going home. Health Maintenance   Topic Date Due    Diabetic retinal exam  07/09/2022    Cervical cancer screen  11/16/2022    Depression Monitoring  01/24/2023    A1C test (Diabetic or Prediabetic)  03/28/2023    Lipids  03/28/2023    Potassium  03/28/2023    Creatinine  03/28/2023    Pneumococcal 0-64 years Vaccine (3 - PPSV23 or PCV20) 04/23/2023    Diabetic foot exam  05/02/2023    Breast cancer screen  09/10/2023    DTaP/Tdap/Td vaccine (2 - Td or Tdap) 08/04/2026    Colorectal Cancer Screen  02/16/2028    Flu vaccine  Completed    Shingles vaccine  Completed    COVID-19 Vaccine  Completed    Hepatitis C screen  Addressed    HIV screen  Addressed    Hepatitis A vaccine  Aged Out    Hib vaccine  Aged Out    Meningococcal (ACWY) vaccine  Aged Out             (applicable per patient's age: Cancer Screenings, Depression Screening, Fall Risk Screening, Immunizations)    Hemoglobin A1C (%)   Date Value   03/28/2022 6.2 (H)   08/05/2021 7.2 (H)   07/19/2021 6.4 (A)     Microalb/Crt.  Ratio (mcg/mg creat)   Date Value   04/08/2019 CANNOT BE CALCULATED     LDL Cholesterol (mg/dL)   Date Value   03/28/2022 62     LDL Calculated (mg/dL)   Date Value   04/10/2020 59     AST (U/L)   Date Value   03/28/2022 16     ALT (U/L)   Date Value   03/28/2022 15     BUN (mg/dL)   Date Value   03/28/2022 14      (goal A1C is < 7)   (goal LDL is <100) need 30-50% reduction from baseline     BP Readings from Last 3 Encounters:   04/25/22 116/84   04/13/22 130/84   04/04/22 112/62    (goal /80)      All Future Testing planned in [facility]:  Lab Frequency Next Occurrence   ECHO Complete 2D W Doppler W Color Once 07/19/2021   H. pylori antigen Once 27/39/7021   Basic Metabolic Panel Once 25/21/1715   CBC Once 08/14/2022   Protein / Creatinine Ratio, Urine Once 08/14/2022   Magnesium Once 08/14/2022   POCT INR Next Visit Date:  Future Appointments   Date Time Provider Department Center   5/9/2022  9:20 AM Syed Barron MD [facility] SPECIA [facility]   5/12/2022 12:30 PM MARIBELL Cabrera psyc [facility]   5/25/2022  3:00 PM Trisha Chaparro, APRN - CNP [facility]   6/27/2022  3:30 PM DO [facility]   7/6/2022  2:00 PM Sumeet Gallegos MD Resp Spec [facility]   7/25/2022 10:00 AM Mino Dixon DPM [facility]   8/15/2022  8:30 AM Natalei House MD [facility] None            Patient Active Problem List:     Essential hypertension     Hyperlipidemia     Osteoarthritis     Obesity     CKD (chronic kidney disease) stage 3, GFR 30-59 ml/min (HCC)     Proteinuria     Controlled type 2 diabetes mellitus with complication, without long-term current use of insulin (HCC)     History of DVT of lower extremity     NARCISO on CPAP     Vitamin D deficiency     Major depressive disorder, recurrent episode, severe, with psychotic behavior ([address]     Multiple lung nodules on CT     Hypomagnesemia     Anxiety     Chronic obstructive pulmonary disease (HCC)     Precordial pain     History of pulmonary embolism     Family history of abdominal aortic aneurysm     Normal coronary arteries     Long term (current) use of anticoagulants     AAA (abdominal aortic aneurysm) without rupture (HCC)     COPD exacerbation (HCC)     Acute tracheobronchitis-viral     Abnormal mammogram     Cerebrovascular accident (CVA) ([address]     Diabetic nephropathy (HCC)     Slow transit constipation     Frontal mass of brain     Intracranial mass     Cerebral cavernoma     Pseudogout     History of gout     Familial cavernous cerebral angioma (HCC)     Cavernoma     Effusion of right knee     Meniscus, medial, anterior horn derangement, right     Post traumatic stress disorder

## 2022-05-09 ENCOUNTER — OFFICE VISIT (OUTPATIENT)
Dept: ORTHOPEDIC SURGERY | Age: 62
End: 2022-05-09
Payer: MEDICARE

## 2022-05-09 VITALS — WEIGHT: 165 LBS | HEIGHT: 62 IN | BODY MASS INDEX: 30.36 KG/M2

## 2022-05-09 DIAGNOSIS — M17.11 PATELLOFEMORAL ARTHRITIS OF RIGHT KNEE: Primary | ICD-10-CM

## 2022-05-09 DIAGNOSIS — M15.9 PRIMARY OSTEOARTHRITIS INVOLVING MULTIPLE JOINTS: ICD-10-CM

## 2022-05-09 DIAGNOSIS — F41.9 ANXIETY: ICD-10-CM

## 2022-05-09 PROCEDURE — 20610 DRAIN/INJ JOINT/BURSA W/O US: CPT | Performed by: ORTHOPAEDIC SURGERY

## 2022-05-09 PROCEDURE — G8427 DOCREV CUR MEDS BY ELIG CLIN: HCPCS | Performed by: ORTHOPAEDIC SURGERY

## 2022-05-09 PROCEDURE — G8417 CALC BMI ABV UP PARAM F/U: HCPCS | Performed by: ORTHOPAEDIC SURGERY

## 2022-05-09 PROCEDURE — 3017F COLORECTAL CA SCREEN DOC REV: CPT | Performed by: ORTHOPAEDIC SURGERY

## 2022-05-09 PROCEDURE — 99213 OFFICE O/P EST LOW 20 MIN: CPT | Performed by: ORTHOPAEDIC SURGERY

## 2022-05-09 PROCEDURE — 1036F TOBACCO NON-USER: CPT | Performed by: ORTHOPAEDIC SURGERY

## 2022-05-09 RX ORDER — TIZANIDINE 4 MG/1
4 TABLET ORAL 3 TIMES DAILY PRN
Qty: 90 TABLET | Refills: 0 | Status: SHIPPED | OUTPATIENT
Start: 2022-05-09 | End: 2022-05-26 | Stop reason: SDUPTHER

## 2022-05-09 RX ORDER — LIDOCAINE HYDROCHLORIDE 10 MG/ML
5 INJECTION, SOLUTION INFILTRATION; PERINEURAL ONCE
Status: SHIPPED | OUTPATIENT
Start: 2022-05-09

## 2022-05-09 RX ORDER — METHYLPREDNISOLONE ACETATE 40 MG/ML
40 INJECTION, SUSPENSION INTRA-ARTICULAR; INTRALESIONAL; INTRAMUSCULAR; SOFT TISSUE ONCE
Status: SHIPPED | OUTPATIENT
Start: 2022-05-09

## 2022-05-09 NOTE — TELEPHONE ENCOUNTER
Last visit: 04/25/22  Last Med refill: 04/11/22  Does patient have enough medication for 72 hours: Yes    Next Visit Date:  Future Appointments   Date Time Provider Mark Junior   5/11/2022  2:20 PM Natalie House MD [facility]   5/12/2022 12:30 PM KIRSTEN Cabrera-S [place] [facility]   5/25/2022  3:00 PM Trisha Morrisn Shankar, APRN - CNP [facility]   6/27/2022  3:30 PM DO [facility]   7/6/2022  2:00 PM Sumeet Gallegos MD Resp Spec [facility]   7/25/2022 10:00 AM Mino Dixon DPM [facility]   8/15/2022  8:30 AM Natalie House MD [facility] None       Health Maintenance   Topic Date Due    Diabetic retinal exam  07/09/2022    Cervical cancer screen  11/16/2022    Depression Monitoring  01/24/2023    A1C test (Diabetic or Prediabetic)  03/28/2023    Lipids  03/28/2023    Pneumococcal 0-64 years Vaccine (3 - PPSV23 or PCV20) 04/23/2023    Diabetic foot exam  05/02/2023    Potassium  05/05/2023    Creatinine  05/05/2023    Breast cancer screen  09/10/2023    DTaP/Tdap/Td vaccine (2 - Td or Tdap) 08/04/2026    Colorectal Cancer Screen  02/16/2028    Flu vaccine  Completed    Shingles vaccine  Completed    COVID-19 Vaccine  Completed    Hepatitis C screen  Addressed    HIV screen  Addressed    Hepatitis A vaccine  Aged Out    Hib vaccine  Aged Out    Meningococcal (ACWY) vaccine  Aged Out       Hemoglobin A1C (%)   Date Value   03/28/2022 6.2 (H)   08/05/2021 7.2 (H)   07/19/2021 6.4 (A)             ( goal A1C is < 7)   Microalb/Crt.  Ratio (mcg/mg creat)   Date Value   04/08/2019 CANNOT BE CALCULATED     LDL Cholesterol (mg/dL)   Date Value   03/28/2022 62   05/07/2020 68     LDL Calculated (mg/dL)   Date Value   04/10/2020 59       (goal LDL is <100)   AST (U/L)   Date Value   03/28/2022 16     ALT (U/L)   Date Value   03/28/2022 15     BUN (mg/dL)   Date Value   03/28/2022 14     BP Readings from Last 3 Encounters:   04/25/22 116/84   04/13/22 130/84   04/04/22 112/62          (goal 120/80)    All Future Testing planned in [facility]  Lab Frequency Next Occurrence   ECHO Complete 2D W Doppler W Color Once 07/19/2021   H. pylori antigen Once 87/23/3914   Basic Metabolic Panel Once 47/89/7292   CBC Once 08/14/2022   Protein / Creatinine Ratio, Urine Once 08/14/2022   Magnesium Once 08/14/2022   POCT INR                 Patient Active Problem List:     Essential hypertension     Hyperlipidemia     Osteoarthritis     Obesity     CKD (chronic kidney disease) stage 3, GFR 30-59 ml/min (HCC)     Proteinuria     Controlled type 2 diabetes mellitus with complication, without long-term current use of insulin (HCC)     History of DVT of lower extremity     NARCISO on CPAP     Vitamin D deficiency     Major depressive disorder, recurrent episode, severe, with psychotic behavior ([address]     Multiple lung nodules on CT     Hypomagnesemia     Anxiety     Chronic obstructive pulmonary disease (HCC)     Precordial pain     History of pulmonary embolism     Family history of abdominal aortic aneurysm     Normal coronary arteries     Long term (current) use of anticoagulants     AAA (abdominal aortic aneurysm) without rupture (HCC)     COPD exacerbation (HCC)     Acute tracheobronchitis-viral     Abnormal mammogram     Cerebrovascular accident (CVA) ([address]     Diabetic nephropathy (HCC)     Slow transit constipation     Frontal mass of brain     Intracranial mass     Cerebral cavernoma     Pseudogout     History of gout     Familial cavernous cerebral angioma (HCC)     Cavernoma     Effusion of right knee     Meniscus, medial, anterior horn derangement, right     Post traumatic stress disorder     Patellofemoral arthritis of right knee

## 2022-05-09 NOTE — PROGRESS NOTES
This patient who was recently diagnosed with cavernoma is seen here for recurrence of pain in her right knee. While she was hospitalized we were asked to see her for her right knee pain and aspiration showed that she had acute gouty arthritis. Today the patient is complaining of pain of mainly in the patellar area. She has difficulty going up and down the stairs and try to avoid it completely. There is no history of instability. The patient also has a history of von Willebrand disease type 2 diabetes, proteinuria with chronic kidney disease, abdominal aortic aneurysm and history of bilateral pulmonary embolism. Patient is on Coumadin. Examination: There is significant grinding in the patellofemoral joint. There was no tenderness along the medial or lateral joint line. There was minimal effusion. Diagnosis: Patellofemoral arthritis right knee. Treatment: Under sterile condition I injected 40 mg Depo-Medrol 5 cc of 1% plain lidocaine. Instructions were given how the injection works and will see her again as needed.

## 2022-05-10 ENCOUNTER — HOSPITAL ENCOUNTER (OUTPATIENT)
Age: 62
Setting detail: SPECIMEN
Discharge: HOME | End: 2022-05-10

## 2022-05-10 DIAGNOSIS — N18.31 STAGE 3A CHRONIC KIDNEY DISEASE (HCC): ICD-10-CM

## 2022-05-10 LAB
ANION GAP SERPL CALCULATED.3IONS-SCNC: 14 MMOL/L (ref 9–17)
BUN BLDV-MCNC: 17 MG/DL (ref 8–23)
CALCIUM SERPL-MCNC: 10.2 MG/DL (ref 8.6–10.4)
CHLORIDE BLD-SCNC: 98 MMOL/L (ref 98–107)
CO2: 24 MMOL/L (ref 20–31)
CREAT SERPL-MCNC: 0.98 MG/DL (ref 0.5–0.9)
CREATININE URINE: 72.4 MG/DL (ref 28–217)
GFR AFRICAN AMERICAN: >60 ML/MIN
GFR NON-AFRICAN AMERICAN: 57 ML/MIN
GFR SERPL CREATININE-BSD FRML MDRD: ABNORMAL ML/MIN/{1.73_M2}
GLUCOSE BLD-MCNC: 117 MG/DL (ref 70–99)
HCT VFR BLD CALC: 39.5 % (ref 36.3–47.1)
HEMOGLOBIN: 12.8 G/DL (ref 11.9–15.1)
MAGNESIUM: 2.1 MG/DL (ref 1.6–2.6)
MCH RBC QN AUTO: 31.5 PG (ref 25.2–33.5)
MCHC RBC AUTO-ENTMCNC: 32.4 G/DL (ref 28.4–34.8)
MCV RBC AUTO: 97.3 FL (ref 82.6–102.9)
NRBC AUTOMATED: 0 PER 100 WBC
PDW BLD-RTO: 15 % (ref 11.8–14.4)
PLATELET # BLD: 322 K/UL (ref 138–453)
PMV BLD AUTO: 9 FL (ref 8.1–13.5)
POTASSIUM SERPL-SCNC: 4.2 MMOL/L (ref 3.7–5.3)
RBC # BLD: 4.06 M/UL (ref 3.95–5.11)
SODIUM BLD-SCNC: 136 MMOL/L (ref 135–144)
TOTAL PROTEIN, URINE: 8 MG/DL
URINE TOTAL PROTEIN CREATININE RATIO: 0.11 (ref 0–0.2)
WBC # BLD: 12.4 K/UL (ref 3.5–11.3)

## 2022-05-10 RX ORDER — ALPRAZOLAM 0.25 MG/1
0.25 TABLET ORAL NIGHTLY PRN
Qty: 7 TABLET | Refills: 0 | Status: SHIPPED | OUTPATIENT
Start: 2022-05-10 | End: 2022-05-18

## 2022-05-10 RX ORDER — OXYCODONE HYDROCHLORIDE AND ACETAMINOPHEN 5; 325 MG/1; MG/1
1 TABLET ORAL EVERY 8 HOURS PRN
Qty: 40 TABLET | Refills: 0 | Status: SHIPPED | OUTPATIENT
Start: 2022-05-10 | End: 2022-05-26 | Stop reason: SDUPTHER

## 2022-05-11 NOTE — RESULT ENCOUNTER NOTE
Noted. Completed (addressed/treated) while in ED.
Patient/Caregiver provided printed discharge information.

## 2022-05-12 ENCOUNTER — OFFICE VISIT (OUTPATIENT)
Dept: BEHAVIORAL/MENTAL HEALTH CLINIC | Age: 62
End: 2022-05-12
Payer: MEDICARE

## 2022-05-12 DIAGNOSIS — F43.10 POST TRAUMATIC STRESS DISORDER: Primary | ICD-10-CM

## 2022-05-12 PROCEDURE — 1036F TOBACCO NON-USER: CPT | Performed by: SOCIAL WORKER

## 2022-05-12 PROCEDURE — 90837 PSYTX W PT 60 MINUTES: CPT | Performed by: SOCIAL WORKER

## 2022-05-14 NOTE — RESULT ENCOUNTER NOTE
Reviewed. Ordered (to be addressed/treated) per ordering provider. Nephrology. Available if needed for correlation of care.

## 2022-05-17 DIAGNOSIS — F41.9 ANXIETY: ICD-10-CM

## 2022-05-18 DIAGNOSIS — J41.1 MUCOPURULENT CHRONIC BRONCHITIS (HCC): ICD-10-CM

## 2022-05-18 RX ORDER — ALPRAZOLAM 0.25 MG/1
TABLET ORAL
Qty: 7 TABLET | Refills: 0 | Status: SHIPPED | OUTPATIENT
Start: 2022-05-18 | End: 2022-05-26

## 2022-05-18 RX ORDER — NEBULIZER ACCESSORIES
1 KIT MISCELLANEOUS 4 TIMES DAILY PRN
Qty: 1 KIT | Refills: 0 | Status: SHIPPED | OUTPATIENT
Start: 2022-05-18 | End: 2023-05-18

## 2022-05-18 NOTE — TELEPHONE ENCOUNTER
Last visit: 04/25/22  Last Med refill: 05/10/22  Does patient have enough medication for 72 hours: Yes    Next Visit Date:  Future Appointments   Date Time Provider Mark Sandi   5/25/2022  3:00 PM BRAULIO Serrano - CNP [facility]   5/31/2022 12:30 PM RACH CabreraS [place] psyc [facility]   6/27/2022  3:30 PM Sebas Rodriguez DO [facility]   7/6/2022  2:00 PM Micheline Lucero MD Resp Spec [facility]   7/25/2022 10:00 AM Fiona Son DPM [facility]   8/15/2022  8:30 AM Parish Diaz MD [facility] None       Health Maintenance   Topic Date Due    Diabetic retinal exam  07/09/2022    Cervical cancer screen  11/16/2022    Depression Monitoring  01/24/2023    A1C test (Diabetic or Prediabetic)  03/28/2023    Lipids  03/28/2023    Pneumococcal 0-64 years Vaccine (3 - PPSV23 or PCV20) 04/23/2023    Diabetic foot exam  05/02/2023    Breast cancer screen  09/10/2023    DTaP/Tdap/Td vaccine (2 - Td or Tdap) 08/04/2026    Colorectal Cancer Screen  02/16/2028    Flu vaccine  Completed    Shingles vaccine  Completed    COVID-19 Vaccine  Completed    Hepatitis C screen  Addressed    HIV screen  Addressed    Hepatitis A vaccine  Aged Out    Hib vaccine  Aged Out    Meningococcal (ACWY) vaccine  Aged Out       Hemoglobin A1C (%)   Date Value   03/28/2022 6.2 (H)   08/05/2021 7.2 (H)   07/19/2021 6.4 (A)             ( goal A1C is < 7)   Microalb/Crt.  Ratio (mcg/mg creat)   Date Value   04/08/2019 CANNOT BE CALCULATED     LDL Cholesterol (mg/dL)   Date Value   03/28/2022 62   05/07/2020 68     LDL Calculated (mg/dL)   Date Value   04/10/2020 59       (goal LDL is <100)   AST (U/L)   Date Value   03/28/2022 16     ALT (U/L)   Date Value   03/28/2022 15     BUN (mg/dL)   Date Value   05/10/2022 17     BP Readings from Last 3 Encounters:   05/11/22 128/70   04/25/22 116/84   04/13/22 130/84          (goal 120/80)    All Future Testing planned in [facility]  Lab Frequency Next Occurrence   ECHO Complete 2D W Doppler W Color Once 07/19/2021   H. pylori antigen Once 10/80/8018   Basic Metabolic Panel Once 05/59/0295   Protein / Creatinine Ratio, Urine Once 08/11/2022   CBC Once 08/11/2022   PTH, Intact with Ionized Calcium Once 08/11/2022   POCT INR                 Patient Active Problem List:     Essential hypertension     Hyperlipidemia     Osteoarthritis     Obesity     CKD (chronic kidney disease) stage 3, GFR 30-59 ml/min (HCC)     Proteinuria     Controlled type 2 diabetes mellitus with complication, without long-term current use of insulin (HCC)     History of DVT of lower extremity     NARCISO on CPAP     Vitamin D deficiency     Major depressive disorder, recurrent episode, severe, with psychotic behavior ([address]     Multiple lung nodules on CT     Hypomagnesemia     Anxiety     Chronic obstructive pulmonary disease (HCC)     Precordial pain     History of pulmonary embolism     Family history of abdominal aortic aneurysm     Normal coronary arteries     Long term (current) use of anticoagulants     AAA (abdominal aortic aneurysm) without rupture (HCC)     COPD exacerbation (HCC)     Acute tracheobronchitis-viral     Abnormal mammogram     Cerebrovascular accident (CVA) ([address]     Diabetic nephropathy (HCC)     Slow transit constipation     Frontal mass of brain     Intracranial mass     Cerebral cavernoma     Pseudogout     History of gout     Familial cavernous cerebral angioma (HCC)     Cavernoma     Effusion of right knee     Meniscus, medial, anterior horn derangement, right     Post traumatic stress disorder     Patellofemoral arthritis of right knee

## 2022-05-18 NOTE — TELEPHONE ENCOUNTER
Last visit: 04/25/22  Last Med refill: 05/03/22  Does patient have enough medication for 72 hours: Yes    Next Visit Date:  Future Appointments   Date Time Provider Mark Sandi   5/25/2022  3:00 PM BRAULIO Serrano - CNP [facility]   5/31/2022 12:30 PM RACH CabreraS eunice psyc [facility]   6/27/2022  3:30 PM Sebas Rodriguez DO [facility]   7/6/2022  2:00 PM Micheline Lucero MD Resp Spec [facility]   7/25/2022 10:00 AM Fiona Son DPM [facility]   8/15/2022  8:30 AM Parish Diaz MD [facility] None       Health Maintenance   Topic Date Due    Diabetic retinal exam  07/09/2022    Cervical cancer screen  11/16/2022    Depression Monitoring  01/24/2023    A1C test (Diabetic or Prediabetic)  03/28/2023    Lipids  03/28/2023    Pneumococcal 0-64 years Vaccine (3 - PPSV23 or PCV20) 04/23/2023    Diabetic foot exam  05/02/2023    Breast cancer screen  09/10/2023    DTaP/Tdap/Td vaccine (2 - Td or Tdap) 08/04/2026    Colorectal Cancer Screen  02/16/2028    Flu vaccine  Completed    Shingles vaccine  Completed    COVID-19 Vaccine  Completed    Hepatitis C screen  Addressed    HIV screen  Addressed    Hepatitis A vaccine  Aged Out    Hib vaccine  Aged Out    Meningococcal (ACWY) vaccine  Aged Out       Hemoglobin A1C (%)   Date Value   03/28/2022 6.2 (H)   08/05/2021 7.2 (H)   07/19/2021 6.4 (A)             ( goal A1C is < 7)   Microalb/Crt.  Ratio (mcg/mg creat)   Date Value   04/08/2019 CANNOT BE CALCULATED     LDL Cholesterol (mg/dL)   Date Value   03/28/2022 62   05/07/2020 68     LDL Calculated (mg/dL)   Date Value   04/10/2020 59       (goal LDL is <100)   AST (U/L)   Date Value   03/28/2022 16     ALT (U/L)   Date Value   03/28/2022 15     BUN (mg/dL)   Date Value   05/10/2022 17     BP Readings from Last 3 Encounters:   05/11/22 128/70   04/25/22 116/84   04/13/22 130/84          (goal 120/80)    All Future Testing planned in [facility]  Lab Frequency Next Occurrence   ECHO Complete 2D W Doppler W Color Once 07/19/2021   H. pylori antigen Once 45/62/9580   Basic Metabolic Panel Once 17/58/6332   Protein / Creatinine Ratio, Urine Once 08/11/2022   CBC Once 08/11/2022   PTH, Intact with Ionized Calcium Once 08/11/2022   POCT INR                 Patient Active Problem List:     Essential hypertension     Hyperlipidemia     Osteoarthritis     Obesity     CKD (chronic kidney disease) stage 3, GFR 30-59 ml/min (HCC)     Proteinuria     Controlled type 2 diabetes mellitus with complication, without long-term current use of insulin (HCC)     History of DVT of lower extremity     NARCISO on CPAP     Vitamin D deficiency     Major depressive disorder, recurrent episode, severe, with psychotic behavior ([address]     Multiple lung nodules on CT     Hypomagnesemia     Anxiety     Chronic obstructive pulmonary disease (HCC)     Precordial pain     History of pulmonary embolism     Family history of abdominal aortic aneurysm     Normal coronary arteries     Long term (current) use of anticoagulants     AAA (abdominal aortic aneurysm) without rupture (HCC)     COPD exacerbation (HCC)     Acute tracheobronchitis-viral     Abnormal mammogram     Cerebrovascular accident (CVA) ([address]     Diabetic nephropathy (HCC)     Slow transit constipation     Frontal mass of brain     Intracranial mass     Cerebral cavernoma     Pseudogout     History of gout     Familial cavernous cerebral angioma (HCC)     Cavernoma     Effusion of right knee     Meniscus, medial, anterior horn derangement, right     Post traumatic stress disorder     Patellofemoral arthritis of right knee

## 2022-05-19 ENCOUNTER — TELEPHONE (OUTPATIENT)
Dept: FAMILY MEDICINE CLINIC | Age: 62
End: 2022-05-19

## 2022-05-19 NOTE — TELEPHONE ENCOUNTER
Patient called requesting an earlier appt (currently scheduled with AD 05/25) Patient went to [facility] in [place] for rib/back pain. Patient states she continues to have the pain, especially with movement. Any suggestions? [facility] ruled sx's as muscle pain.

## 2022-05-20 NOTE — TELEPHONE ENCOUNTER
Patient states she is not sleeping in her bed currently, sleeping in recliner. Urgent care stated muscle pain. She has been taking her muscle relaxer's and spacing out pain medication. Pain level today 8/10. Pain located all on the LT side of the body, neck, back and under her rib cage. Taking a deep breath in causes pain.

## 2022-05-25 ENCOUNTER — ANTI-COAG VISIT (OUTPATIENT)
Dept: FAMILY MEDICINE CLINIC | Age: 62
End: 2022-05-25
Payer: MEDICARE

## 2022-05-25 ENCOUNTER — HOSPITAL ENCOUNTER (OUTPATIENT)
Age: 62
Discharge: HOME | End: 2022-05-27
Payer: MEDICARE

## 2022-05-25 ENCOUNTER — HOSPITAL ENCOUNTER (OUTPATIENT)
Dept: GENERAL RADIOLOGY | Age: 62
Discharge: HOME | End: 2022-05-27
Payer: MEDICARE

## 2022-05-25 ENCOUNTER — OFFICE VISIT (OUTPATIENT)
Dept: FAMILY MEDICINE CLINIC | Age: 62
End: 2022-05-25

## 2022-05-25 ENCOUNTER — HOSPITAL ENCOUNTER (OUTPATIENT)
Age: 62
Discharge: HOME | End: 2022-05-25
Payer: MEDICARE

## 2022-05-25 VITALS
TEMPERATURE: 97.7 F | BODY MASS INDEX: 30.22 KG/M2 | WEIGHT: 165.2 LBS | SYSTOLIC BLOOD PRESSURE: 120 MMHG | DIASTOLIC BLOOD PRESSURE: 80 MMHG | HEART RATE: 78 BPM | RESPIRATION RATE: 16 BRPM | OXYGEN SATURATION: 97 %

## 2022-05-25 DIAGNOSIS — R07.81 RIB PAIN ON LEFT SIDE: ICD-10-CM

## 2022-05-25 DIAGNOSIS — R06.02 SHORTNESS OF BREATH: ICD-10-CM

## 2022-05-25 DIAGNOSIS — F41.9 ANXIETY: Primary | ICD-10-CM

## 2022-05-25 DIAGNOSIS — F33.3 MAJOR DEPRESSIVE DISORDER, RECURRENT EPISODE, SEVERE, WITH PSYCHOTIC BEHAVIOR (HCC): Chronic | ICD-10-CM

## 2022-05-25 DIAGNOSIS — Z86.718 HISTORY OF DVT OF LOWER EXTREMITY: ICD-10-CM

## 2022-05-25 DIAGNOSIS — Z86.718 HISTORY OF DVT OF LOWER EXTREMITY: Primary | ICD-10-CM

## 2022-05-25 LAB
ABSOLUTE EOS #: 0.13 K/UL (ref 0–0.44)
ABSOLUTE IMMATURE GRANULOCYTE: <0.03 K/UL (ref 0–0.3)
ABSOLUTE LYMPH #: 3.49 K/UL (ref 1.1–3.7)
ABSOLUTE MONO #: 0.55 K/UL (ref 0.1–1.2)
BASOPHILS # BLD: 0 % (ref 0–2)
BASOPHILS ABSOLUTE: 0.03 K/UL (ref 0–0.2)
EOSINOPHILS RELATIVE PERCENT: 2 % (ref 1–4)
FERRITIN: 52 NG/ML (ref 13–150)
HCT VFR BLD CALC: 39.6 % (ref 36.3–47.1)
HEMOGLOBIN: 12.7 G/DL (ref 11.9–15.1)
IMMATURE GRANULOCYTES: 0 %
INTERNATIONAL NORMALIZATION RATIO, POC: 1.8
IRON SATURATION: 35 % (ref 20–55)
IRON: 97 UG/DL (ref 37–145)
LYMPHOCYTES # BLD: 44 % (ref 24–43)
MCH RBC QN AUTO: 30.8 PG (ref 25.2–33.5)
MCHC RBC AUTO-ENTMCNC: 32.1 G/DL (ref 28.4–34.8)
MCV RBC AUTO: 95.9 FL (ref 82.6–102.9)
MONOCYTES # BLD: 7 % (ref 3–12)
NRBC AUTOMATED: 0 PER 100 WBC
PDW BLD-RTO: 15 % (ref 11.8–14.4)
PLATELET # BLD: 223 K/UL (ref 138–453)
PMV BLD AUTO: 8.9 FL (ref 8.1–13.5)
PROTHROMBIN TIME, POC: 21.4
RBC # BLD: 4.13 M/UL (ref 3.95–5.11)
RBC # BLD: ABNORMAL 10*6/UL
SEG NEUTROPHILS: 47 % (ref 36–65)
SEGMENTED NEUTROPHILS ABSOLUTE COUNT: 3.65 K/UL (ref 1.5–8.1)
TOTAL IRON BINDING CAPACITY: 278 UG/DL (ref 250–450)
UNSATURATED IRON BINDING CAPACITY: 181 UG/DL (ref 112–347)
WBC # BLD: 7.9 K/UL (ref 3.5–11.3)

## 2022-05-25 PROCEDURE — 1036F TOBACCO NON-USER: CPT | Performed by: NURSE PRACTITIONER

## 2022-05-25 PROCEDURE — 83550 IRON BINDING TEST: CPT

## 2022-05-25 PROCEDURE — 82728 ASSAY OF FERRITIN: CPT

## 2022-05-25 PROCEDURE — G8417 CALC BMI ABV UP PARAM F/U: HCPCS | Performed by: NURSE PRACTITIONER

## 2022-05-25 PROCEDURE — G8427 DOCREV CUR MEDS BY ELIG CLIN: HCPCS | Performed by: NURSE PRACTITIONER

## 2022-05-25 PROCEDURE — 83540 ASSAY OF IRON: CPT

## 2022-05-25 PROCEDURE — 99215 OFFICE O/P EST HI 40 MIN: CPT | Performed by: NURSE PRACTITIONER

## 2022-05-25 PROCEDURE — 85025 COMPLETE CBC W/AUTO DIFF WBC: CPT

## 2022-05-25 PROCEDURE — 3017F COLORECTAL CA SCREEN DOC REV: CPT | Performed by: NURSE PRACTITIONER

## 2022-05-25 PROCEDURE — 93793 ANTICOAG MGMT PT WARFARIN: CPT | Performed by: NURSE PRACTITIONER

## 2022-05-25 PROCEDURE — 71046 X-RAY EXAM CHEST 2 VIEWS: CPT

## 2022-05-25 PROCEDURE — 85610 PROTHROMBIN TIME: CPT | Performed by: NURSE PRACTITIONER

## 2022-05-25 PROCEDURE — 36415 COLL VENOUS BLD VENIPUNCTURE: CPT

## 2022-05-25 NOTE — PROGRESS NOTES
[address]  [phone]    5/25/22     Patient ID  Dariana Austin is a 57 y.o. female  Established patient    Chief Complaint  Dariana Austin presents today for Anxiety    Have you seen any other physician or provider since your last visit? Yes - Records Obtained Nephrology Dr. Cyndy Fernández  Have you had any other diagnostic tests since your last visit? Yes - Records Obtained Blood work   Have you been seen in the emergency room and/or had an admission to a hospital since we last saw you? No    ASSESSMENT/PLAN  1. Anxiety  2. Major depressive disorder, recurrent episode, severe, with psychotic behavior ([facility]  Assessment & Plan:   Borderline controlled, continue current medications and continue current treatment plan   Continue to follow with psychiatry  Also now following with therapy    3. Rib pain on left side  -     XR CHEST STANDARD (2 VW); Future  4. Shortness of breath  -     XR CHEST STANDARD (2 VW); Future  -     CBC with Auto Differential; Future  -     Iron and TIBC; Future  -     Ferritin; Future  5. History of DVT of lower extremity  -     POCT INR     Check chest XR - rationale for left sided rib pain? Musculoskeletal? Seen in ER for this  Noncardiac. Check pulmonary as known nodules? If INR stable, stop and start Eliquis  Concern with ongoing weight loss? More depression related? No recent changes in pharmacology, diabetes controlled. BM improved. Return in about 2 months (around 7/25/2022). On this date 5/25/2022 I have spent 40+ minutes reviewing previous notes, test results and face to face with the patient discussing the diagnosis and importance of compliance with the treatment plan as well as documenting on the day of the visit.     Patient Care Team:  BRAULIO Horowitz CNP as PCP - General (Nurse Practitioner Family)  BRAULIO Horowitz CNP as PCP - [facility] Empaneled Provider  Kuldip Sigala MD as Consulting Physician (Nephrology)  Damian Miguel MD as Consulting Physician (Pulmonology)  Yossi Florence MD as Consulting Physician (Cardiology)  Christopher Ibarra DPM as Consulting Physician ([facility])  Lakhwinder Jerez MD (Psychiatry)  Nati Sierra MD as Consulting Physician (Neurology)  Yarelis Mckeon DO as Surgeon (Neurosurgery)    SUBJECTIVE/OBJECTIVE  History of Present illness / Visit Summary   Patient presents today for follow up   Reviewed health maintenance and any recent labs/imaging    Daughter present for visit  Recent move - lives with other daughter now (present today)  Still with left sided rib pain? No rash. Shingles? Attempted water therapy, unsuccessful. Review of Systems  Review of Systems   Constitutional: Negative for activity change, appetite change, fatigue and fever. HENT: Negative for congestion. Dentures, upper and lower - does not routinely wear    Eyes:        Glasses, recent exam    Respiratory: Negative for chest tightness, shortness of breath (w/exertion ) and wheezing. Follows with pulmonology   Known lung nodules  NARCISO - no longer wearing CPAP   Cardiovascular: Positive for chest pain (left rib pain?). Negative for palpitations and leg swelling. Follows with cardiology   Gastrointestinal: Negative for abdominal distention, abdominal pain, constipation and diarrhea. Endocrine:        Monitors BS daily. BS stable   Follows with podiatry    Genitourinary: Negative for difficulty urinating, dysuria, frequency, hematuria and urgency. Follows with nephrology    Musculoskeletal: Positive for arthralgias (right knee), back pain (chronic lumbar ), gait problem (using walker), joint swelling (right knee) and myalgias. Tried water therapy, went to only one session    Skin: Negative for rash and wound. Allergic/Immunologic: Negative for environmental allergies.    Neurological: Negative for dizziness, syncope, speech difficulty, light-headedness, numbness and headaches. Following with neurology    Hematological: Does not bruise/bleed easily. Psychiatric/Behavioral: Positive for agitation, decreased concentration, dysphoric mood and sleep disturbance. Negative for behavioral problems, hallucinations, self-injury and suicidal ideas. The patient is nervous/anxious. Follows with psychiatry and [facility] within office        Physical exam   Physical Exam  Vitals and nursing note reviewed. Constitutional:       General: She is not in acute distress. Appearance: Normal appearance. She is well-developed, well-groomed and overweight. She is not ill-appearing or toxic-appearing. Cardiovascular:      Rate and Rhythm: Normal rate and regular rhythm. No extrasystoles are present. Heart sounds: Normal heart sounds, S1 normal and S2 normal. No murmur heard. Pulmonary:      Effort: Pulmonary effort is normal. No prolonged expiration or respiratory distress. Breath sounds: Normal breath sounds and air entry. Chest:       Musculoskeletal:      Right knee: Swelling present. Decreased range of motion. Tenderness present. Right lower leg: No edema. Left lower leg: No edema. Skin:     General: Skin is warm and dry. Coloration: Skin is not ashen, cyanotic, jaundiced or pale. Neurological:      Mental Status: She is alert and oriented to person, place, and time. Motor: Motor function is intact. Gait: Gait is intact. Psychiatric:         Attention and Perception: Attention and perception normal.         Mood and Affect: Mood is anxious and depressed. Mood is not elated. Affect is not tearful. Speech: Speech is not rapid and pressured. Behavior: Behavior normal. Behavior is not withdrawn. Behavior is cooperative. Thought Content: Thought content normal. Thought content does not include suicidal ideation. Thought content does not include suicidal plan.          Cognition and Memory: Cognition and memory normal.         Judgment: Judgment normal.           Electronically signed by BRAULIO Landis CNP, APRN-CNP on 6/19/2022 at 9:22 PM    Please note that this chart was generated using voice recognition Dragon dictation software. Although every effort was made to ensure the accuracy of this automated transcription, some errors in transcription may have occurred.

## 2022-05-25 NOTE — PROGRESS NOTES
Patient presents in the office today for INR check. She is alert, oriented, and dressed appropriatly. Tolerated well.      INR slightly low at 1.8

## 2022-05-26 DIAGNOSIS — M15.9 PRIMARY OSTEOARTHRITIS INVOLVING MULTIPLE JOINTS: ICD-10-CM

## 2022-05-26 DIAGNOSIS — M62.838 MUSCLE SPASM: ICD-10-CM

## 2022-05-26 DIAGNOSIS — F41.9 ANXIETY: ICD-10-CM

## 2022-05-26 RX ORDER — TIZANIDINE 4 MG/1
4 TABLET ORAL 3 TIMES DAILY PRN
Qty: 90 TABLET | Refills: 0 | Status: SHIPPED | OUTPATIENT
Start: 2022-05-26 | End: 2022-07-08

## 2022-05-26 RX ORDER — OXYCODONE HYDROCHLORIDE AND ACETAMINOPHEN 5; 325 MG/1; MG/1
1 TABLET ORAL EVERY 8 HOURS PRN
Qty: 40 TABLET | Refills: 0 | Status: SHIPPED | OUTPATIENT
Start: 2022-05-26 | End: 2022-06-08 | Stop reason: SDUPTHER

## 2022-05-26 RX ORDER — ALPRAZOLAM 0.25 MG/1
TABLET ORAL
Qty: 7 TABLET | Refills: 0 | Status: SHIPPED | OUTPATIENT
Start: 2022-05-26 | End: 2022-06-04

## 2022-05-26 NOTE — TELEPHONE ENCOUNTER
Per [employer] message patient requesting medications. LOV 5/25/22  RTO 3 months  LRF 5/10/22 and 5/9/22    Health Maintenance   Topic Date Due    Diabetic retinal exam  07/09/2022    Cervical cancer screen  11/16/2022    Depression Monitoring  01/24/2023    A1C test (Diabetic or Prediabetic)  03/28/2023    Lipids  03/28/2023    Pneumococcal 0-64 years Vaccine (3 - PPSV23 or PCV20) 04/23/2023    Diabetic foot exam  05/02/2023    Breast cancer screen  09/10/2023    DTaP/Tdap/Td vaccine (2 - Td or Tdap) 08/04/2026    Colorectal Cancer Screen  02/16/2028    Flu vaccine  Completed    Shingles vaccine  Completed    COVID-19 Vaccine  Completed    Hepatitis C screen  Addressed    HIV screen  Addressed    Hepatitis A vaccine  Aged Out    Hib vaccine  Aged Out    Meningococcal (ACWY) vaccine  Aged Out             (applicable per patient's age: Cancer Screenings, Depression Screening, Fall Risk Screening, Immunizations)    Hemoglobin A1C (%)   Date Value   03/28/2022 6.2 (H)   08/05/2021 7.2 (H)   07/19/2021 6.4 (A)     Microalb/Crt.  Ratio (mcg/mg creat)   Date Value   04/08/2019 CANNOT BE CALCULATED     LDL Cholesterol (mg/dL)   Date Value   03/28/2022 62     LDL Calculated (mg/dL)   Date Value   04/10/2020 59     AST (U/L)   Date Value   03/28/2022 16     ALT (U/L)   Date Value   03/28/2022 15     BUN (mg/dL)   Date Value   05/10/2022 17      (goal A1C is < 7)   (goal LDL is <100) need 30-50% reduction from baseline     BP Readings from Last 3 Encounters:   05/25/22 120/80   05/11/22 128/70   04/25/22 116/84    (goal /80)      All Future Testing planned in [facility]:  Lab Frequency Next Occurrence   ECHO Complete 2D W Doppler W Color Once 07/19/2021   H. pylori antigen Once 89/20/8617   Basic Metabolic Panel Once 68/54/8325   Protein / Creatinine Ratio, Urine Once 08/11/2022   CBC Once 08/11/2022   PTH, Intact with Ionized Calcium Once 08/11/2022   POCT INR         Next Visit Date:  Future Appointments   Date Time Provider Mark Junior   5/31/2022 12:30 PM MARIBELL Cabrera psyc [facility]   6/27/2022  3:30 PM DO Alexandro Casiano Neuro [facility]   7/6/2022  2:00 PM Joe Liu MD Resp Spec [facility]   7/25/2022 10:00 AM Nelsy Pacheco DPM [facility]   8/15/2022  8:30 AM Samy Arora MD [facility] None            Patient Active Problem List:     Essential hypertension     Hyperlipidemia     Osteoarthritis     Obesity     CKD (chronic kidney disease) stage 3, GFR 30-59 ml/min (HCC)     Proteinuria     Controlled type 2 diabetes mellitus with complication, without long-term current use of insulin (HCC)     History of DVT of lower extremity     NARCISO on CPAP     Vitamin D deficiency     Major depressive disorder, recurrent episode, severe, with psychotic behavior ([address]     Multiple lung nodules on CT     Hypomagnesemia     Anxiety     Chronic obstructive pulmonary disease (HCC)     Precordial pain     History of pulmonary embolism     Family history of abdominal aortic aneurysm     Normal coronary arteries     Long term (current) use of anticoagulants     AAA (abdominal aortic aneurysm) without rupture (HCC)     COPD exacerbation (HCC)     Acute tracheobronchitis-viral     Abnormal mammogram     Cerebrovascular accident (CVA) ([address]     Diabetic nephropathy (HCC)     Slow transit constipation     Frontal mass of brain     Intracranial mass     Cerebral cavernoma     Pseudogout     History of gout     Familial cavernous cerebral angioma (HCC)     Cavernoma     Effusion of right knee     Meniscus, medial, anterior horn derangement, right     Post traumatic stress disorder     Patellofemoral arthritis of right knee

## 2022-05-31 ENCOUNTER — OFFICE VISIT (OUTPATIENT)
Dept: BEHAVIORAL/MENTAL HEALTH CLINIC | Age: 62
End: 2022-05-31
Payer: MEDICARE

## 2022-05-31 ENCOUNTER — ANTI-COAG VISIT (OUTPATIENT)
Dept: FAMILY MEDICINE CLINIC | Age: 62
End: 2022-05-31
Payer: MEDICARE

## 2022-05-31 VITALS — TEMPERATURE: 97.7 F | OXYGEN SATURATION: 98 % | HEART RATE: 99 BPM

## 2022-05-31 DIAGNOSIS — Z79.01 LONG TERM (CURRENT) USE OF ANTICOAGULANTS: ICD-10-CM

## 2022-05-31 DIAGNOSIS — Z86.718 HISTORY OF DVT OF LOWER EXTREMITY: Primary | ICD-10-CM

## 2022-05-31 DIAGNOSIS — F43.10 POST TRAUMATIC STRESS DISORDER: Primary | ICD-10-CM

## 2022-05-31 DIAGNOSIS — M62.838 MUSCLE SPASM: ICD-10-CM

## 2022-05-31 DIAGNOSIS — Z86.718 HISTORY OF DVT OF LOWER EXTREMITY: ICD-10-CM

## 2022-05-31 DIAGNOSIS — F41.9 ANXIETY: ICD-10-CM

## 2022-05-31 LAB
INTERNATIONAL NORMALIZATION RATIO, POC: 1.1
PROTHROMBIN TIME, POC: 13.5

## 2022-05-31 PROCEDURE — 85610 PROTHROMBIN TIME: CPT | Performed by: NURSE PRACTITIONER

## 2022-05-31 PROCEDURE — 1036F TOBACCO NON-USER: CPT | Performed by: SOCIAL WORKER

## 2022-05-31 PROCEDURE — 90832 PSYTX W PT 30 MINUTES: CPT | Performed by: SOCIAL WORKER

## 2022-05-31 PROCEDURE — 93793 ANTICOAG MGMT PT WARFARIN: CPT | Performed by: NURSE PRACTITIONER

## 2022-05-31 NOTE — PROGRESS NOTES
ADULT BEHAVIORAL HEALTH FOLLOW UP  Milly Valencia, LISW-S  Licensed Independent        Visit Date: 5/31/2022   Time of appointment:  [id number]   Time spent with Patient: 37 minutes. This is patient's third appointment. Reason for Consult:  Anxiety and Stress     Referring Provider/PCP:    No ref. provider found  Shanthi Cintia, APRN - CNP      Pt provided informed consent for the behavioral health program. Discussed with patient model of service to include the limits of confidentiality (i.e. abuse reporting, suicide intervention, etc.) and short-term intervention focused approach. Pt indicated understanding. Duane Beam is a 57 y.o. female who presents for follow up of stress. She presents as highly anxious, worried about future plans for living situation. We processed her concerns and options, she was encouraged to keep name on apartment list, as she wants to live there, despite being told to take it off by daughter. Her worries were validated, she was encouraged to focus on what she wants for herself, not what others advise. We reviewed coping skills for anxiety, including cold to face to slow her body down then questioning is this a now or future problem. Previous Recommendations: Anam Zafar will ask questions about medication options with psychiatrist. She will follow up with Tiana Verde in 2 weeks. MENTAL STATUS EXAM  Mood was anxious with anxious affect. Suicidal ideation was denied. Homicidal ideation was denied. Hygiene was good . Dress was appropriate. Behavior was Within Normal Limits with No observation or self-report of difficulties ambulating. Attitude was Cooperative. Eye-contact was good. Speech: rate - WNL, rhythm - WNL, volume - WNL. Verbalizations were coherent. Thought processes were intact and goal-oriented without evidence of delusions, hallucinations, obsessions, or christel; with moderate cognitive distortions.    Associations were characterized by intact cognitive processes. Pt was oriented to person, place, time, and general circumstances;  recent:  good and remote:  good. Insight and judgment were estimated to be fair, AEB, a fair  understanding of cyclical maladaptive patterns, and the ability to use insight to inform behavior change. ASSESSMENT  Simone Burns presented to the appointment today for evaluation and treatment of symptoms of anxiety. She is currently deemed no risk to herself or others and meets criteria for PTSD. She was in agreement with recommendations. PHQ Scores 1/24/2022 7/25/2018 6/27/2017   PHQ2 Score 0 0 0   PHQ9 Score 0 0 0     Interpretation of Total Score Depression Severity: 1-4 = Minimal depression, 5-9 = Mild depression, 10-14 = Moderate depression, 15-19 = Moderately severe depression, 20-27 = Severe depression    How often pt has had thoughts of death or hurting self (if PHQ positive for depression):       No flowsheet data found. Interpretation of ZAIN-7 score: 5-9 = mild anxiety, 10-14 = moderate anxiety, 15+ = severe anxiety. Recommend referral to behavioral health for scores 10 or greater. DIAGNOSIS  Gordon Benítez was seen today for anxiety and stress. Diagnoses and all orders for this visit:    Post traumatic stress disorder          INTERVENTION  Trained in strategies for increasing balanced thinking, Discussed and set plan for behavioral activation, Trained in relaxation strategies, Discussed self-care (sleep, nutrition, rewarding activities, social support, exercise), Discussed potential barriers to change, Established rapport and Supportive techniques      PLAN  Gordon Benítez will utilize healthy coping skills for anxiety. She will follow up with Tommy Ambrose in 3 weeks. INTERACTIVE COMPLEXITY  Is interactive complexity present?   No  Reason:  N/A  Additional Supporting Information:  N/A       Electronically signed by MARIBELL Fitch on 5/31/22 at 12:38 PM EDT

## 2022-05-31 NOTE — PROGRESS NOTES
Patient presents in the office today with self for INR check. Patient is alert, oriented and dressed appropriately. Tolerated well with no reactions. To see provider to discuss starting eliquis. Advised to hold coumadin Friday and Monday.

## 2022-06-01 NOTE — TELEPHONE ENCOUNTER
Last visit: 04/25/22  Last Med refill: 01/07/21  Does patient have enough medication for 72 hours: Yes    Next Visit Date:  Future Appointments   Date Time Provider Mark Sandi   5/9/2022  9:20 AM Kp Adams MD ORTHO SPECIA [facility]   5/12/2022 12:30 PM SOCORRO CabreraW-S [place] psyc [facility]   5/25/2022  3:00 PM Trisha Alexus Ser, APRN - CNP [facility]   6/27/2022  3:30 PM Angel Sue DO [facility]   7/6/2022  2:00 PM Joe Liu MD Resp Spec [facility]   7/25/2022 10:00 AM Nelsy Pacheco DPM [facility]   8/15/2022  8:30 AM Samy Arora MD [facility] None       Health Maintenance   Topic Date Due    Diabetic retinal exam  07/09/2022    Cervical cancer screen  11/16/2022    Depression Monitoring  01/24/2023    A1C test (Diabetic or Prediabetic)  03/28/2023    Lipids  03/28/2023    Potassium  03/28/2023    Creatinine  03/28/2023    Pneumococcal 0-64 years Vaccine (3 - PPSV23 or PCV20) 04/23/2023    Diabetic foot exam  05/02/2023    Breast cancer screen  09/10/2023    DTaP/Tdap/Td vaccine (2 - Td or Tdap) 08/04/2026    Colorectal Cancer Screen  02/16/2028    Flu vaccine  Completed    Shingles vaccine  Completed    COVID-19 Vaccine  Completed    Hepatitis C screen  Addressed    HIV screen  Addressed    Hepatitis A vaccine  Aged Out    Hib vaccine  Aged Out    Meningococcal (ACWY) vaccine  Aged Out       Hemoglobin A1C (%)   Date Value   03/28/2022 6.2 (H)   08/05/2021 7.2 (H)   07/19/2021 6.4 (A)             ( goal A1C is < 7)   Microalb/Crt.  Ratio (mcg/mg creat)   Date Value   04/08/2019 CANNOT BE CALCULATED     LDL Cholesterol (mg/dL)   Date Value   03/28/2022 62   05/07/2020 68     LDL Calculated (mg/dL)   Date Value   04/10/2020 59       (goal LDL is <100)   AST (U/L)   Date Value   03/28/2022 16     ALT (U/L)   Date Value   03/28/2022 15     BUN (mg/dL)   Date Value   03/28/2022 14     BP Readings from Last 3 Encounters:   04/25/22 116/84   04/13/22 130/84   04/04/22 112/62          (goal 120/80)    All Future Testing planned in [facility]  Lab Frequency Next Occurrence   ECHO Complete 2D W Doppler W Color Once 07/19/2021   H. pylori antigen Once 90/12/1848   Basic Metabolic Panel Once 84/24/5160   CBC Once 08/14/2022   Protein / Creatinine Ratio, Urine Once 08/14/2022   Magnesium Once 08/14/2022   POCT INR                 Patient Active Problem List:     Essential hypertension     Hyperlipidemia     Osteoarthritis     Obesity     CKD (chronic kidney disease) stage 3, GFR 30-59 ml/min (HCC)     Proteinuria     Controlled type 2 diabetes mellitus with complication, without long-term current use of insulin (HCC)     History of DVT of lower extremity     NARCISO on CPAP     Vitamin D deficiency     Major depressive disorder, recurrent episode, severe, with psychotic behavior ([address]     Multiple lung nodules on CT     Hypomagnesemia     Anxiety     Chronic obstructive pulmonary disease (HCC)     Precordial pain     History of pulmonary embolism     Family history of abdominal aortic aneurysm     Normal coronary arteries     Long term (current) use of anticoagulants     AAA (abdominal aortic aneurysm) without rupture (HCC)     COPD exacerbation (HCC)     Acute tracheobronchitis-viral     Abnormal mammogram     Cerebrovascular accident (CVA) ([address]     Diabetic nephropathy (HCC)     Slow transit constipation     Frontal mass of brain     Intracranial mass     Cerebral cavernoma     Pseudogout     History of gout     Familial cavernous cerebral angioma (HCC)     Cavernoma     Effusion of right knee     Meniscus, medial, anterior horn derangement, right     Post traumatic stress disorder Skin Substitute Text: The defect edges were debeveled with a #15 scalpel blade.  Given the location of the defect, shape of the defect and the proximity to free margins a skin substitute graft was deemed most appropriate.  The graft material was trimmed to fit the size of the defect. The graft was then placed in the primary defect and oriented appropriately.

## 2022-06-01 NOTE — TELEPHONE ENCOUNTER
Last visit: 5/25/22  Last Med refill:5/26/22  Does patient have enough medication for 72 hours: No:     Next Visit Date:  Future Appointments   Date Time Provider Mark Junior   6/23/2022  8:30 AM MARIBELL Cabrera psyc [facility]   6/27/2022  3:30 PM Greg Nguyen DO [facility]   7/6/2022  2:00 PM Miles Daniels MD Resp Spec [facility]   7/25/2022 10:00 AM Sathya Orantes, DPM [facility]   8/15/2022  8:30 AM Lalita French MD [facility] None       Health Maintenance   Topic Date Due    Diabetic retinal exam  07/09/2022    Cervical cancer screen  11/16/2022    Depression Monitoring  01/24/2023    A1C test (Diabetic or Prediabetic)  03/28/2023    Lipids  03/28/2023    Pneumococcal 0-64 years Vaccine (3 - PPSV23 or PCV20) 04/23/2023    Diabetic foot exam  05/02/2023    Breast cancer screen  09/10/2023    DTaP/Tdap/Td vaccine (2 - Td or Tdap) 08/04/2026    Colorectal Cancer Screen  02/16/2028    Flu vaccine  Completed    Shingles vaccine  Completed    COVID-19 Vaccine  Completed    Hepatitis C screen  Addressed    HIV screen  Addressed    Hepatitis A vaccine  Aged Out    Hib vaccine  Aged Out    Meningococcal (ACWY) vaccine  Aged Out       Hemoglobin A1C (%)   Date Value   03/28/2022 6.2 (H)   08/05/2021 7.2 (H)   07/19/2021 6.4 (A)             ( goal A1C is < 7)   Microalb/Crt.  Ratio (mcg/mg creat)   Date Value   04/08/2019 CANNOT BE CALCULATED     LDL Cholesterol (mg/dL)   Date Value   03/28/2022 62   05/07/2020 68     LDL Calculated (mg/dL)   Date Value   04/10/2020 59       (goal LDL is <100)   AST (U/L)   Date Value   03/28/2022 16     ALT (U/L)   Date Value   03/28/2022 15     BUN (mg/dL)   Date Value   05/10/2022 17     BP Readings from Last 3 Encounters:   05/25/22 120/80   05/11/22 128/70   04/25/22 116/84          (goal 120/80)    All Future Testing planned in [facility]  Lab Frequency Next Occurrence   ECHO Complete 2D W Doppler W Color Once 07/19/2021   H. pylori antigen Once 68/53/6972   Basic Metabolic Panel Once 66/59/4395   Protein / Creatinine Ratio, Urine Once 08/11/2022   CBC Once 08/11/2022   PTH, Intact with Ionized Calcium Once 08/11/2022   POCT INR                 Patient Active Problem List:     Essential hypertension     Hyperlipidemia     Osteoarthritis     Obesity     CKD (chronic kidney disease) stage 3, GFR 30-59 ml/min (HCC)     Proteinuria     Controlled type 2 diabetes mellitus with complication, without long-term current use of insulin (HCC)     History of DVT of lower extremity     NARCISO on CPAP     Vitamin D deficiency     Major depressive disorder, recurrent episode, severe, with psychotic behavior ([facility] [address]     Multiple lung nodules on CT     Hypomagnesemia     Anxiety     Chronic obstructive pulmonary disease (HCC)     Precordial pain     History of pulmonary embolism     Family history of abdominal aortic aneurysm     Normal coronary arteries     Long term (current) use of anticoagulants     AAA (abdominal aortic aneurysm) without rupture (HCC)     COPD exacerbation (HCC)     Acute tracheobronchitis-viral     Abnormal mammogram     Cerebrovascular accident (CVA) ([facility] [address]     Diabetic nephropathy (HCC)     Slow transit constipation     Frontal mass of brain     Intracranial mass     Cerebral cavernoma     Pseudogout     History of gout     Familial cavernous cerebral angioma (HCC)     Cavernoma     Effusion of right knee     Meniscus, medial, anterior horn derangement, right     Post traumatic stress disorder     Patellofemoral arthritis of right knee

## 2022-06-02 DIAGNOSIS — F41.9 ANXIETY: ICD-10-CM

## 2022-06-02 RX ORDER — ALPRAZOLAM 0.25 MG/1
TABLET ORAL
Qty: 7 TABLET | Refills: 0 | OUTPATIENT
Start: 2022-06-02 | End: 2023-05-31

## 2022-06-02 RX ORDER — TIZANIDINE 4 MG/1
4 TABLET ORAL 3 TIMES DAILY PRN
Qty: 90 TABLET | Refills: 0 | OUTPATIENT
Start: 2022-06-02 | End: 2023-06-02

## 2022-06-02 NOTE — TELEPHONE ENCOUNTER
LOV 5/25/22  RTO Not listed  LRF 5/26/22  CSM 2/28/22    Health Maintenance   Topic Date Due    Diabetic retinal exam  07/09/2022    Cervical cancer screen  11/16/2022    Depression Monitoring  01/24/2023    A1C test (Diabetic or Prediabetic)  03/28/2023    Lipids  03/28/2023    Pneumococcal 0-64 years Vaccine (3 - PPSV23 or PCV20) 04/23/2023    Diabetic foot exam  05/02/2023    Breast cancer screen  09/10/2023    DTaP/Tdap/Td vaccine (2 - Td or Tdap) 08/04/2026    Colorectal Cancer Screen  02/16/2028    Flu vaccine  Completed    Shingles vaccine  Completed    COVID-19 Vaccine  Completed    Hepatitis C screen  Addressed    HIV screen  Addressed    Hepatitis A vaccine  Aged Out    Hib vaccine  Aged Out    Meningococcal (ACWY) vaccine  Aged Out             (applicable per patient's age: Cancer Screenings, Depression Screening, Fall Risk Screening, Immunizations)    Hemoglobin A1C (%)   Date Value   03/28/2022 6.2 (H)   08/05/2021 7.2 (H)   07/19/2021 6.4 (A)     Microalb/Crt.  Ratio (mcg/mg creat)   Date Value   04/08/2019 CANNOT BE CALCULATED     LDL Cholesterol (mg/dL)   Date Value   03/28/2022 62     LDL Calculated (mg/dL)   Date Value   04/10/2020 59     AST (U/L)   Date Value   03/28/2022 16     ALT (U/L)   Date Value   03/28/2022 15     BUN (mg/dL)   Date Value   05/10/2022 17      (goal A1C is < 7)   (goal LDL is <100) need 30-50% reduction from baseline     BP Readings from Last 3 Encounters:   05/25/22 120/80   05/11/22 128/70   04/25/22 116/84    (goal /80)      All Future Testing planned in [facility]:  Lab Frequency Next Occurrence   ECHO Complete 2D W Doppler W Color Once 07/19/2021   H. pylori antigen Once 36/92/2917   Basic Metabolic Panel Once 54/37/5373   Protein / Creatinine Ratio, Urine Once 08/11/2022   CBC Once 08/11/2022   PTH, Intact with Ionized Calcium Once 08/11/2022   POCT INR         Next Visit Date:  Future Appointments   Date Time Provider Mark Junior 6/23/2022  8:30 AM MARIBELL Cabrera psyc [facility]   6/27/2022  3:30 PM DO Alexandro Hernandez Neuro [facility]   7/6/2022  2:00 PM Lieutenant Mandie MD Resp Spec [facility]   7/25/2022 10:00 AM Alberto Leahy DPM [facility]   8/15/2022  8:30 AM Evelin Mitchell MD [facility] None            Patient Active Problem List:     Essential hypertension     Hyperlipidemia     Osteoarthritis     Obesity     CKD (chronic kidney disease) stage 3, GFR 30-59 ml/min (HCC)     Proteinuria     Controlled type 2 diabetes mellitus with complication, without long-term current use of insulin (HCC)     History of DVT of lower extremity     NARCISO on CPAP     Vitamin D deficiency     Major depressive disorder, recurrent episode, severe, with psychotic behavior ([address]     Multiple lung nodules on CT     Hypomagnesemia     Anxiety     Chronic obstructive pulmonary disease (HCC)     Precordial pain     History of pulmonary embolism     Family history of abdominal aortic aneurysm     Normal coronary arteries     Long term (current) use of anticoagulants     AAA (abdominal aortic aneurysm) without rupture (HCC)     COPD exacerbation (HCC)     Acute tracheobronchitis-viral     Abnormal mammogram     Cerebrovascular accident (CVA) ([address]     Diabetic nephropathy (HCC)     Slow transit constipation     Frontal mass of brain     Intracranial mass     Cerebral cavernoma     Pseudogout     History of gout     Familial cavernous cerebral angioma (HCC)     Cavernoma     Effusion of right knee     Meniscus, medial, anterior horn derangement, right     Post traumatic stress disorder     Patellofemoral arthritis of right knee

## 2022-06-04 RX ORDER — ALPRAZOLAM 0.25 MG/1
TABLET ORAL
Qty: 30 TABLET | Refills: 0 | Status: SHIPPED | OUTPATIENT
Start: 2022-06-04 | End: 2022-07-29

## 2022-06-08 DIAGNOSIS — M15.9 PRIMARY OSTEOARTHRITIS INVOLVING MULTIPLE JOINTS: ICD-10-CM

## 2022-06-09 DIAGNOSIS — N18.30 CONTROLLED TYPE 2 DIABETES MELLITUS WITH STAGE 3 CHRONIC KIDNEY DISEASE, WITHOUT LONG-TERM CURRENT USE OF INSULIN (HCC): ICD-10-CM

## 2022-06-09 DIAGNOSIS — E11.22 CONTROLLED TYPE 2 DIABETES MELLITUS WITH STAGE 3 CHRONIC KIDNEY DISEASE, WITHOUT LONG-TERM CURRENT USE OF INSULIN (HCC): ICD-10-CM

## 2022-06-09 NOTE — TELEPHONE ENCOUNTER
Appointments   Date Time Provider Mark Junior   6/23/2022  8:30 AM MARIBELL Cabrera psyc [facility]   6/27/2022  3:30 PM DO Alexandro Ritchie Neuro [facility]   7/6/2022  2:00 PM Sumeet Gallegos MD Resp Spec [facility]   7/25/2022 10:00 AM Mino Dixon DPM [facility]   8/5/2022  8:30 AM Trisha Chaparro, APRN - CNP [facility]   8/15/2022  8:30 AM Natalie House MD [facility] None            Patient Active Problem List:     Essential hypertension     Hyperlipidemia     Osteoarthritis     Obesity     CKD (chronic kidney disease) stage 3, GFR 30-59 ml/min (HCC)     Proteinuria     Controlled type 2 diabetes mellitus with complication, without long-term current use of insulin (HCC)     History of DVT of lower extremity     NARCISO on CPAP     Vitamin D deficiency     Major depressive disorder, recurrent episode, severe, with psychotic behavior ([facility] [address]     Multiple lung nodules on CT     Hypomagnesemia     Anxiety     Chronic obstructive pulmonary disease (HCC)     Precordial pain     History of pulmonary embolism     Family history of abdominal aortic aneurysm     Normal coronary arteries     Long term (current) use of anticoagulants     AAA (abdominal aortic aneurysm) without rupture (HCC)     COPD exacerbation (HCC)     Acute tracheobronchitis-viral     Abnormal mammogram     Cerebrovascular accident (CVA) ([address]     Diabetic nephropathy (HCC)     Slow transit constipation     Frontal mass of brain     Intracranial mass     Cerebral cavernoma     Pseudogout     History of gout     Familial cavernous cerebral angioma (HCC)     Cavernoma     Effusion of right knee     Meniscus, medial, anterior horn derangement, right     Post traumatic stress disorder     Patellofemoral arthritis of right knee

## 2022-06-09 NOTE — TELEPHONE ENCOUNTER
Last visit: 5/25/22  Last Med refill:12/7/21  Does patient have enough medication for 72 hours: Yes    Next Visit Date:  Future Appointments   Date Time Provider Mark Sandi   6/23/2022  8:30 AM MARIBELL Cabrera psyc [facility]   6/27/2022  3:30 PM Orly Daniels DO [facility]   7/6/2022  2:00 PM Stephen Douglas MD Resp Spec [facility]   7/25/2022 10:00 AM Leyda Johnson DPM [facility]   8/5/2022  8:30 AM Trisha Blackmon, APRN - CNP [facility]   8/15/2022  8:30 AM Balta Brown MD [facility] None       Health Maintenance   Topic Date Due    Diabetic retinal exam  07/09/2022    Cervical cancer screen  11/16/2022    Depression Monitoring  01/24/2023    A1C test (Diabetic or Prediabetic)  03/28/2023    Lipids  03/28/2023    Pneumococcal 0-64 years Vaccine (3 - PPSV23 or PCV20) 04/23/2023    Diabetic foot exam  05/02/2023    Breast cancer screen  09/10/2023    DTaP/Tdap/Td vaccine (2 - Td or Tdap) 08/04/2026    Colorectal Cancer Screen  02/16/2028    Flu vaccine  Completed    Shingles vaccine  Completed    COVID-19 Vaccine  Completed    Hepatitis C screen  Addressed    HIV screen  Addressed    Hepatitis A vaccine  Aged Out    Hib vaccine  Aged Out    Meningococcal (ACWY) vaccine  Aged Out       Hemoglobin A1C (%)   Date Value   03/28/2022 6.2 (H)   08/05/2021 7.2 (H)   07/19/2021 6.4 (A)             ( goal A1C is < 7)   Microalb/Crt.  Ratio (mcg/mg creat)   Date Value   04/08/2019 CANNOT BE CALCULATED     LDL Cholesterol (mg/dL)   Date Value   03/28/2022 62   05/07/2020 68     LDL Calculated (mg/dL)   Date Value   04/10/2020 59       (goal LDL is <100)   AST (U/L)   Date Value   03/28/2022 16     ALT (U/L)   Date Value   03/28/2022 15     BUN (mg/dL)   Date Value   05/10/2022 17     BP Readings from Last 3 Encounters:   05/25/22 120/80   05/11/22 128/70   04/25/22 116/84          (goal 120/80)    All Future Testing planned in [facility]  Lab Frequency Next Occurrence   ECHO Complete 2D W Doppler W Color Once 07/19/2021   H. pylori antigen Once 13/15/6766   Basic Metabolic Panel Once 02/72/6380   Protein / Creatinine Ratio, Urine Once 08/11/2022   CBC Once 08/11/2022   PTH, Intact with Ionized Calcium Once 08/11/2022   POCT INR                 Patient Active Problem List:     Essential hypertension     Hyperlipidemia     Osteoarthritis     Obesity     CKD (chronic kidney disease) stage 3, GFR 30-59 ml/min (HCC)     Proteinuria     Controlled type 2 diabetes mellitus with complication, without long-term current use of insulin (HCC)     History of DVT of lower extremity     NARCISO on CPAP     Vitamin D deficiency     Major depressive disorder, recurrent episode, severe, with psychotic behavior ([address]     Multiple lung nodules on CT     Hypomagnesemia     Anxiety     Chronic obstructive pulmonary disease (HCC)     Precordial pain     History of pulmonary embolism     Family history of abdominal aortic aneurysm     Normal coronary arteries     Long term (current) use of anticoagulants     AAA (abdominal aortic aneurysm) without rupture (HCC)     COPD exacerbation (HCC)     Acute tracheobronchitis-viral     Abnormal mammogram     Cerebrovascular accident (CVA) ([address]     Diabetic nephropathy (HCC)     Slow transit constipation     Frontal mass of brain     Intracranial mass     Cerebral cavernoma     Pseudogout     History of gout     Familial cavernous cerebral angioma (HCC)     Cavernoma     Effusion of right knee     Meniscus, medial, anterior horn derangement, right     Post traumatic stress disorder     Patellofemoral arthritis of right knee

## 2022-06-10 DIAGNOSIS — F41.9 ANXIETY: ICD-10-CM

## 2022-06-14 RX ORDER — ALPRAZOLAM 0.25 MG/1
TABLET ORAL
Qty: 30 TABLET | Refills: 0 | OUTPATIENT
Start: 2022-06-14 | End: 2022-07-15

## 2022-06-16 ENCOUNTER — APPOINTMENT (OUTPATIENT)
Dept: CT IMAGING | Age: 62
End: 2022-06-16
Payer: MEDICARE

## 2022-06-16 ENCOUNTER — OFFICE VISIT (OUTPATIENT)
Dept: PRIMARY CARE CLINIC | Age: 62
End: 2022-06-16
Payer: MEDICARE

## 2022-06-16 ENCOUNTER — HOSPITAL ENCOUNTER (EMERGENCY)
Age: 62
Discharge: HOME | End: 2022-06-16
Attending: EMERGENCY MEDICINE
Payer: MEDICARE

## 2022-06-16 VITALS
DIASTOLIC BLOOD PRESSURE: 78 MMHG | BODY MASS INDEX: 30.18 KG/M2 | SYSTOLIC BLOOD PRESSURE: 128 MMHG | HEART RATE: 94 BPM | OXYGEN SATURATION: 97 % | WEIGHT: 165 LBS | TEMPERATURE: 98.6 F

## 2022-06-16 VITALS
SYSTOLIC BLOOD PRESSURE: 145 MMHG | OXYGEN SATURATION: 100 % | RESPIRATION RATE: 12 BRPM | HEART RATE: 80 BPM | HEIGHT: 62 IN | WEIGHT: 165 LBS | TEMPERATURE: 98 F | DIASTOLIC BLOOD PRESSURE: 74 MMHG | BODY MASS INDEX: 30.36 KG/M2

## 2022-06-16 DIAGNOSIS — R07.89 LEFT-SIDED CHEST WALL PAIN: ICD-10-CM

## 2022-06-16 DIAGNOSIS — R06.02 SOB (SHORTNESS OF BREATH): Primary | ICD-10-CM

## 2022-06-16 DIAGNOSIS — R07.81 RIB PAIN: Primary | ICD-10-CM

## 2022-06-16 LAB
ABSOLUTE EOS #: 0.1 K/UL (ref 0–0.4)
ABSOLUTE LYMPH #: 1.2 K/UL (ref 1–4.8)
ABSOLUTE MONO #: 0.4 K/UL (ref 0.1–1.2)
ANION GAP SERPL CALCULATED.3IONS-SCNC: 12 MMOL/L (ref 9–17)
BASOPHILS # BLD: 0 % (ref 0–2)
BASOPHILS ABSOLUTE: 0 K/UL (ref 0–0.2)
BUN BLDV-MCNC: 9 MG/DL (ref 8–23)
CALCIUM SERPL-MCNC: 10.1 MG/DL (ref 8.6–10.4)
CHLORIDE BLD-SCNC: 106 MMOL/L (ref 98–107)
CO2: 25 MMOL/L (ref 20–31)
CREAT SERPL-MCNC: 0.7 MG/DL (ref 0.5–0.9)
EOSINOPHILS RELATIVE PERCENT: 2 % (ref 1–4)
GFR AFRICAN AMERICAN: >60 ML/MIN
GFR NON-AFRICAN AMERICAN: >60 ML/MIN
GFR SERPL CREATININE-BSD FRML MDRD: ABNORMAL ML/MIN/{1.73_M2}
GLUCOSE BLD-MCNC: 112 MG/DL (ref 70–99)
HCT VFR BLD CALC: 41 % (ref 36–46)
HEMOGLOBIN: 13.4 G/DL (ref 12–16)
LYMPHOCYTES # BLD: 22 % (ref 24–44)
MCH RBC QN AUTO: 31.7 PG (ref 26–34)
MCHC RBC AUTO-ENTMCNC: 32.7 G/DL (ref 31–37)
MCV RBC AUTO: 96.9 FL (ref 80–100)
MONOCYTES # BLD: 7 % (ref 2–11)
PDW BLD-RTO: 16.2 % (ref 12.5–15.4)
PLATELET # BLD: 217 K/UL (ref 140–450)
PMV BLD AUTO: 6.8 FL (ref 6–12)
POTASSIUM SERPL-SCNC: 3.5 MMOL/L (ref 3.7–5.3)
RBC # BLD: 4.23 M/UL (ref 4–5.2)
SEG NEUTROPHILS: 69 % (ref 36–66)
SEGMENTED NEUTROPHILS ABSOLUTE COUNT: 3.7 K/UL (ref 1.8–7.7)
SODIUM BLD-SCNC: 143 MMOL/L (ref 135–144)
TROPONIN, HIGH SENSITIVITY: 18 NG/L (ref 0–14)
TROPONIN, HIGH SENSITIVITY: 20 NG/L (ref 0–14)
WBC # BLD: 5.5 K/UL (ref 3.5–11)

## 2022-06-16 PROCEDURE — 2580000003 HC RX 258: Performed by: EMERGENCY MEDICINE

## 2022-06-16 PROCEDURE — 99213 OFFICE O/P EST LOW 20 MIN: CPT | Performed by: PHYSICIAN ASSISTANT

## 2022-06-16 PROCEDURE — 93005 ELECTROCARDIOGRAM TRACING: CPT | Performed by: EMERGENCY MEDICINE

## 2022-06-16 PROCEDURE — 80048 BASIC METABOLIC PNL TOTAL CA: CPT

## 2022-06-16 PROCEDURE — 36415 COLL VENOUS BLD VENIPUNCTURE: CPT

## 2022-06-16 PROCEDURE — 71260 CT THORAX DX C+: CPT

## 2022-06-16 PROCEDURE — 99285 EMERGENCY DEPT VISIT HI MDM: CPT

## 2022-06-16 PROCEDURE — 1036F TOBACCO NON-USER: CPT | Performed by: PHYSICIAN ASSISTANT

## 2022-06-16 PROCEDURE — 6360000004 HC RX CONTRAST MEDICATION: Performed by: EMERGENCY MEDICINE

## 2022-06-16 PROCEDURE — G8417 CALC BMI ABV UP PARAM F/U: HCPCS | Performed by: PHYSICIAN ASSISTANT

## 2022-06-16 PROCEDURE — 84484 ASSAY OF TROPONIN QUANT: CPT

## 2022-06-16 PROCEDURE — 85025 COMPLETE CBC W/AUTO DIFF WBC: CPT

## 2022-06-16 PROCEDURE — G8427 DOCREV CUR MEDS BY ELIG CLIN: HCPCS | Performed by: PHYSICIAN ASSISTANT

## 2022-06-16 PROCEDURE — 3017F COLORECTAL CA SCREEN DOC REV: CPT | Performed by: PHYSICIAN ASSISTANT

## 2022-06-16 RX ORDER — LIDOCAINE 50 MG/G
1 PATCH TOPICAL DAILY
Qty: 30 PATCH | Refills: 0 | Status: SHIPPED | OUTPATIENT
Start: 2022-06-16 | End: 2022-07-16

## 2022-06-16 RX ORDER — RISPERIDONE 0.5 MG/1
TABLET, FILM COATED ORAL
COMMUNITY
Start: 2022-05-17

## 2022-06-16 RX ORDER — SODIUM CHLORIDE 0.9 % (FLUSH) 0.9 %
10 SYRINGE (ML) INJECTION PRN
Status: DISCONTINUED | OUTPATIENT
Start: 2022-06-16 | End: 2022-06-16 | Stop reason: HOSPADM

## 2022-06-16 RX ORDER — 0.9 % SODIUM CHLORIDE 0.9 %
80 INTRAVENOUS SOLUTION INTRAVENOUS ONCE
Status: DISCONTINUED | OUTPATIENT
Start: 2022-06-16 | End: 2022-06-16 | Stop reason: HOSPADM

## 2022-06-16 RX ORDER — 0.9 % SODIUM CHLORIDE 0.9 %
500 INTRAVENOUS SOLUTION INTRAVENOUS ONCE
Status: COMPLETED | OUTPATIENT
Start: 2022-06-16 | End: 2022-06-16

## 2022-06-16 RX ADMIN — SODIUM CHLORIDE 500 ML: 9 INJECTION, SOLUTION INTRAVENOUS at 11:43

## 2022-06-16 RX ADMIN — Medication 80 ML: at 12:19

## 2022-06-16 RX ADMIN — IOPAMIDOL 75 ML: 755 INJECTION, SOLUTION INTRAVENOUS at 12:15

## 2022-06-16 RX ADMIN — SODIUM CHLORIDE, PRESERVATIVE FREE 10 ML: 5 INJECTION INTRAVENOUS at 12:16

## 2022-06-16 ASSESSMENT — PAIN DESCRIPTION - PAIN TYPE: TYPE: CHRONIC PAIN

## 2022-06-16 ASSESSMENT — PAIN - FUNCTIONAL ASSESSMENT
PAIN_FUNCTIONAL_ASSESSMENT: 0-10
PAIN_FUNCTIONAL_ASSESSMENT: 0-10

## 2022-06-16 ASSESSMENT — PAIN DESCRIPTION - FREQUENCY
FREQUENCY: CONTINUOUS
FREQUENCY: CONTINUOUS

## 2022-06-16 ASSESSMENT — PAIN DESCRIPTION - DESCRIPTORS
DESCRIPTORS: DISCOMFORT
DESCRIPTORS: ACHING

## 2022-06-16 ASSESSMENT — PAIN DESCRIPTION - LOCATION
LOCATION: RIB CAGE;BACK
LOCATION: RIB CAGE;BACK

## 2022-06-16 ASSESSMENT — PAIN DESCRIPTION - ORIENTATION
ORIENTATION: UPPER;LEFT
ORIENTATION: LEFT;UPPER

## 2022-06-16 ASSESSMENT — PAIN SCALES - GENERAL
PAINLEVEL_OUTOF10: 7
PAINLEVEL_OUTOF10: 5

## 2022-06-16 NOTE — ED NOTES
Pt ambulates to room, gait steady. Pt was seen @ urgent care & was sent here for lab & xray orders. Although pt wanted to be seen by ER & get reassessed. C/o upper left rib cage pain that radiates to back. Pt states this has been on-going since April 16th. Pt describes pain as sharp when breathing in. Per pt there is a lump under the ribcage. No pain meds were taken PTA & pt drove self. RR are easy/unlabored, AOX4,PWD. Dr. Joann Liu in too assess.      Matthew Hughes, LISA  06/16/22 75 Blaze Christopher RN  06/16/22 [id number]

## 2022-06-16 NOTE — ED PROVIDER NOTES
[address] [facility]  [address] [facility] [place] [address]  Phone: [phone]  Fax: Fernando V[address]      Pt Name: Marycarmen Nix  MRN: [id number]  [place] 1960  Date of evaluation: 6/16/2022    CHIEF COMPLAINT       Chief Complaint   Patient presents with    Back Pain     upper left ribcage pain that radiates to left upper back, per pt it hurts even more to breathe in-pt reports a lump under ribcage       HISTORY OF PRESENT ILLNESS    Marycarmen Nix is a 57 y.o. female who presents to the emerged part with left precordial rib pain that radiates around to her left upper back. Worse with deep inspiration and movement. She admits to some shortness of breath when it is painful. No fevers or chills no cough or congestion. She says this is been ongoing since April but not getting any better and feels like there must be something else going on. She denies any other complaints.     REVIEW OF SYSTEMS       Constitutional: No fevers or chills   HEENT: No sore throat, rhinorrhea, or earache   Eyes: No blurry vision or double vision no drainage   Cardiovascular: Positive chest/rib pain no tachycardia  Respiratory: No wheezing positive shortness of breath no cough   Gastrointestinal: No nausea, vomiting, diarrhea, constipation, or abdominal pain   : No hematuria or dysuria   Musculoskeletal: No swelling or pain   Skin: No rash   Neurological: No focal neurologic complaints, paresthesias, weakness, or headache     PAST MEDICAL HISTORY    has a past medical history of Anxiety, Chronic kidney disease, COPD (chronic obstructive pulmonary disease) ([address] Depression, Fracture of ankle, Hyperlipidemia, Hypertension, Obesity, Osteoarthritis, Proteinuria, Pulmonary embolism ([address] Sleep apnea, SOB (shortness of breath), Type 2 diabetes mellitus without complication ([address] Type II or unspecified type diabetes mellitus without mention of complication, not stated as uncontrolled, and Von Willebrand disease ([facility] SURGICAL HISTORY      has a past surgical history that includes Tonsillectomy and adenoidectomy; eye surgery (Bilateral); eye surgery (Bilateral); Appendectomy;  section; Colonoscopy (2018); Colonoscopy (N/A, 2018); and Diagnostic Cardiac Cath Lab Procedure.     CURRENT MEDICATIONS       Previous Medications    ACCU-CHEK FASTCLIX LANCETS MISC    use 1 LANCET to TEST BLOOD SUGAR one to two times a day    ALBUTEROL SULFATE  (90 BASE) MCG/ACT INHALER    Inhale 2 puffs into the lungs every 4 hours as needed for Wheezing or Shortness of Breath (AND/OR COUGH)    ALCOHOL SWABS (ALCOHOL PREP) 70 % PADS    Use twice daily and as needed to check blood sugars    ALLOPURINOL (ZYLOPRIM) 100 MG TABLET    Take 1 tablet by mouth 2 times daily    ALPRAZOLAM (XANAX) 0.25 MG TABLET    take 1 tablet by mouth nightly if needed for sleep or anxiety    APIXABAN (ELIQUIS) 5 MG TABS TABLET    Take 1 tablet by mouth 2 times daily    APIXABAN (ELIQUIS) 5 MG TABS TABLET    Take 1 tablet by mouth 2 times daily for 28 days Lot [id number] exp     ATORVASTATIN (LIPITOR) 40 MG TABLET    take 1 tablet by mouth once daily    BLOOD GLUCOSE TEST STRIPS (ONETOUCH ULTRA) STRIP    TEST THREE TIMES DAILY    BLOOD PRESSURE MONITORING KIT    Use to check blood pressure daily and as needed    BUPROPION (WELLBUTRIN XL) 150 MG EXTENDED RELEASE TABLET    take 1 tablet by mouth every morning ( take with 300 milligram tablet )    BUPROPION (WELLBUTRIN XL) 300 MG EXTENDED RELEASE TABLET    Take 2 tablets by mouth every morning    COLCHICINE (COLCRYS) 0.6 MG TABLET    Take 1 tablet by mouth 2 times daily as needed for Pain (gout pain) Can repeat with 0.6 mg in 1 hour, max 1.8mg daily for acute pain    DAPAGLIFLOZIN (FARXIGA) 5 MG TABLET    Take 1 tablet by mouth every morning    FLUTICASONE (FLONASE) 50 MCG/ACT NASAL SPRAY    1 spray by Nasal route daily    FUROSEMIDE (LASIX) 20 MG TABLET    take 1 tablet by mouth once daily    HANDICAP PLACARD MISC    by Does not apply route Exp 2/3/2026    HYDROACTIVE DRESSINGS (DUODERM HYDROACTIVE) MISC    Apply 1 each topically every 72 hours    IPRATROPIUM-ALBUTEROL (DUONEB) 0.5-2.5 (3) MG/3ML SOLN NEBULIZER SOLUTION    inhale contents of 1 vial ( 3 milliliters ) in nebulizer by mouth and INTO THE LUNGS every 6 hours if needed for shortness of breath    ISOSORBIDE MONONITRATE (IMDUR) 30 MG EXTENDED RELEASE TABLET    take 1 tablet by mouth once daily    JANUVIA 100 MG TABLET    take 1 tablet by mouth once daily    LANCETS MISC. (ACCU-CHEK FASTCLIX LANCET) KIT    use as directed PLEASE APPROVED NEW DEVICE WHILE WE WAIT FOR PRIOR [facility] 1268. .. FASTCLIX MIGHT BE EAISER TO MANIPULATE    LISINOPRIL (PRINIVIL;ZESTRIL) 20 MG TABLET    take 1 tablet by mouth once daily    METFORMIN (GLUCOPHAGE-XR) 500 MG EXTENDED RELEASE TABLET    Take 1 tablet by mouth 2 times daily    NALOXONE 4 MG/0.1ML LIQD NASAL SPRAY    1 spray by Nasal route as needed for Opioid Reversal    OMEPRAZOLE (PRILOSEC) 20 MG DELAYED RELEASE CAPSULE    take 1 capsule by mouth once daily    OXYCODONE-ACETAMINOPHEN (PERCOCET) 5-325 MG PER TABLET    Take 1 tablet by mouth every 8 hours as needed for Pain for up to 30 days.     POLYETHYLENE GLYCOL (GLYCOLAX) 17 GM/SCOOP POWDER    Take 17 g by mouth daily    RA VITAMIN D-3 50 MCG (2000 UT) CAPS    take 1 capsule by mouth once daily    RESPIRATORY THERAPY SUPPLIES (NEBULIZER/TUBING/MOUTHPIECE) KIT    1 kit by Does not apply route 4 times daily as needed (wheezing)    RISPERIDONE (RISPERDAL) 0.5 MG TABLET    take 1 tablet by mouth at bedtime    SEMAGLUTIDE 3 MG TABS    Take 3 mg by mouth daily    SENNA (SENOKOT) 8.6 MG TABLET    Take 1 tablet by mouth 2 times daily    STIOLTO RESPIMAT 2.5-2.5 MCG/ACT AERS        TIZANIDINE (ZANAFLEX) 4 MG TABLET    Take 1 tablet by mouth 3 times daily as needed (muscle relaxer)    VITAMIN C (ASCORBIC ACID) 500 MG TABLET    Take 1,000 mg by mouth daily ZINC GLUCONATE 50 MG TABLET    Take 50 mg by mouth daily       ALLERGIES     has No Known Allergies. FAMILY HISTORY     She indicated that her mother is . She indicated that her father is . She indicated that all of her four sisters are alive. She indicated that two of her four brothers are alive. She indicated that her maternal grandmother is . She indicated that her maternal grandfather is . She indicated that her paternal grandmother is . She indicated that her paternal grandfather is . family history includes Cancer in her brother and father; Diabetes in her sister; Hypertension in her mother; Liver Disease in her mother; No Known Problems in her maternal grandfather, maternal grandmother, paternal grandfather, and paternal grandmother; Other in her brother. SOCIAL HISTORY      reports that she quit smoking about 39 years ago. Her smoking use included cigarettes. She started smoking about 44 years ago. She has a 1.75 pack-year smoking history. She has never used smokeless tobacco. She reports that she does not drink alcohol and does not use drugs. PHYSICAL EXAM       ED Triage Vitals [22 1117]   BP Temp Temp Source Heart Rate Resp SpO2 Height Weight   (!) 156/96 98 °F (36.7 °C) Oral (!) 107 16 100 % 5' 2\" (1.575 m) 165 lb (74.8 kg)     Constitutional: Alert, oriented x3, nontoxic, answering questions appropriately, acting properly for age, in no acute distress   HEENT: Extraocular muscles intact,  Neck: Trachea midline   Cardiovascular: Regular rhythm and tachycardic no murmurs   Respiratory: Clear to auscultation bilaterally no wheezes, rhonchi, rales, no respiratory distress no tachypnea no retractions no hypoxia  Gastrointestinal: Soft, nontender, nondistended, positive bowel sounds. No rebound, rigidity, or guarding. Musculoskeletal: No extremity pain or swelling rib pain left anterolateral and left upper back.   No subcutaneous emphysema or ecchymosis or rash appreciated  Neurologic: Moving all 4 extremities without difficulty there are no gross focal neurologic deficits   Skin: Warm and dry   Physical Exam  Chest:       Musculoskeletal:        Arms:          DIFFERENTIAL DIAGNOSIS/ MDM:     No signs of shingles. Rib pain with movement and palpation. Rule out cardiac or pulmonary etiology. IV labs. CT chest.  EKG. DIAGNOSTIC RESULTS     EKG: All EKG's are interpreted by the Emergency Department Physician who either signs or Co-signs this chart in the absence of a cardiologist.    1146 sinus rhythm rate 90  QRS 70  no acute ST or T wave changes. 1302 sinus rhythm rate 83  QRS 64  no acute ST or T wave changes.     Not indicated unless otherwise documented above    LABS:  Results for orders placed or performed during the hospital encounter of 06/16/22   CBC with Auto Differential   Result Value Ref Range    WBC 5.5 3.5 - 11.0 k/uL    RBC 4.23 4.0 - 5.2 m/uL    Hemoglobin 13.4 12.0 - 16.0 g/dL    Hematocrit 41.0 36 - 46 %    MCV 96.9 80 - 100 fL    MCH 31.7 26 - 34 pg    MCHC 32.7 31 - 37 g/dL    RDW 16.2 (H) 12.5 - 15.4 %    Platelets 613 291 - 656 k/uL    MPV 6.8 6.0 - 12.0 fL    Seg Neutrophils 69 (H) 36 - 66 %    Lymphocytes 22 (L) 24 - 44 %    Monocytes 7 2 - 11 %    Eosinophils % 2 1 - 4 %    Basophils 0 0 - 2 %    Segs Absolute 3.70 1.8 - 7.7 k/uL    Absolute Lymph # 1.20 1.0 - 4.8 k/uL    Absolute Mono # 0.40 0.1 - 1.2 k/uL    Absolute Eos # 0.10 0.0 - 0.4 k/uL    Basophils Absolute 0.00 0.0 - 0.2 k/uL   Basic Metabolic Panel   Result Value Ref Range    Glucose 112 (H) 70 - 99 mg/dL    BUN 9 8 - 23 mg/dL    CREATININE 0.70 0.50 - 0.90 mg/dL    Calcium 10.1 8.6 - 10.4 mg/dL    Sodium 143 135 - 144 mmol/L    Potassium 3.5 (L) 3.7 - 5.3 mmol/L    Chloride 106 98 - 107 mmol/L    CO2 25 20 - 31 mmol/L    Anion Gap 12 9 - 17 mmol/L    GFR Non-African American >60 >60 mL/min    GFR African American >60 >60 mL/min    GFR Comment         Troponin   Result Value Ref Range    Troponin, High Sensitivity 20 (H) 0 - 14 ng/L   Troponin   Result Value Ref Range    Troponin, High Sensitivity 18 (H) 0 - 14 ng/L   EKG 12 Lead   Result Value Ref Range    Ventricular Rate 90 BPM    Atrial Rate 90 BPM    P-R Interval 158 ms    QRS Duration 70 ms    Q-T Interval 368 ms    QTc Calculation (Bazett) 450 ms    P Axis 16 degrees    R Axis -12 degrees    T Axis 29 degrees       Not indicated unless otherwise documented above    RADIOLOGY:   I reviewed the radiologist interpretations:    CT CHEST PULMONARY EMBOLISM W CONTRAST   Final Result   No evidence of pulmonary embolism or acute pulmonary abnormality. Not indicated unless otherwise documented above    EMERGENCY DEPARTMENT COURSE:     The patient was given the following medications:  Orders Placed This Encounter   Medications    0.9 % sodium chloride bolus    0.9 % sodium chloride bolus    iopamidol (ISOVUE-370) 76 % injection 75 mL    sodium chloride flush 0.9 % injection 10 mL    lidocaine (LIDODERM) 5 %     Sig: Place 1 patch onto the skin daily 12 hours on, 12 hours off. Dispense:  30 patch     Refill:  0        Vitals:   -------------------------  BP (!) 145/74   Pulse 80   Temp 98 °F (36.7 °C) (Oral)   Resp 12   Ht 5' 2\" (1.575 m)   Wt 74.8 kg (165 lb)   SpO2 100%   BMI 30.18 kg/m²     12:50 PM no acute distress. Normal CBC normal electrolytes and kidney function. First troponin is elevated at 20. EKG no ST elevations. CT of the chest no pulmonary embolism or other acute pulmonary abnormality. We will await second troponin and EKG. 1:45 PM repeat troponin 18. Low suspicion for cardiac etiology. Repeat EKG no ST elevations. CT again demonstrates no pulmonary embolism. Unclear etiology for pain that she has been having since April. Will discharge to follow-up with her family physician prescription for Lidoderm. Continue muscle laxer. Return if worsening symptoms or any other concerns. The patient understands that at this time there is no evidence for a more malignant underlying process, but also understands that early in the process of an illness or injury, an emergency department workup can be falsely reassuring. Routine discharge counseling was given, and it is understood that worsening, changing or persistent symptoms should prompt an immediate call or follow up with their primary physician or return to the emergency department. The importance of appropriate follow up was also discussed. I have reviewed the disposition diagnosis. I have answered the questions and given discharge instructions. There was voiced understanding of these instructions and no further questions or complaints. CRITICAL CARE:    None    CONSULTS:    None    PROCEDURES:    None      OARRS Report if indicated             FINAL IMPRESSION      1. Rib pain          DISPOSITION/PLAN   DISPOSITION          CONDITION ON DISPOSITION: STABLE       PATIENT REFERRED TO:  BRAULIO Horowitz CNP  [address] [place] [address]  [phone]    Schedule an appointment as soon as possible for a visit in 3 days        DISCHARGE MEDICATIONS:  New Prescriptions    LIDOCAINE (LIDODERM) 5 %    Place 1 patch onto the skin daily 12 hours on, 12 hours off.        (Please note that portions of this note were completed with a voice recognition program.  Efforts were made to edit the dictations but occasionally words are mis-transcribed.)    Navin Rojas DO   Attending Emergency Physician     Navin Rojas DO  06/16/22 6040

## 2022-06-16 NOTE — PROGRESS NOTES
[address]  [address] [address] [facility]  Phone: [phone]  Fax: Ze Molina    Pt Name: Claude Paganini  MRN: [id number]  [place] 1960  Date of evaluation: 6/16/2022  Provider: Nohemi Hart PA-C     CHIEF COMPLAINT       Chief Complaint   Patient presents with    Other     left side pain- left breast to back- lump under breast, hard to breathe in. Started in April, but has gotten worse. HISTORY OF PRESENT ILLNESS  (Location/Symptom, Timing/Onset, Context/Setting, Quality, Duration, Modifying Factors, Severity.)   Claude Paganini is a 57 y.o. White (non-) [1] female who presents to the office for evaluation of      Shortness of Breath  This is a chronic problem. The current episode started more than 1 month ago. The problem occurs daily. The problem has been waxing and waning. Associated symptoms include wheezing. Pertinent negatives include no chest pain, fever, headaches, syncope or vomiting. Associated symptoms comments: Left sided chest wall/breast pain. Her past medical history is significant for COPD, DVT and PE. Nursing Notes were reviewed. REVIEW OF SYSTEMS    (2-9 systems for level 4, 10 or more for level 5)     Review of Systems   Constitutional: Negative for fever. HENT: Negative. Eyes: Negative. Respiratory: Positive for shortness of breath and wheezing. Negative for chest tightness. Cardiovascular: Negative. Negative for chest pain and syncope. Gastrointestinal: Negative for vomiting. Genitourinary: Negative. Musculoskeletal:        Left sided chest wall and left breast tenderness   Skin: Negative. Neurological: Negative for headaches. Except as noted above the remainder of the review of systems was reviewed andnegative. PAST MEDICAL HISTORY   History reviewed.     Past Medical History:   Diagnosis Date    Anxiety     Chronic kidney disease     COPD ([facility]ronic obstructive pulmonary disease) [address]     Depression     Fracture of ankle 2020    Hyperlipidemia     Hypertension     Obesity     Osteoarthritis     Proteinuria     Pulmonary embolism (HCC)     Sleep apnea     c-pap. Doesn't use everynight    SOB (shortness of breath) 2020    Type 2 diabetes mellitus without complication (HCC)     Type II or unspecified type diabetes mellitus without mention of complication, not stated as uncontrolled     Von Willebrand disease ([facility]          SURGICAL HISTORY     History reviewed. Past Surgical History:   Procedure Laterality Date    APPENDECTOMY       SECTION      x3, 1977, 1979, 3536    COLONOSCOPY  2018    with biopsy    COLONOSCOPY N/A 2018    COLONOSCOPY performed by Mag Pacheco MD at [address] CATH LAB PROCEDURE      EYE SURGERY Bilateral     cataracts    EYE SURGERY Bilateral     glaucoma    TONSILLECTOMY AND ADENOIDECTOMY           CURRENT MEDICATIONS       Current Outpatient Medications   Medication Sig Dispense Refill    risperiDONE (RISPERDAL) 0.5 MG tablet take 1 tablet by mouth at bedtime      dapagliflozin (FARXIGA) 5 MG tablet Take 1 tablet by mouth every morning 90 tablet 1    ALPRAZolam (XANAX) 0.25 MG tablet take 1 tablet by mouth nightly if needed for sleep or anxiety 30 tablet 0    apixaban (ELIQUIS) 5 MG TABS tablet Take 1 tablet by mouth 2 times daily 180 tablet 1    apixaban (ELIQUIS) 5 MG TABS tablet Take 1 tablet by mouth 2 times daily for 28 days Lot [id number] exp  56 tablet 0    oxyCODONE-acetaminophen (PERCOCET) 5-325 MG per tablet Take 1 tablet by mouth every 8 hours as needed for Pain for up to 30 days.  40 tablet 0    tiZANidine (ZANAFLEX) 4 MG tablet Take 1 tablet by mouth 3 times daily as needed (muscle relaxer) 90 tablet 0    Respiratory Therapy Supplies (NEBULIZER/TUBING/MOUTHPIECE) KIT 1 kit by Does not apply route 4 times daily as needed (wheezing) 1 kit 0     Semaglutide 3 MG TABS Take 3 mg by mouth daily 90 tablet 1    buPROPion (WELLBUTRIN XL) 300 MG extended release tablet Take 2 tablets by mouth every morning (Patient taking differently: Take 300 mg by mouth every morning ) 30 tablet 0    metFORMIN (GLUCOPHAGE-XR) 500 MG extended release tablet Take 1 tablet by mouth 2 times daily 360 tablet 1    Accu-Chek FastClix Lancets MISC use 1 LANCET to TEST BLOOD SUGAR one to two times a day 120 each 3    naloxone 4 MG/0.1ML LIQD nasal spray 1 spray by Nasal route as needed for Opioid Reversal 1 each 5    Handicap Placard MISC by Does not apply route Exp 2/3/2026 1 each 0    Nirmatrelvir & Ritonavir 10 x 150 MG & 10 x 100MG TBPK Nirmatrelvir 150 mg twice a day with Ritonavir 100 mg twice a day for 5 days 20 each 0    lidocaine (LIDODERM) 5 % Place 1 patch onto the skin daily 12 hours on, 12 hours off. 30 patch 0    colchicine (COLCRYS) 0.6 MG tablet Take 1 tablet by mouth 2 times daily as needed for Pain (gout pain) Can repeat with 0.6 mg in 1 hour, max 1.8mg daily for acute pain 30 tablet 0    Lancets Misc. (ACCU-CHEK FASTCLIX LANCET) KIT use as directed PLEASE APPROVED NEW DEVICE WHILE WE WAIT FOR PRIOR AUTH. .. FASTCLIX MIGHT BE EAISER TO MANIPULATE 1 kit 0     Current Facility-Administered Medications   Medication Dose Route Frequency Provider Last Rate Last Admin    methylPREDNISolone acetate (DEPO-MEDROL) injection 40 mg  40 mg Intra-artICUlar Once Armond Read MD        lidocaine 1 % injection 5 mL  5 mL Intra-artICUlar Once Armond Read MD        methylPREDNISolone acetate (DEPO-MEDROL) injection 40 mg  40 mg Intra-artICUlar Once Armond Read MD        lidocaine 1 % injection 5 mL  5 mL Intra-artICUlar Once Armond Read MD             ALLERGIES     Patient has no known allergies.     FAMILY HISTORY           Problem Relation Age of Onset    Hypertension Mother     Liver Disease Mother     Cancer Father         stomach    Diabetes Sister     Cancer Brother         kidney    No Known Problems Maternal Grandmother     No Known Problems Maternal Grandfather     No Known Problems Paternal Grandmother     No Known Problems Paternal Grandfather     Other Brother         AIDS     Family Status   Relation Name Status    Mother     Samantha Gonzalez Father      Sister Latanya Padilla    Brother roopa     MGM      MGF      [facility]      PGF      Brother otto Alive    Sister salvador Alive    Sister ghazala Alive    Sister denae Alive    Brother j luis Alive    Brother nydia-           SOCIAL HISTORY      reports that she quit smoking about 39 years ago. Her smoking use included cigarettes. She started smoking about 44 years ago. She has a 1.75 pack-year smoking history. She has never used smokeless tobacco. She reports that she does not drink alcohol and does not use drugs. PHYSICAL EXAM    (up to 7 for level 4, 8 or more for level 5)     Vitals:    22 0959   BP: 128/78   Site: Left Upper Arm   Position: Sitting   Cuff Size: Medium Adult   Pulse: 94   Temp: 98.6 °F (37 °C)   SpO2: 97%   Weight: 165 lb (74.8 kg)         Physical Exam  Vitals and nursing note reviewed. Constitutional:       General: She is not in acute distress. Appearance: Normal appearance. She is not ill-appearing. HENT:      Head: Normocephalic and atraumatic. Right Ear: External ear normal.      Left Ear: External ear normal.      Nose: Nose normal.      Mouth/Throat:      Mouth: Mucous membranes are moist.   Eyes:      Extraocular Movements: Extraocular movements intact. Conjunctiva/sclera: Conjunctivae normal.      Pupils: Pupils are equal, round, and reactive to light. Pulmonary:      Effort: Pulmonary effort is normal.   Chest:      Chest wall: Tenderness present. Abdominal:      Palpations: Abdomen is soft. Skin:     General: Skin is warm and dry.    Neurological:      Mental Status: She is alert and oriented to person, place, and time. DIFFERENTIAL DIAGNOSIS:       Pedro Raymond reviewed the disposition diagnosis with the patient and or their family/guardian. I have answered their questions and given discharge instructions. They voiced understanding of these instructions and did not have anyfurther questions or complaints. PROCEDURES:  Orders Placed This Encounter   Procedures    XR CHEST STANDARD (2 VW)     Standing Status:   Future     Standing Expiration Date:   6/16/2023     Order Specific Question:   Reason for exam:     Answer:   SOB, left sided chest wall pain, pain with inspiration    CBC     Standing Status:   Future     Standing Expiration Date:   6/16/2023    Comprehensive Metabolic Panel     Standing Status:   Future     Standing Expiration Date:   6/16/2023    Brain Natriuretic Peptide     Standing Status:   Future     Standing Expiration Date:   6/16/2023    D-Dimer, Quantitative     Standing Status:   Future     Standing Expiration Date:   6/16/2023    Troponin     Standing Status:   Future     Standing Expiration Date:   6/16/2023    EKG 12 lead     Standing Status:   Future     Standing Expiration Date:   8/15/2022     Order Specific Question:   Reason for Exam?     Answer:   Shortness of Breath       No results found for this visit on 06/16/22.     FINALIMPRESSION      Visit Diagnoses and Associated Orders     SOB (shortness of breath)    -  Primary    XR CHEST STANDARD (2 VW) [[id number] Custom]   - Future Order    EKG 12 lead [[id number] Custom]   - Future Order    CBC [[id number] Custom]   - Future Order    Comprehensive Metabolic Panel [[id number] Custom]   - Future Order    Brain Natriuretic Peptide [[id number] Custom]   - Future Order    D-Dimer, Quantitative [[id number] Custom]   - Future Order    Troponin [[id number] Custom]   - Future Order         Left-sided chest wall pain        XR CHEST STANDARD (2 VW) [[id number] Custom]   - Future Order    EKG 12 lead [[id number] Custom]   - Future Order    CBC [[id number] Custom]   - Future Order    Comprehensive Metabolic Panel [[id number] Custom]   - Future Order    Brain Natriuretic Peptide [[id number] Custom]   - Future Order    D-Dimer, Quantitative [[id number] Custom]   - Future Order    Troponin [[id number] Custom]   - Future Order         ORDERS WITHOUT AN ASSOCIATED DIAGNOSIS    risperiDONE (RISPERDAL) 0.5 MG tablet [[id number]]              PLAN     Return if symptoms worsen or fail to improve. DISCHARGEMEDICATIONS:  No orders of the defined types were placed in this encounter. Plan:  Patient top obtain labs as ordered  Patient to obtain EKG as ordered  Patient to obtain Chest X-ray as ordered  Patient instructed to return to the office if symptoms worsen, return, or have any other concerns. Patient understands and is agreeable.          Boyd Cerrato PA-C 6/20/2022 6:43 PM

## 2022-06-17 ENCOUNTER — TELEPHONE (OUTPATIENT)
Dept: FAMILY MEDICINE CLINIC | Age: 62
End: 2022-06-17

## 2022-06-17 LAB
EKG ATRIAL RATE: 83 BPM
EKG ATRIAL RATE: 90 BPM
EKG P AXIS: 16 DEGREES
EKG P AXIS: 19 DEGREES
EKG P-R INTERVAL: 158 MS
EKG P-R INTERVAL: 160 MS
EKG Q-T INTERVAL: 358 MS
EKG Q-T INTERVAL: 368 MS
EKG QRS DURATION: 64 MS
EKG QRS DURATION: 70 MS
EKG QTC CALCULATION (BAZETT): 420 MS
EKG QTC CALCULATION (BAZETT): 450 MS
EKG R AXIS: -12 DEGREES
EKG R AXIS: -8 DEGREES
EKG T AXIS: 29 DEGREES
EKG T AXIS: 9 DEGREES
EKG VENTRICULAR RATE: 83 BPM
EKG VENTRICULAR RATE: 90 BPM

## 2022-06-17 NOTE — TELEPHONE ENCOUNTER
ED Follow up Call    Reason for ED visit:   Muscle pain   Status:     not changed    Did you call your PCP prior to going to the ED? No      Did you receive a discharge instructions from the Emergency Room? Yes  Review of Instructions:     Understands what to report/when to return?:  Yes   Understands discharge instructions?:  Yes   Following discharge instructions?:  Yes   If not why? Are there any new complaints of pain? Yes   New Pain Meds? Lidocaine Patch   Constipation prophylaxis needed? N/A    If you have a wound is the dressing clean, dry, and intact? N/A  Understands wound care regimen? N/A    Are there any other complaints/concerns that you wish to tell your provider? No just currently going to take the muscle relaxers Micha Dagos BRAULIO SIFUENTES prescribed for her. Patient does not want to schedule with PCP ER dictor stated this could take up to a year to heal.     FU appts/Provider:    Future Appointments   Date Time Provider Mark Junior   6/23/2022  8:30 AM MARIBELL Cabrera psyc [facility]   6/27/2022  3:30 PM DO Alexandro Graham Neuro [facility]   7/6/2022  2:00 PM Natalie Julian MD Resp Spec [facility]   7/25/2022 10:00 AM Marilu Flanagan DPM [facility]   8/5/2022  8:30 AM BRAULIO Fenton CNP PC [facility]   8/15/2022  8:30 AM Rusty Dow MD [facility] None           New Medications?:   Yes      Medication Reconciliation by phone - Yes  Understands Medications? Yes  Taking Medications? Yes  Can you swallow your pills? Yes    Any further needs in the home i.e. Equipment?   Not Applicable    Link to services in community?:  N/A   Which services:

## 2022-06-19 ENCOUNTER — TELEPHONE (OUTPATIENT)
Dept: FAMILY MEDICINE CLINIC | Age: 62
End: 2022-06-19

## 2022-06-19 ASSESSMENT — ENCOUNTER SYMPTOMS
CHEST TIGHTNESS: 0
SHORTNESS OF BREATH: 0
BACK PAIN: 1
WHEEZING: 0
ABDOMINAL PAIN: 0
CONSTIPATION: 0
DIARRHEA: 0
ABDOMINAL DISTENTION: 0

## 2022-06-19 NOTE — TELEPHONE ENCOUNTER
Patient was recently seen in the ER for left side rib pain/back pain. Please call and ask if anything is needed? Follow up?

## 2022-06-20 ENCOUNTER — TELEPHONE (OUTPATIENT)
Dept: FAMILY MEDICINE CLINIC | Age: 62
End: 2022-06-20

## 2022-06-20 ENCOUNTER — E-VISIT (OUTPATIENT)
Dept: FAMILY MEDICINE CLINIC | Age: 62
End: 2022-06-20
Payer: MEDICARE

## 2022-06-20 DIAGNOSIS — R76.8 COVID-19 VIRUS ANTIBODY DETECTED: Primary | ICD-10-CM

## 2022-06-20 PROCEDURE — 99423 OL DIG E/M SVC 21+ MIN: CPT | Performed by: NURSE PRACTITIONER

## 2022-06-20 ASSESSMENT — ENCOUNTER SYMPTOMS
VOMITING: 0
WHEEZING: 1
SHORTNESS OF BREATH: 1
CHEST TIGHTNESS: 0
EYES NEGATIVE: 1

## 2022-06-20 ASSESSMENT — LIFESTYLE VARIABLES
SMOKING_STATUS: NO, BUT I USED TO SMOKE
PACKS_PER_DAY: .25
SMOKING_YEARS: 8

## 2022-06-20 NOTE — TELEPHONE ENCOUNTER
Patient contacted the office stating she testing positive for COVID yesterday at home. Sx include cough, weakness, headaches and dizziness. Denies any fever. Taking OTC coricidin and breathing treatments. Is she eligible for IV monoclonal antibodies?

## 2022-06-20 NOTE — ASSESSMENT & PLAN NOTE
Borderline controlled, continue current medications and continue current treatment plan   Continue to follow with psychiatry  Also now following with therapy

## 2022-06-20 NOTE — TELEPHONE ENCOUNTER
She needs either an e -visit or VV. There are many questions that need answered to decipher if she may qualify for treatment. The IV does require a test per a facility, not home test.  There is an oral medication, however, again, needs certain criteria and not always available in local area.

## 2022-06-23 RX ORDER — OXYCODONE HYDROCHLORIDE AND ACETAMINOPHEN 5; 325 MG/1; MG/1
1 TABLET ORAL EVERY 8 HOURS PRN
Qty: 40 TABLET | Refills: 0 | Status: SHIPPED | OUTPATIENT
Start: 2022-06-24 | End: 2022-07-22 | Stop reason: SDUPTHER

## 2022-06-23 NOTE — TELEPHONE ENCOUNTER
I can not confirm if it is the Paxlovid is the cause of her low blood sugars. If that is the only new, she should stop taking it. Or she needs to eat. If her symptoms continue to worsen, she may need to go to ER for evaluation.

## 2022-06-23 NOTE — TELEPHONE ENCOUNTER
Patient notified of recommendations and verbalized understanding. She is questioning how often she can take glucose gummies to boost her sugars if they are too low? Frequency in between doses?

## 2022-06-25 DIAGNOSIS — R06.02 SOB (SHORTNESS OF BREATH): ICD-10-CM

## 2022-06-25 DIAGNOSIS — N18.30 CONTROLLED TYPE 2 DIABETES MELLITUS WITH STAGE 3 CHRONIC KIDNEY DISEASE, WITHOUT LONG-TERM CURRENT USE OF INSULIN (HCC): Chronic | ICD-10-CM

## 2022-06-25 DIAGNOSIS — N18.30 CONTROLLED TYPE 2 DIABETES MELLITUS WITH STAGE 3 CHRONIC KIDNEY DISEASE, WITHOUT LONG-TERM CURRENT USE OF INSULIN (HCC): ICD-10-CM

## 2022-06-25 DIAGNOSIS — E11.22 CONTROLLED TYPE 2 DIABETES MELLITUS WITH STAGE 3 CHRONIC KIDNEY DISEASE, WITHOUT LONG-TERM CURRENT USE OF INSULIN (HCC): ICD-10-CM

## 2022-06-25 DIAGNOSIS — E11.22 CONTROLLED TYPE 2 DIABETES MELLITUS WITH STAGE 3 CHRONIC KIDNEY DISEASE, WITHOUT LONG-TERM CURRENT USE OF INSULIN (HCC): Chronic | ICD-10-CM

## 2022-06-25 DIAGNOSIS — J30.2 SEASONAL ALLERGIC RHINITIS, UNSPECIFIED TRIGGER: ICD-10-CM

## 2022-06-27 ENCOUNTER — TELEMEDICINE (OUTPATIENT)
Dept: NEUROSURGERY | Age: 62
End: 2022-06-27
Payer: MEDICARE

## 2022-06-27 DIAGNOSIS — D18.00 CAVERNOMA: Primary | ICD-10-CM

## 2022-06-27 PROCEDURE — 1036F TOBACCO NON-USER: CPT | Performed by: NEUROLOGICAL SURGERY

## 2022-06-27 PROCEDURE — G8417 CALC BMI ABV UP PARAM F/U: HCPCS | Performed by: NEUROLOGICAL SURGERY

## 2022-06-27 PROCEDURE — 3017F COLORECTAL CA SCREEN DOC REV: CPT | Performed by: NEUROLOGICAL SURGERY

## 2022-06-27 PROCEDURE — G8428 CUR MEDS NOT DOCUMENT: HCPCS | Performed by: NEUROLOGICAL SURGERY

## 2022-06-27 PROCEDURE — 99213 OFFICE O/P EST LOW 20 MIN: CPT | Performed by: NEUROLOGICAL SURGERY

## 2022-06-27 NOTE — PROGRESS NOTES
2022    TELEHEALTH EVALUATION -- Audio/Visual (During [id number] public health emergency)    HPI:    Jer Brian (:  1960) has requested an audio/video evaluation for the following concern(s):    Patient denies any numbness tingling weakness seizures of any kind headaches nausea vomiting vision changes or speech difficulty. Has been functioning normally and getting over COVID as of recent. Does ambulate without the use of a cane or walker and no recent falls. Review of Systems    Prior to Visit Medications    Medication Sig Taking? Authorizing Provider   oxyCODONE-acetaminophen (PERCOCET) 5-325 MG per tablet Take 1 tablet by mouth every 8 hours as needed for Pain for up to 30 days. BRAULIO Akhtar CNP   Nirmatrelvir & Ritonavir 10 x 150 MG & 10 x 100MG TBPK Nirmatrelvir 150 mg twice a day with Ritonavir 100 mg twice a day for 5 days  BRAULIO Akhtar CNP   risperiDONE (RISPERDAL) 0.5 MG tablet take 1 tablet by mouth at bedtime  Historical Provider, MD   lidocaine (LIDODERM) 5 % Place 1 patch onto the skin daily 12 hours on, 12 hours off.   Enrique Muhammad,    dapagliflozin (FARXIGA) 5 MG tablet Take 1 tablet by mouth every morning  Anisa Tinoco MD   ALPRAZolam (XANAX) 0.25 MG tablet take 1 tablet by mouth nightly if needed for sleep or anxiety  BRAULIO Akhtar CNP   apixaban (ELIQUIS) 5 MG TABS tablet Take 1 tablet by mouth 2 times daily  BRAULIO Akhtar CNP   apixaban (ELIQUIS) 5 MG TABS tablet Take 1 tablet by mouth 2 times daily for 28 days Lot [id number] exp   BRAULIO Akhtar CNP   tiZANidine (ZANAFLEX) 4 MG tablet Take 1 tablet by mouth 3 times daily as needed (muscle relaxer)  BRAULIO Akhtar CNP   Respiratory Therapy Supplies (NEBULIZER/TUBING/MOUTHPIECE) KIT 1 kit by Does not apply route 4 times daily as needed (wheezing)  BRAULIO Akhtar CNP   ipratropium-albuterol (DUONEB) 0.5-2.5 (3) MG/3ML SOLN nebulizer solution inhale contents of 1 vial ( 3 milliliters ) in nebulizer by mouth and INTO THE LUNGS every 6 hours if needed for shortness of breath  BRAULIO Rosario CNP   albuterol sulfate  (90 Base) MCG/ACT inhaler Inhale 2 puffs into the lungs every 4 hours as needed for Wheezing or Shortness of Breath (AND/OR COUGH)  BRAULIO Rosario CNP   buPROPion (WELLBUTRIN XL) 150 MG extended release tablet take 1 tablet by mouth every morning ( take with 300 milligram tablet )  Historical Provider, MD   colchicine (COLCRYS) 0.6 MG tablet Take 1 tablet by mouth 2 times daily as needed for Pain (gout pain) Can repeat with 0.6 mg in 1 hour, max 1.8mg daily for acute pain  BRAULIO Rosario CNP   Alcohol Swabs (ALCOHOL PREP) 70 % PADS Use twice daily and as needed to check blood sugars  BRAULIO Rosario CNP   allopurinol (ZYLOPRIM) 100 MG tablet Take 1 tablet by mouth 2 times daily  Derryl IronBRAULIO CNP   STIOLTO RESPIMAT 2.5-2.5 MCG/ACT AERS   Historical Provider, MD   zinc gluconate 50 MG tablet Take 50 mg by mouth daily  Historical Provider, MD   vitamin C (ASCORBIC ACID) 500 MG tablet Take 1,000 mg by mouth daily   Historical Provider, MD MARAVILLA VITAMIN D-3 50 MCG (2000 UT) CAPS take 1 capsule by mouth once daily  BRAULIO Rosario CNP   polyethylene glycol (GLYCOLAX) 17 GM/SCOOP powder Take 17 g by mouth daily  BRAULIO Rosario CNP   senna (SENOKOT) 8.6 MG tablet Take 1 tablet by mouth 2 times daily  RBAULIO Rosario CNP   blood glucose test strips (ONETOUCH ULTRA) strip TEST THREE TIMES DAILY  BRAULIO Rosario CNP   omeprazole (PRILOSEC) 20 MG delayed release capsule take 1 capsule by mouth once daily  BRAULIO Rosario CNP   atorvastatin (LIPITOR) 40 MG tablet take 1 tablet by mouth once daily  BRAULIO Rosario CNP   JANUVIA 100 MG tablet take 1 tablet by mouth once daily  BRAULIO Rosario CNP   lisinopril (PRINIVIL;ZESTRIL) 20 MG tablet take 1 tablet by mouth once daily  Patient taking differently: Take 10 mg by mouth daily   BRAULIO Valladares CNP   fluticasone (FLONASE) 50 MCG/ACT nasal spray 1 spray by Nasal route daily  BRAULIO Valladares CNP   isosorbide mononitrate (IMDUR) 30 MG extended release tablet take 1 tablet by mouth once daily  BRAULIO Valladares CNP   Blood Pressure Monitoring KIT Use to check blood pressure daily and as needed  BRAULIO Valladares CNP   Semaglutide 3 MG TABS Take 3 mg by mouth daily  BRAULIO Valladares CNP   buPROPion (WELLBUTRIN XL) 300 MG extended release tablet Take 2 tablets by mouth every morning  Patient taking differently: Take 300 mg by mouth every morning   BRAULIO Valladares CNP   metFORMIN (GLUCOPHAGE-XR) 500 MG extended release tablet Take 1 tablet by mouth 2 times daily  BRAULIO Lindquist CNP   Lancets Misc. (ACCU-CHEK FASTCLIX LANCET) KIT use as directed PLEASE APPROVED NEW DEVICE WHILE WE WAIT [facility].. ..  FASTCLIX MIGHT BE EAISER TO MANIPULATE  BRAULIO Valladares CNP   Accu-Chek FastClix Lancets MISC use 1 LANCET to TEST BLOOD SUGAR one to two times a day  BRAULIO Valladares CNP   naloxone 4 MG/0.1ML LIQD nasal spray 1 spray by Nasal route as needed for Opioid Reversal  BRAULIO Valladares CNP   Handicap Placard MISC by Does not apply route Exp 2/3/2026  BRAULIO Valladares CNP       Social History     Tobacco Use    Smoking status: Former Smoker     Packs/day: 0.25     Years: 7.00     Pack years: 1.75     Types: Cigarettes     Start date: 10/16/1977     Quit date: 1983     Years since quittin.5    Smokeless tobacco: Never Used   Substance Use Topics    Alcohol use: No    Drug use: No        No Known Allergies,   Past Medical History:   Diagnosis Date    Anxiety     Chronic kidney disease     COPD [facility] obstructive [facility] disease) [facility] [address]     Depression     Fracture of ankle 2020    Hyperlipidemia     Hypertension     Obesity     Osteoarthritis     Proteinuria     Pulmonary embolism (HCC)     Sleep apnea     c-pap.  Doesn't use everynight    SOB (shortness of breath) 2020    Type 2 diabetes mellitus without complication (HCC)     Type II or unspecified type diabetes mellitus without mention of complication, not stated as uncontrolled     [facility] Willebrand disease ([facility] [address]    ,   Past Surgical History:   Procedure Laterality Date    APPENDECTOMY       SECTION      x3, 1977, ,     COLONOSCOPY  2018    with biopsy    COLONOSCOPY N/A 2018    COLONOSCOPY performed by Diana Osler, MD at [address] [facility] CATH LAB PROCEDURE      EYE SURGERY Bilateral     cataracts    EYE SURGERY Bilateral     glaucoma    TONSILLECTOMY AND ADENOIDECTOMY     ,   Social History     Tobacco Use    Smoking status: Former Smoker     Packs/day: 0.25     Years: 7.00     Pack years: 1.75     Types: Cigarettes     Start date: 10/16/1977     Quit date: 1983     Years since quittin.5    Smokeless tobacco: Never Used   Substance Use Topics    Alcohol use: No    Drug use: No   ,   Family History   Problem Relation Age of Onset    Hypertension Mother     Liver Disease Mother     Cancer Father         stomach    Diabetes Sister     Cancer Brother         kidney    No Known Problems Maternal Grandmother     No Known Problems Maternal Grandfather     No Known Problems Paternal Grandmother     No Known Problems Paternal Grandfather     Other Brother         AIDS       PHYSICAL EXAMINATION:  [ INSTRUCTIONS:  \"[x]\" Indicates a positive item  \"[]\" Indicates a negative item  -- DELETE ALL ITEMS NOT EXAMINED]  Vital Signs: (As obtained by patient/caregiver or practitioner observation)    Blood pressure-  Heart rate-    Respiratory rate-    Temperature-  Pulse oximetry-     Constitutional: [x] Appears well-developed and well-nourished [] No apparent distress      [] Abnormal-   Mental status  [x] Alert and awake  [x] Oriented to person/place/time [x]Able to follow commands      Eyes:  EOM    [x]  Normal  [] Abnormal-  Sclera  [x]  Normal  [] Abnormal -         Discharge []  None visible  [] Abnormal -    HENT:   [x] Normocephalic, atraumatic. [] Abnormal   [x] Mouth/Throat: Mucous membranes are moist.     External Ears [x] Normal  [] Abnormal-     Neck: [x] No visualized mass     Pulmonary/Chest: [x] Respiratory effort normal.  [x] No visualized signs of difficulty breathing or respiratory distress        [] Abnormal-      Musculoskeletal:   [x] Normal gait with no signs of ataxia         [x] Normal range of motion of neck        [] Abnormal-       Neurological:        [x] No Facial Asymmetry (Cranial nerve 7 motor function) (limited exam to video visit)          [x] No gaze palsy        [] Abnormal-         Skin:        [x] No significant exanthematous lesions or discoloration noted on facial skin         [] Abnormal-            Psychiatric:       [x] Normal Affect [x] No Hallucinations        [] Abnormal-     Other pertinent observable physical exam findings-     ASSESSMENT/PLAN:  Incidental periventricular cavernoma    No evidence of bleed or recurrent symptoms. Plan for repeat 6-month MRI brain with and without contrast    Cindy White, was evaluated through a synchronous (real-time) audio-video encounter. The patient (or guardian if applicable) is aware that this is a billable service, which includes applicable co-pays. This Virtual Visit was conducted with patient's (and/or legal guardian's) consent. The visit was conducted pursuant to the emergency declaration under the [facility], [address] [place] [address] authority and the [employer] General Act. Patient identification was verified, and a caregiver was present when appropriate. The patient was located at Home: [address] [place] [address].    Provider was located at [facility] ([address] [place] [address]):  [phone] [place] [address]  [facility] [address] [facility], [address]. Total time spent on this encounter: 15    --Chester Jackson DO on 6/27/2022 at 2:53 PM    An electronic signature was used to authenticate this note.

## 2022-06-28 DIAGNOSIS — J41.1 MUCOPURULENT CHRONIC BRONCHITIS (HCC): Primary | ICD-10-CM

## 2022-06-28 RX ORDER — FLUTICASONE PROPIONATE 50 MCG
1 SPRAY, SUSPENSION (ML) NASAL DAILY
Qty: 1 EACH | Refills: 3 | Status: SHIPPED | OUTPATIENT
Start: 2022-06-28 | End: 2022-08-19 | Stop reason: ALTCHOICE

## 2022-06-28 RX ORDER — IPRATROPIUM BROMIDE AND ALBUTEROL SULFATE 2.5; .5 MG/3ML; MG/3ML
1 SOLUTION RESPIRATORY (INHALATION) EVERY 6 HOURS PRN
Qty: 360 ML | Refills: 0 | Status: SHIPPED | OUTPATIENT
Start: 2022-06-28

## 2022-06-28 RX ORDER — BLOOD SUGAR DIAGNOSTIC
STRIP MISCELLANEOUS
Qty: 100 STRIP | Refills: 5 | Status: SHIPPED | OUTPATIENT
Start: 2022-06-28 | End: 2023-06-24

## 2022-06-28 RX ORDER — UBIQUINOL 100 MG
CAPSULE ORAL
Qty: 120 EACH | Refills: 3 | Status: SHIPPED | OUTPATIENT
Start: 2022-06-28 | End: 2024-09-25

## 2022-06-28 NOTE — TELEPHONE ENCOUNTER
LOV 5/25/22  RTO F/U scheduled  LRF 1/5/22    Health Maintenance   Topic Date Due    Diabetic retinal exam  07/09/2022    Cervical cancer screen  11/16/2022    Depression Monitoring  01/24/2023    A1C test (Diabetic or Prediabetic)  03/28/2023    Lipids  03/28/2023    Pneumococcal 0-64 years Vaccine (3 - PPSV23 or PCV20) 04/23/2023    Diabetic foot exam  05/02/2023    Breast cancer screen  09/10/2023    DTaP/Tdap/Td vaccine (2 - Td or Tdap) 08/04/2026    Colorectal Cancer Screen  02/16/2028    Flu vaccine  Completed    Shingles vaccine  Completed    COVID-19 Vaccine  Completed    Hepatitis C screen  Addressed    HIV screen  Addressed    Hepatitis A vaccine  Aged Out    Hib vaccine  Aged Out    Meningococcal (ACWY) vaccine  Aged Out             (applicable per patient's age: Cancer Screenings, Depression Screening, Fall Risk Screening, Immunizations)    Hemoglobin A1C (%)   Date Value   03/28/2022 6.2 (H)   08/05/2021 7.2 (H)   07/19/2021 6.4 (A)     Microalb/Crt.  Ratio (mcg/mg creat)   Date Value   04/08/2019 CANNOT BE CALCULATED     LDL Cholesterol (mg/dL)   Date Value   03/28/2022 62     LDL Calculated (mg/dL)   Date Value   04/10/2020 59     AST (U/L)   Date Value   03/28/2022 16     ALT (U/L)   Date Value   03/28/2022 15     BUN (mg/dL)   Date Value   06/16/2022 9      (goal A1C is < 7)   (goal LDL is <100) need 30-50% reduction from baseline     BP Readings from Last 3 Encounters:   06/16/22 (!) 145/74   06/16/22 128/78   05/25/22 120/80    (goal /80)      All Future Testing planned in [facility]:  Lab Frequency Next Occurrence   ECHO Complete 2D W Doppler W Color Once 07/19/2021   H. pylori antigen Once 70/70/3386   Basic Metabolic Panel Once 89/16/8703   Protein / Creatinine Ratio, Urine Once 08/11/2022   CBC Once 08/11/2022   PTH, Intact with Ionized Calcium Once 08/11/2022   XR CHEST STANDARD (2 VW) Once 06/16/2022   EKG 12 lead Once 06/16/2022   CBC Once 06/16/2022   Comprehensive Metabolic Panel Once 15/22/9543   Brain Natriuretic Peptide Once 06/16/2022   D-Dimer, Quantitative Once 06/16/2022   Troponin Once 06/16/2022   MRI BRAIN W WO CONTRAST Once 12/27/2022   POCT INR         Next Visit Date:  Future Appointments   Date Time Provider Mark Junior   7/6/2022 12:30 PM MARIBELL Cabrera psyc [facility]   7/6/2022  2:00 PM Lee Beverly MD Resp Spec [facility]   7/25/2022 10:00 AM Aurora Vega DPM [facility]   8/15/2022  8:30 AM Thalia Castro MD [facility] None   8/19/2022  8:30 AM Trisha Marin, APRN - [facility] [place] [facility]            Patient Active Problem List:     Essential hypertension     Hyperlipidemia     Osteoarthritis     Obesity     CKD (chronic kidney disease) stage 3, GFR 30-59 ml/min (HCC)     Proteinuria     Controlled type 2 diabetes mellitus with complication, without long-term current use of insulin (HCC)     History of DVT of lower extremity     NARCISO on CPAP     Vitamin D deficiency     Major depressive disorder, recurrent episode, severe, with psychotic behavior ([address]     Multiple lung nodules on CT     Hypomagnesemia     Anxiety     Chronic obstructive pulmonary disease (HCC)     Precordial pain     History of pulmonary embolism     Family history of abdominal aortic aneurysm     Normal coronary arteries     Long term (current) use of anticoagulants     AAA (abdominal aortic aneurysm) without rupture (HCC)     COPD exacerbation (HCC)     Acute tracheobronchitis-viral     Abnormal mammogram     Cerebrovascular accident (CVA) ([address]     Diabetic nephropathy (HCC)     Slow transit constipation     Frontal mass of brain     Intracranial mass     Cerebral cavernoma     Pseudogout     History of gout     Familial cavernous cerebral angioma (HCC)     Cavernoma     Effusion of right knee     Meniscus, medial, anterior horn derangement, right     Post traumatic stress disorder     Patellofemoral arthritis of right knee

## 2022-06-28 NOTE — TELEPHONE ENCOUNTER
LOV 5/25/22  RTO 2 months; F/U scheduled  [facility] 4/18/22    Health Maintenance   Topic Date Due    Diabetic retinal exam  07/09/2022    Cervical cancer screen  11/16/2022    Depression Monitoring  01/24/2023    A1C test (Diabetic or Prediabetic)  03/28/2023    Lipids  03/28/2023    Pneumococcal 0-64 years Vaccine (3 - PPSV23 or PCV20) 04/23/2023    Diabetic foot exam  05/02/2023    Breast cancer screen  09/10/2023    DTaP/Tdap/Td vaccine (2 - Td or Tdap) 08/04/2026    Colorectal Cancer Screen  02/16/2028    Flu vaccine  Completed    Shingles vaccine  Completed    COVID-19 Vaccine  Completed    Hepatitis C screen  Addressed    HIV screen  Addressed    Hepatitis A vaccine  Aged Out    Hib vaccine  Aged Out    Meningococcal (ACWY) vaccine  Aged Out             (applicable per patient's age: Cancer Screenings, Depression Screening, Fall Risk Screening, Immunizations)    Hemoglobin A1C (%)   Date Value   03/28/2022 6.2 (H)   08/05/2021 7.2 (H)   07/19/2021 6.4 (A)     Microalb/Crt.  Ratio (mcg/mg creat)   Date Value   04/08/2019 CANNOT BE CALCULATED     LDL Cholesterol (mg/dL)   Date Value   03/28/2022 62     LDL Calculated (mg/dL)   Date Value   04/10/2020 59     AST (U/L)   Date Value   03/28/2022 16     ALT (U/L)   Date Value   03/28/2022 15     BUN (mg/dL)   Date Value   06/16/2022 9      (goal A1C is < 7)   (goal LDL is <100) need 30-50% reduction from baseline     BP Readings from Last 3 Encounters:   06/16/22 (!) 145/74   06/16/22 128/78   05/25/22 120/80    (goal /80)      All Future Testing planned in [facility]:  Lab Frequency Next Occurrence   ECHO Complete 2D W Doppler W Color Once 07/19/2021   H. pylori antigen Once 34/63/3080   Basic Metabolic Panel Once 22/08/5750   Protein / Creatinine Ratio, Urine Once 08/11/2022   CBC Once 08/11/2022   PTH, Intact with Ionized Calcium Once 08/11/2022   XR CHEST STANDARD (2 VW) Once 06/16/2022   EKG 12 lead Once 06/16/2022   CBC Once 06/16/2022 Comprehensive Metabolic Panel Once 88/45/8782   Brain Natriuretic Peptide Once 06/16/2022   D-Dimer, Quantitative Once 06/16/2022   Troponin Once 06/16/2022   MRI BRAIN W WO CONTRAST Once 12/27/2022   POCT INR         Next Visit Date:  Future Appointments   Date Time Provider Mark Junior   7/6/2022 12:30 PM KIRSTEN Cabrera-S eunice psyc [facility]   7/6/2022  2:00 PM Stephen Douglas MD Resp Spec [facility]   7/25/2022 10:00 AM Leyda Johnson DPM [facility]   8/15/2022  8:30 AM Balta Brown MD [facility] None   8/19/2022  8:30 AM BRAULIO Teran - CNP [place] [facility]            Patient Active Problem List:     Essential hypertension     Hyperlipidemia     Osteoarthritis     Obesity     CKD (chronic kidney disease) stage 3, GFR 30-59 ml/min (HCC)     Proteinuria     Controlled type 2 diabetes mellitus with complication, without long-term current use of insulin (HCC)     History of DVT of lower extremity     NARCISO on CPAP     Vitamin D deficiency     Major depressive disorder, recurrent episode, severe, with psychotic behavior ([address]     Multiple lung nodules on CT     Hypomagnesemia     Anxiety     Chronic obstructive pulmonary disease (HCC)     Precordial pain     History of pulmonary embolism     Family history of abdominal aortic aneurysm     Normal coronary arteries     Long term (current) use of anticoagulants     AAA (abdominal aortic aneurysm) without rupture (HCC)     COPD exacerbation (HCC)     Acute tracheobronchitis-viral     Abnormal mammogram     Cerebrovascular accident (CVA) ([address]     Diabetic nephropathy (HCC)     Slow transit constipation     Frontal mass of brain     Intracranial mass     Cerebral cavernoma     Pseudogout     History of gout     Familial cavernous cerebral angioma (HCC)     Cavernoma     Effusion of right knee     Meniscus, medial, anterior horn derangement, right     Post traumatic stress disorder     Patellofemoral arthritis of right knee

## 2022-06-28 NOTE — TELEPHONE ENCOUNTER
Comprehensive Metabolic Panel Once 58/96/5451   Brain Natriuretic Peptide Once 06/16/2022   D-Dimer, Quantitative Once 06/16/2022   Troponin Once 06/16/2022   MRI BRAIN W WO CONTRAST Once 12/27/2022   POCT INR         Next Visit Date:  Future Appointments   Date Time Provider Mark Junior   7/6/2022 12:30 PM KIRSTEN Cabrera-S eunice psyc [facility]   7/6/2022  2:00 PM Jene Simmonds, MD Resp Spec [facility]   7/25/2022 10:00 AM Jorge Salguero DPM [facility]   8/15/2022  8:30 AM Jonelle Weathers MD [facility] None   8/19/2022  8:30 AM Trisha Muller, APRN - [facility]            Patient Active Problem List:     Essential hypertension     Hyperlipidemia     Osteoarthritis     Obesity     CKD (chronic kidney disease) stage 3, GFR 30-59 ml/min (HCC)     Proteinuria     Controlled type 2 diabetes mellitus with complication, without long-term current use of insulin (HCC)     History of DVT of lower extremity     NARCISO on CPAP     Vitamin D deficiency     Major depressive disorder, recurrent episode, severe, with psychotic behavior ([address]     Multiple lung nodules on CT     Hypomagnesemia     Anxiety     Chronic obstructive pulmonary disease (HCC)     Precordial pain     History of pulmonary embolism     Family history of abdominal aortic aneurysm     Normal coronary arteries     Long term (current) use of anticoagulants     AAA (abdominal aortic aneurysm) without rupture (HCC)     COPD exacerbation (HCC)     Acute tracheobronchitis-viral     Abnormal mammogram     Cerebrovascular accident (CVA) ([address]     Diabetic nephropathy (HCC)     Slow transit constipation     Frontal mass of brain     Intracranial mass     Cerebral cavernoma     Pseudogout     History of gout     Familial cavernous cerebral angioma (HCC)     Cavernoma     Effusion of right knee     Meniscus, medial, anterior horn derangement, right     Post traumatic stress disorder     Patellofemoral arthritis of right knee

## 2022-06-29 DIAGNOSIS — I10 ESSENTIAL HYPERTENSION: ICD-10-CM

## 2022-06-29 NOTE — TELEPHONE ENCOUNTER
Patient states dose was lowered to 10 mg per specialist. Please review. LOV 5/25/22  RTO F/U scheduled  LRF 1/17/22    Health Maintenance   Topic Date Due    Diabetic retinal exam  07/09/2022    Cervical cancer screen  11/16/2022    Depression Monitoring  01/24/2023    A1C test (Diabetic or Prediabetic)  03/28/2023    Lipids  03/28/2023    Pneumococcal 0-64 years Vaccine (3 - PPSV23 or PCV20) 04/23/2023    Diabetic foot exam  05/02/2023    Breast cancer screen  09/10/2023    DTaP/Tdap/Td vaccine (2 - Td or Tdap) 08/04/2026    Colorectal Cancer Screen  02/16/2028    Flu vaccine  Completed    Shingles vaccine  Completed    COVID-19 Vaccine  Completed    Hepatitis C screen  Addressed    HIV screen  Addressed    Hepatitis A vaccine  Aged Out    Hib vaccine  Aged Out    Meningococcal (ACWY) vaccine  Aged Out             (applicable per patient's age: Cancer Screenings, Depression Screening, Fall Risk Screening, Immunizations)    Hemoglobin A1C (%)   Date Value   03/28/2022 6.2 (H)   08/05/2021 7.2 (H)   07/19/2021 6.4 (A)     Microalb/Crt.  Ratio (mcg/mg creat)   Date Value   04/08/2019 CANNOT BE CALCULATED     LDL Cholesterol (mg/dL)   Date Value   03/28/2022 62     LDL Calculated (mg/dL)   Date Value   04/10/2020 59     AST (U/L)   Date Value   03/28/2022 16     ALT (U/L)   Date Value   03/28/2022 15     BUN (mg/dL)   Date Value   06/16/2022 9      (goal A1C is < 7)   (goal LDL is <100) need 30-50% reduction from baseline     BP Readings from Last 3 Encounters:   06/16/22 (!) 145/74   06/16/22 128/78   05/25/22 120/80    (goal /80)      All Future Testing planned in [facility]:  Lab Frequency Next Occurrence   ECHO Complete 2D W Doppler W Color Once 07/19/2021   H. pylori antigen Once 00/87/1975   Basic Metabolic Panel Once 43/25/7287   Protein / Creatinine Ratio, Urine Once 08/11/2022   CBC Once 08/11/2022   PTH, Intact with Ionized Calcium Once 08/11/2022   XR CHEST STANDARD (2 VW) Once 06/16/2022   EKG 12 lead Once 06/16/2022   CBC Once 06/16/2022   Comprehensive Metabolic Panel Once 35/16/5728   Brain Natriuretic Peptide Once 06/16/2022   D-Dimer, Quantitative Once 06/16/2022   Troponin Once 06/16/2022   MRI BRAIN W WO CONTRAST Once 12/27/2022   POCT INR         Next Visit Date:  Future Appointments   Date Time Provider Mark Sandi   7/6/2022 12:30 PM MARIBELL Cabrera psyc [facility]   7/6/2022  2:00 PM Hanh Baeza MD Resp Spec [facility]   7/25/2022 10:00 AM Nany Epstein DPM [facility]   8/15/2022  8:30 AM Ebony Lawrence MD [facility]   8/19/2022  8:30 AM Trisha Magdaleno, APRN - CNP [place] [facility]            Patient Active Problem List:     Essential hypertension     Hyperlipidemia     Osteoarthritis     Obesity     CKD (chronic kidney disease) stage 3, GFR 30-59 ml/min (HCC)     Proteinuria     Controlled type 2 diabetes mellitus with complication, without long-term current use of insulin ([address]     History of DVT of lower extremity     NARCISO on CPAP     Vitamin D deficiency     Major depressive disorder, recurrent episode, severe, with psychotic behavior ([address]     Multiple lung nodules on CT     Hypomagnesemia     Anxiety     Chronic obstructive pulmonary disease (HCC)     Precordial pain     History of pulmonary embolism     Family history of abdominal aortic aneurysm     Normal coronary arteries     Long term (current) use of anticoagulants     AAA (abdominal aortic aneurysm) without rupture (HCC)     COPD exacerbation (HCC)     Acute tracheobronchitis-viral     Abnormal mammogram     Cerebrovascular accident (CVA) ([address]     Diabetic nephropathy (HCC)     Slow transit constipation     Frontal mass of brain     Intracranial mass     Cerebral cavernoma     Pseudogout     History of gout     Familial cavernous cerebral angioma (HCC)     Cavernoma     Effusion of right knee     Meniscus, medial, anterior horn derangement, right     Post traumatic stress disorder     Patellofemoral arthritis of right knee

## 2022-06-29 NOTE — TELEPHONE ENCOUNTER
Last visit: 6-20-22  Last Med refill: 3-10-22  Does patient have enough medication for 72 hours: Yes    Next Visit Date:  Future Appointments   Date Time Provider Mark Sandi   7/6/2022 12:30 PM MARIBELL Cabrera psyc [facility]   7/6/2022  2:00 PM Rowena Carmen MD Resp Spec [facility]   7/25/2022 10:00 AM Thomas Fontana DPM [facility]   8/15/2022  8:30 AM Kuldip Sigala MD [facility] None   8/19/2022  8:30 AM Trisha Pearson APRN - CNP [place] [facility]       Health Maintenance   Topic Date Due    Diabetic retinal exam  07/09/2022    Cervical cancer screen  11/16/2022    Depression Monitoring  01/24/2023    A1C test (Diabetic or Prediabetic)  03/28/2023    Lipids  03/28/2023    Pneumococcal 0-64 years Vaccine (3 - PPSV23 or PCV20) 04/23/2023    Diabetic foot exam  05/02/2023    Breast cancer screen  09/10/2023    DTaP/Tdap/Td vaccine (2 - Td or Tdap) 08/04/2026    Colorectal Cancer Screen  02/16/2028    Flu vaccine  Completed    Shingles vaccine  Completed    COVID-19 Vaccine  Completed    Hepatitis C screen  Addressed    HIV screen  Addressed    Hepatitis A vaccine  Aged Out    Hib vaccine  Aged Out    Meningococcal (ACWY) vaccine  Aged Out       Hemoglobin A1C (%)   Date Value   03/28/2022 6.2 (H)   08/05/2021 7.2 (H)   07/19/2021 6.4 (A)             ( goal A1C is < 7)   Microalb/Crt.  Ratio (mcg/mg creat)   Date Value   04/08/2019 CANNOT BE CALCULATED     LDL Cholesterol (mg/dL)   Date Value   03/28/2022 62   05/07/2020 68     LDL Calculated (mg/dL)   Date Value   04/10/2020 59       (goal LDL is <100)   AST (U/L)   Date Value   03/28/2022 16     ALT (U/L)   Date Value   03/28/2022 15     BUN (mg/dL)   Date Value   06/16/2022 9     BP Readings from Last 3 Encounters:   06/16/22 (!) 145/74   06/16/22 128/78   05/25/22 120/80          (goal 120/80)    All Future Testing planned in [facility]  Lab Frequency Next Occurrence   ECHO Complete 2D W Doppler W Color Once 07/19/2021   H. pylori antigen Once 30/59/1004   Basic Metabolic Panel Once 28/10/1842   Protein / Creatinine Ratio, Urine Once 08/11/2022   CBC Once 08/11/2022   PTH, Intact with Ionized Calcium Once 08/11/2022   XR CHEST STANDARD (2 VW) Once 06/16/2022   EKG 12 lead Once 06/16/2022   CBC Once 06/16/2022   Comprehensive Metabolic Panel Once 82/37/8285   Brain Natriuretic Peptide Once 06/16/2022   D-Dimer, Quantitative Once 06/16/2022   Troponin Once 06/16/2022   MRI BRAIN W WO CONTRAST Once 12/27/2022   POCT INR                 Patient Active Problem List:     Essential hypertension     Hyperlipidemia     Osteoarthritis     Obesity     CKD (chronic kidney disease) stage 3, GFR 30-59 ml/min ([facility])     Proteinuria     Controlled type 2 diabetes mellitus with complication, without long-term current use of insulin (HCC)     History of DVT of lower extremity     NARCISO on CPAP     Vitamin D deficiency     Major depressive disorder, recurrent episode, severe, with psychotic behavior ([facility] [address]     Multiple lung nodules on CT     Hypomagnesemia     Anxiety     Chronic obstructive pulmonary disease (HCC)     Precordial pain     History of pulmonary embolism     Family history of abdominal aortic aneurysm     Normal coronary arteries     Long term (current) use of anticoagulants     AAA (abdominal aortic aneurysm) without rupture (HCC)     COPD exacerbation (HCC)     Acute tracheobronchitis-viral     Abnormal mammogram     Cerebrovascular accident (CVA) ([facility] [address]     Diabetic nephropathy (HCC)     Slow transit constipation     Frontal mass of brain     Intracranial mass     Cerebral cavernoma     Pseudogout     History of gout     Familial cavernous cerebral angioma (HCC)     Cavernoma     Effusion of right knee     Meniscus, medial, anterior horn derangement, right     Post traumatic stress disorder     Patellofemoral arthritis of right knee

## 2022-06-30 RX ORDER — LISINOPRIL 20 MG/1
10 TABLET ORAL DAILY
Qty: 45 TABLET | Refills: 3 | Status: SHIPPED | OUTPATIENT
Start: 2022-06-30 | End: 2022-08-19 | Stop reason: DRUGHIGH

## 2022-07-03 DIAGNOSIS — M79.89 LEG SWELLING: ICD-10-CM

## 2022-07-05 RX ORDER — FUROSEMIDE 20 MG/1
20 TABLET ORAL DAILY
Qty: 90 TABLET | Refills: 1 | OUTPATIENT
Start: 2022-07-05 | End: 2023-07-05

## 2022-07-05 NOTE — TELEPHONE ENCOUNTER
Last visit: 6-20-22  Last Med refill: not listed  Does patient have enough medication for 72 hours: No: not in current med list    Next Visit Date:  Future Appointments   Date Time Provider Mark Sandi   7/6/2022 12:30 PM MARIBELL Cabrera psyc [facility]   7/6/2022  2:00 PM [address] LEE MD Resp Spec [facility]   7/25/2022 10:00 AM Zohaib Paris DPM [facility]   8/15/2022  8:30 AM Willian Edwards MD [facility]   8/19/2022  8:30 AM Trisha Copeland, APRN - CNP [place] [facility]       Health Maintenance   Topic Date Due    Diabetic retinal exam  07/09/2022    Flu vaccine (1) 09/01/2022    Cervical cancer screen  11/16/2022    Depression Monitoring  01/24/2023    A1C test (Diabetic or Prediabetic)  03/28/2023    Lipids  03/28/2023    Pneumococcal 0-64 years Vaccine (3 - PPSV23 or PCV20) 04/23/2023    Diabetic foot exam  05/02/2023    Breast cancer screen  09/10/2023    DTaP/Tdap/Td vaccine (2 - Td or Tdap) 08/04/2026    Colorectal Cancer Screen  02/16/2028    Shingles vaccine  Completed    COVID-19 Vaccine  Completed    Hepatitis C screen  Addressed    HIV screen  Addressed    Hepatitis A vaccine  Aged Out    Hib vaccine  Aged Out    Meningococcal (ACWY) vaccine  Aged Out       Hemoglobin A1C (%)   Date Value   03/28/2022 6.2 (H)   08/05/2021 7.2 (H)   07/19/2021 6.4 (A)             ( goal A1C is < 7)   Microalb/Crt.  Ratio (mcg/mg creat)   Date Value   04/08/2019 CANNOT BE CALCULATED     LDL Cholesterol (mg/dL)   Date Value   03/28/2022 62   05/07/2020 68     LDL Calculated (mg/dL)   Date Value   04/10/2020 59       (goal LDL is <100)   AST (U/L)   Date Value   03/28/2022 16     ALT (U/L)   Date Value   03/28/2022 15     BUN (mg/dL)   Date Value   06/16/2022 9     BP Readings from Last 3 Encounters:   06/16/22 (!) 145/74   06/16/22 128/78   05/25/22 120/80          (goal 120/80)    All Future Testing planned in [facility]  Lab Frequency Next Occurrence   ECHO Complete 2D W Doppler W Color Once 07/19/2021   H. pylori antigen Once 73/00/6145   Basic Metabolic Panel Once 78/16/2469   Protein / Creatinine Ratio, Urine Once 08/11/2022   CBC Once 08/11/2022   PTH, Intact with Ionized Calcium Once 08/11/2022   XR CHEST STANDARD (2 VW) Once 06/16/2022   EKG 12 lead Once 06/16/2022   CBC Once 06/16/2022   Comprehensive Metabolic Panel Once 59/56/5519   Brain Natriuretic Peptide Once 06/16/2022   D-Dimer, Quantitative Once 06/16/2022   Troponin Once 06/16/2022   MRI BRAIN W WO CONTRAST Once 12/27/2022   POCT INR                 Patient Active Problem List:     Essential hypertension     Hyperlipidemia     Osteoarthritis     Obesity     CKD (chronic kidney disease) stage 3, GFR 30-59 ml/min (HCC)     Proteinuria     Controlled type 2 diabetes mellitus with complication, without long-term current use of insulin (HCC)     History of DVT of lower extremity     NARCISO on CPAP     Vitamin D deficiency     Major depressive disorder, recurrent episode, severe, with psychotic behavior ([facility] [address]     Multiple lung nodules on CT     Hypomagnesemia     Anxiety     Chronic obstructive pulmonary disease (HCC)     Precordial pain     History of pulmonary embolism     Family history of abdominal aortic aneurysm     Normal coronary arteries     Long term (current) use of anticoagulants     AAA (abdominal aortic aneurysm) without rupture (HCC)     COPD exacerbation (HCC)     Acute tracheobronchitis-viral     Abnormal mammogram     Cerebrovascular accident (CVA) ([facility] [address]     Diabetic nephropathy (HCC)     Slow transit constipation     Frontal mass of brain     Intracranial mass     Cerebral cavernoma     Pseudogout     History of gout     Familial cavernous cerebral angioma (HCC)     Cavernoma     Effusion of right knee     Meniscus, medial, anterior horn derangement, right     Post traumatic stress disorder     Patellofemoral arthritis of right knee

## 2022-07-06 ENCOUNTER — OFFICE VISIT (OUTPATIENT)
Dept: PULMONOLOGY | Age: 62
End: 2022-07-06
Payer: MEDICARE

## 2022-07-06 ENCOUNTER — OFFICE VISIT (OUTPATIENT)
Dept: BEHAVIORAL/MENTAL HEALTH CLINIC | Age: 62
End: 2022-07-06
Payer: MEDICARE

## 2022-07-06 VITALS
WEIGHT: 159.6 LBS | HEART RATE: 89 BPM | RESPIRATION RATE: 16 BRPM | TEMPERATURE: 97.2 F | HEIGHT: 62 IN | DIASTOLIC BLOOD PRESSURE: 89 MMHG | OXYGEN SATURATION: 100 % | SYSTOLIC BLOOD PRESSURE: 128 MMHG | BODY MASS INDEX: 29.37 KG/M2

## 2022-07-06 DIAGNOSIS — G47.33 OSA (OBSTRUCTIVE SLEEP APNEA): Primary | ICD-10-CM

## 2022-07-06 DIAGNOSIS — J44.9 CHRONIC OBSTRUCTIVE PULMONARY DISEASE, UNSPECIFIED COPD TYPE (HCC): ICD-10-CM

## 2022-07-06 DIAGNOSIS — F43.10 POST TRAUMATIC STRESS DISORDER: Primary | ICD-10-CM

## 2022-07-06 DIAGNOSIS — R91.1 LUNG NODULE: ICD-10-CM

## 2022-07-06 PROCEDURE — 3023F SPIROM DOC REV: CPT | Performed by: INTERNAL MEDICINE

## 2022-07-06 PROCEDURE — 1036F TOBACCO NON-USER: CPT | Performed by: SOCIAL WORKER

## 2022-07-06 PROCEDURE — 99204 OFFICE O/P NEW MOD 45 MIN: CPT | Performed by: INTERNAL MEDICINE

## 2022-07-06 PROCEDURE — 90832 PSYTX W PT 30 MINUTES: CPT | Performed by: SOCIAL WORKER

## 2022-07-06 PROCEDURE — 1036F TOBACCO NON-USER: CPT | Performed by: INTERNAL MEDICINE

## 2022-07-06 PROCEDURE — 3017F COLORECTAL CA SCREEN DOC REV: CPT | Performed by: INTERNAL MEDICINE

## 2022-07-06 PROCEDURE — G8417 CALC BMI ABV UP PARAM F/U: HCPCS | Performed by: INTERNAL MEDICINE

## 2022-07-06 PROCEDURE — G8427 DOCREV CUR MEDS BY ELIG CLIN: HCPCS | Performed by: INTERNAL MEDICINE

## 2022-07-06 ASSESSMENT — SLEEP AND FATIGUE QUESTIONNAIRES
HOW LIKELY ARE YOU TO NOD OFF OR FALL ASLEEP WHILE WATCHING TV: 1
HOW LIKELY ARE YOU TO NOD OFF OR FALL ASLEEP WHILE SITTING AND READING: 0
HOW LIKELY ARE YOU TO NOD OFF OR FALL ASLEEP WHEN YOU ARE A PASSENGER IN A CAR FOR AN HOUR WITHOUT A BREAK: 0
HOW LIKELY ARE YOU TO NOD OFF OR FALL ASLEEP WHILE SITTING QUIETLY AFTER LUNCH WITHOUT ALCOHOL: 0
HOW LIKELY ARE YOU TO NOD OFF OR FALL ASLEEP WHILE SITTING AND TALKING TO SOMEONE: 0
HOW LIKELY ARE YOU TO NOD OFF OR FALL ASLEEP WHILE SITTING INACTIVE IN A PUBLIC PLACE: 0
HOW LIKELY ARE YOU TO NOD OFF OR FALL ASLEEP WHILE LYING DOWN TO REST IN THE AFTERNOON WHEN CIRCUMSTANCES PERMIT: 3
HOW LIKELY ARE YOU TO NOD OFF OR FALL ASLEEP IN A CAR, WHILE STOPPED FOR A FEW MINUTES IN TRAFFIC: 0
ESS TOTAL SCORE: 4

## 2022-07-06 NOTE — LETTER
[address] [facility] [address]  Phone: [phone]  Fax: [phone]    Paul Jones MD    July 6, 2022     Zoie Arrington, APRN - [facility]  [address] [facility] [address]    Patient: Manuel Barrow   MR Number: [id number]   YOB: 1960   Date of Visit: 7/6/2022       Dear Zoie Arrington: Thank you for referring Alicia Cotto to me for evaluation/treatment. Below are the relevant portions of my assessment and plan of care. If you have questions, please do not hesitate to call me. I look forward to following Hyacinth Stephens along with you.     Sincerely,      Paul Jones MD

## 2022-07-06 NOTE — PROGRESS NOTES
OUTPATIENT PULMONARY CONSULT NOTE      Patient:  Abbi Ocampo  MRN: [id number]    Consulting Physician: BRAULIO Handy CNP  Reason for Consult: Obstructive sleep apnea  Primacy Care Physician: BRAULIO Handy CNP    HISTORY OF PRESENT ILLNESS:   The patient is a 57 y.o. female she is referred here for evaluation and management of obstructive sleep apnea. According to patient she was followed by Dr. Jim Snowden for obstructive sleep apnea/history of lung nodule and was diagnosed with COPD about a year ago. According patient she had lost considerable amount of weight about 100 pounds in last 1 year and she is stopped using CPAP. She did have history of obstructive sleep apnea according to available history in 2009 she had mild obstructive sleep apnea with AHI of 6. She did have symptoms of sleep apnea. According to patient she did have daytime sleepiness tiredness labs and history of snoring and she was using CPAP when she was on CPAP she was compliant with CPAP according to patient she was using it every night getting benefit from CPAP and symptoms were better. For last 1 year since she had lost 100 pounds weight and stopped using CPAP according to patient she does not remember if she has a snoring she did ask her daughter about his snoring. She denies nocturnal awakening with a snoring gasping choking. According to patient she is feeling better she does not take naps at this time and she is usually energetic during the daytime does not doze off and when she wake up in the morning she feels okay according to patient her sleep is okay. Says she is not using CPAP now but had not had any sleep study done she wanted evaluation and a sleep study. She was diagnosed with COPD apparently a year ago according to patient and she is currently on Stiolto which she take once daily which apparently was started few months ago she take it once daily.   According to patient since she had lost weight she think that her breathing is better she had history of dyspnea on exertion activity she is able to do more since she had lost weight she is not sure whether his Stiolto she will better or losing weight. She does not complain of cough wheezing chest tightness. She is able to do her regular activities at home. She denies sputum production. She denies nocturnal awakening with cough wheezing chest tightness or shortness of breath. She does not complain of orthopnea and PND. Currently patient she is being followed by cardiology she did not have history of MI/CAD according to patient. She had an echocardiogram done in 2019 and apparently systolic function was normal at that time. CT scan of the chest from May 6 2020 and January 31, 2022 about 1 year 8-month apart seen she has 3 small lung nodule about 3 mm in left lower lobe all are peripheral above the diaphragm and has been stable. She has also had a CTA chest done in June 2022 which shows a similar finding although it was a CTA chest so difficult to see some of the nodules. She had pulmonary function test done on 03/24/2022 on review of the pulmonary function test she has normal spirometry, normal lung volumes and normal diffusion capacity normal pulm function test.    She had history of smoking only for 7 years when she smoked 1 pack which last for 1 week and had to stop smoking 30 years ago. Past Medical History:        Diagnosis Date    Anxiety     Chronic kidney disease     COPD (chronic obstructive pulmonary disease) ([facility]     Depression     Fracture of ankle 5/14/2020    Hyperlipidemia     Hypertension     Obesity     Osteoarthritis     Proteinuria     Pulmonary embolism (HCC)     Sleep apnea     c-pap.  Doesn't use everynight    SOB (shortness of breath) 5/7/2020    Type 2 diabetes mellitus without complication (HCC)     Type II or unspecified type diabetes mellitus without mention of complication, not stated as uncontrolled     Von Willebrand disease [facility])        Past Surgical History:        Procedure Laterality Date    APPENDECTOMY       SECTION      x3, 1977, 1979, 8550    COLONOSCOPY  2018    with biopsy    COLONOSCOPY N/A 2018    COLONOSCOPY performed by Jose Jean Baptiste MD at [address] [address] [facility] CATH LAB PROCEDURE      EYE SURGERY Bilateral     cataracts    EYE SURGERY Bilateral     glaucoma    TONSILLECTOMY AND ADENOIDECTOMY         Allergies:    No Known Allergies      Home Meds:   Outpatient Encounter Medications as of 2022   Medication Sig Dispense Refill    lisinopril (PRINIVIL;ZESTRIL) 20 MG tablet Take 0.5 tablets by mouth daily 45 tablet 3    tiotropium-olodaterol (STIOLTO RESPIMAT) 2.5-2.5 MCG/ACT AERS Inhale 2 puffs into the lungs daily 4 g 2    Alcohol Swabs (ALCOHOL PREP) 70 % PADS Use twice daily and as needed to check blood sugars 120 each 3    blood glucose test strips (ONETOUCH ULTRA) strip TEST THREE TIMES DAILY 100 strip 5    fluticasone (FLONASE) 50 MCG/ACT nasal spray 1 spray by Nasal route daily 1 each 3    ipratropium-albuterol (DUONEB) 0.5-2.5 (3) MG/3ML SOLN nebulizer solution Take 3 mLs by nebulization every 6 hours as needed for Shortness of Breath 360 mL 0    oxyCODONE-acetaminophen (PERCOCET) 5-325 MG per tablet Take 1 tablet by mouth every 8 hours as needed for Pain for up to 30 days. 40 tablet 0    Nirmatrelvir & Ritonavir 10 x 150 MG & 10 x 100MG TBPK Nirmatrelvir 150 mg twice a day with Ritonavir 100 mg twice a day for 5 days 20 each 0    risperiDONE (RISPERDAL) 0.5 MG tablet take 1 tablet by mouth at bedtime      lidocaine (LIDODERM) 5 % Place 1 patch onto the skin daily 12 hours on, 12 hours off.  30 patch 0    dapagliflozin (FARXIGA) 5 MG tablet Take 1 tablet by mouth every morning 90 tablet 1    ALPRAZolam (XANAX) 0.25 MG tablet take 1 tablet by mouth nightly if needed for sleep or anxiety 30 tablet 0    apixaban (ELIQUIS) 5 MG TABS tablet Take 1 tablet by mouth 2 times daily 180 tablet 1    tiZANidine (ZANAFLEX) 4 MG tablet Take 1 tablet by mouth 3 times daily as needed (muscle relaxer) 90 tablet 0    Respiratory Therapy Supplies (NEBULIZER/TUBING/MOUTHPIECE) KIT 1 kit by Does not apply route 4 times daily as needed (wheezing) 1 kit 0    albuterol sulfate  (90 Base) MCG/ACT inhaler Inhale 2 puffs into the lungs every 4 hours as needed for Wheezing or Shortness of Breath (AND/OR COUGH) 18 g 1    buPROPion (WELLBUTRIN XL) 150 MG extended release tablet take 1 tablet by mouth every morning ( take with 300 milligram tablet )      allopurinol (ZYLOPRIM) 100 MG tablet Take 1 tablet by mouth 2 times daily 180 tablet 1    zinc gluconate 50 MG tablet Take 50 mg by mouth daily      vitamin C (ASCORBIC ACID) 500 MG tablet Take 1,000 mg by mouth daily       RA VITAMIN D-3 50 MCG (2000 UT) CAPS take 1 capsule by mouth once daily 90 capsule 3    polyethylene glycol (GLYCOLAX) 17 GM/SCOOP powder Take 17 g by mouth daily 90 each 3    senna (SENOKOT) 8.6 MG tablet Take 1 tablet by mouth 2 times daily 180 tablet 3    omeprazole (PRILOSEC) 20 MG delayed release capsule take 1 capsule by mouth once daily 90 capsule 1    atorvastatin (LIPITOR) 40 MG tablet take 1 tablet by mouth once daily 90 tablet 1    JANUVIA 100 MG tablet take 1 tablet by mouth once daily 90 tablet 1    isosorbide mononitrate (IMDUR) 30 MG extended release tablet take 1 tablet by mouth once daily 90 tablet 1    Blood Pressure Monitoring KIT Use to check blood pressure daily and as needed 1 kit 0    Semaglutide 3 MG TABS Take 3 mg by mouth daily 90 tablet 1    buPROPion (WELLBUTRIN XL) 300 MG extended release tablet Take 2 tablets by mouth every morning (Patient taking differently: Take 300 mg by mouth every morning ) 30 tablet 0    metFORMIN (GLUCOPHAGE-XR) 500 MG extended release tablet Take 1 tablet by mouth 2 times daily 360 tablet 1    Lancets Misc. (ACCU-CHEK FASTCLIX LANCET) KIT use as directed PLEASE APPROVED NEW DEVICE WHILE WE WAIT FOR PRIOR AUTH. .. FASTCLIX MIGHT BE EAISER TO MANIPULATE 1 kit 0    Accu-Chek FastClix Lancets MISC use 1 LANCET to TEST BLOOD SUGAR one to two times a day 120 each 3    naloxone 4 MG/0.1ML LIQD nasal spray 1 spray by Nasal route as needed for Opioid Reversal 1 each 5    Handicap Placard MISC by Does not apply route Exp 2/3/2026 1 each 0    colchicine (COLCRYS) 0.6 MG tablet Take 1 tablet by mouth 2 times daily as needed for Pain (gout pain) Can repeat with 0.6 mg in 1 hour, max 1.8mg daily for acute pain 30 tablet 0     Facility-Administered Encounter Medications as of 7/6/2022   Medication Dose Route Frequency Provider Last Rate Last Admin    methylPREDNISolone acetate (DEPO-MEDROL) injection 40 mg  40 mg Intra-artICUlar Once Peter Souza MD        lidocaine 1 % injection 5 mL  5 mL Intra-artICUlar Once Peter Souza MD        methylPREDNISolone acetate (DEPO-MEDROL) injection 40 mg  40 mg Intra-artICUlar Once Peter Souza MD        lidocaine 1 % injection 5 mL  5 mL Intra-artICUlar Once Peter Souza MD           Social History:   TOBACCO:   reports that she quit smoking about 39 years ago. Her smoking use included cigarettes. She started smoking about 44 years ago. She has a 1.75 pack-year smoking history. She has never used smokeless tobacco.  ETOH:   reports no history of alcohol use.   OCCUPATION:      Family History:       Problem Relation Age of Onset    Hypertension Mother     Liver Disease Mother     Cancer Father         stomach    Diabetes Sister     Cancer Brother         kidney    No Known Problems Maternal Grandmother     No Known Problems Maternal Grandfather     No Known Problems Paternal Grandmother     No Known Problems Paternal Grandfather     Other Brother         AIDS       Immunizations:    Immunization History   Administered Date(s) Administered    COVID-19, MODERNA BLUE border, Primary or Immunocompromised, (age 12y+), IM, 100 mcg/0.5mL 03/03/2021, 03/03/2021, 03/31/2021, 03/31/2021, 11/07/2021    COVID-19, MODERNA Booster BLUE border, (age 18y+), IM, 50mcg/0.25mL 05/19/2022    Influenza Vaccine, unspecified formulation 09/05/2016    Influenza Virus Vaccine 10/03/2012, 09/25/2014, 10/07/2015, 09/04/2018, 08/27/2020    Influenza, MDCK Leo Dobbins, with preserv IM (Flucelvax 2 yrs and older) 09/08/2019    Influenza, Leo Dobbins, IM, (6 mo and older Fluzone, Flulaval, Fluarix and 3 yrs and older Afluria) 10/12/2017    Influenza, Melene Dobbins, IM, PF (6 mo and older Fluzone, Flulaval, Fluarix, and 3 yrs and older Afluria) 09/08/2019, 09/08/2019, 10/02/2021    Pneumococcal Conjugate 13-valent ([id number]) 12/15/2016    Pneumococcal Conjugate 7-valent (Prevnar7) 10/03/2012    Pneumococcal Polysaccharide ([id number]) 01/24/2017    Tdap (Boostrix, Adacel) 08/04/2016    Zoster Recombinant (Shingrix) 04/30/2018, 07/30/2018         REVIEW OF SYSTEMS:  CONSTITUTIONAL:  negative for  fevers, chills, sweats, fatigue, anorexia, and weight loss  EYES:  negative for  double vision, blurred vision, dry eyes, eye discharge, visual disturbance, redness, and icterus  HEENT:  negative for  hearing loss, tinnitus, ear drainage, earaches, nasal congestion, epistaxis, sore throat, hoarseness, voice change, and postnasal drip  RESPIRATORY:  negative for  dry cough, cough with sputum, dyspnea, wheezing, hemoptysis, chest pain, and pleuritic pain  CARDIOVASCULAR:  negative for  chest pain, dyspnea, palpitations, orthopnea, PND, exertional chest pressure/discomfort, fatigue, edema, syncope  GASTROINTESTINAL:  negative for nausea, vomiting, diarrhea, constipation, abdominal pain, abdominal mass, abdominal distention, jaundice, dysphagia, reflux, odynophagia, hematemesis, and hemtochezia  GENITOURINARY:  negative for frequency, dysuria, nocturia, and hematuria  HEMATOLOGIC/LYMPHATIC:  negative for easy bruising, bleeding, lymphadenopathy, and petechiae  ALLERGIC/IMMUNOLOGIC:  negative for recurrent infections, urticaria, hay fever, angioedema, anaphylaxis, and drug reactions  ENDOCRINE:  negative for heat intolerance, cold intolerance, tremor, and weight changes  MUSCULOSKELETAL:  negative for  myalgias, arthralgias, joint swelling, stiff joints, and muscle weakness  NEUROLOGICAL:  negative for headaches, dizziness, seizures, memory problems, speech problems, visual disturbance, gait problems, tremor, dysphagia, weakness, numbness, syncope, and tingling  BEHAVIOR/PSYCH:  negative for decreased sleep, decreased energy level, increased energy level, poor concentration, depressed mood, and anxiety          Physical Exam:    Vitals: /89 (Site: Left Upper Arm)   Pulse 89   Temp 97.2 °F (36.2 °C)   Resp 16   Ht 5' 2\" (1.575 m)   Wt 159 lb 9.6 oz (72.4 kg)   SpO2 100% Comment: Room air at rest  BMI 29.19 kg/m²   Last 3 weights: Wt Readings from Last 3 Encounters:   07/06/22 159 lb 9.6 oz (72.4 kg)   06/16/22 165 lb (74.8 kg)   06/16/22 165 lb (74.8 kg)     Body mass index is 29.19 kg/m². Physical Examination:   General appearance - alert, well appearing, and in no distress, overweight and acyanotic, in no respiratory distress  Mental status - alert, oriented to person, place, and time, normal mood, behavior, speech, dress, motor activity, and thought processes  Eyes - pupils equal and reactive, extraocular eye movements intact, sclera anicteric  Ears - right ear normal, left ear normal  Nose - normal and patent, no erythema, discharge or polyps  Mouth - mucous membranes moist, pharynx normal without lesions  Neck - supple, no significant adenopathy  Chest - no tachypnea, retractions or cyanosis bilateral symmetrical chest movement, normal resonance on percussion, air entry is present bilaterally and symmetrical, no expiratory wheezing rhonchi or crackles.   Heart - normal rate, regular rhythm, normal S1, S2, no murmurs, rubs, clicks or gallops  Abdomen - soft, nontender, nondistended, no masses or organomegaly  Neurological - alert, oriented, normal speech, no focal findings or movement disorder noted}  Extremities - peripheral pulses normal, no pedal edema, no clubbing or cyanosis  Skin - normal coloration and turgor, no rashes, no suspicious skin lesions noted       LABS:    CBC:   WBC   Date Value Ref Range Status   06/16/2022 5.5 3.5 - 11.0 k/uL Final   05/25/2022 7.9 3.5 - 11.3 k/uL Final   05/10/2022 12.4 (H) 3.5 - 11.3 k/uL Final     Hemoglobin   Date Value Ref Range Status   06/16/2022 13.4 12.0 - 16.0 g/dL Final   05/25/2022 12.7 11.9 - 15.1 g/dL Final   05/10/2022 12.8 11.9 - 15.1 g/dL Final     Platelet Count   Date Value Ref Range Status   05/23/2012 278 140 - 450 k/uL Final   02/24/2012 312 140 - 450 k/uL Final     Platelets   Date Value Ref Range Status   06/16/2022 217 140 - 450 k/uL Final   05/25/2022 223 138 - 453 k/uL Final   05/10/2022 322 138 - 453 k/uL Final     BMP:   Sodium   Date Value Ref Range Status   06/16/2022 143 135 - 144 mmol/L Final   05/10/2022 136 135 - 144 mmol/L Final   03/28/2022 140 135 - 144 mmol/L Final     Potassium   Date Value Ref Range Status   06/16/2022 3.5 (L) 3.7 - 5.3 mmol/L Final   05/10/2022 4.2 3.7 - 5.3 mmol/L Final   03/28/2022 4.7 3.7 - 5.3 mmol/L Final     Chloride   Date Value Ref Range Status   06/16/2022 106 98 - 107 mmol/L Final   05/10/2022 98 98 - 107 mmol/L Final   03/28/2022 100 98 - 107 mmol/L Final     CO2   Date Value Ref Range Status   06/16/2022 25 20 - 31 mmol/L Final   05/10/2022 24 20 - 31 mmol/L Final   03/28/2022 21 20 - 31 mmol/L Final     BUN   Date Value Ref Range Status   06/16/2022 9 8 - 23 mg/dL Final   05/10/2022 17 8 - 23 mg/dL Final   03/28/2022 14 8 - 23 mg/dL Final     CREATININE   Date Value Ref Range Status   06/16/2022 0.70 0.50 - 0.90 mg/dL Final   05/10/2022 0.98 (H) 0.50 - 0.90 mg/dL Final   03/28/2022 0.95 (H) 0.50 - 0.90 mg/dL Final     POC Creatinine Date Value Ref Range Status   04/28/2022 1.1 0.6 - 1.4 mg/dL Final     Glucose   Date Value Ref Range Status   06/16/2022 112 (H) 70 - 99 mg/dL Final   05/10/2022 117 (H) 70 - 99 mg/dL Final   03/28/2022 91 70 - 99 mg/dL Final   05/23/2012 84 74 - 106 mg/dL Final   02/24/2012 164 (H) 74 - 106 mg/dL Final     Hepatic:   AST   Date Value Ref Range Status   03/28/2022 16 <32 U/L Final   07/19/2021 24 <32 U/L Final   09/14/2020 24 <32 U/L Final     ALT   Date Value Ref Range Status   03/28/2022 15 5 - 33 U/L Final   07/19/2021 32 5 - 33 U/L Final   09/14/2020 31 5 - 33 U/L Final     Total Bilirubin   Date Value Ref Range Status   03/28/2022 0.30 0.3 - 1.2 mg/dL Final   07/19/2021 0.20 (L) 0.3 - 1.2 mg/dL Final   09/14/2020 0.22 (L) 0.3 - 1.2 mg/dL Final     Alkaline Phosphatase   Date Value Ref Range Status   03/28/2022 77 35 - 104 U/L Final   07/19/2021 89 35 - 104 U/L Final   09/14/2020 77 35 - 104 U/L Final     Amylase:   Amylase   Date Value Ref Range Status   05/06/2020 64 28 - 100 U/L Final     Lipase:   Lipase   Date Value Ref Range Status   05/06/2020 51 13 - 60 U/L Final     CARDIAC ENZYMES:   Total CK   Date Value Ref Range Status   08/01/2019 70 26 - 192 U/L Final     CK-MB   Date Value Ref Range Status   08/01/2019 2.9 <5.4 ng/mL Final   08/01/2019 2.8 <5.4 ng/mL Final     BNP: No results found for: BNP  Lipids:   Cholesterol, Total   Date Value Ref Range Status   04/10/2020 135 mg/dL Final     Cholesterol   Date Value Ref Range Status   05/07/2020 132 <200 mg/dL Final     Comment:        Cholesterol Guidelines:      <200  Desirable   200-240  Borderline      >240  Undesirable          HDL   Date Value Ref Range Status   03/28/2022 36 (L) >40 mg/dL Final     Comment:        HDL Guidelines:    <40     Undesirable   40-59    Borderline    >59     Desirable            INR:   INR   Date Value Ref Range Status   03/01/2022 1.7  Final     Comment:           Therapeutic Range:    Moderate Anticoagulant Intensity:     INR = 2.0-3.0   High Anticoagulant Intensity:     INR = 2.5-3.5           02/27/2022 2.8  Final     Comment:           Therapeutic Range: Moderate Anticoagulant Intensity:     INR = 2.0-3.0   High Anticoagulant Intensity:     INR = 2.5-3.5           08/07/2021 2.2  Final     Comment:           Non-therapeutic Range:     INR = 0.9-1.2  Therapeutic Range:    Moderate Anticoagulant Intensity:     INR = 2.0-3.0   High Anticoagulant Intensity:     INR = 2.5-3.5             INR,(POC)   Date Value Ref Range Status   05/31/2022 1.1 (A)  Final   05/25/2022 1.8 (L)  Final   04/13/2022 2.0  Final     Thyroid:   TSH   Date Value Ref Range Status   03/28/2022 1.55 0.30 - 5.00 mIU/L Final     Urinalysis:   Bacteria, UA   Date Value Ref Range Status   12/22/2021 NOT REPORTED None Final     Color, UA   Date Value Ref Range Status   12/22/2021 Yellow Yellow Final     pH, UA   Date Value Ref Range Status   12/22/2021 5.0 5.0 - 8.0 Final     Protein, UA   Date Value Ref Range Status   12/22/2021 NEGATIVE NEGATIVE Final     RBC, UA   Date Value Ref Range Status   12/22/2021 0 TO 2 0 - 2 /HPF Final     Specific Gravity, UA   Date Value Ref Range Status   12/22/2021 1.010 1.005 - 1.030 Final     Bilirubin, Urine   Date Value Ref Range Status   02/24/2012 NEGATIVE NEG Final     Bilirubin Urine   Date Value Ref Range Status   12/22/2021 NEGATIVE NEGATIVE Final     Nitrite, Urine   Date Value Ref Range Status   12/22/2021 NEGATIVE NEGATIVE Final     WBC, UA   Date Value Ref Range Status   12/22/2021 10 TO 20 0 - 5 /HPF Final     Leukocyte Esterase, Urine   Date Value Ref Range Status   12/22/2021 SMALL (A) NEGATIVE Final     Glucose, UA   Date Value Ref Range Status   02/24/2012 NEGATIVE NEG Final     Glucose, Ur   Date Value Ref Range Status   12/22/2021 3+ (A) NEGATIVE Final     Cultures:-  -----------------------------------------------------------------    ABGs: No results found for: PHART, PO2ART, CIB8ESO    Pulmonary Functions Testing Results:    No results found for: FEV1, FVC, [id number], TLC, DLCO    CXR      CT Scans    Immunization History   Administered Date(s) Administered    COVID-19, MODERNA BLUE border, Primary or Immunocompromised, (age 12y+), IM, 100 mcg/0.5mL 03/03/2021, 03/03/2021, 03/31/2021, 03/31/2021, 11/07/2021    COVID-19, MODERNA Booster BLUE border, (age 18y+), IM, 50mcg/0.25mL 05/19/2022    Influenza Vaccine, unspecified formulation 09/05/2016    Influenza Virus Vaccine 10/03/2012, 09/25/2014, 10/07/2015, 09/04/2018, 08/27/2020    Influenza, MDCK Scot Spark, with preserv IM (Flucelvax 2 yrs and older) 09/08/2019    Influenza, Scot Spark, IM, (6 mo and older Fluzone, Flulaval, Fluarix and 3 yrs and older Afluria) 10/12/2017    Influenza, Quadv, IM, PF (6 mo and older Fluzone, Flulaval, Fluarix, and 3 yrs and older Afluria) 09/08/2019, 09/08/2019, 10/02/2021    Pneumococcal Conjugate 13-valent ([id number]) 12/15/2016    Pneumococcal Conjugate 7-valent (Prevnar7) 10/03/2012    Pneumococcal Polysaccharide ([id number]) 01/24/2017    Tdap (Boostrix, Adacel) 08/04/2016    Zoster Recombinant (Shingrix) 04/30/2018, 07/30/2018         Assessment and Plan       ICD-10-CM    1. NARCISO (obstructive sleep apnea)  G47.33    2. Lung nodule  R91.1    3. Chronic obstructive pulmonary disease, unspecified COPD type ([facility]  J44.9      Assessment:    She had considerable amount of weight she had history of mild obstructive sleep apnea and she was on CPAP. For last 1 year since she had lost 100 pounds she had to stop using CPAP currently she does not have significant symptoms.   Will get sleep study done baseline to determine if she has residual apneas if she does have sleep apnea then she will need titration study the last study was done in 2009 she still have the CPAP from 2009 which she had not use it for about a year now    CT scan of the chest from May 6 2020 and January 31, 2022 about 1 year 8-month apart seen she has 3 small lung nodule about 3 mm in left lower lobe all are peripheral above the diaphragm and has been stable. She has also had a CTA chest done in June 2022 which shows a similar finding although it was a CTA chest so difficult to see some of the nodules. So nodules are stable for 2 years she did not have history of considerable smoking total pack 2-3-pack-year and she does not need further CT scan    She had pulmonary function test done on 03/24/2022 on review of the pulmonary function test she has normal spirometry, normal lung volumes and normal diffusion capacity normal pulm function test.  Currently she is on Stiolto combination of LAMA and LABA as her pulmonary function test is normal in March 2022 did not have considerable smoking no history of asthma will downgrade to Spiriva once daily and probably wean her off Spiriva her dyspnea exertion in the past could be related to obesity and deconditioning and is better we will watch clinically. Plan and recommendation:    Sleep study baseline and if needed titration study  Wt loss is recommended and discussed  Follow good sleep hygeine instructions  Given sleep hygeine instructions    Will discontinue Stiolto. Start a Spiriva once daily 2 puff. Likely depending upon her symptoms may need to wean off his Spiriva if she continues to do well. Albuterol to be used as needed. Doubt any further need of the CT of the chest for follow-up of lung nodules as it has been stable for 2 years    Vaccinations recommended and given  Annual flu vaccination recommended  She had COVID vaccination and booster. She is up-to-date on vaccines from pulm standpoint  PFT's reviewed   CXR reviewed from 25 May 2022  CT chest reviewed May 2020, January 2022 in June 2022  Questions answered pertaining to diagnosis and management explained importance of compliance with therapy         It was my pleasure to evaluate Teri Anaya today. Please call with questions.     Sherin Loyola MD, MD 7/6/2022, 2:26 PM    Please note that this chart was generated using voice recognition Dragon dictation software. Although every effort was made to ensure the accuracy of this automated transcription, some errors in transcription may have occurred.

## 2022-07-06 NOTE — PATIENT INSTRUCTIONS
@[email or web address]@        YOU ARE BEING REFERRED TO:    DEVAUGHN RODRIGUEZ (a [facility] [place] [facility])    [address] [place] [address] [address], [place]    Main Phone Number: [phone]    Phone number for scheduling sleep study: [phone]      Please contact the above numbers to schedule your sleep study. Once you have completed the FIRST NIGHT you may be asked to return for a SECOND NIGHT if necessary. After the SECOND NIGHT the sleep center will order a CPAP/BiPAP machine for you. An order for the machine will be sent to a durable medical equipment company ([employer]). The sleep center will provide you with the [employer] companies information. Once you have your machine please contact our office to schedule a follow up appointment. Your follow up will be scheduled for a date 30-90 days after you have received your machine. At that follow up visit we will need to have a compliance download from your DME company. Please contact your [employer] company to have the compliance download faxed to our office at   [phone] for your follow up appointment. IF YOU HAVE ANY QUESTIONS ABOUT YOUR SLEEP STUDY   PLEASE CONTACT THE SLEEP CENTER.

## 2022-07-06 NOTE — PROGRESS NOTES
times, had trouble completing sentences. Pt was oriented to person, place, time, and general circumstances;  recent:  good and fair and remote:  fair. Insight and judgment were estimated to be fair, AEB, a fair  understanding of cyclical maladaptive patterns, and the ability to use insight to inform behavior change. ASSESSMENT  Romana Reveal presented to the appointment today for evaluation and treatment of symptoms of depression and anxiety. She is currently deemed no risk to herself or others and meets criteria for PTSD. She was in agreement with recommendations. PHQ Scores 1/24/2022 7/25/2018 6/27/2017   PHQ2 Score 0 0 0   PHQ9 Score 0 0 0     Interpretation of Total Score Depression Severity: 1-4 = Minimal depression, 5-9 = Mild depression, 10-14 = Moderate depression, 15-19 = Moderately severe depression, 20-27 = Severe depression    How often pt has had thoughts of death or hurting self (if PHQ positive for depression):       No flowsheet data found. Interpretation of ZAIN-7 score: 5-9 = mild anxiety, 10-14 = moderate anxiety, 15+ = severe anxiety. Recommend referral to behavioral health for scores 10 or greater. DIAGNOSIS  Beto Mack was seen today for anxiety and depression. Diagnoses and all orders for this visit:    Post traumatic stress disorder          INTERVENTION  Trained in strategies for increasing balanced thinking, Trained in improving communication skills, Discussed self-care (sleep, nutrition, rewarding activities, social support, exercise), Discussed potential barriers to change and Supportive techniques      PLAN  Beto Mack will continue with healthy boundaries and self care. She will follow up with Cy Long in 2 weeks. INTERACTIVE COMPLEXITY  Is interactive complexity present?   No  Reason:  N/A  Additional Supporting Information:  N/A       Electronically signed by MARIBELL Marion on 7/6/22 at 12:24 PM EDT

## 2022-07-08 ENCOUNTER — HOSPITAL ENCOUNTER (OUTPATIENT)
Dept: SLEEP CENTER | Age: 62
Discharge: HOME | End: 2022-07-10

## 2022-07-08 DIAGNOSIS — M62.838 MUSCLE SPASM: ICD-10-CM

## 2022-07-08 DIAGNOSIS — M79.89 LEG SWELLING: ICD-10-CM

## 2022-07-08 DIAGNOSIS — G47.33 OSA (OBSTRUCTIVE SLEEP APNEA): ICD-10-CM

## 2022-07-08 RX ORDER — TIZANIDINE 4 MG/1
4 TABLET ORAL 3 TIMES DAILY PRN
Qty: 90 TABLET | Refills: 0 | Status: SHIPPED | OUTPATIENT
Start: 2022-07-08 | End: 2022-09-28

## 2022-07-08 RX ORDER — FUROSEMIDE 20 MG/1
TABLET ORAL
Qty: 90 TABLET | Refills: 1 | OUTPATIENT
Start: 2022-07-08

## 2022-07-08 NOTE — TELEPHONE ENCOUNTER
Request for Tizanidine Hcl     Last script sent: 06/02/2022  Last OV: 07/06/2022  Next Visit Date:08/19/2022  Future Appointments   Date Time Provider Mark Junior   7/8/2022  8:00 PM [facility] SLEEP RM 2 [facility] [place] [facility]   7/20/2022  9:30 AM MARIBELL Cabrera psyc [facility]   7/25/2022 10:45 AM Becky Muro DPM [facility]   8/15/2022  8:30 AM Eze Mckenzie MD [facility] None   8/19/2022  8:30 AM Amy Aleatha Goltz, APRN - CNP [place] [facility]   11/8/2022 10:45 AM Arias Baldwin MD Resp Spec [address] Maintenance   Topic Date Due    Diabetic retinal exam  07/09/2022    Flu vaccine (1) 09/01/2022    Cervical cancer screen  11/16/2022    Depression Monitoring  01/24/2023    A1C test (Diabetic or Prediabetic)  03/28/2023    Lipids  03/28/2023    Pneumococcal 0-64 years Vaccine (3 - PPSV23 or PCV20) 04/23/2023    Diabetic foot exam  05/02/2023    Breast cancer screen  09/10/2023    DTaP/Tdap/Td vaccine (2 - Td or Tdap) 08/04/2026    Colorectal Cancer Screen  02/16/2028    Shingles vaccine  Completed    COVID-19 Vaccine  Completed    Hepatitis C screen  Addressed    HIV screen  Addressed    Hepatitis A vaccine  Aged Out    Hib vaccine  Aged Out    Meningococcal (ACWY) vaccine  Aged Out       Hemoglobin A1C (%)   Date Value   03/28/2022 6.2 (H)   08/05/2021 7.2 (H)   07/19/2021 6.4 (A)             ( goal A1C is < 7)   Microalb/Crt.  Ratio (mcg/mg creat)   Date Value   04/08/2019 CANNOT BE CALCULATED     LDL Cholesterol (mg/dL)   Date Value   03/28/2022 62     LDL Calculated (mg/dL)   Date Value   04/10/2020 59       (goal LDL is <100)   AST (U/L)   Date Value   03/28/2022 16     ALT (U/L)   Date Value   03/28/2022 15     BUN (mg/dL)   Date Value   06/16/2022 9     BP Readings from Last 3 Encounters:   07/06/22 128/89   06/16/22 (!) 145/74   06/16/22 128/78          (goal 120/80)    All Future Testing planned in [facility]  Lab Frequency Next Occurrence   ECHO Complete 2D W Doppler W Color Once 07/19/2021   H. pylori antigen Once 23/90/9735   Basic Metabolic Panel Once 33/36/0402   Protein / Creatinine Ratio, Urine Once 08/11/2022   CBC Once 08/11/2022   PTH, Intact with Ionized Calcium Once 08/11/2022   XR CHEST STANDARD (2 VW) Once 06/16/2022   EKG 12 lead Once 06/16/2022   CBC Once 06/16/2022   Comprehensive Metabolic Panel Once 73/16/3619   Brain Natriuretic Peptide Once 06/16/2022   D-Dimer, Quantitative Once 06/16/2022   Troponin Once 06/16/2022   MRI BRAIN W WO CONTRAST Once 12/27/2022   Baseline Diagnostic Sleep Study Once 07/13/2022   POCT INR           Patient Active Problem List:     Essential hypertension     Hyperlipidemia     Osteoarthritis     Obesity     CKD (chronic kidney disease) stage 3, GFR 30-59 ml/min (HCC)     Proteinuria     Controlled type 2 diabetes mellitus with complication, without long-term current use of insulin (HCC)     History of DVT of lower extremity     NARCISO on CPAP     Vitamin D deficiency     Major depressive disorder, recurrent episode, severe, with psychotic behavior ([address]     Multiple lung nodules on CT     Hypomagnesemia     Anxiety     Chronic obstructive pulmonary disease (HCC)     Precordial pain     History of pulmonary embolism     Family history of abdominal aortic aneurysm     Normal coronary arteries     Long term (current) use of anticoagulants     AAA (abdominal aortic aneurysm) without rupture (HCC)     COPD exacerbation (HCC)     Acute tracheobronchitis-viral     Abnormal mammogram     Cerebrovascular accident (CVA) ([address]     Diabetic nephropathy (HCC)     Slow transit constipation     Frontal mass of brain     Intracranial mass     Cerebral cavernoma     Pseudogout     History of gout     Familial cavernous cerebral angioma (HCC)     Cavernoma     Effusion of right knee     Meniscus, medial, anterior horn derangement, right     Post traumatic stress disorder     Patellofemoral arthritis of right knee

## 2022-07-09 VITALS
WEIGHT: 159 LBS | HEART RATE: 64 BPM | HEIGHT: 62 IN | OXYGEN SATURATION: 96 % | RESPIRATION RATE: 16 BRPM | BODY MASS INDEX: 29.26 KG/M2

## 2022-07-20 LAB — STATUS: NORMAL

## 2022-07-22 ENCOUNTER — TELEPHONE (OUTPATIENT)
Dept: FAMILY MEDICINE CLINIC | Age: 62
End: 2022-07-22

## 2022-07-22 DIAGNOSIS — M15.9 PRIMARY OSTEOARTHRITIS INVOLVING MULTIPLE JOINTS: ICD-10-CM

## 2022-07-22 DIAGNOSIS — Z99.89 OSA ON CPAP: ICD-10-CM

## 2022-07-22 DIAGNOSIS — M11.20 PSEUDOGOUT: ICD-10-CM

## 2022-07-22 DIAGNOSIS — I63.10 CEREBROVASCULAR ACCIDENT (CVA) DUE TO EMBOLISM OF PRECEREBRAL ARTERY (HCC): Primary | ICD-10-CM

## 2022-07-22 DIAGNOSIS — J44.1 COPD EXACERBATION (HCC): ICD-10-CM

## 2022-07-22 DIAGNOSIS — G47.33 OSA ON CPAP: ICD-10-CM

## 2022-07-22 RX ORDER — OXYCODONE HYDROCHLORIDE AND ACETAMINOPHEN 5; 325 MG/1; MG/1
1 TABLET ORAL EVERY 8 HOURS PRN
Qty: 40 TABLET | OUTPATIENT
Start: 2022-07-22 | End: 2022-08-21

## 2022-07-22 RX ORDER — OXYCODONE HYDROCHLORIDE AND ACETAMINOPHEN 5; 325 MG/1; MG/1
1 TABLET ORAL EVERY 8 HOURS PRN
Qty: 40 TABLET | Refills: 0 | Status: SHIPPED | OUTPATIENT
Start: 2022-07-22 | End: 2022-08-19 | Stop reason: SDUPTHER

## 2022-07-22 NOTE — TELEPHONE ENCOUNTER
Last visit: 5/25/22  Last Med refill: 6/24/22  Does patient have enough medication for 72 hours: Yes    Next Visit Date:  Future Appointments   Date Time Provider Mark Junior   8/15/2022  8:30 AM Robert Hernadez MD [facility] None   8/19/2022  8:30 AM Trisha Martínez, APRN - CNP [place]  [facility]   11/8/2022 10:45 AM Betty Mccann MD Resp Spec [address] Maintenance   Topic Date Due    Diabetic retinal exam  07/09/2022    Flu vaccine (1) 09/01/2022    Cervical cancer screen  11/16/2022    Depression Monitoring  01/24/2023    A1C test (Diabetic or Prediabetic)  03/28/2023    Lipids  03/28/2023    Pneumococcal 0-64 years Vaccine (3 - PPSV23 or PCV20) 04/23/2023    Diabetic foot exam  05/02/2023    Breast cancer screen  09/10/2023    DTaP/Tdap/Td vaccine (2 - Td or Tdap) 08/04/2026    Colorectal Cancer Screen  02/16/2028    Shingles vaccine  Completed    COVID-19 Vaccine  Completed    Hepatitis C screen  Addressed    HIV screen  Addressed    Hepatitis A vaccine  Aged Out    Hib vaccine  Aged Out    Meningococcal (ACWY) vaccine  Aged Out       Hemoglobin A1C (%)   Date Value   03/28/2022 6.2 (H)   08/05/2021 7.2 (H)   07/19/2021 6.4 (A)             ( goal A1C is < 7)   Microalb/Crt.  Ratio (mcg/mg creat)   Date Value   04/08/2019 CANNOT BE CALCULATED     LDL Cholesterol (mg/dL)   Date Value   03/28/2022 62   05/07/2020 68     LDL Calculated (mg/dL)   Date Value   04/10/2020 59       (goal LDL is <100)   AST (U/L)   Date Value   03/28/2022 16     ALT (U/L)   Date Value   03/28/2022 15     BUN (mg/dL)   Date Value   06/16/2022 9     BP Readings from Last 3 Encounters:   07/06/22 128/89   06/16/22 (!) 145/74   06/16/22 128/78          (goal 120/80)    All Future Testing planned in [facility]  Lab Frequency Next Occurrence   H. pylori antigen Once 54/08/4488   Basic Metabolic Panel Once 23/18/7458   Protein / Creatinine Ratio, Urine Once 08/11/2022   CBC Once 08/11/2022   PTH, Intact with Ionized Calcium Once 08/11/2022   XR CHEST STANDARD (2 VW) Once 06/16/2022   EKG 12 lead Once 06/16/2022   CBC Once 06/16/2022   Comprehensive Metabolic Panel Once 61/30/9032   Brain Natriuretic Peptide Once 06/16/2022   D-Dimer, Quantitative Once 06/16/2022   Troponin Once 06/16/2022   MRI BRAIN W WO CONTRAST Once 12/27/2022   POCT INR                 Patient Active Problem List:     Essential hypertension     Hyperlipidemia     Osteoarthritis     Obesity     CKD (chronic kidney disease) stage 3, GFR 30-59 ml/min (HCC)     Proteinuria     Controlled type 2 diabetes mellitus with complication, without long-term current use of insulin (HCC)     History of DVT of lower extremity     NARCISO on CPAP     Vitamin D deficiency     Major depressive disorder, recurrent episode, severe, with psychotic behavior ([address]     Multiple lung nodules on CT     Hypomagnesemia     Anxiety     Chronic obstructive pulmonary disease (HCC)     Precordial pain     History of pulmonary embolism     Family history of abdominal aortic aneurysm     Normal coronary arteries     Long term (current) use of anticoagulants     AAA (abdominal aortic aneurysm) without rupture (HCC)     COPD exacerbation (HCC)     Acute tracheobronchitis-viral     Abnormal mammogram     Cerebrovascular accident (CVA) ([address]     Diabetic nephropathy (HCC)     Slow transit constipation     Frontal mass of brain     Intracranial mass     Cerebral cavernoma     Pseudogout     History of gout     Familial cavernous cerebral angioma (HCC)     Cavernoma     Effusion of right knee     Meniscus, medial, anterior horn derangement, right     Post traumatic stress disorder     Patellofemoral arthritis of right knee

## 2022-07-22 NOTE — TELEPHONE ENCOUNTER
Patient contacted the office stating her adjustable bed went out a few days ago and she is having difficulty sleeping. Wondering if provider can prescribe hospital bed? She did complete recent sleep study. Patient using [facility] on [address].

## 2022-07-25 ENCOUNTER — TELEPHONE (OUTPATIENT)
Dept: FAMILY MEDICINE CLINIC | Age: 62
End: 2022-07-25

## 2022-07-25 NOTE — TELEPHONE ENCOUNTER
Dileep Westfall from [address] [place] [address] called to state that OV notes needs to describe the bed, state that the patient requires the [facility] to be elevated more than 30 degrees most of the time due to COPD.  The OV note also needs to state that the patient requires frequent body changes to elevate pain and prevent aspiration or any other respiratory issues       The updated OV note can be faxed to [address] [place] [address]  Dileep Westfall

## 2022-07-26 ENCOUNTER — TELEPHONE (OUTPATIENT)
Dept: FAMILY MEDICINE CLINIC | Age: 62
End: 2022-07-26

## 2022-07-26 NOTE — TELEPHONE ENCOUNTER
----- Message from DALIA sent at 7/26/2022  2:24 PM EDT -----  Subject: Message to Provider    QUESTIONS  Information for Provider? Please contact Natalie Nunez back regarding to have   the notes that meet the criteria for the bed per Medicaid guidelines   ---------------------------------------------------------------------------  --------------  CALL BACK INFO  [phone]; OK to leave message on voicemail  ---------------------------------------------------------------------------  --------------  SCRIPT ANSWERS  Relationship to Patient? Third Party  Third Party Type? Other  Other Third Party Type? Natalie Nunez  Representative Name?  [facility]

## 2022-07-29 ENCOUNTER — PATIENT MESSAGE (OUTPATIENT)
Dept: FAMILY MEDICINE CLINIC | Age: 62
End: 2022-07-29

## 2022-07-29 DIAGNOSIS — F41.9 ANXIETY: ICD-10-CM

## 2022-07-29 RX ORDER — ALPRAZOLAM 0.25 MG/1
TABLET ORAL
Qty: 30 TABLET | Refills: 0 | Status: SHIPPED | OUTPATIENT
Start: 2022-07-29 | End: 2022-08-09 | Stop reason: SDUPTHER

## 2022-07-29 NOTE — TELEPHONE ENCOUNTER
Ricky Stearns from [facility] DME requesting office note to be addended stating reasoning for home hospital bed. Ricky Stearns states COPD is a qualified dx, however the note needs to state that patient needs the head of the bed to be elevated at a 30 degree angle or more most of the time to assist with breathing.   [phone]  Fax: [phone]

## 2022-07-29 NOTE — TELEPHONE ENCOUNTER
From: Earline Lynne  To:  Trisha Gamble  Sent: 7/29/2022 1:26 PM EDT  Subject: Refill    My son has passed away and I need the refill that [facility] has requested I can't find it on my med list to request please and ty

## 2022-07-29 NOTE — TELEPHONE ENCOUNTER
LOV 5/25/22  RTO 2 months; F/U scheduled  LRF 6/4/22    Health Maintenance   Topic Date Due    Diabetic retinal exam  07/09/2022    Flu vaccine (1) 09/01/2022    Cervical cancer screen  11/16/2022    Depression Monitoring  01/24/2023    A1C test (Diabetic or Prediabetic)  03/28/2023    Lipids  03/28/2023    Pneumococcal 0-64 years Vaccine (3 - PPSV23 or PCV20) 04/23/2023    Diabetic foot exam  05/02/2023    Breast cancer screen  09/10/2023    DTaP/Tdap/Td vaccine (2 - Td or Tdap) 08/04/2026    Colorectal Cancer Screen  02/16/2028    Shingles vaccine  Completed    COVID-19 Vaccine  Completed    Hepatitis C screen  Addressed    HIV screen  Addressed    Hepatitis A vaccine  Aged Out    Hib vaccine  Aged Out    Meningococcal (ACWY) vaccine  Aged Out             (applicable per patient's age: Cancer Screenings, Depression Screening, Fall Risk Screening, Immunizations)    Hemoglobin A1C (%)   Date Value   03/28/2022 6.2 (H)   08/05/2021 7.2 (H)   07/19/2021 6.4 (A)     Microalb/Crt.  Ratio (mcg/mg creat)   Date Value   04/08/2019 CANNOT BE CALCULATED     LDL Cholesterol (mg/dL)   Date Value   03/28/2022 62     LDL Calculated (mg/dL)   Date Value   04/10/2020 59     AST (U/L)   Date Value   03/28/2022 16     ALT (U/L)   Date Value   03/28/2022 15     BUN (mg/dL)   Date Value   06/16/2022 9      (goal A1C is < 7)   (goal LDL is <100) need 30-50% reduction from baseline     BP Readings from Last 3 Encounters:   07/06/22 128/89   06/16/22 (!) 145/74   06/16/22 128/78    (goal /80)      All Future Testing planned in [facility]:  Lab Frequency Next Occurrence   H. pylori antigen Once 51/27/4966   Basic Metabolic Panel Once 83/82/3746   Protein / Creatinine Ratio, Urine Once 08/11/2022   CBC Once 08/11/2022   PTH, Intact with Ionized Calcium Once 08/11/2022   XR CHEST STANDARD (2 VW) Once 06/16/2022   EKG 12 lead Once 06/16/2022   CBC Once 06/16/2022   Comprehensive Metabolic Panel Once 30/91/5407   Brain Natriuretic Peptide Once 06/16/2022   D-Dimer, Quantitative Once 06/16/2022   Troponin Once 06/16/2022   MRI BRAIN W WO CONTRAST Once 12/27/2022   POCT INR         Next Visit Date:  Future Appointments   Date Time Provider Mark Junior   8/15/2022 10:00 AM Zakia Bar MD [facility] None   8/19/2022  8:30 AM BRAULIO Hagan - CNP [place] [facility]   11/8/2022 10:45 AM More Casillas MD Resp Spec [facility]            Patient Active Problem List:     Essential hypertension     Hyperlipidemia     Osteoarthritis     Obesity     CKD (chronic kidney disease) stage 3, GFR 30-59 ml/min ([facility])     Proteinuria     Controlled type 2 diabetes mellitus with complication, without long-term current use of insulin (HCC)     History of DVT of lower extremity     NARCISO on CPAP     Vitamin D deficiency     Major depressive disorder, recurrent episode, severe, with psychotic behavior ([address]     Multiple lung nodules on CT     Hypomagnesemia     Anxiety     Chronic obstructive pulmonary disease (HCC)     Precordial pain     History of pulmonary embolism     Family history of abdominal aortic aneurysm     Normal coronary arteries     Long term (current) use of anticoagulants     AAA (abdominal aortic aneurysm) without rupture (HCC)     COPD exacerbation (HCC)     Acute tracheobronchitis-viral     Abnormal mammogram     Cerebrovascular accident (CVA) ([address]     Diabetic nephropathy (HCC)     Slow transit constipation     Frontal mass of brain     Intracranial mass     Cerebral cavernoma     Pseudogout     History of gout     Familial cavernous cerebral angioma (HCC)     Cavernoma     Effusion of right knee     Meniscus, medial, anterior horn derangement, right     Post traumatic stress disorder     Patellofemoral arthritis of right knee

## 2022-07-31 DIAGNOSIS — I10 ESSENTIAL HYPERTENSION: ICD-10-CM

## 2022-07-31 DIAGNOSIS — K21.9 GASTROESOPHAGEAL REFLUX DISEASE: ICD-10-CM

## 2022-07-31 DIAGNOSIS — N18.30 CONTROLLED TYPE 2 DIABETES MELLITUS WITH STAGE 3 CHRONIC KIDNEY DISEASE, WITHOUT LONG-TERM CURRENT USE OF INSULIN (HCC): ICD-10-CM

## 2022-07-31 DIAGNOSIS — E11.22 CONTROLLED TYPE 2 DIABETES MELLITUS WITH STAGE 3 CHRONIC KIDNEY DISEASE, WITHOUT LONG-TERM CURRENT USE OF INSULIN (HCC): ICD-10-CM

## 2022-07-31 DIAGNOSIS — E78.5 HYPERLIPIDEMIA, UNSPECIFIED HYPERLIPIDEMIA TYPE: ICD-10-CM

## 2022-08-01 ENCOUNTER — TELEPHONE (OUTPATIENT)
Dept: FAMILY MEDICINE CLINIC | Age: 62
End: 2022-08-01

## 2022-08-01 NOTE — TELEPHONE ENCOUNTER
Patient called the office requesting sooner appt with RIAZ Vasquez and Merline Parent, CNP, d/t her son unexpectantly passing away. Appt with RIAZ Vasquez 08/02/2022. OK per [address]. Would AD like to see her in the office sooner than 08/19/2022.   Patient believes she may need to restart her anx/dep meds

## 2022-08-01 NOTE — TELEPHONE ENCOUNTER
I can not addend last OV  Last time seen was in may  We can document all needed with current/next scheduled exam

## 2022-08-01 NOTE — TELEPHONE ENCOUNTER
LOV 5/25/22  RTO 2 months; F/U scheduled  [facility] 1/17/22    Health Maintenance   Topic Date Due    Diabetic retinal exam  07/09/2022    Flu vaccine (1) 09/01/2022    Cervical cancer screen  11/16/2022    Depression Monitoring  01/24/2023    A1C test (Diabetic or Prediabetic)  03/28/2023    Lipids  03/28/2023    Pneumococcal 0-64 years Vaccine (3 - PPSV23 or PCV20) 04/23/2023    Diabetic foot exam  05/02/2023    Breast cancer screen  09/10/2023    DTaP/Tdap/Td vaccine (2 - Td or Tdap) 08/04/2026    Colorectal Cancer Screen  02/16/2028    Shingles vaccine  Completed    COVID-19 Vaccine  Completed    Hepatitis C screen  Addressed    HIV screen  Addressed    Hepatitis A vaccine  Aged Out    Hib vaccine  Aged Out    Meningococcal (ACWY) vaccine  Aged Out             (applicable per patient's age: Cancer Screenings, Depression Screening, Fall Risk Screening, Immunizations)    Hemoglobin A1C (%)   Date Value   03/28/2022 6.2 (H)   08/05/2021 7.2 (H)   07/19/2021 6.4 (A)     Microalb/Crt.  Ratio (mcg/mg creat)   Date Value   04/08/2019 CANNOT BE CALCULATED     LDL Cholesterol (mg/dL)   Date Value   03/28/2022 62     LDL Calculated (mg/dL)   Date Value   04/10/2020 59     AST (U/L)   Date Value   03/28/2022 16     ALT (U/L)   Date Value   03/28/2022 15     BUN (mg/dL)   Date Value   06/16/2022 9      (goal A1C is < 7)   (goal LDL is <100) need 30-50% reduction from baseline     BP Readings from Last 3 Encounters:   07/06/22 128/89   06/16/22 (!) 145/74   06/16/22 128/78    (goal /80)      All Future Testing planned in [facility]:  Lab Frequency Next Occurrence   H. pylori antigen Once 24/03/7192   Basic Metabolic Panel Once 25/49/5277   Protein / Creatinine Ratio, Urine Once 08/11/2022   CBC Once 08/11/2022   PTH, Intact with Ionized Calcium Once 08/11/2022   XR CHEST STANDARD (2 VW) Once 06/16/2022   EKG 12 lead Once 06/16/2022   CBC Once 06/16/2022   Comprehensive Metabolic Panel Once 78/54/7161   Brain Natriuretic Peptide Once 06/16/2022   D-Dimer, Quantitative Once 06/16/2022   Troponin Once 06/16/2022   MRI BRAIN W WO CONTRAST Once 12/27/2022   POCT INR         Next Visit Date:  Future Appointments   Date Time Provider Mark Junior   8/2/2022  2:30 PM MARIBELL Cabrera psyc [facility]   8/15/2022 10:00 AM Nick Faustin MD [facility] None   8/19/2022  8:30 AM Trisha Godoy APRN - CNP [place] [facility] [address] [employer] [facility]   11/8/2022 10:45 AM Freddie Lopez MD Resp Spec [address] [employer] [facility]            Patient Active Problem List:     Essential hypertension     Hyperlipidemia     Osteoarthritis     Obesity     CKD (chronic kidney disease) stage 3, GFR 30-59 ml/min (HCC)     Proteinuria     Controlled type 2 diabetes mellitus with complication, without long-term current use of insulin (HCC)     History of DVT of lower extremity     NARCISO on CPAP     Vitamin D deficiency     Major depressive disorder, recurrent episode, severe, with psychotic behavior ([address]     Multiple lung nodules on CT     Hypomagnesemia     Anxiety     Chronic obstructive pulmonary disease (HCC)     Precordial pain     History of pulmonary embolism     Family history of abdominal aortic aneurysm     Normal coronary arteries     Long term (current) use of anticoagulants     AAA (abdominal aortic aneurysm) without rupture (HCC)     COPD exacerbation (HCC)     Acute tracheobronchitis-viral     Abnormal mammogram     Cerebrovascular accident (CVA) ([address]     Diabetic nephropathy (HCC)     Slow transit constipation     Frontal mass of brain     Intracranial mass     Cerebral cavernoma     Pseudogout     History of gout     Familial cavernous cerebral angioma (HCC)     Cavernoma     Effusion of right knee     Meniscus, medial, anterior horn derangement, right     Post traumatic stress disorder     Patellofemoral arthritis of right knee

## 2022-08-02 ENCOUNTER — HOSPITAL ENCOUNTER (OUTPATIENT)
Age: 62
Setting detail: SPECIMEN
Discharge: HOME | End: 2022-08-02

## 2022-08-02 ENCOUNTER — OFFICE VISIT (OUTPATIENT)
Dept: BEHAVIORAL/MENTAL HEALTH CLINIC | Age: 62
End: 2022-08-02
Payer: MEDICARE

## 2022-08-02 DIAGNOSIS — I95.2 HYPOTENSION DUE TO DRUGS: ICD-10-CM

## 2022-08-02 DIAGNOSIS — F43.10 POST TRAUMATIC STRESS DISORDER: Primary | ICD-10-CM

## 2022-08-02 DIAGNOSIS — N18.2 CKD (CHRONIC KIDNEY DISEASE), STAGE II: ICD-10-CM

## 2022-08-02 LAB
ANION GAP SERPL CALCULATED.3IONS-SCNC: 15 MMOL/L (ref 9–17)
BUN BLDV-MCNC: 12 MG/DL (ref 8–23)
CALCIUM IONIZED: 1.32 MMOL/L (ref 1.13–1.33)
CALCIUM SERPL-MCNC: 10.3 MG/DL (ref 8.6–10.4)
CHLORIDE BLD-SCNC: 104 MMOL/L (ref 98–107)
CO2: 23 MMOL/L (ref 20–31)
CREAT SERPL-MCNC: 0.8 MG/DL (ref 0.5–0.9)
GFR AFRICAN AMERICAN: >60 ML/MIN
GFR NON-AFRICAN AMERICAN: >60 ML/MIN
GFR SERPL CREATININE-BSD FRML MDRD: ABNORMAL ML/MIN/{1.73_M2}
GLUCOSE BLD-MCNC: 119 MG/DL (ref 70–99)
HCT VFR BLD CALC: 40.5 % (ref 36.3–47.1)
HEMOGLOBIN: 12.6 G/DL (ref 11.9–15.1)
MCH RBC QN AUTO: 31.2 PG (ref 25.2–33.5)
MCHC RBC AUTO-ENTMCNC: 31.1 G/DL (ref 28.4–34.8)
MCV RBC AUTO: 100.2 FL (ref 82.6–102.9)
NRBC AUTOMATED: 0 PER 100 WBC
PDW BLD-RTO: 14.9 % (ref 11.8–14.4)
PLATELET # BLD: 239 K/UL (ref 138–453)
PMV BLD AUTO: 9.8 FL (ref 8.1–13.5)
POTASSIUM SERPL-SCNC: 4.5 MMOL/L (ref 3.7–5.3)
PTH INTACT: 29.54 PG/ML (ref 15–65)
RBC # BLD: 4.04 M/UL (ref 3.95–5.11)
SODIUM BLD-SCNC: 142 MMOL/L (ref 135–144)
WBC # BLD: 8.2 K/UL (ref 3.5–11.3)

## 2022-08-02 PROCEDURE — 90837 PSYTX W PT 60 MINUTES: CPT | Performed by: SOCIAL WORKER

## 2022-08-02 PROCEDURE — 1036F TOBACCO NON-USER: CPT | Performed by: SOCIAL WORKER

## 2022-08-02 RX ORDER — SITAGLIPTIN 100 MG/1
TABLET, FILM COATED ORAL
Qty: 90 TABLET | Refills: 1 | Status: SHIPPED | OUTPATIENT
Start: 2022-08-02 | End: 2022-09-19 | Stop reason: ALTCHOICE

## 2022-08-02 RX ORDER — OMEPRAZOLE 20 MG/1
CAPSULE, DELAYED RELEASE ORAL
Qty: 90 CAPSULE | Refills: 1 | Status: SHIPPED | OUTPATIENT
Start: 2022-08-02 | End: 2022-09-19 | Stop reason: ALTCHOICE

## 2022-08-02 RX ORDER — ISOSORBIDE MONONITRATE 30 MG/1
TABLET, EXTENDED RELEASE ORAL
Qty: 90 TABLET | Refills: 1 | Status: SHIPPED | OUTPATIENT
Start: 2022-08-02 | End: 2023-08-01

## 2022-08-02 RX ORDER — ATORVASTATIN CALCIUM 40 MG/1
TABLET, FILM COATED ORAL
Qty: 90 TABLET | Refills: 1 | Status: SHIPPED | OUTPATIENT
Start: 2022-08-02 | End: 2023-08-01

## 2022-08-02 NOTE — TELEPHONE ENCOUNTER
We can see her next Friday at noon - 60 minutes  I also want her to call her psychiatrist regarding medications. That is who has always prescribed them . She still is on Risperdal and Wellbutrin? Correct?

## 2022-08-02 NOTE — TELEPHONE ENCOUNTER
Lvm for patient to contact the office to schedule. Keep 8/19/22 appt for medicare hospital bed documentation/follow up.

## 2022-08-02 NOTE — PROGRESS NOTES
ADULT BEHAVIORAL HEALTH FOLLOW UP  MARIBELL Osorio  Licensed Independent        Visit Date: 2022   Time of appointment:  796-315G   Time spent with Patient: 55 minutes. This is patient's fifth appointment. Reason for Consult:  Anxiety and Stress     Referring Provider/PCP:    No ref. provider found  BRAULIO Burnett - CNP      Pt provided informed consent for the behavioral health program. Discussed with patient model of service to include the limits of confidentiality (i.e. abuse reporting, suicide intervention, etc.) and short-term intervention focused approach. Pt indicated understanding. Angelito Cruz is a 57 y.o. female who presents for follow up of anxiety and stress. Corinne Nissen presents as very anxious and tearful, reports her son  unexpectedly last week. We processed the events that occurred and her struggle with this emotionally, she had to take many breaks, with excessively increased and rapid breathing. Guided her thru calming breathing exercises and allowed emotion release and crying, as she states she is \"not allowed\" to do this at home. She reports her daughters have not allowed her to be alone, are telling her to stop taking her mental health medication, and threatening to have her hospitalized because she is upset. We processed her options with this, including reality based information about how someone is hospitalized (she denies any suicidal/homicidal thought/plan/intent) and a letter provided to explain her mental health and needs for her to utilize as she chooses. Also assisted her to remove daughters from release of information per her request. She states she met with psychiatry yesterday and is now back on her medication, is trying to find alternate housing to remove herself from the difficult setting. She was encouraged to allow herself to mourn and to not punish herself for past choices she made in response to a terrible trauma.     Previous Recommendations: Abigail Gilbert will continue with healthy boundaries and self care. She will follow up with Mirlande Ovalle in 2 weeks. MENTAL STATUS EXAM  Mood was anxious with anxious and tearful affect. Suicidal ideation was denied. Homicidal ideation was denied. Hygiene was fair . Dress was appropriate. Behavior was Within Normal Limits with No observation or self-report of difficulties ambulating. Attitude was Cooperative. Eye-contact was good. Speech: rate - WNL, rhythm - WNL, volume - WNL. Verbalizations were coherent. Thought processes were intact and goal-oriented without evidence of delusions, hallucinations, obsessions, or christel; with moderate cognitive distortions. Associations were characterized by intact cognitive processes. Pt was oriented to person, place, time, and general circumstances;  recent:  good and remote:  good. Insight and judgment were estimated to be fair, AEB, a fair  understanding of cyclical maladaptive patterns, and the ability to use insight to inform behavior change. ASSESSMENT  Earline Kaufmans presented to the appointment today for evaluation and treatment of symptoms of anxiety. She is currently deemed no risk to herself or others and meets criteria for PTSD. Abigail Gilbert was in agreement with recommendations. PHQ Scores 1/24/2022 7/25/2018 6/27/2017   PHQ2 Score 0 0 0   PHQ9 Score 0 0 0     Interpretation of Total Score Depression Severity: 1-4 = Minimal depression, 5-9 = Mild depression, 10-14 = Moderate depression, 15-19 = Moderately severe depression, 20-27 = Severe depression    How often pt has had thoughts of death or hurting self (if PHQ positive for depression):       No flowsheet data found. Interpretation of ZAIN-7 score: 5-9 = mild anxiety, 10-14 = moderate anxiety, 15+ = severe anxiety. Recommend referral to behavioral health for scores 10 or greater. DIAGNOSIS  Abigail Gilbert was seen today for anxiety and stress.     Diagnoses and all orders for this visit:    Post traumatic stress disorder        INTERVENTION  Trained in strategies for increasing balanced thinking, Trained in improving communication skills, Discussed self-care (sleep, nutrition, rewarding activities, social support, exercise), Discussed potential barriers to change, and Supportive techniques      Gely Cordial will try to increase communication of her needs. She will follow up with Annie Mackay in 1 week. INTERACTIVE COMPLEXITY  Is interactive complexity present?   No  Reason:  N/A  Additional Supporting Information:  N/A       Electronically signed by MARIBELL Greene on 8/2/22 at 2:23 PM EDT

## 2022-08-03 ENCOUNTER — TELEPHONE (OUTPATIENT)
Dept: FAMILY MEDICINE CLINIC | Age: 62
End: 2022-08-03

## 2022-08-03 ENCOUNTER — TELEPHONE (OUTPATIENT)
Dept: BEHAVIORAL/MENTAL HEALTH CLINIC | Age: 62
End: 2022-08-03

## 2022-08-03 LAB
CREATININE URINE: 23.9 MG/DL (ref 28–217)
TOTAL PROTEIN, URINE: <4 MG/DL
URINE TOTAL PROTEIN CREATININE RATIO: ABNORMAL (ref 0–0.2)

## 2022-08-03 NOTE — TELEPHONE ENCOUNTER
Writer called pt due to daughter coming to office earlier with concerns. Yesica denies any suicidal or homicidal thought, plan, or intent. She states she is trying to work thru the grief of the loss of her son and express this as best she can. She reports her daughters have been yelling at her a lot due to this, wanting to talk about her past trauma, very angry she removed them from the release form. She states she has no safety concerns at this time, her sister in law is with her helping her thru this. She was encouraged to walk away when able and utilize crisis numbers if needed, she is agreeable with this.

## 2022-08-03 NOTE — TELEPHONE ENCOUNTER
Daughter Antonino Templeton (tearful) stopped into the office requesting to speak with A Galo Jones regarding concerns of her mother. AMB first spoke with RIAZ Vasquez (counselor). Salvador Mata provided Crisis telephones numbers to give to daughter. AMB then spoke with LASHAUN Jones to make her aware of the situation. Patient signed an updated release 08/02/2022 authorizing NO family member is to be given information regarding her medical situation. AMB relayed the new BRITTNEY information to daughter. Attempted to give Crisis information. Daughter threw her hands up in the air and walked out of the building.

## 2022-08-03 NOTE — TELEPHONE ENCOUNTER
[facility] called to get updated information if the last OV could addend, Writer gave facility information provided by PCP \"last OV can not addend last time seen was in May, patient is scheduled in August with PCP. Facility stated a new order will be ordered at the office visit. Order has to be order with the current office note. Order can't be wrote before office note date.

## 2022-08-04 ENCOUNTER — TELEPHONE (OUTPATIENT)
Dept: FAMILY MEDICINE CLINIC | Age: 62
End: 2022-08-04

## 2022-08-04 NOTE — TELEPHONE ENCOUNTER
Patient called to inform the [facility] office that she was fine. She has no thoughts of harming herself. She is currently staying with her daughter Sherrie. She knew we would be concerned about her. She will be attending her 08/08/2022 appt with RIAZ Vasquez as well as her two Aug 2022 appointments with Serafin Dunne.

## 2022-08-05 DIAGNOSIS — E11.22 CONTROLLED TYPE 2 DIABETES MELLITUS WITH STAGE 3 CHRONIC KIDNEY DISEASE, WITHOUT LONG-TERM CURRENT USE OF INSULIN (HCC): ICD-10-CM

## 2022-08-05 DIAGNOSIS — N18.30 CONTROLLED TYPE 2 DIABETES MELLITUS WITH STAGE 3 CHRONIC KIDNEY DISEASE, WITHOUT LONG-TERM CURRENT USE OF INSULIN (HCC): ICD-10-CM

## 2022-08-05 NOTE — TELEPHONE ENCOUNTER
Appointments   Date Time Provider Mark Junior   8/8/2022  3:30 PM MARIBELL Cabrera psyc [facility]   8/10/2022 10:45 AM Zohaib Paris DPM [facility]   8/12/2022 12:00 PM BRAULIO Salomon - CNP [facility]   8/15/2022 10:00 AM Willian Edwards MD [facility] None   8/19/2022  8:30 AM Trisha Copeland APRN - CNP [facility]   11/8/2022 10:45 AM Shary Mcburney, MD Resp Spec [facility]            Patient Active Problem List:     Essential hypertension     Hyperlipidemia     Osteoarthritis     Obesity     CKD (chronic kidney disease) stage 3, GFR 30-59 ml/min (HCC)     Proteinuria     Controlled type 2 diabetes mellitus with complication, without long-term current use of insulin (HCC)     History of DVT of lower extremity     NARCISO on CPAP     Vitamin D deficiency     Major depressive disorder, recurrent episode, severe, with psychotic behavior ([address]     Multiple lung nodules on CT     Hypomagnesemia     Anxiety     Chronic obstructive pulmonary disease (HCC)     Precordial pain     History of pulmonary embolism     Family history of abdominal aortic aneurysm     Normal coronary arteries     Long term (current) use of anticoagulants     AAA (abdominal aortic aneurysm) without rupture (HCC)     COPD exacerbation (HCC)     Acute tracheobronchitis-viral     Abnormal mammogram     Cerebrovascular accident (CVA) ([address]     Diabetic nephropathy (HCC)     Slow transit constipation     Frontal mass of brain     Intracranial mass     Cerebral cavernoma     Pseudogout     History of gout     Familial cavernous cerebral angioma (HCC)     Cavernoma     Effusion of right knee     Meniscus, medial, anterior horn derangement, right     Post traumatic stress disorder     Patellofemoral arthritis of right knee

## 2022-08-07 ENCOUNTER — PATIENT MESSAGE (OUTPATIENT)
Dept: FAMILY MEDICINE CLINIC | Age: 62
End: 2022-08-07

## 2022-08-07 DIAGNOSIS — F41.9 ANXIETY: ICD-10-CM

## 2022-08-07 RX ORDER — ORAL SEMAGLUTIDE 3 MG/1
TABLET ORAL
Qty: 90 TABLET | Refills: 1 | Status: SHIPPED | OUTPATIENT
Start: 2022-08-07 | End: 2022-08-19 | Stop reason: DRUGHIGH

## 2022-08-08 ENCOUNTER — OFFICE VISIT (OUTPATIENT)
Dept: BEHAVIORAL/MENTAL HEALTH CLINIC | Age: 62
End: 2022-08-08
Payer: MEDICARE

## 2022-08-08 DIAGNOSIS — F43.10 POST TRAUMATIC STRESS DISORDER: Primary | ICD-10-CM

## 2022-08-08 PROCEDURE — 1036F TOBACCO NON-USER: CPT | Performed by: SOCIAL WORKER

## 2022-08-08 PROCEDURE — 90834 PSYTX W PT 45 MINUTES: CPT | Performed by: SOCIAL WORKER
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
## 2022-08-09 RX ORDER — ALPRAZOLAM 0.25 MG/1
0.25 TABLET ORAL 3 TIMES DAILY PRN
Qty: 21 TABLET | Refills: 0 | Status: SHIPPED | OUTPATIENT
Start: 2022-08-09 | End: 2022-08-16

## 2022-08-10 ENCOUNTER — TELEPHONE (OUTPATIENT)
Dept: FAMILY MEDICINE CLINIC | Age: 62
End: 2022-08-10

## 2022-08-10 NOTE — TELEPHONE ENCOUNTER
She asked me if she could take more often. Throughout the day.    Previously took HS - has been taking about 3 times a day - recent loss of son suddenly   I said yes to three times a day - updated prescription   Was filled 7/29 as original (30 tabs HS)  With her taking more often (that averages 3/day) I was okay refilling given circumstance

## 2022-08-12 ENCOUNTER — OFFICE VISIT (OUTPATIENT)
Dept: FAMILY MEDICINE CLINIC | Age: 62
End: 2022-08-12
Payer: MEDICARE

## 2022-08-12 VITALS — WEIGHT: 151 LBS | BODY MASS INDEX: 27.62 KG/M2

## 2022-08-12 DIAGNOSIS — F43.10 POST TRAUMATIC STRESS DISORDER: ICD-10-CM

## 2022-08-12 DIAGNOSIS — F33.3 MAJOR DEPRESSIVE DISORDER, RECURRENT EPISODE, SEVERE, WITH PSYCHOTIC BEHAVIOR (HCC): Primary | Chronic | ICD-10-CM

## 2022-08-12 DIAGNOSIS — F41.9 ANXIETY: ICD-10-CM

## 2022-08-12 PROCEDURE — G8427 DOCREV CUR MEDS BY ELIG CLIN: HCPCS | Performed by: NURSE PRACTITIONER

## 2022-08-12 PROCEDURE — 1036F TOBACCO NON-USER: CPT | Performed by: NURSE PRACTITIONER

## 2022-08-12 PROCEDURE — G8417 CALC BMI ABV UP PARAM F/U: HCPCS | Performed by: NURSE PRACTITIONER

## 2022-08-12 PROCEDURE — 3017F COLORECTAL CA SCREEN DOC REV: CPT | Performed by: NURSE PRACTITIONER

## 2022-08-12 PROCEDURE — 99214 OFFICE O/P EST MOD 30 MIN: CPT | Performed by: NURSE PRACTITIONER

## 2022-08-12 RX ORDER — TIOTROPIUM BROMIDE AND OLODATEROL 3.124; 2.736 UG/1; UG/1
SPRAY, METERED RESPIRATORY (INHALATION)
COMMUNITY
Start: 2022-07-14 | End: 2022-08-19 | Stop reason: SDUPTHER

## 2022-08-12 RX ORDER — TIOTROPIUM BROMIDE AND OLODATEROL 3.124; 2.736 UG/1; UG/1
SPRAY, METERED RESPIRATORY (INHALATION)
Status: CANCELLED | OUTPATIENT
Start: 2022-08-12

## 2022-08-12 SDOH — ECONOMIC STABILITY: FOOD INSECURITY: WITHIN THE PAST 12 MONTHS, THE FOOD YOU BOUGHT JUST DIDN'T LAST AND YOU DIDN'T HAVE MONEY TO GET MORE.: NEVER TRUE

## 2022-08-12 SDOH — ECONOMIC STABILITY: FOOD INSECURITY: WITHIN THE PAST 12 MONTHS, YOU WORRIED THAT YOUR FOOD WOULD RUN OUT BEFORE YOU GOT MONEY TO BUY MORE.: NEVER TRUE

## 2022-08-12 ASSESSMENT — SOCIAL DETERMINANTS OF HEALTH (SDOH): HOW HARD IS IT FOR YOU TO PAY FOR THE VERY BASICS LIKE FOOD, HOUSING, MEDICAL CARE, AND HEATING?: PATIENT DECLINED

## 2022-08-12 NOTE — PROGRESS NOTES
[address]  [phone]    22     Patient ID  Nicole Odom is a 57 y.o. female  Established patient    Chief Complaint  Nicole Oodm presents today for Anxiety and Depression    Have you seen any other physician or provider since your last visit? Yes - Records Obtained Psych  Have you had any other diagnostic tests since your last visit? No  Have you been seen in the emergency room and/or had an admission to a hospital since we last saw you? No     ASSESSMENT/PLAN  1. Major depressive disorder, recurrent episode, severe, with psychotic behavior [address] [address]  2. Anxiety  3. Post traumatic stress disorder     No changes in pharmaology. Psych medications resumed per psychiatry - she weaned off previously      Return in about 2 weeks (around 2022) for Depression, Anxiety. On this date 2022 I have spent 31+ minutes reviewing previous notes, test results and face to face with the patient discussing the diagnosis and importance of compliance with the treatment plan as well as documenting on the day of the visit. Patient Care Team:  [address], APRN - CNP as PCP - General (Nurse Practitioner Family)  [address], APRN - CNP as PCP - [facility]ATION [facility]  Ely Almonte MD as Consulting Physician (Nephrology)  Alfreda Lai MD as Consulting Physician (Pulmonology)  Jesus Akhtar MD as Consulting Physician (Cardiology)  Sandy Ba DPM as Consulting Physician ([facility] Pulling)  Florentino Flores MD (Psychiatry)  Jefm Apgar, MD as Consulting Physician (Neurology)  Bishnu [address], DO as Surgeon (Neurosurgery)    SUBJECTIVE/OBJECTIVE  History of Present illness / Visit Summary   Patient presents today for follow up   Reviewed health maintenance and any recent labs/imaging      Saw pulmonology, no longer needs CPAP  Not longer requires hospital bed, purchased twin that is adjustable.        Review of Systems  Review of Systems normal.      Breath sounds: Normal breath sounds and air entry. Skin:     General: Skin is warm and dry. Coloration: Skin is not pale. Neurological:      Mental Status: She is alert and oriented to person, place, and time. Motor: Motor function is intact. Gait: Gait is intact. Psychiatric:         Attention and Perception: Attention and perception normal.         Mood and Affect: Mood is anxious and depressed. Affect is tearful. Affect is not flat. Speech: Speech normal.         Behavior: Behavior is hyperactive. Behavior is cooperative. Thought Content: Thought content normal. Thought content does not include suicidal ideation. Thought content does not include suicidal plan. Cognition and Memory: Cognition and memory normal.         Judgment: Judgment normal.         Electronically signed by BRAULIO Snow CNP, APRN-CNP on 8/23/2022 at 7:52 AM    Please note that this chart was generated using voice recognition Dragon dictation software. Although every effort was made to ensure the accuracy of this automated transcription, some errors in transcription may have occurred.

## 2022-08-15 ENCOUNTER — TELEPHONE (OUTPATIENT)
Dept: FAMILY MEDICINE CLINIC | Age: 62
End: 2022-08-15

## 2022-08-15 NOTE — TELEPHONE ENCOUNTER
Patient requesting a referral to a Grief counselor, individual sessions, not group therapy.  Patient needs locations near the [facility], she is unable to drive to [place].  She is ok with [place].

## 2022-08-16 ENCOUNTER — OFFICE VISIT (OUTPATIENT)
Dept: BEHAVIORAL/MENTAL HEALTH CLINIC | Age: 62
End: 2022-08-16
Payer: MEDICARE

## 2022-08-16 DIAGNOSIS — F43.10 POST TRAUMATIC STRESS DISORDER: Primary | ICD-10-CM

## 2022-08-16 PROCEDURE — 90834 PSYTX W PT 45 MINUTES: CPT | Performed by: SOCIAL WORKER

## 2022-08-16 PROCEDURE — 1036F TOBACCO NON-USER: CPT | Performed by: SOCIAL WORKER

## 2022-08-16 NOTE — TELEPHONE ENCOUNTER
Patient has no preference except location. PCP questioning guidance on grief counselor in area for patient? Can she continue therapy with you and grief counselor?

## 2022-08-16 NOTE — PROGRESS NOTES
ADULT BEHAVIORAL HEALTH FOLLOW UP  KIRSTEN Cannon-S  Licensed Independent        Visit Date: 8/16/2022   Time of appointment:  [id number]   Time spent with Patient: 44 minutes. This is patient's seventh appointment. Reason for Consult:  Stress     Referring Provider/PCP:    No ref. provider found  BRAULIO Espinoza - CNP      Pt provided informed consent for the behavioral health program. Discussed with patient model of service to include the limits of confidentiality (i.e. abuse reporting, suicide intervention, etc.) and short-term intervention focused approach. Pt indicated understanding. Erasmo Josue is a 57 y.o. female who presents for follow up of stress. She reports feeling a bit better, got good sleep last night. She presented letter to writer, review of events of the past week and her continued struggle with grief. We reviewed grieving process, need to feel emotions and receive support, provided list of grief groups to contact for assistance outside of therapy (she is agreeable to try if she can sit quietly and listen). We processed continued difficulty with her daughters, and the dialectic of knowing they care about her and are also doing harm by not allowing her to grieve. We processed incidents in their childhood in which they witnessed or heard about her suicide attempts, making them now very sensitive to her emotions, for fear this will occur again. Helped her connect the past incidents and present difficulties while also understanding they are not the same and she is allowed to express her emotions (she denies suicidal thought plan or intent currently). Previous Recommendations: Kavin Roland will attempt to find space and time for herself to grieve. She will follow up with Jeny Johnson in 1 week. MENTAL STATUS EXAM  Mood was sad with sad  affect. Suicidal ideation was denied. Homicidal ideation was denied. Hygiene was fair . Dress was appropriate.    Behavior was Within Normal Limits with No observation or self-report of difficulties ambulating. Attitude was Cooperative. Eye-contact was good. Speech: rate - WNL, rhythm - WNL, volume - WNL. Verbalizations were coherent. Thought processes were intact and goal-oriented without evidence of delusions, hallucinations, obsessions, or christel; with moderate cognitive distortions. Associations were characterized by intact cognitive processes. Pt was oriented to person, place, time, and general circumstances;  recent:  good and remote:  good. Insight and judgment were estimated to be fair, AEB, a fair  understanding of cyclical maladaptive patterns, and the ability to use insight to inform behavior change. ASSESSMENT  Juan Bello presented to the appointment today for evaluation and treatment of symptoms of stress. She is currently deemed no risk to herself or others and meets criteria for PTSD. Frantz Judge was in agreement with recommendations. PHQ Scores 1/24/2022 7/25/2018 6/27/2017   PHQ2 Score 0 0 0   PHQ9 Score 0 0 0     Interpretation of Total Score Depression Severity: 1-4 = Minimal depression, 5-9 = Mild depression, 10-14 = Moderate depression, 15-19 = Moderately severe depression, 20-27 = Severe depression    How often pt has had thoughts of death or hurting self (if PHQ positive for depression):       No flowsheet data found. Interpretation of ZAIN-7 score: 5-9 = mild anxiety, 10-14 = moderate anxiety, 15+ = severe anxiety. Recommend referral to behavioral health for scores 10 or greater. DIAGNOSIS  Frantz Judge was seen today for stress.     Diagnoses and all orders for this visit:    Post traumatic stress disorder        INTERVENTION  Trained in strategies for increasing balanced thinking, Discussed self-care (sleep, nutrition, rewarding activities, social support, exercise), Discussed potential barriers to change, Supportive techniques, and Provided Psychoeducation re: grief support groups and grieving process      PLAN  Babatunde Luke will contact grief support groups for added assistance. She will follow up with Melina Garza in 2 weeks. INTERACTIVE COMPLEXITY  Is interactive complexity present?   No  Reason:  N/A  Additional Supporting Information:  N/A       Electronically signed by MARIBELL Partida on 8/16/22 at 11:34 AM EDT

## 2022-08-19 ENCOUNTER — OFFICE VISIT (OUTPATIENT)
Dept: FAMILY MEDICINE CLINIC | Age: 62
End: 2022-08-19
Payer: MEDICARE

## 2022-08-19 VITALS
TEMPERATURE: 97.4 F | OXYGEN SATURATION: 98 % | BODY MASS INDEX: 27.42 KG/M2 | HEIGHT: 62 IN | DIASTOLIC BLOOD PRESSURE: 80 MMHG | WEIGHT: 149 LBS | HEART RATE: 72 BPM | SYSTOLIC BLOOD PRESSURE: 122 MMHG | RESPIRATION RATE: 18 BRPM

## 2022-08-19 DIAGNOSIS — N18.30 STAGE 3 CHRONIC KIDNEY DISEASE, UNSPECIFIED WHETHER STAGE 3A OR 3B CKD (HCC): Chronic | ICD-10-CM

## 2022-08-19 DIAGNOSIS — E11.22 CONTROLLED TYPE 2 DIABETES MELLITUS WITH STAGE 3 CHRONIC KIDNEY DISEASE, WITHOUT LONG-TERM CURRENT USE OF INSULIN (HCC): ICD-10-CM

## 2022-08-19 DIAGNOSIS — I10 ESSENTIAL HYPERTENSION: ICD-10-CM

## 2022-08-19 DIAGNOSIS — F33.3 MAJOR DEPRESSIVE DISORDER, RECURRENT EPISODE, SEVERE, WITH PSYCHOTIC BEHAVIOR (HCC): Primary | Chronic | ICD-10-CM

## 2022-08-19 DIAGNOSIS — Z79.01 LONG TERM (CURRENT) USE OF ANTICOAGULANTS: ICD-10-CM

## 2022-08-19 DIAGNOSIS — F41.9 ANXIETY: ICD-10-CM

## 2022-08-19 DIAGNOSIS — E56.9 HYPOVITAMINOSIS: ICD-10-CM

## 2022-08-19 DIAGNOSIS — J41.1 MUCOPURULENT CHRONIC BRONCHITIS (HCC): ICD-10-CM

## 2022-08-19 DIAGNOSIS — M15.9 PRIMARY OSTEOARTHRITIS INVOLVING MULTIPLE JOINTS: ICD-10-CM

## 2022-08-19 DIAGNOSIS — N18.30 CONTROLLED TYPE 2 DIABETES MELLITUS WITH STAGE 3 CHRONIC KIDNEY DISEASE, WITHOUT LONG-TERM CURRENT USE OF INSULIN (HCC): ICD-10-CM

## 2022-08-19 DIAGNOSIS — Z86.718 HISTORY OF DVT OF LOWER EXTREMITY: ICD-10-CM

## 2022-08-19 PROCEDURE — 3044F HG A1C LEVEL LT 7.0%: CPT | Performed by: NURSE PRACTITIONER

## 2022-08-19 PROCEDURE — 1036F TOBACCO NON-USER: CPT | Performed by: NURSE PRACTITIONER

## 2022-08-19 PROCEDURE — 99215 OFFICE O/P EST HI 40 MIN: CPT | Performed by: NURSE PRACTITIONER

## 2022-08-19 PROCEDURE — G8427 DOCREV CUR MEDS BY ELIG CLIN: HCPCS | Performed by: NURSE PRACTITIONER

## 2022-08-19 PROCEDURE — 2022F DILAT RTA XM EVC RTNOPTHY: CPT | Performed by: NURSE PRACTITIONER

## 2022-08-19 PROCEDURE — 3023F SPIROM DOC REV: CPT | Performed by: NURSE PRACTITIONER

## 2022-08-19 PROCEDURE — 3017F COLORECTAL CA SCREEN DOC REV: CPT | Performed by: NURSE PRACTITIONER

## 2022-08-19 PROCEDURE — G8417 CALC BMI ABV UP PARAM F/U: HCPCS | Performed by: NURSE PRACTITIONER

## 2022-08-19 RX ORDER — ALBUTEROL SULFATE 90 UG/1
2 AEROSOL, METERED RESPIRATORY (INHALATION) EVERY 4 HOURS PRN
Qty: 18 G | Refills: 1 | Status: SHIPPED | OUTPATIENT
Start: 2022-08-19 | End: 2022-10-17

## 2022-08-19 RX ORDER — OXYCODONE HYDROCHLORIDE AND ACETAMINOPHEN 5; 325 MG/1; MG/1
1 TABLET ORAL EVERY 8 HOURS PRN
Qty: 40 TABLET | Refills: 0 | Status: SHIPPED | OUTPATIENT
Start: 2022-08-19 | End: 2022-09-19 | Stop reason: SDUPTHER

## 2022-08-19 RX ORDER — METFORMIN HYDROCHLORIDE 500 MG/1
500 TABLET, EXTENDED RELEASE ORAL
Qty: 360 TABLET | Refills: 1 | Status: SHIPPED
Start: 2022-08-19 | End: 2022-09-19 | Stop reason: ALTCHOICE

## 2022-08-19 RX ORDER — LISINOPRIL 10 MG/1
10 TABLET ORAL DAILY
Qty: 90 TABLET | Refills: 3 | Status: SHIPPED | OUTPATIENT
Start: 2022-08-19 | End: 2022-10-17 | Stop reason: SDUPTHER

## 2022-08-19 RX ORDER — M-VIT,TX,IRON,MINS/CALC/FOLIC 27MG-0.4MG
1 TABLET ORAL DAILY
Qty: 30 TABLET | Refills: 11 | Status: SHIPPED | OUTPATIENT
Start: 2022-08-19 | End: 2022-09-19 | Stop reason: SDUPTHER

## 2022-08-19 RX ORDER — BUPROPION HYDROCHLORIDE 300 MG/1
300 TABLET ORAL EVERY MORNING
Qty: 30 TABLET | Refills: 0 | Status: SHIPPED
Start: 2022-08-19 | End: 2022-09-19 | Stop reason: DRUGHIGH

## 2022-08-19 NOTE — Clinical Note
[address] [address]  Phone: [phone]  Fax: [phone]    BRAULIO Hahn CNP        August 19, 2022     Patient: Carlita Olivo   YOB: 1960   Date of Visit: 8/19/2022       To Whom It May Concern: It is my medical opinion that Alicja Prieto {Work release (duty restriction):87716}. If you have any questions or concerns, please don't hesitate to call.     Sincerely,        BRAULIO Hahn CNP

## 2022-08-19 NOTE — PROGRESS NOTES
[address]  [phone]    8/19/22     Patient ID  Shanice Joel is a 57 y.o. female  Established patient    Chief Complaint  Shanice Joel presents today for Anxiety, Depression, and Other [facility] HOSPITAL bed requirements)    Have you seen any other physician or provider since your last visit? Yes - Records Obtained  Have you had any other diagnostic tests since your last visit? No  Have you been seen in the emergency room and/or had an admission to a hospital since we last saw you? No     ASSESSMENT/PLAN  1. Major depressive disorder, recurrent episode, severe, with psychotic behavior ([facility]  Assessment & Plan:   Monitored by specialist- no acute findings meriting change in the plan  2. Anxiety  3. Controlled type 2 diabetes mellitus with stage 3 chronic kidney disease, without long-term current use of insulin (HCC)  -     metFORMIN (GLUCOPHAGE-XR) 500 MG extended release tablet; Take 1 tablet by mouth daily (with breakfast), Disp-360 tablet, R-1Adjust Sig  -     Semaglutide 7 MG TABS; Take 7 mg by mouth daily, Disp-365 tablet, R-1Normal  4. Stage 3 chronic kidney disease, unspecified whether stage 3a or 3b CKD ([facility]  5. Mucopurulent chronic bronchitis ([facility]  -     tiotropium-olodaterol (STIOLTO RESPIMAT) 2.5-2.5 MCG/ACT AERS; Inhale 2 puffs into the lungs daily, Disp-1 each, R-5Normal  -     albuterol sulfate HFA (PROVENTIL;VENTOLIN;PROAIR) 108 (90 Base) MCG/ACT inhaler; Inhale 2 puffs into the lungs every 4 hours as needed for Wheezing or Shortness of Breath (AND/OR COUGH), Disp-18 g, R-1Normal  6. History of DVT of lower extremity  -     apixaban (ELIQUIS) 5 MG TABS tablet; Take 1 tablet by mouth 2 times daily, Disp-60 tablet, R-11Normal  7. Long term (current) use of anticoagulants  -     apixaban (ELIQUIS) 5 MG TABS tablet; Take 1 tablet by mouth 2 times daily, Disp-60 tablet, R-11Normal  8.  Essential hypertension  -     lisinopril (PRINIVIL;ZESTRIL) 10 MG tablet; Take 1 tablet by mouth daily, Disp-90 tablet, R-3Normal  9. Primary osteoarthritis involving multiple joints  -     oxyCODONE-acetaminophen (PERCOCET) 5-325 MG per tablet; Take 1 tablet by mouth every 8 hours as needed for Pain for up to 30 days. , Disp-40 tablet, R-0Normal  10. Hypovitaminosis  -     Magnesium; Future  -     Vitamin D 25 Hydroxy; Future  -     Vitamin B12 & Folate; Future  -     Zinc; Future     Decrease Metformin to daily  Discontinue Farxiga (recommended per nephrology)  Increase Rybelsus to 7 mg daily  Continue to follow with psychiatry and [facility] [place] discussed grief counseling   Other medications refilled. Return in about 4 weeks (around 9/16/2022) for Anxiety, Depression. On this date 8/19/2022 I have spent 55+ minutes reviewing previous notes, test results and face to face with the patient discussing the diagnosis and importance of compliance with the treatment plan as well as documenting on the day of the visit. Patient Care Team:  BRAULIO Schwartz CNP as PCP - General (Nurse Practitioner Family)  BRAULIO Schwartz CNP as PCP - [facility]  Yoon Mg MD as Consulting Physician (Nephrology)  Pooja Kidd MD as Consulting Physician (Pulmonology)  Dana Matt MD as Consulting Physician (Cardiology)  Hermelinda Mireles DPM as Consulting Physician ([facility])  Cristiana Grier MD (Psychiatry)  Roni Long MD as Consulting Physician (Neurology)  Miguelina Moore DO as Surgeon (Neurosurgery)    SUBJECTIVE/OBJECTIVE  History of Present illness / Visit Summary   Patient presents today for follow up   Reviewed health maintenance and any recent labs/imaging        Review of Systems  Review of Systems   Constitutional:  Positive for fatigue. Negative for activity change and appetite change.    HENT:          No teeth - not current at dentist    Eyes:         Glasses - current eye exam    Respiratory:  Negative for cough, chest tightness and shortness of breath. Follows with pulmonology   Sleep study no longer indicated need for CPAP   Pulmonology changed Stiolito to Spiriva. Patient feels as though her breathing is better with Stiolito? Resent and will correlate with pulmonologist    Cardiovascular:  Negative for chest pain, palpitations and leg swelling. Follows with cardiology    Gastrointestinal:  Positive for constipation (miving every 3 days, still more loose?). Negative for abdominal distention, abdominal pain, blood in stool, diarrhea and nausea. Endocrine:        Checking glucose daily. Averages /stable. No hypoglycemia   Follows with podiatry    Genitourinary:  Negative for difficulty urinating and dysuria. Follows with nephrology   Musculoskeletal:  Positive for back pain (ongoing left sided pain? rationale?). Negative for arthralgias and joint swelling. Neurological:  Negative for dizziness, light-headedness and headaches. Psychiatric/Behavioral:  Positive for agitation, decreased concentration, dysphoric mood and sleep disturbance. Negative for behavioral problems, self-injury and suicidal ideas. The patient is nervous/anxious. Recent loss of son. Follows with psychiatry for prescriptions. Also following with [facility] for therapy, discussing options for grief counseling      Physical exam   Physical Exam  Vitals and nursing note reviewed. Constitutional:       General: She is not in acute distress. Appearance: Normal appearance. She is well-developed. She is not ill-appearing or toxic-appearing. Eyes:      Comments: Glasses    Cardiovascular:      Rate and Rhythm: Normal rate and regular rhythm. Heart sounds: Normal heart sounds, S1 normal and S2 normal.   Pulmonary:      Effort: Pulmonary effort is normal.      Breath sounds: Normal breath sounds and air entry. Skin:     General: Skin is warm and dry. Coloration: Skin is not pale.    Neurological:      Mental Status: She is alert and oriented to person, place, and time. Motor: Motor function is intact. Gait: Gait is intact. Psychiatric:         Attention and Perception: Attention and perception normal.         Mood and Affect: Mood is anxious and depressed. Affect is tearful. Affect is not flat. Speech: Speech normal.         Behavior: Behavior normal. Behavior is cooperative. Thought Content: Thought content normal. Thought content does not include suicidal ideation. Thought content does not include suicidal plan. Cognition and Memory: Cognition and memory normal.         Judgment: Judgment normal.         Electronically signed by BRAULIO Snow CNP, APRN-CNP on 8/23/2022 at 7:25 AM    Please note that this chart was generated using voice recognition Dragon dictation software. Although every effort was made to ensure the accuracy of this automated transcription, some errors in transcription may have occurred.

## 2022-08-19 NOTE — PATIENT INSTRUCTIONS
Stop all vitamins - sent multivitamin to pharam covering all. Will check labs for fat soluble vitamin levels. Medication changes -   Metformin only once a day. Ryblesus increase to 7 mg daily  Okay to stop Alan Shah  Overall goal will be Januvia Ryblesus only.   Will monitor A1C

## 2022-08-23 PROBLEM — M25.461 EFFUSION OF RIGHT KNEE: Status: RESOLVED | Noted: 2022-03-07 | Resolved: 2022-08-23

## 2022-08-23 PROBLEM — G47.33 OSA ON CPAP: Chronic | Status: RESOLVED | Noted: 2018-01-19 | Resolved: 2022-08-23

## 2022-08-23 PROBLEM — Z99.89 OSA ON CPAP: Chronic | Status: RESOLVED | Noted: 2018-01-19 | Resolved: 2022-08-23

## 2022-08-23 ASSESSMENT — ENCOUNTER SYMPTOMS
NAUSEA: 0
BLOOD IN STOOL: 0
SHORTNESS OF BREATH: 0
ABDOMINAL PAIN: 0
BACK PAIN: 0
BACK PAIN: 1
WHEEZING: 0
ABDOMINAL DISTENTION: 0
CONSTIPATION: 1
CHEST TIGHTNESS: 0
ABDOMINAL DISTENTION: 0
COUGH: 0
CHEST TIGHTNESS: 0
CONSTIPATION: 0
ABDOMINAL PAIN: 0
DIARRHEA: 0
DIARRHEA: 0
SHORTNESS OF BREATH: 0

## 2022-08-24 ENCOUNTER — OFFICE VISIT (OUTPATIENT)
Dept: PODIATRY | Age: 62
End: 2022-08-24
Payer: MEDICARE

## 2022-08-24 VITALS — HEIGHT: 62 IN | WEIGHT: 149 LBS | BODY MASS INDEX: 27.42 KG/M2

## 2022-08-24 DIAGNOSIS — I73.9 PVD (PERIPHERAL VASCULAR DISEASE) (HCC): ICD-10-CM

## 2022-08-24 DIAGNOSIS — B35.1 DERMATOPHYTOSIS OF NAIL: ICD-10-CM

## 2022-08-24 DIAGNOSIS — E11.42 DIABETIC POLYNEUROPATHY ASSOCIATED WITH TYPE 2 DIABETES MELLITUS (HCC): ICD-10-CM

## 2022-08-24 DIAGNOSIS — M79.671 PAIN IN BOTH FEET: ICD-10-CM

## 2022-08-24 DIAGNOSIS — E11.51 TYPE II DIABETES MELLITUS WITH PERIPHERAL CIRCULATORY DISORDER (HCC): Primary | ICD-10-CM

## 2022-08-24 DIAGNOSIS — M79.672 PAIN IN BOTH FEET: ICD-10-CM

## 2022-08-24 PROCEDURE — G8417 CALC BMI ABV UP PARAM F/U: HCPCS | Performed by: PODIATRIST

## 2022-08-24 PROCEDURE — 2022F DILAT RTA XM EVC RTNOPTHY: CPT | Performed by: PODIATRIST

## 2022-08-24 PROCEDURE — 3044F HG A1C LEVEL LT 7.0%: CPT | Performed by: PODIATRIST

## 2022-08-24 PROCEDURE — 3017F COLORECTAL CA SCREEN DOC REV: CPT | Performed by: PODIATRIST

## 2022-08-24 PROCEDURE — G8427 DOCREV CUR MEDS BY ELIG CLIN: HCPCS | Performed by: PODIATRIST

## 2022-08-24 PROCEDURE — 1036F TOBACCO NON-USER: CPT | Performed by: PODIATRIST

## 2022-08-24 PROCEDURE — 99213 OFFICE O/P EST LOW 20 MIN: CPT | Performed by: PODIATRIST

## 2022-08-24 PROCEDURE — 11721 DEBRIDE NAIL 6 OR MORE: CPT | Performed by: PODIATRIST

## 2022-08-24 NOTE — PROGRESS NOTES
[address] 36.  Dept: [phone]    DIABETIC PROGRESS NOTE  Date of patient's visit: 8/24/2022  Patient's Name:  Prasad Peraza YOB: 1960            Patient Care Team:  BRAULIO Conn CNP as PCP - General (Nurse Practitioner Family)  BRAULIO Conn CNP as PCP - [facility]  Spring Luevano MD as Consulting Physician (Nephrology)  Javier Blackmon MD as Consulting Physician (Pulmonology)  Malgorzata Devine MD as Consulting Physician (Cardiology)  Chuy Madrid DPM as Consulting Physician (Hector Saxena)  Allie Sage MD (Psychiatry)  Christiane Parra MD as Consulting Physician (Neurology)  Eugenio Chavez DO as Surgeon (Neurosurgery)          Chief Complaint   Patient presents with    Diabetes     Diabetic foot care  Bs 73    Peripheral Neuropathy     Bl feet    Tingling     Bl feet more on the left        Subjective:   Prasad Peraza comes to clinic for Diabetes (Diabetic foot care/Bs 73), Peripheral Neuropathy (Bl feet), and Tingling (Bl feet more on the left )    she is a diabetic and states that she is using lotion to her feet . Pt currently has complaint of thickened, elongated nails that they cannot manage by themselves. Pt's primary care physician is BRAULIO Conn CNP last seen august 19 2022   Pt's last blood sugar was 73 . Pt has a new complaint of numbness and tingling to her feet. States she has had elevated blood sugars recently . Pt has tried changing shoes but it has not helped the pain       Lab Results   Component Value Date    LABA1C 6.2 (H) 03/28/2022      Complains of numbness in the feet bilat.   Past Medical History:   Diagnosis Date    Anxiety     Chronic kidney disease     COPD (chronic obstructive pulmonary disease) ([facility]     Depression     Effusion of right knee 3/7/2022    Fracture of ankle 5/14/2020    Hyperlipidemia Hypertension     Obesity     NARCISO on CPAP 1/19/2018    Osteoarthritis     Proteinuria     Pulmonary embolism (HCC)     Sleep apnea     c-pap. Doesn't use everynight    SOB (shortness of breath) 5/7/2020    Type 2 diabetes mellitus without complication (HCC)     Type II or unspecified type diabetes mellitus without mention of complication, not stated as uncontrolled     Von Willebrand disease ([facility]        No Known Allergies  Current Outpatient Medications on File Prior to Visit   Medication Sig Dispense Refill    buPROPion (WELLBUTRIN XL) 300 MG extended release tablet Take 1 tablet by mouth every morning Take with 150 mg for total of 450 mg 30 tablet 0    lisinopril (PRINIVIL;ZESTRIL) 10 MG tablet Take 1 tablet by mouth daily 90 tablet 3    metFORMIN (GLUCOPHAGE-XR) 500 MG extended release tablet Take 1 tablet by mouth daily (with breakfast) 360 tablet 1    tiotropium-olodaterol (STIOLTO RESPIMAT) 2.5-2.5 MCG/ACT AERS Inhale 2 puffs into the lungs daily 1 each 5    albuterol sulfate HFA (PROVENTIL;VENTOLIN;PROAIR) 108 (90 Base) MCG/ACT inhaler Inhale 2 puffs into the lungs every 4 hours as needed for Wheezing or Shortness of Breath (AND/OR COUGH) 18 g 1    apixaban (ELIQUIS) 5 MG TABS tablet Take 1 tablet by mouth 2 times daily 60 tablet 11    oxyCODONE-acetaminophen (PERCOCET) 5-325 MG per tablet Take 1 tablet by mouth every 8 hours as needed for Pain for up to 30 days.  40 tablet 0    Multiple Vitamins-Minerals (THERAPEUTIC MULTIVITAMIN-MINERALS) tablet Take 1 tablet by mouth daily 30 tablet 11    Semaglutide 7 MG TABS Take 7 mg by mouth daily 365 tablet 1    isosorbide mononitrate (IMDUR) 30 MG extended release tablet take 1 tablet by mouth once daily 90 tablet 1    omeprazole (PRILOSEC) 20 MG delayed release capsule take 1 capsule by mouth once daily 90 capsule 1    atorvastatin (LIPITOR) 40 MG tablet take 1 tablet by mouth once daily 90 tablet 1    JANUVIA 100 MG tablet take 1 tablet by mouth once daily 90 tablet 1    tiZANidine (ZANAFLEX) 4 MG tablet Take 1 tablet by mouth 3 times daily as needed (Muscle relaxer) 90 tablet 0    Alcohol Swabs (ALCOHOL PREP) 70 % PADS Use twice daily and as needed to check blood sugars 120 each 3    blood glucose test strips (ONETOUCH ULTRA) strip TEST THREE TIMES DAILY 100 strip 5    ipratropium-albuterol (DUONEB) 0.5-2.5 (3) MG/3ML SOLN nebulizer solution Take 3 mLs by nebulization every 6 hours as needed for Shortness of Breath 360 mL 0    risperiDONE (RISPERDAL) 0.5 MG tablet take 1 tablet by mouth at bedtime      Respiratory Therapy Supplies (NEBULIZER/TUBING/MOUTHPIECE) KIT 1 kit by Does not apply route 4 times daily as needed (wheezing) 1 kit 0    buPROPion (WELLBUTRIN XL) 150 MG extended release tablet take 1 tablet by mouth every morning ( take with 300 milligram tablet )      colchicine (COLCRYS) 0.6 MG tablet Take 1 tablet by mouth 2 times daily as needed for Pain (gout pain) Can repeat with 0.6 mg in 1 hour, max 1.8mg daily for acute pain 30 tablet 0    allopurinol (ZYLOPRIM) 100 MG tablet Take 1 tablet by mouth 2 times daily 180 tablet 1    polyethylene glycol (GLYCOLAX) 17 GM/SCOOP powder Take 17 g by mouth daily 90 each 3    senna (SENOKOT) 8.6 MG tablet Take 1 tablet by mouth 2 times daily 180 tablet 3    Blood Pressure Monitoring KIT Use to check blood pressure daily and as needed 1 kit 0    Lancets Misc. (ACCU-CHEK FASTCLIX LANCET) KIT use as directed PLEASE APPROVED NEW DEVICE WHILE WE WAIT FOR PRIOR AUTH. ..  FASTCLIX MIGHT BE EAISER TO MANIPULATE 1 kit 0    Accu-Chek FastClix Lancets MISC use 1 LANCET to TEST BLOOD SUGAR one to two times a day 120 each 3    naloxone 4 MG/0.1ML LIQD nasal spray 1 spray by Nasal route as needed for Opioid Reversal 1 each 5    Handicap Placard MISC by Does not apply route Exp 2/3/2026 1 each 0     Current Facility-Administered Medications on File Prior to Visit   Medication Dose Route Frequency Provider Last Rate Last Admin methylPREDNISolone acetate (DEPO-MEDROL) injection 40 mg  40 mg Intra-artICUlar Once Shyann Moreno MD        lidocaine 1 % injection 5 mL  5 mL Intra-artICUlar Once Shyann Moreno MD        methylPREDNISolone acetate (DEPO-MEDROL) injection 40 mg  40 mg Intra-artICUlar Once Shyann Moreno MD        lidocaine 1 % injection 5 mL  5 mL Intra-artICUlar Once Shyann Moreno MD           Review of Systems    Review of Systems:   History obtained from chart review and the patient  General ROS: negative for - chills, fatigue, fever, night sweats or weight gain  Constitutional: Negative for chills, diaphoresis, fatigue, fever and unexpected weight change. Musculoskeletal: Positive for arthralgias, gait problem and joint swelling. Neurological ROS: negative for - behavioral changes, confusion, headaches or seizures. Negative for weakness and numbness. Dermatological ROS: negative for - mole changes, rash  Cardiovascular: Negative for leg swelling. Gastrointestinal: Negative for constipation, diarrhea, nausea and vomiting. Objective:  Dermatologic Exam:  Skin lesion/ulceration Absent . Skin No rashes or nodules noted. .   Skin is thin, with flaky sloughing skin as well as decreased hair growth to the lower leg  Small red hemosiderin deposits seen dorsal foot   Musculoskeletal:     1st MPJ ROM decreased, Bilateral.  Muscle strength 5/5, Bilateral.  Pain present upon palpation of toenails 1-5, Bilateral. decreased medial longitudinal arch, Bilateral.  Ankle ROM decreased,Bilateral.    Dorsally contracted digits present digits 2, Bilateral.     Vascular: DP pulses 1/4 bilateral.  PT pulses 0/4 bilateral.   CFT <5 seconds, Bilateral.  Hair growth absent to the level of the digits, Bilateral.  Edema present, Bilateral.  Varicosities absent, Bilateral. Erythema absent, Bilateral    Neurological: Sensation diminshed to light touch to level of digits, Bilateral.  Protective sensation intact 6/10 sites via 5.07/10g Laceys Spring-Shraddha Monofilament, Bilateral.  negative Tinel's, Bilateral.  negative Valleix sign, Bilateral.      Integument: Warm, dry, supple, Bilateral.  Open lesion absent, Bilateral.  Interdigital maceration absent to web spaces 4, Bilateral.  Nails 1-5 left and 1-5 right thickened > 3.0 mm, dystrophic and crumbly, discolored with subungual debris. Fissures absent, Bilateral.   General: AAO x 3 in NAD. Derm  Toenail Description  Sites of Onychomycosis Involvement (Check affected area)  [x] [x] [x] [x] [x] [x] [x] [x] [x] [x]  5 4 3 2 1 1 2 3 4 5                          Right                                        Left    Thickness  [x] [x] [x] [x] [x] [x] [x] [x] [x] [x]  5 4 3 2 1 1 2 3 4 5                         Right                                        Left    Dystrophic Changes   [x] [x] [x] [x] [x] [x] [x] [x] [x] [x]  5 4 3 2 1 1 2 3 4 5                         Right                                        Left    Color   [x] [x] [x] [x] [x] [x] [x] [x] [x] [x]  5 4 3 2 1 1 2 3 4 5                          Right                                        Left    Incurvation/Ingrowin   [] [] [] [] [] [] [] [] [] []  5 4 3 2 1 1 2 3 4 5                         Right                                        Left    Inflammation/Pain   [x] [x] [x] [x] [x] [x] [x] [x] [x] [x]  5 4 3 2 1 1 2 3 4 5                         Right                                        Left        Visual inspection:  Deformity: hammertoe deformity sherman feet  amputation: absent  Skin lesions: absent  Edema: right- 2+ pitting edema, left- 2+ pitting edema    Sensory exam:  Monofilament sensation: abnormal - 6/10 via SW 5.07/10g monofilament to the plantar foot bilateral feet    Pulses: abnormal - 1/4 dorsalis pedis pulse and 0/4 Posterior tibial pulse,   Pinprick: Impaired  Proprioception: Impaired  Vibration (128 Hz): Impaired       DM with PVD       [x]Yes    []No      Assessment:  57 y.o. female with:  1.  Type II diabetes mellitus with peripheral circulatory disorder ([facility]    2. Dermatophytosis of nail    3. PVD (peripheral vascular disease) ([facility]     No orders of the defined types were placed in this encounter. Q7   []Yes    []No                Q8   [x]Yes    []No                     Q9   []Yes    []No    Plan:   Pt was evaluated and examined. Patient was given personalized discharge instructions. For the new complaint of neuropathy to the feet pt to start taking a vit B complex to help with th nerve pain. Pt to get her sugars under control and exercise 30 min a day    Nails 1-10 were debrided sharply in length and thickness with a nipper and , without incident. Pt will follow up in 9 weeks or sooner if any problems arise. Diagnosis was discussed with the pt and all of their questions were answered in detail. Proper foot hygiene and care was discussed with the pt. Informed patient on proper diabetic foot care and importance of tight glycemic control. Patient to check feet daily and contact the office with any questions/problems/concerns. Other comorbidity noted and will be managed by PCP. All labs were reviewed and all imagining including the above findings were reviewed PRIOR to the patients arrival and with the patient today. Previous patient encounter was reviewed. Encounters from the patients other medical providers were reviewed and noted. Time was spent educating the patient and their families/caregivers on proper care of the feet and ankles. All the above diagnosis were addressed at todays visit and all questions were answered.   A total of 20 minutes was spent with this patients encounter which included charting after the patients visit    8/24/2022    Electronically signed by Sathya BRODERICK Orantes on 8/24/2022 at 9:35 AM  8/24/2022

## 2022-08-30 ENCOUNTER — OFFICE VISIT (OUTPATIENT)
Dept: BEHAVIORAL/MENTAL HEALTH CLINIC | Age: 62
End: 2022-08-30
Payer: MEDICARE

## 2022-08-30 DIAGNOSIS — F43.10 POST TRAUMATIC STRESS DISORDER: Primary | ICD-10-CM

## 2022-08-30 PROCEDURE — 1036F TOBACCO NON-USER: CPT | Performed by: SOCIAL WORKER

## 2022-08-30 PROCEDURE — 90832 PSYTX W PT 30 MINUTES: CPT | Performed by: SOCIAL WORKER
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
## 2022-08-31 DIAGNOSIS — E11.22 CONTROLLED TYPE 2 DIABETES MELLITUS WITH STAGE 3 CHRONIC KIDNEY DISEASE, WITHOUT LONG-TERM CURRENT USE OF INSULIN (HCC): ICD-10-CM

## 2022-08-31 DIAGNOSIS — N18.30 CONTROLLED TYPE 2 DIABETES MELLITUS WITH STAGE 3 CHRONIC KIDNEY DISEASE, WITHOUT LONG-TERM CURRENT USE OF INSULIN (HCC): ICD-10-CM

## 2022-08-31 RX ORDER — METFORMIN HYDROCHLORIDE 500 MG/1
TABLET, EXTENDED RELEASE ORAL
Qty: 360 TABLET | Refills: 1 | OUTPATIENT
Start: 2022-08-31

## 2022-09-06 ENCOUNTER — HOSPITAL ENCOUNTER (OUTPATIENT)
Age: 62
Setting detail: SPECIMEN
Discharge: HOME | End: 2022-09-06

## 2022-09-06 ENCOUNTER — TELEPHONE (OUTPATIENT)
Dept: FAMILY MEDICINE CLINIC | Age: 62
End: 2022-09-06

## 2022-09-06 ENCOUNTER — OFFICE VISIT (OUTPATIENT)
Dept: BEHAVIORAL/MENTAL HEALTH CLINIC | Age: 62
End: 2022-09-06
Payer: MEDICARE

## 2022-09-06 DIAGNOSIS — F43.10 POST TRAUMATIC STRESS DISORDER: Primary | ICD-10-CM

## 2022-09-06 DIAGNOSIS — E56.9 HYPOVITAMINOSIS: ICD-10-CM

## 2022-09-06 LAB
FOLATE: 13.7 NG/ML
MAGNESIUM: 1.9 MG/DL (ref 1.6–2.6)
VITAMIN B-12: 405 PG/ML (ref 232–1245)
VITAMIN D 25-HYDROXY: 55.7 NG/ML

## 2022-09-06 PROCEDURE — 90832 PSYTX W PT 30 MINUTES: CPT | Performed by: SOCIAL WORKER

## 2022-09-06 PROCEDURE — 1036F TOBACCO NON-USER: CPT | Performed by: SOCIAL WORKER
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
## 2022-09-06 NOTE — TELEPHONE ENCOUNTER
Patient presented in the office today with concerns of blood sugars dropping to 60-70's. Please review blood sugar logs in media and advise patient to any recommendations.

## 2022-09-06 NOTE — TELEPHONE ENCOUNTER
Please confirm she made changes we discussed  Stop Farxiga  Metformin only once a day  Rybelsus 7 mg daily

## 2022-09-10 LAB — ZINC: 59.7 UG/DL (ref 60–120)

## 2022-09-13 ENCOUNTER — OFFICE VISIT (OUTPATIENT)
Dept: BEHAVIORAL/MENTAL HEALTH CLINIC | Age: 62
End: 2022-09-13
Payer: MEDICARE

## 2022-09-13 DIAGNOSIS — F43.10 POST TRAUMATIC STRESS DISORDER: Primary | ICD-10-CM

## 2022-09-13 PROCEDURE — 90832 PSYTX W PT 30 MINUTES: CPT | Performed by: SOCIAL WORKER

## 2022-09-13 PROCEDURE — 1036F TOBACCO NON-USER: CPT | Performed by: SOCIAL WORKER
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
## 2022-09-19 ENCOUNTER — HOSPITAL ENCOUNTER (OUTPATIENT)
Age: 62
Setting detail: SPECIMEN
Discharge: HOME | End: 2022-09-19

## 2022-09-19 ENCOUNTER — OFFICE VISIT (OUTPATIENT)
Dept: FAMILY MEDICINE CLINIC | Age: 62
End: 2022-09-19
Payer: MEDICARE

## 2022-09-19 VITALS
TEMPERATURE: 97.7 F | HEART RATE: 82 BPM | DIASTOLIC BLOOD PRESSURE: 84 MMHG | OXYGEN SATURATION: 99 % | WEIGHT: 152.2 LBS | SYSTOLIC BLOOD PRESSURE: 128 MMHG | BODY MASS INDEX: 27.84 KG/M2

## 2022-09-19 DIAGNOSIS — M15.9 OSTEOARTHRITIS OF MULTIPLE JOINTS, UNSPECIFIED OSTEOARTHRITIS TYPE: ICD-10-CM

## 2022-09-19 DIAGNOSIS — E53.8 VITAMIN B12 DEFICIENCY: ICD-10-CM

## 2022-09-19 DIAGNOSIS — K59.01 SLOW TRANSIT CONSTIPATION: ICD-10-CM

## 2022-09-19 DIAGNOSIS — N18.30 STAGE 3 CHRONIC KIDNEY DISEASE, UNSPECIFIED WHETHER STAGE 3A OR 3B CKD (HCC): ICD-10-CM

## 2022-09-19 DIAGNOSIS — I10 ESSENTIAL HYPERTENSION: ICD-10-CM

## 2022-09-19 DIAGNOSIS — E11.8 CONTROLLED TYPE 2 DIABETES MELLITUS WITH COMPLICATION, WITHOUT LONG-TERM CURRENT USE OF INSULIN (HCC): ICD-10-CM

## 2022-09-19 DIAGNOSIS — E11.8 CONTROLLED TYPE 2 DIABETES MELLITUS WITH COMPLICATION, WITHOUT LONG-TERM CURRENT USE OF INSULIN (HCC): Primary | ICD-10-CM

## 2022-09-19 DIAGNOSIS — M15.9 PRIMARY OSTEOARTHRITIS INVOLVING MULTIPLE JOINTS: ICD-10-CM

## 2022-09-19 DIAGNOSIS — M17.11 PATELLOFEMORAL ARTHRITIS OF RIGHT KNEE: ICD-10-CM

## 2022-09-19 DIAGNOSIS — Z51.81 ENCOUNTER FOR THERAPEUTIC DRUG LEVEL MONITORING: ICD-10-CM

## 2022-09-19 DIAGNOSIS — K21.9 GASTROESOPHAGEAL REFLUX DISEASE, UNSPECIFIED WHETHER ESOPHAGITIS PRESENT: ICD-10-CM

## 2022-09-19 LAB
ALCOHOL URINE: NORMAL
AMPHETAMINE SCREEN, URINE: NEGATIVE
ANION GAP SERPL CALCULATED.3IONS-SCNC: 12 MMOL/L (ref 9–17)
BARBITURATE SCREEN, URINE: NEGATIVE
BENZODIAZEPINE SCREEN, URINE: NEGATIVE
BILIRUBIN URINE: NEGATIVE
BUN BLDV-MCNC: 11 MG/DL (ref 8–23)
BUPRENORPHINE URINE: NEGATIVE
CALCIUM SERPL-MCNC: 9.4 MG/DL (ref 8.6–10.4)
CHLORIDE BLD-SCNC: 103 MMOL/L (ref 98–107)
CO2: 23 MMOL/L (ref 20–31)
COCAINE METABOLITE SCREEN URINE: NEGATIVE
COLOR: YELLOW
COMMENT UA: NORMAL
CREAT SERPL-MCNC: 0.78 MG/DL (ref 0.5–0.9)
FENTANYL SCREEN, URINE: NEGATIVE
GABAPENTIN SCREEN, URINE: NORMAL
GFR AFRICAN AMERICAN: >60 ML/MIN
GFR NON-AFRICAN AMERICAN: >60 ML/MIN
GFR SERPL CREATININE-BSD FRML MDRD: NORMAL ML/MIN/{1.73_M2}
GLUCOSE BLD-MCNC: 83 MG/DL (ref 70–99)
GLUCOSE URINE: NEGATIVE
HBA1C MFR BLD: 5.3 %
KETONES, URINE: NEGATIVE
LEUKOCYTE ESTERASE, URINE: NEGATIVE
MDMA URINE: NEGATIVE
METHADONE SCREEN, URINE: NEGATIVE
METHAMPHETAMINE, URINE: NEGATIVE
NITRITE, URINE: NEGATIVE
OPIATE SCREEN URINE: NORMAL
OXYCODONE SCREEN URINE: NEGATIVE
PH UA: 7.5 (ref 5–8)
PHENCYCLIDINE SCREEN URINE: NEGATIVE
POTASSIUM SERPL-SCNC: 4.1 MMOL/L (ref 3.7–5.3)
PROPOXYPHENE SCREEN, URINE: NORMAL
PROTEIN UA: NEGATIVE
SODIUM BLD-SCNC: 138 MMOL/L (ref 135–144)
SPECIFIC GRAVITY UA: 1.01 (ref 1–1.03)
SYNTHETIC CANNABINOIDS(K2) SCREEN, URINE: NORMAL
THC SCREEN, URINE: NEGATIVE
TRAMADOL SCREEN URINE: NORMAL
TRICYCLIC ANTIDEPRESSANTS, UR: NORMAL
TURBIDITY: CLEAR
URINE HGB: NEGATIVE
UROBILINOGEN, URINE: NORMAL

## 2022-09-19 PROCEDURE — 83036 HEMOGLOBIN GLYCOSYLATED A1C: CPT | Performed by: NURSE PRACTITIONER

## 2022-09-19 PROCEDURE — 3017F COLORECTAL CA SCREEN DOC REV: CPT | Performed by: NURSE PRACTITIONER

## 2022-09-19 PROCEDURE — 1036F TOBACCO NON-USER: CPT | Performed by: NURSE PRACTITIONER

## 2022-09-19 PROCEDURE — 80305 DRUG TEST PRSMV DIR OPT OBS: CPT | Performed by: NURSE PRACTITIONER

## 2022-09-19 PROCEDURE — 2022F DILAT RTA XM EVC RTNOPTHY: CPT | Performed by: NURSE PRACTITIONER

## 2022-09-19 PROCEDURE — G8417 CALC BMI ABV UP PARAM F/U: HCPCS | Performed by: NURSE PRACTITIONER

## 2022-09-19 PROCEDURE — 3044F HG A1C LEVEL LT 7.0%: CPT | Performed by: NURSE PRACTITIONER

## 2022-09-19 PROCEDURE — G8427 DOCREV CUR MEDS BY ELIG CLIN: HCPCS | Performed by: NURSE PRACTITIONER

## 2022-09-19 PROCEDURE — 99214 OFFICE O/P EST MOD 30 MIN: CPT | Performed by: NURSE PRACTITIONER

## 2022-09-19 RX ORDER — OXYCODONE HYDROCHLORIDE AND ACETAMINOPHEN 5; 325 MG/1; MG/1
1 TABLET ORAL EVERY 8 HOURS PRN
Qty: 40 TABLET | Refills: 0 | Status: SHIPPED | OUTPATIENT
Start: 2022-09-19 | End: 2022-10-17 | Stop reason: ALTCHOICE

## 2022-09-19 RX ORDER — FAMOTIDINE 20 MG/1
20 TABLET, FILM COATED ORAL 2 TIMES DAILY PRN
Qty: 60 TABLET | Refills: 2 | Status: SHIPPED | OUTPATIENT
Start: 2022-09-19 | End: 2023-09-19

## 2022-09-19 RX ORDER — CYANOCOBALAMIN 1000 UG/ML
1000 INJECTION INTRAMUSCULAR; SUBCUTANEOUS ONCE
Status: COMPLETED | OUTPATIENT
Start: 2022-09-19 | End: 2022-09-19

## 2022-09-19 RX ORDER — BUPROPION HYDROCHLORIDE 450 MG/1
450 TABLET, FILM COATED, EXTENDED RELEASE ORAL EVERY MORNING
Qty: 45 TABLET | Refills: 0 | Status: SHIPPED
Start: 2022-09-19 | End: 2022-10-19

## 2022-09-19 RX ORDER — M-VIT,TX,IRON,MINS/CALC/FOLIC 27MG-0.4MG
1 TABLET ORAL DAILY
Qty: 30 TABLET | Refills: 11 | Status: SHIPPED | OUTPATIENT
Start: 2022-09-19 | End: 2023-09-19

## 2022-09-19 RX ORDER — CYANOCOBALAMIN 1000 UG/ML
1000 INJECTION INTRAMUSCULAR; SUBCUTANEOUS ONCE
Status: CANCELLED | OUTPATIENT
Start: 2022-09-19 | End: 2022-09-19

## 2022-09-19 RX ORDER — TRAZODONE HYDROCHLORIDE 50 MG/1
50 TABLET ORAL NIGHTLY
COMMUNITY

## 2022-09-19 RX ADMIN — CYANOCOBALAMIN 1000 MCG: 1000 INJECTION INTRAMUSCULAR; SUBCUTANEOUS at 12:23

## 2022-09-19 ASSESSMENT — ENCOUNTER SYMPTOMS
ABDOMINAL PAIN: 0
ABDOMINAL DISTENTION: 0
CHEST TIGHTNESS: 0
CONSTIPATION: 0
COUGH: 0
DIARRHEA: 0
NAUSEA: 0
SHORTNESS OF BREATH: 0
BACK PAIN: 1
BLOOD IN STOOL: 0

## 2022-09-19 NOTE — PROGRESS NOTES
[facility] [address]  [phone]    9/19/22     Patient ID  Jonathan Gaffney is a 57 y.o. female  Established patient    Chief Complaint  Jonathan Gaffney presents today for Anxiety      ASSESSMENT/PLAN  1. Controlled type 2 diabetes mellitus with complication, without long-term current use of insulin (HCC)  -     POCT glycosylated hemoglobin (Hb A1C)  -     Basic Metabolic Panel; Future  2. Essential hypertension  -     Blood Pressure Monitoring KIT; Disp-1 kit, R-0, NormalUse to check blood pressure daily and as needed  3. Vitamin B12 deficiency  -     cyanocobalamin injection 1,000 mcg; 1,000 mcg, IntraMUSCular, ONCE, 1 dose, On Mon 9/19/22 at 1230  -     Multiple Vitamins-Minerals (THERAPEUTIC MULTIVITAMIN-MINERALS) tablet; Take 1 tablet by mouth daily, Disp-30 tablet, R-11Normal  4. Gastroesophageal reflux disease, unspecified whether esophagitis present  -     famotidine (PEPCID) 20 MG tablet; Take 1 tablet by mouth 2 times daily as needed (GERD), Disp-60 tablet, R-2Normal  5. Slow transit constipation  6. Patellofemoral arthritis of right knee  7. Osteoarthritis of multiple joints, unspecified osteoarthritis type  8. Primary osteoarthritis involving multiple joints  -     oxyCODONE-acetaminophen (PERCOCET) 5-325 MG per tablet; Take 1 tablet by mouth every 8 hours as needed for Pain for up to 30 days. , Disp-40 tablet, R-0Normal  9. Stage 3 chronic kidney disease, unspecified whether stage 3a or 3b CKD ([facility] [address]  10. Encounter for therapeutic drug level monitoring  -     POCT Rapid Drug Screen     Medications refilled. Return in about 4 weeks (around 10/17/2022).       Patient Care Team:  BRAULIO Horne CNP as PCP - General (Nurse Practitioner Family)  BRAULIO Horne CNP as PCP - [facility] Empaneled Provider  Jono Bradley MD as Consulting Physician (Nephrology)  Trista Russo MD as Consulting Physician (Pulmonology)  Mayur Mcgarry MD as Consulting Physician (Cardiology)  Emogene Cushing, DPM as Consulting Physician (Francis Antunez)  Dante Mendez MD (Psychiatry)  Juve Palomares MD as Consulting Physician (Neurology)  Anita Peralta DO as Surgeon (Neurosurgery)    SUBJECTIVE/OBJECTIVE  History of Present illness / Visit Summary   Patient presents today for follow up   Reviewed health maintenance and any recent labs/imaging    Recent loss of son. Went to hospice facility for grief counseling. [facility] - signed up   Moving mid October to out single bedroom apartment       Review of Systems  Review of Systems   Constitutional:  Positive for fatigue. Negative for activity change and appetite change. Joined george ZHAO:          No teeth - not current at dentist    Eyes:         Glasses - current eye exam    Respiratory:  Negative for cough, chest tightness and shortness of breath. Follows with pulmonology   Sleep study no longer indicated need for CPAP   Pulmonology changed Stiolito to Spiriva. Patient feels as though her breathing is better with Stiolito? Resent and will correlate with pulmonologist    Cardiovascular:  Negative for chest pain, palpitations and leg swelling. Follows with cardiology    Gastrointestinal:  Negative for abdominal distention, abdominal pain, blood in stool, constipation (miving every 3 days, still more loose?), diarrhea and nausea. Endocrine:        Checking glucose daily. Averages /stable. No hypoglycemia   Follows with podiatry    Genitourinary:  Positive for decreased urine volume and flank pain. Negative for difficulty urinating and dysuria. Follows with nephrology   Musculoskeletal:  Positive for back pain. Negative for arthralgias and joint swelling. No longer having pain to left  rib? No pain to mid back, going to chiropractor - no relief    Neurological:  Negative for dizziness, light-headedness and headaches.    Psychiatric/Behavioral:  Positive for agitation, decreased concentration, dysphoric mood and sleep disturbance. Negative for behavioral problems, self-injury and suicidal ideas. The patient is nervous/anxious. Recent loss of son. Follows with psychiatry for prescriptions. Also following with [facility] for therapy, discussing options for grief counseling      Physical exam   Physical Exam  Vitals and nursing note reviewed. Constitutional:       General: She is not in acute distress. Appearance: Normal appearance. She is well-developed and well-groomed. She is not ill-appearing or toxic-appearing. HENT:      Head: Normocephalic. Mouth/Throat:      Lips: Pink. Pulmonary:      Effort: Pulmonary effort is normal. No respiratory distress. Skin:     Coloration: Skin is not cyanotic, jaundiced or pale. Neurological:      Mental Status: She is alert and oriented to person, place, and time. Psychiatric:         Attention and Perception: Attention and perception normal.         Mood and Affect: Mood and affect normal.         Speech: Speech normal.         Behavior: Behavior normal. Behavior is cooperative. Thought Content: Thought content normal. Thought content does not include suicidal ideation. Thought content does not include suicidal plan. Cognition and Memory: Cognition and memory normal.         Judgment: Judgment normal.         Electronically signed by BRAULIO Malagon CNP, APRN-CNP on 10/3/2022 at 8:13 PM    Please note that this chart was generated using voice recognition Dragon dictation software. Although every effort was made to ensure the accuracy of this automated transcription, some errors in transcription may have occurred.

## 2022-09-20 ENCOUNTER — OFFICE VISIT (OUTPATIENT)
Dept: BEHAVIORAL/MENTAL HEALTH CLINIC | Age: 62
End: 2022-09-20
Payer: MEDICARE

## 2022-09-20 DIAGNOSIS — F43.10 POST TRAUMATIC STRESS DISORDER: Primary | ICD-10-CM

## 2022-09-20 PROCEDURE — 90832 PSYTX W PT 30 MINUTES: CPT | Performed by: SOCIAL WORKER

## 2022-09-20 PROCEDURE — 1036F TOBACCO NON-USER: CPT | Performed by: SOCIAL WORKER

## 2022-09-20 NOTE — PROGRESS NOTES
ADULT BEHAVIORAL HEALTH FOLLOW UP  MARIBELL Mejia  Licensed Independent        Visit Date: 9/20/2022   Time of appointment:  1025-1100a   Time spent with Patient: 35 minutes. This is patient's 11th appointment. Reason for Consult:  Anxiety and Stress     Referring Provider/PCP:    No ref. provider found  Jay Verde, BRAULIO - CNP      Pt provided informed consent for the behavioral health program. Discussed with patient model of service to include the limits of confidentiality (i.e. abuse reporting, suicide intervention, etc.) and short-term intervention focused approach. Pt indicated understanding. Evelin Cobb is a 57 y.o. female who presents for follow up of anxiety. She reports doing well, signed lease for apartment and will be moving in at the end of next month. She reports \"I have made a decision about my life\", is choosing to let go of her past and focus on now. We processed ways to do so, with reminder memories and thoughts will arise, focusing on ways to manage this (mantra, grounding, relaxing her body). She overall presents much calmer, states she is still working on her grief, provided podcast recommendations to listen to on her own (showed how to access on her phone). Previous Recommendations: Cole Hay will utilize grounding to stay in the present and will remind herself she has done the best she can. She will follow up with Karen Chery in 1 week. MENTAL STATUS EXAM  Mood was within normal limits with calm affect. Suicidal ideation was denied. Homicidal ideation was denied. Hygiene was good . Dress was appropriate. Behavior was Within Normal Limits with No observation or self-report of difficulties ambulating. Attitude was Cooperative. Eye-contact was good. Speech: rate - WNL, rhythm - WNL, volume - WNL. Verbalizations were coherent.   Thought processes were intact and goal-oriented without evidence of delusions, hallucinations, obsessions, or christel; with moderate cognitive distortions. Associations were characterized by intact cognitive processes. Pt was oriented to person, place, time, and general circumstances;  recent:  good and remote:  good. Insight and judgment were estimated to be fair, AEB, a fair  understanding of cyclical maladaptive patterns, and the ability to use insight to inform behavior change. ASSESSMENT  Porsche Osman presented to the appointment today for evaluation and treatment of symptoms of anxiety. She is currently deemed no risk to herself or others and meets criteria for PTSD. Yesica was in agreement with recommendations. PHQ Scores 1/24/2022 7/25/2018 6/27/2017   PHQ2 Score 0 0 0   PHQ9 Score 0 0 0     Interpretation of Total Score Depression Severity: 1-4 = Minimal depression, 5-9 = Mild depression, 10-14 = Moderate depression, 15-19 = Moderately severe depression, 20-27 = Severe depression    How often pt has had thoughts of death or hurting self (if PHQ positive for depression):       No flowsheet data found. Interpretation of ZAIN-7 score: 5-9 = mild anxiety, 10-14 = moderate anxiety, 15+ = severe anxiety. Recommend referral to behavioral health for scores 10 or greater. DIAGNOSIS  Eugene Handley was seen today for anxiety and stress. Diagnoses and all orders for this visit:    Post traumatic stress disorder        INTERVENTION  Trained in strategies for increasing balanced thinking, Discussed self-care (sleep, nutrition, rewarding activities, social support, exercise), Discussed potential barriers to change, Supportive techniques, Provided Psychoeducation re: grounding and reciprocal inhibition, and Identified maladaptive thoughts      PLAN  Doreen Sainz will utilize grounding, mantra, and relaxation skills to stay focused on the present. She will follow up with Estela Parnell in 3 weeks. INTERACTIVE COMPLEXITY  Is interactive complexity present?   No  Reason:  N/A  Additional Supporting Information:  N/A Electronically signed by MARIBELL Pope on 9/20/22 at 10:29 AM AKIT

## 2022-09-21 ENCOUNTER — TELEPHONE (OUTPATIENT)
Dept: FAMILY MEDICINE CLINIC | Age: 62
End: 2022-09-21

## 2022-09-21 DIAGNOSIS — I10 ESSENTIAL HYPERTENSION: Primary | ICD-10-CM

## 2022-09-21 RX ORDER — ADHESIVE BANDAGE 3/4"
BANDAGE TOPICAL
Qty: 1 EACH | Refills: 0 | Status: SHIPPED | OUTPATIENT
Start: 2022-09-21 | End: 2023-09-21

## 2022-09-21 NOTE — TELEPHONE ENCOUNTER
----- Message from Kay Moore sent at 9/20/2022  1:13 PM EDT -----  Subject: Message to Provider    QUESTIONS  Information for Provider? Patient is needing the prescription for the cup   for the blood pressure monitor, to be faxed over to the [address] [place] [address],[address] [place] [address]   equipment on [facility]. their fax # is [phone].  ---------------------------------------------------------------------------  --------------  Venice SHANNON  [phone]; OK to leave message on voicemail  ---------------------------------------------------------------------------  --------------  SCRIPT ANSWERS  Relationship to Patient?  Self

## 2022-09-27 ENCOUNTER — TELEPHONE (OUTPATIENT)
Dept: FAMILY MEDICINE CLINIC | Age: 62
End: 2022-09-27

## 2022-09-27 NOTE — TELEPHONE ENCOUNTER
Patient contacted the office as she has not had a BM since 9/19/22 appointment. She is on senna BID, miralax every morning, and col-rite 250 mg. Eating normally. Pain located in lower pelvic/abdominal area spreading across her stomach. She feels obstructed. Also stopped Prilosec as advised and taking Pepcid PRN. Notices more acid in the back of her throat lately. Onset 3 days. Should she take daily? Or still PRN.

## 2022-09-27 NOTE — TELEPHONE ENCOUNTER
Spoke with patient, patient stated her daughter is taking her to [facility] ED tonight.

## 2022-09-28 ENCOUNTER — HOSPITAL ENCOUNTER (EMERGENCY)
Age: 62
Discharge: HOME | End: 2022-09-28
Attending: EMERGENCY MEDICINE
Payer: MEDICARE

## 2022-09-28 ENCOUNTER — APPOINTMENT (OUTPATIENT)
Dept: GENERAL RADIOLOGY | Age: 62
End: 2022-09-28
Payer: MEDICARE

## 2022-09-28 ENCOUNTER — TELEPHONE (OUTPATIENT)
Dept: FAMILY MEDICINE CLINIC | Age: 62
End: 2022-09-28

## 2022-09-28 VITALS
WEIGHT: 150 LBS | HEART RATE: 71 BPM | DIASTOLIC BLOOD PRESSURE: 86 MMHG | BODY MASS INDEX: 27.6 KG/M2 | TEMPERATURE: 97.5 F | HEIGHT: 62 IN | SYSTOLIC BLOOD PRESSURE: 158 MMHG | RESPIRATION RATE: 16 BRPM | OXYGEN SATURATION: 99 %

## 2022-09-28 DIAGNOSIS — M11.20 PSEUDOGOUT: ICD-10-CM

## 2022-09-28 DIAGNOSIS — E11.22 CONTROLLED TYPE 2 DIABETES MELLITUS WITH STAGE 3 CHRONIC KIDNEY DISEASE, WITHOUT LONG-TERM CURRENT USE OF INSULIN (HCC): ICD-10-CM

## 2022-09-28 DIAGNOSIS — N18.30 CONTROLLED TYPE 2 DIABETES MELLITUS WITH STAGE 3 CHRONIC KIDNEY DISEASE, WITHOUT LONG-TERM CURRENT USE OF INSULIN (HCC): ICD-10-CM

## 2022-09-28 DIAGNOSIS — K59.01 SLOW TRANSIT CONSTIPATION: ICD-10-CM

## 2022-09-28 DIAGNOSIS — K59.03 DRUG-INDUCED CONSTIPATION: Primary | ICD-10-CM

## 2022-09-28 PROCEDURE — 99283 EMERGENCY DEPT VISIT LOW MDM: CPT

## 2022-09-28 PROCEDURE — 6370000000 HC RX 637 (ALT 250 FOR IP): Performed by: EMERGENCY MEDICINE

## 2022-09-28 PROCEDURE — 74022 RADEX COMPL AQT ABD SERIES: CPT

## 2022-09-28 PROCEDURE — 2580000003 HC RX 258: Performed by: EMERGENCY MEDICINE

## 2022-09-28 RX ORDER — TIZANIDINE 2 MG/1
2 TABLET ORAL EVERY 6 HOURS PRN
COMMUNITY

## 2022-09-28 RX ADMIN — WATER: 1 IRRIGANT IRRIGATION at 08:56

## 2022-09-28 RX ADMIN — MAGNESIUM CITRATE 296 ML: 1.75 LIQUID ORAL at 08:47

## 2022-09-28 ASSESSMENT — PAIN DESCRIPTION - LOCATION: LOCATION: ABDOMEN

## 2022-09-28 ASSESSMENT — PAIN DESCRIPTION - ONSET: ONSET: PROGRESSIVE

## 2022-09-28 ASSESSMENT — ENCOUNTER SYMPTOMS
CONSTIPATION: 1
BLOOD IN STOOL: 0
VOMITING: 0

## 2022-09-28 ASSESSMENT — PAIN DESCRIPTION - FREQUENCY: FREQUENCY: INTERMITTENT

## 2022-09-28 ASSESSMENT — PAIN DESCRIPTION - PAIN TYPE: TYPE: ACUTE PAIN

## 2022-09-28 ASSESSMENT — PAIN SCALES - GENERAL: PAINLEVEL_OUTOF10: 5

## 2022-09-28 ASSESSMENT — PAIN DESCRIPTION - ORIENTATION: ORIENTATION: RIGHT;LEFT;LOWER;UPPER

## 2022-09-28 ASSESSMENT — PAIN - FUNCTIONAL ASSESSMENT: PAIN_FUNCTIONAL_ASSESSMENT: 0-10

## 2022-09-28 ASSESSMENT — PAIN DESCRIPTION - DESCRIPTORS: DESCRIPTORS: SHARP

## 2022-09-28 NOTE — ED PROVIDER NOTES
[facility] ED  EMERGENCY DEPARTMENT ENCOUNTER      Pt Name: Fili Castrejon  MRN: [id number]  [place] 1960  Date of evaluation: 9/28/2022  Provider: Blaine Webb MD    CHIEF COMPLAINT     Chief Complaint   Patient presents with    Constipation     No stool for 11 days. Last liquid stool (normal for her) was last Friday/Saturday. HISTORY OF PRESENT ILLNESS   (Location/Symptom, Timing/Onset, Context/Setting,Quality, Duration, Modifying Factors, Severity)  Note limiting factors. Fili Castrejon is a 57 y.o. female who presents to the emergency department stating she has not had her last bowel movement for 11 days. She takes MiraLAX daily and also senna. She does not report chills or fever, nausea or vomiting. Patient is diabetic and currently on semaglutide 7 mg daily. She also takes an opiate for back pain but states she only takes 1 pill a day. Patient reports a loss of 125 pounds in the last year and a half most of it intentionally. The history is provided by the patient and medical records. Nursing Notes werereviewed. REVIEW OF SYSTEMS    (2-9 systems for level 4, 10 or more for level 5)     Review of Systems   Constitutional:  Negative for fever. Gastrointestinal:  Positive for constipation. Negative for blood in stool and vomiting. Genitourinary:  Negative for dysuria. All other systems reviewed and are negative. Except as noted above the remainder of the review of systems was reviewed and negative. PAST MEDICAL HISTORY     Past Medical History:   Diagnosis Date    Anxiety     Chronic kidney disease     COPD (chronic obstructive pulmonary disease) ([facility] [address]     Depression     Effusion of right knee 3/7/2022    Fracture of ankle 5/14/2020    Hyperlipidemia     Hypertension     Obesity     NARCISO on CPAP 1/19/2018    Osteoarthritis     Proteinuria     Pulmonary embolism (HCC)     Sleep apnea     c-pap.  Doesn't use everynight    SOB (shortness of breath) 5/7/2020 Type 2 diabetes mellitus without complication (HCC)     Type II or unspecified type diabetes mellitus without mention of complication, not stated as uncontrolled     Von Willebrand disease ([facility] [address]          SURGICALHISTORY       Past Surgical History:   Procedure Laterality Date    APPENDECTOMY       SECTION      x3, 1977, ,     COLONOSCOPY  2018    with biopsy    COLONOSCOPY N/A 2018    COLONOSCOPY performed by Grisel Martino MD at [address] [facility] LAB PROCEDURE      EYE SURGERY Bilateral     cataracts    EYE SURGERY Bilateral     glaucoma    TONSILLECTOMY AND ADENOIDECTOMY           CURRENT MEDICATIONS       Discharge Medication List as of 2022 10:12 AM        CONTINUE these medications which have NOT CHANGED    Details   tiZANidine (ZANAFLEX) 2 MG tablet Take 2 mg by mouth every 6 hours as neededHistorical Med      Blood Pressure Monitoring (BLOOD PRESSURE CUFF) MISC Disp-1 each, R-0, PrintUse as directed by physician to check blood pressure. Please fit to patient. traZODone (DESYREL) 50 MG tablet Take 50 mg by mouth nightlyHistorical Med      Multiple Vitamins-Minerals (THERAPEUTIC MULTIVITAMIN-MINERALS) tablet Take 1 tablet by mouth daily, Disp-30 tablet, R-11Normal      oxyCODONE-acetaminophen (PERCOCET) 5-325 MG per tablet Take 1 tablet by mouth every 8 hours as needed for Pain for up to 30 days. , Disp-40 tablet, R-0Normal      famotidine (PEPCID) 20 MG tablet Take 1 tablet by mouth 2 times daily as needed (GERD), Disp-60 tablet, R-2Normal      buPROPion 450 MG TB24 Take 450 mg by mouth every morning Take with 150 mg for total of 450 mg, Disp-45 tablet, R-0Adjust Sig      Blood Pressure Monitoring KIT Disp-1 kit, R-0, NormalUse to check blood pressure daily and as needed      lisinopril (PRINIVIL;ZESTRIL) 10 MG tablet Take 1 tablet by mouth daily, Disp-90 tablet, R-3Normal      tiotropium-olodaterol (STIOLTO RESPIMAT) 2.5-2.5 MCG/ACT AERS Inhale 2 puffs into the lungs daily, Disp-1 each, R-5Normal      albuterol sulfate HFA (PROVENTIL;VENTOLIN;PROAIR) 108 (90 Base) MCG/ACT inhaler Inhale 2 puffs into the lungs every 4 hours as needed for Wheezing or Shortness of Breath (AND/OR COUGH), Disp-18 g, R-1Normal      apixaban (ELIQUIS) 5 MG TABS tablet Take 1 tablet by mouth 2 times daily, Disp-60 tablet, R-11Normal      Semaglutide 7 MG TABS Take 7 mg by mouth daily, Disp-365 tablet, R-1Normal      isosorbide mononitrate (IMDUR) 30 MG extended release tablet take 1 tablet by mouth once daily, Disp-90 tablet, R-1Normal      atorvastatin (LIPITOR) 40 MG tablet take 1 tablet by mouth once daily, Disp-90 tablet, R-1Normal      Alcohol Swabs (ALCOHOL PREP) 70 % PADS Disp-120 each, R-3, NormalUse twice daily and as needed to check blood sugars      blood glucose test strips (ONETOUCH ULTRA) strip TEST THREE TIMES DAILY, Disp-100 strip, R-5Normal      ipratropium-albuterol (DUONEB) 0.5-2.5 (3) MG/3ML SOLN nebulizer solution Take 3 mLs by nebulization every 6 hours as needed for Shortness of Breath, Disp-360 mL, R-0Normal      risperiDONE (RISPERDAL) 0.5 MG tablet take 1 tablet by mouth at bedtimeHistorical Med      Respiratory Therapy Supplies (NEBULIZER/TUBING/MOUTHPIECE) KIT 4 TIMES DAILY PRN Starting Wed 5/18/2022, Until Thu 5/18/2023 at 2359, For 365 days, Disp-1 kit, R-0, Normal      colchicine (COLCRYS) 0.6 MG tablet Take 1 tablet by mouth 2 times daily as needed for Pain (gout pain) Can repeat with 0.6 mg in 1 hour, max 1.8mg daily for acute pain, Disp-30 tablet, R-0Normal      allopurinol (ZYLOPRIM) 100 MG tablet Take 1 tablet by mouth 2 times daily, Disp-180 tablet, R-1Normal      polyethylene glycol (GLYCOLAX) 17 GM/SCOOP powder Take 17 g by mouth daily, Disp-90 each, R-3Normal      senna (SENOKOT) 8.6 MG tablet Take 1 tablet by mouth 2 times daily, Disp-180 tablet, R-3Normal      Lancets Misc. (ACCU-CHEK FASTCLIX LANCET) KIT Disp-1 kit, R-0, Normal Accu-Chek FastClix Lancets MISC Disp-120 each, R-3, Normaluse 1 LANCET to TEST BLOOD SUGAR one to two times a day      naloxone 4 MG/0.1ML LIQD nasal spray 1 spray by Nasal route as needed for Opioid Reversal, Disp-1 each, R-5Normal      Handicap Placard MISC Starting Wed 2/3/2021, Disp-1 each, R-0, PrintExp 2/3/2026             ALLERGIES     Patient has no known allergies. FAMILY HISTORY       Family History   Problem Relation Age of Onset    Hypertension Mother     Liver Disease Mother     Cancer Father         stomach    Diabetes Sister     Cancer Brother         kidney    No Known Problems Maternal Grandmother     No Known Problems Maternal Grandfather     No Known Problems Paternal Grandmother     No Known Problems Paternal Grandfather     Other Brother         AIDS          SOCIAL HISTORY       Social History     Socioeconomic History    Marital status:      Spouse name: None    Number of children: 3    Years of education: None    Highest education level: None   Tobacco Use    Smoking status: Former     Packs/day: 0.25     Years: 7.00     Pack years: 1.75     Types: Cigarettes     Start date: 10/16/1977     Quit date: 1983     Years since quittin.7    Smokeless tobacco: Never   Substance and Sexual Activity    Alcohol use: No    Drug use: No    Sexual activity: Not Currently     Social Determinants of Health     Financial Resource Strain: Unknown    Difficulty of Paying Living Expenses: Patient refused   Food Insecurity: No Food Insecurity    Worried About Running Out of Food in the Last Year: Never true    Ran Out of Food in the Last Year: Never true       SCREENINGS             PHYSICAL EXAM    (up to 7 for level 4, 8 or more for level 5)     ED Triage Vitals [22 0707]   BP Temp Temp Source Heart Rate Resp SpO2 Height Weight   (!) 158/86 97.5 °F (36.4 °C) Oral 71 16 99 % 5' 2\" (1.575 m) 150 lb (68 kg)       Physical Exam  Vitals reviewed.    Constitutional:       General: She is not in acute distress. Appearance: She is not ill-appearing. HENT:      Head: Normocephalic. Right Ear: External ear normal.      Left Ear: External ear normal.      Nose: Nose normal.   Eyes:      Extraocular Movements: Extraocular movements intact. Cardiovascular:      Rate and Rhythm: Normal rate and regular rhythm. Heart sounds: Normal heart sounds. Pulmonary:      Effort: Pulmonary effort is normal. No respiratory distress. Breath sounds: Normal breath sounds. Abdominal:      Palpations: Abdomen is soft. Tenderness: There is no abdominal tenderness. Musculoskeletal:      Cervical back: Neck supple. Right lower leg: No edema. Left lower leg: No edema. Skin:     General: Skin is warm and dry. Coloration: Skin is not pale. Neurological:      General: No focal deficit present. Mental Status: She is alert and oriented to person, place, and time. DIAGNOSTIC RESULTS     EKG: All EKG's are interpreted by the Emergency Department Physician who either signs orCo-signs this chart in the absence of a cardiologist.    RADIOLOGY:     Interpretation per the Radiologist below, ifavailable at the time of this note:    XR ACUTE ABD SERIES CHEST 1 VW   Final Result   1. No acute intrathoracic process. 2.  No evidence of bowel obstruction. Large stool burden. ED BEDSIDE ULTRASOUND:   Performed by ED Physician - none    LABS:  Labs Reviewed - No data to display    All other labs were within normal range ornot returned as of this dictation. EMERGENCY DEPARTMENT COURSE and DIFFERENTIAL DIAGNOSIS/MDM:   Vitals:    Vitals:    09/28/22 0707   BP: (!) 158/86   Pulse: 71   Resp: 16   Temp: 97.5 °F (36.4 °C)   TempSrc: Oral   SpO2: 99%   Weight: 68 kg (150 lb)   Height: 5' 2\" (1.575 m)            X-rays reveal large stool burden and no acute findings. Patient is administered oral mag citrate and lactulose enema and has put out small amount of stool. Since she is on an opiate chronically, I am prescribing Linzess to help with her constipation. follow-up instructions provided and she is to return for worsening symptoms.     MDM    CONSULTS:  None    PROCEDURES:  Unlessotherwise noted below, none     Procedures    FINAL IMPRESSION      1. Drug-induced constipation          DISPOSITION/PLAN   DISPOSITION Decision To Discharge 09/28/2022 10:11:05 AM      PATIENT REFERRED TO:  Allyn Portillo, APRN - CNP  [id number]  [phone]    In 3 days      DISCHARGE MEDICATIONS:         Problem List:  Patient Active Problem List   Diagnosis Code    Essential hypertension I10    Hyperlipidemia E78.5    Osteoarthritis M19.90    Obesity E66.9    CKD (chronic kidney disease) stage 3, GFR 30-59 ml/min ([facility]) N18.30    Proteinuria R80.9    Controlled type 2 diabetes mellitus with complication, without long-term current use of insulin ([facility]) E11.8    History of DVT of lower extremity Z86.718    Vitamin D deficiency E55.9    Major depressive disorder, recurrent episode, severe, with psychotic behavior ([facility] [address] F33.3    Multiple lung nodules on CT R91.8    Hypomagnesemia E83.42    Anxiety F41.9    Chronic obstructive pulmonary disease ([facility]) J44.9    Precordial pain R07.2    History of pulmonary embolism Z86.711    Family history of abdominal aortic aneurysm Z82.49    Normal coronary arteries [id number]    Long term (current) use of anticoagulants Z79.01    AAA (abdominal aortic aneurysm) without rupture ([facility]) I71.4    COPD exacerbation ([facility]) J44.1    Acute tracheobronchitis-viral J20.9    Abnormal mammogram R92.8    Cerebrovascular accident (CVA) ([facility] [address] I63.9    Diabetic nephropathy ([facility]) E11.21    Slow transit constipation K59.01    Frontal mass of brain G93.89    Intracranial mass R90.0    Cerebral cavernoma Q28.3    Pseudogout M11.20    History of gout Z87.39    Familial cavernous cerebral angioma ([facility]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    Cavernoma D18.00    Meniscus, medial, anterior horn derangement, right M23.311    Post traumatic stress disorder F43.10    Patellofemoral arthritis of right knee M17.11           Summation      Patient Course: Discharged    ED Medicationsadministered this visit:    Medications   magnesium citrate solution 296 mL (296 mLs Oral Given 9/28/22 0847)   lactulose enema ( Rectal Given 9/28/22 0856)       New Prescriptions from this visit:    Discharge Medication List as of 9/28/2022 10:12 AM        START taking these medications    Details   linaCLOtide (LINZESS) 72 MCG CAPS capsule Take 1 capsule by mouth every morning (before breakfast), Disp-30 capsule, R-0Normal             Follow-up:  BRAULIO Ayers - CNP  [address] [place] [address]  [phone]    In 3 days        Final Impression:   1.  Drug-induced constipation               (Please note that portions of this note were completed with a voice recognitionprogram.  Efforts were made to edit the dictations but occasionally words are mis-transcribed.)    Nataly Koenig MD (electronically signed)  Attending Emergency Physician            Nataly Koenig MD  09/28/22 1907

## 2022-09-28 NOTE — ED NOTES
Small amount of solid stool removed per patient. No distress noted.      José Porter RN  09/28/22 2829

## 2022-09-28 NOTE — ED NOTES
Patient to the ER with c/c of no stool for 11 days. Patients last stool was liquid last Friday/Saturday which she states is normal for her. She takes narcotics for pain control and understands the side affects of the medication. Patient sent here by her PMD. Concern is for a blockage. Patient is a/o x 3, patent airway, speaking clearly in complete sentences. Patient denies any CP or dyspnea. Lungs are clear and equal bilaterally to auscultation, HT are S/R, A=R. Abdomen is soft, non-tender. No NVD in last 24 hours. Normal Bowel and Bladder habits. Patient has strong PMS x 4. No peripheral edema is appreciated. Patient was ambulatory into the ER today without distress.       Jj Dhillon RN  09/28/22 0781

## 2022-09-28 NOTE — ED NOTES
Remainder of lactulose enema administered. Patient was up to BR and noted two small solid stools released.       Devon Sena RN  09/28/22 0650

## 2022-09-28 NOTE — TELEPHONE ENCOUNTER
LOV 9/19/22  RTO 4 weeks; F/U scheduled  LRF 4/6/22    Health Maintenance   Topic Date Due    Diabetic retinal exam  07/09/2022    Cervical cancer screen  11/16/2022    Depression Monitoring  01/24/2023    Lipids  03/28/2023    Pneumococcal 0-64 years Vaccine (3 - PPSV23 or PCV20) 04/23/2023    Diabetic foot exam  08/29/2023    Breast cancer screen  09/10/2023    A1C test (Diabetic or Prediabetic)  09/19/2023    DTaP/Tdap/Td vaccine (2 - Td or Tdap) 08/04/2026    Colorectal Cancer Screen  02/16/2028    Flu vaccine  Completed    Shingles vaccine  Completed    COVID-19 Vaccine  Completed    Hepatitis C screen  Addressed    HIV screen  Addressed    Hepatitis A vaccine  Aged Out    Hib vaccine  Aged Out    Meningococcal (ACWY) vaccine  Aged Out             (applicable per patient's age: Cancer Screenings, Depression Screening, Fall Risk Screening, Immunizations)    Hemoglobin A1C (%)   Date Value   09/19/2022 5.3   03/28/2022 6.2 (H)   08/05/2021 7.2 (H)     Microalb/Crt.  Ratio (mcg/mg creat)   Date Value   04/08/2019 CANNOT BE CALCULATED     LDL Cholesterol (mg/dL)   Date Value   03/28/2022 62     LDL Calculated (mg/dL)   Date Value   04/10/2020 59     AST (U/L)   Date Value   03/28/2022 16     ALT (U/L)   Date Value   03/28/2022 15     BUN (mg/dL)   Date Value   09/19/2022 11      (goal A1C is < 7)   (goal LDL is <100) need 30-50% reduction from baseline     BP Readings from Last 3 Encounters:   09/28/22 (!) 158/86   09/19/22 128/84   08/19/22 122/80    (goal /80)      All Future Testing planned in [facility]:  Lab Frequency Next Occurrence   H. pylori antigen Once 12/21/2021   XR CHEST STANDARD (2 VW) Once 06/16/2022   CBC Once 06/16/2022   Comprehensive Metabolic Panel Once 20/43/7282   Brain Natriuretic Peptide Once 06/16/2022   D-Dimer, Quantitative Once 06/16/2022   Troponin Once 06/16/2022   MRI BRAIN W WO CONTRAST Once 12/27/2022   POCT INR     Basic Metabolic Panel Every 6 Months 1/27/2023, 7/14/2023   CBC Every 6 Months 1/27/2023, 7/14/2023   Protein / Creatinine Ratio, Urine Every 6 Months 1/27/2023, 7/14/2023, 12/29/2023, 6/14/2024, 11/29/2024, 5/16/2025       Next Visit Date:  Future Appointments   Date Time Provider Mark Mendozai   10/11/2022  9:30 AM [facility]   10/17/2022 11:00 AM Trisha Mayo [facility]N - CNP [facility]   12/19/2022 10:15 AM Yuki Landaverde, [facility]            Patient Active Problem List:     Essential hypertension     Hyperlipidemia     Osteoarthritis     Obesity     CKD (chronic kidney disease) stage 3, GFR 30-59 ml/min (HCC)     Proteinuria     Controlled type 2 diabetes mellitus with complication, without long-term current use of insulin (HCC)     History of DVT of lower extremity     Vitamin D deficiency     Major depressive disorder, recurrent episode, severe, with psychotic behavior ([facility] [address]     Multiple lung nodules on CT     Hypomagnesemia     Anxiety     Chronic obstructive pulmonary disease (HCC)     Precordial pain     History of pulmonary embolism     Family history of abdominal aortic aneurysm     Normal coronary arteries     Long term (current) use of anticoagulants     AAA (abdominal aortic aneurysm) without rupture (HCC)     COPD exacerbation (HCC)     Acute tracheobronchitis-viral     Abnormal mammogram     Cerebrovascular accident (CVA) ([facility] [address]     Diabetic nephropathy (HCC)     Slow transit constipation     Frontal mass of brain     Intracranial mass     Cerebral cavernoma     Pseudogout     History of gout     Familial cavernous cerebral angioma (HCC)     Cavernoma     Meniscus, medial, anterior horn derangement, right     Post traumatic stress disorder     Patellofemoral arthritis of right knee

## 2022-09-28 NOTE — ED NOTES
Patient was able to drink the Mag Citrate. 1/2 of the enema infused rectally. Patient tolerating fine.      Ceci Murillo RN  09/28/22 1680

## 2022-09-28 NOTE — TELEPHONE ENCOUNTER
Telephone call from patient stating she went to [facility] ED today because she hasn't had a bowel movement for 11 days. She saw Dr Yovana Cronin, who said there was no intestinal blockage, so patient was given Mag citrate, and a lactulose enema. Then she was sent home with a prescription for  linaclitide 72mcg. Asaf Shannon She was told by pharmacy that this med needs a prior authorization.

## 2022-09-29 RX ORDER — ALLOPURINOL 100 MG/1
TABLET ORAL
Qty: 180 TABLET | Refills: 1 | Status: SHIPPED | OUTPATIENT
Start: 2022-09-29 | End: 2023-09-29

## 2022-09-30 NOTE — TELEPHONE ENCOUNTER
Telephone call to patient to explain that the prescribing physician needs to get the prior auth for this medication. Understanding expressed.

## 2022-10-03 DIAGNOSIS — J41.1 MUCOPURULENT CHRONIC BRONCHITIS (HCC): ICD-10-CM

## 2022-10-03 RX ORDER — SENNA PLUS 8.6 MG/1
1 TABLET ORAL 2 TIMES DAILY
Qty: 60 TABLET | Refills: 1 | Status: SHIPPED | OUTPATIENT
Start: 2022-10-03 | End: 2023-10-03

## 2022-10-03 RX ORDER — POLYETHYLENE GLYCOL 3350 17 G/17G
17 POWDER, FOR SOLUTION ORAL 2 TIMES DAILY
Qty: 1 EACH | Refills: 1 | Status: SHIPPED | OUTPATIENT
Start: 2022-10-03 | End: 2023-10-03

## 2022-10-03 RX ORDER — TIOTROPIUM BROMIDE AND OLODATEROL 3.124; 2.736 UG/1; UG/1
SPRAY, METERED RESPIRATORY (INHALATION)
Qty: 4 G | OUTPATIENT
Start: 2022-10-03

## 2022-10-03 NOTE — TELEPHONE ENCOUNTER
Stomach pain today. Patient unable to have BM yet. Emergency room prescribed linzess. Would provider be willing to call in prescription? Hospital unable to assist with PA. States she is eating normally. No dietary changes. Still not having regular BM's.

## 2022-10-03 NOTE — TELEPHONE ENCOUNTER
Linzess sent may need PA  I want her to take Miralax and dulcolax both twice a day. She has a very large amount of stool noted     Also want her to go back to Rybelsus 3 mg.    The increase to 7 mg could be worsening constipation

## 2022-10-04 RX ORDER — ORAL SEMAGLUTIDE 3 MG/1
TABLET ORAL
Qty: 30 TABLET | Refills: 1 | Status: CANCELLED | OUTPATIENT
Start: 2022-10-04 | End: 2023-10-04

## 2022-10-04 NOTE — TELEPHONE ENCOUNTER
Patient notified of recommendations and expressed understanding. Awaiting insurance determination on Linzess.  3 mg pending for provider review/approval.

## 2022-10-06 ENCOUNTER — TELEPHONE (OUTPATIENT)
Dept: PRIMARY CARE CLINIC | Age: 62
End: 2022-10-06

## 2022-10-11 ENCOUNTER — TELEMEDICINE (OUTPATIENT)
Dept: BEHAVIORAL/MENTAL HEALTH CLINIC | Age: 62
End: 2022-10-11
Payer: MEDICARE

## 2022-10-11 DIAGNOSIS — F43.10 POST TRAUMATIC STRESS DISORDER: Primary | ICD-10-CM

## 2022-10-11 PROCEDURE — 1036F TOBACCO NON-USER: CPT | Performed by: SOCIAL WORKER

## 2022-10-11 PROCEDURE — 90832 PSYTX W PT 30 MINUTES: CPT | Performed by: SOCIAL WORKER

## 2022-10-11 NOTE — PROGRESS NOTES
ADULT BEHAVIORAL HEALTH FOLLOW UP  MARIBELL Mccormack  Licensed Independent        Visit Date: 10/11/2022   Time of appointment:  [phone]   Time spent with Patient: 17 minutes. This is patient's 12th appointment. Reason for Consult:  Stress     Referring Provider/PCP:    No ref. provider found  Taco Almanzar, APRN - CNP      Pt provided informed consent for the behavioral health program. Discussed with patient model of service to include the limits of confidentiality (i.e. abuse reporting, suicide intervention, etc.) and short-term intervention focused approach. Pt indicated understanding. Telehealth session conducted via [employer]. me, pt in privacy of car, in [place], clinician located in [place], [place]. Yaya Son is a 57 y.o. female who presents for follow up of stress. She reports overall doing well, moving to own apartment in 2 weeks. She states her mood has been stable, continuing to work thru grief on her own. She states \"it gets to me sometimes\", but is getting better. She continues to socialize with others, working to increase comfort with this. She denies any concerns at this time. Previous Recommendations: Haleigh Scherer will utilize grounding, mantra, and relaxation skills to stay focused on the present. She will follow up with Augustina Obrien in 3 weeks. MENTAL STATUS EXAM  Mood was within normal limits with calm affect. Suicidal ideation was denied. Homicidal ideation was denied. Hygiene was good . Dress was appropriate. Behavior was Within Normal Limits with No observation or self-report of difficulties ambulating. Attitude was Cooperative. Eye-contact was good. Speech: rate - WNL, rhythm - WNL, volume - WNL. Verbalizations were coherent. Thought processes were intact and goal-oriented without evidence of delusions, hallucinations, obsessions, or christel; with little cognitive distortions. Associations were characterized by intact cognitive processes.   Pt was oriented to person, place, time, and general circumstances;  recent:  good and remote:  good. Insight and judgment were estimated to be fair, AEB, a fair  understanding of cyclical maladaptive patterns, and the ability to use insight to inform behavior change. ASSESSMENT  Cayetano Martino presented to the appointment today for evaluation and treatment of symptoms of stress. She is currently deemed no risk to herself or others and meets criteria for PTSD. Yesica was in agreement with recommendations. PHQ Scores 1/24/2022 7/25/2018 6/27/2017   PHQ2 Score 0 0 0   PHQ9 Score 0 0 0     Interpretation of Total Score Depression Severity: 1-4 = Minimal depression, 5-9 = Mild depression, 10-14 = Moderate depression, 15-19 = Moderately severe depression, 20-27 = Severe depression    How often pt has had thoughts of death or hurting self (if PHQ positive for depression):       No flowsheet data found. Interpretation of ZAIN-7 score: 5-9 = mild anxiety, 10-14 = moderate anxiety, 15+ = severe anxiety. Recommend referral to behavioral health for scores 10 or greater. DIAGNOSIS  Sara Olivas was seen today for stress. Diagnoses and all orders for this visit:    Post traumatic stress disorder        INTERVENTION  Trained in strategies for increasing balanced thinking, Discussed self-care (sleep, nutrition, rewarding activities, social support, exercise), and Supportive techniques      Dava Najjar will continue healthy coping skills. She will follow up with Zakiya Becker in 1 month. INTERACTIVE COMPLEXITY  Is interactive complexity present?   No  Reason:  N/A  Additional Supporting Information:  N/A       Electronically signed by MARIBELL Ng on 10/11/22 at 9:30 AM EDT

## 2022-10-16 DIAGNOSIS — J41.1 MUCOPURULENT CHRONIC BRONCHITIS (HCC): ICD-10-CM

## 2022-10-17 ENCOUNTER — OFFICE VISIT (OUTPATIENT)
Dept: FAMILY MEDICINE CLINIC | Age: 62
End: 2022-10-17
Payer: MEDICARE

## 2022-10-17 VITALS
BODY MASS INDEX: 27.18 KG/M2 | WEIGHT: 148.6 LBS | HEART RATE: 85 BPM | RESPIRATION RATE: 22 BRPM | DIASTOLIC BLOOD PRESSURE: 84 MMHG | OXYGEN SATURATION: 98 % | TEMPERATURE: 97.2 F | SYSTOLIC BLOOD PRESSURE: 142 MMHG

## 2022-10-17 DIAGNOSIS — J44.9 CHRONIC OBSTRUCTIVE PULMONARY DISEASE, UNSPECIFIED COPD TYPE (HCC): ICD-10-CM

## 2022-10-17 DIAGNOSIS — I10 ESSENTIAL HYPERTENSION: ICD-10-CM

## 2022-10-17 DIAGNOSIS — K59.01 SLOW TRANSIT CONSTIPATION: ICD-10-CM

## 2022-10-17 DIAGNOSIS — M15.9 PRIMARY OSTEOARTHRITIS INVOLVING MULTIPLE JOINTS: ICD-10-CM

## 2022-10-17 DIAGNOSIS — E11.8 CONTROLLED TYPE 2 DIABETES MELLITUS WITH COMPLICATION, WITHOUT LONG-TERM CURRENT USE OF INSULIN (HCC): Primary | ICD-10-CM

## 2022-10-17 DIAGNOSIS — N18.30 STAGE 3 CHRONIC KIDNEY DISEASE, UNSPECIFIED WHETHER STAGE 3A OR 3B CKD (HCC): ICD-10-CM

## 2022-10-17 PROCEDURE — 3044F HG A1C LEVEL LT 7.0%: CPT | Performed by: NURSE PRACTITIONER

## 2022-10-17 PROCEDURE — 3023F SPIROM DOC REV: CPT | Performed by: NURSE PRACTITIONER

## 2022-10-17 PROCEDURE — 3078F DIAST BP <80 MM HG: CPT | Performed by: NURSE PRACTITIONER

## 2022-10-17 PROCEDURE — 1036F TOBACCO NON-USER: CPT | Performed by: NURSE PRACTITIONER

## 2022-10-17 PROCEDURE — 2022F DILAT RTA XM EVC RTNOPTHY: CPT | Performed by: NURSE PRACTITIONER

## 2022-10-17 PROCEDURE — G8484 FLU IMMUNIZE NO ADMIN: HCPCS | Performed by: NURSE PRACTITIONER

## 2022-10-17 PROCEDURE — G8417 CALC BMI ABV UP PARAM F/U: HCPCS | Performed by: NURSE PRACTITIONER

## 2022-10-17 PROCEDURE — G8427 DOCREV CUR MEDS BY ELIG CLIN: HCPCS | Performed by: NURSE PRACTITIONER

## 2022-10-17 PROCEDURE — 3017F COLORECTAL CA SCREEN DOC REV: CPT | Performed by: NURSE PRACTITIONER

## 2022-10-17 PROCEDURE — 99214 OFFICE O/P EST MOD 30 MIN: CPT | Performed by: NURSE PRACTITIONER

## 2022-10-17 PROCEDURE — 3074F SYST BP LT 130 MM HG: CPT | Performed by: NURSE PRACTITIONER

## 2022-10-17 RX ORDER — LISINOPRIL 5 MG/1
5 TABLET ORAL DAILY
Qty: 90 TABLET | Refills: 1 | Status: SHIPPED | OUTPATIENT
Start: 2022-10-17

## 2022-10-17 RX ORDER — LISINOPRIL 5 MG/1
10 TABLET ORAL DAILY
Qty: 180 TABLET | Refills: 3 | Status: SHIPPED | OUTPATIENT
Start: 2022-10-17 | End: 2022-10-17 | Stop reason: DRUGHIGH

## 2022-10-17 RX ORDER — HYDROCODONE BITARTRATE AND ACETAMINOPHEN 5; 325 MG/1; MG/1
1 TABLET ORAL EVERY 8 HOURS PRN
Qty: 40 TABLET | Refills: 0 | Status: SHIPPED | OUTPATIENT
Start: 2022-10-17 | End: 2022-11-15 | Stop reason: SDUPTHER

## 2022-10-17 ASSESSMENT — ENCOUNTER SYMPTOMS
COUGH: 1
ABDOMINAL DISTENTION: 1
SHORTNESS OF BREATH: 0
SINUS PRESSURE: 0
CONSTIPATION: 1
ABDOMINAL PAIN: 1
SORE THROAT: 0
RHINORRHEA: 0
CHEST TIGHTNESS: 1
BACK PAIN: 1
DIARRHEA: 0
TROUBLE SWALLOWING: 0
WHEEZING: 0
RECTAL PAIN: 0

## 2022-10-17 ASSESSMENT — VISUAL ACUITY: OU: 1

## 2022-10-17 NOTE — TELEPHONE ENCOUNTER
LOV 9/19/22  RTO 4 weeks; F/U today  [facility] 8/19/22    Health Maintenance   Topic Date Due    Diabetic retinal exam  07/09/2022    Cervical cancer screen  11/16/2022    Depression Monitoring  01/24/2023    Lipids  03/28/2023    Pneumococcal 0-64 years Vaccine (3 - PPSV23 or PCV20) 04/23/2023    Diabetic foot exam  08/29/2023    Breast cancer screen  09/10/2023    A1C test (Diabetic or Prediabetic)  09/19/2023    DTaP/Tdap/Td vaccine (2 - Td or Tdap) 08/04/2026    Colorectal Cancer Screen  02/16/2028    Flu vaccine  Completed    Shingles vaccine  Completed    COVID-19 Vaccine  Completed    Hepatitis C screen  Addressed    HIV screen  Addressed    Hepatitis A vaccine  Aged Out    Hib vaccine  Aged Out    Meningococcal (ACWY) vaccine  Aged Out             (applicable per patient's age: Cancer Screenings, Depression Screening, Fall Risk Screening, Immunizations)    Hemoglobin A1C (%)   Date Value   09/19/2022 5.3   03/28/2022 6.2 (H)   08/05/2021 7.2 (H)     Microalb/Crt.  Ratio (mcg/mg creat)   Date Value   04/08/2019 CANNOT BE CALCULATED     LDL Cholesterol (mg/dL)   Date Value   03/28/2022 62     LDL Calculated (mg/dL)   Date Value   04/10/2020 59     AST (U/L)   Date Value   03/28/2022 16     ALT (U/L)   Date Value   03/28/2022 15     BUN (mg/dL)   Date Value   09/19/2022 11      (goal A1C is < 7)   (goal LDL is <100) need 30-50% reduction from baseline     BP Readings from Last 3 Encounters:   10/17/22 (!) 150/90   09/28/22 (!) 158/86   09/19/22 128/84    (goal /80)      All Future Testing planned in [facility]:  Lab Frequency Next Occurrence   H. pylori antigen Once 12/21/2021   XR CHEST STANDARD (2 VW) Once 06/16/2022   CBC Once 06/16/2022   Comprehensive Metabolic Panel Once 33/56/3217   Brain Natriuretic Peptide Once 06/16/2022   D-Dimer, Quantitative Once 06/16/2022   Troponin Once 06/16/2022   MRI BRAIN W WO CONTRAST Once 12/27/2022   POCT INR     Basic Metabolic Panel Every 6 Months 1/27/2023, 7/14/2023 CBC Every 6 Months 1/27/2023, 7/14/2023   Protein / Creatinine Ratio, Urine Every 6 Months 1/27/2023, 7/14/2023, 12/29/2023, 6/14/2024, 11/29/2024, 5/16/2025       Next Visit Date:  Future Appointments   Date Time Provider Mark Sandi   [facility]20[facility]   12/19/2022 10:15 AM Elham Grimaldo DPCLAUDIA [facility]G [facility]            Patient Active Problem List:     Essential hypertension     Hyperlipidemia     Osteoarthritis     Obesity     CKD (chronic kidney disease) stage 3, GFR 30-59 ml/min (HCC)     Proteinuria     Controlled type 2 diabetes mellitus with complication, without long-term current use of insulin (HCC)     History of DVT of lower extremity     Vitamin D deficiency     Major depressive disorder, recurrent episode, severe, with psychotic behavior ([address]     Multiple lung nodules on CT     Hypomagnesemia     Anxiety     Chronic obstructive pulmonary disease (HCC)     Precordial pain     History of pulmonary embolism     Family history of abdominal aortic aneurysm     Normal coronary arteries     Long term (current) use of anticoagulants     AAA (abdominal aortic aneurysm) without rupture     COPD exacerbation (HCC)     Acute tracheobronchitis-viral     Abnormal mammogram     Cerebrovascular accident (CVA) ([address]     Diabetic nephropathy (HCC)     Slow transit constipation     Frontal mass of brain     Intracranial mass     Cerebral cavernoma     Pseudogout     History of gout     Familial cavernous cerebral angioma (HCC)     Cavernoma     Meniscus, medial, anterior horn derangement, right     Post traumatic stress disorder     Patellofemoral arthritis of right knee

## 2022-10-17 NOTE — PROGRESS NOTES
[address]  [phone]    10/17/22     Patient ID  Laurel England is a 57 y.o. female  Established patient    Chief Complaint  Laurel England presents today for Depression, Constipation, and Laryngitis (Onset Sunday. Coughing and sinusitis. Denies any fever. )    Have you seen any other physician or provider since your last visit? Yes - Records Obtained [facility] 9/28/22- Drug Induced Constipation  Have you had any other diagnostic tests since your last visit? No  Have you been seen in the emergency room and/or had an admission to a hospital since we last saw you? Yes - Records Obtained 9/28/22 [facility]- Drug induced constipation     ASSESSMENT/PLAN  1. Controlled type 2 diabetes mellitus with complication, without long-term current use of insulin (HCC)  -     lisinopril (PRINIVIL;ZESTRIL) 5 MG tablet; Take 1 tablet by mouth daily, Disp-90 tablet, R-1Normal  -     DME Order for (Specify) as OP  -     DME Order for (Specify) as OP  -     DME Order for (Specify) as OP  2. Chronic obstructive pulmonary disease, unspecified COPD type ([facility]  -     DME Order for (Specify) as OP  -     DME Order for (Specify) as OP  -     DME Order for (Specify) as OP  3. Stage 3 chronic kidney disease, unspecified whether stage 3a or 3b CKD ([facility]  -     DME Order for (Specify) as OP  -     DME Order for (Specify) as OP  -     DME Order for (Specify) as OP  4. Essential hypertension  -     lisinopril (PRINIVIL;ZESTRIL) 5 MG tablet; Take 1 tablet by mouth daily, Disp-90 tablet, R-1Normal  -     DME Order for (Specify) as OP  -     DME Order for (Specify) as OP  -     DME Order for (Specify) as OP  5. Primary osteoarthritis involving multiple joints  -     HYDROcodone-acetaminophen (NORCO) 5-325 MG per tablet; Take 1 tablet by mouth every 8 hours as needed for Pain for up to 30 days. Intended supply: 7 days.  Take lowest dose possible to manage pain, Disp-40 tablet, R-0Normal  -     DME Order for (Specify) as OP  -     DME Order for (Specify) as OP  -     DME Order for (Specify) as OP  6. Slow transit constipation     Stop Linzess - not working  Stop Rybelsus  Change to [facility] from [address] to help with DMe      Return in about 6 weeks (around 11/28/2022) for COPD, DM. Patient Care Team:  BRAULIO Jorgensen CNP as PCP - General (Nurse Practitioner Family)  BRAULIO Jorgensen CNP as PCP - [facility]  Aleshia Lopez MD as Consulting Physician (Nephrology)  Micheal Souza MD as Consulting Physician (Pulmonology)  Laurel Greco MD as Consulting Physician (Cardiology)  Elyse Severance, DPM as Consulting Physician (Krystal Beard)  Liane Nicolas MD (Psychiatry)  Bhupendra Valdez MD as Consulting Physician (Neurology)  Dorisann Kehr, DO as Surgeon (Neurosurgery)    SUBJECTIVE/OBJECTIVE  History of Present illness / Visit Summary   Patient presents today for follow up   Reviewed health maintenance and any recent labs/imaging      Still planning to move - next Monday   Woke up Sunday not feeling well   No thermometer? Went to hospice grief? Only went once    Review of Systems  Review of Systems   Constitutional:  Positive for appetite change and fatigue. Negative for activity change, chills and fever. Woke up Sunday ill    HENT:  Negative for congestion, ear pain, postnasal drip, rhinorrhea, sinus pressure, sneezing, sore throat and trouble swallowing. Respiratory:  Positive for cough (excessive sputum) and chest tightness. Negative for shortness of breath and wheezing. Rarely needs rescue inhaler    Cardiovascular:  Negative for chest pain, palpitations and leg swelling. Gastrointestinal:  Positive for abdominal distention, abdominal pain and constipation. Negative for diarrhea and rectal pain. Genitourinary:  Negative for difficulty urinating, dysuria, frequency, hematuria and urgency.    Musculoskeletal: Positive for back pain. Negative for arthralgias, gait problem, joint swelling and myalgias. Recent xray ([facility]) showed past left rib fracture    Skin:  Negative for rash and wound. Allergic/Immunologic: Negative for environmental allergies. Neurological:  Negative for dizziness, syncope, light-headedness, numbness and headaches. Nati Prom last Friday   Walking in house, smaller grandson holding door, had stuff in hands. Took step up, looked at him, didn't raise foot enough. Fell backwards on dog cage   Hematological:  Does not bruise/bleed easily. Psychiatric/Behavioral:  Positive for agitation and sleep disturbance. Negative for decreased concentration, self-injury and suicidal ideas. The patient is nervous/anxious. Follows with psychiatry for pharmacology and therapy routinely      Physical exam   Physical Exam  Vitals and nursing note reviewed. Constitutional:       General: She is not in acute distress. Appearance: Normal appearance. She is well-developed and well-groomed. She is not ill-appearing or toxic-appearing. HENT:      Head: Normocephalic. Right Ear: Ear canal and external ear normal. No middle ear effusion. There is no impacted cerumen. Tympanic membrane is retracted. Tympanic membrane is not erythematous or bulging. Left Ear: Ear canal and external ear normal.  No middle ear effusion. There is no impacted cerumen. Tympanic membrane is retracted. Tympanic membrane is not erythematous or bulging. Nose: Mucosal edema present. No congestion or rhinorrhea. Right Turbinates: Not enlarged or swollen. Left Turbinates: Not enlarged or swollen. Right Sinus: No maxillary sinus tenderness or frontal sinus tenderness. Left Sinus: No maxillary sinus tenderness or frontal sinus tenderness. Mouth/Throat:      Lips: Pink. Mouth: Mucous membranes are moist.      Dentition: Normal dentition. No dental caries. Pharynx: Oropharynx is clear. No oropharyngeal exudate, posterior oropharyngeal erythema or uvula swelling. Comments: Post nasal drip noted   Eyes:      General: Lids are normal. Vision grossly intact. Cardiovascular:      Rate and Rhythm: Normal rate and regular rhythm. No extrasystoles are present. Heart sounds: Normal heart sounds, S1 normal and S2 normal. No murmur heard. Pulmonary:      Effort: Pulmonary effort is normal. No accessory muscle usage, prolonged expiration or respiratory distress. Breath sounds: Normal breath sounds and air entry. No wheezing, rhonchi or rales. Abdominal:      General: Bowel sounds are decreased. There is distension. Tenderness: There is no abdominal tenderness. Musculoskeletal:      Cervical back: No torticollis. No pain with movement. Normal range of motion. Right lower leg: No edema. Left lower leg: No edema. Lymphadenopathy:      Cervical: No cervical adenopathy. Skin:     General: Skin is warm and dry. Coloration: Skin is not ashen, cyanotic, jaundiced or pale. Neurological:      General: No focal deficit present. Mental Status: She is alert and oriented to person, place, and time. Motor: Motor function is intact. Gait: Gait is intact. Psychiatric:         Attention and Perception: Attention and perception normal.         Mood and Affect: Mood is anxious and depressed. Affect is tearful. Speech: Speech normal.         Behavior: Behavior normal. Behavior is cooperative. Thought Content: Thought content normal. Thought content does not include suicidal ideation. Thought content does not include suicidal plan. Cognition and Memory: Cognition and memory normal.         Judgment: Judgment normal.         Electronically signed by BRAULIO Malagon CNP, APRN-CNP on 11/6/2022 at 9:26 AM    Please note that this chart was generated using voice recognition Dragon dictation software.   Although every effort was made to ensure the accuracy of this automated transcription, some errors in transcription may have occurred.

## 2022-10-18 ENCOUNTER — PATIENT MESSAGE (OUTPATIENT)
Dept: FAMILY MEDICINE CLINIC | Age: 62
End: 2022-10-18

## 2022-10-18 DIAGNOSIS — M54.50 ACUTE BILATERAL LOW BACK PAIN WITHOUT SCIATICA: Primary | ICD-10-CM

## 2022-10-19 RX ORDER — LIDOCAINE 50 MG/G
1 PATCH TOPICAL DAILY
Qty: 30 PATCH | Refills: 0 | Status: SHIPPED | OUTPATIENT
Start: 2022-10-19 | End: 2022-11-18

## 2022-10-19 NOTE — TELEPHONE ENCOUNTER
From: Jim Hodge  To:  Allyn Portillo  Sent: 10/18/2022 4:57 PM EDT  Subject: Back pain    Trisha one of the emergency doctors prescribed Lidocaine 5% patch I was wanting to see if you can send a prescription for them for by back from where I fell I think I had them back in June

## 2022-10-27 ENCOUNTER — TELEPHONE (OUTPATIENT)
Dept: FAMILY MEDICINE CLINIC | Age: 62
End: 2022-10-27

## 2022-10-27 DIAGNOSIS — N18.30 CONTROLLED TYPE 2 DIABETES MELLITUS WITH STAGE 3 CHRONIC KIDNEY DISEASE, WITHOUT LONG-TERM CURRENT USE OF INSULIN (HCC): ICD-10-CM

## 2022-10-27 DIAGNOSIS — E11.22 CONTROLLED TYPE 2 DIABETES MELLITUS WITH STAGE 3 CHRONIC KIDNEY DISEASE, WITHOUT LONG-TERM CURRENT USE OF INSULIN (HCC): ICD-10-CM

## 2022-10-27 DIAGNOSIS — K21.9 GASTROESOPHAGEAL REFLUX DISEASE: ICD-10-CM

## 2022-10-27 RX ORDER — OMEPRAZOLE 20 MG/1
CAPSULE, DELAYED RELEASE ORAL
Qty: 90 CAPSULE | Refills: 1 | Status: SHIPPED | OUTPATIENT
Start: 2022-10-27

## 2022-10-27 NOTE — TELEPHONE ENCOUNTER
Patient contacted the office stating heartburn occurring daily. Taking Pepcid BID as prescribed. Would like to be switched back to omeprazole? Please advise. Rybelsus stopped. No DM medication currently. Discussed starting farxiga back up if her BS were elevated? BS's ranging 100-150 and on 10/20/22 it was 193. Over 100 every day since then. Should she start farxiga? FYI she hasn't gotten keys to apartment yet.

## 2022-10-28 NOTE — TELEPHONE ENCOUNTER
Patient notified. Still not having routine BM's. Last BM 4 days ago. Stomach and lower abdomen is still in pain, causing constipation. Lower belly pain especially when walking.

## 2022-10-28 NOTE — TELEPHONE ENCOUNTER
Farxigo 5 mg daily sent. We will check in on her Monday, hopeful abdomen feeling better. If not, and becomes worse, she will need to be seen.

## 2022-11-01 NOTE — TELEPHONE ENCOUNTER
I only wanted Lisinopril 5 mg once a day   Only thing for sugars is Farxiga 5 mg once a day     On 10/17 I sent Lisinopril 5mg and Norco   We stopped Percocet.    We stopped Rybelsus

## 2022-11-01 NOTE — TELEPHONE ENCOUNTER
Patient started farxiga, omeprazole, and linacotide. Her heartburn is gone. BM's daily. Diet is improving and she is tracking. States lisinopril 5 mg was only sent daily instead of 2 tablets. As she was previously breaking the 20's in half. It looks as though two scripts were sent on 10/17/22?

## 2022-11-04 ENCOUNTER — TELEPHONE (OUTPATIENT)
Dept: FAMILY MEDICINE CLINIC | Age: 62
End: 2022-11-04

## 2022-11-04 NOTE — TELEPHONE ENCOUNTER
Patient states AD was going to write a script for a long grasper and a  BP machine with smaller cuff. Please send to   [address] [facility] Fax: [phone]  Patient also states AD was looking into a Life Alert, waterproof necklace. Patient is to move into her own apartment the week of 11/07/2023.

## 2022-11-08 ENCOUNTER — OFFICE VISIT (OUTPATIENT)
Dept: BEHAVIORAL/MENTAL HEALTH CLINIC | Age: 62
End: 2022-11-08
Payer: MEDICARE

## 2022-11-08 DIAGNOSIS — F43.10 POST TRAUMATIC STRESS DISORDER: Primary | ICD-10-CM

## 2022-11-08 PROCEDURE — 90832 PSYTX W PT 30 MINUTES: CPT | Performed by: SOCIAL WORKER

## 2022-11-08 PROCEDURE — 1036F TOBACCO NON-USER: CPT | Performed by: SOCIAL WORKER

## 2022-11-08 NOTE — TELEPHONE ENCOUNTER
Patient was given different fax # for [id number] [facility] [phone]  [facility] faxed.

## 2022-11-08 NOTE — PROGRESS NOTES
ADULT BEHAVIORAL HEALTH FOLLOW UP  Milly Valencia, LISW-S  Licensed Independent        Visit Date: 11/8/2022   Time of appointment:  [phone]   Time spent with Patient: 35 minutes. This is patient's 13th appointment. Reason for Consult:  Stress     Referring Provider/PCP:    No ref. provider found  Alexia BRAULIO Starkey - CNP      Pt provided informed consent for the behavioral health program. Discussed with patient model of service to include the limits of confidentiality (i.e. abuse reporting, suicide intervention, etc.) and short-term intervention focused approach. Pt indicated understanding. Camille Guerra is a 57 y.o. female who presents for follow up of stress. She reports continuing to struggle with grief, we processed dual process of grief and ways to allow oscillation between loss and restoration instead of staying stuck in loss. She reports some judgement from daughters about her grief and decisions, she was encouraged to remember she is her own expert in herself and needs to do what is best for her. We reviewed grief podcasts to listen to (showed her how to access on her phone) as well as non grief ones to balance out. She reports a struggle communicating with new apartment, hopeful this will be resolved so she can move in soon. Previous Recommendations: [facility] will continue healthy coping skills. She will follow up with Mariah Lam in 1 month. MENTAL STATUS EXAM  Mood was anxious with anxious affect. Suicidal ideation was denied. Homicidal ideation was denied. Hygiene was fair . Dress was appropriate. Behavior was Within Normal Limits with No observation or self-report of difficulties ambulating. Attitude was Cooperative. Eye-contact was good. Speech: rate - WNL, rhythm - WNL, volume - WNL. Verbalizations were coherent.   Thought processes were intact and goal-oriented without evidence of delusions, hallucinations, obsessions, or christel; with moderate cognitive distortions. Associations were characterized by intact cognitive processes. Pt was oriented to person, place, time, and general circumstances;  recent:  good and remote:  good. Insight and judgment were estimated to be fair, AEB, a fair  understanding of cyclical maladaptive patterns, and the ability to use insight to inform behavior change. ASSESSMENT  Deon Lima presented to the appointment today for evaluation and treatment of symptoms of anxiety. She is currently deemed no risk to herself or others and meets criteria for PTSD. Yesica was in agreement with recommendations. PHQ Scores 1/24/2022 7/25/2018 6/27/2017   PHQ2 Score 0 0 0   PHQ9 Score 0 0 0     Interpretation of Total Score Depression Severity: 1-4 = Minimal depression, 5-9 = Mild depression, 10-14 = Moderate depression, 15-19 = Moderately severe depression, 20-27 = Severe depression    How often pt has had thoughts of death or hurting self (if PHQ positive for depression):       No flowsheet data found. Interpretation of ZAIN-7 score: 5-9 = mild anxiety, 10-14 = moderate anxiety, 15+ = severe anxiety. Recommend referral to behavioral health for scores 10 or greater. DIAGNOSIS  Shelly Ross was seen today for stress. Diagnoses and all orders for this visit:    Post traumatic stress disorder        INTERVENTION  Trained in strategies for increasing balanced thinking, Discussed self-care (sleep, nutrition, rewarding activities, social support, exercise), Supportive techniques, Provided Psychoeducation re: dual process of grief, and Identified maladaptive thoughts      Nita Mckeon will allow herself to balance between feeling grief and experiencing her life. She will follow up with Moreila Mc in 2 weeks. INTERACTIVE COMPLEXITY  Is interactive complexity present?   No  Reason:  N/A  Additional Supporting Information:  N/A       Electronically signed by MARIBELL Felder on 11/8/22 at 9:27 AM EST

## 2022-11-15 DIAGNOSIS — M15.9 PRIMARY OSTEOARTHRITIS INVOLVING MULTIPLE JOINTS: ICD-10-CM

## 2022-11-15 DIAGNOSIS — F41.9 ANXIETY: ICD-10-CM

## 2022-11-15 RX ORDER — HYDROCODONE BITARTRATE AND ACETAMINOPHEN 5; 325 MG/1; MG/1
1 TABLET ORAL EVERY 8 HOURS PRN
Qty: 40 TABLET | Refills: 0 | Status: SHIPPED | OUTPATIENT
Start: 2022-11-15 | End: 2022-12-15

## 2022-11-15 RX ORDER — ALPRAZOLAM 0.25 MG/1
0.25 TABLET ORAL 3 TIMES DAILY PRN
Qty: 21 TABLET | Refills: 0 | Status: SHIPPED | OUTPATIENT
Start: 2022-11-15 | End: 2022-11-28 | Stop reason: SDUPTHER

## 2022-11-15 NOTE — TELEPHONE ENCOUNTER
"              After Visit Summary   1/16/2017    Dahiana Jain    MRN: [id number]           Patient Information     Date Of Birth          2003        Visit Information        Provider Department      1/16/2017 2:40 PM Steven Lowry MD [facility]        Today's Diagnoses     Major depressive disorder, recurrent episode, mild (H)    -  1     Other insomnia            Follow-ups after your visit        Who to contact     If you have questions or need follow up information about today's clinic visit or your schedule please contact [facility] directly at [phone].  Normal or non-critical lab and imaging results will be communicated to you by BioCisionhart, letter or phone within 4 business days after the clinic has received the results. If you do not hear from us within 7 days, please contact the clinic through ROXIMITYt or phone. If you have a critical or abnormal lab result, we will notify you by phone as soon as possible.  Submit refill requests through SmartProcure or call your pharmacy and they will forward the refill request to us. Please allow 3 business days for your refill to be completed.          Additional Information About Your Visit        MyChart Information     SmartProcure lets you send messages to your doctor, view your test results, renew your prescriptions, schedule appointments and more. To sign up, go to [email or web address], contact your [facility] clinic or call [phone] during business hours.            Care EveryWhere ID     This is your Care EveryWhere ID. This could be used by other organizations to access your [facility] medical records  [id number]        Your Vitals Were     Pulse Temperature Respirations Height BMI (Body Mass Index) Pulse Oximetry    107 98.4  F (36.9  C) (Tympanic) 16 5' 4.9\" (1.648 m) 23.72 kg/m2 100%       Blood Pressure from Last 3 Encounters:   01/16/17 116/68   12/06/16 122/72   08/10/16 118/78    Weight from Last 3 " Last visit: 10/17/2022  Last Med refill: 08/09/2022  Does patient have enough medication for 72 hours: No  R/A [place]. Patient feels she may be having increased anxiety d/t the upcoming holidays. First holidays without her son. Next Visit Date:11/23/2022  Future Appointments   Date Time Provider Mark Junior   11/22/2022  9:30 AM [facility]   11/23/2022 11:00 AM [facility]as [facility]   12/19/2022 10:15 AM Alanis Lamb DPM [address] Maintenance   Topic Date Due    Diabetic retinal exam  07/09/2022    Cervical cancer screen  11/16/2022    Depression Monitoring  01/24/2023    Lipids  03/28/2023    Pneumococcal 0-64 years Vaccine (3 - PPSV23 if available, else PCV20) 04/23/2023    Diabetic foot exam  08/29/2023    Breast cancer screen  09/10/2023    A1C test (Diabetic or Prediabetic)  09/19/2023    DTaP/Tdap/Td vaccine (2 - Td or Tdap) 08/04/2026    Colorectal Cancer Screen  02/16/2028    Flu vaccine  Completed    Shingles vaccine  Completed    COVID-19 Vaccine  Completed    Hepatitis C screen  Addressed    HIV screen  Addressed    Hepatitis A vaccine  Aged Out    Hib vaccine  Aged Out    Meningococcal (ACWY) vaccine  Aged Out       Hemoglobin A1C (%)   Date Value   09/19/2022 5.3   03/28/2022 6.2 (H)   08/05/2021 7.2 (H)             ( goal A1C is < 7)   Microalb/Crt.  Ratio (mcg/mg creat)   Date Value   04/08/2019 CANNOT BE CALCULATED     LDL Cholesterol (mg/dL)   Date Value   03/28/2022 62   05/07/2020 68     LDL Calculated (mg/dL)   Date Value   04/10/2020 59       (goal LDL is <100)   AST (U/L)   Date Value   03/28/2022 16     ALT (U/L)   Date Value   03/28/2022 15     BUN (mg/dL)   Date Value   09/19/2022 11     BP Readings from Last 3 Encounters:   10/17/22 (!) 142/84   09/28/22 (!) 158/86   09/19/22 128/84          (goal 120/80)    All Future Testing planned in [facility]  Lab Frequency Next Occurrence   H. pylori Encounters:   01/16/17 142 lb (64.411 kg) (82.48 %*)   12/06/16 146 lb (66.225 kg) (85.64 %*)   09/19/16 145 lb (65.772 kg) (85.50 %*)     * Growth percentiles are based on [facility] 2-20 Years data.              Today, you had the following     No orders found for display       Primary Care Provider Office Phone # Fax #    Steven Lowry [phone]       [facility] [address] [place] [address]        Thank you!     Thank you for choosing [facility]  for your care. Our goal is always to provide you with excellent care. Hearing back from our patients is one way we can continue to improve our services. Please take a few minutes to complete the written survey that you may receive in the mail after your visit with us. Thank you!             Your Updated Medication List - Protect others around you: Learn how to safely use, store and throw away your medicines at www.disposemymeds.org.          This list is accurate as of: 1/16/17 11:59 PM.  Always use your most recent med list.                   Brand Name Dispense Instructions for use    albuterol 90 MCG/ACT inhaler     2 Inhaler    Inhale 1-2 puffs into the lungs every 4 hours as needed for shortness of breath / dyspnea.       desogestrel-ethinyl estradiol 0.15-30 MG-MCG per tablet    APRI    84 tablet    Take 1 tablet by mouth daily       FLUoxetine 10 MG capsule    PROzac    90 capsule    Take 3 capsules (30 mg) by mouth daily       multivitamin, therapeutic with minerals Tabs tablet      Take 1 tablet by mouth At Bedtime       OMEGA-3 FISH OIL PO      Take 1 g by mouth At Bedtime          antigen Once 12/21/2021   XR CHEST STANDARD (2 VW) Once 06/16/2022   CBC Once 06/16/2022   Comprehensive Metabolic Panel Once 19/60/9432   Brain Natriuretic Peptide Once 06/16/2022   D-Dimer, Quantitative Once 06/16/2022   Troponin Once 06/16/2022   MRI BRAIN W WO CONTRAST Once 12/27/2022   POCT INR     Basic Metabolic Panel Every 6 Months 1/27/2023, 7/14/2023   CBC Every 6 Months 1/27/2023, 7/14/2023   Protein / Creatinine Ratio, Urine Every 6 Months 1/27/2023, 7/14/2023, 12/29/2023, 6/14/2024, 11/29/2024, 5/16/2025               Patient Active Problem List:     Essential hypertension     Hyperlipidemia     Osteoarthritis     Obesity     CKD (chronic kidney disease) stage 3, GFR 30-59 ml/min (HCC)     Proteinuria     Controlled type 2 diabetes mellitus with complication, without long-term current use of insulin (HCC)     History of DVT of lower extremity     Vitamin D deficiency     Major depressive disorder, recurrent episode, severe, with psychotic behavior ([address]     Multiple lung nodules on CT     Hypomagnesemia     Anxiety     Chronic obstructive pulmonary disease (HCC)     Precordial pain     History of pulmonary embolism     Family history of abdominal aortic aneurysm     Normal coronary arteries     Long term (current) use of anticoagulants     AAA (abdominal aortic aneurysm) without rupture     COPD exacerbation (HCC)     Acute tracheobronchitis-viral     Abnormal mammogram     Cerebrovascular accident (CVA) ([address]     Diabetic nephropathy (HCC)     Slow transit constipation     Frontal mass of brain     Intracranial mass     Cerebral cavernoma     Pseudogout     History of gout     Familial cavernous cerebral angioma (HCC)     Cavernoma     Meniscus, medial, anterior horn derangement, right     Post traumatic stress disorder     Patellofemoral arthritis of right knee

## 2022-11-22 ENCOUNTER — OFFICE VISIT (OUTPATIENT)
Dept: BEHAVIORAL/MENTAL HEALTH CLINIC | Age: 62
End: 2022-11-22
Payer: MEDICARE

## 2022-11-22 DIAGNOSIS — F43.10 POST TRAUMATIC STRESS DISORDER: Primary | ICD-10-CM

## 2022-11-22 PROCEDURE — 90832 PSYTX W PT 30 MINUTES: CPT | Performed by: SOCIAL WORKER

## 2022-11-22 PROCEDURE — 1036F TOBACCO NON-USER: CPT | Performed by: SOCIAL WORKER

## 2022-11-22 NOTE — PROGRESS NOTES
ADULT BEHAVIORAL HEALTH FOLLOW UP  Seymourirene Leonard KIRSTEN-S  Licensed Independent        Visit Date: 11/22/2022   Time of appointment:  [phone]   Time spent with Patient: 36 minutes. This is patient's 14th appointment. Reason for Consult:  Stress     Referring Provider/PCP:    No ref. provider found  BRAULIO Francisco - CNP      Pt provided informed consent for the behavioral health program. Discussed with patient model of service to include the limits of confidentiality (i.e. abuse reporting, suicide intervention, etc.) and short-term intervention focused approach. Pt indicated understanding. Lazarus Barth is a 57 y.o. female who presents for follow up of stress. She reports feeling worse with the holidays, increased grief over loss of son. She states her daughter yells at her, tells her she should move on, we processed how this is very harmful and ways to allow herself less contact when this occurs. She states \"I don't even know why I'm here at this point\" but denies suicidal plan or intent, states \"I need to see the grandchildren graduate\". She acknowledges feeling stuck in grief, we reviewed the dual process model and need to move more towards restoration with distraction and enjoyable activities as well as support from people. She was encouraged to try the [facility] for socialization and peace, as well as possible connections. We also reviewed plan to move, held up by new company, researched difference phone number for her to call for assistance. Previous Recommendations: Pablo Coleman will allow herself to balance between feeling grief and experiencing her life. She will follow up with Cassie Grimaldo in 2 weeks. MENTAL STATUS EXAM  Mood was anxious with anxious affect. Suicidal ideation was denied. Homicidal ideation was denied. Hygiene was good . Dress was appropriate. Behavior was Within Normal Limits with No observation or self-report of difficulties ambulating. Attitude was Cooperative. Eye-contact was good. Speech: rate - WNL, rhythm - WNL, volume - WNL. Verbalizations were coherent. Thought processes were intact and goal-oriented without evidence of delusions, hallucinations, obsessions, or christel; with moderate cognitive distortions. Associations were characterized by intact cognitive processes. Pt was oriented to person, place, time, and general circumstances;  recent:  good and remote:  good. Insight and judgment were estimated to be fair, AEB, a fair  understanding of cyclical maladaptive patterns, and the ability to use insight to inform behavior change. ASSESSMENT  Ramona Wall presented to the appointment today for evaluation and treatment of symptoms of anxiety. She is currently deemed no risk to herself or others and meets criteria for PTSD. She was in agreement with recommendations. PHQ Scores 1/24/2022 7/25/2018 6/27/2017   PHQ2 Score 0 0 0   PHQ9 Score 0 0 0     Interpretation of Total Score Depression Severity: 1-4 = Minimal depression, 5-9 = Mild depression, 10-14 = Moderate depression, 15-19 = Moderately severe depression, 20-27 = Severe depression    How often pt has had thoughts of death or hurting self (if PHQ positive for depression):       No flowsheet data found. Interpretation of ZAIN-7 score: 5-9 = mild anxiety, 10-14 = moderate anxiety, 15+ = severe anxiety. Recommend referral to behavioral health for scores 10 or greater. DIAGNOSIS  Simone Castaneda was seen today for stress.     Diagnoses and all orders for this visit:    Post traumatic stress disorder        INTERVENTION  Trained in strategies for increasing balanced thinking, Discussed and set plan for behavioral activation, Trained in relaxation strategies, Discussed self-care (sleep, nutrition, rewarding activities, social support, exercise), Discussed potential barriers to change, and Supportive techniques      Katy Willard will try [facility] for increased time out of the home and social support. She will follow up with Zakiya Becker in 2 weeks. INTERACTIVE COMPLEXITY  Is interactive complexity present?   No  Reason:  N/A  Additional Supporting Information:  N/A       Electronically signed by MARIBELL Ng on 11/22/22 at 9:33 AM EST

## 2022-11-23 ENCOUNTER — PATIENT MESSAGE (OUTPATIENT)
Dept: FAMILY MEDICINE CLINIC | Age: 62
End: 2022-11-23

## 2022-11-23 ENCOUNTER — OFFICE VISIT (OUTPATIENT)
Dept: FAMILY MEDICINE CLINIC | Age: 62
End: 2022-11-23
Payer: MEDICARE

## 2022-11-23 VITALS
HEART RATE: 70 BPM | OXYGEN SATURATION: 99 % | DIASTOLIC BLOOD PRESSURE: 84 MMHG | TEMPERATURE: 96.8 F | SYSTOLIC BLOOD PRESSURE: 130 MMHG | HEIGHT: 62 IN | BODY MASS INDEX: 27.79 KG/M2 | WEIGHT: 151 LBS

## 2022-11-23 DIAGNOSIS — E11.21 DIABETIC NEPHROPATHY ASSOCIATED WITH TYPE 2 DIABETES MELLITUS (HCC): ICD-10-CM

## 2022-11-23 DIAGNOSIS — F41.9 ANXIETY: ICD-10-CM

## 2022-11-23 DIAGNOSIS — N18.30 STAGE 3 CHRONIC KIDNEY DISEASE, UNSPECIFIED WHETHER STAGE 3A OR 3B CKD (HCC): Chronic | ICD-10-CM

## 2022-11-23 DIAGNOSIS — F33.3 MAJOR DEPRESSIVE DISORDER, RECURRENT EPISODE, SEVERE, WITH PSYCHOTIC BEHAVIOR (HCC): Primary | Chronic | ICD-10-CM

## 2022-11-23 DIAGNOSIS — J44.9 CHRONIC OBSTRUCTIVE PULMONARY DISEASE, UNSPECIFIED COPD TYPE (HCC): ICD-10-CM

## 2022-11-23 DIAGNOSIS — K59.01 SLOW TRANSIT CONSTIPATION: ICD-10-CM

## 2022-11-23 DIAGNOSIS — E11.8 CONTROLLED TYPE 2 DIABETES MELLITUS WITH COMPLICATION, WITHOUT LONG-TERM CURRENT USE OF INSULIN (HCC): ICD-10-CM

## 2022-11-23 PROBLEM — J44.1 COPD EXACERBATION (HCC): Status: RESOLVED | Noted: 2021-08-05 | Resolved: 2022-11-23

## 2022-11-23 PROCEDURE — 3023F SPIROM DOC REV: CPT | Performed by: NURSE PRACTITIONER

## 2022-11-23 PROCEDURE — 99214 OFFICE O/P EST MOD 30 MIN: CPT | Performed by: NURSE PRACTITIONER

## 2022-11-23 PROCEDURE — 1036F TOBACCO NON-USER: CPT | Performed by: NURSE PRACTITIONER

## 2022-11-23 PROCEDURE — 3078F DIAST BP <80 MM HG: CPT | Performed by: NURSE PRACTITIONER

## 2022-11-23 PROCEDURE — 3017F COLORECTAL CA SCREEN DOC REV: CPT | Performed by: NURSE PRACTITIONER

## 2022-11-23 PROCEDURE — G8417 CALC BMI ABV UP PARAM F/U: HCPCS | Performed by: NURSE PRACTITIONER

## 2022-11-23 PROCEDURE — 3044F HG A1C LEVEL LT 7.0%: CPT | Performed by: NURSE PRACTITIONER

## 2022-11-23 PROCEDURE — 2022F DILAT RTA XM EVC RTNOPTHY: CPT | Performed by: NURSE PRACTITIONER

## 2022-11-23 PROCEDURE — G8482 FLU IMMUNIZE ORDER/ADMIN: HCPCS | Performed by: NURSE PRACTITIONER

## 2022-11-23 PROCEDURE — G8427 DOCREV CUR MEDS BY ELIG CLIN: HCPCS | Performed by: NURSE PRACTITIONER

## 2022-11-23 PROCEDURE — 3074F SYST BP LT 130 MM HG: CPT | Performed by: NURSE PRACTITIONER

## 2022-11-23 RX ORDER — ESCITALOPRAM OXALATE 5 MG/1
5 TABLET ORAL DAILY
COMMUNITY

## 2022-11-23 ASSESSMENT — ANXIETY QUESTIONNAIRES
4. TROUBLE RELAXING: 3
IF YOU CHECKED OFF ANY PROBLEMS ON THIS QUESTIONNAIRE, HOW DIFFICULT HAVE THESE PROBLEMS MADE IT FOR YOU TO DO YOUR WORK, TAKE CARE OF THINGS AT HOME, OR GET ALONG WITH OTHER PEOPLE: NOT DIFFICULT AT ALL
6. BECOMING EASILY ANNOYED OR IRRITABLE: 3
GAD7 TOTAL SCORE: 21
2. NOT BEING ABLE TO STOP OR CONTROL WORRYING: 3
1. FEELING NERVOUS, ANXIOUS, OR ON EDGE: 3
3. WORRYING TOO MUCH ABOUT DIFFERENT THINGS: 3
7. FEELING AFRAID AS IF SOMETHING AWFUL MIGHT HAPPEN: 3
5. BEING SO RESTLESS THAT IT IS HARD TO SIT STILL: 3

## 2022-11-23 ASSESSMENT — ENCOUNTER SYMPTOMS
DIARRHEA: 0
TROUBLE SWALLOWING: 0
WHEEZING: 0
SINUS PRESSURE: 0
SORE THROAT: 0
RHINORRHEA: 0
RECTAL PAIN: 0
ABDOMINAL DISTENTION: 1
SHORTNESS OF BREATH: 0

## 2022-11-23 ASSESSMENT — PATIENT HEALTH QUESTIONNAIRE - PHQ9
3. TROUBLE FALLING OR STAYING ASLEEP: 3
SUM OF ALL RESPONSES TO PHQ QUESTIONS 1-9: 24
6. FEELING BAD ABOUT YOURSELF - OR THAT YOU ARE A FAILURE OR HAVE LET YOURSELF OR YOUR FAMILY DOWN: 3
7. TROUBLE CONCENTRATING ON THINGS, SUCH AS READING THE NEWSPAPER OR WATCHING TELEVISION: 3
10. IF YOU CHECKED OFF ANY PROBLEMS, HOW DIFFICULT HAVE THESE PROBLEMS MADE IT FOR YOU TO DO YOUR WORK, TAKE CARE OF THINGS AT HOME, OR GET ALONG WITH OTHER PEOPLE: 0
SUM OF ALL RESPONSES TO PHQ QUESTIONS 1-9: 27
SUM OF ALL RESPONSES TO PHQ QUESTIONS 1-9: 24
2. FEELING DOWN, DEPRESSED OR HOPELESS: 3
SUM OF ALL RESPONSES TO PHQ QUESTIONS 1-9: 21
3. TROUBLE FALLING OR STAYING ASLEEP: 3
2. FEELING DOWN, DEPRESSED OR HOPELESS: 3
8. MOVING OR SPEAKING SO SLOWLY THAT OTHER PEOPLE COULD HAVE NOTICED. OR THE OPPOSITE, BEING SO FIGETY OR RESTLESS THAT YOU HAVE BEEN MOVING AROUND A LOT MORE THAN USUAL: 3
7. TROUBLE CONCENTRATING ON THINGS, SUCH AS READING THE NEWSPAPER OR WATCHING TELEVISION: 3
5. POOR APPETITE OR OVEREATING: 3
SUM OF ALL RESPONSES TO PHQ QUESTIONS 1-9: 24
SUM OF ALL RESPONSES TO PHQ9 QUESTIONS 1 & 2: 6
8. MOVING OR SPEAKING SO SLOWLY THAT OTHER PEOPLE COULD HAVE NOTICED. OR THE OPPOSITE, BEING SO FIGETY OR RESTLESS THAT YOU HAVE BEEN MOVING AROUND A LOT MORE THAN USUAL: 3
5. POOR APPETITE OR OVEREATING: 3
SUM OF ALL RESPONSES TO PHQ QUESTIONS 1-9: 27
6. FEELING BAD ABOUT YOURSELF - OR THAT YOU ARE A FAILURE OR HAVE LET YOURSELF OR YOUR FAMILY DOWN: 3
SUM OF ALL RESPONSES TO PHQ QUESTIONS 1-9: 27
SUM OF ALL RESPONSES TO PHQ QUESTIONS 1-9: 24
9. THOUGHTS THAT YOU WOULD BE BETTER OFF DEAD, OR OF HURTING YOURSELF: 3
9. THOUGHTS THAT YOU WOULD BE BETTER OFF DEAD, OR OF HURTING YOURSELF: 3
4. FEELING TIRED OR HAVING LITTLE ENERGY: 3
1. LITTLE INTEREST OR PLEASURE IN DOING THINGS: 3
10. IF YOU CHECKED OFF ANY PROBLEMS, HOW DIFFICULT HAVE THESE PROBLEMS MADE IT FOR YOU TO DO YOUR WORK, TAKE CARE OF THINGS AT HOME, OR GET ALONG WITH OTHER PEOPLE: 0
4. FEELING TIRED OR HAVING LITTLE ENERGY: 3

## 2022-11-23 ASSESSMENT — COLUMBIA-SUICIDE SEVERITY RATING SCALE - C-SSRS
6. HAVE YOU EVER DONE ANYTHING, STARTED TO DO ANYTHING, OR PREPARED TO DO ANYTHING TO END YOUR LIFE?: NO
1. WITHIN THE PAST MONTH, HAVE YOU WISHED YOU WERE DEAD OR WISHED YOU COULD GO TO SLEEP AND NOT WAKE UP?: NO
6. HAVE YOU EVER DONE ANYTHING, STARTED TO DO ANYTHING, OR PREPARED TO DO ANYTHING TO END YOUR LIFE?: NO
1. WITHIN THE PAST MONTH, HAVE YOU WISHED YOU WERE DEAD OR WISHED YOU COULD GO TO SLEEP AND NOT WAKE UP?: NO
2. HAVE YOU ACTUALLY HAD ANY THOUGHTS OF KILLING YOURSELF?: NO
2. HAVE YOU ACTUALLY HAD ANY THOUGHTS OF KILLING YOURSELF?: NO

## 2022-11-23 NOTE — PROGRESS NOTES
[address]  [phone]    11/23/22     Patient ID  Meg Joya is a 57 y.o. female  Established patient    Chief Complaint  Meg Joya presents today for Diabetes and Depression      ASSESSMENT/PLAN  1. Major depressive disorder, recurrent episode, severe, with psychotic behavior ([facility]  2. Anxiety  -     ALPRAZolam (XANAX) 0.25 MG tablet; Take 1 tablet by mouth 3 times daily as needed for Sleep or Anxiety for up to 7 days. , Disp-21 tablet, R-0Normal  3. Controlled type 2 diabetes mellitus with complication, without long-term current use of insulin ([address]  4. Diabetic nephropathy associated with type 2 diabetes mellitus (HCC)  5. Stage 3 chronic kidney disease, unspecified whether stage 3a or 3b CKD ([facility]  6. Chronic obstructive pulmonary disease, unspecified COPD type ([address]  7. Slow transit constipation     No changes in pharmaology. No refills needed     Return in about 2 months (around 1/23/2023). Patient Care Team:  BRAULIO Bloom CNP as PCP - General (Nurse Practitioner Family)  BRAULIO Bloom CNP as PCP - [facility] Empaneled Provider  John Danielson MD as Consulting Physician (Nephrology)  Gordy Basurto MD as Consulting Physician (Pulmonology)  Maine Chakraborty MD as Consulting Physician (Cardiology)  Chris Alexis DPM as Consulting Physician ([place])  Andrew Nagy MD (Psychiatry)  Govind Blair MD as Consulting Physician (Neurology)  Irais Marshall DO as Surgeon (Neurosurgery)    SUBJECTIVE/OBJECTIVE  History of Present illness / Visit Summary   Patient presents today for follow up   Reviewed health maintenance and any recent labs/imaging        Review of Systems  Review of Systems   Constitutional:  Negative for activity change, appetite change, chills, fatigue and fever. HENT:  Negative for congestion, ear pain, postnasal drip, rhinorrhea, sinus pressure, sneezing, sore throat and trouble swallowing.     Eyes: Follows with ophthalmology    Respiratory:  Negative for cough (excessive sputum), chest tightness (w/anxiety), shortness of breath and wheezing. Rarely needs rescue inhaler. No longer using CPAP. Follows with pulmonology    Cardiovascular:  Negative for chest pain, palpitations and leg swelling. Follows with cardiology    Gastrointestinal:  Positive for abdominal distention. Negative for abdominal pain, constipation, diarrhea and rectal pain. Moving bowels almost daily with addition of Linzess    Endocrine:        Follows with podiatry    Genitourinary:  Negative for difficulty urinating, dysuria, frequency, hematuria and urgency. Follows with nephrology    Musculoskeletal:  Negative for arthralgias, back pain (chronic, lumbar), gait problem, joint swelling and myalgias. Skin:  Negative for rash and wound. Allergic/Immunologic: Negative for environmental allergies. Neurological:  Positive for tremors (worse with anxiety). Negative for dizziness, syncope, light-headedness, numbness and headaches. Follows with neurology - routine MRI scans    Hematological:  Does not bruise/bleed easily. Psychiatric/Behavioral:  Positive for agitation and sleep disturbance. Negative for decreased concentration, self-injury and suicidal ideas. The patient is nervous/anxious. Follows with psychiatry for pharmacology and therapy routinely      Physical exam   Physical Exam  Vitals and nursing note reviewed. Constitutional:       General: She is not in acute distress. Appearance: Normal appearance. She is well-developed and well-groomed. She is not ill-appearing or toxic-appearing. Cardiovascular:      Rate and Rhythm: Normal rate and regular rhythm. No extrasystoles are present. Heart sounds: Normal heart sounds, S1 normal and S2 normal. No murmur heard. Pulmonary:      Effort: Pulmonary effort is normal. No prolonged expiration or respiratory distress.       Breath sounds: Normal breath sounds and air entry. Musculoskeletal:      Right lower leg: No edema. Left lower leg: No edema. Skin:     General: Skin is warm and dry. Coloration: Skin is not ashen, cyanotic, jaundiced or pale. Neurological:      Mental Status: She is alert and oriented to person, place, and time. Motor: Motor function is intact. Gait: Gait is intact. Psychiatric:         Attention and Perception: Attention and perception normal.         Mood and Affect: Affect normal. Mood is anxious. Speech: Speech normal.         Behavior: Behavior normal. Behavior is cooperative. Thought Content: Thought content normal. Thought content does not include suicidal ideation. Thought content does not include suicidal plan. Cognition and Memory: Cognition and memory normal.         Judgment: Judgment normal.         Electronically signed by BRAULIO Huizar CNP, APRN-CNP on 12/5/2022 at 3:26 PM    Please note that this chart was generated using voice recognition Dragon dictation software. Although every effort was made to ensure the accuracy of this automated transcription, some errors in transcription may have occurred.

## 2022-11-25 NOTE — TELEPHONE ENCOUNTER
From: Venkat Orta  To:  Abdelrahman Brar  Sent: 11/23/2022 8:04 PM EST  Subject: Mental health     I GOT MY APARTMENT FINALLY

## 2022-11-28 RX ORDER — ALPRAZOLAM 0.25 MG/1
0.25 TABLET ORAL 3 TIMES DAILY PRN
Qty: 21 TABLET | Refills: 0 | Status: SHIPPED | OUTPATIENT
Start: 2022-11-28 | End: 2022-12-05

## 2022-11-29 DIAGNOSIS — K59.01 SLOW TRANSIT CONSTIPATION: ICD-10-CM

## 2022-11-30 RX ORDER — LINACLOTIDE 72 UG/1
CAPSULE, GELATIN COATED ORAL
Qty: 30 CAPSULE | Refills: 1 | OUTPATIENT
Start: 2022-11-30

## 2022-12-02 DIAGNOSIS — K59.01 SLOW TRANSIT CONSTIPATION: ICD-10-CM

## 2022-12-02 NOTE — TELEPHONE ENCOUNTER
Last visit: 11/23/2022  Last Med refill: 10/03/2022  Does patient have enough medication for 72 hours: Yes    Next Visit Date:  Future Appointments   Date Time Provider Mark Junior   12/6/2022 10:30 AM MARIBELL Cabrera psyc [facility]   12/19/2022 10:15 AM Frankey Alf, DPM [facility]   2/8/2023 12:00 PM BRAULIO Quinonez - CNP [facility] [address] Maintenance   Topic Date Due    Cervical cancer screen  11/23/2023 (Originally 11/16/2020)    Diabetic retinal exam  12/03/2023 (Originally 7/9/2022)    Lipids  03/28/2023    Pneumococcal 0-64 years Vaccine (3 - PPSV23 if available, else PCV20) 04/23/2023    Diabetic foot exam  08/29/2023    Breast cancer screen  09/10/2023    A1C test (Diabetic or Prediabetic)  09/19/2023    Depression Monitoring  11/23/2023    DTaP/Tdap/Td vaccine (2 - Td or Tdap) 08/04/2026    Colorectal Cancer Screen  02/16/2028    Flu vaccine  Completed    Shingles vaccine  Completed    COVID-19 Vaccine  Completed    Hepatitis C screen  Addressed    HIV screen  Addressed    Hepatitis A vaccine  Aged Out    Hib vaccine  Aged Out    Meningococcal (ACWY) vaccine  Aged Out       Hemoglobin A1C (%)   Date Value   09/19/2022 5.3   03/28/2022 6.2 (H)   08/05/2021 7.2 (H)             ( goal A1C is < 7)   Microalb/Crt.  Ratio (mcg/mg creat)   Date Value   04/08/2019 CANNOT BE CALCULATED     LDL Cholesterol (mg/dL)   Date Value   03/28/2022 62   05/07/2020 68     LDL Calculated (mg/dL)   Date Value   04/10/2020 59       (goal LDL is <100)   AST (U/L)   Date Value   03/28/2022 16     ALT (U/L)   Date Value   03/28/2022 15     BUN (mg/dL)   Date Value   09/19/2022 11     BP Readings from Last 3 Encounters:   11/23/22 130/84   10/17/22 (!) 142/84   09/28/22 (!) 158/86          (goal 120/80)    All Future Testing planned in [facility]  Lab Frequency Next Occurrence   H. pylori antigen Once 12/21/2021   XR CHEST STANDARD (2 VW) Once 06/16/2022   CBC Once 06/16/2022 Comprehensive Metabolic Panel Once 55/64/1340   Brain Natriuretic Peptide Once 06/16/2022   D-Dimer, Quantitative Once 06/16/2022   Troponin Once 06/16/2022   MRI BRAIN W WO CONTRAST Once 12/27/2022   POCT INR     Basic Metabolic Panel Every 6 Months 1/27/2023, 7/14/2023   CBC Every 6 Months 1/27/2023, 7/14/2023   Protein / Creatinine Ratio, Urine Every 6 Months 1/27/2023, 7/14/2023, 12/29/2023, 6/14/2024, 11/29/2024, 5/16/2025               Patient Active Problem List:     Essential hypertension     Hyperlipidemia     Osteoarthritis     Obesity     CKD (chronic kidney disease) stage 3, GFR 30-59 ml/min ([facility])     Proteinuria     Controlled type 2 diabetes mellitus with complication, without long-term current use of insulin ([facility])     History of DVT of lower extremity     Vitamin D deficiency     Major depressive disorder, recurrent episode, severe, with psychotic behavior ([address]     Multiple lung nodules on CT     Hypomagnesemia     Anxiety     Chronic obstructive pulmonary disease (HCC)     Precordial pain     History of pulmonary embolism     Family history of abdominal aortic aneurysm     Normal coronary arteries     Long term (current) use of anticoagulants     AAA (abdominal aortic aneurysm) without rupture     Acute tracheobronchitis-viral     Abnormal mammogram     Cerebrovascular accident (CVA) ([address]     Diabetic nephropathy (HCC)     Slow transit constipation     Cerebral cavernoma     Pseudogout     History of gout     Familial cavernous cerebral angioma (HCC)     Cavernoma     Meniscus, medial, anterior horn derangement, right     Post traumatic stress disorder     Patellofemoral arthritis of right knee

## 2022-12-03 DIAGNOSIS — K59.01 SLOW TRANSIT CONSTIPATION: ICD-10-CM

## 2022-12-04 NOTE — ASSESSMENT & PLAN NOTE
Well-controlled, continue current medications and continue current treatment plan I reviewed the overnight course of events on the unit, re-confirming the patient history. I discussed the care with the patient and their family. The plan of care was discussed with the ACP team and modifications were made to the notation where appropriate. Differential diagnosis and plan of care discussed with patient after the evaluation. Advanced care planning was discussed with patient and family.  Advanced care planning forms were reviewed and discussed.  Risks, benefits and alternatives of cardiac procedures were discussed in detail and all questions were answered. 35 minutes spent on total encounter of which more than fifty percent of the encounter was spent counseling and/or coordinating care by the attending physician.

## 2022-12-05 RX ORDER — LINACLOTIDE 72 UG/1
CAPSULE, GELATIN COATED ORAL
Qty: 30 CAPSULE | Refills: 1 | OUTPATIENT
Start: 2022-12-05

## 2022-12-05 ASSESSMENT — ENCOUNTER SYMPTOMS
COUGH: 0
CONSTIPATION: 0
CHEST TIGHTNESS: 0
BACK PAIN: 0
ABDOMINAL PAIN: 0

## 2022-12-06 ENCOUNTER — OFFICE VISIT (OUTPATIENT)
Dept: BEHAVIORAL/MENTAL HEALTH CLINIC | Age: 62
End: 2022-12-06

## 2022-12-06 DIAGNOSIS — F43.10 POST TRAUMATIC STRESS DISORDER: Primary | ICD-10-CM

## 2022-12-06 NOTE — PROGRESS NOTES
ADULT BEHAVIORAL HEALTH FOLLOW UP  Hiawatha Cheadle, LISW-S  Licensed Independent        Visit Date: 12/6/2022   Time of appointment:  [id number]   Time spent with Patient: 25 minutes. This is patient's 15th appointment. Reason for Consult:  Stress     Referring Provider/PCP:    No ref. provider found  BRAULIO Patel CNP      Pt provided informed consent for the behavioral health program. Discussed with patient model of service to include the limits of confidentiality (i.e. abuse reporting, suicide intervention, etc.) and short-term intervention focused approach. Pt indicated understanding. Lila Mock is a 57 y.o. female who presents for follow up of stress. She reports she was able to move into her apartment, states she \"loves it\". She reports continued high nerves, is very shaky today. We processed how continuing controlling behavior of daughters puts her on edge (they have threatened to take her car and won't allow her to do her own laundry at her apartment), as well as ways to try to take more control back of her life. She was encouraged to do what she needs to do for herself, including her laundry, and allow them to experience their feelings, while removing herself if yelling begins. Contact information provided for Alert 1 necklace system for safety. Previous Recommendations: Layne Holcomb will try [employer] for increased time out of the home and social support. She will follow up with Jenny Odom in 2 weeks. MENTAL STATUS EXAM  Mood was anxious with anxious affect. Suicidal ideation was denied. Homicidal ideation was denied. Hygiene was fair . Dress was appropriate. Behavior was Psychomotor Agitation with No observation or self-report of difficulties ambulating, her hands were very shaky entire session. Attitude was Cooperative. Eye-contact was good. Speech: rate - WNL, rhythm - WNL, volume - WNL. Verbalizations were coherent.   Thought processes were intact and goal-oriented without evidence of delusions, hallucinations, obsessions, or christel; with moderate cognitive distortions. Associations were characterized by intact cognitive processes. Pt was oriented to person, place, time, and general circumstances;  recent:  good and remote:  good. Insight and judgment were estimated to be fair, AEB, a fair  understanding of cyclical maladaptive patterns, and the ability to use insight to inform behavior change. ASSESSMENT  Jim Hodge presented to the appointment today for evaluation and treatment of symptoms of stress. She is currently deemed no risk to herself or others and meets criteria for PTSD. Yesica was in agreement with recommendations. PHQ Scores 11/23/2022 11/23/2022 1/24/2022 7/25/2018 6/27/2017   PHQ2 Score 6 - 0 0 0   PHQ9 Score 27 24 0 0 0     Interpretation of Total Score Depression Severity: 1-4 = Minimal depression, 5-9 = Mild depression, 10-14 = Moderate depression, 15-19 = Moderately severe depression, 20-27 = Severe depression    How often pt has had thoughts of death or hurting self (if PHQ positive for depression):       ZAIN 7 SCORE 11/23/2022   ZAIN-7 Total Score 21     Interpretation of ZAIN-7 score: 5-9 = mild anxiety, 10-14 = moderate anxiety, 15+ = severe anxiety. Recommend referral to behavioral health for scores 10 or greater. DIAGNOSIS  Richard Guillen was seen today for stress. Diagnoses and all orders for this visit:    Post traumatic stress disorder        INTERVENTION  Trained in strategies for increasing balanced thinking, Trained in relaxation strategies, Discussed self-care (sleep, nutrition, rewarding activities, social support, exercise), Discussed potential barriers to change, and Supportive techniques      Stephanie Gaytan will take time for herself and do what she needs, not what others tell her to do. She will follow up with Nighat Drew in 2 weeks. INTERACTIVE COMPLEXITY  Is interactive complexity present? No  Reason:  N/A  Additional Supporting Information:  N/A       Electronically signed by MARIBELL Mccormack on 12/6/22 at 10:12 AM EST

## 2022-12-12 ENCOUNTER — HOSPITAL ENCOUNTER (OUTPATIENT)
Dept: GENERAL RADIOLOGY | Age: 62
Discharge: HOME | End: 2022-12-14
Payer: COMMERCIAL

## 2022-12-12 ENCOUNTER — HOSPITAL ENCOUNTER (OUTPATIENT)
Age: 62
Discharge: HOME | End: 2022-12-14
Payer: COMMERCIAL

## 2022-12-12 DIAGNOSIS — J41.1 MUCOPURULENT CHRONIC BRONCHITIS (HCC): ICD-10-CM

## 2022-12-12 DIAGNOSIS — M53.3 COCCYX PAIN: ICD-10-CM

## 2022-12-12 PROCEDURE — 72220 X-RAY EXAM SACRUM TAILBONE: CPT

## 2022-12-12 RX ORDER — ALBUTEROL SULFATE 90 UG/1
AEROSOL, METERED RESPIRATORY (INHALATION)
Qty: 18 G | Refills: 1 | Status: SHIPPED | OUTPATIENT
Start: 2022-12-12 | End: 2023-12-12

## 2022-12-12 NOTE — TELEPHONE ENCOUNTER
LOV 11/23/22  RTO 2 months; F/U scheduled  LRF 10/17/22    Health Maintenance   Topic Date Due    Cervical cancer screen  11/23/2023 (Originally 11/16/2020)    Diabetic retinal exam  12/03/2023 (Originally 7/9/2022)    Lipids  03/28/2023    Pneumococcal 0-64 years Vaccine (3 - PPSV23 if available, else PCV20) 04/23/2023    Diabetic foot exam  08/29/2023    A1C test (Diabetic or Prediabetic)  09/19/2023    Depression Monitoring  11/23/2023    Breast cancer screen  12/09/2024    DTaP/Tdap/Td vaccine (2 - Td or Tdap) 08/04/2026    Colorectal Cancer Screen  02/16/2028    Flu vaccine  Completed    Shingles vaccine  Completed    COVID-19 Vaccine  Completed    Hepatitis C screen  Addressed    HIV screen  Addressed    Hepatitis A vaccine  Aged Out    Hib vaccine  Aged Out    Meningococcal (ACWY) vaccine  Aged Out             (applicable per patient's age: Cancer Screenings, Depression Screening, Fall Risk Screening, Immunizations)    Hemoglobin A1C (%)   Date Value   09/19/2022 5.3   03/28/2022 6.2 (H)   08/05/2021 7.2 (H)     Microalb/Crt.  Ratio (mcg/mg creat)   Date Value   04/08/2019 CANNOT BE CALCULATED     LDL Cholesterol (mg/dL)   Date Value   03/28/2022 62     LDL Calculated (mg/dL)   Date Value   04/10/2020 59     AST (U/L)   Date Value   03/28/2022 16     ALT (U/L)   Date Value   03/28/2022 15     BUN (mg/dL)   Date Value   09/19/2022 11      (goal A1C is < 7)   (goal LDL is <100) need 30-50% reduction from baseline     BP Readings from Last 3 Encounters:   11/23/22 130/84   10/17/22 (!) 142/84   09/28/22 (!) 158/86    (goal /80)      All Future Testing planned in [facility]:  Lab Frequency Next Occurrence   H. pylori antigen Once 12/21/2021   XR CHEST STANDARD (2 VW) Once 06/16/2022   CBC Once 06/16/2022   Comprehensive Metabolic Panel Once 37/57/7936   Brain Natriuretic Peptide Once 06/16/2022   D-Dimer, Quantitative Once 06/16/2022   Troponin Once 06/16/2022   MRI BRAIN W WO CONTRAST Once 12/27/2022   POCT INR     Basic Metabolic Panel Every 6 Months 1/27/2023, 7/14/2023   CBC Every 6 Months 1/27/2023, 7/14/2023   Protein / Creatinine Ratio, Urine Every 6 Months 1/27/2023, 7/14/2023, 12/29/2023, 6/14/2024, 11/29/2024, 5/16/2025       Next Visit Date:  Future Appointments   Date Time Provider Mark Junior   12/19/2022  9:30 AM [facility]   12/19/2022 10:15 AM Margot Kohler DPM [facility]   2/8/2023 12:00 PM BRAULIO Henao - CNP [facility]            Patient Active Problem List:     Essential hypertension     Hyperlipidemia     Osteoarthritis     Obesity     CKD (chronic kidney disease) stage 3, GFR 30-59 ml/min (HCC)     Proteinuria     Controlled type 2 diabetes mellitus with complication, without long-term current use of insulin (HCC)     History of DVT of lower extremity     Vitamin D deficiency     Major depressive disorder, recurrent episode, severe, with psychotic behavior ([address]     Multiple lung nodules on CT     Hypomagnesemia     Anxiety     Chronic obstructive pulmonary disease (HCC)     Precordial pain     History of pulmonary embolism     Family history of abdominal aortic aneurysm     Normal coronary arteries     Long term (current) use of anticoagulants     AAA (abdominal aortic aneurysm) without rupture     Acute tracheobronchitis-viral     Abnormal mammogram     Cerebrovascular accident (CVA) ([address]     Diabetic nephropathy (HCC)     Slow transit constipation     Cerebral cavernoma     Pseudogout     History of gout     Familial cavernous cerebral angioma (HCC)     Cavernoma     Meniscus, medial, anterior horn derangement, right     Post traumatic stress disorder     Patellofemoral arthritis of right knee

## 2022-12-15 ENCOUNTER — TELEPHONE (OUTPATIENT)
Dept: FAMILY MEDICINE CLINIC | Age: 62
End: 2022-12-15

## 2022-12-15 DIAGNOSIS — E11.21 DIABETIC NEPHROPATHY ASSOCIATED WITH TYPE 2 DIABETES MELLITUS (HCC): ICD-10-CM

## 2022-12-15 DIAGNOSIS — M15.9 PRIMARY OSTEOARTHRITIS INVOLVING MULTIPLE JOINTS: ICD-10-CM

## 2022-12-15 DIAGNOSIS — M53.3 COCCYX PAIN: ICD-10-CM

## 2022-12-15 DIAGNOSIS — M15.9 PRIMARY OSTEOARTHRITIS INVOLVING MULTIPLE JOINTS: Primary | ICD-10-CM

## 2022-12-15 RX ORDER — HYDROCODONE BITARTRATE AND ACETAMINOPHEN 5; 325 MG/1; MG/1
TABLET ORAL
Qty: 40 TABLET | Refills: 0 | Status: SHIPPED | OUTPATIENT
Start: 2022-12-15 | End: 2023-12-15

## 2022-12-15 NOTE — TELEPHONE ENCOUNTER
Patient requesting dme order for 4 pronged cane due to recent fall and notices she is losing her balance more frequently. Did place patient in same day slot on 12/19/22 for evaluation. She is having RT knee and RT shoulder pain. XR results are in for coccyx. Requesting equipment to [employer]. Fax number: [phone].

## 2022-12-15 NOTE — TELEPHONE ENCOUNTER
LOV 11/23/22  RTO 2 months; F/U scheduled  LRF 11/15/22  CSM 2/28/22    Health Maintenance   Topic Date Due    Cervical cancer screen  11/23/2023 (Originally 11/16/2020)    Diabetic retinal exam  12/03/2023 (Originally 7/9/2022)    Lipids  03/28/2023    Pneumococcal 0-64 years Vaccine (3 - PPSV23 if available, else PCV20) 04/23/2023    Diabetic foot exam  08/29/2023    A1C test (Diabetic or Prediabetic)  09/19/2023    Depression Monitoring  11/23/2023    Breast cancer screen  12/09/2024    DTaP/Tdap/Td vaccine (2 - Td or Tdap) 08/04/2026    Colorectal Cancer Screen  02/16/2028    Flu vaccine  Completed    Shingles vaccine  Completed    COVID-19 Vaccine  Completed    Hepatitis C screen  Addressed    HIV screen  Addressed    Hepatitis A vaccine  Aged Out    Hib vaccine  Aged Out    Meningococcal (ACWY) vaccine  Aged Out             (applicable per patient's age: Cancer Screenings, Depression Screening, Fall Risk Screening, Immunizations)    Hemoglobin A1C (%)   Date Value   09/19/2022 5.3   03/28/2022 6.2 (H)   08/05/2021 7.2 (H)     Microalb/Crt.  Ratio (mcg/mg creat)   Date Value   04/08/2019 CANNOT BE CALCULATED     LDL Cholesterol (mg/dL)   Date Value   03/28/2022 62     LDL Calculated (mg/dL)   Date Value   04/10/2020 59     AST (U/L)   Date Value   03/28/2022 16     ALT (U/L)   Date Value   03/28/2022 15     BUN (mg/dL)   Date Value   09/19/2022 11      (goal A1C is < 7)   (goal LDL is <100) need 30-50% reduction from baseline     BP Readings from Last 3 Encounters:   11/23/22 130/84   10/17/22 (!) 142/84   09/28/22 (!) 158/86    (goal /80)      All Future Testing planned in [facility]:  Lab Frequency Next Occurrence   H. pylori antigen Once 12/21/2021   XR CHEST STANDARD (2 VW) Once 06/16/2022   CBC Once 06/16/2022   Comprehensive Metabolic Panel Once 27/39/5281   Brain Natriuretic Peptide Once 06/16/2022   D-Dimer, Quantitative Once 06/16/2022   Troponin Once 06/16/2022   MRI BRAIN W WO CONTRAST Once 12/27/2022   POCT INR     Basic Metabolic Panel Every 6 Months 1/27/2023, 7/14/2023   CBC Every 6 Months 1/27/2023, 7/14/2023   Protein / Creatinine Ratio, Urine Every 6 Months 1/27/2023, 7/14/2023, 12/29/2023, 6/14/2024, 11/29/2024, 5/16/2025       Next Visit Date:  Future Appointments   Date Time Provider Mark Junior   12/19/2022  9:30 AM MARIBELL Cabrera psyc [facility]   12/19/2022 10:15 AM Elyse Severance, DPM [facility]   12/19/2022 12:30 [facility]   2/8/2023 12:00 PM BRAULIO Leal - CNP [facility]            Patient Active Problem List:     Essential hypertension     Hyperlipidemia     Osteoarthritis     Obesity     CKD (chronic kidney disease) stage 3, GFR 30-59 ml/min (HCC)     Proteinuria     Controlled type 2 diabetes mellitus with complication, without long-term current use of insulin (HCC)     History of DVT of lower extremity     Vitamin D deficiency     Major depressive disorder, recurrent episode, severe, with psychotic behavior ([address]     Multiple lung nodules on CT     Hypomagnesemia     Anxiety     Chronic obstructive pulmonary disease (HCC)     Precordial pain     History of pulmonary embolism     Family history of abdominal aortic aneurysm     Normal coronary arteries     Long term (current) use of anticoagulants     AAA (abdominal aortic aneurysm) without rupture     Acute tracheobronchitis-viral     Abnormal mammogram     Cerebrovascular accident (CVA) ([address]     Diabetic nephropathy (HCC)     Slow transit constipation     Cerebral cavernoma     Pseudogout     History of gout     Familial cavernous cerebral angioma (HCC)     Cavernoma     Meniscus, medial, anterior horn derangement, right     Post traumatic stress disorder     Patellofemoral arthritis of right knee

## 2022-12-19 ENCOUNTER — OFFICE VISIT (OUTPATIENT)
Dept: FAMILY MEDICINE CLINIC | Age: 62
End: 2022-12-19

## 2022-12-19 ENCOUNTER — OFFICE VISIT (OUTPATIENT)
Dept: BEHAVIORAL/MENTAL HEALTH CLINIC | Age: 62
End: 2022-12-19

## 2022-12-19 ENCOUNTER — OFFICE VISIT (OUTPATIENT)
Dept: PODIATRY | Age: 62
End: 2022-12-19

## 2022-12-19 VITALS
TEMPERATURE: 97 F | RESPIRATION RATE: 18 BRPM | SYSTOLIC BLOOD PRESSURE: 140 MMHG | HEART RATE: 77 BPM | DIASTOLIC BLOOD PRESSURE: 90 MMHG | WEIGHT: 151 LBS | OXYGEN SATURATION: 97 % | BODY MASS INDEX: 27.62 KG/M2

## 2022-12-19 VITALS — WEIGHT: 151 LBS | HEIGHT: 62 IN | BODY MASS INDEX: 27.79 KG/M2
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
## 2022-12-19 DIAGNOSIS — M85.851 OSTEOPENIA OF NECKS OF BOTH FEMURS: ICD-10-CM

## 2022-12-19 DIAGNOSIS — B35.1 DERMATOPHYTOSIS OF NAIL: ICD-10-CM

## 2022-12-19 DIAGNOSIS — M85.852 OSTEOPENIA OF NECKS OF BOTH FEMURS: ICD-10-CM

## 2022-12-19 DIAGNOSIS — E11.51 TYPE II DIABETES MELLITUS WITH PERIPHERAL CIRCULATORY DISORDER (HCC): Primary | ICD-10-CM

## 2022-12-19 DIAGNOSIS — F43.10 POST TRAUMATIC STRESS DISORDER: Primary | ICD-10-CM

## 2022-12-19 DIAGNOSIS — M20.42 HAMMER TOES OF BOTH FEET: ICD-10-CM

## 2022-12-19 DIAGNOSIS — M20.41 HAMMER TOES OF BOTH FEET: ICD-10-CM

## 2022-12-19 DIAGNOSIS — I73.9 PVD (PERIPHERAL VASCULAR DISEASE) (HCC): ICD-10-CM

## 2022-12-19 DIAGNOSIS — M79.672 PAIN IN BOTH FEET: ICD-10-CM

## 2022-12-19 DIAGNOSIS — M79.671 PAIN IN BOTH FEET: ICD-10-CM

## 2022-12-19 DIAGNOSIS — R26.81 UNSTEADINESS: ICD-10-CM

## 2022-12-19 PROCEDURE — 90832 PSYTX W PT 30 MINUTES: CPT | Performed by: SOCIAL WORKER

## 2022-12-19 RX ORDER — CAL/D3/MAG11/ZINC/COP/MANG/BOR 600 MG-800
1 TABLET ORAL 2 TIMES DAILY
Qty: 60 TABLET | Refills: 11 | Status: SHIPPED | OUTPATIENT
Start: 2022-12-19 | End: 2023-12-19

## 2022-12-19 ASSESSMENT — ENCOUNTER SYMPTOMS
ABDOMINAL PAIN: 0
CHEST TIGHTNESS: 0
DIARRHEA: 0
WHEEZING: 0
SINUS PRESSURE: 0
SORE THROAT: 0
SHORTNESS OF BREATH: 0
COUGH: 0
RHINORRHEA: 0
CONSTIPATION: 0
TROUBLE SWALLOWING: 0
RECTAL PAIN: 0

## 2022-12-19 NOTE — PROGRESS NOTES
[address]  [phone]    12/19/22     Patient ID  Tiffany Phillips is a 57 y.o. female  Established patient    Chief Complaint  Tiffany Phillips presents today for Fall, Shoulder Pain (Right side), and Knee Pain (Right side)      ASSESSMENT/PLAN  1. Fall, initial encounter  2. Osteopenia of necks of both femurs  -     Caltrate 600+D Plus Minerals (CALTRATE) 600-800 MG-UNIT TABS tablet; Take 1 tablet by mouth 2 times daily, Disp-60 tablet, R-11Normal  3. Unsteadiness     If worsening sxs, no improvement will order additional imaging. PT? Return for no improvement in symptoms. Patient Care Team:  Brett Saint, APRN - CNP as PCP - General (Nurse Practitioner Family)  Brett Saint, APRN - CNP as PCP - [facility] Empaneled Provider  Cayden Silvestre MD as Consulting Physician (Nephrology)  Rosy Mcmahan MD as Consulting Physician (Pulmonology)  Marvin Langley MD as Consulting Physician (Cardiology)  Gabino Dominique DPM as Consulting Physician ([facility])  Bo Rinaldi MD (Psychiatry)  Keshawn Pfeiffer MD as Consulting Physician (Neurology)  Christine Valentin DO as Surgeon (Neurosurgery)  Donnie Ferrer RN as Ambulatory Care Manager    SUBJECTIVE/OBJECTIVE  History of Present illness / Visit Summary   Recent fall - no acute injury   More concerned. .. refusing imaging at this time   Plans to start attending water therapy again   Rarely needing Norco - recently weaned down from [address] almost daily, minimal GI symptoms       Review of Systems  Review of Systems   Constitutional:  Positive for activity change. Negative for appetite change, chills, fatigue and fever. HENT:  Negative for congestion, ear pain, postnasal drip, rhinorrhea, sinus pressure, sneezing, sore throat and trouble swallowing.     Eyes:         Follows with ophthalmology    Respiratory:  Negative for cough (excessive sputum), chest tightness (w/anxiety), shortness of breath and wheezing. Rarely needs rescue inhaler. No longer using CPAP. Follows with pulmonology    Cardiovascular:  Negative for chest pain, palpitations and leg swelling. Follows with cardiology    Gastrointestinal:  Negative for abdominal distention, abdominal pain, constipation, diarrhea and rectal pain. Moving bowels almost daily with addition of Linzess    Endocrine:        -Follows with podiatry      Genitourinary:  Negative for difficulty urinating, dysuria, frequency, hematuria and urgency. Follows with nephrology    Musculoskeletal:  Positive for arthralgias (right shoulder, right knee) and back pain (chronic, lumbar). Negative for gait problem, joint swelling and myalgias. Skin:  Negative for rash and wound. Allergic/Immunologic: Negative for environmental allergies. Neurological:  Positive for tremors (worse with anxiety). Negative for dizziness, syncope, light-headedness, numbness and headaches. Follows with neurology - routine MRI scans   Recent fall - fell into wall with right shoulder, twisted right knee? Landed on buttock. Hematological:  Does not bruise/bleed easily. Psychiatric/Behavioral:  Positive for agitation and sleep disturbance. Negative for decreased concentration, self-injury and suicidal ideas. The patient is nervous/anxious. Follows with psychiatry for pharmacology and therapy routinely      Physical exam   Physical Exam  Vitals and nursing note reviewed. Constitutional:       General: She is not in acute distress. Appearance: Normal appearance. She is well-developed and well-groomed. She is not ill-appearing or toxic-appearing. Cardiovascular:      Rate and Rhythm: Normal rate and regular rhythm. No extrasystoles are present. Heart sounds: Normal heart sounds, S1 normal and S2 normal. No murmur heard. Pulmonary:      Effort: Pulmonary effort is normal. No prolonged expiration or respiratory distress.       Breath sounds: Normal breath sounds and air entry. Musculoskeletal:      Right shoulder: Tenderness present. Decreased range of motion. Thoracic back: Decreased range of motion. Lumbar back: Decreased range of motion. Right knee: No swelling. Decreased range of motion. Tenderness present. Left knee: No swelling. Right lower leg: No edema. Left lower leg: No edema. Skin:     General: Skin is warm and dry. Coloration: Skin is not ashen, cyanotic, jaundiced or pale. Neurological:      Mental Status: She is alert and oriented to person, place, and time. Motor: Motor function is intact. Gait: Gait abnormal.      Comments: Has cane    Psychiatric:         Attention and Perception: Attention and perception normal.         Mood and Affect: Mood and affect normal.         Speech: Speech normal.         Behavior: Behavior normal. Behavior is cooperative. Thought Content: Thought content normal. Thought content does not include suicidal ideation. Thought content does not include suicidal plan. Cognition and Memory: Cognition and memory normal.         Judgment: Judgment normal.         Electronically signed by BRAULIO Jorgensen CNP, APRN-CNP on 12/31/2022 at 7:28 PM    Please note that this chart was generated using voice recognition Dragon dictation software. Although every effort was made to ensure the accuracy of this automated transcription, some errors in transcription may have occurred.

## 2022-12-19 NOTE — PROGRESS NOTES
ADULT BEHAVIORAL HEALTH FOLLOW UP  Vikram VazquezrichyMARIBELL  Licensed Independent        Visit Date: 12/19/2022   Time of appointment:  [phone]   Time spent with Patient: 25 minutes. This is patient's 16th appointment. Reason for Consult:  Stress     Referring Provider/PCP:    No ref. provider found  BRAULIO Horne - CNP      Pt provided informed consent for the behavioral health program. Discussed with patient model of service to include the limits of confidentiality (i.e. abuse reporting, suicide intervention, etc.) and short-term intervention focused approach. Pt indicated understanding. Kev Rodriguez is a 57 y.o. female who presents for follow up of stress. She reports continuing to feel better and calmer in her own apartment, states \"I don't feel like I'm in the way anymore\". We processed feelings of grief around the holidays, ways to allow this safely then move forward to other activities as to not stay stuck in it. She reports daughter is telling her to not hang pictures of her son and to move forward, she was reminded that is not healthy and to to what she needs to do for herself. We reviewed her holiday plans, she was encouraged to be around people that will allow her to feel that day. Previous Recommendations: Bandar Salinas will take time for herself and do what she needs, not what others tell her to do. She will follow up with Prudence Willard in 2 weeks. MENTAL STATUS EXAM  Mood was sad with sad  and tearful affect. Suicidal ideation was denied. Homicidal ideation was denied. Hygiene was fair . Dress was appropriate. Behavior was Within Normal Limits with Yes observation or self-report of difficulties ambulating, using a cane, states she fell down her daughter's steps this weekend. Attitude was Cooperative. Eye-contact was good. Speech: rate - WNL, rhythm - WNL, volume - WNL. Verbalizations were coherent.   Thought processes were intact and goal-oriented without evidence of delusions, hallucinations, obsessions, or christel; with moderate cognitive distortions. Associations were characterized by intact cognitive processes. Pt was oriented to person, place, time, and general circumstances;  recent:  good and remote:  good. Insight and judgment were estimated to be fair, AEB, a fair  understanding of cyclical maladaptive patterns, and the ability to use insight to inform behavior change. ASSESSMENT  Renetta Mckenzie presented to the appointment today for evaluation and treatment of symptoms of stress. She is currently deemed no risk to herself or others and meets criteria for PTSD. Yesica was in agreement with recommendations. PHQ Scores 11/23/2022 11/23/2022 1/24/2022 7/25/2018 6/27/2017   PHQ2 Score 6 - 0 0 0   PHQ9 Score 27 24 0 0 0     Interpretation of Total Score Depression Severity: 1-4 = Minimal depression, 5-9 = Mild depression, 10-14 = Moderate depression, 15-19 = Moderately severe depression, 20-27 = Severe depression    How often pt has had thoughts of death or hurting self (if PHQ positive for depression):       ZAIN 7 SCORE 11/23/2022   ZAIN-7 Total Score 21     Interpretation of ZAIN-7 score: 5-9 = mild anxiety, 10-14 = moderate anxiety, 15+ = severe anxiety. Recommend referral to behavioral health for scores 10 or greater. DIAGNOSIS  José Miguel Rosas was seen today for stress. Diagnoses and all orders for this visit:    Post traumatic stress disorder        INTERVENTION  Trained in strategies for increasing balanced thinking, Trained in relaxation strategies, Discussed self-care (sleep, nutrition, rewarding activities, social support, exercise), Discussed potential barriers to change, and Supportive techniques      PLAN  Yesica will continue healthy balance of grief and restoration. She will follow up with Roshan Cardona in 2 weeks. INTERACTIVE COMPLEXITY  Is interactive complexity present?   No  Reason:  N/A  Additional Supporting Information:  N/A Electronically signed by MARIBELL Mccormack on 12/19/22 at 9:32 AM EST

## 2022-12-19 NOTE — PROGRESS NOTES
[address]  Dept: [phone]    DIABETIC PROGRESS NOTE  Date of patient's visit: 12/19/2022  Patient's Name:  Tarik Mcintosh YOB: 1960            Patient Care Team:  BRAULIO Gee CNP as PCP - General (Nurse Practitioner Family)  BRAULIO Gee CNP as PCP - [facility] Empaneled Provider  Shannan Gilliam MD as Consulting Physician (Nephrology)  Clarisse Councilman, MD as Consulting Physician (Pulmonology)  Eleazar Santamaria MD as Consulting Physician (Cardiology)  Ivonne Haynes DPM as Consulting Physician ([facility])  Jose Velarde MD (Psychiatry)  Garry Bamberger, MD as Consulting Physician (Neurology)  Tabby Watkins DO as Surgeon (Neurosurgery)          Chief Complaint   Patient presents with    Diabetes     Diabetic foot care  Bs 101 this am    Peripheral Neuropathy     Bl feet       Subjective:   Tarik Mcintosh comes to clinic for Diabetes (Diabetic foot care/Bs 101 this am) and Peripheral Neuropathy (Bl feet)    she is a diabetic and states that she checks her feet daily. Pt currently has complaint of thickened, elongated nails that they cannot manage by themselves. Pt's primary care physician is BRAULIO Gee CNP last seen November 23 2022   Pt's last blood sugar was 101 . Pt has a new complaint of pain to her second toes. .  Pt has tried changing shoes but it has not helped the pain  Patient is taking over the counter medications with no relief. Lab Results   Component Value Date    LABA1C 5.3 09/19/2022      Complains of numbness in the feet bilat.   Past Medical History:   Diagnosis Date    Anxiety     Chronic kidney disease     COPD (chronic obstructive pulmonary disease) ([facility])     COPD exacerbation ([facility] [address] 8/5/2021    Depression     Effusion of right knee 3/7/2022    Fracture of ankle 5/14/2020    Hyperlipidemia     Hypertension     Obesity     NARCISO on CPAP 1/19/2018    Osteoarthritis     Proteinuria     Pulmonary embolism (HCC)     Sleep apnea     c-pap. Doesn't use everynight    SOB (shortness of breath) 5/7/2020    Type 2 diabetes mellitus without complication (HCC)     Type II or unspecified type diabetes mellitus without mention of complication, not stated as uncontrolled     Von Willebrand disease        No Known Allergies  Current Outpatient Medications on File Prior to Visit   Medication Sig Dispense Refill    HYDROcodone-acetaminophen (NORCO) 5-325 MG per tablet take 1 tablet by mouth every 8 hours if needed for pain for up to 30 DAYS 40 tablet 0    albuterol sulfate HFA (PROVENTIL;VENTOLIN;PROAIR) 108 (90 Base) MCG/ACT inhaler inhale 2 puffs by mouth and INTO THE LUNGS every 4 hours if needed for wheezing shortness of breath or cough 18 g 1    linaCLOtide (LINZESS) 72 MCG CAPS capsule Take 1 capsule by mouth every morning (before breakfast) 90 capsule 1    escitalopram (LEXAPRO) 5 MG tablet Take 5 mg by mouth daily      dapagliflozin (FARXIGA) 5 MG tablet Take 1 tablet by mouth every morning 90 tablet 1    omeprazole (PRILOSEC) 20 MG delayed release capsule take 1 capsule by mouth once daily 90 capsule 1    lisinopril (PRINIVIL;ZESTRIL) 5 MG tablet Take 1 tablet by mouth daily 90 tablet 1    polyethylene glycol (GLYCOLAX) 17 GM/SCOOP powder Take 17 g by mouth 2 times daily 1 each 1    senna (SENOKOT) 8.6 MG tablet Take 1 tablet by mouth 2 times daily 60 tablet 1    allopurinol (ZYLOPRIM) 100 MG tablet take 1 tablet by mouth twice a day 180 tablet 1    tiZANidine (ZANAFLEX) 2 MG tablet Take 2 mg by mouth every 6 hours as needed      Blood Pressure Monitoring (BLOOD PRESSURE CUFF) MISC Use as directed by physician to check blood pressure. Please fit to patient.  1 each 0    traZODone (DESYREL) 50 MG tablet Take 50 mg by mouth nightly      Multiple Vitamins-Minerals (THERAPEUTIC MULTIVITAMIN-MINERALS) tablet Take 1 tablet by mouth daily 30 tablet 11 famotidine (PEPCID) 20 MG tablet Take 1 tablet by mouth 2 times daily as needed (GERD) 60 tablet 2    Blood Pressure Monitoring KIT Use to check blood pressure daily and as needed 1 kit 0    tiotropium-olodaterol (STIOLTO RESPIMAT) 2.5-2.5 MCG/ACT AERS Inhale 2 puffs into the lungs daily 1 each 5    apixaban (ELIQUIS) 5 MG TABS tablet Take 1 tablet by mouth 2 times daily 60 tablet 11    isosorbide mononitrate (IMDUR) 30 MG extended release tablet take 1 tablet by mouth once daily 90 tablet 1    atorvastatin (LIPITOR) 40 MG tablet take 1 tablet by mouth once daily 90 tablet 1    Alcohol Swabs (ALCOHOL PREP) 70 % PADS Use twice daily and as needed to check blood sugars 120 each 3    blood glucose test strips (ONETOUCH ULTRA) strip TEST THREE TIMES DAILY 100 strip 5    ipratropium-albuterol (DUONEB) 0.5-2.5 (3) MG/3ML SOLN nebulizer solution Take 3 mLs by nebulization every 6 hours as needed for Shortness of Breath 360 mL 0    risperiDONE (RISPERDAL) 0.5 MG tablet take 1 tablet by mouth at bedtime      Respiratory Therapy Supplies (NEBULIZER/TUBING/MOUTHPIECE) KIT 1 kit by Does not apply route 4 times daily as needed (wheezing) 1 kit 0    colchicine (COLCRYS) 0.6 MG tablet Take 1 tablet by mouth 2 times daily as needed for Pain (gout pain) Can repeat with 0.6 mg in 1 hour, max 1.8mg daily for acute pain 30 tablet 0    Lancets Misc. (ACCU-CHEK FASTCLIX LANCET) KIT use as directed PLEASE APPROVED NEW DEVICE WHILE WE WAIT FOR PRIOR AUTH. ..  FASTCLIX MIGHT BE EAISER TO MANIPULATE 1 kit 0    Accu-Chek FastClix Lancets MISC use 1 LANCET to TEST BLOOD SUGAR one to two times a day 120 each 3    naloxone 4 MG/0.1ML LIQD nasal spray 1 spray by Nasal route as needed for Opioid Reversal 1 each 5    Handicap Placard MISC by Does not apply route Exp 2/3/2026 1 each 0    buPROPion 450 MG TB24 Take 450 mg by mouth every morning Take with 150 mg for total of 450 mg 45 tablet 0     Current Facility-Administered Medications on File Prior to Visit   Medication Dose Route Frequency Provider Last Rate Last Admin    methylPREDNISolone acetate (DEPO-MEDROL) injection 40 mg  40 mg Intra-artICUlar Once Baldo Nicolas MD        lidocaine 1 % injection 5 mL  5 mL Intra-artICUlar Once Baldo Nicolas MD        methylPREDNISolone acetate (DEPO-MEDROL) injection 40 mg  40 mg Intra-artICUlar Once Baldo Nicolas MD        lidocaine 1 % injection 5 mL  5 mL Intra-artICUlar Once Baldo Nicolas MD           Review of Systems    Review of Systems:   History obtained from chart review and the patient  General ROS: negative for - chills, fatigue, fever, night sweats or weight gain  Constitutional: Negative for chills, diaphoresis, fatigue, fever and unexpected weight change. Musculoskeletal: Positive for arthralgias, gait problem and joint swelling. Neurological ROS: negative for - behavioral changes, confusion, headaches or seizures. Negative for weakness and numbness. Dermatological ROS: negative for - mole changes, rash  Cardiovascular: Negative for leg swelling. Gastrointestinal: Negative for constipation, diarrhea, nausea and vomiting. Objective:  Dermatologic Exam:  Skin lesion/ulceration Absent . Skin No rashes or nodules noted. .   Skin is thin, with flaky sloughing skin as well as decreased hair growth to the lower leg  Small red hemosiderin deposits seen dorsal foot   Musculoskeletal:     1st MPJ ROM decreased, Bilateral.  Muscle strength 5/5, Bilateral.  Pain present upon palpation of toenails 1-5, Bilateral. decreased medial longitudinal arch, Bilateral.  Ankle ROM decreased,Bilateral.    Dorsally contracted digits present digits 2, Bilateral.     Vascular: DP pulses 1/4 bilateral.  PT pulses 0/4 bilateral.   CFT <5 seconds, Bilateral.  Hair growth absent to the level of the digits, Bilateral.  Edema present, Bilateral.  Varicosities absent, Bilateral. Erythema absent, Bilateral    Neurological: Sensation diminshed to light touch to level of digits, Bilateral.  Protective sensation intact 6/10 sites via 5.07/10g Sadorus-Shraddha Monofilament, Bilateral.  negative Tinel's, Bilateral.  negative Valleix sign, Bilateral.      Integument: Warm, dry, supple, Bilateral.  Open lesion absent, Bilateral.  Interdigital maceration absent to web spaces 4, Bilateral.  Nails 1-5 left and 1-5 right thickened > 3.0 mm, dystrophic and crumbly, discolored with subungual debris. Fissures absent, Bilateral.   General: AAO x 3 in NAD. Derm  Toenail Description  Sites of Onychomycosis Involvement (Check affected area)  [x] [x] [x] [x] [x] [x] [x] [x] [x] [x]  5 4 3 2 1 1 2 3 4 5                          Right                                        Left    Thickness  [x] [x] [x] [x] [x] [x] [x] [x] [x] [x]  5 4 3 2 1 1 2 3 4 5                         Right                                        Left    Dystrophic Changes   [x] [x] [x] [x] [x] [x] [x] [x] [x] [x]  5 4 3 2 1 1 2 3 4 5                         Right                                        Left    Color   [x] [x] [x] [x] [x] [x] [x] [x] [x] [x]  5 4 3 2 1 1 2 3 4 5                          Right                                        Left    Incurvation/Ingrowin   [] [] [] [] [] [] [] [] [] []  5 4 3 2 1 1 2 3 4 5                         Right                                        Left    Inflammation/Pain   [x] [x] [x] [x] [x] [x] [x] [x] [x] [x]  5 4 3 2 1 1 2 3 4 5                         Right                                        Left        Visual inspection:  Deformity: hammertoe deformity sherman feet  amputation: absent  Skin lesions: absent  Edema: right- 2+ pitting edema, left- 2+ pitting edema    Sensory exam:  Monofilament sensation: abnormal - 6/10 via SW 5.07/10g monofilament to the plantar foot bilateral feet    Pulses: abnormal - 1/4 dorsalis pedis pulse and 0/4 Posterior tibial pulse,   Pinprick: Impaired  Proprioception: Impaired  Vibration (128 Hz):  Impaired       DM with PVD       [x]Yes []No      Assessment:  57 y.o. female with:   Diagnosis Orders   1. Type II diabetes mellitus with peripheral circulatory disorder ([facility])  16710 - CO DEBRIDEMENT OF NAILS, 6 OR MORE    HM DIABETES FOOT EXAM      2. Dermatophytosis of nail  22760 - CO DEBRIDEMENT OF NAILS, 6 OR MORE    HM DIABETES FOOT EXAM      3. PVD (peripheral vascular disease) ([facility])  37589 - CO DEBRIDEMENT OF NAILS, 6 OR MORE    HM DIABETES FOOT EXAM      4. Pain in both feet  31930 - CO DEBRIDEMENT OF NAILS, 6 OR MORE    HM DIABETES FOOT EXAM      5. Hammer toes of both feet  HM DIABETES FOOT EXAM            Q7   []Yes    []No                Q8   [x]Yes    []No                     Q9   []Yes    []No    Plan:   Pt was evaluated and examined. Patient was given personalized discharge instructions. For the hammertoe pt is to use the pad I dispensed to him to help hold the toe downward. Pt is to wear deep toe boxed shoes and to stretch the flexor muscles of the toes. This was demonstrated to the patient. X rays reviewed with the patient personally and discussed all conservative treatments vs surgery      Nails 1-10 were debrided sharply in length and thickness with a nipper and , without incident. Pt will follow up in 9 weeks or sooner if any problems arise. Diagnosis was discussed with the pt and all of their questions were answered in detail. Proper foot hygiene and care was discussed with the pt. Informed patient on proper diabetic foot care and importance of tight glycemic control. Patient to check feet daily and contact the office with any questions/problems/concerns. Other comorbidity noted and will be managed by PCP. All labs were reviewed and all imagining including the above findings were reviewed PRIOR to the patients arrival and with the patient today. Previous patient encounter was reviewed. Encounters from the patients other medical providers were reviewed and noted.       Time was spent educating the patient and their families/caregivers on proper care of the feet and ankles. All the above diagnosis were addressed at todays visit and all questions were answered.   A total of 20 minutes was spent with this patients encounter which included charting after the patients visit    12/19/2022    Electronically signed by Eugene Galvin DPM on 12/19/2022 at 10:48 AM  12/19/2022

## 2022-12-20 ENCOUNTER — TELEPHONE (OUTPATIENT)
Dept: FAMILY MEDICINE CLINIC | Age: 62
End: 2022-12-20

## 2022-12-20 NOTE — TELEPHONE ENCOUNTER
Patient contacted the office today because she wanted to inform you that she is getting a rolling walker next week. Patient states that she is need of a life alert in case she falls.

## 2022-12-20 NOTE — TELEPHONE ENCOUNTER
That is wonderful! Is there anything we need to do for the rolling walker? I am still investigating the life alert. I reached out to [facility] for assistance.

## 2022-12-22 NOTE — TELEPHONE ENCOUNTER
Patient notified and expressed understanding. Nothing further needed with rolling walker. Post office will be sending a form for PCP to sign regarding her mail drop off. Please notify patient once completed and she will  in office.

## 2022-12-27 ENCOUNTER — CARE COORDINATION (OUTPATIENT)
Dept: CARE COORDINATION | Age: 62
End: 2022-12-27

## 2022-12-27 NOTE — CARE COORDINATION
Attempted to reach pt for care coordination left a vm to call [facility] back [phone]

## 2022-12-28 ENCOUNTER — CARE COORDINATION (OUTPATIENT)
Dept: CARE COORDINATION | Age: 62
End: 2022-12-28

## 2022-12-28 SDOH — ECONOMIC STABILITY: FOOD INSECURITY: WITHIN THE PAST 12 MONTHS, YOU WORRIED THAT YOUR FOOD WOULD RUN OUT BEFORE YOU GOT MONEY TO BUY MORE.: SOMETIMES TRUE

## 2022-12-28 SDOH — ECONOMIC STABILITY: TRANSPORTATION INSECURITY
IN THE PAST 12 MONTHS, HAS THE LACK OF TRANSPORTATION KEPT YOU FROM MEDICAL APPOINTMENTS OR FROM GETTING MEDICATIONS?: NO

## 2022-12-28 SDOH — HEALTH STABILITY: PHYSICAL HEALTH: ON AVERAGE, HOW MANY MINUTES DO YOU ENGAGE IN EXERCISE AT THIS LEVEL?: 0 MIN

## 2022-12-28 SDOH — HEALTH STABILITY: PHYSICAL HEALTH: ON AVERAGE, HOW MANY DAYS PER WEEK DO YOU ENGAGE IN MODERATE TO STRENUOUS EXERCISE (LIKE A BRISK WALK)?: 0 DAYS

## 2022-12-28 SDOH — ECONOMIC STABILITY: FOOD INSECURITY: WITHIN THE PAST 12 MONTHS, THE FOOD YOU BOUGHT JUST DIDN'T LAST AND YOU DIDN'T HAVE MONEY TO GET MORE.: SOMETIMES TRUE

## 2022-12-28 SDOH — ECONOMIC STABILITY: HOUSING INSECURITY: IN THE LAST 12 MONTHS, HOW MANY PLACES HAVE YOU LIVED?: 1

## 2022-12-28 SDOH — ECONOMIC STABILITY: HOUSING INSECURITY
IN THE LAST 12 MONTHS, WAS THERE A TIME WHEN YOU DID NOT HAVE A STEADY PLACE TO SLEEP OR SLEPT IN A SHELTER (INCLUDING NOW)?: NO

## 2022-12-28 SDOH — ECONOMIC STABILITY: TRANSPORTATION INSECURITY
IN THE PAST 12 MONTHS, HAS LACK OF TRANSPORTATION KEPT YOU FROM MEETINGS, WORK, OR FROM GETTING THINGS NEEDED FOR DAILY LIVING?: NO

## 2022-12-28 SDOH — ECONOMIC STABILITY: INCOME INSECURITY: IN THE LAST 12 MONTHS, WAS THERE A TIME WHEN YOU WERE NOT ABLE TO PAY THE MORTGAGE OR RENT ON TIME?: NO

## 2022-12-28 ASSESSMENT — SOCIAL DETERMINANTS OF HEALTH (SDOH)
HOW OFTEN DO YOU GET TOGETHER WITH FRIENDS OR RELATIVES?: THREE TIMES A WEEK
HOW OFTEN DO YOU ATTENT MEETINGS OF THE CLUB OR ORGANIZATION YOU BELONG TO?: NEVER
HOW OFTEN DO YOU ATTEND CHURCH OR RELIGIOUS SERVICES?: NEVER
IN A TYPICAL WEEK, HOW MANY TIMES DO YOU TALK ON THE PHONE WITH FAMILY, FRIENDS, OR NEIGHBORS?: MORE THAN THREE TIMES A WEEK
HOW HARD IS IT FOR YOU TO PAY FOR THE VERY BASICS LIKE FOOD, HOUSING, MEDICAL CARE, AND HEATING?: SOMEWHAT HARD
DO YOU BELONG TO ANY CLUBS OR ORGANIZATIONS SUCH AS CHURCH GROUPS UNIONS, FRATERNAL OR ATHLETIC GROUPS, OR SCHOOL GROUPS?: NO

## 2022-12-28 ASSESSMENT — LIFESTYLE VARIABLES
HOW MANY STANDARD DRINKS CONTAINING ALCOHOL DO YOU HAVE ON A TYPICAL DAY: PATIENT DOES NOT DRINK
HOW OFTEN DO YOU HAVE A DRINK CONTAINING ALCOHOL: NEVER

## 2022-12-28 NOTE — CARE COORDINATION
Ambulatory Care Coordination Note  12/28/2022    ACC: Aneesh Lockwood RN  Was asked by pt pcp to reach out to pt to see about services that are available to her. She lives in an apt by herself she does have a car that she can drive very short distances. She has hat 5 falls in the last year her last one was 2 weeks ago she fell down her daughters stairs. She said she feels she has balance issues that are the reason she falls. She has not tried physical therapy and is concerned about having to drive to PT. She is open to home PT and OT for fall prevention. Given freedom of choice and would like [facility] to provide her care. Will send an order for this if her pcp agrees. A rolling walker was ordered and is going to be delivered tomorrow. She would like Life alert and possibly Center[employer] services. She does have [employer]. Will refer to SW team to see if she is eligible for these services. She feels she needs help with showering laundy and grocery shopping she can get the grocery but cannot carry them in from the car. She also does have food insecurities. She goes get $50 dollars in food stamps monthly but does run out of food at times. She has several specialist. She used to see Dr Rabia Taylor for pulmonary but no longer needs to follow up with him. She had a sleep study that did not show sleep apnea. She sees Dr Nancy Velásquez for cardiology she did see her in September and sees her yearly. She sees Nephrology Dr Rodriguez Spearing she is to call his office in Jan to make her follow up apt. She sees Neurosurgery she has an MRI scheduled for Jan 5th her follow up with neuro is pending this mri. She sees eye doctor on [address] ([place]) she was seen 6 months ago she is seen there yearly. She has a podiatrist and will follow up with them in March. She does follow closely with [address] [place] [address] she sees Racheal Whitehead with [facility] and has an apt for jason 3 she also goes to the [facility] monthly her next apt is Jan 10th.  She recently lost her son and feels a gread deal of depression related to this. Her pcp apt is for Feb.  She does have all her medications did review her med list. She is working with the pharmacist Sridhar Philippe at [facility] to get all her medications filled at the same time at this time her meds are all due for refills at different times of the month and this is difficult for her to keep up with. 1) acp done   2) sdoh done   3) med review done   4) cardiology Dr Tal Bateman due [address] 2023  5) podiatry Dr Lena Sung due March 2023  6) nephrology Dr Zoraida Velarde needs to call Jan for apt  7) neurosurgery dr Ulisses Garcia MRI is jan 5 needs follow [facility].   8) eye yearly exam due in June  9)  britni jason 10 tai jason 3   10) pcp feb 8th  11) [facility] dispense medications together   12)  referral for passport, life alert and food insecurities   15) home care referral for PT / OT [facility]         Offered patient enrollment in the Remote Patient Monitoring (RPM) program for in-home monitoring: Patient declined. Ambulatory Care Coordination Assessment    Care Coordination Protocol  Referral from Primary Care Provider: No  Week 1 - Initial Assessment     Do you have all of your prescriptions and are they filled?: Yes  Barriers to medication adherence: None  Are you able to afford your medications?: Yes  How often do you have trouble taking your medications the way you have been told to take them?: I always take them as prescribed. Do you have Home O2 Therapy?: No      Ability handle personal hygeine needs (bathing/dressing/grooming): Independent  Ability to manage Medications: Independent  Ability to prepare Food Preparation: Independent  Ability to maintain home (clean home, laundry): Needs Assistance  Ability to drive and/or has transportation: Needs Assistance  Ability to do shopping: Needs Assistance  Ability to manage finances:  Independent  Is patient able to live independently?: Yes     Current Housing: Apartment        Per the Fall Risk Screening, did the patient have 2 or more falls or 1 fall with injury in the past year?: No  How often do you think you are about to fall and you do NOT fall? For example, you grab something to stabilize yourself or hold onto a wall/furniture?: Occasionally  Use of a Mobility Aid: Yes  Difficulty walking/impaired gait: No  Issues with feet or shoes like numbness, edema, shoes not fitting: Yes  Changes in vision, poor vision or poor lighting in environment: No  Dizziness: No  Other Fall Risk: Yes  What other fall risks does the patient have?: feels off balance     Frequent urination at night?: No  Do you use rails/bars?: Yes  Do you have a non-slip tub mat?: Yes        Thinking about your patient's physical health needs, are there any symptoms or problems (risk indicators) you are unsure about that require further investigation?: No identified areas of uncertainly or problems already being investigated   Are the patients physical health problems impacting on their mental well-being?: Moderate to severe impact upon mental well-being and preventing enjoyment of usual activities   Are there any problems with your patients lifestyle behaviors (alcohol, drugs, diet, exercise) that are impacting on physical or mental well-being?: No identified areas of concern   Do you have any other concerns about your patients mental well-being?  How would you rate their severity and impact on the patient?: Moderate to severe problems that interfere with function   How would you rate their home environment in terms of safety and stability (including domestic violence, insecure housing, neighbor harassment)?: Consistently safe, supportive, stable, no identified problems   How do daily activities impact on the patient's well-being? (include current or anticipated unemployment, work, caregiving, access to transportation or other): Some general dissatisfaction but no concern   How would you rate their social network (family, work, friends)?: Adequate participation with social networks   How would you rate their financial resources (including ability to afford all required medical care)?: Financially secure, some resource challenges   How wells does the patient now understand their health and well-being (symptoms, signs or risk factors) and what they need to do to manage their health?: Reasonable to good understanding and already engages in managing health or is willing to undertake better management   How well do you think your patient can engage in healthcare discussions? (Barriers include language, deafness, aphasia, alcohol or drug problems, learning difficulties, concentration): Clear and open communication, no identified barriers   Do other services need to be involved to help this patient?: Other care/services not in place and required   Are current services involved with this patient well-coordinated? (Include coordination with other services you are now recommendation): Required care/services missing and/or fragmented   Suggested Interventions and Community Resources  Diabetes Education: Declined   Fall Risk Prevention: In Process Disease Specific Clinic: Completed   Home Health Services: In Process   Physical Therapy: In Process   Social Work: In Process   Zone Management Tools: In Process         Set up/Review Goals, Set up/Review an Education Plan              Prior to Admission medications    Medication Sig Start Date End Date Taking?  Authorizing Provider   Caltrate 600+D Plus Minerals (CALTRATE) 600-800 MG-UNIT TABS tablet Take 1 tablet by mouth 2 times daily 12/19/22 12/19/23 Yes BRAULIO Casillas CNP   HYDROcodone-acetaminophen (NORCO) 5-325 MG per tablet take 1 tablet by mouth every 8 hours if needed for pain for up to 30 DAYS 12/15/22 12/15/23 Yes BRAULIO Casillas CNP   albuterol sulfate HFA (PROVENTIL;VENTOLIN;PROAIR) 108 (90 Base) MCG/ACT inhaler inhale 2 puffs by mouth and INTO THE LUNGS every 4 hours if needed for wheezing shortness of breath or cough 12/12/22 12/12/23 Yes BRAULIO Petersen - CNP   linaCLOtide (LINZESS) 72 MCG CAPS capsule Take 1 capsule by mouth every morning (before breakfast) 12/4/22 12/4/23 Yes BRAULIO Petersen - CNP   escitalopram (LEXAPRO) 5 MG tablet Take 5 mg by mouth daily   Yes Historical Provider, MD   dapagliflozin (FARXIGA) 5 MG tablet Take 1 tablet by mouth every morning 10/28/22 10/28/23 Yes BRAULIO Petersen - CNP   omeprazole (PRILOSEC) 20 MG delayed release capsule take 1 capsule by mouth once daily 10/27/22  Yes BRAULIO Petersen - CNP   lisinopril (PRINIVIL;ZESTRIL) 5 MG tablet Take 1 tablet by mouth daily 10/17/22  Yes BRAULIO Petersen - CNP   senna (SENOKOT) 8.6 MG tablet Take 1 tablet by mouth 2 times daily 10/3/22 10/3/23 Yes BRAULIO Petersen - CNP   allopurinol (ZYLOPRIM) 100 MG tablet take 1 tablet by mouth twice a day 9/29/22 9/29/23 Yes BRAULIO Petersen - CNP   tiZANidine (ZANAFLEX) 2 MG tablet Take 2 mg by mouth every 6 hours as needed   Yes Historical Provider, MD   Blood Pressure Monitoring (BLOOD PRESSURE CUFF) MISC Use as directed by physician to check blood pressure. Please fit to patient.  9/21/22 9/21/23 Yes BRAULIO Petersen - CNP   traZODone (DESYREL) 50 MG tablet Take 50 mg by mouth nightly   Yes Historical Provider, MD   Multiple Vitamins-Minerals (THERAPEUTIC MULTIVITAMIN-MINERALS) tablet Take 1 tablet by mouth daily 9/19/22 9/19/23 Yes BRAULIO Petersen CNP   buPROPion 450 MG TB24 Take 450 mg by mouth every morning Take with 150 mg for total of 450 mg 9/19/22 12/19/23 Yes BRAULIO Petersen - CNP   Blood Pressure Monitoring KIT Use to check blood pressure daily and as needed 9/19/22 7/27/25 Yes BRAULIO Petersen CNP   tiotropium-olodaterol (STIOLTO RESPIMAT) 2.5-2.5 MCG/ACT AERS Inhale 2 puffs into the lungs daily 8/19/22 8/19/23 Yes BRAULIO Petersen - CNP   apixaban (ELIQUIS) 5 MG TABS tablet Take 1 tablet by mouth 2 times daily 8/19/22 8/19/23 Yes Trisha BRAULIO Shi CNP   isosorbide mononitrate (IMDUR) 30 MG extended release tablet take 1 tablet by mouth once daily 8/2/22 8/1/23 Yes BRAULIO Leal CNP   atorvastatin (LIPITOR) 40 MG tablet take 1 tablet by mouth once daily 8/2/22 8/1/23 Yes BRAULIO Leal CNP   Alcohol Swabs (ALCOHOL PREP) 70 % PADS Use twice daily and as needed to check blood sugars 6/28/22 9/25/24 Yes BRAULIO Leal CNP   blood glucose test strips (ONETOUCH ULTRA) strip TEST THREE TIMES DAILY 6/28/22 6/24/23 Yes BRAULIO Leal CNP   ipratropium-albuterol (DUONEB) 0.5-2.5 (3) MG/3ML SOLN nebulizer solution Take 3 mLs by nebulization every 6 hours as needed for Shortness of Breath 6/28/22  Yes BRAULIO Leal CNP   risperiDONE (RISPERDAL) 0.5 MG tablet take 1 tablet by mouth at bedtime 5/17/22  Yes Historical Provider, MD   colchicine (COLCRYS) 0.6 MG tablet Take 1 tablet by mouth 2 times daily as needed for Pain (gout pain) Can repeat with 0.6 mg in 1 hour, max 1.8mg daily for acute pain 4/25/22 8/12/23 Yes BRAULIO Leal CNP   Lancets Mis. (ACCU-CHEK FASTCLIX LANCET) KIT use as directed Anabel Navarro ..  FASTCLIX MIGHT BE EAISER TO MANIPULATE 7/22/21 8/19/23 Yes BRAULIO Leal CNP   Accu-Chek FastClix Lancets MISC use 1 LANCET to TEST BLOOD SUGAR one to two times a day 7/22/21 8/19/23 Yes BRAULIO Leal CNP   Respiratory Therapy Supplies (NEBULIZER/TUBING/MOUTHPIECE) KIT 1 kit by Does not apply route 4 times daily as needed (wheezing) 5/18/22 5/18/23  BRAULIO Leal CNP   naloxone 4 MG/0.1ML LIQD nasal spray 1 spray by Nasal route as needed for Opioid Reversal 2/16/21   BRAULIO Leal CNP   Handicap Placard MISC by Does not apply route Exp 2/3/2026 2/3/21   BRAULIO Leal CNP       Future Appointments   Date Time Provider Mark Junior   1/3/2023 11:00 AM MARIBELL Cabrera psyc [facility]   1/5/2023 11:00 AM STV MAUMEE MRI RM 1 [facility] MA MRI [facility] [place] R   2/8/2023 12:00 PM BRAULIO Leal - CNP [place]  [facility]   3/20/2023 10:00 AM Elyse Severance, DPM [place] [facility]

## 2022-12-28 NOTE — ACP (ADVANCE CARE PLANNING)
Advance Care Planning   Healthcare Decision Maker:cristal daughter       Click here to complete Healthcare Decision Makers including selection of the Healthcare Decision Maker Relationship (ie \"Primary\"). Today we documented Decision Maker(s) consistent with Legal Next of Kin hierarchy.

## 2022-12-29 DIAGNOSIS — Q28.3 CEREBRAL CAVERNOMA: Primary | ICD-10-CM

## 2022-12-29 DIAGNOSIS — E11.21 DIABETIC NEPHROPATHY ASSOCIATED WITH TYPE 2 DIABETES MELLITUS (HCC): ICD-10-CM

## 2022-12-29 DIAGNOSIS — R29.6 FREQUENT FALLS: ICD-10-CM

## 2023-01-03 ENCOUNTER — TELEMEDICINE (OUTPATIENT)
Dept: BEHAVIORAL/MENTAL HEALTH CLINIC | Age: 63
End: 2023-01-03
Payer: COMMERCIAL

## 2023-01-03 DIAGNOSIS — F43.10 POST TRAUMATIC STRESS DISORDER: Primary | ICD-10-CM

## 2023-01-03 PROCEDURE — 90832 PSYTX W PT 30 MINUTES: CPT | Performed by: SOCIAL WORKER

## 2023-01-03 NOTE — PROGRESS NOTES
ADULT BEHAVIORAL HEALTH FOLLOW UP  MARIBELL Gore  Licensed Independent        Visit Date: 1/3/2023   Time of appointment:  [id number]   Time spent with Patient: 16 minutes. This is patient's 17th appointment. Reason for Consult:  Stress     Referring Provider/PCP:    No ref. provider found  Lauryn Almodovar APRN - CNP      Pt provided informed consent for the behavioral health program. Discussed with patient model of service to include the limits of confidentiality (i.e. abuse reporting, suicide intervention, etc.) and short-term intervention focused approach. Pt indicated understanding. Telehealth session conducted via phone, pt in privacy of home, in [place], clinician located in [place], [place]. Saturnino Capone is a 57 y.o. female who presents for follow up of stress. She reports doing well, loves her apartment very much, continues to get settled. She reports spent a few hours over holidays with her daughter, was not allowed to talk about her son, so left early. She states she is trying to adjust her expectations of others and be aware of what they will and will not do. She denies any specific concerns at this time. Previous Recommendations: Heriberto Treviño will continue healthy balance of grief and restoration. She will follow up with Georgette Scott in 2 weeks. MENTAL STATUS EXAM  Mood was within normal limits, ERNIE affect due to phone call. Suicidal ideation was denied. Homicidal ideation was denied. Hygiene was fair . Dress was appropriate. Behavior was Within Normal Limits with some self-report of difficulties ambulating, states she continues to use cane due to knee pain. Attitude was Cooperative. Eye-contact was ERNIE due to phone call. Speech: rate - WNL, rhythm - WNL, volume - WNL. Verbalizations were coherent. Thought processes were intact and goal-oriented without evidence of delusions, hallucinations, obsessions, or christel; with moderate cognitive distortions. Associations were characterized by intact cognitive processes. Pt was oriented to person, place, time, and general circumstances;  recent:  good and remote:  good. Insight and judgment were estimated to be fair, AEB, a fair  understanding of cyclical maladaptive patterns, and the ability to use insight to inform behavior change. ASSESSMENT  Sebastián Garza presented to the appointment today for evaluation and treatment of symptoms of stress. She is currently deemed no risk to herself or others and meets criteria for PTSD. Sheebadeborah Hinojosa was in agreement with recommendations. PHQ Scores 11/23/2022 11/23/2022 1/24/2022 7/25/2018 6/27/2017   PHQ2 Score 6 - 0 0 0   PHQ9 Score 27 24 0 0 0     Interpretation of Total Score Depression Severity: 1-4 = Minimal depression, 5-9 = Mild depression, 10-14 = Moderate depression, 15-19 = Moderately severe depression, 20-27 = Severe depression    How often pt has had thoughts of death or hurting self (if PHQ positive for depression):       ZAIN 7 SCORE 11/23/2022   ZAIN-7 Total Score 21     Interpretation of ZAIN-7 score: 5-9 = mild anxiety, 10-14 = moderate anxiety, 15+ = severe anxiety. Recommend referral to behavioral health for scores 10 or greater. DIAGNOSIS  Sheeba Hinojosa was seen today for stress. Diagnoses and all orders for this visit:    Post traumatic stress disorder        INTERVENTION  Trained in strategies for increasing balanced thinking, Trained in relaxation strategies, Discussed self-care (sleep, nutrition, rewarding activities, social support, exercise), and Supportive techniques      PLAN  Yesica will continue healthy self care and coping skills. She will follow up with Susan Solorio in 1 month. INTERACTIVE COMPLEXITY  Is interactive complexity present?   No  Reason:  N/A  Additional Supporting Information:  N/A       Electronically signed by MARIBELL Cr on 1/3/23 at 11:01 AM EST

## 2023-01-04 ENCOUNTER — CARE COORDINATION (OUTPATIENT)
Dept: CARE COORDINATION | Age: 63
End: 2023-01-04

## 2023-01-04 NOTE — CARE COORDINATION
Writer called Patient to Introduce myself and gauge her needs. We spoke about getting her connected with [employer] through [id number] Paulie dario gave her the Number to [employer] and we will do an application for [facility] as well. Writer also gave her the Number to [address] [employer] so she can access their Services for Grocery delivery. Maci Rodriguez will continue to aid Patient with her needs.

## 2023-01-04 NOTE — CARE COORDINATION
Ambulatory Care Coordination Note  1/4/2023    ACC: Rere Foster, RN    Spoke with pt who said home care did start over the weekend. [facility] and is having an MRI tomorrow at 11:00 am she will have PT tomorrow after her mri. She did not call Nephrology for an apt yet but agrees to call today. She did get a letter stating she is approved for Semagluitide for a year. This is not on her med list she is only taking farxiga for dm at this time. 1) acp done   2) sdoh done   3) med review done   4) cardiology Dr Jeniffer Rehman due [address] 2023  5) podiatry Dr Bard Reese due March 2023  6) nephrology Dr Adelaida Sierra needs to call Jan for apt  7) neurosurgery dr Pamelia Boeck MRI is jan 5 needs follow [facility].   8) eye yearly exam due in June  9)  lissetteph jason 10 tai jason 3   10) pcp feb 8th  11) [facility] together   12)  referral for passport, life alert and food insecurities   15) home care referral for PT / OT [facility]     Offered patient enrollment in the Remote Patient Monitoring (RPM) program for in-home monitoring: Patient declined. Lab Results       None            Care Coordination Interventions    Referral from Primary Care Provider: No  Suggested Interventions and Community Resources  Diabetes Education: Declined  Fall Risk Prevention: In Process  Disease Specific Clinic: Completed  Home Health Services: Completed  Physical Therapy: Completed  Social Work: In Process  Zone Management Tools:  In Process          Goals Addressed                   This Visit's Progress     Reduce Falls    On track     I will reduce my risk of falls by the following: Remove rugs or use non slip rugs  Install grab bars in bathroom  Use walking aids like cane or walker  Follow through on orders for PT    Barriers: lack of support and overwhelmed by complexity of regimen  Plan for overcoming my barriers: care coordination   Confidence: 9/10  Anticipated Goal Completion Date: 5/28/23              Prior to Admission medications    Medication Sig Start Date End Date Taking? Authorizing Provider   Caltrate 600+D Plus Minerals (CALTRATE) 600-800 MG-UNIT TABS tablet Take 1 tablet by mouth 2 times daily 12/19/22 12/19/23  BRAULIO Sanchez CNP   HYDROcodone-acetaminophen ([facility]) 5-325 MG per tablet take 1 tablet by mouth every 8 hours if needed for pain for up to 30 DAYS 12/15/22 12/15/23  BRAULIO Sanchez CNP   albuterol sulfate HFA (PROVENTIL;VENTOLIN;PROAIR) 108 (90 Base) MCG/ACT inhaler inhale 2 puffs by mouth and INTO THE LUNGS every 4 hours if needed for wheezing shortness of breath or cough 12/12/22 12/12/23  BRAULIO Sanchez CNP   linaCLOtide (LINZESS) 72 MCG CAPS capsule Take 1 capsule by mouth every morning (before breakfast) 12/4/22 12/4/23  BRAULIO Sanchez CNP   escitalopram (LEXAPRO) 5 MG tablet Take 5 mg by mouth daily    Historical Provider, MD   dapagliflozin (FARXIGA) 5 MG tablet Take 1 tablet by mouth every morning 10/28/22 10/28/23  BRAULIO Sanchez CNP   omeprazole (PRILOSEC) 20 MG delayed release capsule take 1 capsule by mouth once daily 10/27/22   BRAULIO Sanchez CNP   lisinopril (PRINIVIL;ZESTRIL) 5 MG tablet Take 1 tablet by mouth daily 10/17/22   BRAULIO Sanchez CNP   senna (SENOKOT) 8.6 MG tablet Take 1 tablet by mouth 2 times daily 10/3/22 10/3/23  BRAULIO Sanchez CNP   allopurinol (ZYLOPRIM) 100 MG tablet take 1 tablet by mouth twice a day 9/29/22 9/29/23  BRAULIO Sanchez CNP   tiZANidine (ZANAFLEX) 2 MG tablet Take 2 mg by mouth every 6 hours as needed    Historical Provider, MD   Blood Pressure Monitoring (BLOOD PRESSURE CUFF) MISC Use as directed by physician to check blood pressure. Please fit to patient.  9/21/22 9/21/23  Trisha Delgado APRN - CNP   traZODone (DESYREL) 50 MG tablet Take 50 mg by mouth nightly    Historical Provider, MD   Multiple Vitamins-Minerals (THERAPEUTIC MULTIVITAMIN-MINERALS) tablet Take 1 tablet by mouth daily 9/19/22 9/19/23  BRAULIO Hdialgo CNP   buPROPion 450 MG TB24 Take 450 mg by mouth every morning Take with 150 mg for total of 450 mg 9/19/22 12/19/23  BRAULIO Hidalgo CNP   Blood Pressure Monitoring KIT Use to check blood pressure daily and as needed 9/19/22 7/27/25  BRAULIO Hidalgo CNP   tiotropium-olodaterol (STIOLTO RESPIMAT) 2.5-2.5 MCG/ACT AERS Inhale 2 puffs into the lungs daily 8/19/22 8/19/23  BRAULIO Hidalgo CNP   apixaban (ELIQUIS) 5 MG TABS tablet Take 1 tablet by mouth 2 times daily 8/19/22 8/19/23  BRAULIO Hidalgo CNP   isosorbide mononitrate (IMDUR) 30 MG extended release tablet take 1 tablet by mouth once daily 8/2/22 8/1/23  BRAULIO Hidalgo CNP   atorvastatin (LIPITOR) 40 MG tablet take 1 tablet by mouth once daily 8/2/22 8/1/23  BRAULIO Hidalgo CNP   Alcohol Swabs (ALCOHOL PREP) 70 % PADS Use twice daily and as needed to check blood sugars 6/28/22 9/25/24  BRAULIO Hidalgo CNP   blood glucose test strips (ONETOUCH ULTRA) strip TEST THREE TIMES DAILY 6/28/22 6/24/23  BRAULIO Hidalgo CNP   ipratropium-albuterol (DUONEB) 0.5-2.5 (3) MG/3ML SOLN nebulizer solution Take 3 mLs by nebulization every 6 hours as needed for Shortness of Breath 6/28/22   BRAULIO iHdalgo CNP   risperiDONE (RISPERDAL) 0.5 MG tablet take 1 tablet by mouth at bedtime 5/17/22   Historical Provider, MD   Respiratory Therapy Supplies (NEBULIZER/TUBING/MOUTHPIECE) KIT 1 kit by Does not apply route 4 times daily as needed (wheezing) 5/18/22 5/18/23  BRAULIO Hidalgo CNP   colchicine (COLCRYS) 0.6 MG tablet Take 1 tablet by mouth 2 times daily as needed for Pain (gout pain) Can repeat with 0.6 mg in 1 hour, max 1.8mg daily for acute pain 4/25/22 8/12/23  BRAULIO Hidalgo - [facility]   Lancets [facility]. (ACCU-CHEK FASTCLIX LANCET) KIT use as directed PLEASE APPROVED NEW DEVICE WHILE WE WAIT [address] ..  FASTCLIX MIGHT BE EAISER TO MANIPULATE 7/22/21 8/19/23 BRAULIO Anne CNP   Accu-Chek FastClix Lancets MISC use 1 LANCET to TEST BLOOD SUGAR one to two times a day 7/22/21 8/19/23  BRAULIO Anne CNP   naloxone 4 MG/0.1ML LIQD nasal spray 1 spray by Nasal route as needed for Opioid Reversal 2/16/21   BRAULIO Anne CNP   Handicap Placard MISC by Does not apply route Exp 2/3/2026 2/3/21   BRAULIO Anne CNP       Future Appointments   Date Time Provider Mark Junior   1/5/2023 11:00 AM STV MAUMEE MRI RM 1 [facility] MA MRI [facility] [place] R   1/31/2023 10:00 AM MARIBELL Cabrera psyc [facility]   2/8/2023 12:00 PM BRAULIO Anne CNP PC [facility]   3/20/2023 10:00 AM BRODERICK GoodmanURG PODIAT [facility]    and   Diabetes Assessment    Medic Alert ID: No  Meal Planning: Avoidance of concentrated sweets   How often do you test your blood sugar?: Daily   Do you have barriers with adherence to non-pharmacologic self-management interventions?  (Nutrition/Exercise/Self-Monitoring): No   Have you ever had to go to the ED for symptoms of low blood sugar?: No       No patient-reported symptoms

## 2023-01-05 ENCOUNTER — HOSPITAL ENCOUNTER (OUTPATIENT)
Dept: MRI IMAGING | Age: 63
Discharge: HOME | End: 2023-01-07
Payer: MEDICARE

## 2023-01-05 DIAGNOSIS — D18.00 CAVERNOMA: ICD-10-CM

## 2023-01-05 LAB — POC CREATININE: 1.2 MG/DL (ref 0.6–1.4)

## 2023-01-05 PROCEDURE — 82565 ASSAY OF CREATININE: CPT

## 2023-01-05 PROCEDURE — A9579 GAD-BASE MR CONTRAST NOS,1ML: HCPCS | Performed by: NEUROLOGICAL SURGERY

## 2023-01-05 PROCEDURE — 2580000003 HC RX 258: Performed by: NEUROLOGICAL SURGERY

## 2023-01-05 PROCEDURE — 6360000004 HC RX CONTRAST MEDICATION: Performed by: NEUROLOGICAL SURGERY

## 2023-01-05 PROCEDURE — 70553 MRI BRAIN STEM W/O & W/DYE: CPT

## 2023-01-05 RX ORDER — SODIUM CHLORIDE 0.9 % (FLUSH) 0.9 %
10 SYRINGE (ML) INJECTION PRN
Status: DISCONTINUED | OUTPATIENT
Start: 2023-01-05 | End: 2023-01-08 | Stop reason: HOSPADM

## 2023-01-05 RX ADMIN — SODIUM CHLORIDE, PRESERVATIVE FREE 10 ML: 5 INJECTION INTRAVENOUS at 11:04

## 2023-01-05 RX ADMIN — GADOTERIDOL 15 ML: 279.3 INJECTION, SOLUTION INTRAVENOUS at 11:04

## 2023-01-07 DIAGNOSIS — J41.1 MUCOPURULENT CHRONIC BRONCHITIS (HCC): ICD-10-CM

## 2023-01-09 RX ORDER — ALBUTEROL SULFATE 90 UG/1
AEROSOL, METERED RESPIRATORY (INHALATION)
Qty: 8.5 G | Refills: 1 | Status: SHIPPED | OUTPATIENT
Start: 2023-01-09 | End: 2024-01-09

## 2023-01-09 NOTE — TELEPHONE ENCOUNTER
LOV 12/19/22  RTO F/U scheduled  LRF 12/12/22    Health Maintenance   Topic Date Due    Diabetic Alb to Cr ratio (uACR) test  04/10/2021    Cervical cancer screen  11/23/2023 (Originally 11/16/2020)    Diabetic retinal exam  12/03/2023 (Originally 7/9/2022)    Lipids  03/28/2023    Pneumococcal 0-64 years Vaccine (3 - PPSV23 if available, else PCV20) 04/23/2023    A1C test (Diabetic or Prediabetic)  09/19/2023    GFR test (Diabetes, CKD 3-4, OR last GFR 15-59)  09/19/2023    Depression Monitoring  11/23/2023    Diabetic foot exam  12/21/2023    Breast cancer screen  12/09/2024    DTaP/Tdap/Td vaccine (2 - Td or Tdap) 08/04/2026    Colorectal Cancer Screen  02/16/2028    Flu vaccine  Completed    Shingles vaccine  Completed    COVID-19 Vaccine  Completed    Hepatitis C screen  Addressed    HIV screen  Addressed    Hepatitis A vaccine  Aged Out    Hib vaccine  Aged Out    Meningococcal (ACWY) vaccine  Aged Out             (applicable per patient's age: Cancer Screenings, Depression Screening, Fall Risk Screening, Immunizations)    Hemoglobin A1C (%)   Date Value   09/19/2022 5.3   03/28/2022 6.2 (H)   08/05/2021 7.2 (H)     Microalb/Crt.  Ratio (mcg/mg creat)   Date Value   04/08/2019 CANNOT BE CALCULATED     LDL Cholesterol (mg/dL)   Date Value   03/28/2022 62     LDL Calculated (mg/dL)   Date Value   04/10/2020 59     AST (U/L)   Date Value   03/28/2022 16     ALT (U/L)   Date Value   03/28/2022 15     BUN (mg/dL)   Date Value   09/19/2022 11      (goal A1C is < 7)   (goal LDL is <100) need 30-50% reduction from baseline     BP Readings from Last 3 Encounters:   12/19/22 (!) 140/90   11/23/22 130/84   10/17/22 (!) 142/84    (goal /80)      All Future Testing planned in [facility]:  Lab Frequency Next Occurrence   XR CHEST STANDARD (2 VW) Once 06/16/2022   CBC Once 06/16/2022   Comprehensive Metabolic Panel Once 25/23/8569   Brain Natriuretic Peptide Once 06/16/2022   D-Dimer, Quantitative Once 06/16/2022 Troponin Once 06/16/2022   POCT INR     Basic Metabolic Panel Every 6 Months 1/27/2023, 7/14/2023   CBC Every 6 Months 1/27/2023, 7/14/2023   Protein / Creatinine Ratio, Urine Every 6 Months 1/27/2023, 7/14/2023, 12/29/2023, 6/14/2024, 11/29/2024, 5/16/2025       Next Visit Date:  Future Appointments   Date Time Provider Mark Sandi   1/23/2023  1:50 PM Dominique Segundo MD [facility] None   1/31/2023 10:00 AM RACH CabreraS eunice psyc [facility]   2/8/2023 12:00 PM BRAULIO Jones CNP PC [facility]   3/20/2023 10:00 AM Trent Stout DPM [facility]            Patient Active Problem List:     Essential hypertension     Hyperlipidemia     Osteoarthritis     Obesity     CKD (chronic kidney disease) stage 3, GFR 30-59 ml/min (HCC)     Proteinuria     Controlled type 2 diabetes mellitus with complication, without long-term current use of insulin (HCC)     History of DVT of lower extremity     Vitamin D deficiency     Major depressive disorder, recurrent episode, severe, with psychotic behavior ([address]     Multiple lung nodules on CT     Hypomagnesemia     Anxiety     Chronic obstructive pulmonary disease (HCC)     Precordial pain     History of pulmonary embolism     Family history of abdominal aortic aneurysm     Normal coronary arteries     Long term (current) use of anticoagulants     AAA (abdominal aortic aneurysm) without rupture     Acute tracheobronchitis-viral     Abnormal mammogram     Cerebrovascular accident (CVA) ([address]     Diabetic nephropathy (HCC)     Slow transit constipation     Cerebral cavernoma     Pseudogout     History of gout     Familial cavernous cerebral angioma (HCC)     Cavernoma     Meniscus, medial, anterior horn derangement, right     Post traumatic stress disorder     Patellofemoral arthritis of right knee

## 2023-01-11 ENCOUNTER — CARE COORDINATION (OUTPATIENT)
Dept: CARE COORDINATION | Age: 63
End: 2023-01-11

## 2023-01-11 ENCOUNTER — TELEPHONE (OUTPATIENT)
Dept: NEUROSURGERY | Age: 63
End: 2023-01-11

## 2023-01-11 NOTE — CARE COORDINATION
Ambulatory Care Coordination Note  1/11/2023    ACC: Marino Dejesus, RN    Spoke with pt who said she is doing well at home she is getting PT and OT this is going well. She did have her MRI done but did not think she needed to follow up with neurosurgery did call Dr Krissy Hernandez office and was told they will send him a message to see if he will call her with resutls or have her come in to the office his office will call the pt and let her know. She was able to get an apt with nephrology for jan 23rd. She did have her phone apt with [facility] yesterday. She will see [facility] jan 30,  was able to reach her and will fax a referral to [phone] for passport services. 1) acp done   2) sdoh done   3) med review done   4) cardiology Dr Diane Meadows due [address] 2023  5) podiatry Dr Antonio Anthony due March 2023  6) nephrology Dr Jeanne Livingston Jan 23  7) neurosurgery dr Meron Garza MRI is jan 5 needs follow [facility].   8) eye yearly exam due in June  9)  zeph jason 10 tai jason 3   10) pcp feb 8th  11) [facility] dispense medications together   12)  referral for passport, life alert and food insecurities   15) home care referral for PT / OT [facility]       Offered patient enrollment in the Remote Patient Monitoring (RPM) program for in-home monitoring: Patient declined. Lab Results       None            Care Coordination Interventions    Referral from Primary Care Provider: No  Suggested Interventions and Community Resources  Diabetes Education: Declined  Fall Risk Prevention: In Process  Disease Specific Clinic: Completed  Home Health Services: Completed  Physical Therapy: Completed  Social Work: In Process  Zone Management Tools:  In Process          Goals Addressed                   This Visit's Progress     Reduce Falls    On track     I will reduce my risk of falls by the following: Remove rugs or use non slip rugs  Install grab bars in bathroom  Use walking aids like cane or walker  Follow through on orders for PT    Barriers: lack of support and overwhelmed by complexity of regimen  Plan for overcoming my barriers: care coordination   Confidence: 9/10  Anticipated Goal Completion Date: 5/28/23              Prior to Admission medications    Medication Sig Start Date End Date Taking? Authorizing Provider   albuterol sulfate HFA (PROVENTIL;VENTOLIN;PROAIR) 108 (90 Base) MCG/ACT inhaler inhale 2 puffs by mouth and INTO THE LUNGS every 4 hours if needed for wheezing shortness of breath or cough 1/9/23 1/9/24  BRAULIO Cyr CNP   Caltrate 600+D Plus Minerals (CALTRATE) 600-800 MG-UNIT TABS tablet Take 1 tablet by mouth 2 times daily 12/19/22 12/19/23  BRAULIO Cyr CNP   HYDROcodone-acetaminophen (NORCO) 5-325 MG per tablet take 1 tablet by mouth every 8 hours if needed for pain for up to 30 DAYS 12/15/22 12/15/23  BRAULIO Cyr CNP   linaCLOtide (LINZESS) 72 MCG CAPS capsule Take 1 capsule by mouth every morning (before breakfast) 12/4/22 12/4/23  BRAULIO Cyr CNP   escitalopram (LEXAPRO) 5 MG tablet Take 5 mg by mouth daily    Historical Provider, MD   dapagliflozin (FARXIGA) 5 MG tablet Take 1 tablet by mouth every morning 10/28/22 10/28/23  BRAULIO Cyr CNP   omeprazole (PRILOSEC) 20 MG delayed release capsule take 1 capsule by mouth once daily 10/27/22   BRAULIO Cyr CNP   lisinopril (PRINIVIL;ZESTRIL) 5 MG tablet Take 1 tablet by mouth daily 10/17/22   BRAULIO Cyr CNP   senna (SENOKOT) 8.6 MG tablet Take 1 tablet by mouth 2 times daily 10/3/22 10/3/23  BRAULIO Cyr CNP   allopurinol (ZYLOPRIM) 100 MG tablet take 1 tablet by mouth twice a day 9/29/22 9/29/23  BRAULIO Cyr CNP   tiZANidine (ZANAFLEX) 2 MG tablet Take 2 mg by mouth every 6 hours as needed    Historical Provider, MD   Blood Pressure Monitoring (BLOOD PRESSURE CUFF) MISC Use as directed by physician to check blood pressure. Please fit to patient.  9/21/22 9/21/23  BRAULIO Cyr - CNP   traZODone (DESYREL) 50 MG tablet Take 50 mg by mouth nightly    Historical Provider, MD   Multiple Vitamins-Minerals (THERAPEUTIC MULTIVITAMIN-MINERALS) tablet Take 1 tablet by mouth daily 9/19/22 9/19/23  BRAULIO Harkins CNP   buPROPion 450 MG TB24 Take 450 mg by mouth every morning Take with 150 mg for total of 450 mg 9/19/22 12/19/23  BRAULIO Harkins CNP   Blood Pressure Monitoring KIT Use to check blood pressure daily and as needed 9/19/22 7/27/25  BRAULIO Harkins CNP   tiotropium-olodaterol (STIOLTO RESPIMAT) 2.5-2.5 MCG/ACT AERS Inhale 2 puffs into the lungs daily 8/19/22 8/19/23  BRAULIO Harkins CNP   apixaban (ELIQUIS) 5 MG TABS tablet Take 1 tablet by mouth 2 times daily 8/19/22 8/19/23  BRAULIO Harkins CNP   isosorbide mononitrate (IMDUR) 30 MG extended release tablet take 1 tablet by mouth once daily 8/2/22 8/1/23  BRAULIO Harkins CNP   atorvastatin (LIPITOR) 40 MG tablet take 1 tablet by mouth once daily 8/2/22 8/1/23  BRAULIO Harkins CNP   Alcohol Swabs (ALCOHOL PREP) 70 % PADS Use twice daily and as needed to check blood sugars 6/28/22 9/25/24  BRAULIO Harkins CNP   blood glucose test strips (ONETOUCH ULTRA) strip TEST THREE TIMES DAILY 6/28/22 6/24/23  BRAULIO Harkins CNP   ipratropium-albuterol (DUONEB) 0.5-2.5 (3) MG/3ML SOLN nebulizer solution Take 3 mLs by nebulization every 6 hours as needed for Shortness of Breath 6/28/22   BRAULIO Harkins CNP   risperiDONE (RISPERDAL) 0.5 MG tablet take 1 tablet by mouth at bedtime 5/17/22   Historical Provider, MD   Respiratory Therapy Supplies (NEBULIZER/TUBING/MOUTHPIECE) KIT 1 kit by Does not apply route 4 times daily as needed (wheezing) 5/18/22 5/18/23  Trisha Patterson, APRN - CNP   colchicine (COLCRYS) 0.6 MG tablet Take 1 tablet by mouth 2 times daily as needed for Pain (gout pain) Can repeat with 0.6 mg in 1 hour, max 1.8mg daily for acute pain 4/25/22 8/12/23  Trisha Patterson, APRN - CNP Lancets Misc. (ACCU-CHEK FASTCLIX LANCET) KIT use as directed PLEASE APPROVED NEW DEVICE WHILE WE WAIT [address] ..  FASTCLIX MIGHT BE EAISER TO MANIPULATE 7/22/21 8/19/23  BRAULIO Mehta CNP   Accu-Chek FastClix Lancets MISC use 1 LANCET to TEST BLOOD SUGAR one to two times a day 7/22/21 8/19/23  BRAULIO Mehta CNP   naloxone 4 MG/0.1ML LIQD nasal spray 1 spray by Nasal route as needed for Opioid Reversal 2/16/21   BRAULIO Mehta CNP   Handicap Placard MISC by Does not apply route Exp 2/3/2026 2/3/21   BRAULIO Mehta CNP       Future Appointments   Date Time Provider Mark Junior   1/23/2023  1:50 PM Liliana Lopez MD [facility] None   1/31/2023 10:00 AM MARIBELL Cabrera psyc [facility]   2/8/2023 12:00 PM BRAULIO Mehta CNP PC [facility]   3/20/2023 10:00 AM Fadi French DPM [facility]

## 2023-01-11 NOTE — TELEPHONE ENCOUNTER
PCP called - Patient was told to have MRI in 6 months but states she did not think she had to follow back up with you. PCP not comfortable interpreting MRI results and wants patient to follow up with you for results. Did you want patient to be seen in clinic or did you plan to call patient with results?

## 2023-01-12 ENCOUNTER — CARE COORDINATION (OUTPATIENT)
Dept: CARE COORDINATION | Age: 63
End: 2023-01-12

## 2023-01-12 DIAGNOSIS — I10 ESSENTIAL HYPERTENSION: ICD-10-CM

## 2023-01-12 DIAGNOSIS — E78.5 HYPERLIPIDEMIA, UNSPECIFIED HYPERLIPIDEMIA TYPE: ICD-10-CM

## 2023-01-12 NOTE — CARE COORDINATION
called Patient to do a follow up Social service call with her.  was able to complete and fax over an application for [employer] to [employer] on behalf of Patient.  will meet with Patient at her request to go over Paperwork for MEDICARE as she will turn The required age in April.

## 2023-01-12 NOTE — TELEPHONE ENCOUNTER
LOV 11/23/22  LRF 8/2/22 Both medications, Patient changing to bubble packs    RTO    Health Maintenance   Topic Date Due    Cervical cancer screen  11/23/2023 (Originally 11/16/2020)    Diabetic retinal exam  12/03/2023 (Originally 7/9/2022)    Lipids  03/28/2023    Pneumococcal 0-64 years Vaccine (3 - PPSV23 if available, else PCV20) 04/23/2023    Diabetic Alb to Cr ratio (uACR) test  08/02/2023    A1C test (Diabetic or Prediabetic)  09/19/2023    GFR test (Diabetes, CKD 3-4, OR last GFR 15-59)  09/19/2023    Depression Monitoring  11/23/2023    Diabetic foot exam  12/21/2023    Breast cancer screen  12/09/2024    DTaP/Tdap/Td vaccine (2 - Td or Tdap) 08/04/2026    Colorectal Cancer Screen  02/16/2028    Flu vaccine  Completed    Shingles vaccine  Completed    COVID-19 Vaccine  Completed    Hepatitis C screen  Addressed    HIV screen  Addressed    Hepatitis A vaccine  Aged Out    Hib vaccine  Aged Out    Meningococcal (ACWY) vaccine  Aged Out             (applicable per patient's age: Cancer Screenings, Depression Screening, Fall Risk Screening, Immunizations)    Hemoglobin A1C (%)   Date Value   09/19/2022 5.3   03/28/2022 6.2 (H)   08/05/2021 7.2 (H)     Microalb/Crt.  Ratio (mcg/mg creat)   Date Value   04/08/2019 CANNOT BE CALCULATED     LDL Cholesterol (mg/dL)   Date Value   03/28/2022 62     LDL Calculated (mg/dL)   Date Value   04/10/2020 59     AST (U/L)   Date Value   03/28/2022 16     ALT (U/L)   Date Value   03/28/2022 15     BUN (mg/dL)   Date Value   09/19/2022 11      (goal A1C is < 7)   (goal LDL is <100) need 30-50% reduction from baseline     BP Readings from Last 3 Encounters:   12/19/22 (!) 140/90   11/23/22 130/84   10/17/22 (!) 142/84    (goal /80)      All Future Testing planned in [facility]:  Lab Frequency Next Occurrence   XR CHEST STANDARD (2 VW) Once 06/16/2022   CBC Once 06/16/2022   Comprehensive Metabolic Panel Once 01/91/6428   Brain Natriuretic Peptide Once 06/16/2022   D-Dimer, Quantitative Once 06/16/2022   Troponin Once 06/16/2022   POCT INR     Basic Metabolic Panel Every 6 Months 1/27/2023, 7/14/2023   CBC Every 6 Months 1/27/2023, 7/14/2023   Protein / Creatinine Ratio, Urine Every 6 Months 1/27/2023, 7/14/2023, 12/29/2023, 6/14/2024, 11/29/2024, 5/16/2025       Next Visit Date:  Future Appointments   Date Time Provider Mark Sandi   1/23/2023  8:15 AM DO Alexandro Hensley Neuro [facility]   1/23/2023  1:50 PM Hoda Summers MD [facility] None   1/31/2023 10:00 AM MARIBELL Cabrera psyc [facility]   2/8/2023 12:00 PM Trisha Chong, APRN - CNP [place] [facility]   3/20/2023 10:00 AM Howard Padilla DPM [facility]            Patient Active Problem List:     Essential hypertension     Hyperlipidemia     Osteoarthritis     Obesity     CKD (chronic kidney disease) stage 3, GFR 30-59 ml/min (HCC)     Proteinuria     Controlled type 2 diabetes mellitus with complication, without long-term current use of insulin (HCC)     History of DVT of lower extremity     Vitamin D deficiency     Major depressive disorder, recurrent episode, severe, with psychotic behavior ([address]     Multiple lung nodules on CT     Hypomagnesemia     Anxiety     Chronic obstructive pulmonary disease (HCC)     Precordial pain     History of pulmonary embolism     Family history of abdominal aortic aneurysm     Normal coronary arteries     Long term (current) use of anticoagulants     AAA (abdominal aortic aneurysm) without rupture     Acute tracheobronchitis-viral     Abnormal mammogram     Cerebrovascular accident (CVA) ([address]     Diabetic nephropathy (HCC)     Slow transit constipation     Cerebral cavernoma     Pseudogout     History of gout     Familial cavernous cerebral angioma (HCC)     Cavernoma     Meniscus, medial, anterior horn derangement, right     Post traumatic stress disorder     Patellofemoral arthritis of right knee

## 2023-01-15 RX ORDER — ISOSORBIDE MONONITRATE 30 MG/1
30 TABLET, EXTENDED RELEASE ORAL DAILY
Qty: 90 TABLET | Refills: 1 | Status: SHIPPED | OUTPATIENT
Start: 2023-01-15 | End: 2024-01-14

## 2023-01-15 RX ORDER — ATORVASTATIN CALCIUM 40 MG/1
40 TABLET, FILM COATED ORAL DAILY
Qty: 90 TABLET | Refills: 1 | Status: SHIPPED | OUTPATIENT
Start: 2023-01-15 | End: 2024-01-14

## 2023-01-16 DIAGNOSIS — M15.9 PRIMARY OSTEOARTHRITIS INVOLVING MULTIPLE JOINTS: ICD-10-CM

## 2023-01-16 RX ORDER — HYDROCODONE BITARTRATE AND ACETAMINOPHEN 5; 325 MG/1; MG/1
TABLET ORAL
Qty: 40 TABLET | Refills: 0 | Status: SHIPPED | OUTPATIENT
Start: 2023-01-16 | End: 2023-02-13

## 2023-01-16 NOTE — TELEPHONE ENCOUNTER
LOV 12/19/22  RTO 3 months; F/U scheduled  LRF 11/15/22  CSM 12/15/22    Health Maintenance   Topic Date Due    Cervical cancer screen  11/23/2023 (Originally 11/16/2020)    Diabetic retinal exam  12/03/2023 (Originally 7/9/2022)    Lipids  03/28/2023    Pneumococcal 0-64 years Vaccine (3 - PPSV23 if available, else PCV20) 04/23/2023    Diabetic Alb to Cr ratio (uACR) test  08/02/2023    A1C test (Diabetic or Prediabetic)  09/19/2023    GFR test (Diabetes, CKD 3-4, OR last GFR 15-59)  09/19/2023    Depression Monitoring  11/23/2023    Diabetic foot exam  12/21/2023    Breast cancer screen  12/09/2024    DTaP/Tdap/Td vaccine (2 - Td or Tdap) 08/04/2026    Colorectal Cancer Screen  02/16/2028    Flu vaccine  Completed    Shingles vaccine  Completed    COVID-19 Vaccine  Completed    Hepatitis C screen  Addressed    HIV screen  Addressed    Hepatitis A vaccine  Aged Out    Hib vaccine  Aged Out    Meningococcal (ACWY) vaccine  Aged Out             (applicable per patient's age: Cancer Screenings, Depression Screening, Fall Risk Screening, Immunizations)    Hemoglobin A1C (%)   Date Value   09/19/2022 5.3   03/28/2022 6.2 (H)   08/05/2021 7.2 (H)     Microalb/Crt.  Ratio (mcg/mg creat)   Date Value   04/08/2019 CANNOT BE CALCULATED     LDL Cholesterol (mg/dL)   Date Value   03/28/2022 62     LDL Calculated (mg/dL)   Date Value   04/10/2020 59     AST (U/L)   Date Value   03/28/2022 16     ALT (U/L)   Date Value   03/28/2022 15     BUN (mg/dL)   Date Value   09/19/2022 11      (goal A1C is < 7)   (goal LDL is <100) need 30-50% reduction from baseline     BP Readings from Last 3 Encounters:   12/19/22 (!) 140/90   11/23/22 130/84   10/17/22 (!) 142/84    (goal /80)      All Future Testing planned in [facility]:  Lab Frequency Next Occurrence   XR CHEST STANDARD (2 VW) Once 06/16/2022   CBC Once 06/16/2022   Comprehensive Metabolic Panel Once 15/27/5220   Brain Natriuretic Peptide Once 06/16/2022   D-Dimer, Quantitative Once 06/16/2022   Troponin Once 06/16/2022   POCT INR     Basic Metabolic Panel Every 6 Months 1/27/2023, 7/14/2023   CBC Every 6 Months 1/27/2023, 7/14/2023   Protein / Creatinine Ratio, Urine Every 6 Months 1/27/2023, 7/14/2023, 12/29/2023, 6/14/2024, 11/29/2024, 5/16/2025       Next Visit Date:  Future Appointments   Date Time Provider Mark Sandi   1/23/2023  8:15 AM DO Alexandro Espinal Neuro [facility]   1/23/2023  1:50 PM aJcobo Sotelo MD [facility] None   1/31/2023 10:00 AM MARIBELL Cabrera psyc [facility]   2/8/2023 12:00 PM BRAULIO Valladares CNP PC [facility]   3/20/2023 10:00 AM Dax , DPM [facility]            Patient Active Problem List:     Essential hypertension     Hyperlipidemia     Osteoarthritis     Obesity     CKD (chronic kidney disease) stage 3, GFR 30-59 ml/min (HCC)     Proteinuria     Controlled type 2 diabetes mellitus with complication, without long-term current use of insulin (HCC)     History of DVT of lower extremity     Vitamin D deficiency     Major depressive disorder, recurrent episode, severe, with psychotic behavior ([address]     Multiple lung nodules on CT     Hypomagnesemia     Anxiety     Chronic obstructive pulmonary disease (HCC)     Precordial pain     History of pulmonary embolism     Family history of abdominal aortic aneurysm     Normal coronary arteries     Long term (current) use of anticoagulants     AAA (abdominal aortic aneurysm) without rupture     Acute tracheobronchitis-viral     Abnormal mammogram     Cerebrovascular accident (CVA) ([address]     Diabetic nephropathy (HCC)     Slow transit constipation     Cerebral cavernoma     Pseudogout     History of gout     Familial cavernous cerebral angioma (HCC)     Cavernoma     Meniscus, medial, anterior horn derangement, right     Post traumatic stress disorder     Patellofemoral arthritis of right knee

## 2023-01-18 ENCOUNTER — CARE COORDINATION (OUTPATIENT)
Dept: CARE COORDINATION | Age: 63
End: 2023-01-18

## 2023-01-18 ENCOUNTER — HOSPITAL ENCOUNTER (OUTPATIENT)
Age: 63
Setting detail: SPECIMEN
Discharge: HOME | End: 2023-01-18

## 2023-01-18 DIAGNOSIS — I10 PRIMARY HYPERTENSION: ICD-10-CM

## 2023-01-18 LAB
HCT VFR BLD CALC: 36.1 % (ref 36.3–47.1)
HEMOGLOBIN: 11.5 G/DL (ref 11.9–15.1)
MCH RBC QN AUTO: 30.8 PG (ref 25.2–33.5)
MCHC RBC AUTO-ENTMCNC: 31.9 G/DL (ref 28.4–34.8)
MCV RBC AUTO: 96.8 FL (ref 82.6–102.9)
NRBC AUTOMATED: 0 PER 100 WBC
PDW BLD-RTO: 14.4 % (ref 11.8–14.4)
PLATELET # BLD: 205 K/UL (ref 138–453)
PMV BLD AUTO: 8.9 FL (ref 8.1–13.5)
RBC # BLD: 3.73 M/UL (ref 3.95–5.11)
WBC # BLD: 7.1 K/UL (ref 3.5–11.3)

## 2023-01-18 NOTE — CARE COORDINATION
Ambulatory Care Coordination Note  1/18/2023    ACC: Rose Mary Davies, RN  Spoke with pt who said she does have many apt on Monday she will see neurology, ortho, and nephrology. She is going to have her grandson come with her to help her. She does have an apt with [facility] for jan 25th at 10:00 am. She is still current with home care. She will stop at the office tomorrow morning to drop off her urine tomorrow for nephrology   1) acp done   2) sdoh done   3) med review done   4) cardiology Dr Mery Flowers due [address] 2023  5) podiatry Dr Luis Eduardo Burks due March 2023  6) nephrology Dr Marcelo Burnett needs to call Jan for apt  7) neurosurgery dr Radha Garcia MRI is jan 5 needs follow konrad rodriguez.   8) eye yearly exam due in June  9)  britni jason 10 tai jason 3   10) pcp feb 8th  11) [facility]   12)  referral for passport, life alert and food insecurities   13) home care referral for PT / OT [facility]          Offered patient enrollment in the Remote Patient Monitoring (RPM) program for in-home monitoring: Patient declined. Lab Results       None            Care Coordination Interventions    Referral from Primary Care Provider: No  Suggested Interventions and Community Resources  Diabetes Education: Declined  Fall Risk Prevention:  In Process  Disease Specific Clinic: Completed  Home Health Services: Completed  Physical Therapy: Completed  Social Work: Completed  Zone Management Tools: Completed          Goals Addressed                   This Visit's Progress     Reduce Falls    On track     I will reduce my risk of falls by the following: Remove rugs or use non slip rugs  Install grab bars in bathroom  Use walking aids like cane or walker  Follow through on orders for PT    Barriers: lack of support and overwhelmed by complexity of regimen  Plan for overcoming my barriers: care coordination   Confidence: 9/10  Anticipated Goal Completion Date: 5/28/23              Prior to Admission medications    Medication Sig Start Date End Date Taking? Authorizing Provider   HYDROcodone-acetaminophen (NORCO) 5-325 MG per tablet take 1 tablet by mouth every 8 hours if needed for pain 1/16/23 2/13/23  BRAULIO Brewer CNP   atorvastatin (LIPITOR) 40 MG tablet Take 1 tablet by mouth daily 1/15/23 1/14/24  BRAULIO Brewer CNP   isosorbide mononitrate (IMDUR) 30 MG extended release tablet Take 1 tablet by mouth daily 1/15/23 1/14/24  BRAULIO Brewer CNP   albuterol sulfate HFA (PROVENTIL;VENTOLIN;PROAIR) 108 (90 Base) MCG/ACT inhaler inhale 2 puffs by mouth and INTO THE LUNGS every 4 hours if needed for wheezing shortness of breath or cough 1/9/23 1/9/24  BRAULIO Brewer CNP   Caltrate 600+D Plus Minerals (CALTRATE) 600-800 MG-UNIT TABS tablet Take 1 tablet by mouth 2 times daily 12/19/22 12/19/23  BRAULIO Brewer CNP   linaCLOtide (LINZESS) 72 MCG CAPS capsule Take 1 capsule by mouth every morning (before breakfast) 12/4/22 12/4/23  BRAULIO Brewer CNP   escitalopram (LEXAPRO) 5 MG tablet Take 5 mg by mouth daily    Historical Provider, MD   dapagliflozin (FARXIGA) 5 MG tablet Take 1 tablet by mouth every morning 10/28/22 10/28/23  BRAULIO Brewer CNP   omeprazole (PRILOSEC) 20 MG delayed release capsule take 1 capsule by mouth once daily 10/27/22   BRAULIO Brewer CNP   lisinopril (PRINIVIL;ZESTRIL) 5 MG tablet Take 1 tablet by mouth daily 10/17/22   BRAULIO Brewer CNP   senna (SENOKOT) 8.6 MG tablet Take 1 tablet by mouth 2 times daily 10/3/22 10/3/23  BRAULIO Brewer CNP   allopurinol (ZYLOPRIM) 100 MG tablet take 1 tablet by mouth twice a day 9/29/22 9/29/23  BRAULIO Brewer CNP   tiZANidine (ZANAFLEX) 2 MG tablet Take 2 mg by mouth every 6 hours as needed    Historical Provider, MD   Blood Pressure Monitoring (BLOOD PRESSURE CUFF) MISC Use as directed by physician to check blood pressure. Please fit to patient.  9/21/22 9/21/23  BRAULIO Brewer - CNP   traZODone (DESYREL) 50 MG tablet Take 50 mg by mouth nightly    Historical Provider, MD   Multiple Vitamins-Minerals (THERAPEUTIC MULTIVITAMIN-MINERALS) tablet Take 1 tablet by mouth daily 9/19/22 9/19/23  BRAULIO Meeks CNP   buPROPion 450 MG TB24 Take 450 mg by mouth every morning Take with 150 mg for total of 450 mg 9/19/22 12/19/23  BRAULIO Meeks CNP   Blood Pressure Monitoring KIT Use to check blood pressure daily and as needed 9/19/22 7/27/25  BRAULIO Meeks CNP   tiotropium-olodaterol (STIOLTO RESPIMAT) 2.5-2.5 MCG/ACT AERS Inhale 2 puffs into the lungs daily 8/19/22 8/19/23  BRAULIO Meeks CNP   apixaban (ELIQUIS) 5 MG TABS tablet Take 1 tablet by mouth 2 times daily 8/19/22 8/19/23  BRAULIO Meeks CNP   Alcohol Swabs (ALCOHOL PREP) 70 % PADS Use twice daily and as needed to check blood sugars 6/28/22 9/25/24  BRAULIO Meeks CNP   blood glucose test strips (ONETOUCH ULTRA) strip TEST THREE TIMES DAILY 6/28/22 6/24/23  BRAULIO Meeks CNP   ipratropium-albuterol (DUONEB) 0.5-2.5 (3) MG/3ML SOLN nebulizer solution Take 3 mLs by nebulization every 6 hours as needed for Shortness of Breath 6/28/22   BRAULIO Meeks CNP   risperiDONE (RISPERDAL) 0.5 MG tablet take 1 tablet by mouth at bedtime 5/17/22   Historical Provider, MD   Respiratory Therapy Supplies (NEBULIZER/TUBING/MOUTHPIECE) KIT 1 kit by Does not apply route 4 times daily as needed (wheezing) 5/18/22 5/18/23  BRAULIO Meeks CNP   colchicine (COLCRYS) 0.6 MG tablet Take 1 tablet by mouth 2 times daily as needed for Pain (gout pain) Can repeat with 0.6 mg in 1 hour, max 1.8mg daily for acute pain 4/25/22 8/12/23  Trisha Green Hoop, APRN - CNP   Lancets Misc. (ACCU-CHEK FASTCLIX LANCET) KIT use as directed PLEASE APPROVED NEW DEVICE WHILE WE WAIT [address] ..  FASTCLIX MIGHT BE EAISER TO MANIPULATE 7/22/21 8/19/23  Trisha Choudhary, APRN - CNP   Accu-Chek FastClix Lancets MISC use 1 LANCET to TEST BLOOD SUGAR one to two times a day 7/22/21 8/19/23  BRAULIO Otero CNP   naloxone 4 MG/0.1ML LIQD nasal spray 1 spray by Nasal route as needed for Opioid Reversal 2/16/21   Roger GuardBRAULIO CNP   Handicap Placard MISC by Does not apply route Exp 2/3/2026 2/3/21   BRAULIO Otero CNP       Future Appointments   Date Time Provider [facility]   1/23/2023  8:00 AM DO Alexandro Krishnan Neuro [facility]   1/23/2023  8:40 AM Soledad Ortez MD ORTHO SPECIA [facility]   1/23/2023  1:50 PM Maxx Carrasquillo MD [facility] None   1/31/2023 10:00 AM MARIBELL Cabrera psyc [facility]   2/8/2023 12:00 PM BRAULIO Otero CNP PC [facility]   3/20/2023 10:00 AM Aileen Kilgore DPM [facility]

## 2023-01-19 ENCOUNTER — HOSPITAL ENCOUNTER (OUTPATIENT)
Age: 63
Setting detail: SPECIMEN
Discharge: HOME | End: 2023-01-19

## 2023-01-19 DIAGNOSIS — I10 PRIMARY HYPERTENSION: ICD-10-CM

## 2023-01-19 DIAGNOSIS — R39.89 SUSPECTED UTI: ICD-10-CM

## 2023-01-19 LAB
ABSOLUTE EOS #: 0.33 K/UL (ref 0–0.44)
ABSOLUTE IMMATURE GRANULOCYTE: <0.03 K/UL (ref 0–0.3)
ABSOLUTE LYMPH #: 3.55 K/UL (ref 1.1–3.7)
ABSOLUTE MONO #: 0.37 K/UL (ref 0.1–1.2)
ALBUMIN SERPL-MCNC: 4.1 G/DL (ref 3.5–5.2)
ALBUMIN/GLOBULIN RATIO: 1.4 (ref 1–2.5)
ALP BLD-CCNC: 93 U/L (ref 35–104)
ALT SERPL-CCNC: 28 U/L (ref 5–33)
ANION GAP SERPL CALCULATED.3IONS-SCNC: 11 MMOL/L (ref 9–17)
ANION GAP SERPL CALCULATED.3IONS-SCNC: 13 MMOL/L (ref 9–17)
AST SERPL-CCNC: 24 U/L
BASOPHILS # BLD: 1 % (ref 0–2)
BASOPHILS ABSOLUTE: 0.04 K/UL (ref 0–0.2)
BILIRUB SERPL-MCNC: 0.2 MG/DL (ref 0.3–1.2)
BILIRUBIN URINE: NEGATIVE
BUN BLDV-MCNC: 20 MG/DL (ref 8–23)
BUN BLDV-MCNC: 20 MG/DL (ref 8–23)
CALCIUM SERPL-MCNC: 9 MG/DL (ref 8.6–10.4)
CALCIUM SERPL-MCNC: 9.9 MG/DL (ref 8.6–10.4)
CHLORIDE BLD-SCNC: 103 MMOL/L (ref 98–107)
CHLORIDE BLD-SCNC: 108 MMOL/L (ref 98–107)
CHOLESTEROL, FASTING: 168 MG/DL
CHOLESTEROL/HDL RATIO: 3.8
CO2: 25 MMOL/L (ref 20–31)
CO2: 27 MMOL/L (ref 20–31)
COLOR: YELLOW
COMMENT UA: ABNORMAL
CREAT SERPL-MCNC: 1.1 MG/DL (ref 0.5–0.9)
CREAT SERPL-MCNC: 1.13 MG/DL (ref 0.5–0.9)
CREATININE URINE: 41.2 MG/DL (ref 28–217)
EOSINOPHILS RELATIVE PERCENT: 5 % (ref 1–4)
GFR SERPL CREATININE-BSD FRML MDRD: 54 ML/MIN/1.73M2
GFR SERPL CREATININE-BSD FRML MDRD: 56 ML/MIN/1.73M2
GLUCOSE BLD-MCNC: 76 MG/DL (ref 70–99)
GLUCOSE FASTING: 104 MG/DL (ref 70–99)
GLUCOSE URINE: ABNORMAL
HCT VFR BLD CALC: 42 % (ref 36.3–47.1)
HDLC SERPL-MCNC: 44 MG/DL
HEMOGLOBIN: 13.1 G/DL (ref 11.9–15.1)
IMMATURE GRANULOCYTES: 0 %
KETONES, URINE: NEGATIVE
LDL CHOLESTEROL: 89 MG/DL (ref 0–130)
LEUKOCYTE ESTERASE, URINE: NEGATIVE
LYMPHOCYTES # BLD: 55 % (ref 24–43)
MCH RBC QN AUTO: 30.8 PG (ref 25.2–33.5)
MCHC RBC AUTO-ENTMCNC: 31.2 G/DL (ref 28.4–34.8)
MCV RBC AUTO: 98.8 FL (ref 82.6–102.9)
MONOCYTES # BLD: 6 % (ref 3–12)
NITRITE, URINE: NEGATIVE
NRBC AUTOMATED: 0 PER 100 WBC
PDW BLD-RTO: 14.6 % (ref 11.8–14.4)
PH UA: 7 (ref 5–8)
PLATELET # BLD: 218 K/UL (ref 138–453)
PMV BLD AUTO: 9 FL (ref 8.1–13.5)
POTASSIUM SERPL-SCNC: 4.4 MMOL/L (ref 3.7–5.3)
POTASSIUM SERPL-SCNC: 4.5 MMOL/L (ref 3.7–5.3)
PROTEIN UA: NEGATIVE
RBC # BLD: 4.25 M/UL (ref 3.95–5.11)
RBC # BLD: ABNORMAL 10*6/UL
SEG NEUTROPHILS: 33 % (ref 36–65)
SEGMENTED NEUTROPHILS ABSOLUTE COUNT: 2.06 K/UL (ref 1.5–8.1)
SODIUM BLD-SCNC: 141 MMOL/L (ref 135–144)
SODIUM BLD-SCNC: 146 MMOL/L (ref 135–144)
SPECIFIC GRAVITY UA: 1.01 (ref 1–1.03)
TOTAL PROTEIN, URINE: <4 MG/DL
TOTAL PROTEIN: 7.1 G/DL (ref 6.4–8.3)
TRIGLYCERIDE, FASTING: 174 MG/DL
TURBIDITY: CLEAR
URINE HGB: NEGATIVE
URINE TOTAL PROTEIN CREATININE RATIO: NORMAL (ref 0–0.2)
UROBILINOGEN, URINE: NORMAL
WBC # BLD: 6.4 K/UL (ref 3.5–11.3)

## 2023-01-20 LAB
CULTURE: NORMAL
ESTIMATED AVERAGE GLUCOSE: 128 MG/DL
HBA1C MFR BLD: 6.1 % (ref 4–6)
SPECIMEN DESCRIPTION: NORMAL

## 2023-01-23 ENCOUNTER — OFFICE VISIT (OUTPATIENT)
Dept: NEUROSURGERY | Age: 63
End: 2023-01-23

## 2023-01-23 ENCOUNTER — OFFICE VISIT (OUTPATIENT)
Dept: ORTHOPEDIC SURGERY | Age: 63
End: 2023-01-23

## 2023-01-23 VITALS
BODY MASS INDEX: 29.26 KG/M2 | HEIGHT: 62 IN | SYSTOLIC BLOOD PRESSURE: 160 MMHG | HEART RATE: 79 BPM | OXYGEN SATURATION: 98 % | DIASTOLIC BLOOD PRESSURE: 100 MMHG | WEIGHT: 159 LBS

## 2023-01-23 VITALS — HEIGHT: 62 IN | WEIGHT: 159 LBS | BODY MASS INDEX: 29.26 KG/M2

## 2023-01-23 DIAGNOSIS — R41.3 MEMORY DISTURBANCE: ICD-10-CM

## 2023-01-23 DIAGNOSIS — D18.00 CAVERNOMA: Primary | ICD-10-CM

## 2023-01-23 DIAGNOSIS — R25.1 TREMOR: ICD-10-CM

## 2023-01-23 DIAGNOSIS — M17.11 PATELLOFEMORAL ARTHRITIS OF RIGHT KNEE: Primary | ICD-10-CM

## 2023-01-23 DIAGNOSIS — M25.532 LEFT WRIST PAIN: ICD-10-CM

## 2023-01-23 PROCEDURE — 3017F COLORECTAL CA SCREEN DOC REV: CPT | Performed by: NEUROLOGICAL SURGERY

## 2023-01-23 PROCEDURE — G8482 FLU IMMUNIZE ORDER/ADMIN: HCPCS | Performed by: NEUROLOGICAL SURGERY

## 2023-01-23 PROCEDURE — G8428 CUR MEDS NOT DOCUMENT: HCPCS | Performed by: NEUROLOGICAL SURGERY

## 2023-01-23 PROCEDURE — 3080F DIAST BP >= 90 MM HG: CPT | Performed by: NEUROLOGICAL SURGERY

## 2023-01-23 PROCEDURE — 99213 OFFICE O/P EST LOW 20 MIN: CPT | Performed by: NEUROLOGICAL SURGERY

## 2023-01-23 PROCEDURE — G8417 CALC BMI ABV UP PARAM F/U: HCPCS | Performed by: NEUROLOGICAL SURGERY

## 2023-01-23 PROCEDURE — 1036F TOBACCO NON-USER: CPT | Performed by: NEUROLOGICAL SURGERY

## 2023-01-23 PROCEDURE — 3077F SYST BP >= 140 MM HG: CPT | Performed by: NEUROLOGICAL SURGERY

## 2023-01-23 RX ORDER — METHYLPREDNISOLONE ACETATE 40 MG/ML
40 INJECTION, SUSPENSION INTRA-ARTICULAR; INTRALESIONAL; INTRAMUSCULAR; SOFT TISSUE ONCE
Status: COMPLETED | OUTPATIENT
Start: 2023-01-23 | End: 2023-01-24

## 2023-01-23 RX ORDER — CALCIUM CARBONATE/VITAMIN D3 600 MG-20
TABLET ORAL
COMMUNITY
Start: 2023-01-10

## 2023-01-23 RX ORDER — POLYETHYLENE GLYCOL 3350 17 G/17G
POWDER, FOR SOLUTION ORAL
COMMUNITY
Start: 2023-01-18

## 2023-01-23 RX ORDER — BUPROPION HYDROCHLORIDE 150 MG/1
TABLET ORAL
COMMUNITY
Start: 2023-01-10

## 2023-01-23 RX ORDER — LIDOCAINE HYDROCHLORIDE 10 MG/ML
5 INJECTION, SOLUTION INFILTRATION; PERINEURAL ONCE
Status: COMPLETED | OUTPATIENT
Start: 2023-01-23 | End: 2023-01-24

## 2023-01-24 ENCOUNTER — CARE COORDINATION (OUTPATIENT)
Dept: CARE COORDINATION | Age: 63
End: 2023-01-24

## 2023-01-24 RX ADMIN — LIDOCAINE HYDROCHLORIDE 5 ML: 10 INJECTION, SOLUTION INFILTRATION; PERINEURAL at 15:51

## 2023-01-24 RX ADMIN — METHYLPREDNISOLONE ACETATE 40 MG: 40 INJECTION, SUSPENSION INTRA-ARTICULAR; INTRALESIONAL; INTRAMUSCULAR; SOFT TISSUE at 15:56

## 2023-01-24 NOTE — CARE COORDINATION
Writer called Patient to do a follow up Social service call with her to gauge her needs and to see if she had heard back from [facility] about her PASSPORT application. Patient reported that they had set up a time to come on 01/25/2023: Aurora Irby and Patient agreed to meet on Next Thursday 02/02/2023 to go over her information about her MEDICARE. Writer will call Patient on Wednesday 02/01/2023 to make sure everything is set for us to still meet on Thursday.

## 2023-01-24 NOTE — PROGRESS NOTES
Patient is certainly here today with's several complaints. She has recurrence of pain in the right knee. The pain in the right knee is located over the anterolateral aspect and in the peripatellar region. The second complaint is that she had fallen and injured her left wrist and continues to have pain. The third concern she had was that she had fallen and injured her coccyx and also if she is not sure of her bone density studies and possible osteoporosis. Examination: Left wrist examination shows definite tenderness over the radial styloid but also some tenderness over the scaphoid. The Finkelstein's test was negative. The fall had occurred couple of weeks ago. Wrist motions were slightly limited and painful. Right knee examination showed she had an extension lag of 5 degrees and then she could flex fully. There was tenderness over the medial retinaculum and there was patellofemoral grating. X-rays: X-rays of the wrist show mild osteoarthritic changes of in the scaphotrapezial joint. No bony injury is seen. Scaphoid appears normal.  Reviewed previous x-ray of the pelvis and the coccyx and I cannot clearly make out the fracture. The bone density appears to be satisfactory both on the wrist and the pelvic x-rays. Diagnosis: #1 fracture coccyx. #2 osteoarthritis right knee. #3 scaphotrapezial osteoarthritis left wrist.    Treatment: Under sterile conditions injected 40 mg Depo-Medrol and 5 cc of 1% plain lidocaine into the right knee through anterolateral portal without any complications. She had immediate response to this. Have given her instructions as to how the injection works. As for the wrist told her to continue mobilization since there is no bony injury.     She also wanted to know about her osteoporosis we spent considerable time trying to get the results she initially stated that the bone density was carried out at [facility] eventually found out it was at [facility] clinic. Discussed with her that she should contact her gynecologist to order the test and follow-up with her regarding treatment. We will see her as needed.

## 2023-01-25 ENCOUNTER — CARE COORDINATION (OUTPATIENT)
Dept: CARE COORDINATION | Age: 63
End: 2023-01-25

## 2023-01-25 DIAGNOSIS — I10 ESSENTIAL HYPERTENSION: ICD-10-CM

## 2023-01-25 DIAGNOSIS — E78.5 HYPERLIPIDEMIA, UNSPECIFIED HYPERLIPIDEMIA TYPE: ICD-10-CM

## 2023-01-25 RX ORDER — ATORVASTATIN CALCIUM 40 MG/1
TABLET, FILM COATED ORAL
Qty: 90 TABLET | Refills: 1 | OUTPATIENT
Start: 2023-01-25

## 2023-01-25 RX ORDER — ISOSORBIDE MONONITRATE 30 MG/1
TABLET, EXTENDED RELEASE ORAL
Qty: 90 TABLET | Refills: 1 | OUTPATIENT
Start: 2023-01-25

## 2023-01-25 NOTE — CARE COORDINATION
Ambulatory Care Coordination Note  1/25/2023    ACC: Zoie Varma, RN    Spoke with pt who said she is doing well she did get all her apt made all her apt last week she did get her knee injected at ortho she said it did not really help. Neurosurgery will see her back in July. She will see neurology in march she did have to reschedule her [facility] apt for Monday because her apt manager is coming to day to fix her bathroom and she had to leave her apt for the day     1) acp done   2) sdoh done   3) med review done   4) cardiology Dr Anderson due sept 2023  5) podiatry Dr Alejandra due March 2023  6) nephrology Dr Lopez 4/24/23  7) neurosurgery dr baumann July 24  8) eye yearly exam due in June  9)  britni jason 10 tai jason 3   10) pcp feb 8th  11) [facility]   12)  referral for passport, life alert and food insecurities   13) home care referral for PT / OT [facility]     Offered patient enrollment in the Remote Patient Monitoring (RPM) program for in-home monitoring: Patient declined.    Lab Results       None            Care Coordination Interventions    Referral from Primary Care Provider: No  Suggested Interventions and Community Resources  Diabetes Education: Declined  Fall Risk Prevention: In Process  Disease Specific Clinic: Completed  Home Health Services: Completed  Physical Therapy: Completed  Social Work: Completed  Zone Management Tools: Completed          Goals Addressed                   This Visit's Progress     Reduce Falls    On track     I will reduce my risk of falls by the following: Remove rugs or use non slip rugs  Install grab bars in bathroom  Use walking aids like cane or walker  Follow through on orders for PT    Barriers: lack of support and overwhelmed by complexity of regimen  Plan for overcoming my barriers: care coordination   Confidence: 9/10  Anticipated Goal Completion Date: 5/28/23              Prior to Admission medications    Medication Sig Start Date End Date  Taking?  Authorizing Provider   buPROPion (WELLBUTRIN XL) 150 MG extended release tablet  1/10/23   Historical Provider, MD   CALTRATE 600+D3 600-20 MG-MCG TABS  1/10/23   Historical Provider, MD   polyethylene glycol Pamila Kostason) 17 GM/SCOOP powder  1/18/23   Historical Provider, MD   HYDROcodone-acetaminophen (Josephine Garcia) 5-325 MG per tablet take 1 tablet by mouth every 8 hours if needed for pain 1/16/23 2/13/23  BRAULIO Swenson CNP   atorvastatin (LIPITOR) 40 MG tablet Take 1 tablet by mouth daily 1/15/23 1/14/24  BRAULIO Swenson CNP   isosorbide mononitrate (IMDUR) 30 MG extended release tablet Take 1 tablet by mouth daily 1/15/23 1/14/24  BRAULIO Swenson CNP   albuterol sulfate HFA (PROVENTIL;VENTOLIN;PROAIR) 108 (90 Base) MCG/ACT inhaler inhale 2 puffs by mouth and INTO THE LUNGS every 4 hours if needed for wheezing shortness of breath or cough 1/9/23 1/9/24  BRAULIO Swenson CNP   Caltrate 600+D Plus Minerals (CALTRATE) 600-800 MG-UNIT TABS tablet Take 1 tablet by mouth 2 times daily 12/19/22 12/19/23  BRAULIO Swenson CNP   linaCLOtide (LINZESS) 72 MCG CAPS capsule Take 1 capsule by mouth every morning (before breakfast) 12/4/22 12/4/23  BRAULIO Swenson CNP   escitalopram (LEXAPRO) 5 MG tablet Take 5 mg by mouth daily    Historical Provider, MD   dapagliflozin (FARXIGA) 5 MG tablet Take 1 tablet by mouth every morning 10/28/22 10/28/23  BRAULIO Swenson CNP   omeprazole (PRILOSEC) 20 MG delayed release capsule take 1 capsule by mouth once daily 10/27/22   BRAULIO Swenson CNP   lisinopril (PRINIVIL;ZESTRIL) 5 MG tablet Take 1 tablet by mouth daily 10/17/22   BRAULIO Swenson CNP   senna (SENOKOT) 8.6 MG tablet Take 1 tablet by mouth 2 times daily 10/3/22 10/3/23  BRAULIO Swenson CNP   allopurinol (ZYLOPRIM) 100 MG tablet take 1 tablet by mouth twice a day 9/29/22 9/29/23  BRAULIO Swenson CNP   tiZANidine (ZANAFLEX) 2 MG tablet Take 2 mg by mouth every 6 hours as needed    Historical Provider, MD   Blood Pressure Monitoring (BLOOD PRESSURE CUFF) MISC Use as directed by physician to check blood pressure. Please fit to patient. 9/21/22 9/21/23  BRAULIO Marc CNP   traZODone (DESYREL) 50 MG tablet Take 50 mg by mouth nightly    Historical Provider, MD   Multiple Vitamins-Minerals (THERAPEUTIC MULTIVITAMIN-MINERALS) tablet Take 1 tablet by mouth daily 9/19/22 9/19/23  BRAULIO Marc CNP   Blood Pressure Monitoring KIT Use to check blood pressure daily and as needed 9/19/22 7/27/25  BRAULIO Marc CNP   tiotropium-olodaterol (STIOLTO RESPIMAT) 2.5-2.5 MCG/ACT AERS Inhale 2 puffs into the lungs daily 8/19/22 8/19/23  BRAULIO Marc CNP   apixaban (ELIQUIS) 5 MG TABS tablet Take 1 tablet by mouth 2 times daily 8/19/22 8/19/23  BRAULIO Marc CNP   Alcohol Swabs (ALCOHOL PREP) 70 % PADS Use twice daily and as needed to check blood sugars 6/28/22 9/25/24  BRAULIO Marc CNP   blood glucose test strips (ONETOUCH ULTRA) strip TEST THREE TIMES DAILY 6/28/22 6/24/23  BRAULIO Marc CNP   ipratropium-albuterol (DUONEB) 0.5-2.5 (3) MG/3ML SOLN nebulizer solution Take 3 mLs by nebulization every 6 hours as needed for Shortness of Breath 6/28/22   BRAULIO Marc CNP   risperiDONE (RISPERDAL) 0.5 MG tablet take 1 tablet by mouth at bedtime 5/17/22   Historical Provider, MD   Respiratory Therapy Supplies (NEBULIZER/TUBING/MOUTHPIECE) KIT 1 kit by Does not apply route 4 times daily as needed (wheezing) 5/18/22 5/18/23  BRAULIO Marc CNP   colchicine (COLCRYS) 0.6 MG tablet Take 1 tablet by mouth 2 times daily as needed for Pain (gout pain) Can repeat with 0.6 mg in 1 hour, max 1.8mg daily for acute pain 4/25/22 8/12/23  BRAULIO Marc - CNP   Lancets [facility]. (ACCU-CHEK FASTCLIX LANCET) KIT use as directed PLEASE APPROVED NEW DEVICE WHILE WE WAIT [address] [address].. ..  FASTCLIX MIGHT BE EAISER TO MANIPULATE 7/22/21 8/19/23  Trisha Sanchez, APRN - CNP   Accu-Chek FastClix Lancets MISC use 1 LANCET to TEST BLOOD SUGAR one to two times a day 7/22/21 8/19/23  Trisha Sanchez, APRN - CNP   naloxone 4 MG/0.1ML LIQD nasal spray 1 spray by Nasal route as needed for Opioid Reversal 2/16/21   Alvenia Fairly, APRN - CNP   Handicap Placard MISC by Does not apply route Exp 2/3/2026 2/3/21   Trisha Sanchez, APRN - CNP       Future Appointments   Date Time Provider [facility]   1/31/2023 10:00 AM MARIBELL Cabrera psyc [facility]   2/8/2023 12:00 PM BRAULIO Anne CNP PC [facility]   3/20/2023 10:00 AM Luciana Villafana DPM [facility]   3/27/2023  3:30 PM Solange Vazquez MD Neuro [facility] -   4/24/2023 11:30 AM Diana Goldman MD [facility] None   7/24/2023  8:30 AM DO Alexandro Velazquez Neuro [facility]

## 2023-01-31 ENCOUNTER — OFFICE VISIT (OUTPATIENT)
Dept: BEHAVIORAL/MENTAL HEALTH CLINIC | Age: 63
End: 2023-01-31

## 2023-01-31 DIAGNOSIS — F43.10 POST TRAUMATIC STRESS DISORDER: Primary | ICD-10-CM

## 2023-01-31 PROCEDURE — 90834 PSYTX W PT 45 MINUTES: CPT | Performed by: SOCIAL WORKER

## 2023-01-31 NOTE — PROGRESS NOTES
ADULT BEHAVIORAL HEALTH FOLLOW UP  MARIBELL Farrell  Licensed Independent        Visit Date: 1/31/2023   Time of appointment:  [id number]   Time spent with Patient: 40 minutes. This is patient's 18th appointment. Reason for Consult:  Stress and Anxiety     Referring Provider/PCP:    No ref. provider found  BRAULIO Reynoso CNP      Pt provided informed consent for the behavioral health program. Discussed with patient model of service to include the limits of confidentiality (i.e. abuse reporting, suicide intervention, etc.) and short-term intervention focused approach. Pt indicated understanding. Natan Kessler is a 57 y.o. female who presents for follow up of stress and anxiety. She presents as very anxious and upset, as her phone broke and while her daughter gets her a new one she has access to her previous texts. She reports a lot of concern about her daughter being upset with her for asking for help updating her sons grave, as well as other things her daughters have been upset with her for. She states her other daughter will be taking her car, we reviewed options for alternate transportation. She was encouraged to reach out to daughter in law for more help, as well as using community resources she has been signed up for. She was reminded she is her own guardian and can make her own choices, she is allowed to feel what she feels and speak to who she choses. Previous Recommendations: Niko Singer will continue healthy self care and coping skills. She will follow up with Gayla Garcia in 1 month. MENTAL STATUS EXAM  Mood was anxious with anxious and tearful affect. Suicidal ideation was denied. Homicidal ideation was denied. Hygiene was fair . Dress was appropriate. Behavior was Within Normal Limits with Sometimes observation or self-report of difficulties ambulating, states her knee still hurts from fall, walking with a limp. Attitude was Cooperative.   Eye-contact was good.  Speech: rate - WNL, rhythm - WNL, volume - WNL. Verbalizations were coherent. Thought processes were intact and goal-oriented without evidence of delusions, hallucinations, obsessions, or christel; with moderate cognitive distortions. Associations were characterized by intact cognitive processes. Pt was oriented to person, place, time, and general circumstances;  recent:  good and remote:  good. Insight and judgment were estimated to be fair, AEB, a fair  understanding of cyclical maladaptive patterns, and the ability to use insight to inform behavior change. ASSESSMENT  Armando Dobbins presented to the appointment today for evaluation and treatment of symptoms of anxiety. She is currently deemed no risk to herself or others and meets criteria for PTSD. Yesica was in agreement with recommendations. PHQ Scores 11/23/2022 11/23/2022 1/24/2022 7/25/2018 6/27/2017   PHQ2 Score 6 - 0 0 0   PHQ9 Score 27 24 0 0 0     Interpretation of Total Score Depression Severity: 1-4 = Minimal depression, 5-9 = Mild depression, 10-14 = Moderate depression, 15-19 = Moderately severe depression, 20-27 = Severe depression    How often pt has had thoughts of death or hurting self (if PHQ positive for depression):       ZAIN 7 SCORE 11/23/2022   ZAIN-7 Total Score 21     Interpretation of ZAIN-7 score: 5-9 = mild anxiety, 10-14 = moderate anxiety, 15+ = severe anxiety. Recommend referral to behavioral health for scores 10 or greater. DIAGNOSIS  Fay Reyna was seen today for stress and anxiety. Diagnoses and all orders for this visit:    Post traumatic stress disorder        INTERVENTION  Trained in strategies for increasing balanced thinking, Trained in improving communication skills, Discussed self-care (sleep, nutrition, rewarding activities, social support, exercise), Discussed potential barriers to change, and Supportive techniques      [id number] will focus on her own needs and reach out to daughter in law. She will follow up with Mariia Campos in 1 month. INTERACTIVE COMPLEXITY  Is interactive complexity present?   No  Reason:  N/A  Additional Supporting Information:  N/A       Electronically signed by MARIBELL Dangelo on 1/31/23 at 10:02 AM EST

## 2023-02-01 ENCOUNTER — CARE COORDINATION (OUTPATIENT)
Dept: CARE COORDINATION | Age: 63
End: 2023-02-01

## 2023-02-01 NOTE — CARE COORDINATION
Ambulatory Care Coordination Note  2/1/2023    ACC: Howard Alicia, RN  Spoke with pt who said she is doing ok at home her phone broke and she is really upset because she lost text and pictures are gone. [facility] did come out on Monday and they will be getting back with her soon. 1) acp done   2) sdoh done   3) med review done   4) cardiology Dr Sole Lopez due [address] 2023  5) podiatry Dr Ladarius Gallagher due March 2023  6) nephrology Dr Dionne Sauceda 4/24/23  7) neurosurgery dr Balta Rosaroi July 24  8) eye yearly exam due in June  9)  zeph jason 10 tai jason 3   10) pcp feb 8th  11) pill packing through [address]   12)  referral for passport, life alert and food insecurities   15) home care referral for PT / OT [facility]     Offered patient enrollment in the Remote Patient Monitoring (RPM) program for in-home monitoring: Patient declined. Lab Results       None            Care Coordination Interventions    Referral from Primary Care Provider: No  Suggested Interventions and Community Resources  Diabetes Education: Declined  Fall Risk Prevention: In Process  Disease Specific Clinic: Completed  Home Health Services: Completed  Physical Therapy: Completed  Social Work: Completed  Zone Management Tools: Completed          Goals Addressed                   This Visit's Progress     Reduce Falls    On track     I will reduce my risk of falls by the following: Remove rugs or use non slip rugs  Install grab bars in bathroom  Use walking aids like cane or walker  Follow through on orders for PT    Barriers: lack of support and overwhelmed by complexity of regimen  Plan for overcoming my barriers: care coordination   Confidence: 9/10  Anticipated Goal Completion Date: 5/28/23              Prior to Admission medications    Medication Sig Start Date End Date Taking?  Authorizing Provider   buPROPion (WELLBUTRIN XL) 150 MG extended release tablet  1/10/23   Historical Provider, MD   CALTRATE 600+D3 600-20 MG-MCG TABS  1/10/23   Historical Provider, MD polyethylene glycol (GLYCOLAX) 17 GM/SCOOP powder  1/18/23   Historical Provider, MD   HYDROcodone-acetaminophen (Jonetta Floss) 5-325 MG per tablet take 1 tablet by mouth every 8 hours if needed for pain 1/16/23 2/13/23  BRAULIO Casillas CNP   atorvastatin (LIPITOR) 40 MG tablet Take 1 tablet by mouth daily 1/15/23 1/14/24  BRAULIO Casillas CNP   isosorbide mononitrate (IMDUR) 30 MG extended release tablet Take 1 tablet by mouth daily 1/15/23 1/14/24  BRAULIO Casillas CNP   albuterol sulfate HFA (PROVENTIL;VENTOLIN;PROAIR) 108 (90 Base) MCG/ACT inhaler inhale 2 puffs by mouth and INTO THE LUNGS every 4 hours if needed for wheezing shortness of breath or cough 1/9/23 1/9/24  BRAULIO Casillas CNP   Caltrate 600+D Plus Minerals (CALTRATE) 600-800 MG-UNIT TABS tablet Take 1 tablet by mouth 2 times daily 12/19/22 12/19/23  BRAULIO Casillas CNP   linaCLOtide (LINZESS) 72 MCG CAPS capsule Take 1 capsule by mouth every morning (before breakfast) 12/4/22 12/4/23  BRAULIO Casillas CNP   escitalopram (LEXAPRO) 5 MG tablet Take 5 mg by mouth daily    Historical Provider, MD   dapagliflozin (FARXIGA) 5 MG tablet Take 1 tablet by mouth every morning 10/28/22 10/28/23  BRAULIO Casillas CNP   omeprazole (PRILOSEC) 20 MG delayed release capsule take 1 capsule by mouth once daily 10/27/22   BRAULIO Casillas CNP   lisinopril (PRINIVIL;ZESTRIL) 5 MG tablet Take 1 tablet by mouth daily 10/17/22   BRAULIO Casillas CNP   senna (SENOKOT) 8.6 MG tablet Take 1 tablet by mouth 2 times daily 10/3/22 10/3/23  BRAULIO Casillas CNP   allopurinol (ZYLOPRIM) 100 MG tablet take 1 tablet by mouth twice a day 9/29/22 9/29/23  Trisha Hector APRN - CNP   tiZANidine (ZANAFLEX) 2 MG tablet Take 2 mg by mouth every 6 hours as needed    Historical Provider, MD   Blood Pressure Monitoring (BLOOD PRESSURE CUFF) MISC Use as directed by physician to check blood pressure. Please fit to patient.  9/21/22 9/21/23  BRAULIO Parsons CNP   traZODone (DESYREL) 50 MG tablet Take 50 mg by mouth nightly    Historical Provider, MD   Multiple Vitamins-Minerals (THERAPEUTIC MULTIVITAMIN-MINERALS) tablet Take 1 tablet by mouth daily 9/19/22 9/19/23  BRAULIO Parsons CNP   Blood Pressure Monitoring KIT Use to check blood pressure daily and as needed 9/19/22 7/27/25  BRAULIO Parsons CNP   tiotropium-olodaterol (STIOLTO RESPIMAT) 2.5-2.5 MCG/ACT AERS Inhale 2 puffs into the lungs daily 8/19/22 8/19/23  BRAULIO Parsons CNP   apixaban (ELIQUIS) 5 MG TABS tablet Take 1 tablet by mouth 2 times daily 8/19/22 8/19/23  BRAULIO Parsons CNP   Alcohol Swabs (ALCOHOL PREP) 70 % PADS Use twice daily and as needed to check blood sugars 6/28/22 9/25/24  BRAULIO Parsons CNP   blood glucose test strips (ONETOUCH ULTRA) strip TEST THREE TIMES DAILY 6/28/22 6/24/23  BRAULIO Parsons CNP   ipratropium-albuterol (DUONEB) 0.5-2.5 (3) MG/3ML SOLN nebulizer solution Take 3 mLs by nebulization every 6 hours as needed for Shortness of Breath 6/28/22   BRAULIO Parsons CNP   risperiDONE (RISPERDAL) 0.5 MG tablet take 1 tablet by mouth at bedtime 5/17/22   Historical Provider, MD   Respiratory Therapy Supplies (NEBULIZER/TUBING/MOUTHPIECE) KIT 1 kit by Does not apply route 4 times daily as needed (wheezing) 5/18/22 5/18/23  BRAULIO Parsons CNP   colchicine (COLCRYS) 0.6 MG tablet Take 1 tablet by mouth 2 times daily as needed for Pain (gout pain) Can repeat with 0.6 mg in 1 hour, max 1.8mg daily for acute pain 4/25/22 8/12/23  BRAULIO Parsons CNP   Lancets Misc. (ACCU-CHEK FASTCLIX LANCET) KIT use as directed PLEASE APPROVED NEW DEVICE WHILE WE WAIT [address] ..  FASTCLIX MIGHT BE EAISER TO MANIPULATE 7/22/21 8/19/23  Trisha Gonsalez, APRN - CNP   Accu-Chek FastClix Lancets MISC use 1 LANCET to TEST BLOOD SUGAR one to two times a day 7/22/21 8/19/23  Trisha Gonsalez, APRN - CNP   naloxone 4 MG/0.1ML LIQD nasal spray 1 spray by Nasal route as needed for Opioid Reversal 2/16/21   BRAULIO Leal CNP   Handicap Placard MISC by Does not apply route Exp 2/3/2026 2/3/21   BRAULIO Leal CNP       Future Appointments   Date Time Provider Mark Mendozai   2/8/2023 12:00 PM BRAULIO Leal CNP PC [facility]   2/28/2023 10:00 AM MARIBELL Cabrera psyc [facility]   3/20/2023 10:00 AM Elyse Severance, DPM [facility]   3/27/2023  3:30 PM Domingo Griffiths MD Neuro [facility] Neurology -   4/24/2023 11:30 AM Aleshia Lopez MD [facility] None   7/24/2023  8:30 AM Dorisann Kehr, DO [place] [facility]

## 2023-02-02 ENCOUNTER — CARE COORDINATION (OUTPATIENT)
Dept: CARE COORDINATION | Age: 63
End: 2023-02-02

## 2023-02-02 ENCOUNTER — TELEPHONE (OUTPATIENT)
Dept: ORTHOPEDIC SURGERY | Age: 63
End: 2023-02-02

## 2023-02-02 NOTE — TELEPHONE ENCOUNTER
Called patient to schedule appointment with you, she stated she had no car or transportation to get to the appointment. She has requested a phone call please.

## 2023-02-02 NOTE — CARE COORDINATION
went to Patient's Home to assist her with her MEDICARE as Patient will turn 70 in April of this year and was very confused as she had received multiple mailers through the Mail petitioning her to sign up through them for her MEDICARE Needs.  has already set her up with [employer] who came out to do an Interview with her and will get back  to her to let her know what she qualifies for.  will then help her to set up her MEDICARE to compliment her MEDICAID.

## 2023-02-02 NOTE — TELEPHONE ENCOUNTER
----- Message from Yuval Blanca sent at 2/2/2023 10:56 AM EST -----  Patient called in looking to discuss the results of the Dexa Scan she had performed on 12/09/23 Please advise thank you.

## 2023-02-03 NOTE — TELEPHONE ENCOUNTER
Kimberly Randle  ([phone]) at [facility] will call Dionne Landa at [facility] ( [phone]) to coordinate  transfer of patient from hospital to hospital.

## 2023-02-07 DIAGNOSIS — J41.1 MUCOPURULENT CHRONIC BRONCHITIS (HCC): ICD-10-CM

## 2023-02-07 DIAGNOSIS — K59.01 SLOW TRANSIT CONSTIPATION: ICD-10-CM

## 2023-02-07 RX ORDER — TIOTROPIUM BROMIDE AND OLODATEROL 3.124; 2.736 UG/1; UG/1
SPRAY, METERED RESPIRATORY (INHALATION)
Qty: 1 EACH | Refills: 5 | Status: SHIPPED | OUTPATIENT
Start: 2023-02-07 | End: 2024-02-07

## 2023-02-07 RX ORDER — POLYETHYLENE GLYCOL 3350 17 G/17G
POWDER, FOR SOLUTION ORAL
Qty: 1 EACH | Refills: 5 | Status: SHIPPED | OUTPATIENT
Start: 2023-02-07 | End: 2024-02-07

## 2023-02-07 RX ORDER — SENNOSIDES 8.6 MG
TABLET ORAL
Qty: 60 TABLET | Refills: 3 | Status: SHIPPED | OUTPATIENT
Start: 2023-02-07 | End: 2024-02-07

## 2023-02-07 NOTE — TELEPHONE ENCOUNTER
LOV 12/19/22  RTO F/U scheduled  LRF 10/03/22    Health Maintenance   Topic Date Due    Cervical cancer screen  11/23/2023 (Originally 11/16/2020)    Diabetic retinal exam  12/03/2023 (Originally 7/9/2022)    Pneumococcal 0-64 years Vaccine (3 - PPSV23 if available, else PCV20) 04/23/2023    Depression Monitoring  11/23/2023    Diabetic foot exam  12/21/2023    A1C test (Diabetic or Prediabetic)  01/19/2024    Diabetic Alb to Cr ratio (uACR) test  01/19/2024    Lipids  01/19/2024    GFR test (Diabetes, CKD 3-4, OR last GFR 15-59)  01/19/2024    Breast cancer screen  12/09/2024    DTaP/Tdap/Td vaccine (2 - Td or Tdap) 08/04/2026    Colorectal Cancer Screen  02/16/2028    Flu vaccine  Completed    Shingles vaccine  Completed    COVID-19 Vaccine  Completed    Hepatitis C screen  Addressed    HIV screen  Addressed    Hepatitis A vaccine  Aged Out    Hib vaccine  Aged Out    Meningococcal (ACWY) vaccine  Aged Out             (applicable per patient's age: Cancer Screenings, Depression Screening, Fall Risk Screening, Immunizations)    Hemoglobin A1C (%)   Date Value   01/19/2023 6.1 (H)   09/19/2022 5.3   03/28/2022 6.2 (H)     Microalb/Crt.  Ratio (mcg/mg creat)   Date Value   04/08/2019 CANNOT BE CALCULATED     LDL Cholesterol (mg/dL)   Date Value   01/19/2023 89     LDL Calculated (mg/dL)   Date Value   04/10/2020 59     AST (U/L)   Date Value   01/19/2023 24     ALT (U/L)   Date Value   01/19/2023 28     BUN (mg/dL)   Date Value   01/19/2023 20      (goal A1C is < 7)   (goal LDL is <100) need 30-50% reduction from baseline     BP Readings from Last 3 Encounters:   01/23/23 (!) 148/92   01/23/23 (!) 160/100   12/19/22 (!) 140/90    (goal /80)      All Future Testing planned in [facility]:  Lab Frequency Next Occurrence   XR CHEST STANDARD (2 VW) Once 06/16/2022   CBC Once 06/16/2022   Comprehensive Metabolic Panel Once 04/08/4485   Brain Natriuretic Peptide Once 06/16/2022   D-Dimer, Quantitative Once 06/16/2022 Troponin Once 06/16/2022   MRI BRAIN W WO CONTRAST Once 79/34/8223   Basic Metabolic Panel Once 98/48/9109   CBC Once 04/23/2023   Protein / Creatinine Ratio, Urine Once 04/23/2023   POCT INR     Basic Metabolic Panel Every 6 Months 7/14/2023   CBC Every 6 Months 7/14/2023   Protein / Creatinine Ratio, Urine Every 6 Months 7/14/2023, 12/29/2023, 6/14/2024, 11/29/2024, 5/16/2025       Next Visit Date:  Future Appointments   Date Time Provider Mark Sandi   2/8/2023 12:00 PM Trisha Garza, APRN - CNP [place] [facility]   2/28/2023 10:00 AM MARIBELL Cabrera psyc [facility]   3/20/2023 10:00 AM Charli Yeboah DPM [facility]   3/30/2023  4:00 PM Mayte Ng MD Neuro [facility] Neurology -   4/24/2023 11:30 AM Oscar Castro MD [facility] None   7/24/2023  8:30 AM DO Alexandro Huitron Neuro [facility]            Patient Active Problem List:     Essential hypertension     Hyperlipidemia     Osteoarthritis     Obesity     CKD (chronic kidney disease) stage 3, GFR 30-59 ml/min (HCC)     Proteinuria     Controlled type 2 diabetes mellitus with complication, without long-term current use of insulin (HCC)     History of DVT of lower extremity     Vitamin D deficiency     Major depressive disorder, recurrent episode, severe, with psychotic behavior ([address]     Multiple lung nodules on CT     Hypomagnesemia     Anxiety     Chronic obstructive pulmonary disease (HCC)     Precordial pain     History of pulmonary embolism     Family history of abdominal aortic aneurysm     Normal coronary arteries     Long term (current) use of anticoagulants     AAA (abdominal aortic aneurysm) without rupture     Acute tracheobronchitis-viral     Abnormal mammogram     Cerebrovascular accident (CVA) ([address]     Diabetic nephropathy (HCC)     Slow transit constipation     Cerebral cavernoma     Pseudogout     History of gout     Familial cavernous cerebral angioma (HCC)     Cavernoma     Meniscus, medial, anterior horn derangement, right     Post traumatic stress disorder     Patellofemoral arthritis of right knee

## 2023-02-08 ENCOUNTER — OFFICE VISIT (OUTPATIENT)
Dept: FAMILY MEDICINE CLINIC | Age: 63
End: 2023-02-08
Payer: MEDICAID

## 2023-02-08 VITALS
WEIGHT: 166 LBS | SYSTOLIC BLOOD PRESSURE: 138 MMHG | RESPIRATION RATE: 20 BRPM | TEMPERATURE: 97.4 F | HEART RATE: 88 BPM | DIASTOLIC BLOOD PRESSURE: 84 MMHG | BODY MASS INDEX: 30.36 KG/M2 | OXYGEN SATURATION: 98 %

## 2023-02-08 DIAGNOSIS — J41.8 MIXED SIMPLE AND MUCOPURULENT CHRONIC BRONCHITIS (HCC): ICD-10-CM

## 2023-02-08 DIAGNOSIS — E11.8 CONTROLLED TYPE 2 DIABETES MELLITUS WITH COMPLICATION, WITHOUT LONG-TERM CURRENT USE OF INSULIN (HCC): ICD-10-CM

## 2023-02-08 DIAGNOSIS — R42 VERTIGO: ICD-10-CM

## 2023-02-08 DIAGNOSIS — F41.9 ANXIETY: Primary | ICD-10-CM

## 2023-02-08 DIAGNOSIS — E11.21 DIABETIC NEPHROPATHY ASSOCIATED WITH TYPE 2 DIABETES MELLITUS (HCC): ICD-10-CM

## 2023-02-08 PROCEDURE — 99214 OFFICE O/P EST MOD 30 MIN: CPT | Performed by: NURSE PRACTITIONER

## 2023-02-08 PROCEDURE — 3074F SYST BP LT 130 MM HG: CPT | Performed by: NURSE PRACTITIONER

## 2023-02-08 PROCEDURE — 3044F HG A1C LEVEL LT 7.0%: CPT | Performed by: NURSE PRACTITIONER

## 2023-02-08 PROCEDURE — 3078F DIAST BP <80 MM HG: CPT | Performed by: NURSE PRACTITIONER

## 2023-02-08 ASSESSMENT — ENCOUNTER SYMPTOMS
SORE THROAT: 0
TROUBLE SWALLOWING: 0
CHEST TIGHTNESS: 0
ABDOMINAL DISTENTION: 0
RHINORRHEA: 0
SINUS PRESSURE: 0
BACK PAIN: 1
WHEEZING: 0
DIARRHEA: 0
RECTAL PAIN: 0
SHORTNESS OF BREATH: 0
ABDOMINAL PAIN: 0
COUGH: 0
CONSTIPATION: 0

## 2023-02-08 ASSESSMENT — PATIENT HEALTH QUESTIONNAIRE - PHQ9
9. THOUGHTS THAT YOU WOULD BE BETTER OFF DEAD, OR OF HURTING YOURSELF: 2
SUM OF ALL RESPONSES TO PHQ QUESTIONS 1-9: 19
SUM OF ALL RESPONSES TO PHQ9 QUESTIONS 1 & 2: 5
7. TROUBLE CONCENTRATING ON THINGS, SUCH AS READING THE NEWSPAPER OR WATCHING TELEVISION: 3
8. MOVING OR SPEAKING SO SLOWLY THAT OTHER PEOPLE COULD HAVE NOTICED. OR THE OPPOSITE, BEING SO FIGETY OR RESTLESS THAT YOU HAVE BEEN MOVING AROUND A LOT MORE THAN USUAL: 2
SUM OF ALL RESPONSES TO PHQ QUESTIONS 1-9: 21
1. LITTLE INTEREST OR PLEASURE IN DOING THINGS: 3
10. IF YOU CHECKED OFF ANY PROBLEMS, HOW DIFFICULT HAVE THESE PROBLEMS MADE IT FOR YOU TO DO YOUR WORK, TAKE CARE OF THINGS AT HOME, OR GET ALONG WITH OTHER PEOPLE: 2
2. FEELING DOWN, DEPRESSED OR HOPELESS: 2
3. TROUBLE FALLING OR STAYING ASLEEP: 1
5. POOR APPETITE OR OVEREATING: 3
SUM OF ALL RESPONSES TO PHQ QUESTIONS 1-9: 21
6. FEELING BAD ABOUT YOURSELF - OR THAT YOU ARE A FAILURE OR HAVE LET YOURSELF OR YOUR FAMILY DOWN: 3
4. FEELING TIRED OR HAVING LITTLE ENERGY: 2
SUM OF ALL RESPONSES TO PHQ QUESTIONS 1-9: 21

## 2023-02-08 ASSESSMENT — COLUMBIA-SUICIDE SEVERITY RATING SCALE - C-SSRS
6. HAVE YOU EVER DONE ANYTHING, STARTED TO DO ANYTHING, OR PREPARED TO DO ANYTHING TO END YOUR LIFE?: NO
2. HAVE YOU ACTUALLY HAD ANY THOUGHTS OF KILLING YOURSELF?: NO
1. WITHIN THE PAST MONTH, HAVE YOU WISHED YOU WERE DEAD OR WISHED YOU COULD GO TO SLEEP AND NOT WAKE UP?: YES

## 2023-02-08 NOTE — PROGRESS NOTES
[address]  [phone]    2/8/23     Patient ID  Clau Burroughs is a 57 y.o. female  Established patient    Chief Complaint  Clau Burroughs presents today for COPD, Diabetes, and Weight Gain (Worried about weight gain since her last visit. )      ASSESSMENT/PLAN  1. Anxiety  2. Mixed simple and mucopurulent chronic bronchitis ([facility]  Assessment & Plan:   Monitored by specialist- no acute findings meriting change in the plan  3. Controlled type 2 diabetes mellitus with complication, without long-term current use of insulin ([facility]  Assessment & Plan:   Well-controlled, continue current medications and continue current treatment plan  Orders:  -     Vitamin B12 & Folate; Future  4. Diabetic nephropathy associated with type 2 diabetes mellitus ([facility]  Assessment & Plan:   At goal, continue current medications and continue current treatment plan  Orders:  -     Vitamin B12 & Folate; Future  5. Vertigo  -     [facility] [place]         Return in about 2 months (around 4/8/2023) for mental health. On this date 2/8/2023 I have spent 37+ minutes reviewing previous notes, test results and face to face with the patient discussing the diagnosis and importance of compliance with the treatment plan as well as documenting on the day of the visit.     Patient Care Team:  BRAULIO Boss CNP as PCP - General (Nurse Practitioner Family)  BRAULIO Boss CNP as PCP - Empaneled Provider  Anselmo Olivia MD as Consulting Physician (Nephrology)  Julianna Mclean MD as Consulting Physician (Pulmonology)  Ricky Smith MD as Consulting Physician (Cardiology)  Frankey Alf, DPM as Consulting Physician (Podiatry)  Pollo Hughes MD (Psychiatry)  Zohaib Spangler MD as Consulting Physician (Neurology)  Melanie Atwood DO as Surgeon (Neurosurgery)  Josafat Richter RN as [address] [place] [address]  SOFIE Sellers, LSW as  (Licensed Clinical )    SUBJECTIVE/OBJECTIVE  History of Present illness / Visit Summary   Patient presents today for follow up   Reviewed health maintenance and any recent labs/imaging      Still needs life alert in home    1/23/2023 Nephrology -  Ms. Gayatri Cardona was in my clinic today for ongoing nephrological evaluation. Patient states that she had a fall that resulted in rib injury. She recently had an MRI of her brain. There was some abnormalities and she will be seeing a neurologist in the month of March. She was having excessive loss of weight. Farxiga dose was decreased from 10 mg to 5 mg once a day and she is gaining some weight. Patient was also hyponatremic and her creatinine is now 1.1 mg/dL slightly higher than her baseline and most likely due to prerenal factors. Remains under good control. Last available hemoglobin A1c was 6.2. The only hypoglycemic agent he is on Farxiga 5 mg once a day. Her blood pressure remained stable. Her current nephrological and associated diagnoses include:     #1. Chronic kidney disease stage III based on GFR estimation. Her baseline creatinine is now somewhere   in the range of 1-1.3 mg/dL. #2.  History of essential hypertension. #3.  History of diabetes mellitus without diabetic retinopathy. #4.  History of nonsteroidal anti-inflammatory drug use. For an extended period of time and was also on   Celebrex 1 tablet once a day for at least 2 years. #5.  Obesity and sleep apnea, patient uses her CPAP on a fairly regular basis. #6. Diagnosed von Willebrand disease by Dr. Es Yu. #7.  Significant weight loss after changing diet. Patient has lost close to 60 pounds in the last 12 months  #8. Patient is on long-term Prilosec and refused to discontinue. Risks explained. 1/23/2023 orthopedic -   Patient is certainly here today with's several complaints. She has recurrence of pain in the right knee.   The pain in the right knee is located over the anterolateral aspect and in the peripatellar region. The second complaint is that she had fallen and injured her left wrist and continues to have pain. The third concern she had was that she had fallen and injured her coccyx and also if she is not sure of her bone density studies and possible osteoporosis. Examination: Left wrist examination shows definite tenderness over the radial styloid but also some tenderness over the scaphoid. The Finkelstein's test was negative. The fall had occurred couple of weeks ago. Wrist motions were slightly limited and painful. Right knee examination showed she had an extension lag of 5 degrees and then she could flex fully. There was tenderness over the medial retinaculum and there was patellofemoral grating. X-rays: X-rays of the wrist show mild osteoarthritic changes of in the scaphotrapezial joint. No bony injury is seen. Scaphoid appears normal.  Reviewed previous x-ray of the pelvis and the coccyx and I cannot clearly make out the fracture. The bone density appears to be satisfactory both on the wrist and the pelvic x-rays. Diagnosis: #1 fracture coccyx. #2 osteoarthritis right knee. #3 scaphotrapezial osteoarthritis left wrist.    Treatment: Under sterile conditions injected 40 mg Depo-Medrol and 5 cc of 1% plain lidocaine into the right knee through anterolateral portal without any complications. She had immediate response to this. Have given her instructions as to how the injection works. As for the wrist told her to continue mobilization since there is no bony injury. She also wanted to know about her osteoporosis we spent considerable time trying to get the results she initially stated that the bone density was carried out at [facility] eventually found out it was at [facility]. Discussed with her that she should contact her gynecologist to order the test and follow-up with her regarding treatment.   We will see her as needed. 1/23/2023 neurosurgery -    Abraham is a 57 y.o. female on whom neurosurgical consultation was requested by BRAULIO Sherman CNP for management of left periventricular cavernoma. Patient was originally seen in the hospital and found to have a extremity hemorrhage cavernoma in the periventricular region of the left lateral ventricle. Patient has not had any lateralizing issues such as focal weakness speech deficits or vision problems recently. She did unfortunately fall and injured her knee for which she is seeing orthopedics. She is newly utilizing a walker as well. Patient describes 2 episodes where she was driving and forgot where she was completely and is generally describing memory loss is one of her primary complaints. It appears that over the last 6 months she is also developed a tremor of both upper extremities. .     No significant palliating or Provoking factors in terms of the tremor. Appears to occur at rest as well as when ambulating. Appears to be moderate in intensity has been going on for 6 months. Review of Systems  Review of Systems   Constitutional:  Positive for activity change. Negative for appetite change, chills, fatigue and fever. HENT:  Negative for congestion, ear pain, postnasal drip, rhinorrhea, sinus pressure, sneezing, sore throat and trouble swallowing. Eyes:         Follows with ophthalmology    Respiratory:  Negative for cough (excessive sputum), chest tightness (w/anxiety), shortness of breath and wheezing. Rarely needs rescue inhaler. No longer using CPAP. Follows with pulmonology    Cardiovascular:  Negative for chest pain, palpitations and leg swelling. Follows with cardiology    Gastrointestinal:  Negative for abdominal distention, abdominal pain, constipation, diarrhea and rectal pain.         Moving bowels almost daily with addition of Linzess    Endocrine:        Follows with podiatry 100-150     Genitourinary:  Negative for difficulty urinating, dysuria, frequency, hematuria and urgency. Follows with nephrology    Musculoskeletal:  Positive for arthralgias (right shoulder, right knee) and back pain (chronic, lumbar). Negative for gait problem, joint swelling and myalgias. Skin:  Negative for rash and wound. Allergic/Immunologic: Negative for environmental allergies. Neurological:  Positive for dizziness, tremors (worse with anxiety) and light-headedness. Negative for syncope, numbness and headaches. Follows with neurology - routine MRI scans   Unsteady    Hematological:  Does not bruise/bleed easily. Psychiatric/Behavioral:  Positive for agitation and sleep disturbance. Negative for decreased concentration, self-injury and suicidal ideas. The patient is nervous/anxious. Follows with psychiatry for pharmacology and therapy routinely      Physical exam   Physical Exam  Vitals and nursing note reviewed. Constitutional:       General: She is not in acute distress. Appearance: Normal appearance. She is well-developed and well-groomed. She is not ill-appearing or toxic-appearing. Cardiovascular:      Rate and Rhythm: Normal rate and regular rhythm. No extrasystoles are present. Heart sounds: Normal heart sounds, S1 normal and S2 normal. No murmur heard. Pulmonary:      Effort: Pulmonary effort is normal. No prolonged expiration or respiratory distress. Breath sounds: Normal breath sounds and air entry. Musculoskeletal:      Right lower leg: No edema. Left lower leg: No edema. Skin:     General: Skin is warm and dry. Coloration: Skin is not ashen, cyanotic, jaundiced or pale. Neurological:      Mental Status: She is alert and oriented to person, place, and time. Motor: Motor function is intact. Gait: Gait is intact. Psychiatric:         Attention and Perception: Attention and perception normal.         Mood and Affect: Affect normal. Mood is anxious.          Speech: Speech normal.         Behavior: Behavior normal. Behavior is cooperative. Thought Content: Thought content normal. Thought content does not include suicidal ideation. Thought content does not include suicidal plan. Cognition and Memory: Cognition and memory normal.         Judgment: Judgment normal.         Electronically signed by BRAULIO Huizar CNP, APRN-CNP on 2/26/2023 at 9:55 PM    Please note that this chart was generated using voice recognition Dragon dictation software. Although every effort was made to ensure the accuracy of this automated transcription, some errors in transcription may have occurred.

## 2023-02-09 ENCOUNTER — CLINICAL DOCUMENTATION (OUTPATIENT)
Dept: ORTHOPEDIC SURGERY | Age: 63
End: 2023-02-09

## 2023-02-09 ENCOUNTER — CARE COORDINATION (OUTPATIENT)
Dept: CARE COORDINATION | Age: 63
End: 2023-02-09

## 2023-02-09 NOTE — CARE COORDINATION
Ambulatory Care Coordination Note  2023    Patient Current Location: [facility]     ACM contacted the patient by telephone. Verified name and  with patient as identifiers. Provided introduction to self, and explanation of the ACM role. Challenges to be reviewed by the provider   Additional needs identified to be addressed with provider: No  none               Method of communication with provider: none. ACM: Oseas Westfall, RN  Spoke with pt who said she is doing ok at home she did not hear anything back yet from area office on aging she does have the number to call and will call them to check in. She will see [facility] on  and neph in April she denies any needs at this time     1) acp done   2) sdoh done   3) med review done   4) cardiology Dr Tati Belcher due [address]   5) podiatry Dr Elizabeth Chong due 2023  6) nephrology Dr Pauline Rao 23  7) neurosurgery dr Orquidea Rivero   8) eye yearly exam due in   9)  britni jason 10 tai jason 3   10) pcp   11) pill packing through [address]   12)  referral for passport, life alert and food insecurities   15) home care referral for PT / OT [facility]    Offered patient enrollment in the Remote Patient Monitoring (RPM) program for in-home monitoring: Patient declined. Lab Results       None            Care Coordination Interventions    Referral from Primary Care Provider: No  Suggested Interventions and Community Resources  Diabetes Education: Declined  Fall Risk Prevention:  In Process  Disease Specific Clinic: Completed  Home Health Services: Completed  Physical Therapy: Completed  Social Work: Completed  Zone Management Tools: Completed          Goals Addressed    None         Future Appointments   Date Time Provider aMrk Junior   2023 10:00 AM MARIBELL Cabrera psyc [facility]   3/20/2023 10:00 AM Kristen Lane DPM [facility]   3/30/2023 12:00 PM BRAULIO Goddard CNP PC [facility]   3/30/2023  4:00 PM Onesimo Hubbard Aneesh Penny MD Neuro [facility] Neurology -   4/24/2023 11:30 AM Jose Antonio Aldridge MD [facility] None   4/25/2023  1:00 PM Trisha Doshi, APRN - CNP [place] [facility]   5/23/2023  1:00 PM Trisha Doshi, APRN - CNP [place] [facility] Kathern Ghee   6/20/2023  2:00 PM Trisha Doshi, APRN - CNP [place] [facility] Kathern Ghee   7/18/2023  2:00 PM Trisha Doshi, APRN - CNP [place] [facility] Kathern Ghee   7/24/2023  8:30 AM DO Alexandro Pickens Neuro [facility]   8/15/2023  2:00 PM Trisha Doshi, APRN - CNP [place] [facility]   9/12/2023  1:00 PM Trisha Doshi, APRN - CNP [place] [facility]   10/10/2023  1:00 PM Trisha Doshi, APRN - CNP [place] [facility]   11/7/2023  1:00 PM Trisha Doshi, APRN - CNP [place] [facility]   12/5/2023  1:00 PM Trisha Doshi, APRN - CNP [place] [facility] Kathern Kamaljit Yes

## 2023-02-15 ENCOUNTER — OFFICE VISIT (OUTPATIENT)
Dept: BEHAVIORAL/MENTAL HEALTH CLINIC | Age: 63
End: 2023-02-15
Payer: MEDICAID

## 2023-02-15 DIAGNOSIS — F43.10 POST TRAUMATIC STRESS DISORDER: Primary | ICD-10-CM

## 2023-02-15 DIAGNOSIS — F41.9 ANXIETY: ICD-10-CM

## 2023-02-15 DIAGNOSIS — M15.9 PRIMARY OSTEOARTHRITIS INVOLVING MULTIPLE JOINTS: ICD-10-CM

## 2023-02-15 PROCEDURE — 90834 PSYTX W PT 45 MINUTES: CPT | Performed by: SOCIAL WORKER

## 2023-02-15 RX ORDER — HYDROCODONE BITARTRATE AND ACETAMINOPHEN 5; 325 MG/1; MG/1
TABLET ORAL
Qty: 40 TABLET | Refills: 0 | Status: SHIPPED | OUTPATIENT
Start: 2023-02-15 | End: 2023-03-15

## 2023-02-15 RX ORDER — ALPRAZOLAM 0.25 MG/1
0.25 TABLET ORAL 3 TIMES DAILY PRN
Qty: 21 TABLET | Refills: 0 | Status: SHIPPED | OUTPATIENT
Start: 2023-02-15 | End: 2023-02-16 | Stop reason: SDUPTHER

## 2023-02-15 NOTE — TELEPHONE ENCOUNTER
LOV 2/8/23  RTO F/U scheduled  LRF 1/16/23  CSM 9/19/22    Health Maintenance   Topic Date Due    Cervical cancer screen  11/23/2023 (Originally 11/16/2020)    Diabetic retinal exam  12/03/2023 (Originally 7/9/2022)    Pneumococcal 0-64 years Vaccine (3 - PPSV23 if available, else PCV20) 04/23/2023    Diabetic foot exam  12/21/2023    A1C test (Diabetic or Prediabetic)  01/19/2024    Diabetic Alb to Cr ratio (uACR) test  01/19/2024    Lipids  01/19/2024    GFR test (Diabetes, CKD 3-4, OR last GFR 15-59)  01/19/2024    Depression Monitoring  02/08/2024    Breast cancer screen  12/09/2024    DTaP/Tdap/Td vaccine (2 - Td or Tdap) 08/04/2026    Colorectal Cancer Screen  02/16/2028    Flu vaccine  Completed    Shingles vaccine  Completed    COVID-19 Vaccine  Completed    Hepatitis C screen  Addressed    HIV screen  Addressed    Hepatitis A vaccine  Aged Out    Hib vaccine  Aged Out    Meningococcal (ACWY) vaccine  Aged Out             (applicable per patient's age: Cancer Screenings, Depression Screening, Fall Risk Screening, Immunizations)    Hemoglobin A1C (%)   Date Value   01/19/2023 6.1 (H)   09/19/2022 5.3   03/28/2022 6.2 (H)     Microalb/Crt.  Ratio (mcg/mg creat)   Date Value   04/08/2019 CANNOT BE CALCULATED     LDL Cholesterol (mg/dL)   Date Value   01/19/2023 89     LDL Calculated (mg/dL)   Date Value   04/10/2020 59     AST (U/L)   Date Value   01/19/2023 24     ALT (U/L)   Date Value   01/19/2023 28     BUN (mg/dL)   Date Value   01/19/2023 20      (goal A1C is < 7)   (goal LDL is <100) need 30-50% reduction from baseline     BP Readings from Last 3 Encounters:   02/08/23 138/84   01/23/23 (!) 148/92   01/23/23 (!) 160/100    (goal /80)      All Future Testing planned in [facility]:  Lab Frequency Next Occurrence   XR CHEST STANDARD (2 VW) Once 06/16/2022   CBC Once 06/16/2022   Comprehensive Metabolic Panel Once 24/45/1905   Brain Natriuretic Peptide Once 06/16/2022   D-Dimer, Quantitative Once 06/16/2022   Troponin Once 06/16/2022   MRI BRAIN W WO CONTRAST Once 19/40/7175   Basic Metabolic Panel Once 84/76/9739   CBC Once 04/23/2023   Protein / Creatinine Ratio, Urine Once 04/23/2023   Vitamin B12 & Folate Once 02/08/2023   POCT INR     Basic Metabolic Panel Every 6 Months 7/14/2023   CBC Every 6 Months 7/14/2023   Protein / Creatinine Ratio, Urine Every 6 Months 7/14/2023, 12/29/2023, 6/14/2024, 11/29/2024, 5/16/2025       Next Visit Date:  Future Appointments   Date Time Provider Mark Sandi   2/15/2023  4:00 PM KIRSTEN Cabrera-S [place] psyc [facility]   2/28/2023 10:00 AM KIRSTEN Cabrera[place]S [place] psyc [facility]   3/20/2023 10:00 AM Haris Graham DPM [facility]   3/30/2023 12:00 PM Trisha Diaz, APRN - CNP [place] [facility]   3/30/2023  4:00 PM Dunia Mackey MD Neuro [facility] Neurology -   4/24/2023 11:30 AM Shaun Yoon MD [facility] None   4/25/2023  1:00 PM Trisha Diaz, APRN - CNP [place] [facility]   5/23/2023  1:00 PM Trisha Diaz, APRN - CNP [place] [facility] [address]   6/20/2023  2:00 PM Trisha Diaz, APRN - CNP [place] [facility] [address]   7/18/2023  2:00 PM Trisha Diaz, APRN - CNP [place] [facility] [address]   7/24/2023  8:30 AM DO Alexandro Suarez Neuro [facility]   8/15/2023  2:00 PM Trisha Diaz, APRN - CNP [place] [facility]   9/12/2023  1:00 PM Trisha Diaz, APRN - CNP [place] [facility]   10/10/2023  1:00 PM BRAULIO Us - CNP [place] [facility]   11/7/2023  1:00 PM BRAULIO Us - CNP [place] [facility]   12/5/2023  1:00 PM BRAULIO Us - CNP [place] [facility]            Patient Active Problem List:     Essential hypertension     Hyperlipidemia     Osteoarthritis     Obesity     CKD (chronic kidney disease) stage 3, GFR 30-59 ml/min (HCC)     Proteinuria     Controlled type 2 diabetes mellitus with complication, without long-term current use of insulin ([facility] [address]     History of DVT of lower extremity     Vitamin D deficiency     Major depressive disorder, recurrent episode, severe, with psychotic behavior ([address]     Multiple lung nodules on CT     Hypomagnesemia     Anxiety     Chronic obstructive pulmonary disease (HCC)     Precordial pain     History of pulmonary embolism     Family history of abdominal aortic aneurysm     Normal coronary arteries     Long term (current) use of anticoagulants     AAA (abdominal aortic aneurysm) without rupture     Acute tracheobronchitis-viral     Abnormal mammogram     Cerebrovascular accident (CVA) ([address]     Diabetic nephropathy (HCC)     Slow transit constipation     Pseudogout     History of gout     Familial cavernous cerebral angioma (HCC)     Meniscus, medial, anterior horn derangement, right     Post traumatic stress disorder     Patellofemoral arthritis of right knee
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
## 2023-02-15 NOTE — TELEPHONE ENCOUNTER
Last visit: 02/08/2023    Last Med refill: 11/28/2022  Does patient have enough medication for 72 hours: No.  Patient requesting to take medication PRN. She feels she will need it during the day as well as at bed time.     Next Visit Date:03/30/2023  Future Appointments   Date Time Provider Mark Junior   2/15/2023  4:00 PM KIRSTEN Cabrera-S [place] psyc [facility]   2/28/2023 10:00 AM KIRSTEN Cabrera[place] psyc [facility]   3/20/2023 10:00 AM Aruna Hoang DPM [facility]   3/30/2023 12:00 PM Trisha Carlus Bowling, APRN - CNP [place] [facility]   3/30/2023  4:00 PM Merry Cuevas MD Neuro [facility] -   4/24/2023 11:30 AM Ekaterina Conway MD [facility] None   4/25/2023  1:00 PM Trisha Carlus Bowling, APRN - CNP [place] [facility]   5/23/2023  1:00 PM Trisha Carlus Bowling, APRN - CNP [place] [facility]   6/20/2023  2:00 PM Trisha Carlus Bowling, APRN - CNP [place] [facility]   7/18/2023  2:00 PM Trisha Carlus Bowling, APRN - CNP [place] [facility]   7/24/2023  8:30 AM DO Alexandro Harrison Neuro [facility]   8/15/2023  2:00 PM Trisha Carlus Bowling, APRN - CNP [place] [facility]   9/12/2023  1:00 PM Trisha Carlus Bowling, APRN - CNP [place] [facility]   10/10/2023  1:00 PM Trisha Carlus Bowling, APRN - CNP [place] [facility]   11/7/2023  1:00 PM Trisha Carlus Bowling, APRN - CNP [place] [facility]   12/5/2023  1:00 PM Trisha Carlus Bowling, APRN - CNP [place] [facility] [address] Maintenance   Topic Date Due    Cervical cancer screen  11/23/2023 (Originally 11/16/2020)    Diabetic retinal exam  12/03/2023 (Originally 7/9/2022)    Pneumococcal 0-64 years Vaccine (3 - PPSV23 if available, else PCV20) 04/23/2023    Diabetic foot exam  12/21/2023    A1C test (Diabetic or Prediabetic)  01/19/2024    Diabetic Alb to Cr ratio (uACR) test  01/19/2024    Lipids  01/19/2024    GFR test (Diabetes, CKD 3-4, OR last GFR 15-59)  01/19/2024    Depression Monitoring  02/08/2024    Breast cancer screen  12/09/2024    DTaP/Tdap/Td vaccine (2 - Td or Tdap) 08/04/2026 Colorectal Cancer Screen  02/16/2028    Flu vaccine  Completed    Shingles vaccine  Completed    COVID-19 Vaccine  Completed    Hepatitis C screen  Addressed    HIV screen  Addressed    Hepatitis A vaccine  Aged Out    Hib vaccine  Aged Out    Meningococcal (ACWY) vaccine  Aged Out       Hemoglobin A1C (%)   Date Value   01/19/2023 6.1 (H)   09/19/2022 5.3   03/28/2022 6.2 (H)             ( goal A1C is < 7)   Microalb/Crt.  Ratio (mcg/mg creat)   Date Value   04/08/2019 CANNOT BE CALCULATED     LDL Cholesterol (mg/dL)   Date Value   01/19/2023 89   03/28/2022 62     LDL Calculated (mg/dL)   Date Value   04/10/2020 59       (goal LDL is <100)   AST (U/L)   Date Value   01/19/2023 24     ALT (U/L)   Date Value   01/19/2023 28     BUN (mg/dL)   Date Value   01/19/2023 20     BP Readings from Last 3 Encounters:   02/08/23 138/84   01/23/23 (!) 148/92   01/23/23 (!) 160/100          (goal 120/80)    All Future Testing planned in [facility]  Lab Frequency Next Occurrence   XR CHEST STANDARD (2 VW) Once 06/16/2022   CBC Once 06/16/2022   Comprehensive Metabolic Panel Once 94/79/1750   Brain Natriuretic Peptide Once 06/16/2022   D-Dimer, Quantitative Once 06/16/2022   Troponin Once 06/16/2022   MRI BRAIN W WO CONTRAST Once 81/46/2153   Basic Metabolic Panel Once 65/11/8747   CBC Once 04/23/2023   Protein / Creatinine Ratio, Urine Once 04/23/2023   Vitamin B12 & Folate Once 02/08/2023   POCT INR     Basic Metabolic Panel Every 6 Months 7/14/2023   CBC Every 6 Months 7/14/2023   Protein / Creatinine Ratio, Urine Every 6 Months 7/14/2023, 12/29/2023, 6/14/2024, 11/29/2024, 5/16/2025               Patient Active Problem List:     Essential hypertension     Hyperlipidemia     Osteoarthritis     Obesity     CKD (chronic kidney disease) stage 3, GFR 30-59 ml/min (HCC)     Proteinuria     Controlled type 2 diabetes mellitus with complication, without long-term current use of insulin ([address]     History of DVT of lower extremity Vitamin D deficiency     Major depressive disorder, recurrent episode, severe, with psychotic behavior ([address]     Multiple lung nodules on CT     Hypomagnesemia     Anxiety     Chronic obstructive pulmonary disease (HCC)     Precordial pain     History of pulmonary embolism     Family history of abdominal aortic aneurysm     Normal coronary arteries     Long term (current) use of anticoagulants     AAA (abdominal aortic aneurysm) without rupture     Acute tracheobronchitis-viral     Abnormal mammogram     Cerebrovascular accident (CVA) ([facility] [address]     Diabetic nephropathy (HCC)     Slow transit constipation     Pseudogout     History of gout     Familial cavernous cerebral angioma (HCC)     Meniscus, medial, anterior horn derangement, right     Post traumatic stress disorder     Patellofemoral arthritis of right knee

## 2023-02-21 ENCOUNTER — TELEPHONE (OUTPATIENT)
Dept: FAMILY MEDICINE CLINIC | Age: 63
End: 2023-02-21

## 2023-02-21 ENCOUNTER — CARE COORDINATION (OUTPATIENT)
Dept: CARE COORDINATION | Age: 63
End: 2023-02-21

## 2023-02-21 NOTE — TELEPHONE ENCOUNTER
Patient called to request phone number be removed from her demographics. Patient states she doesn't have a phone at this time and would like all communication to be sent through the mail. Patient also requested to remove all BRITTNEY contacts from her account at this time. Patient will update a new BRITTNEY when she is in the office on 2/28. Patient also requested nothing be sent through her EarlyShares at this time. Patient EarlyShares is inactive at this time. RIAZ Hendricks requested both communication be removed from patient media scanned in on 1/23/23 & 8/19/2022 requested by patient.

## 2023-02-21 NOTE — CARE COORDINATION
saw where [facility] had not reached out to Patient, so  re-sent the [place] for Passport to them on today 02/21/2023. Chinedur sent it to the e-mail that Elgin Huynh was told to send it to ([email or web address])    Writer will follow up to see if they have received the paperwork.

## 2023-02-23 ENCOUNTER — CARE COORDINATION (OUTPATIENT)
Dept: CARE COORDINATION | Age: 63
End: 2023-02-23

## 2023-02-24 NOTE — CARE COORDINATION
received a call from Patient inquiring about a Resource she had heard about from a friend.  cautioned Patient as the Product/Resource she had heard about was from a Different Insurance Provider than what she presently has at this time.  put in a call to her Insurance Provider  to see if they offer a similar Service/Resource. They will get back to  with an answer.

## 2023-02-25 ENCOUNTER — PATIENT MESSAGE (OUTPATIENT)
Dept: FAMILY MEDICINE CLINIC | Age: 63
End: 2023-02-25

## 2023-02-27 ENCOUNTER — TELEPHONE (OUTPATIENT)
Dept: FAMILY MEDICINE CLINIC | Age: 63
End: 2023-02-27

## 2023-02-27 NOTE — TELEPHONE ENCOUNTER
From: Pat James  To:  Trisha Vazquez  Sent: 2/25/2023 9:19 PM EST  Subject: Power of      Please do NOT give any kind of information out to anyone about me I have new papers wrote up and notarized make anyone one wait until I come for [facility] but I am coming in and will bring a copy for trisha aguayo

## 2023-02-27 NOTE — TELEPHONE ENCOUNTER
Patient contacted the office today because she is needing to change her BRITTNEY status to RELEASE NO INFORMATION TO NO ONE.  Writer advised to fill out a new form tomorrow when she is in the office

## 2023-02-28 ENCOUNTER — TELEPHONE (OUTPATIENT)
Dept: BEHAVIORAL/MENTAL HEALTH CLINIC | Age: 63
End: 2023-02-28

## 2023-02-28 ENCOUNTER — OFFICE VISIT (OUTPATIENT)
Dept: BEHAVIORAL/MENTAL HEALTH CLINIC | Age: 63
End: 2023-02-28
Payer: MEDICAID

## 2023-02-28 DIAGNOSIS — F43.10 POST TRAUMATIC STRESS DISORDER: Primary | ICD-10-CM

## 2023-02-28 PROCEDURE — 90832 PSYTX W PT 30 MINUTES: CPT | Performed by: SOCIAL WORKER
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
## 2023-03-01 ENCOUNTER — CARE COORDINATION (OUTPATIENT)
Dept: CARE COORDINATION | Age: 63
End: 2023-03-01

## 2023-03-01 NOTE — CARE COORDINATION
Ambulatory Care Coordination Note  3/1/2023    Patient Current Location:  Home: [address]  [facility] [address]     ACM contacted the patient by telephone. Verified name and  with patient as identifiers. Provided introduction to self, and explanation of the ACM role. Challenges to be reviewed by the provider   Additional needs identified to be addressed with provider: No  none               Method of communication with provider: none. ACM: Sandrine Shin, RN  Spoke with pt who said Passport did start. Her meal delivery did start she did get an emergency response system. She will be getting assistance in her home soon. She was to have an apt yesterday but her transportation did not show up she did reach out to them and they could not explain to her why they did not show up she is set up for an apt next week she will call and set up the ride for this. She denies any needs at this moment     1) acp done   2) sdoh done   3) med review done   4) cardiology Dr Lizabeth Hussein due [address]   5) podiatry Dr Dipak Hughes due 2023  6) nephrology Dr Zhang Coma 23  7) neurosurgery dr Nataliya Mercedes   8) eye yearly exam due in   9)  lissette jason 10 tai jason 3   10) pcp 3/30  11) pill packing through [address]   12)  referral for passport, life alert and food insecurities done   13) home care referral for PT / OT [facility]    Offered patient enrollment in the Remote Patient Monitoring (RPM) program for in-home monitoring: Patient declined. Lab Results       None            Care Coordination Interventions    Referral from Primary Care Provider: No  Suggested Interventions and Community Resources  Diabetes Education: Declined  Fall Risk Prevention:  In Process  Disease Specific Clinic: Completed  Home Health Services: Completed  Physical Therapy: Completed  Social Work: Completed  Zone Management Tools: Completed          Goals Addressed                   This Visit's Progress     Reduce Falls    On track I will reduce my risk of falls by the following: Remove rugs or use non slip rugs  Install grab bars in bathroom  Use walking aids like cane or walker  Follow through on orders for PT    Barriers: lack of support and overwhelmed by complexity of regimen  Plan for overcoming my barriers: care coordination   Confidence: 9/10  Anticipated Goal Completion Date: 5/28/23              Future Appointments   Date Time Provider Mark Junior   3/8/2023 10:00 AM MARIBELL Cabrera psyc [facility]   3/20/2023  3:30 PM Juliana Camejo DPM [facility]   3/30/2023 12:00 PM Trisha Vazquez APRN - CNP [facility]   4/24/2023 11:30 AM America Vanegas MD [facility] None   4/25/2023  1:00 PM Trisha Vazquez APRN - CNP [facility]   5/23/2023  1:00 PM Trisha Vazquez APRN - CNP [facility] Roc Aldrich   6/20/2023  2:00 PM Trisha Vazquez APRN - CNP [facility] Cheril Rejorgemann   7/18/2023  2:00 PM Trisha Vazquez APRN - CNP [facility]   7/24/2023  8:30 AM DO Alexandro Murillo Neuro [facility]   8/15/2023  2:00 PM Trisha Vazquez APRN - CNP [facility]   9/12/2023  1:00 PM Trisha Vazquez APRN - CNP [facility]   10/10/2023  1:00 PM Trisha Vazquez APRN - CNP [facility]   11/7/2023  1:00 PM Trisha Vazquez APRN - CNP [facility]   12/5/2023  1:00 PM Trisha Vazquez APRN - CNP [facility] Roc Aldrich

## 2023-03-03 DIAGNOSIS — F41.9 ANXIETY: ICD-10-CM

## 2023-03-03 RX ORDER — ALPRAZOLAM 0.25 MG/1
0.25 TABLET ORAL 3 TIMES DAILY PRN
Qty: 21 TABLET | Refills: 0 | Status: CANCELLED | OUTPATIENT
Start: 2023-03-03 | End: 2023-03-10

## 2023-03-03 RX ORDER — ALPRAZOLAM 0.25 MG/1
0.25 TABLET ORAL 3 TIMES DAILY PRN
Qty: 21 TABLET | Refills: 0 | Status: SHIPPED | OUTPATIENT
Start: 2023-03-03 | End: 2023-03-10

## 2023-03-03 NOTE — TELEPHONE ENCOUNTER
Last visit: 02/08/2023  Last Med refill: 02/16/2023  Does patient have enough medication for 72 hours: no.  Patient has transportation today    Next Visit Date:03/08/2023  Future Appointments   Date Time Provider Mark Sandi   3/8/2023 10:00 AM MARIBELL Cabrera psyc [facility]   3/20/2023  3:30 PM Ivon Rawls DPM [facility]   3/30/2023 12:00 PM Trisha Douglas APRN - CNP [facility]   4/24/2023 11:30 AM Mathew Smith MD [facility] None   4/25/2023  1:00 PM Trisha Douglas APRN - CNP [facility]   5/23/2023  1:00 PM Trisha Douglas APRN - CNP [facility]   6/20/2023  2:00 PM Trisha Douglas APRN - CNP [facility]   7/18/2023  2:00 PM Trisha Douglas APRN - CNP [facility]   7/24/2023  8:30 AM DO Alexandro Henriquez Neuro [facility]   8/15/2023  2:00 PM Trisha Douglas APRN - CNP [facility]   9/12/2023  1:00 PM Trisha Douglas APRN - CNP [facility]   10/10/2023  1:00 PM Trisha Douglas APRN - CNP [facility]   11/7/2023  1:00 PM Trisha Douglas APRN - CNP [facility]   12/5/2023  1:00 PM Trisha Douglas APRN - CNP [facility] [address] Maintenance   Topic Date Due    Cervical cancer screen  11/23/2023 (Originally 11/16/2020)    Diabetic retinal exam  12/03/2023 (Originally 7/9/2022)    Pneumococcal 0-64 years Vaccine (3 - PPSV23 if available, else PCV20) 04/23/2023    Diabetic foot exam  12/21/2023    A1C test (Diabetic or Prediabetic)  01/19/2024    Diabetic Alb to Cr ratio (uACR) test  01/19/2024    Lipids  01/19/2024    GFR test (Diabetes, CKD 3-4, OR last GFR 15-59)  01/19/2024    Depression Monitoring  02/08/2024    Breast cancer screen  12/09/2024    DTaP/Tdap/Td vaccine (2 - Td or Tdap) 08/04/2026    Colorectal Cancer Screen  02/16/2028    Flu vaccine  Completed    Shingles vaccine  Completed    COVID-19 Vaccine  Completed    Hepatitis C screen  Addressed    HIV screen  Addressed    Hepatitis A vaccine Aged Out    Hib vaccine  Aged Out    Meningococcal (ACWY) vaccine  Aged Out       Hemoglobin A1C (%)   Date Value   01/19/2023 6.1 (H)   09/19/2022 5.3   03/28/2022 6.2 (H)             ( goal A1C is < 7)   Microalb/Crt.  Ratio (mcg/mg creat)   Date Value   04/08/2019 CANNOT BE CALCULATED     LDL Cholesterol (mg/dL)   Date Value   01/19/2023 89   03/28/2022 62     LDL Calculated (mg/dL)   Date Value   04/10/2020 59       (goal LDL is <100)   AST (U/L)   Date Value   01/19/2023 24     ALT (U/L)   Date Value   01/19/2023 28     BUN (mg/dL)   Date Value   01/19/2023 20     BP Readings from Last 3 Encounters:   02/08/23 138/84   01/23/23 (!) 148/92   01/23/23 (!) 160/100          (goal 120/80)    All Future Testing planned in [facility]  Lab Frequency Next Occurrence   XR CHEST STANDARD (2 VW) Once 06/16/2022   CBC Once 06/16/2022   Comprehensive Metabolic Panel Once 28/20/3992   Brain Natriuretic Peptide Once 06/16/2022   D-Dimer, Quantitative Once 06/16/2022   Troponin Once 06/16/2022   MRI BRAIN W WO CONTRAST Once 50/31/8665   Basic Metabolic Panel Once 29/56/0659   CBC Once 04/23/2023   Protein / Creatinine Ratio, Urine Once 04/23/2023   Vitamin B12 & Folate Once 02/08/2023   POCT INR     Basic Metabolic Panel Every 6 Months 7/14/2023   CBC Every 6 Months 7/14/2023   Protein / Creatinine Ratio, Urine Every 6 Months 7/14/2023, 12/29/2023, 6/14/2024, 11/29/2024, 5/16/2025               Patient Active Problem List:     Essential hypertension     Hyperlipidemia     Osteoarthritis     Obesity     CKD (chronic kidney disease) stage 3, GFR 30-59 ml/min ([facility])     Proteinuria     Controlled type 2 diabetes mellitus with complication, without long-term current use of insulin ([facility])     History of DVT of lower extremity     Vitamin D deficiency     Major depressive disorder, recurrent episode, severe, with psychotic behavior ([address]     Multiple lung nodules on CT     Hypomagnesemia     Anxiety     Chronic obstructive pulmonary disease ([facility]     Precordial pain     History of pulmonary embolism     Family history of abdominal aortic aneurysm     Normal coronary arteries     Long term (current) use of anticoagulants     AAA (abdominal aortic aneurysm) without rupture     Acute tracheobronchitis-viral     Abnormal mammogram     Cerebrovascular accident (CVA) ([facility]     Diabetic nephropathy (HCC)     Slow transit constipation     Pseudogout     History of gout     Familial cavernous cerebral angioma (HCC)     Meniscus, medial, anterior horn derangement, right     Post traumatic stress disorder     Patellofemoral arthritis of right knee

## 2023-03-07 ENCOUNTER — CARE COORDINATION (OUTPATIENT)
Dept: CARE COORDINATION | Age: 63
End: 2023-03-07

## 2023-03-07 DIAGNOSIS — M11.20 PSEUDOGOUT: ICD-10-CM

## 2023-03-07 NOTE — TELEPHONE ENCOUNTER
Last visit: 2/8/2023    Last Med refill: 9/29/2022  Does patient have enough medication for 72 hours: Yes    Next Visit Date:  Future Appointments   Date Time Provider Mark Junior   3/8/2023 10:00 AM MARIBELL Cabrera [place] psyc [facility]   3/20/2023  3:30 PM Stephane Robles DPM [facility]   3/30/2023 12:00 PM Trisha Socrates Felty, APRN - CNP [facility] [place] [facility]   4/24/2023 11:30 AM Zoraida Long MD [facility] None   4/25/2023  1:00 PM Trisha Socrates Felty, APRN - CNP [place] [facility]   5/23/2023  1:00 PM Trisha Socrates Felty, APRN - CNP [facility] [place] [facility]   6/20/2023  2:00 PM Trisha Socrates Felty, APRN - CNP [place] [facility]   7/18/2023  2:00 PM Trisha Socrates Felty, APRN - CNP [place] [facility]   7/24/2023  8:30 AM DO Alexandro Copeland Neuro [facility]   8/15/2023  2:00 PM Trisha Socrates Felty, APRN - CNP [place] [facility]   9/12/2023  1:00 PM Trisha Socrates Felty, APRN - CNP [place] [facility]   10/10/2023  1:00 PM Trisha Socrates Felty, APRN - CNP [facility]   11/7/2023  1:00 PM Trisha Socrates Felty, APRN - CNP [facility]   12/5/2023  1:00 PM Trisha Socrates Felty, APRN - CNP [place] [facility] [address] Maintenance   Topic Date Due    Cervical cancer screen  11/23/2023 (Originally 11/16/2020)    Diabetic retinal exam  12/03/2023 (Originally 7/9/2022)    Pneumococcal 0-64 years Vaccine (3 - PPSV23 if available, else PCV20) 04/23/2023    Diabetic foot exam  12/21/2023    A1C test (Diabetic or Prediabetic)  01/19/2024    Diabetic Alb to Cr ratio (uACR) test  01/19/2024    Lipids  01/19/2024    GFR test (Diabetes, CKD 3-4, OR last GFR 15-59)  01/19/2024    Depression Monitoring  02/08/2024    Breast cancer screen  12/09/2024    DTaP/Tdap/Td vaccine (2 - Td or Tdap) 08/04/2026    Colorectal Cancer Screen  02/16/2028    Flu vaccine  Completed    Shingles vaccine  Completed    COVID-19 Vaccine  Completed    Hepatitis C screen  Addressed    HIV screen  Addressed    Hepatitis A vaccine  Aged Out    Hib vaccine  Aged Out Meningococcal (ACWY) vaccine  Aged Out       Hemoglobin A1C (%)   Date Value   01/19/2023 6.1 (H)   09/19/2022 5.3   03/28/2022 6.2 (H)             ( goal A1C is < 7)   Microalb/Crt.  Ratio (mcg/mg creat)   Date Value   04/08/2019 CANNOT BE CALCULATED     LDL Cholesterol (mg/dL)   Date Value   01/19/2023 89   03/28/2022 62     LDL Calculated (mg/dL)   Date Value   04/10/2020 59       (goal LDL is <100)   AST (U/L)   Date Value   01/19/2023 24     ALT (U/L)   Date Value   01/19/2023 28     BUN (mg/dL)   Date Value   01/19/2023 20     BP Readings from Last 3 Encounters:   02/08/23 138/84   01/23/23 (!) 148/92   01/23/23 (!) 160/100          (goal 120/80)    All Future Testing planned in [facility]  Lab Frequency Next Occurrence   XR CHEST STANDARD (2 VW) Once 06/16/2022   CBC Once 06/16/2022   Comprehensive Metabolic Panel Once 14/09/5938   Brain Natriuretic Peptide Once 06/16/2022   D-Dimer, Quantitative Once 06/16/2022   Troponin Once 06/16/2022   MRI BRAIN W WO CONTRAST Once 58/67/7422   Basic Metabolic Panel Once 78/01/8470   CBC Once 04/23/2023   Protein / Creatinine Ratio, Urine Once 04/23/2023   Vitamin B12 & Folate Once 02/08/2023   POCT INR     Basic Metabolic Panel Every 6 Months 7/14/2023   CBC Every 6 Months 7/14/2023   Protein / Creatinine Ratio, Urine Every 6 Months 7/14/2023, 12/29/2023, 6/14/2024, 11/29/2024, 5/16/2025               Patient Active Problem List:     Essential hypertension     Hyperlipidemia     Osteoarthritis     Obesity     CKD (chronic kidney disease) stage 3, GFR 30-59 ml/min ([facility])     Proteinuria     Controlled type 2 diabetes mellitus with complication, without long-term current use of insulin ([facility])     History of DVT of lower extremity     Vitamin D deficiency     Major depressive disorder, recurrent episode, severe, with psychotic behavior ([address]     Multiple lung nodules on CT     Hypomagnesemia     Anxiety     Chronic obstructive pulmonary disease (HCC)     Precordial pain History of pulmonary embolism     Family history of abdominal aortic aneurysm     Normal coronary arteries     Long term (current) use of anticoagulants     AAA (abdominal aortic aneurysm) without rupture     Acute tracheobronchitis-viral     Abnormal mammogram     Cerebrovascular accident (CVA) (HCC)     Diabetic nephropathy (HCC)     Slow transit constipation     Pseudogout     History of gout     Familial cavernous cerebral angioma (HCC)     Meniscus, medial, anterior horn derangement, right     Post traumatic stress disorder     Patellofemoral arthritis of right knee

## 2023-03-07 NOTE — CARE COORDINATION
called Patient to do a follow up Social service call with her. Patient requested for  to come out and assist her with Paperwork for [employer] and MEDICARE.      scheduled an appointment for this Thursday 03/09/2023 at 1pm.

## 2023-03-08 ENCOUNTER — TELEMEDICINE (OUTPATIENT)
Dept: BEHAVIORAL/MENTAL HEALTH CLINIC | Age: 63
End: 2023-03-08
Payer: MEDICAID

## 2023-03-08 DIAGNOSIS — F43.10 POST TRAUMATIC STRESS DISORDER: Primary | ICD-10-CM

## 2023-03-08 PROCEDURE — 90832 PSYTX W PT 30 MINUTES: CPT | Performed by: SOCIAL WORKER

## 2023-03-08 RX ORDER — ALLOPURINOL 100 MG/1
100 TABLET ORAL 2 TIMES DAILY
Qty: 180 TABLET | Refills: 3 | Status: SHIPPED | OUTPATIENT
Start: 2023-03-08 | End: 2024-03-07

## 2023-03-08 NOTE — PROGRESS NOTES
ADULT BEHAVIORAL HEALTH FOLLOW UP  MARIBELL Velarde  Licensed Independent        Visit Date: 3/8/2023   Time of appointment:  [id number]   Time spent with Patient: 35 minutes. This is patient's  21st  appointment. Reason for Consult:  Stress and Anxiety     Referring Provider/PCP:    No ref. provider found  BRAULIO Souza CNP      Pt provided informed consent for the behavioral health program. Discussed with patient model of service to include the limits of confidentiality (i.e. abuse reporting, suicide intervention, etc.) and short-term intervention focused approach. Pt indicated understanding. Telehealth session conducted via phone, pt in privacy of home, in [address] [place] [address] Dr clinician located in [place], [place]. Elliott Kebede is a 57 y.o. female who presents for follow up of stress and anxiety. She reports doing better, has had good visits with her grandchildren and this brought her great comfort, states they plan to help her more. She states she has cried a lot less lately, is working to be more assertive and hung up on her daughter when she was yelling at her. We reviewed her future plans, including reaching out to [facility] case management and added help as well as things for her apartment, states \"I'm going to get back to me\". Previous Recommendations: Lisa Kimberly will utilize healthy support people and community resources. She will follow up with Mirlande Ovalle next week. MENTAL STATUS EXAM  Mood was within normal limits, ERNIE affect due to phone call. Suicidal ideation was denied. Homicidal ideation was denied. Hygiene was ERNIE. Dress was [employer]. Behavior was ERNIE with No self-report of difficulties ambulating. Attitude was Cooperative. Eye-contact was ERNIE. Speech: rate - WNL, rhythm - WNL, volume - WNL. Verbalizations were coherent.   Thought processes were intact and goal-oriented without evidence of delusions, hallucinations, obsessions, or christel; with moderate cognitive distortions.   Associations were characterized by intact cognitive processes.  Pt was oriented to person, place, time, and general circumstances;  recent:  good and remote:  good.  Insight and judgment were estimated to be fair, AEB, a fair  understanding of cyclical maladaptive patterns, and the ability to use insight to inform behavior change.       ASSESSMENT  Adrianne Campos presented to the appointment today for evaluation and treatment of symptoms of stress.  She is currently deemed no risk to herself or others and meets criteria for PTSD.  Yesica was in agreement with recommendations.      PHQ Scores 2/8/2023 11/23/2022 11/23/2022 1/24/2022 7/25/2018 6/27/2017   PHQ2 Score 5 6 - 0 0 0   PHQ9 Score 21 27 24 0 0 0     Interpretation of Total Score Depression Severity: 1-4 = Minimal depression, 5-9 = Mild depression, 10-14 = Moderate depression, 15-19 = Moderately severe depression, 20-27 = Severe depression    How often pt has had thoughts of death or hurting self (if PHQ positive for depression):       ZAIN 7 SCORE 11/23/2022   ZAIN-7 Total Score 21     Interpretation of ZAIN-7 score: 5-9 = mild anxiety, 10-14 = moderate anxiety, 15+ = severe anxiety. Recommend referral to behavioral health for scores 10 or greater.      DIAGNOSIS  Adrianne was seen today for stress and anxiety.    Diagnoses and all orders for this visit:    Post traumatic stress disorder        INTERVENTION  Developed Crisis Response Plan, Trained in relaxation strategies, Discussed self-care (sleep, nutrition, rewarding activities, social support, exercise), Discussed potential barriers to change, and Supportive techniques      PLAN  Yesica will continue healthy self care and focus. She will follow up with Hallie in 1 month.      INTERACTIVE COMPLEXITY  Is interactive complexity present?  No  Reason:  N/A  Additional Supporting Information:  N/A       Electronically signed by MARIBELL Cabrera on 3/8/23 at 10:04 AM EST

## 2023-03-13 DIAGNOSIS — M54.50 ACUTE BILATERAL LOW BACK PAIN WITHOUT SCIATICA: ICD-10-CM

## 2023-03-13 RX ORDER — LIDOCAINE 50 MG/G
1 PATCH TOPICAL DAILY
Qty: 30 PATCH | Refills: 0 | Status: CANCELLED | OUTPATIENT
Start: 2023-03-13 | End: 2023-04-12

## 2023-03-14 ENCOUNTER — CARE COORDINATION (OUTPATIENT)
Dept: CARE COORDINATION | Age: 63
End: 2023-03-14

## 2023-03-14 DIAGNOSIS — M15.9 PRIMARY OSTEOARTHRITIS INVOLVING MULTIPLE JOINTS: ICD-10-CM

## 2023-03-14 RX ORDER — LIDOCAINE 50 MG/G
PATCH TOPICAL
Qty: 30 PATCH | Refills: 0 | OUTPATIENT
Start: 2023-03-14 | End: 2024-03-14

## 2023-03-14 RX ORDER — HYDROCODONE BITARTRATE AND ACETAMINOPHEN 5; 325 MG/1; MG/1
TABLET ORAL
Qty: 40 TABLET | Refills: 0 | Status: SHIPPED | OUTPATIENT
Start: 2023-03-14 | End: 2023-04-11

## 2023-03-14 NOTE — CARE COORDINATION
Ambulatory Care Coordination Note  3/14/2023    Patient Current Location:  Home: [address]  [facility] [address]     ACM contacted the patient by telephone. Verified name and  with patient as identifiers. Provided introduction to self, and explanation of the ACM role. Challenges to be reviewed by the provider   Additional needs identified to be addressed with provider: No  none               Method of communication with provider: none. ACM: Rodolfo Pineda, RN    Spoke with pt who said Gurwinder Porter the LSW was at her house at the time of the call he is helping her with her medicare paperwork. 1) acp done   2) sdoh done   3) med review done   4) cardiology Dr Kyleigh Lozano due [address]   5) podiatry Dr Dedra Guerrero due 2023  6) nephrology Dr Rosa Maria Callahan 23  7) neurosurgery dr Elana Zendejas   8) eye yearly exam due in   9) [facility]   10) pcp 3/30  11) pill packing through [address]   12)  referral for passport, life alert and food insecurities done   13) home care referral for PT / OT [facility]    Offered patient enrollment in the Remote Patient Monitoring (RPM) program for in-home monitoring: Patient declined. Lab Results       None            Care Coordination Interventions    Referral from Primary Care Provider: No  Suggested Interventions and Community Resources  Diabetes Education: Declined  Fall Risk Prevention:  In Process  Disease Specific Clinic: Completed  Home Health Services: Completed  Physical Therapy: Completed  Social Work: Completed  Zone Management Tools: Completed          Goals Addressed                   This Visit's Progress     Reduce Falls    On track     I will reduce my risk of falls by the following: Remove rugs or use non slip rugs  Install grab bars in bathroom  Use walking aids like cane or walker  Follow through on orders for PT    Barriers: lack of support and overwhelmed by complexity of regimen  Plan for overcoming my barriers: care coordination   Confidence: 9/10  Anticipated Goal Completion Date: 5/28/23              Future Appointments   Date Time Provider Mark Mendozai   3/20/2023  3:30 PM Ok Abbott Galien, [place] [facility]   3/30/2023 12:00 PM Trisha Mark Floor, APRN - CNP [facility]   4/11/2023 11:00 AM Hallie Vasquez, MARIBELL [place] psyc [facility]   4/24/2023 11:30 AM Chuck Duran MD [facility] None   4/25/2023  1:00 PM Trisha Mark Floor, APRN - CNP [facility]   5/23/2023  1:00 PM Trisha Mark Floor, APRN - CNP [facility]   6/20/2023  2:00 PM Trisha Mark Floor, APRN - CNP [facility] [email or web address] [facility]   7/18/2023  2:00 PM Trisha Mark Floor, APRN - CNP [facility]   7/24/2023  8:30 AM DO Alexandro Enrique Neuro [facility]   8/15/2023  2:00 PM Trisha Mark Floor, APRN - CNP [facility]   9/12/2023  1:00 PM Trisha Mark Floor, APRN - CNP [facility]   10/10/2023  1:00 PM Trisha Mark Floor, APRN - CNP [facility]   11/7/2023  1:00 PM Trisha Mark Floor, APRN - CNP [facility]   12/5/2023  1:00 PM Trisha Mark Floor, APRN - CNP [facility] [address] [employer] [facility]

## 2023-03-14 NOTE — TELEPHONE ENCOUNTER
LOV 2/8/23  RTO F/U scheduled  LRF 2/15/23  CSM 2/15/23    Health Maintenance   Topic Date Due    Cervical cancer screen  11/23/2023 (Originally 11/16/2020)    Diabetic retinal exam  12/03/2023 (Originally 7/9/2022)    Pneumococcal 0-64 years Vaccine (3 - PPSV23 if available, else PCV20) 04/23/2023    Diabetic foot exam  12/21/2023    A1C test (Diabetic or Prediabetic)  01/19/2024    Diabetic Alb to Cr ratio (uACR) test  01/19/2024    Lipids  01/19/2024    GFR test (Diabetes, CKD 3-4, OR last GFR 15-59)  01/19/2024    Depression Monitoring  02/08/2024    Breast cancer screen  12/09/2024    DTaP/Tdap/Td vaccine (2 - Td or Tdap) 08/04/2026    Colorectal Cancer Screen  02/16/2028    Flu vaccine  Completed    Shingles vaccine  Completed    COVID-19 Vaccine  Completed    Hepatitis C screen  Addressed    HIV screen  Addressed    Hepatitis A vaccine  Aged Out    Hib vaccine  Aged Out    Meningococcal (ACWY) vaccine  Aged Out             (applicable per patient's age: Cancer Screenings, Depression Screening, Fall Risk Screening, Immunizations)    Hemoglobin A1C (%)   Date Value   01/19/2023 6.1 (H)   09/19/2022 5.3   03/28/2022 6.2 (H)     Microalb/Crt.  Ratio (mcg/mg creat)   Date Value   04/08/2019 CANNOT BE CALCULATED     LDL Cholesterol (mg/dL)   Date Value   01/19/2023 89     LDL Calculated (mg/dL)   Date Value   04/10/2020 59     AST (U/L)   Date Value   01/19/2023 24     ALT (U/L)   Date Value   01/19/2023 28     BUN (mg/dL)   Date Value   01/19/2023 20      (goal A1C is < 7)   (goal LDL is <100) need 30-50% reduction from baseline     BP Readings from Last 3 Encounters:   02/08/23 138/84   01/23/23 (!) 148/92   01/23/23 (!) 160/100    (goal /80)      All Future Testing planned in [facility]:  Lab Frequency Next Occurrence   XR CHEST STANDARD (2 VW) Once 06/16/2022   CBC Once 06/16/2022   Comprehensive Metabolic Panel Once 19/86/2572   Brain Natriuretic Peptide Once 06/16/2022   D-Dimer, Quantitative Once 06/16/2022   Troponin Once 06/16/2022   MRI BRAIN W WO CONTRAST Once 67/33/7496   Basic Metabolic Panel Once 96/89/4938   CBC Once 04/23/2023   Protein / Creatinine Ratio, Urine Once 04/23/2023   Vitamin B12 & Folate Once 02/08/2023   POCT INR     Basic Metabolic Panel Every 6 Months 7/14/2023   CBC Every 6 Months 7/14/2023   Protein / Creatinine Ratio, Urine Every 6 Months 7/14/2023, 12/29/2023, 6/14/2024, 11/29/2024, 5/16/2025       Next Visit Date:  Future Appointments   Date Time Provider Mark Sandi   3/20/2023  3:30 PM Margot Kohler DPM [facility]   3/30/2023 12:00 PM Trisha Krystina Romansert, APRN - CNP [place] [facility]   4/11/2023 11:00 AM MARIBELL Cabrera psyc [facility]   4/24/2023 11:30 AM Gonsalo Cisse MD [facility] None   4/25/2023  1:00 PM Trisha Krystina Romansert, APRN - CNP [facility]   5/23/2023  1:00 PM Trisha Krystina Dessert, APRN - CNP [facility]   6/20/2023  2:00 PM Trisha Krystina Dessert, APRN - CNP [facility]   7/18/2023  2:00 PM Trisha Krystina Dessert, APRN - CNP [facility]   7/24/2023  8:30 AM DO Alexandro Mayer Neuro [facility]   8/15/2023  2:00 PM Trisha Krystina Dessert, APRN - CNP [facility]   9/12/2023  1:00 PM Trisha Krystina Dessert, APRN - CNP [facility]   10/10/2023  1:00 PM Trisha Krystina Dessert, APRN - CNP [facility]   11/7/2023  1:00 PM Trisha Krystina Dessert, APRN - CNP [facility]   12/5/2023  1:00 PM [facility]            Patient Active Problem List:     Essential hypertension     Hyperlipidemia     Osteoarthritis     Obesity     CKD (chronic kidney disease) stage 3, GFR 30-59 ml/min ([facility])     Proteinuria     Controlled type 2 diabetes mellitus with complication, without long-term current use of insulin ([address]     History of DVT of lower extremity     Vitamin D deficiency     Major depressive disorder, recurrent episode, severe, with psychotic behavior ([address]     Multiple lung nodules on CT     Hypomagnesemia     Anxiety Chronic obstructive pulmonary disease (HCC)     Precordial pain     History of pulmonary embolism     Family history of abdominal aortic aneurysm     Normal coronary arteries     Long term (current) use of anticoagulants     AAA (abdominal aortic aneurysm) without rupture     Acute tracheobronchitis-viral     Abnormal mammogram     Cerebrovascular accident (CVA) [facility]     Diabetic nephropathy (HCC)     Slow transit constipation     Pseudogout     History of gout     Familial cavernous cerebral angioma (HCC)     Meniscus, medial, anterior horn derangement, right     Post traumatic stress disorder     Patellofemoral arthritis of right knee

## 2023-03-14 NOTE — CARE COORDINATION
Writer went to Patient's Home to assist her in getting signed up for MEDICARE as she turns 63 on 04/23/2023 and Patient said she had No idea on how to accomplish this task. Writer assisted her in getting set up with Ayleen Casey as she has that Insurance Product for her MEDICAID so know she has the Dual Plan. Patient was Very Happy with what she will be receiving in regards to Benefits from this Product.

## 2023-03-17 ENCOUNTER — TELEPHONE (OUTPATIENT)
Dept: FAMILY MEDICINE CLINIC | Age: 63
End: 2023-03-17

## 2023-03-17 NOTE — TELEPHONE ENCOUNTER
Patient [facility]. If home care aide requested, we should contact [facility]. In addition, she has [facility]. They should be able to assist with any device needed in home as they are there assessing, she will have better insurance coverage that way.      I will address paperwork Monday when back in office

## 2023-03-17 NOTE — TELEPHONE ENCOUNTER
Patient states she is also requesting referral for [id number]. She was notified TARTA paperwork awaiting PCP review/signature. Please fax order to [phone].

## 2023-03-17 NOTE — TELEPHONE ENCOUNTER
Patient contacted the office today because she would like an order for a shower chair with a back on it. Patient states that she has one and it is old and too large for her shower. Patient is also asking for an update on her paperwork.

## 2023-03-20 NOTE — TELEPHONE ENCOUNTER
Patient notfiied and states the passport lady is coming to her house on WED and she will discuss the home aide then. Shower chair order is taken care of.

## 2023-03-21 ENCOUNTER — CARE COORDINATION (OUTPATIENT)
Dept: CARE COORDINATION | Age: 63
End: 2023-03-21

## 2023-03-21 NOTE — CARE COORDINATION
bathroom  Use walking aids like cane or walker  Follow through on orders for PT    Barriers: lack of support and overwhelmed by complexity of regimen  Plan for overcoming my barriers: care coordination   Confidence: 9/10  Anticipated Goal Completion Date: 5/28/23              Future Appointments   Date Time Provider Mark Junior   3/30/2023 12:00 PM Trisha Collins, APRN - CNP [facility] [place] [facility]   4/11/2023 11:00 AM MARIBELL Cabrera psyc [facility]   4/24/2023 11:30 AM Yasmany Gould MD [facility] None   4/25/2023  1:00 PM Trisha Peyton Collins, APRN - CNP [facility]   5/23/2023  1:00 PM Trisha Collins, APRN - CNP [facility] Jenny    6/20/2023  2:00 PM Trisha Collins, APRN - CNP [facility] [place]   7/18/2023  2:00 PM Trisha Collins, APRN - CNP [facility] Jenny    7/24/2023  8:30 AM DO Alexandro Alford Neuro [facility]   8/15/2023  2:00 PM Trisha Collins, APRN - CNP [facility]   9/12/2023  1:00 PM Trisha Collins, APRN - CNP [facility]   10/10/2023  1:00 PM Trisha Collins, APRN - CNP [facility]   11/7/2023  1:00 PM Trisha Collins, APRN - CNP [facility]   12/5/2023  1:00 PM Trisha Collins, APRN - CNP [facility] Jenny

## 2023-03-22 DIAGNOSIS — E11.22 CONTROLLED TYPE 2 DIABETES MELLITUS WITH STAGE 3 CHRONIC KIDNEY DISEASE, WITHOUT LONG-TERM CURRENT USE OF INSULIN (HCC): Chronic | ICD-10-CM

## 2023-03-22 DIAGNOSIS — N18.30 CONTROLLED TYPE 2 DIABETES MELLITUS WITH STAGE 3 CHRONIC KIDNEY DISEASE, WITHOUT LONG-TERM CURRENT USE OF INSULIN (HCC): Chronic | ICD-10-CM

## 2023-03-22 NOTE — TELEPHONE ENCOUNTER
Last visit: 2/8/2023    Last Med refill: 6/28/2022  Does patient have enough medication for 72 hours: Yes    Next Visit Date:  Future Appointments   Date Time Provider Mark Junior   3/30/2023 12:00 PM Trisha Stuart Doshi, APRN - CNP [place] [facility]   4/11/2023 11:00 AM MARIBELL Cabreran psyc [facility]   4/24/2023 11:30 AM Jose Antonio Aldridge MD [facility] None   4/25/2023  1:00 PM Trisha Stuart Doshi, APRN - CNP [place] [facility]   5/23/2023  1:00 PM Trisha Stuart Doshi, APRN - CNP [place] [facility]   6/20/2023  2:00 PM Trisha Stuart Doshi, APRN - CNP [place] [facility]   7/18/2023  2:00 PM Trisha Stuart Doshi, APRN - CNP [place] [facility]   7/24/2023  8:30 AM DO Alexandro Pickens Neuro [facility]   8/15/2023  2:00 PM Trisha Stuart Doshi, APRN - CNP [place] [facility]   9/12/2023  1:00 PM Trisha Stuart Doshi, APRN - CNP [place] [facility]   10/10/2023  1:00 PM Trisha Stuart Doshi, APRN - CNP [place] [facility]   11/7/2023  1:00 PM Trisha Stuart Doshi, APRN - CNP [place] [facility]   12/5/2023  1:00 PM Trisha Stuart Doshi, APRN - CNP [place] [facility] [address] Maintenance   Topic Date Due    Cervical cancer screen  11/23/2023 (Originally 11/16/2020)    Diabetic retinal exam  12/03/2023 (Originally 7/9/2022)    Pneumococcal 0-64 years Vaccine (3 - PPSV23 if available, else PCV20) 04/23/2023    Diabetic foot exam  12/21/2023    A1C test (Diabetic or Prediabetic)  01/19/2024    Diabetic Alb to Cr ratio (uACR) test  01/19/2024    Lipids  01/19/2024    GFR test (Diabetes, CKD 3-4, OR last GFR 15-59)  01/19/2024    Depression Monitoring  02/08/2024    Breast cancer screen  12/09/2024    DTaP/Tdap/Td vaccine (2 - Td or Tdap) 08/04/2026    Colorectal Cancer Screen  02/16/2028    Flu vaccine  Completed    Shingles vaccine  Completed    COVID-19 Vaccine  Completed    Hepatitis C screen  Addressed    HIV screen  Addressed    Hepatitis A vaccine  Aged Out    Hib vaccine  Aged Out    Meningococcal (ACWY) vaccine  Aged Out       Hemoglobin A1C (%)   Date

## 2023-03-23 NOTE — TELEPHONE ENCOUNTER
I prefer she elevate and rest, apply ice.
Mary Shine spoke with the patient on 3/16 to let her know.
obstructive pulmonary disease (HCC)     Precordial pain     History of pulmonary embolism     Family history of abdominal aortic aneurysm     Normal coronary arteries     Long term (current) use of anticoagulants     AAA (abdominal aortic aneurysm) without rupture     Acute tracheobronchitis-viral     Abnormal mammogram     Cerebrovascular accident (CVA) ([facility]     Diabetic nephropathy (HCC)     Slow transit constipation     Pseudogout     History of gout     Familial cavernous cerebral angioma (HCC)     Meniscus, medial, anterior horn derangement, right     Post traumatic stress disorder     Patellofemoral arthritis of right knee

## 2023-03-24 RX ORDER — BLOOD SUGAR DIAGNOSTIC
STRIP MISCELLANEOUS
Qty: 100 EACH | Refills: 5 | Status: SHIPPED | OUTPATIENT
Start: 2023-03-24

## 2023-03-28 ENCOUNTER — TELEPHONE (OUTPATIENT)
Dept: FAMILY MEDICINE CLINIC | Age: 63
End: 2023-03-28

## 2023-03-28 NOTE — TELEPHONE ENCOUNTER
Joe Velarde from [address] [place] [address] is contacting the office today to report an 8lb increase in weight in two weeks.      3/14/23: 165  3/21/23: 170  3/28/23: 173    Patient has an appointment on Thursday asking if there is any directions to be taken before then to reach out to the patient

## 2023-03-30 ENCOUNTER — OFFICE VISIT (OUTPATIENT)
Dept: FAMILY MEDICINE CLINIC | Age: 63
End: 2023-03-30
Payer: MEDICAID

## 2023-03-30 VITALS
OXYGEN SATURATION: 98 % | DIASTOLIC BLOOD PRESSURE: 84 MMHG | WEIGHT: 178 LBS | RESPIRATION RATE: 24 BRPM | HEART RATE: 82 BPM | TEMPERATURE: 97.2 F | BODY MASS INDEX: 32.56 KG/M2 | SYSTOLIC BLOOD PRESSURE: 120 MMHG

## 2023-03-30 DIAGNOSIS — E11.8 CONTROLLED TYPE 2 DIABETES MELLITUS WITH COMPLICATION, WITHOUT LONG-TERM CURRENT USE OF INSULIN (HCC): ICD-10-CM

## 2023-03-30 DIAGNOSIS — N18.30 STAGE 3 CHRONIC KIDNEY DISEASE, UNSPECIFIED WHETHER STAGE 3A OR 3B CKD (HCC): Chronic | ICD-10-CM

## 2023-03-30 DIAGNOSIS — F33.3 MAJOR DEPRESSIVE DISORDER, RECURRENT EPISODE, SEVERE, WITH PSYCHOTIC BEHAVIOR (HCC): Primary | Chronic | ICD-10-CM

## 2023-03-30 DIAGNOSIS — M15.9 PRIMARY OSTEOARTHRITIS INVOLVING MULTIPLE JOINTS: ICD-10-CM

## 2023-03-30 DIAGNOSIS — F41.9 ANXIETY: ICD-10-CM

## 2023-03-30 DIAGNOSIS — J41.8 MIXED SIMPLE AND MUCOPURULENT CHRONIC BRONCHITIS (HCC): ICD-10-CM

## 2023-03-30 PROCEDURE — 3074F SYST BP LT 130 MM HG: CPT | Performed by: NURSE PRACTITIONER

## 2023-03-30 PROCEDURE — 3044F HG A1C LEVEL LT 7.0%: CPT | Performed by: NURSE PRACTITIONER

## 2023-03-30 PROCEDURE — 3078F DIAST BP <80 MM HG: CPT | Performed by: NURSE PRACTITIONER

## 2023-03-30 PROCEDURE — 99214 OFFICE O/P EST MOD 30 MIN: CPT | Performed by: NURSE PRACTITIONER

## 2023-03-30 RX ORDER — ESCITALOPRAM OXALATE 5 MG/1
10 TABLET ORAL DAILY
Qty: 30 TABLET | Refills: 0 | Status: SHIPPED
Start: 2023-03-30

## 2023-03-30 ASSESSMENT — ENCOUNTER SYMPTOMS
ABDOMINAL DISTENTION: 1
CONSTIPATION: 0
RECTAL PAIN: 0
SHORTNESS OF BREATH: 0
SORE THROAT: 0
COUGH: 0
ABDOMINAL PAIN: 0
WHEEZING: 0
TROUBLE SWALLOWING: 0
RHINORRHEA: 0
DIARRHEA: 0
BACK PAIN: 1
SINUS PRESSURE: 0

## 2023-03-30 NOTE — TELEPHONE ENCOUNTER
history of abdominal aortic aneurysm     Normal coronary arteries     Long term (current) use of anticoagulants     AAA (abdominal aortic aneurysm) without rupture     Acute tracheobronchitis-viral     Abnormal mammogram     Cerebrovascular accident (CVA) ([address]     Diabetic nephropathy (HCC)     Slow transit constipation     Pseudogout     History of gout     Familial cavernous cerebral angioma (HCC)     Meniscus, medial, anterior horn derangement, right     Post traumatic stress disorder     Patellofemoral arthritis of right knee

## 2023-03-30 NOTE — PROGRESS NOTES
Left lower leg: No edema. Skin:     General: Skin is warm and dry. Coloration: Skin is not ashen, cyanotic, jaundiced or pale. Neurological:      Mental Status: She is alert and oriented to person, place, and time. Motor: Motor function is intact. Gait: Gait is intact. Psychiatric:         Attention and Perception: Attention and perception normal.         Mood and Affect: Mood is anxious and depressed. Affect is tearful. Speech: Speech normal.         Behavior: Behavior normal. Behavior is cooperative. Thought Content: Thought content normal. Thought content does not include suicidal ideation. Thought content does not include suicidal plan. Cognition and Memory: Cognition and memory normal.         Judgment: Judgment normal.         Electronically signed by BRAULIO Snow CNP, APRN-CNP on 3/31/2023 at 11:40 PM    Please note that this chart was generated using voice recognition Dragon dictation software. Although every effort was made to ensure the accuracy of this automated transcription, some errors in transcription may have occurred.

## 2023-03-31 ENCOUNTER — CARE COORDINATION (OUTPATIENT)
Dept: CARE COORDINATION | Age: 63
End: 2023-03-31

## 2023-03-31 RX ORDER — HYDROCODONE BITARTRATE AND ACETAMINOPHEN 5; 325 MG/1; MG/1
1 TABLET ORAL EVERY 8 HOURS PRN
Qty: 40 TABLET | Refills: 0 | OUTPATIENT
Start: 2023-03-31 | End: 2023-04-28

## 2023-03-31 ASSESSMENT — ENCOUNTER SYMPTOMS: CHEST TIGHTNESS: 1

## 2023-03-31 NOTE — CARE COORDINATION
Writer called Patient to do a follow up Social service call with her. Patient was upset because her Daughter had made her to shred some Paperwork that Patient was holding onto for her Insurance that let her know what Benefits she had through her [employer]. [employer] is working with Patient to address her needs and concerns in regards to Transportation through [employer] and other issues. Patient told chinedur that she had turned in an application for TARPS to her PCP'S Office. Writer will call to see what if anything I need to do to assist with process.

## 2023-04-01 NOTE — ASSESSMENT & PLAN NOTE
Monitored by specialist- no acute findings meriting change in the plan   Continue to follow with [facility] and psychiatry   No changes in pharmaology.

## 2023-04-01 NOTE — ASSESSMENT & PLAN NOTE
Borderline controlled, continue current medications and continue current treatment plan   Insurance not paying for inhaler - patient has done exceptionally well using daily   Reached out to pharmacy - PA to be done if needed

## 2023-04-03 ENCOUNTER — TELEPHONE (OUTPATIENT)
Dept: FAMILY MEDICINE CLINIC | Age: 63
End: 2023-04-03

## 2023-04-03 DIAGNOSIS — K59.01 SLOW TRANSIT CONSTIPATION: ICD-10-CM

## 2023-04-03 DIAGNOSIS — M15.9 PRIMARY OSTEOARTHRITIS INVOLVING MULTIPLE JOINTS: ICD-10-CM

## 2023-04-03 NOTE — TELEPHONE ENCOUNTER
Pt stated she has not had a BM since 3/26 and even then it was difficult for her to go and resulted in a hemorrhoid. Pt is wondering if there is anything [facility] to help relieve some of the pain. Pt said her stomach also hurts and \"bubbles\" occasionally.

## 2023-04-04 ENCOUNTER — TELEPHONE (OUTPATIENT)
Dept: FAMILY MEDICINE CLINIC | Age: 63
End: 2023-04-04

## 2023-04-04 ENCOUNTER — CARE COORDINATION (OUTPATIENT)
Dept: CARE COORDINATION | Age: 63
End: 2023-04-04

## 2023-04-04 DIAGNOSIS — I10 ESSENTIAL HYPERTENSION: ICD-10-CM

## 2023-04-04 DIAGNOSIS — K59.01 SLOW TRANSIT CONSTIPATION: ICD-10-CM

## 2023-04-04 DIAGNOSIS — J41.1 MUCOPURULENT CHRONIC BRONCHITIS (HCC): ICD-10-CM

## 2023-04-04 DIAGNOSIS — K21.9 GASTROESOPHAGEAL REFLUX DISEASE: ICD-10-CM

## 2023-04-04 DIAGNOSIS — E11.8 CONTROLLED TYPE 2 DIABETES MELLITUS WITH COMPLICATION, WITHOUT LONG-TERM CURRENT USE OF INSULIN (HCC): ICD-10-CM

## 2023-04-04 RX ORDER — POLYETHYLENE GLYCOL 3350 17 G/17G
POWDER, FOR SOLUTION ORAL
Qty: 1 EACH | Refills: 5 | Status: SHIPPED | OUTPATIENT
Start: 2023-04-04

## 2023-04-04 RX ORDER — SENNOSIDES 8.6 MG
1 TABLET ORAL 2 TIMES DAILY
Qty: 60 TABLET | Refills: 3 | Status: SHIPPED | OUTPATIENT
Start: 2023-04-04 | End: 2024-04-03

## 2023-04-04 RX ORDER — OMEPRAZOLE 20 MG/1
CAPSULE, DELAYED RELEASE ORAL
Qty: 90 CAPSULE | Refills: 1 | Status: SHIPPED | OUTPATIENT
Start: 2023-04-04 | End: 2024-04-04

## 2023-04-04 RX ORDER — LISINOPRIL 5 MG/1
5 TABLET ORAL DAILY
Qty: 90 TABLET | Refills: 1 | Status: SHIPPED | OUTPATIENT
Start: 2023-04-04

## 2023-04-04 NOTE — CARE COORDINATION
Ambulatory Care Coordination Note  2023    Patient Current Location:  Home: [address]     ACM contacted the patient by telephone. Verified name and  with patient as identifiers. Provided introduction to self, and explanation of the ACM role. Challenges to be reviewed by the provider   Additional needs identified to be addressed with provider: No  none               Method of communication with provider: none. ACM: Pilo Estevez, RN    Spoke with pt who said she is doing ok at home. She has been constipated her pcp office is working on prior auth for her lizness this was faxed yesterday waiting on decision. Maeve Ventura is coming over tomorrow to help her again with medicare papers. TARTA paper faxed yesterday per pcp office. 1) acp done   2) sdoh done   3) med review done   4) cardiology Dr Bailey Evans due [address]   5) podiatry Dr Winifred Anderson due 2023  6) nephrology Dr [address]  7) neurosurgery dr Hollis Kras   8) eye yearly exam due in   9) [address]   10) pcp 3/30  11) pill packing through [address]   12)  referral for passport, life alert and food insecurities done   13) home care referral for PT / OT [facility]    Offered patient enrollment in the Remote Patient Monitoring (RPM) program for in-home monitoring: Patient declined. Lab Results       None            Care Coordination Interventions    Referral from Primary Care Provider: No  Suggested Interventions and Community Resources  Diabetes Education: Declined  Fall Risk Prevention:  In Process  Disease Specific Clinic: Completed  Home Health Services: Completed  Physical Therapy: Completed  Social Work: Completed  Zone Management Tools: Completed          Goals Addressed                   This Visit's Progress     Reduce Falls    On track     I will reduce my risk of falls by the following: Remove rugs or use non slip rugs  Install grab bars in bathroom  Use walking aids like cane or walker  Follow

## 2023-04-04 NOTE — TELEPHONE ENCOUNTER
Addressed in another encounter   She needs to use her stool softeners and miralax. [facility] out?

## 2023-04-04 NOTE — TELEPHONE ENCOUNTER
LOV 3/30/23  RTO 4 weeks; F/U scheduled  LRF 10/27/22 and 10/17/22    Health Maintenance   Topic Date Due    Cervical cancer screen  11/23/2023 (Originally 11/16/2020)    Diabetic retinal exam  12/03/2023 (Originally 7/9/2022)    Pneumococcal 0-64 years Vaccine (3 - PPSV23 if available, else PCV20) 04/23/2023    Diabetic foot exam  12/21/2023    A1C test (Diabetic or Prediabetic)  01/19/2024    Diabetic Alb to Cr ratio (uACR) test  01/19/2024    Lipids  01/19/2024    GFR test (Diabetes, CKD 3-4, OR last GFR 15-59)  01/19/2024    Depression Monitoring  02/08/2024    Breast cancer screen  12/09/2024    DTaP/Tdap/Td vaccine (2 - Td or Tdap) 08/04/2026    Colorectal Cancer Screen  02/16/2028    Flu vaccine  Completed    Shingles vaccine  Completed    COVID-19 Vaccine  Completed    Hepatitis C screen  Addressed    HIV screen  Addressed    Hepatitis A vaccine  Aged Out    Hib vaccine  Aged Out    Meningococcal (ACWY) vaccine  Aged Out             (applicable per patient's age: Cancer Screenings, Depression Screening, Fall Risk Screening, Immunizations)    Hemoglobin A1C (%)   Date Value   01/19/2023 6.1 (H)   09/19/2022 5.3   03/28/2022 6.2 (H)     Microalb/Crt.  Ratio (mcg/mg creat)   Date Value   04/08/2019 CANNOT BE CALCULATED     LDL Cholesterol (mg/dL)   Date Value   01/19/2023 89     LDL Calculated (mg/dL)   Date Value   04/10/2020 59     AST (U/L)   Date Value   01/19/2023 24     ALT (U/L)   Date Value   01/19/2023 28     BUN (mg/dL)   Date Value   01/19/2023 20      (goal A1C is < 7)   (goal LDL is <100) need 30-50% reduction from baseline     BP Readings from Last 3 Encounters:   03/30/23 120/84   02/08/23 138/84   01/23/23 (!) 148/92    (goal /80)      All Future Testing planned in [facility]:  Lab Frequency Next Occurrence   XR CHEST STANDARD (2 VW) Once 06/16/2022   CBC Once 06/16/2022   Comprehensive Metabolic Panel Once 46/48/1064   Brain Natriuretic Peptide Once 06/16/2022   D-Dimer, Quantitative Once

## 2023-04-04 NOTE — TELEPHONE ENCOUNTER
Her insurance is not covering? This is an insurance issue, not office issue. Both Linzess and inhaler have been sent to her pharmacy with refills. We sent OV yesterday as well for PA. She needs to use her stool softeners and miralax in meantime. Both Linzess and Stiolito inhaler resent to [employer]. Maybe the issue is that they were at [facility] [place] prior?

## 2023-04-04 NOTE — TELEPHONE ENCOUNTER
[place] [facility] called to report that pt has had another significant weight gain, 4/4-177,3/. 3/.3/. No Bm in over a week, Pt is out of Linzess that seemed to be helping her BM's, insurance won't cover the medication unless there is something sent from the dr stating that this medication works for pt and no other med works for pt. Pt needs this medication immediately. All other vitals are fine. Pt was advised to ED. Pt would like to briefs prescribed so her insurance will pick it up. The immediate medications need to go to [facility].

## 2023-04-05 ENCOUNTER — CARE COORDINATION (OUTPATIENT)
Dept: CARE COORDINATION | Age: 63
End: 2023-04-05

## 2023-04-05 RX ORDER — HYDROCODONE BITARTRATE AND ACETAMINOPHEN 5; 325 MG/1; MG/1
TABLET ORAL
Qty: 40 TABLET | Refills: 0 | OUTPATIENT
Start: 2023-04-05 | End: 2023-05-03

## 2023-04-05 RX ORDER — HYDROCODONE BITARTRATE AND ACETAMINOPHEN 5; 325 MG/1; MG/1
1 TABLET ORAL EVERY 8 HOURS PRN
Qty: 40 TABLET | Refills: 0 | Status: SHIPPED | OUTPATIENT
Start: 2023-04-05 | End: 2023-04-25 | Stop reason: SDUPTHER

## 2023-04-05 NOTE — TELEPHONE ENCOUNTER
Update: She has taken linzess this afternoon. Wondering if Rexall brand enema is ok to use at home? Per PCP she may use at home enema.

## 2023-04-05 NOTE — TELEPHONE ENCOUNTER
Patient aware and has been using. She is also trying prune juice and colrite. She will  [facility] today.

## 2023-04-05 NOTE — CARE COORDINATION
Writer called Patient's Physicians office to inquire about her TARPS application to see if there was anything I needed to do to help move the process along. They reported that they were working on it and would turn it in once they had it completed. Writer called Patient to convey this message to her. Patient had a question about her Discount prescription card that she received from Her Insurance company and writer helped her to understand exactly what it was.

## 2023-04-06 NOTE — TELEPHONE ENCOUNTER
Update:  Patient called to inform that she finally did have a bowel movement. Patient states that her belly feels a little better.

## 2023-04-18 ENCOUNTER — TELEPHONE (OUTPATIENT)
Dept: FAMILY MEDICINE CLINIC | Age: 63
End: 2023-04-18

## 2023-04-20 ENCOUNTER — CARE COORDINATION (OUTPATIENT)
Dept: CARE COORDINATION | Age: 63
End: 2023-04-20

## 2023-04-20 NOTE — CARE COORDINATION
mailed out for Patient an application for assistance with her Medication cost Through her MEDICARE insurance plan. That was sent out in the [employer] mail here at the [facility]. We will wait to hear back from the Social security Department where the application was sent to.

## 2023-04-24 ENCOUNTER — CARE COORDINATION (OUTPATIENT)
Dept: CARE COORDINATION | Age: 63
End: 2023-04-24

## 2023-04-25 ENCOUNTER — CARE COORDINATION (OUTPATIENT)
Dept: CARE COORDINATION | Age: 63
End: 2023-04-25

## 2023-04-25 ENCOUNTER — OFFICE VISIT (OUTPATIENT)
Dept: FAMILY MEDICINE CLINIC | Age: 63
End: 2023-04-25
Payer: MEDICAID

## 2023-04-25 VITALS
HEART RATE: 75 BPM | RESPIRATION RATE: 18 BRPM | DIASTOLIC BLOOD PRESSURE: 82 MMHG | SYSTOLIC BLOOD PRESSURE: 130 MMHG | OXYGEN SATURATION: 96 % | BODY MASS INDEX: 33.43 KG/M2 | TEMPERATURE: 97.5 F | WEIGHT: 182.8 LBS

## 2023-04-25 DIAGNOSIS — R82.90 ABNORMAL URINALYSIS: ICD-10-CM

## 2023-04-25 DIAGNOSIS — F43.10 POST TRAUMATIC STRESS DISORDER: ICD-10-CM

## 2023-04-25 DIAGNOSIS — F33.3 MAJOR DEPRESSIVE DISORDER, RECURRENT EPISODE, SEVERE, WITH PSYCHOTIC BEHAVIOR (HCC): Chronic | ICD-10-CM

## 2023-04-25 DIAGNOSIS — K59.01 SLOW TRANSIT CONSTIPATION: ICD-10-CM

## 2023-04-25 DIAGNOSIS — F41.9 ANXIETY: ICD-10-CM

## 2023-04-25 DIAGNOSIS — E11.8 CONTROLLED TYPE 2 DIABETES MELLITUS WITH COMPLICATION, WITHOUT LONG-TERM CURRENT USE OF INSULIN (HCC): Primary | ICD-10-CM

## 2023-04-25 DIAGNOSIS — M81.6 LOCALIZED OSTEOPOROSIS WITHOUT CURRENT PATHOLOGICAL FRACTURE: ICD-10-CM

## 2023-04-25 DIAGNOSIS — I10 ESSENTIAL HYPERTENSION: Primary | ICD-10-CM

## 2023-04-25 DIAGNOSIS — J41.8 MIXED SIMPLE AND MUCOPURULENT CHRONIC BRONCHITIS (HCC): ICD-10-CM

## 2023-04-25 DIAGNOSIS — M15.9 PRIMARY OSTEOARTHRITIS INVOLVING MULTIPLE JOINTS: ICD-10-CM

## 2023-04-25 DIAGNOSIS — G47.33 OSA (OBSTRUCTIVE SLEEP APNEA): ICD-10-CM

## 2023-04-25 DIAGNOSIS — J44.9 CHRONIC OBSTRUCTIVE PULMONARY DISEASE, UNSPECIFIED COPD TYPE (HCC): ICD-10-CM

## 2023-04-25 DIAGNOSIS — K21.9 GASTROESOPHAGEAL REFLUX DISEASE, UNSPECIFIED WHETHER ESOPHAGITIS PRESENT: ICD-10-CM

## 2023-04-25 DIAGNOSIS — E11.21 DIABETIC NEPHROPATHY ASSOCIATED WITH TYPE 2 DIABETES MELLITUS (HCC): ICD-10-CM

## 2023-04-25 DIAGNOSIS — N18.30 STAGE 3 CHRONIC KIDNEY DISEASE, UNSPECIFIED WHETHER STAGE 3A OR 3B CKD (HCC): Chronic | ICD-10-CM

## 2023-04-25 PROCEDURE — 3074F SYST BP LT 130 MM HG: CPT | Performed by: NURSE PRACTITIONER

## 2023-04-25 PROCEDURE — 1123F ACP DISCUSS/DSCN MKR DOCD: CPT | Performed by: NURSE PRACTITIONER

## 2023-04-25 PROCEDURE — 3078F DIAST BP <80 MM HG: CPT | Performed by: NURSE PRACTITIONER

## 2023-04-25 PROCEDURE — 99215 OFFICE O/P EST HI 40 MIN: CPT | Performed by: NURSE PRACTITIONER

## 2023-04-25 PROCEDURE — 3044F HG A1C LEVEL LT 7.0%: CPT | Performed by: NURSE PRACTITIONER

## 2023-04-25 RX ORDER — BENZTROPINE MESYLATE 0.5 MG/1
0.5 TABLET ORAL DAILY PRN
COMMUNITY
Start: 2023-04-12

## 2023-04-25 RX ORDER — HYDROCODONE BITARTRATE AND ACETAMINOPHEN 5; 325 MG/1; MG/1
1 TABLET ORAL DAILY PRN
Qty: 40 TABLET | Refills: 0 | Status: SHIPPED | OUTPATIENT
Start: 2023-04-25 | End: 2023-05-25
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
## 2023-04-25 NOTE — PROGRESS NOTES
4/25/23 10:27 AM       Remote Patient Monitoring Treatment Plan    Received request from ACM/TRE Alicia RN  to order remote patient monitoring for in home monitoring of COPD, Diabetes, and HTN and order completed. Patient will be monitoring   --blood pressure   --glucose  --pulse ox   --weight Please set alert for ONLY weight gain of 5# in 7 days. Pt has no documented hx of HF.    --survey questions. Patient will engage in Remote Patient Monitoring each day to develop the skills necessary for self management. RPM Care Team Responsibilities:   Alerts will be reviewed daily and addressed within 2-4 hours during operational hours (Monday -Friday 9 am-4 pm)  Alert response and intervention documented in patient medical record  Alert response escalated to PCP per protocol and documented in patient medical record  Patient monitored over approximately  days  Discharge from program based on self-management readiness    See care coordination encounters for additional details.       Bruno Blackburn DNP, FNP-C, Remote Patient Monitoring NP, () [phone]

## 2023-04-25 NOTE — CARE COORDINATION
Remote Patient Kit Ordering Note      Date/Time:  4/25/2023 10:02 AM      [x] CCSS confirmed patient shipping address  [x] Patient will receive package over the next 2-4 business days. Someone 21 years or older must be present to sign for UPS delivery. [x] Patient to contact virtual installation-specific phone number listed in the patient instructions. [x] If the patient does not contact HRS within 24 hours, an [employer] [facility] Bernard[facility] will call the patient directly: If the patient does not answer, HRS will follow up with the clinical team notifying them about the unsuccessful attempt to contact the patient. HRS will make three call attempts to the patient. [] LPN will contact patient once equipment is active to welcome them to the program.                                                         [] Hours of RPM monitoring - Monday-Friday [id number]                     All questions answered at this time. ACM made aware the RPM kit has been ordered. CCSS notified patient of RPM equipment order.

## 2023-04-27 ENCOUNTER — HOSPITAL ENCOUNTER (OUTPATIENT)
Age: 63
Setting detail: SPECIMEN
Discharge: HOME | End: 2023-04-27

## 2023-04-27 DIAGNOSIS — R82.90 ABNORMAL URINALYSIS: ICD-10-CM

## 2023-04-27 LAB
BILIRUBIN URINE: NEGATIVE
COLOR: YELLOW
COMMENT UA: ABNORMAL
GLUCOSE UR STRIP.AUTO-MCNC: ABNORMAL MG/DL
KETONES UR STRIP.AUTO-MCNC: NEGATIVE MG/DL
LEUKOCYTE ESTERASE UR QL STRIP.AUTO: NEGATIVE
NITRITE UR QL STRIP.AUTO: NEGATIVE
PROT UR STRIP.AUTO-MCNC: 6 MG/DL (ref 5–8)
PROT UR STRIP.AUTO-MCNC: NEGATIVE MG/DL
SPECIFIC GRAVITY UA: 1.02 (ref 1–1.03)
TURBIDITY: CLEAR
URINE HGB: NEGATIVE
UROBILINOGEN, URINE: NORMAL

## 2023-04-28 DIAGNOSIS — M54.50 ACUTE BILATERAL LOW BACK PAIN WITHOUT SCIATICA: ICD-10-CM

## 2023-04-28 LAB
MICROORGANISM SPEC CULT: NORMAL
SPECIMEN DESCRIPTION: NORMAL

## 2023-04-28 RX ORDER — LIDOCAINE 50 MG/G
1 PATCH TOPICAL DAILY
Qty: 30 PATCH | Refills: 0 | Status: SHIPPED | OUTPATIENT
Start: 2023-04-28 | End: 2023-05-28

## 2023-05-01 ENCOUNTER — CARE COORDINATION (OUTPATIENT)
Dept: CARE COORDINATION | Age: 63
End: 2023-05-01

## 2023-05-01 ENCOUNTER — TELEPHONE (OUTPATIENT)
Dept: PRIMARY CARE CLINIC | Age: 63
End: 2023-05-01

## 2023-05-01 ENCOUNTER — OFFICE VISIT (OUTPATIENT)
Dept: BEHAVIORAL/MENTAL HEALTH CLINIC | Age: 63
End: 2023-05-01
Payer: MEDICAID

## 2023-05-01 DIAGNOSIS — F43.10 POST TRAUMATIC STRESS DISORDER: Primary | ICD-10-CM

## 2023-05-01 PROCEDURE — 1123F ACP DISCUSS/DSCN MKR DOCD: CPT | Performed by: SOCIAL WORKER

## 2023-05-01 PROCEDURE — 90832 PSYTX W PT 30 MINUTES: CPT | Performed by: SOCIAL WORKER

## 2023-05-01 NOTE — CARE COORDINATION
Remote Patient Monitoring Welcome Note  Date/Time:  2023 9:44 AM  Patient Current Location: [facility]  Verified patients name and  as identifiers. Completed and confirmed the following:   Emergency Contact: Emerita Mckeon ([id number])[phone]  [x] Patient received all RPM equipment (tablet (Pt USING MOBILE KIT), scale, blood pressure device and cuff, and pulse oximeter)  Cuff Size: regular (9.05\"-15.74\")    Weight Scale:  regular (<330lbs)                    [x] Instructed patient keep box for use when returning equipment                                                          [x] Reviewed Patient Welcome Letter with patient                         [x] Reviewed expectations for patient and care team  Monitoring hours M-F 9-4pm  Completing monitoring by 12pm on  so that alerts can be responded to in the same day  Patient weighs self at same time every day (or after urinating and waking up)  Take blood pressure 1-2 hrs after medications   RPM team may have different phone area code (including [place], [place], [facility] or [address] [facility] [address])                              [x] Instructed patient to keep scale on flat surface                                                         [] Instructed patient to keep tablet plugged in at all times  (PT USING MOBILE KIT)                      [x] Instructed how to contact IT support ([phone])  [x] Provided Remote Patient Monitoring care  information               All questions answered at this time. LPN reviewed patients reported daily Remote Patient Monitoring metrics. All reported metrics are within alert parameters. This nurse reached out to [facility] team to request they call pt to assist with blue tooth connectivity. Plan/Follow Up: Will continue to review, monitor and address alerts with follow up based on severity of symptoms and risk factors.

## 2023-05-01 NOTE — CARE COORDINATION
Ambulatory Care Coordination Note  2023    Patient Current Location:  Home: [address]  [place] [facility] [address]     ACM contacted the patient by telephone. Verified name and  with patient as identifiers. Provided introduction to self, and explanation of the ACM role. Challenges to be reviewed by the provider   Additional needs identified to be addressed with provider: No  none               Method of communication with provider: none. ACM: Lukas Adams RN    Spoke with pt with pt who said she is doing ok at home she did see her pcp last week. She has an apt with [address] today. She is current with the [facility] as well. Her ribs are still sore. She will call pharmacy to see if her patches are ready to be picked up today. She did talk to someone today about RPM     1) acp done   2) sdoh done   3) med review done   4) cardiology Dr Wandy Amezcua due [address]   5) podiatry Dr Enzo Wahl due 2023  6) nephrology Dr Yessi Campos October   7) neurosurgery dr Madison Teixeira   8) eye yearly exam due in   9)    10) pcp   11) pill packing through [address]   12)  referral for passport, life alert and food insecurities done   13) home care referral for PT / OT [facility]    Offered patient enrollment in the Remote Patient Monitoring (RPM) program for in-home monitoring: Yes, patient already enrolled. Lab Results       None            Care Coordination Interventions    Referral from Primary Care Provider: No  Suggested Interventions and Community Resources  Diabetes Education: Declined  Fall Risk Prevention:  In Process  Disease Specific Clinic: Completed  Home Health Services: Completed  Physical Therapy: Completed  Social Work: Completed  Zone Management Tools: Completed          Goals Addressed                   This Visit's Progress     Reduce Falls    On track     I will reduce my risk of falls by the following: Remove rugs or use non slip rugs  Install grab bars in bathroom  Use walking aids

## 2023-05-02 ENCOUNTER — CARE COORDINATION (OUTPATIENT)
Dept: CARE COORDINATION | Age: 63
End: 2023-05-02

## 2023-05-02 NOTE — CARE COORDINATION
She has appointments scheduled monthly. She will need to come to one of them already scheduled. I am not changing patient appointments.

## 2023-05-02 NOTE — CARE COORDINATION
Remote Alert Monitoring Note  Date/Time:  2023 10:05 AM  Patient Current Location: [facility]  LPN contacted patient by telephone regarding red alert received for blood pressure reading (156/106). Verified patients name and  as identifiers. Background: Pt enrolled in RPM r/t HTN, COPD, DM. BP Triage  Are you having any Chest Pain? no   Are you having any Shortness of Breath? no   Do you have a headache or have any vision changes? no   Are you having any numbness or tingling? no   Are you having any other health concerns or issues? no   Clinical Interventions: Reviewed and followed up on alerts and treatments-Spoke with pt and she verbalizes that she takes her Lisinopril at night and takes IMDUR in the morning. Took around 7 AM today. Discussed process for placing cuff and requested pt recheck BP with updated reading of  120/78. She is still not able to connect blue tooth, but requested tech support number and she is going to call them later today when her grand daughter is available to assist.     Plan/Follow Up: Will continue to review, monitor and address alerts with follow up based on severity of symptoms and risk factors.

## 2023-05-02 NOTE — CARE COORDINATION
Called pt home care to clarify what company is providing care  [facility] is is her skilled care SN and PT  Called [facility], and was told they were going to provide passport but are unable to take her due to staffing  They are going to call her  at [facility] and see if another agency can come out  [facility] daughter was at [place] [address] and did get on the phone with acm she said her mom is complaining that she is more forgetful lately and even got lost driving they would like to see if the family can come in with her to her next apt that is scheduled for May 23 the daughter cannot make this apt and wants to know if she can come in a different day. Will check to see if this can be rescheduled.

## 2023-05-04 ENCOUNTER — CARE COORDINATION (OUTPATIENT)
Dept: CARE COORDINATION | Age: 63
End: 2023-05-04

## 2023-05-04 NOTE — CARE COORDINATION
Pt called to say her bp has been high she has remote pt monitoring but has not been able to connect to blue tooth her daughter Kate Mitchell is coming over tomorrow and will call the help desk to see if they can fix it. Her bp after taking her meds was 144/92 hr 74 will continue to monitor with RPM  She said her daughters to plan to come to her next apt with her to talk  to her pcp about her care.

## 2023-05-05 ENCOUNTER — CARE COORDINATION (OUTPATIENT)
Dept: CASE MANAGEMENT | Age: 63
End: 2023-05-05

## 2023-05-05 ENCOUNTER — CARE COORDINATION (OUTPATIENT)
Dept: CARE COORDINATION | Age: 63
End: 2023-05-05

## 2023-05-05 NOTE — CARE COORDINATION
Writer called Patient to do a follow up Social service call with her. Patient reported that things were going [id number] [address] and she had No needs at this time. Writer will call back On e more time and if things are still going Valeen Hatchet will sign off at that time.

## 2023-05-05 NOTE — CARE COORDINATION
Remote Patient Monitoring Note  Date/Time:  2023 1:50 PM  Patient Current Location: Home: [address] [place], [place] [address]N contacted patient by telephone regarding  no metrics x 2 days  received for Verified patients name and  as identifiers. Background: enrolled in RPM   Clinical Interventions: Reviewed and followed up on alerts and treatments-Call to [facility] regarding no metrics x 2 days. Yesica states she uses her smart phone. She is unable to get her phone to connect to blue tooth. Metrics do not transfer over. Call to [address] [place] [address] [employer] support. Spoke to [place]. After attempting to trouble shoot issues. Pt was unable to connect her iphone to [facility]. Message routed to  GWYN Castro to issue a  tablet. Entered metrics in HRS today. Pt reports blood pressures have been high, she has an appt with her PCP on 23. Taking all medications as directed. Vital sign metrics forwarded to PCP  Plan/Follow Up: Will continue to review, monitor and address alerts with follow up based on severity of symptoms and risk factors.

## 2023-05-05 NOTE — TELEPHONE ENCOUNTER
[facility],     Does the patient need a new order for RPM,? I see the order Marin Stovall put in last month included:  Patient will be monitoring   --blood pressure   --glucose  --pulse ox   --weight Please set alert for ONLY weight gain of 5# in 7 days. Pt has no documented hx of HF.    --survey questions. Thank you for the clarification.      Lamar Booker M.D., M.P.H  [address] [place] [address] Remote Patient Monitoring Provider   [address] [facility]   Phone: [phone]  Fax: [phone]

## 2023-05-05 NOTE — CARE COORDINATION
-writer received a message from [address] [place] [address], that patient will like to switch from a mobile kit to a complete kit. -Writer reached out to pt.,there was no answer and a message was left regarding new complete kit order. Patient was asked to call writer back at [phone], so writer can explain the process of returning the old kit. -complete kit order is placed, and return order is placed for the mobile kit.

## 2023-05-07 PROBLEM — M11.20 PSEUDOGOUT: Status: RESOLVED | Noted: 2022-03-01 | Resolved: 2023-05-07

## 2023-05-07 PROBLEM — R07.2 PRECORDIAL PAIN: Status: RESOLVED | Noted: 2020-06-30 | Resolved: 2023-05-07

## 2023-05-07 PROBLEM — J20.9 ACUTE TRACHEOBRONCHITIS: Status: RESOLVED | Noted: 2021-08-05 | Resolved: 2023-05-07

## 2023-05-07 RX ORDER — DENOSUMAB 60 MG/ML
60 INJECTION SUBCUTANEOUS ONCE
Qty: 1 ML | Refills: 0 | Status: SHIPPED | OUTPATIENT
Start: 2023-05-07 | End: 2023-05-07

## 2023-05-07 ASSESSMENT — ENCOUNTER SYMPTOMS
WHEEZING: 0
SHORTNESS OF BREATH: 0
SORE THROAT: 0
ABDOMINAL PAIN: 0
RECTAL PAIN: 0
DIARRHEA: 0
COUGH: 0
SINUS PRESSURE: 0
RHINORRHEA: 0
ABDOMINAL DISTENTION: 1
CONSTIPATION: 0
BACK PAIN: 1
TROUBLE SWALLOWING: 0
CHEST TIGHTNESS: 1

## 2023-05-08 ENCOUNTER — OFFICE VISIT (OUTPATIENT)
Dept: PODIATRY | Age: 63
End: 2023-05-08
Payer: MEDICARE

## 2023-05-08 VITALS
DIASTOLIC BLOOD PRESSURE: 66 MMHG | WEIGHT: 180.6 LBS | HEART RATE: 74 BPM | OXYGEN SATURATION: 97 % | BODY MASS INDEX: 33.03 KG/M2 | SYSTOLIC BLOOD PRESSURE: 121 MMHG

## 2023-05-08 VITALS — WEIGHT: 181 LBS | HEIGHT: 62 IN | BODY MASS INDEX: 33.31 KG/M2

## 2023-05-08 DIAGNOSIS — B35.1 DERMATOPHYTOSIS OF NAIL: ICD-10-CM

## 2023-05-08 DIAGNOSIS — E11.51 TYPE II DIABETES MELLITUS WITH PERIPHERAL CIRCULATORY DISORDER (HCC): Primary | ICD-10-CM

## 2023-05-08 DIAGNOSIS — I89.0 LYMPHATIC EDEMA: ICD-10-CM

## 2023-05-08 DIAGNOSIS — M79.672 PAIN IN BOTH FEET: ICD-10-CM

## 2023-05-08 DIAGNOSIS — I73.9 PVD (PERIPHERAL VASCULAR DISEASE) (HCC): ICD-10-CM

## 2023-05-08 DIAGNOSIS — M79.671 PAIN IN BOTH FEET: ICD-10-CM

## 2023-05-08 PROCEDURE — 1123F ACP DISCUSS/DSCN MKR DOCD: CPT | Performed by: PODIATRIST

## 2023-05-08 PROCEDURE — 11721 DEBRIDE NAIL 6 OR MORE: CPT | Performed by: PODIATRIST

## 2023-05-08 PROCEDURE — 99213 OFFICE O/P EST LOW 20 MIN: CPT | Performed by: PODIATRIST

## 2023-05-08 PROCEDURE — 3044F HG A1C LEVEL LT 7.0%: CPT | Performed by: PODIATRIST

## 2023-05-08 NOTE — PROGRESS NOTES
tablet Take 2 tablets by mouth daily 30 tablet 0    blood glucose test strips (ONETOUCH ULTRA) strip TEST THREE TIMES A  each 5    allopurinol (ZYLOPRIM) 100 MG tablet Take 1 tablet by mouth 2 times daily 180 tablet 3    buPROPion (WELLBUTRIN XL) 150 MG extended release tablet Take 3 tablets by mouth daily Taking 150 mg and 300 mg- Total 450 mg.      atorvastatin (LIPITOR) 40 MG tablet Take 1 tablet by mouth daily 90 tablet 1    isosorbide mononitrate (IMDUR) 30 MG extended release tablet Take 1 tablet by mouth daily 90 tablet 1    albuterol sulfate HFA (PROVENTIL;VENTOLIN;PROAIR) 108 (90 Base) MCG/ACT inhaler inhale 2 puffs by mouth and INTO THE LUNGS every 4 hours if needed for wheezing shortness of breath or cough 8.5 g 1    Caltrate 600+D Plus Minerals (CALTRATE) 600-800 MG-UNIT TABS tablet Take 1 tablet by mouth 2 times daily 60 tablet 11    dapagliflozin (FARXIGA) 5 MG tablet Take 1 tablet by mouth every morning 90 tablet 1    tiZANidine (ZANAFLEX) 2 MG tablet Take 1 tablet by mouth every 6 hours as needed      Blood Pressure Monitoring (BLOOD PRESSURE CUFF) MISC Use as directed by physician to check blood pressure. Please fit to patient.  1 each 0    traZODone (DESYREL) 50 MG tablet Take 1 tablet by mouth nightly      Multiple Vitamins-Minerals (THERAPEUTIC MULTIVITAMIN-MINERALS) tablet Take 1 tablet by mouth daily 30 tablet 11    Blood Pressure Monitoring KIT Use to check blood pressure daily and as needed 1 kit 0    apixaban (ELIQUIS) 5 MG TABS tablet Take 1 tablet by mouth 2 times daily 60 tablet 11    Alcohol Swabs (ALCOHOL PREP) 70 % PADS Use twice daily and as needed to check blood sugars 120 each 3    ipratropium-albuterol (DUONEB) 0.5-2.5 (3) MG/3ML SOLN nebulizer solution Take 3 mLs by nebulization every 6 hours as needed for Shortness of Breath 360 mL 0    risperiDONE (RISPERDAL) 0.5 MG tablet take 1 tablet by mouth at bedtime      Respiratory Therapy Supplies (NEBULIZER/TUBING/MOUTHPIECE) KIT

## 2023-05-15 ENCOUNTER — CARE COORDINATION (OUTPATIENT)
Dept: CARE COORDINATION | Age: 63
End: 2023-05-15

## 2023-05-15 ENCOUNTER — TELEPHONE (OUTPATIENT)
Dept: FAMILY MEDICINE CLINIC | Age: 63
End: 2023-05-15

## 2023-05-15 DIAGNOSIS — M54.50 ACUTE BILATERAL LOW BACK PAIN WITHOUT SCIATICA: ICD-10-CM

## 2023-05-15 DIAGNOSIS — N18.30 STAGE 3 CHRONIC KIDNEY DISEASE, UNSPECIFIED WHETHER STAGE 3A OR 3B CKD (HCC): Primary | Chronic | ICD-10-CM

## 2023-05-15 NOTE — CARE COORDINATION
Ambulatory Care Coordination Note  5/15/2023    Patient Current Location:  Home: [address]     ACM contacted the patient by telephone. Verified name and  with patient as identifiers. Provided introduction to self, and explanation of the ACM role. Challenges to be reviewed by the provider   Additional needs identified to be addressed with provider: No  none               Method of communication with provider: none. ACM: Ariana Wray, RN  Spoke with pt who said she is doing well at home. She will see her pcp next week. She did not get her sleep study scheduled. Did provide her the phone number to schedule. She denies any needs at this time  1) acp done   2) sdoh done   3) med review done   4) cardiology Dr Madelaine Mackay due [address]   5) podiatry Dr Kandis Hernandez due 2023  6) nephrology Dr Pagan Call October   7) neurosurgery dr Mark Bernardo   8) eye yearly exam due in   9)    10) pcp   11) pill packing through [address]   12)  referral for passport, life alert and food insecurities done   13) home care referral for PT / OT [facility]    Offered patient enrollment in the Remote Patient Monitoring (RPM) program for in-home monitoring: Yes, patient already enrolled. Lab Results       None            Care Coordination Interventions    Referral from Primary Care Provider: No  Suggested Interventions and Community Resources  Diabetes Education: Declined  Fall Risk Prevention:  In Process  Disease Specific Clinic: Completed  Home Health Services: Completed  Physical Therapy: Completed  Social Work: Completed  Zone Management Tools: Completed          Goals Addressed                   This Visit's Progress     Reduce Falls    On track     I will reduce my risk of falls by the following: Remove rugs or use non slip rugs  Install grab bars in bathroom  Use walking aids like cane or walker  Follow through on orders for PT    Barriers: lack of support and overwhelmed by

## 2023-05-15 NOTE — TELEPHONE ENCOUNTER
Please call. Confirm that there is no cost for patient and that she needs to bring to office for us to administer.    Prior I will need to have her calcium drawn

## 2023-05-15 NOTE — TELEPHONE ENCOUNTER
Patient states she believes it was 15$ when picked up. She will have calcium drawn prior to injection, she does have appointment with tai tomorrow, should she bring injection to visit? Or just have labs drawn at that time? Next appt with PCP is 5/23/23. She also states she has decreased her pain medication, trying to wean herself off. Last pill taken on 5/2/23. Wondering if she can be prescribed lidocaine patches again for relief? Pain located in knees and back.

## 2023-05-16 ENCOUNTER — OFFICE VISIT (OUTPATIENT)
Dept: BEHAVIORAL/MENTAL HEALTH CLINIC | Age: 63
End: 2023-05-16
Payer: MEDICARE

## 2023-05-16 DIAGNOSIS — F43.10 POST TRAUMATIC STRESS DISORDER: Primary | ICD-10-CM

## 2023-05-16 PROCEDURE — 90832 PSYTX W PT 30 MINUTES: CPT | Performed by: SOCIAL WORKER

## 2023-05-16 PROCEDURE — 1123F ACP DISCUSS/DSCN MKR DOCD: CPT | Performed by: SOCIAL WORKER

## 2023-05-16 RX ORDER — LIDOCAINE 50 MG/G
1 PATCH TOPICAL DAILY
Qty: 30 PATCH | Refills: 0 | Status: SHIPPED | OUTPATIENT
Start: 2023-05-16 | End: 2023-06-15

## 2023-05-17 ENCOUNTER — CARE COORDINATION (OUTPATIENT)
Dept: CARE COORDINATION | Age: 63
End: 2023-05-17

## 2023-05-17 NOTE — CARE COORDINATION
Remote Patient Monitoring Note  Date/Time:  2023 11:06 AM  [facility] regarding red alert received for blood pressure reading (167/103). Verified patients name and  as identifiers. Background: Pt enrolled in RPM r/t HTN, COPD, DM. Clinical Interventions: Reviewed and followed up on alerts and treatments-Pt rechecked BP again with new red alert. She spoke with pharmacy and is upset that medication was not available before today. She is calling her grand daughter to  medication this afternoon. Educated pt that she needs to sit and relax and take medication when it is available. She is agreeable to recheck BP again at this time and now yellow alert noted for updated reading of 172/97. Plan/Follow Up: Will continue to review, monitor and address alerts with follow up based on severity of symptoms and risk factors.

## 2023-05-17 NOTE — PROGRESS NOTES
interactive complexity present?   No  Reason:  N/A  Additional Supporting Information:  N/A       Electronically signed by MARIBELL Lloyd on 5/17/23 at 8:30 AM EDT

## 2023-05-17 NOTE — CARE COORDINATION
Remote Alert Monitoring Note  Date/Time:  2023 9:01 AM  Patient Current Location: [facility]  LPN contacted patient by telephone regarding red alert received for blood pressure reading (152/102). Verified patients name and  as identifiers. Background: Pt enrolled in RPM r/t HTN, COPD, DM. BP Triage  Are you having any Chest Pain? no   Are you having any Shortness of Breath? no   Do you have a headache or have any vision changes? no   Are you having any numbness or tingling? no   Are you having any other health concerns or issues? no   Clinical Interventions: Reviewed and followed up on alerts and treatments-Spoke with pt who is in no apparent distress. Discussed cuff placement and requested pt recheck BP with updated reading of  154/90. She took her last dose of Lisinopril yesterday and has not received refill yet. She is going to call pharmacy as soon as they open to find out where her medication is. Plan/Follow Up: Will continue to review, monitor and address alerts with follow up based on severity of symptoms and risk factors.

## 2023-05-17 NOTE — CARE COORDINATION
23  3:16 PM Patient is currently enrolled in Remote Patient Monitoring for COPD, Diabetes, and HTN. Follow up with pt after verifying name and  as identifiers. States her daughter just arrived with her BP medication. She is agreeable to recheck BP at this time with updated reading of 152/92. She is going to take medication at this time and relax. Plan/Follow Up: Will continue to review, monitor and address alerts with follow up based on severity of symptoms and risk factors.

## 2023-05-24 ENCOUNTER — HOSPITAL ENCOUNTER (OUTPATIENT)
Age: 63
Setting detail: SPECIMEN
Discharge: HOME | End: 2023-05-24

## 2023-05-24 DIAGNOSIS — N18.30 STAGE 3 CHRONIC KIDNEY DISEASE, UNSPECIFIED WHETHER STAGE 3A OR 3B CKD (HCC): Chronic | ICD-10-CM

## 2023-05-24 LAB — CALCIUM SERPL-MCNC: 9.7 MG/DL (ref 8.6–10.4)

## 2023-05-25 ENCOUNTER — CARE COORDINATION (OUTPATIENT)
Dept: CARE COORDINATION | Age: 63
End: 2023-05-25

## 2023-05-25 ENCOUNTER — TELEPHONE (OUTPATIENT)
Dept: FAMILY MEDICINE CLINIC | Age: 63
End: 2023-05-25

## 2023-05-25 DIAGNOSIS — R42 VERTIGO: ICD-10-CM

## 2023-05-25 DIAGNOSIS — M54.50 ACUTE BILATERAL LOW BACK PAIN WITHOUT SCIATICA: Primary | ICD-10-CM

## 2023-05-25 DIAGNOSIS — R26.81 UNSTEADINESS: ICD-10-CM

## 2023-05-25 NOTE — CARE COORDINATION
Remote Alert Monitoring Note  Date/Time:  2023 2:48 PM  Patient Current Location: [facility]  LPN contacted patient by telephone regarding red alert received for blood pressure reading (172/104). Verified patients name and  as identifiers. Background: Pt enrolled in RPM r/t HTN, COPD, DM. BP Triage  Are you having any Chest Pain? no   Are you having any Shortness of Breath? no   Do you have a headache or have any vision changes? no   Are you having any numbness or tingling? no   Are you having any other health concerns or issues? no   Clinical Interventions: Reviewed and followed up on alerts and treatments-Spoke with pt who states she has been cold all day and just got home from the dentist.  No other complaints at this time. Reviewed medications, pt takes Lisinopril at night and Imdur in the am.  She is agreeable to recheck BP at this time with updated reading of 174/96. Discussed that if she starts having any new issues (SOB, headache, chest discomfort, numbness/tingling or vision change) she should go to ED for eval.  Verbalizes understanding. Plan/Follow Up: Will continue to review, monitor and address alerts with follow up based on severity of symptoms and risk factors.

## 2023-05-26 ENCOUNTER — CARE COORDINATION (OUTPATIENT)
Dept: CARE COORDINATION | Age: 63
End: 2023-05-26

## 2023-05-26 NOTE — CARE COORDINATION
Remote Alert Monitoring Note  Date/Time:  2023 9:21 AM  Patient Current Location: [facility]  LPN contacted patient by telephone regarding red alert received for blood pressure reading (145/102). Verified patients name and  as identifiers. Background: Pt enrolled in RPM r/t HTN, COPD, DM. BP Triage  Are you having any Chest Pain? no   Are you having any Shortness of Breath? no   Do you have a headache or have any vision changes? yes   Are you having any numbness or tingling? no   Are you having any other health concerns or issues? no   Clinical Interventions: Reviewed and followed up on alerts and treatments-Spoke with pt who is in no apparent distress. Verbalizes she took her scheduled Lisinopril last night before bed and took Imdur around 5:30 this morning. She did have a slight headache when she woke up, but took OTC Tylenol with improvement. Discussed cuff placement and pt agreeable to recheck BP at this time with updated reading of 123/75. Reminded pt to watch her sodium intake over the holiday weekend and she verbalizes understanding. Plan/Follow Up: Will continue to review, monitor and address alerts with follow up based on severity of symptoms and risk factors.

## 2023-05-30 ENCOUNTER — OFFICE VISIT (OUTPATIENT)
Dept: BEHAVIORAL/MENTAL HEALTH CLINIC | Age: 63
End: 2023-05-30
Payer: MEDICARE

## 2023-05-30 DIAGNOSIS — F43.10 POST TRAUMATIC STRESS DISORDER: Primary | ICD-10-CM

## 2023-05-30 PROCEDURE — 1123F ACP DISCUSS/DSCN MKR DOCD: CPT | Performed by: SOCIAL WORKER

## 2023-05-30 PROCEDURE — 90834 PSYTX W PT 45 MINUTES: CPT | Performed by: SOCIAL WORKER

## 2023-05-30 NOTE — PROGRESS NOTES
ADULT BEHAVIORAL HEALTH FOLLOW UP  MARIBELL Sweeney  Licensed Independent        Visit Date: 5/30/2023   Time of appointment:  [id number]   Time spent with Patient: 40 minutes. This is patient's  25th  appointment. Reason for Consult:  Depression and Stress     Referring Provider/PCP:    No ref. provider found  BRAULIO Gonzalez CNP      Pt provided informed consent for the behavioral health program. Discussed with patient model of service to include the limits of confidentiality (i.e. abuse reporting, suicide intervention, etc.) and short-term intervention focused approach. Pt indicated understanding. Rita Bashir is a 70 y.o. female who presents for follow up of stress. She presents as calm and pleasant, doing much better since previous appointment. She acknowledges increased suicidal thoughts last week, had a good visit with her grandson and he helped her through this. She states she is trying to move forward with her life, denies current suicidal thoughts. She states she attended a group event at her apartment complex, this went well, she is will to continue this to develop new relationships. She was encouraged to allow space with her daughters for everyone to heal, and for her to find people to remind her she is a loveable person. Previous Recommendations: Nikolay Starkey will focus on people that make her feel better, not worse. She will follow up with Joe Pradhan in 2 weeks. MENTAL STATUS EXAM  Mood was within normal limits with calm affect. Suicidal ideation was denied. Homicidal ideation was denied. Hygiene was good . Dress was appropriate. Behavior was Within Normal Limits with No observation or self-report of difficulties ambulating. Some tardive dyskensia noted in mouth, she reports involuntary movements of mouth and tongue because has been unable to refill Cogentin, encouraged to call [facility] for this issue. Attitude was Cooperative.   Eye-contact was

## 2023-06-05 ENCOUNTER — CARE COORDINATION (OUTPATIENT)
Dept: CARE COORDINATION | Age: 63
End: 2023-06-05

## 2023-06-05 NOTE — CARE COORDINATION
Writer called Patient to do a follow up Social service call with her to see how things were going with Patient; She called writer back. Patient said that everything was going good except for some Medical Bills that are not being covered and Patient said that she can not figure out how to address this situation. Writer agreed to go out to Patient's home on 06/12/2023 at 1PM to assist her with this situation.

## 2023-06-06 DIAGNOSIS — K59.01 SLOW TRANSIT CONSTIPATION: ICD-10-CM

## 2023-06-07 RX ORDER — SENNOSIDES 8.6 MG
1 TABLET ORAL 2 TIMES DAILY
Qty: 180 TABLET | Refills: 1 | Status: SHIPPED | OUTPATIENT
Start: 2023-06-07 | End: 2024-06-06

## 2023-06-20 ENCOUNTER — TELEPHONE (OUTPATIENT)
Dept: FAMILY MEDICINE CLINIC | Age: 63
End: 2023-06-20

## 2023-06-22 ENCOUNTER — CARE COORDINATION (OUTPATIENT)
Dept: CARE COORDINATION | Age: 63
End: 2023-06-22

## 2023-06-22 NOTE — CARE COORDINATION
Remote Alert Monitoring Note      Date/Time:  2023 8:28 AM  Patient Current Location: Home: [address] contacted patient by telephone regarding red alert received for weight increase (195.8lb). 4lb weight increase overnight and 6.2lb weight increase in the past week. Verified patients name and  as identifiers. Challenges to be reviewed by the provider   Additional needs identified to be addressed with provider Yes    FYI  Patient has had a 4lb weight increase overnight and a 6.2lb weight increase over the past week. She states she is asymptomatic denying any swelling or SOB. Will continue to monitor. Background: High Blood-Pressure, COPD, Diabetes    Refer to 911 immediately if:  Patient unresponsive or unable to provide history  Change in cognition or sudden confusion  Patient unable to respond in complete sentences  Intense chest pain/tightness  Any concern for any clinical emergency  Red Alert: Provider response time of 1 hr required for any red alert requiring intervention  Yellow Alert: Provider response time of 3hr required for any escalated yellow alert    Weight Scale Triage  Was your weight obtained upon rising/waking today? yes   Was your weight obtained after voiding and/or use of the bathroom today? yes   Did you weigh yourself in the same amount of clothing today, compared to how you typically do? yes   Was the scale bumped or moved prior to today's weight? no   Is your scale on a flat/hard surface? yes   Did you obtain your weight with shoes on? no   If yes, is this something you normally do during your daily weights? yes   Were you standing up straight on the scale today? yes   Were you leaning on anything while obtaining your weight today? no       Weight Triage  Are you weighing any different than you did yesterday? (time of day, clothes and shoes on or off, etc)? no   Do you have any shortness of breath?  no   Do you have any swelling

## 2023-06-23 ENCOUNTER — CARE COORDINATION (OUTPATIENT)
Dept: CARE COORDINATION | Age: 63
End: 2023-06-23

## 2023-06-23 ENCOUNTER — TELEPHONE (OUTPATIENT)
Dept: FAMILY MEDICINE CLINIC | Age: 63
End: 2023-06-23

## 2023-06-23 NOTE — TELEPHONE ENCOUNTER
If patient starts having symptoms of shortness of breath or chest pain, patient should be advise to be evaluated in ER.

## 2023-06-23 NOTE — CARE COORDINATION
Writer went to Patient's home to assist her with multiple issues pertaining to her Insurance and [facility] [address]. Writer was able to assist Patient with calling her [employer] and getting those issues straightened out and Patient was locked out of her Mychart as someone who has her information had changed the Log In information and Patient could not Get into her account,    Writer helped assist Patient with this task as well.

## 2023-06-26 ENCOUNTER — CARE COORDINATION (OUTPATIENT)
Dept: CARE COORDINATION | Age: 63
End: 2023-06-26

## 2023-06-27 ENCOUNTER — TELEPHONE (OUTPATIENT)
Dept: FAMILY MEDICINE CLINIC | Age: 63
End: 2023-06-27

## 2023-06-27 DIAGNOSIS — N18.30 CONTROLLED TYPE 2 DIABETES MELLITUS WITH STAGE 3 CHRONIC KIDNEY DISEASE, WITHOUT LONG-TERM CURRENT USE OF INSULIN (HCC): ICD-10-CM

## 2023-06-27 DIAGNOSIS — E11.22 CONTROLLED TYPE 2 DIABETES MELLITUS WITH STAGE 3 CHRONIC KIDNEY DISEASE, WITHOUT LONG-TERM CURRENT USE OF INSULIN (HCC): ICD-10-CM

## 2023-06-28 RX ORDER — DAPAGLIFLOZIN 5 MG/1
TABLET, FILM COATED ORAL
Qty: 90 TABLET | Refills: 1 | Status: SHIPPED | OUTPATIENT
Start: 2023-06-28 | End: 2024-06-28

## 2023-06-29 DIAGNOSIS — I10 ESSENTIAL HYPERTENSION: ICD-10-CM

## 2023-06-29 DIAGNOSIS — J41.1 MUCOPURULENT CHRONIC BRONCHITIS (HCC): ICD-10-CM

## 2023-06-29 RX ORDER — ALBUTEROL SULFATE 90 UG/1
2 AEROSOL, METERED RESPIRATORY (INHALATION) 4 TIMES DAILY
Qty: 8.5 G | Refills: 1 | Status: SHIPPED | OUTPATIENT
Start: 2023-06-29

## 2023-06-30 RX ORDER — ISOSORBIDE MONONITRATE 30 MG/1
30 TABLET, EXTENDED RELEASE ORAL DAILY
Qty: 90 TABLET | Refills: 1 | Status: SHIPPED | OUTPATIENT
Start: 2023-06-30 | End: 2023-12-27

## 2023-07-03 ENCOUNTER — OFFICE VISIT (OUTPATIENT)
Dept: FAMILY MEDICINE CLINIC | Age: 63
End: 2023-07-03
Payer: MEDICARE

## 2023-07-03 VITALS — BODY MASS INDEX: 36.25 KG/M2 | HEART RATE: 90 BPM | WEIGHT: 197 LBS | OXYGEN SATURATION: 97 % | HEIGHT: 62 IN

## 2023-07-03 DIAGNOSIS — E11.8 CONTROLLED TYPE 2 DIABETES MELLITUS WITH COMPLICATION, WITHOUT LONG-TERM CURRENT USE OF INSULIN (HCC): ICD-10-CM

## 2023-07-03 DIAGNOSIS — E11.22 CONTROLLED TYPE 2 DIABETES MELLITUS WITH STAGE 3 CHRONIC KIDNEY DISEASE, WITHOUT LONG-TERM CURRENT USE OF INSULIN (HCC): Primary | ICD-10-CM

## 2023-07-03 DIAGNOSIS — G47.33 OSA (OBSTRUCTIVE SLEEP APNEA): ICD-10-CM

## 2023-07-03 DIAGNOSIS — J41.8 MIXED SIMPLE AND MUCOPURULENT CHRONIC BRONCHITIS (HCC): ICD-10-CM

## 2023-07-03 DIAGNOSIS — N18.30 CONTROLLED TYPE 2 DIABETES MELLITUS WITH STAGE 3 CHRONIC KIDNEY DISEASE, WITHOUT LONG-TERM CURRENT USE OF INSULIN (HCC): Primary | ICD-10-CM

## 2023-07-03 DIAGNOSIS — F33.3 MAJOR DEPRESSIVE DISORDER, RECURRENT EPISODE, SEVERE, WITH PSYCHOTIC BEHAVIOR (HCC): Chronic | ICD-10-CM

## 2023-07-03 PROCEDURE — 99215 OFFICE O/P EST HI 40 MIN: CPT | Performed by: NURSE PRACTITIONER

## 2023-07-03 PROCEDURE — 3044F HG A1C LEVEL LT 7.0%: CPT | Performed by: NURSE PRACTITIONER

## 2023-07-03 PROCEDURE — 1123F ACP DISCUSS/DSCN MKR DOCD: CPT | Performed by: NURSE PRACTITIONER

## 2023-07-03 NOTE — TELEPHONE ENCOUNTER
Called the number listed in this thread today and was transferred 4 times and then given info to call [employer] directly. Spoke with sarahi at [facility]. She is going to be faxing over an appeals form to fill out and fax back to [phone]. Can also email the appeals form to [email or web address]    I will note that when calling [employer], I was transferred 2 times because I kept getting told that patient has commercial insurance when I assured them it was a dual plan. They were unable to locate her with her Medcare ID, but did locate her using her Medicaid ID.

## 2023-07-06 ENCOUNTER — TELEPHONE (OUTPATIENT)
Dept: FAMILY MEDICINE CLINIC | Age: 63
End: 2023-07-06

## 2023-07-07 VITALS
SYSTOLIC BLOOD PRESSURE: 163 MMHG | HEART RATE: 70 BPM | OXYGEN SATURATION: 94 % | BODY MASS INDEX: 36 KG/M2 | DIASTOLIC BLOOD PRESSURE: 90 MMHG | WEIGHT: 196.8 LBS

## 2023-07-11 ENCOUNTER — NURSE ONLY (OUTPATIENT)
Dept: FAMILY MEDICINE CLINIC | Age: 63
End: 2023-07-11
Payer: MEDICARE

## 2023-07-11 ENCOUNTER — OFFICE VISIT (OUTPATIENT)
Dept: BEHAVIORAL/MENTAL HEALTH CLINIC | Age: 63
End: 2023-07-11
Payer: MEDICARE

## 2023-07-11 VITALS — HEART RATE: 76 BPM | TEMPERATURE: 98.8 F | OXYGEN SATURATION: 98 %

## 2023-07-11 DIAGNOSIS — M85.852 OSTEOPENIA OF NECKS OF BOTH FEMURS: Primary | ICD-10-CM

## 2023-07-11 DIAGNOSIS — F43.10 POST TRAUMATIC STRESS DISORDER: Primary | ICD-10-CM

## 2023-07-11 DIAGNOSIS — M85.851 OSTEOPENIA OF NECKS OF BOTH FEMURS: Primary | ICD-10-CM

## 2023-07-11 PROCEDURE — 1123F ACP DISCUSS/DSCN MKR DOCD: CPT | Performed by: SOCIAL WORKER

## 2023-07-11 PROCEDURE — 96372 THER/PROPH/DIAG INJ SC/IM: CPT | Performed by: NURSE PRACTITIONER

## 2023-07-11 PROCEDURE — 90832 PSYTX W PT 30 MINUTES: CPT | Performed by: SOCIAL WORKER

## 2023-07-11 NOTE — PROGRESS NOTES
Patient presents to the office today for her prolia injection. Patient tolerated the injection well with no adverse affects.

## 2023-07-11 NOTE — PROGRESS NOTES
ADULT BEHAVIORAL HEALTH FOLLOW UP  MARIBELL Hartmann  Licensed Independent        Visit Date: 7/11/2023   Time of appointment:  [phone]   Time spent with Patient: 20 minutes. This is patient's  27th  appointment. Reason for Consult:  Trauma and Anxiety     Referring Provider/PCP:    No ref. provider found  BRAULIO Henderson CNP      Pt provided informed consent for the behavioral health program. Discussed with patient model of service to include the limits of confidentiality (i.e. abuse reporting, suicide intervention, etc.) and short-term intervention focused approach. Pt indicated understanding. Pamela Freed is a 70 y.o. female who presents for follow up of trauma and anxiety. She reports doing well, working to focus on her friendships and health. She reviewed continued stress with daughters, being called \"evil\" and blamed for their health issues. She was encouraged to remember this is their issue not hers, to let go of trying to change them. She reports plan to follow up with [facility] psychiatry in a few weeks and will set up therapy there as well. Previous Recommendations: Arlet Veras will continue healthy socialization with others. She will follow up with Cheryl Germain in 1 month. MENTAL STATUS EXAM  Mood was anxious with anxious affect. Suicidal ideation was denied. Homicidal ideation was denied. Hygiene was good . Dress was appropriate. Behavior was Within Normal Limits with No observation or self-report of difficulties ambulating. Attitude was Cooperative. Eye-contact was good. Speech: rate - WNL, rhythm - WNL, volume - WNL. Verbalizations were coherent. Thought processes were intact and goal-oriented without evidence of delusions, hallucinations, obsessions, or christel; with moderate cognitive distortions. Associations were characterized by intact cognitive processes.   Pt was oriented to person, place, time, and general circumstances;  recent:  good and remote:

## 2023-07-16 ASSESSMENT — ENCOUNTER SYMPTOMS
ABDOMINAL PAIN: 0
ABDOMINAL DISTENTION: 0
RECTAL PAIN: 0
SORE THROAT: 0
RHINORRHEA: 0
DIARRHEA: 0
COUGH: 0
CONSTIPATION: 0
SHORTNESS OF BREATH: 0
BACK PAIN: 1
TROUBLE SWALLOWING: 0
CHEST TIGHTNESS: 1
WHEEZING: 0
SINUS PRESSURE: 0

## 2023-07-17 ENCOUNTER — CARE COORDINATION (OUTPATIENT)
Dept: CARE COORDINATION | Age: 63
End: 2023-07-17

## 2023-07-17 NOTE — CARE COORDINATION
Ambulatory Care Coordination Note  2023    Patient Current Location:  Home: [place]     ACM contacted the patient by telephone. Verified name and  with patient as identifiers. Provided introduction to self, and explanation of the ACM role. Challenges to be reviewed by the provider   Additional needs identified to be addressed with provider: No  none               Method of communication with provider: none. ACM: Celio Jerez, RN    Spoke with pt who said she fell on Friday after getting dizzy she is at [facility] she thinks she will be discharged home today. She does have an apt with her pcp for tomorrow she will keep this apt. They will resume home care at the time of discharge. She did call and leave a message to cancel her mri for today     1) acp done   2) sdoh done   3) med review done   4) cardiology Dr Marco England due [facility]   5) podiatry Dr Zoraida Zimmerman due 2023  6) nephrology Dr Tonny Alexander October   7) neurosurgery dr Jesus Cruz   8) eye yearly exam due in   9)    10) pcp   11) pill packing through [address]   12)  referral for passport, life alert and food insecurities done   13) home care referral for PT / OT [facility]    Offered patient enrollment in the Remote Patient Monitoring (RPM) program for in-home monitoring: Yes, patient already enrolled. Lab Results       None            Care Coordination Interventions    Referral from Primary Care Provider: No  Suggested Interventions and Community Resources  Diabetes Education: Declined  Fall Risk Prevention:  In Process  Disease Specific Clinic: Completed  Home Health Services: Completed  Physical Therapy: Completed  Social Work: Completed  Zone Management Tools: Completed          Goals Addressed                   This Visit's Progress     Reduce Falls    On track     I will reduce my risk of falls by the following: Remove rugs or use non slip rugs  Install grab bars in bathroom  Use walking aids like cane or walker  Follow through

## 2023-07-18 ENCOUNTER — OFFICE VISIT (OUTPATIENT)
Dept: FAMILY MEDICINE CLINIC | Age: 63
End: 2023-07-18
Payer: MEDICARE

## 2023-07-18 VITALS
WEIGHT: 193.8 LBS | TEMPERATURE: 97.4 F | BODY MASS INDEX: 35.45 KG/M2 | HEART RATE: 81 BPM | SYSTOLIC BLOOD PRESSURE: 130 MMHG | OXYGEN SATURATION: 98 % | RESPIRATION RATE: 22 BRPM | DIASTOLIC BLOOD PRESSURE: 90 MMHG

## 2023-07-18 DIAGNOSIS — R55 SYNCOPE, UNSPECIFIED SYNCOPE TYPE: Primary | ICD-10-CM

## 2023-07-18 DIAGNOSIS — D18.02 FAMILIAL CAVERNOUS CEREBRAL ANGIOMA (HCC): ICD-10-CM

## 2023-07-18 DIAGNOSIS — R68.89 RECENT CHANGE IN WEIGHT: ICD-10-CM

## 2023-07-18 PROCEDURE — 1123F ACP DISCUSS/DSCN MKR DOCD: CPT | Performed by: NURSE PRACTITIONER

## 2023-07-18 PROCEDURE — 3080F DIAST BP >= 90 MM HG: CPT | Performed by: NURSE PRACTITIONER

## 2023-07-18 PROCEDURE — 99214 OFFICE O/P EST MOD 30 MIN: CPT | Performed by: NURSE PRACTITIONER

## 2023-07-18 PROCEDURE — 3075F SYST BP GE 130 - 139MM HG: CPT | Performed by: NURSE PRACTITIONER

## 2023-07-18 RX ORDER — FEBUXOSTAT 40 MG/1
40 TABLET, FILM COATED ORAL DAILY
COMMUNITY

## 2023-07-18 RX ORDER — ACETAMINOPHEN 500 MG
1000 TABLET ORAL EVERY 6 HOURS PRN
COMMUNITY
Start: 2023-07-17

## 2023-07-18 RX ORDER — FUROSEMIDE 20 MG/1
20 TABLET ORAL 2 TIMES DAILY
COMMUNITY

## 2023-07-18 RX ORDER — GLIMEPIRIDE 4 MG/1
4 TABLET ORAL 2 TIMES DAILY
COMMUNITY

## 2023-07-18 NOTE — PROGRESS NOTES
appt and scheduled MRI brain     Also scheduled with cardiology today     Patient states she was carrying down steps carrying her walker? No ramp  Lost her footing  Denies LOC     7/14/2023 ER -   HPI:  Olivia Tejeda. Tito Ledesma is a 70year old female with past medical history of hypertension, type 2 diabetes, history of TIA, von willebrand's disease, history of DVT/PE on eliquis. Patient presented to ED for a fall on eliquis. Patient fell this afternoon while carrying a table out of her home. Patient endorses dizziness prior to her fall, denies loss of consciousness however did strike her head during the fall. Patient was down 30 minutes post fall. In the ED patient had CT head and C-spine. CT head was notable for a hyperdense focus within the left frontal lobe superior to left lateral ventricle concerning for intraparenchymal bleed. CT c-spine, XR right elbow, XR chest, and XR pelvis all negative for acute traumatic injuries. SICU was consulted for close monitoring of neurological status. Review of Systems  Review of Systems   Constitutional:  Positive for unexpected weight change (gain?). Negative for activity change, appetite change and fatigue. Respiratory:  Negative for chest tightness and shortness of breath. Cardiovascular:  Negative for palpitations. Gastrointestinal:  Negative for abdominal distention, abdominal pain, constipation and diarrhea. Neurological:  Positive for dizziness and syncope. Recent fall    Psychiatric/Behavioral:  Positive for confusion (worsening memory), decreased concentration and sleep disturbance. Negative for agitation, behavioral problems, dysphoric mood, self-injury and suicidal ideas. The patient is nervous/anxious. Physical exam   Physical Exam  Vitals and nursing note reviewed. Constitutional:       General: She is not in acute distress. Appearance: Normal appearance. She is well-developed and well-groomed. She is obese.  She is not ill-appearing or

## 2023-07-19 ENCOUNTER — CARE COORDINATION (OUTPATIENT)
Dept: CARE COORDINATION | Age: 63
End: 2023-07-19

## 2023-07-19 NOTE — CARE COORDINATION
Writer called Patient to do a follow up Social service call with her to discuss what we had talked about in our last conversation; Patient reported that she had been in the Hospital and did not do the Testing that they wanted her to do as a result. Writer told Patient to get some rest and I will call her on this upcoming Friday to see how she is feeling as We have to switch Patient over to another Insurance as she has [place]. Writer will help her to do that and address her concern about not being able to afford to have the Testing done financially once we meet up when she is feeling up to it.

## 2023-07-24 ENCOUNTER — CARE COORDINATION (OUTPATIENT)
Dept: CARE COORDINATION | Age: 63
End: 2023-07-24

## 2023-07-24 NOTE — CARE COORDINATION
Remote Alert Monitoring Note      Date/Time:  2023 10:24 AM  Patient Current Location: Home: [facility] Bernabe Patel [address] [place], [place] [address]    LPN contacted patient by telephone regarding red alert received for weight increase (198.4). Verified patients name and  as identifiers. Rpm alert to be reviewed by the provider    Red Alert    Pt is asymptomatic and in no apparent distress, speaking in full sentences. She denies any new or worsening swelling and states her breathing is baseline. She states she has been snacking more due to anxiety. She is currently fully dressed and has eaten breakfast since weighing this am.  She will not be able to reweigh due to this. Will continue to monitor and update as needed. I have attached metric history. Background:  High Blood-Pressure, COPD, Diabetes    Refer to 911 immediately if:  Patient unresponsive or unable to provide history  Change in cognition or sudden confusion  Patient unable to respond in complete sentences  Intense chest pain/tightness  Any concern for any clinical emergency  Red Alert: Provider response time of 1 hr required for any red alert requiring intervention  Yellow Alert: Provider response time of 3hr required for any escalated yellow alert    Weight Scale Triage  Was your weight obtained upon rising/waking today? yes   Was your weight obtained after voiding and/or use of the bathroom today? yes   Did you weigh yourself in the same amount of clothing today, compared to how you typically do? yes   Was the scale bumped or moved prior to today's weight? no   Is your scale on a flat/hard surface? yes   Did you obtain your weight with shoes on? no   If yes, is this something you normally do during your daily weights? yes   Were you standing up straight on the scale today? yes   Were you leaning on anything while obtaining your weight today? no     Have you taken your medications as instructed by your doctor today?  Yes   Weight

## 2023-07-25 ENCOUNTER — CARE COORDINATION (OUTPATIENT)
Dept: CARE COORDINATION | Age: 63
End: 2023-07-25

## 2023-07-25 DIAGNOSIS — M25.561 PAIN IN BOTH KNEES, UNSPECIFIED CHRONICITY: Primary | ICD-10-CM

## 2023-07-25 DIAGNOSIS — M25.562 PAIN IN BOTH KNEES, UNSPECIFIED CHRONICITY: Primary | ICD-10-CM

## 2023-07-25 NOTE — CARE COORDINATION
Writer is meeting with Patient on today to go over her options as she has [place] and she has to switch her MEDICAID portion of [employer] [place]. Writer is helping her to do that on today.

## 2023-07-25 NOTE — CARE COORDINATION
and in no apparent distress, speaking in full sentences. She denies any new or worsening swelling and states her breathing is baseline. She states she has been snacking more due to anxiety. Weight is unchanged from yesterday. Writer noted Lasix 20mg bid on med list.  Patient states that was discontinued over a year ago. FYI provided to PCP yesterday. Will update ACM again today. Plan/Follow Up: Will continue to review, monitor and address alerts with follow up based on severity of symptoms and risk factors.     Yeimy Crawford LPN  [facility]/ CTN/ Remote Patient monitoring  [phone]

## 2023-07-26 ENCOUNTER — CARE COORDINATION (OUTPATIENT)
Dept: CARE COORDINATION | Age: 63
End: 2023-07-26

## 2023-07-26 ENCOUNTER — HOSPITAL ENCOUNTER (OUTPATIENT)
Dept: MRI IMAGING | Age: 63
Discharge: HOME | End: 2023-07-28
Attending: NEUROLOGICAL SURGERY
Payer: MEDICARE

## 2023-07-26 ENCOUNTER — OFFICE VISIT (OUTPATIENT)
Dept: ORTHOPEDIC SURGERY | Age: 63
End: 2023-07-26

## 2023-07-26 VITALS — HEIGHT: 62 IN | OXYGEN SATURATION: 100 % | RESPIRATION RATE: 16 BRPM | BODY MASS INDEX: 36.25 KG/M2 | WEIGHT: 197 LBS

## 2023-07-26 DIAGNOSIS — M17.11 PATELLOFEMORAL ARTHRITIS OF RIGHT KNEE: Primary | ICD-10-CM

## 2023-07-26 DIAGNOSIS — M17.12 PATELLOFEMORAL ARTHRITIS OF LEFT KNEE: ICD-10-CM

## 2023-07-26 DIAGNOSIS — E78.5 HYPERLIPIDEMIA, UNSPECIFIED HYPERLIPIDEMIA TYPE: ICD-10-CM

## 2023-07-26 DIAGNOSIS — D18.00 CAVERNOMA: ICD-10-CM

## 2023-07-26 LAB — CREAT BLD-MCNC: 1.1 MG/DL (ref 0.6–1.4)

## 2023-07-26 PROCEDURE — 82565 ASSAY OF CREATININE: CPT

## 2023-07-26 PROCEDURE — 2580000003 HC RX 258: Performed by: NEUROLOGICAL SURGERY

## 2023-07-26 PROCEDURE — 70553 MRI BRAIN STEM W/O & W/DYE: CPT

## 2023-07-26 PROCEDURE — A9579 GAD-BASE MR CONTRAST NOS,1ML: HCPCS | Performed by: NEUROLOGICAL SURGERY

## 2023-07-26 PROCEDURE — 6360000004 HC RX CONTRAST MEDICATION: Performed by: NEUROLOGICAL SURGERY

## 2023-07-26 RX ORDER — METHYLPREDNISOLONE ACETATE 40 MG/ML
40 INJECTION, SUSPENSION INTRA-ARTICULAR; INTRALESIONAL; INTRAMUSCULAR; SOFT TISSUE ONCE
Status: COMPLETED | OUTPATIENT
Start: 2023-07-26 | End: 2023-07-26

## 2023-07-26 RX ORDER — ATORVASTATIN CALCIUM 40 MG/1
40 TABLET, FILM COATED ORAL DAILY
Qty: 90 TABLET | Refills: 1 | Status: SHIPPED | OUTPATIENT
Start: 2023-07-26 | End: 2024-01-22

## 2023-07-26 RX ORDER — SODIUM CHLORIDE 0.9 % (FLUSH) 0.9 %
10 SYRINGE (ML) INJECTION PRN
Status: DISCONTINUED | OUTPATIENT
Start: 2023-07-26 | End: 2023-07-29 | Stop reason: HOSPADM

## 2023-07-26 RX ORDER — LIDOCAINE HYDROCHLORIDE 10 MG/ML
10 INJECTION, SOLUTION INFILTRATION; PERINEURAL ONCE
Status: COMPLETED | OUTPATIENT
Start: 2023-07-26 | End: 2023-07-26

## 2023-07-26 RX ADMIN — METHYLPREDNISOLONE ACETATE 40 MG: 40 INJECTION, SUSPENSION INTRA-ARTICULAR; INTRALESIONAL; INTRAMUSCULAR; SOFT TISSUE at 11:09

## 2023-07-26 RX ADMIN — LIDOCAINE HYDROCHLORIDE 10 ML: 10 INJECTION, SOLUTION INFILTRATION; PERINEURAL at 11:08

## 2023-07-26 RX ADMIN — GADOTERIDOL 17 ML: 279.3 INJECTION, SOLUTION INTRAVENOUS at 15:34

## 2023-07-26 RX ADMIN — SODIUM CHLORIDE, PRESERVATIVE FREE 10 ML: 5 INJECTION INTRAVENOUS at 15:14

## 2023-07-26 NOTE — TELEPHONE ENCOUNTER
Abnormal mammogram     Cerebrovascular accident (CVA) ([address])     Diabetic nephropathy (HCC)     Slow transit constipation     History of gout     Familial cavernous cerebral angioma (HCC)     Meniscus, medial, anterior horn derangement, right     Post traumatic stress disorder     Patellofemoral arthritis of right knee

## 2023-07-26 NOTE — PROGRESS NOTES
Chief Complaint   Patient presents with    Knee Pain     Bilateral -- LI: R Knee 1/23/23   This patient who has previously been treated for left knee patellofemoral arthritis is returning here now because she is having pain in both the knees. The pain is located in the peripatellar region anteriorly and across patellar tendon. There is no history of locking or instability of the knees. Patient had corticosteroid injection in the left knee in February and she says that it worked very well for her. She does have difficulty negotiating the stairs coming down is worse than going up. The other problem she has is she has had frequent falls and loses her balance and is being investigated for that. Examination: In standing her alignment appears satisfactory. She does have excellent motion in both the knees but marked patellofemoral grating. No instability. There does not appear to be any effusion. X-rays: I again reviewed the x-rays which show lateral subluxation of both the patella with significant patellofemoral arthritis. Diagnosis: Bilateral patellofemoral arthritis. Treatment: Under sterile condition I injected both knees with 40 mg Depo-Medrol and 5 cc of 1% plain lidocaine into each of the knees through anterolateral portal without any complications. I injected both the knees because her sugar was only 103 this morning. She is going to check her sugar as soon as she gets home. I have instructed her in elevation of the sugar after the corticosteroid injection. I will see her again as necessary. I have told her that if the symptoms are not resolved within 3 weeks then she should return here.

## 2023-07-26 NOTE — CARE COORDINATION
Remote Alert Monitoring Note      Date/Time:  2023 8:57 AM  Patient Current Location: Home: [address] [place] [address]    LPN contacted patient by telephone regarding red alert received for blood pressure reading (156/101). Verified patients name and  as identifiers. Rpm alert to be reviewed by the provider                         Background:  High Blood-Pressure, COPD, Diabetes    Refer to 911 immediately if:  Patient unresponsive or unable to provide history  Change in cognition or sudden confusion  Patient unable to respond in complete sentences  Intense chest pain/tightness  Any concern for any clinical emergency  Red Alert: Provider response time of 1 hr required for any red alert requiring intervention  Yellow Alert: Provider response time of 3hr required for any escalated yellow alert    BP Triage  Are you having any Chest Pain? no   Are you having any Shortness of Breath? no   Do you have a headache or have any vision changes? no   Are you having any numbness or tingling? no   Are you having any other health concerns or issues? no     Have you taken your medications as instructed by your doctor today? Yes       Clinical Interventions: Reviewed and followed up on alerts and treatments-. Pt is asymptomatic and in no apparent distress, speaking in full sentences. Patient states she is feeling fine and was able to recheck while on the call and got a normal reading of 125/87. No further action necessary at this time. Plan/Follow Up: Will continue to review, monitor and address alerts with follow up based on severity of symptoms and risk factors.     Nicol Yang LPN  [facility]/ CTN/ Remote Patient monitoring  [phone]

## 2023-07-27 ENCOUNTER — TELEPHONE (OUTPATIENT)
Dept: FAMILY MEDICINE CLINIC | Age: 63
End: 2023-07-27

## 2023-07-27 DIAGNOSIS — I63.9 CEREBROVASCULAR ACCIDENT (CVA), UNSPECIFIED MECHANISM (HCC): Primary | ICD-10-CM

## 2023-07-27 DIAGNOSIS — D18.02 FAMILIAL CAVERNOUS CEREBRAL ANGIOMA (HCC): ICD-10-CM

## 2023-07-27 NOTE — TELEPHONE ENCOUNTER
Patient would like to acquire more information about dementia or is there is testing to detect early dementia. Please advise.

## 2023-07-27 NOTE — TELEPHONE ENCOUNTER
[facility]  called and will work with patient 2 times a week for 2 weeks and then 1 time a week for one week.

## 2023-07-28 NOTE — TELEPHONE ENCOUNTER
I placed referral to [address] [facility] [address]   [address] [facility], [address] [facility]   Referrals may be faxed to [phone]

## 2023-07-31 ENCOUNTER — CARE COORDINATION (OUTPATIENT)
Dept: CARE COORDINATION | Age: 63
End: 2023-07-31

## 2023-07-31 PROBLEM — R55 SYNCOPE: Status: ACTIVE | Noted: 2023-07-18

## 2023-07-31 ASSESSMENT — ENCOUNTER SYMPTOMS
ABDOMINAL DISTENTION: 0
ABDOMINAL PAIN: 0
DIARRHEA: 0
SHORTNESS OF BREATH: 0
CONSTIPATION: 0
CHEST TIGHTNESS: 0

## 2023-07-31 NOTE — CARE COORDINATION
slip rugs  Install grab bars in bathroom  Use walking aids like cane or walker  Follow through on orders for PT    Barriers: lack of support and overwhelmed by complexity of regimen  Plan for overcoming my barriers: care coordination   Confidence: 9/10  Anticipated Goal Completion Date: 5/28/23              Future Appointments   Date Time Provider [address]   8/2/2023  3:00 PM Cookie Hernandez MD Neuro [facility] -   8/7/2023  1:45 PM Radha Nicholson DPM [facility]   8/8/2023  4:00 PM MARIBELL Cabrera psyc [facility]   8/15/2023  2:00 PM [facility]   9/12/2023  1:00 PM BRAULIO Mathur - CNP [facility]   9/25/2023  9:00 AM Veta Hamman, DO [facility]   10/10/2023  1:00 PM BRAULIO Mathur CNP [facility]   10/23/2023 10:30 AM Gisela Galdamez MD [facility]   11/7/2023  1:00 PM BRAULIO Mathur - CNP [facility]   12/5/2023  1:00 PM BRAULIO Mathru - CNP [facility] N[place] Mckenzie

## 2023-08-01 NOTE — TELEPHONE ENCOUNTER
[facility] referral faxed and patient notified. She wanted to give PCP an update that she finally had normal bowel movements over the weekend. Would like 8/15/23 appointment alone with provider if daughters come with her- can provider ask to speak with patient alone?

## 2023-08-07 ENCOUNTER — OFFICE VISIT (OUTPATIENT)
Dept: PODIATRY | Age: 63
End: 2023-08-07
Payer: MEDICARE

## 2023-08-07 VITALS — WEIGHT: 198 LBS | HEIGHT: 62 IN | BODY MASS INDEX: 36.44 KG/M2

## 2023-08-07 DIAGNOSIS — B35.1 DERMATOPHYTOSIS OF NAIL: ICD-10-CM

## 2023-08-07 DIAGNOSIS — I73.9 PVD (PERIPHERAL VASCULAR DISEASE) (HCC): ICD-10-CM

## 2023-08-07 DIAGNOSIS — M79.672 PAIN IN BOTH FEET: ICD-10-CM

## 2023-08-07 DIAGNOSIS — E11.51 TYPE II DIABETES MELLITUS WITH PERIPHERAL CIRCULATORY DISORDER (HCC): Primary | ICD-10-CM

## 2023-08-07 DIAGNOSIS — M79.671 PAIN IN BOTH FEET: ICD-10-CM

## 2023-08-07 PROCEDURE — 99999 PR OFFICE/OUTPT VISIT,PROCEDURE ONLY: CPT | Performed by: PODIATRIST

## 2023-08-07 PROCEDURE — 11721 DEBRIDE NAIL 6 OR MORE: CPT | Performed by: PODIATRIST

## 2023-08-07 NOTE — PROGRESS NOTES
CPAP 1/19/2018    Osteoarthritis     Proteinuria     Pseudogout 3/1/2022    Pulmonary embolism (HCC)     Sleep apnea     c-pap. Doesn't use everynight    SOB (shortness of breath) 5/7/2020    Type 2 diabetes mellitus without complication (HCC)     Type II or unspecified type diabetes mellitus without mention of complication, not stated as uncontrolled     Von Willebrand disease ([address])        Allergies   Allergen Reactions    Bone Density [Calcium-Vit D-Arg-Inos-Silicon]      I called the patient today. Gave her the result of DEXA scan. Told her to make an appointment to be seen at the clinic. She said she would be able to do it for week after next. In the meantime did discuss with her taking vitamin D3 and calcium.      Current Outpatient Medications on File Prior to Visit   Medication Sig Dispense Refill    acetaminophen (TYLENOL) 500 MG tablet Take 2 tablets by mouth every 6 hours as needed      febuxostat (ULORIC) 40 MG TABS tablet Take 1 tablet by mouth daily      furosemide (LASIX) 20 MG tablet Take 1 tablet by mouth 2 times daily      glimepiride (AMARYL) 4 MG tablet Take 1 tablet by mouth in the morning and at bedtime      dapagliflozin (FARXIGA) 10 MG tablet Take 1 tablet by mouth every morning 90 tablet 1    isosorbide mononitrate (IMDUR) 30 MG extended release tablet Take 1 tablet by mouth daily 90 tablet 1    senna (SENOKOT) 8.6 MG TABS tablet Take 1 tablet by mouth 2 times daily 180 tablet 1    Lift Chair MISC by Does not apply route 1 each 0    denosumab (PROLIA) 60 MG/ML SOSY SC injection Inject 1 mL into the skin once for 1 dose Every 6 months - to be given in office 1 mL 0    lisinopril (PRINIVIL;ZESTRIL) 5 MG tablet TAKE 1 TABLET BY MOUTH DAILY 90 tablet 1    omeprazole (PRILOSEC) 20 MG delayed release capsule TAKE 1 CAPSULE BY MOUTH DAILY 90 capsule 1    linaCLOtide (LINZESS) 72 MCG CAPS capsule Take 1 capsule by mouth every morning (before breakfast) 90 capsule 1    tiotropium-olodaterol

## 2023-08-08 ENCOUNTER — OFFICE VISIT (OUTPATIENT)
Dept: BEHAVIORAL/MENTAL HEALTH CLINIC | Age: 63
End: 2023-08-08
Payer: MEDICARE

## 2023-08-08 ENCOUNTER — OFFICE VISIT (OUTPATIENT)
Dept: PRIMARY CARE CLINIC | Age: 63
End: 2023-08-08
Payer: MEDICARE

## 2023-08-08 VITALS
TEMPERATURE: 97.6 F | OXYGEN SATURATION: 97 % | BODY MASS INDEX: 36.58 KG/M2 | SYSTOLIC BLOOD PRESSURE: 142 MMHG | WEIGHT: 200 LBS | DIASTOLIC BLOOD PRESSURE: 88 MMHG | HEART RATE: 78 BPM

## 2023-08-08 DIAGNOSIS — M25.562 ACUTE PAIN OF LEFT KNEE: ICD-10-CM

## 2023-08-08 DIAGNOSIS — M79.661 RIGHT CALF PAIN: Primary | ICD-10-CM

## 2023-08-08 DIAGNOSIS — M79.89 PAIN AND SWELLING OF RIGHT LOWER LEG: ICD-10-CM

## 2023-08-08 DIAGNOSIS — F43.10 POST TRAUMATIC STRESS DISORDER: Primary | ICD-10-CM

## 2023-08-08 DIAGNOSIS — M79.661 PAIN AND SWELLING OF RIGHT LOWER LEG: ICD-10-CM

## 2023-08-08 PROCEDURE — 1123F ACP DISCUSS/DSCN MKR DOCD: CPT

## 2023-08-08 PROCEDURE — 90832 PSYTX W PT 30 MINUTES: CPT | Performed by: SOCIAL WORKER

## 2023-08-08 PROCEDURE — 3074F SYST BP LT 130 MM HG: CPT

## 2023-08-08 PROCEDURE — 99213 OFFICE O/P EST LOW 20 MIN: CPT

## 2023-08-08 PROCEDURE — 3079F DIAST BP 80-89 MM HG: CPT

## 2023-08-08 PROCEDURE — 1123F ACP DISCUSS/DSCN MKR DOCD: CPT | Performed by: SOCIAL WORKER

## 2023-08-08 ASSESSMENT — ENCOUNTER SYMPTOMS
BACK PAIN: 0
TROUBLE SWALLOWING: 0
COLOR CHANGE: 1
DIARRHEA: 0
CHEST TIGHTNESS: 0
CONSTIPATION: 0

## 2023-08-08 NOTE — PROGRESS NOTES
ADULT BEHAVIORAL HEALTH FOLLOW UP  MARIBELL Ortiz  Licensed Independent        Visit Date: 8/8/2023   Time of appointment:  [id number]   Time spent with Patient: 29 minutes. This is patient's  28th  appointment. Reason for Consult:  Stress and Anxiety     Referring Provider/PCP:    No ref. provider found  Edison January, APRN - CNP      Pt provided informed consent for the behavioral health program. Discussed with patient model of service to include the limits of confidentiality (i.e. abuse reporting, suicide intervention, etc.) and short-term intervention focused approach. Pt indicated understanding. Fanta An is a 70 y.o. female who presents for follow up of stress and anxiety. She reports feeling sad \"because of my life, but I will turn it around\". She states she has decided to step away from her daughters, after they tried to put her in assisted living. She reports she has realized they are not healthy for her, is instead focusing on herself and friends at her apartment complex. She states this is going well, they have been helpful since she fell recently. Overall she is in good spirits, hopeful for her future. She will follow up with [facility] for mental health treatment as writer is leaving practice. Previous Recommendations: Evelin Balderas will continue healthy self care. She will follow up with [facility] in 1 month. MENTAL STATUS EXAM  Mood was within normal limits with calm affect. Suicidal ideation was denied. Homicidal ideation was denied. Hygiene was good . Dress was appropriate. Behavior was Within Normal Limits with No observation or self-report of difficulties ambulating. Attitude was Cooperative. Eye-contact was good. Speech: rate - WNL, rhythm - WNL, volume - WNL. Verbalizations were coherent. Thought processes were intact and goal-oriented without evidence of delusions, hallucinations, obsessions, or christel; with moderate cognitive distortions.

## 2023-08-08 NOTE — PROGRESS NOTES
Total 450 mg. Caltrate 600+D Plus Minerals (CALTRATE) 600-800 MG-UNIT TABS tablet Take 1 tablet by mouth 2 times daily 60 tablet 11    tiZANidine (ZANAFLEX) 2 MG tablet Take 1 tablet by mouth every 6 hours as needed      traZODone (DESYREL) 50 MG tablet Take 1 tablet by mouth nightly      Multiple Vitamins-Minerals (THERAPEUTIC MULTIVITAMIN-MINERALS) tablet Take 1 tablet by mouth daily 30 tablet 11    apixaban (ELIQUIS) 5 MG TABS tablet Take 1 tablet by mouth 2 times daily 60 tablet 11    ipratropium-albuterol (DUONEB) 0.5-2.5 (3) MG/3ML SOLN nebulizer solution Take 3 mLs by nebulization every 6 hours as needed for Shortness of Breath 360 mL 0    risperiDONE (RISPERDAL) 0.5 MG tablet take 1 tablet by mouth at bedtime      colchicine (COLCRYS) 0.6 MG tablet Take 1 tablet by mouth 2 times daily as needed for Pain (gout pain) Can repeat with 0.6 mg in 1 hour, max 1.8mg daily for acute pain 30 tablet 0    febuxostat (ULORIC) 40 MG TABS tablet Take 1 tablet by mouth daily (Patient not taking: Reported on 8/8/2023)      furosemide (LASIX) 20 MG tablet Take 1 tablet by mouth 2 times daily (Patient not taking: Reported on 8/8/2023)      glimepiride (AMARYL) 4 MG tablet Take 1 tablet by mouth in the morning and at bedtime (Patient not taking: Reported on 8/8/2023)      albuterol sulfate HFA (PROVENTIL;VENTOLIN;PROAIR) 108 (90 Base) MCG/ACT inhaler Inhale 2 puffs into the lungs 4 times daily (Patient not taking: Reported on 8/2/2023) 8.5 g 1    Lift Chair MISC by Does not apply route 1 each 0    Blood Pressure Monitoring (BLOOD PRESSURE CUFF) MISC Use as directed by physician to check blood pressure. Please fit to patient.  1 each 0    Blood Pressure Monitoring KIT Use to check blood pressure daily and as needed (Patient not taking: Reported on 8/2/2023) 1 kit 0    Alcohol Swabs (ALCOHOL PREP) 70 % PADS Use twice daily and as needed to check blood sugars 120 each 3    Respiratory Therapy Supplies

## 2023-08-09 ENCOUNTER — CARE COORDINATION (OUTPATIENT)
Dept: CARE COORDINATION | Age: 63
End: 2023-08-09

## 2023-08-09 ENCOUNTER — TRANSCRIBE ORDERS (OUTPATIENT)
Dept: ADMINISTRATIVE | Age: 63
End: 2023-08-09

## 2023-08-09 ENCOUNTER — TELEPHONE (OUTPATIENT)
Dept: PRIMARY CARE CLINIC | Age: 63
End: 2023-08-09

## 2023-08-09 DIAGNOSIS — M79.661 BILATERAL CALF PAIN: ICD-10-CM

## 2023-08-09 DIAGNOSIS — M79.662 BILATERAL CALF PAIN: ICD-10-CM

## 2023-08-09 DIAGNOSIS — M25.462 EFFUSION OF LEFT KNEE: Primary | ICD-10-CM

## 2023-08-09 DIAGNOSIS — M79.661 RIGHT CALF PAIN: ICD-10-CM

## 2023-08-09 NOTE — CARE COORDINATION
Spoke with pt who  said she has called several people to get a ride to her us apt scheduled for today she is unable to find a [facility].  He will call pt and speak with her

## 2023-08-09 NOTE — CARE COORDINATION
Initial Anesthesia Post-op Note    Patient: Zach Levine  Procedure(s) Performed: RIGHT TOTAL KNEE ARTHROPLASTY - RIGHT  Anesthesia type: Spinal    Vitals Value Taken Time   Temp 36.8 °C (98.2 °F) 03/01/21 0817   Pulse 77 03/01/21 0817   Resp 16 03/01/21 0817   SpO2 94 % 03/01/21 0817   /72 03/01/21 0817         Patient Location: PACU Phase 1  Post-op Vital Signs:stable  Level of Consciousness: participates in exam, alert, awake and oriented  Respiratory Status: spontaneous ventilation, unassisted and nasal cannula  Cardiovascular stable  Hydration: euvolemic  Pain Management: well controlled  Handoff: Handoff to receiving clinician was performed and questions were answered  Vomiting: none   Nausea: None  Airway Patency:patent  Post-op Assessment: no complications, patient tolerated procedure well with no complications and no evidence of recall      No complications documented.   Remote Alert Monitoring Note  Rpm alert to be reviewed by the provider   red alert   weight (201.4 -- weight gain >5# in 7 days)   Additional needs to be addressed by PCP:  RPM red alert weight (201.4 -- weight gain 5.6# in 7 days). Pt reports she has had edema in bilateral LE since . States left is worse than right. States she does not take Lasix. Went to [facility] for left knee edema  and was directed to ED. [facility] ED provider ordered a venous duplex of LE, but she will need assistance setting up transportation. RN encouraged pt to reach out to [facility]. Pt speaking in full sentences, no distress noted. Denies CP, SOB. Please advise. Date/Time:  2023 10:23 AM  Patient Current Location: Home: [address] [place] [address] [address]  RN  contacted patient by telephone. Verified patients name and  as identifiers. Background: Pt enrolled in RPM r/t HTN, COPD, DM. Refer to 911 immediately if:  Patient unresponsive or unable to provide history  Change in cognition or sudden confusion  Patient unable to respond in complete sentences  Intense chest pain/tightness  Any concern for any clinical emergency  Red Alert: Provider response time of 1 hr required for any red alert requiring intervention  Yellow Alert: Provider response time of 3hr required for any escalated yellow alert  Weight Scale Triage  Was your weight obtained upon rising/waking today? Yes   Was your weight obtained after voiding and/or use of the bathroom today? yes   Did you weigh yourself in the same amount of clothing today, compared to how you typically do? yes   Was the scale bumped or moved prior to today's weight? no   Is your scale on a flat/hard surface? yes   Did you obtain your weight with shoes on? no   If yes, is this something you normally do during your daily weights? no   Were you standing up straight on the scale today?  yes   Were you leaning on anything while obtaining your weight today? no    Clinical Interventions:

## 2023-08-11 ENCOUNTER — CARE COORDINATION (OUTPATIENT)
Dept: CARE COORDINATION | Age: 63
End: 2023-08-11

## 2023-08-11 NOTE — TELEPHONE ENCOUNTER
I am aware of all the above. Yesica is scheduled with me next week. I plan on ordering labs.
said she would be able to do it for week after next. In the meantime did discuss with her taking vitamin D3 and calcium.        RECENT LABS   Ref Range & Units 7/17/23 0635   Sodium 136 - 145 mmol/L 137    Potassium 3.5 - 5.1 mmol/L 4.4    Chloride 98 - 107 mmol/L 107    CO2 21 - 31 mmol/L 27    BUN 7 - 25 mg/dL 13    Creatinine 0.60 - 1.20 mg/dL 0.98    Glucose 70 - 100 mg/dL 91    Calcium 8.6 - 10.3 mg/dL 8.5 Low     Anion Gap 7 - 20 mmol/L 7    eGFR >60.0 mL/min/1.73m*2 64.1      Michelle Jean Baptiste, JAMES, FNP-C, Remote Patient Monitoring NP, (Ph) [phone]

## 2023-08-11 NOTE — CARE COORDINATION
Ambulatory Care Coordination Note  2023    Patient Current Location:  Home: [address] [place] [address]     ACM contacted the patient by telephone. Verified name and  with patient as identifiers. Provided introduction to self, and explanation of the ACM role. Challenges to be reviewed by the provider   Additional needs identified to be addressed with provider: No  none               Method of communication with provider: none. ACM: Amado Obregon, RN  Spoke with pt who said she is doing ok at home she did have her dopplers done yesterday at [address] she will see ortho next week and her pcp next week. She will see cardiology Wednesday next week to follow up on her holtor monitor. Neuropsych apt is for December 15    1) acp done   2) sdoh done   3) med review done   4) cardiology [facility]   5) podiatry Dr Anastasai Troy aug   6) nephrology Dr Gautam Gil October   7) neurosurgery dr Lilia Cortez    8) eye yearly exam due in   9)    10) pcp 8/15  11) pill packing through [address]   12)  referral for passport, life alert and food insecurities done   13) home care referral for PT / OT [facility]        Offered patient enrollment in the Remote Patient Monitoring (RPM) program for in-home monitoring: Yes, patient already enrolled. Lab Results       None            Care Coordination Interventions    Referral from Primary Care Provider: No  Suggested Interventions and Community Resources  Diabetes Education: Declined  Fall Risk Prevention:  In Process  Disease Specific Clinic: Completed  Home Health Services: Completed  Physical Therapy: Completed  Social Work: Completed  Other Therapy Services: Completed  Zone Management Tools: Completed          Goals Addressed                   This Visit's Progress     Reduce Falls    On track     I will reduce my risk of falls by the following: Remove rugs or use non slip rugs  Install grab bars in bathroom  Use walking aids like cane or

## 2023-08-15 ENCOUNTER — OFFICE VISIT (OUTPATIENT)
Dept: ORTHOPEDIC SURGERY | Age: 63
End: 2023-08-15

## 2023-08-15 ENCOUNTER — OFFICE VISIT (OUTPATIENT)
Dept: FAMILY MEDICINE CLINIC | Age: 63
End: 2023-08-15
Payer: MEDICARE

## 2023-08-15 VITALS
SYSTOLIC BLOOD PRESSURE: 122 MMHG | OXYGEN SATURATION: 96 % | HEART RATE: 87 BPM | RESPIRATION RATE: 22 BRPM | TEMPERATURE: 97.1 F | WEIGHT: 201 LBS | BODY MASS INDEX: 36.76 KG/M2 | DIASTOLIC BLOOD PRESSURE: 80 MMHG

## 2023-08-15 VITALS — WEIGHT: 202 LBS | BODY MASS INDEX: 37.17 KG/M2 | HEIGHT: 62 IN

## 2023-08-15 DIAGNOSIS — J41.8 MIXED SIMPLE AND MUCOPURULENT CHRONIC BRONCHITIS (HCC): ICD-10-CM

## 2023-08-15 DIAGNOSIS — M17.12 PATELLOFEMORAL ARTHRITIS OF LEFT KNEE: ICD-10-CM

## 2023-08-15 DIAGNOSIS — E08.65 DIABETES MELLITUS DUE TO UNDERLYING CONDITION WITH HYPERGLYCEMIA, WITHOUT LONG-TERM CURRENT USE OF INSULIN (HCC): ICD-10-CM

## 2023-08-15 DIAGNOSIS — I10 ESSENTIAL HYPERTENSION: Primary | ICD-10-CM

## 2023-08-15 DIAGNOSIS — G47.33 OSA (OBSTRUCTIVE SLEEP APNEA): ICD-10-CM

## 2023-08-15 DIAGNOSIS — M81.0 AGE-RELATED OSTEOPOROSIS WITHOUT CURRENT PATHOLOGICAL FRACTURE: ICD-10-CM

## 2023-08-15 DIAGNOSIS — E11.8 CONTROLLED TYPE 2 DIABETES MELLITUS WITH COMPLICATION, WITHOUT LONG-TERM CURRENT USE OF INSULIN (HCC): ICD-10-CM

## 2023-08-15 DIAGNOSIS — E83.42 HYPOMAGNESEMIA: ICD-10-CM

## 2023-08-15 DIAGNOSIS — F33.3 MAJOR DEPRESSIVE DISORDER, RECURRENT EPISODE, SEVERE, WITH PSYCHOTIC BEHAVIOR (HCC): Chronic | ICD-10-CM

## 2023-08-15 DIAGNOSIS — N18.30 STAGE 3 CHRONIC KIDNEY DISEASE, UNSPECIFIED WHETHER STAGE 3A OR 3B CKD (HCC): Chronic | ICD-10-CM

## 2023-08-15 DIAGNOSIS — M15.9 OSTEOARTHRITIS OF MULTIPLE JOINTS, UNSPECIFIED OSTEOARTHRITIS TYPE: ICD-10-CM

## 2023-08-15 DIAGNOSIS — E53.8 B12 DEFICIENCY: ICD-10-CM

## 2023-08-15 DIAGNOSIS — M17.11 PATELLOFEMORAL ARTHRITIS OF RIGHT KNEE: Primary | ICD-10-CM

## 2023-08-15 DIAGNOSIS — K59.01 SLOW TRANSIT CONSTIPATION: ICD-10-CM

## 2023-08-15 DIAGNOSIS — E78.2 MIXED HYPERLIPIDEMIA: Chronic | ICD-10-CM

## 2023-08-15 PROCEDURE — 1123F ACP DISCUSS/DSCN MKR DOCD: CPT | Performed by: NURSE PRACTITIONER

## 2023-08-15 PROCEDURE — 3044F HG A1C LEVEL LT 7.0%: CPT | Performed by: NURSE PRACTITIONER

## 2023-08-15 PROCEDURE — 3074F SYST BP LT 130 MM HG: CPT | Performed by: NURSE PRACTITIONER

## 2023-08-15 PROCEDURE — 3078F DIAST BP <80 MM HG: CPT | Performed by: NURSE PRACTITIONER

## 2023-08-15 PROCEDURE — 99214 OFFICE O/P EST MOD 30 MIN: CPT | Performed by: NURSE PRACTITIONER

## 2023-08-15 RX ORDER — METHYLPREDNISOLONE ACETATE 40 MG/ML
40 INJECTION, SUSPENSION INTRA-ARTICULAR; INTRALESIONAL; INTRAMUSCULAR; SOFT TISSUE ONCE
Status: SHIPPED | OUTPATIENT
Start: 2023-08-15

## 2023-08-15 RX ORDER — DEXAMETHASONE 1 MG
1 TABLET ORAL ONCE
Qty: 1 TABLET | Refills: 0 | Status: SHIPPED | OUTPATIENT
Start: 2023-08-15 | End: 2023-08-15

## 2023-08-15 RX ORDER — LIDOCAINE HYDROCHLORIDE 10 MG/ML
5 INJECTION, SOLUTION INFILTRATION; PERINEURAL ONCE
Status: SHIPPED | OUTPATIENT
Start: 2023-08-15

## 2023-08-15 NOTE — PROGRESS NOTES
Chief Complaint   Patient presents with    Knee Pain     B/L knee pain    This patient who has previously been seen for knee pain is returning because of recurrence of pain in both the knees. About 2 weeks ago she started having pain in the left knee is started to subside but then the right knee flared up she went to [facility] emergency room for this. She was evaluated and then discharged. The pain she complains is in the peripatellar region. Today she describes more pain in the left knee than the right knee. Occasionally she has felt a feeling of instability but has not actually fallen. No episode of locking. The patient does have a past history of pulmonary embolism, hypertension, cerebrovascular accident, abdominal aortic aneurysm and type 2 diabetes. Examination: She does have a mild effusion in both the knees. Both the knees are stable in all the compartments. She has marked patellofemoral grating. Clinically the patellae are facing laterally. I reviewed her previous x-ray which shows severe primary patellofemoral arthritis bilaterally with patellar subluxation. Diagnosis: Bilateral patellofemoral arthritis of the knees. Treatment: I did discuss with her that the corticosteroid injection usually does not work for patellofemoral arthritis but we will try it out. She wanted to have the left knee injected today. Under sterile condition I injected 40 mg Depo-Medrol and 5 cc of 1% plain lidocaine into the left knee through anterolateral portal without any complications. She had immediate excellent response. We will see her again next week and if she is improving then we will inject the right knee. If she does not show any improvement then may consider arthroscopy and lateral release of 1 knee at a time.

## 2023-08-15 NOTE — PROGRESS NOTES
potential for weight gain ? Physical exam   Physical Exam  Vitals and nursing note reviewed. Constitutional:       General: She is not in acute distress. Appearance: Normal appearance. She is well-developed and well-groomed. She is morbidly obese. She is not ill-appearing or toxic-appearing. Cardiovascular:      Rate and Rhythm: Normal rate and regular rhythm. No extrasystoles are present. Heart sounds: Normal heart sounds, S1 normal and S2 normal. No murmur heard. Pulmonary:      Effort: Pulmonary effort is normal. No prolonged expiration or respiratory distress. Breath sounds: Normal breath sounds and air entry. Musculoskeletal:      Right knee: Swelling present. Decreased range of motion. Tenderness present. Left knee: Swelling present. Decreased range of motion. Tenderness present. Right lower leg: No edema. Left lower leg: No edema. Skin:     General: Skin is warm and dry. Coloration: Skin is not ashen, cyanotic, jaundiced or pale. Neurological:      Mental Status: She is alert and oriented to person, place, and time. Motor: Motor function is intact. Gait: Gait is intact. Psychiatric:         Attention and Perception: Attention and perception normal.         Mood and Affect: Mood and affect normal.         Speech: Speech normal.         Behavior: Behavior normal. Behavior is cooperative. Thought Content: Thought content normal. Thought content does not include suicidal ideation. Thought content does not include suicidal plan. Cognition and Memory: Cognition and memory normal.         Judgment: Judgment normal.         Electronically signed by BRAULIO Bernal CNP, APRN-CNP on 8/21/2023 at 9:06 PM    Please note that this chart was generated using voice recognition Dragon dictation software. Although every effort was made to ensure the accuracy of this automated transcription, some errors in transcription may have occurred.

## 2023-08-16 ENCOUNTER — CARE COORDINATION (OUTPATIENT)
Dept: CARE COORDINATION | Age: 63
End: 2023-08-16

## 2023-08-16 ENCOUNTER — CARE COORDINATION (OUTPATIENT)
Dept: CARE COORDINATION | Facility: CLINIC | Age: 63
End: 2023-08-16

## 2023-08-16 NOTE — CARE COORDINATION
Remote Alert Monitoring Note      Date/Time:  2023 1:47 PM  Patient Current Location: Home: [address] [place] [address]    LPN contacted patient by telephone regarding red alert received for glucose reading (353). Verified patients name and  as identifiers. Rpm alert to be reviewed by the provider                         Background:  High Blood-Pressure, COPD, Diabetes    Refer to 911 immediately if:  Patient unresponsive or unable to provide history  Change in cognition or sudden confusion  Patient unable to respond in complete sentences  Intense chest pain/tightness  Any concern for any clinical emergency  Red Alert: Provider response time of 1 hr required for any red alert requiring intervention  Yellow Alert: Provider response time of 3hr required for any escalated yellow alert    Hyperglycemia Triage  Are you having any vision changes? no   Are you having any increased thirst? no   Do you feel sweaty/clammy? no   Are you having increased urination? no   Are you having increased fatigue? no      Have you taken your medications as instructed by your doctor today? Yes       Clinical Interventions: Reviewed and followed up on alerts and treatments-. Pt is asymptomatic and in no apparent distress, speaking in full sentences. Patient states she ate a fudgsicle  prior to checking her glucose this am.  Patient states she thought she could because it was only 100 calories. Patient re-educated on limiting carbs and sugars. Will update ACM to see if patient would benefit from a dietary referral for education. Patient was able to recheck glucose while on the call and got a lower reading of 203. Plan/Follow Up: Will continue to review, monitor and address alerts with follow up based on severity of symptoms and risk factors.     Rj Benites LPN  [facility]/ CTN/ Remote Patient monitoring  [phone]

## 2023-08-16 NOTE — CARE COORDINATION
Remote Patient Monitoring Note      Date/Time:  8/16/2023 10:59 AM  Patient Current Location: [place]  LPN attempted to contact patient by telephone regarding red alert received for glucose reading (353). Background: Pt enrolled in RPM r/t HTN, COPD, and DM  Clinical Interventions:  Left HIPAA compliant message requesting a return call. Plan/Follow Up: Will continue to review, monitor and address alerts with follow up based on severity of symptoms and risk factors.

## 2023-08-17 ENCOUNTER — CARE COORDINATION (OUTPATIENT)
Dept: CARE COORDINATION | Age: 63
End: 2023-08-17

## 2023-08-17 NOTE — CARE COORDINATION
Visit's Progress     Reduce Falls    On track     I will reduce my risk of falls by the following: Remove rugs or use non slip rugs  Install grab bars in bathroom  Use walking aids like cane or walker  Follow through on orders for PT    Barriers: lack of support and overwhelmed by complexity of regimen  Plan for overcoming my barriers: care coordination   Confidence: 9/10  Anticipated Goal Completion Date: 5/28/23              Future Appointments   Date Time Provider [address] [place] [address]   8/22/2023 11:00 AM Tj Cunningham MD [facility]   9/12/2023  1:00 PM Amy Rosaria Curling, APRN - CNP [facility]   9/25/2023  9:00 AM DO Alexandro Alanis Neuro [facility]   10/10/2023  1:00 PM Amy Rosaria Curling, APRN - CNP [facility]   10/16/2023 10:15 AM Victor Hugo Singh DPM [facility]   10/23/2023 10:30 AM Kaylan Kline MD [facility]   11/7/2023  1:00 PM Amy Rosaria Curling, APRN - CNP [facility]   12/5/2023  1:00 PM Amy Rosaria Curling, APRN - CNP [facility]

## 2023-08-21 ASSESSMENT — ENCOUNTER SYMPTOMS
SINUS PRESSURE: 0
SHORTNESS OF BREATH: 0
ABDOMINAL PAIN: 0
BACK PAIN: 1
COUGH: 0
TROUBLE SWALLOWING: 0
DIARRHEA: 0
WHEEZING: 0
ABDOMINAL DISTENTION: 0
SORE THROAT: 0
CONSTIPATION: 0
RECTAL PAIN: 0
CHEST TIGHTNESS: 1
RHINORRHEA: 0

## 2023-08-22 ENCOUNTER — CARE COORDINATION (OUTPATIENT)
Dept: CARE COORDINATION | Age: 63
End: 2023-08-22

## 2023-08-22 DIAGNOSIS — E53.8 VITAMIN B12 DEFICIENCY: ICD-10-CM

## 2023-08-22 NOTE — CARE COORDINATION
Ambulatory Care Coordination Note  2023    Patient Current Location:  Home: [address]  [facility] [address]     ACM contacted the patient by telephone. Verified name and  with patient as identifiers. Provided introduction to self, and explanation of the ACM role. Challenges to be reviewed by the provider   Additional needs identified to be addressed with provider: No  none               Method of communication with provider: none. ACM: Michelle Brumfield, RN  Spoke with sleep lab and was able to get pt scheduled for  they will send her a packet with directions in the mail called laurita and provided her this information she did see ortho for her knee and said this is feeling better  1) acp done   2) sdoh done   3) med review done   4) cardiology dr Pizarro [facility]    5) podiatry Dr Burdick [facility] aug   6) nephrology Dr Antionette Chirinos October   7) neurosurgery dr Sharee Almaraz    8) eye yearly exam due in )    10) pcp   11) pill packing through [address]   12)  referral for passport, life alert and food insecurities done   13) home care referral for PT / OT [facility]  14) sleep study 23  15) labs    Offered patient enrollment in the Remote Patient Monitoring (RPM) program for in-home monitoring: Yes, patient already enrolled. Lab Results       None            Care Coordination Interventions    Referral from Primary Care Provider: No  Suggested Interventions and Community Resources  Diabetes Education: Declined  Fall Risk Prevention:  In Process  Disease Specific Clinic: Completed  Home Health Services: Completed  Physical Therapy: Completed  Social Work: Completed  Other Therapy Services: Completed  Zone Management Tools: Completed          Goals Addressed    None         Future Appointments   Date Time Provider [address]   2023  1:00 PM Trisha Alfonso, APRN - [facility]  [facility]   2023  7:30 PM [facility] RM [facility]   2023  9:00 AM Roro Lozano

## 2023-08-23 LAB
ALBUMIN SERPL-MCNC: 4.3 G/DL
ALP BLD-CCNC: 81 U/L
ALT SERPL-CCNC: 20 U/L
AST SERPL-CCNC: 15 U/L
AVERAGE GLUCOSE: 143
BASOPHILS ABSOLUTE: 0.03 /ΜL
BASOPHILS RELATIVE PERCENT: 0.5 %
BILIRUB SERPL-MCNC: 0.5 MG/DL (ref 0.1–1.4)
BILIRUBIN, URINE: NEGATIVE
BLOOD, URINE: NEGATIVE
BUN BLDV-MCNC: 15.5 MG/DL
CALCIUM SERPL-MCNC: 9 MG/DL
CHLORIDE BLD-SCNC: 111 MMOL/L
CHOLESTEROL, TOTAL: 178 MG/DL
CHOLESTEROL/HDL RATIO: 4.2
CLARITY: CLEAR
CO2: 27 MMOL/L
COLOR: YELLOW
CREAT SERPL-MCNC: 1.1 MG/DL
CREATININE URINE: 41 MG/DL
EOSINOPHILS ABSOLUTE: 0.22 /ΜL
EOSINOPHILS RELATIVE PERCENT: 4 %
FOLATE: 19.99
GLUCOSE FASTING: 138 MG/DL
GLUCOSE URINE: >=500
HBA1C MFR BLD: 6.6 %
HCT VFR BLD CALC: 40.4 % (ref 36–46)
HDLC SERPL-MCNC: 42 MG/DL (ref 35–70)
HEMOGLOBIN: 13 G/DL (ref 12–16)
KETONES, URINE: NEGATIVE
LDL CHOLESTEROL CALCULATED: 96 MG/DL (ref 0–160)
LEUKOCYTE ESTERASE, URINE: NEGATIVE
LYMPHOCYTES ABSOLUTE: 2.37 /ΜL
LYMPHOCYTES RELATIVE PERCENT: 43.1 %
MAGNESIUM: 2.3 MG/DL
MCH RBC QN AUTO: 31.4 PG
MCHC RBC AUTO-ENTMCNC: 32.2 G/DL
MCV RBC AUTO: 97.6 FL
MICROALBUMIN/CREAT 24H UR: <1 MG/G{CREAT}
MONOCYTES ABSOLUTE: 0.35 /ΜL
MONOCYTES RELATIVE PERCENT: 6.4 %
NEUTROPHILS ABSOLUTE: 2.52 /ΜL
NEUTROPHILS RELATIVE PERCENT: 45.8 %
NITRITE, URINE: NEGATIVE
NONHDLC SERPL-MCNC: 136 MG/DL
PDW BLD-RTO: 15.3 %
PH UA: 8 (ref 4.5–8)
PLATELET # BLD: 228 K/ΜL
PMV BLD AUTO: ABNORMAL FL
POTASSIUM SERPL-SCNC: 4.3 MMOL/L
PROTEIN UA: NEGATIVE
PTH INTACT: 435
RBC # BLD: 4.14 10^6/ΜL
SODIUM BLD-SCNC: 143 MMOL/L
SPECIFIC GRAVITY, URINE: 1.01
TOTAL PROTEIN: 7 G/DL (ref 6.4–8.2)
TRIGL SERPL-MCNC: 199 MG/DL
TSH SERPL DL<=0.05 MIU/L-ACNC: 0.93 UIU/ML
UROBILINOGEN, URINE: ABNORMAL
VITAMIN B-12: 440
VITAMIN D 25-HYDROXY: 43.6
VITAMIN D2, 25 HYDROXY: NORMAL
VITAMIN D3,25 HYDROXY: NORMAL
VLDLC SERPL CALC-MCNC: 40 MG/DL
WBC # BLD: 5.5 10^3/ML

## 2023-08-23 NOTE — TELEPHONE ENCOUNTER
LOV 8/15/2023     RTO 9/12/2023  LRF 9/19/2022          Controlled Substance Monitoring:    Acute and Chronic Pain Monitoring:   RX Monitoring 2/15/2023   Attestation -   Periodic Controlled Substance Monitoring Obtaining appropriate analgesic effect of treatment.

## 2023-08-24 ENCOUNTER — CARE COORDINATION (OUTPATIENT)
Facility: CLINIC | Age: 63
End: 2023-08-24

## 2023-08-24 ENCOUNTER — CARE COORDINATION (OUTPATIENT)
Dept: CARE COORDINATION | Age: 63
End: 2023-08-24

## 2023-08-24 DIAGNOSIS — E83.42 HYPOMAGNESEMIA: ICD-10-CM

## 2023-08-24 DIAGNOSIS — E08.65 DIABETES MELLITUS DUE TO UNDERLYING CONDITION WITH HYPERGLYCEMIA, WITHOUT LONG-TERM CURRENT USE OF INSULIN (HCC): ICD-10-CM

## 2023-08-24 DIAGNOSIS — I10 ESSENTIAL HYPERTENSION: ICD-10-CM

## 2023-08-24 DIAGNOSIS — M81.0 AGE-RELATED OSTEOPOROSIS WITHOUT CURRENT PATHOLOGICAL FRACTURE: ICD-10-CM

## 2023-08-24 DIAGNOSIS — E78.2 MIXED HYPERLIPIDEMIA: Chronic | ICD-10-CM

## 2023-08-24 DIAGNOSIS — N18.30 STAGE 3 CHRONIC KIDNEY DISEASE, UNSPECIFIED WHETHER STAGE 3A OR 3B CKD (HCC): Chronic | ICD-10-CM

## 2023-08-24 DIAGNOSIS — E11.8 CONTROLLED TYPE 2 DIABETES MELLITUS WITH COMPLICATION, WITHOUT LONG-TERM CURRENT USE OF INSULIN (HCC): ICD-10-CM

## 2023-08-24 DIAGNOSIS — E53.8 B12 DEFICIENCY: ICD-10-CM

## 2023-08-24 DIAGNOSIS — J41.8 MIXED SIMPLE AND MUCOPURULENT CHRONIC BRONCHITIS (HCC): ICD-10-CM

## 2023-08-24 NOTE — CARE COORDINATION
Writer called Patient to do a follow up Social service call with her to see if she had followed up on Her Insurance switch over as writer had instructed for her to do so. She had not so writer told her to call and find out what New MEDICAID Product she had as she had to let go of her [employer] MEDICAID: Patient said she was going to call today.

## 2023-08-25 DIAGNOSIS — F33.3 MAJOR DEPRESSIVE DISORDER, RECURRENT EPISODE, SEVERE, WITH PSYCHOTIC BEHAVIOR (HCC): Chronic | ICD-10-CM

## 2023-08-25 DIAGNOSIS — E11.8 CONTROLLED TYPE 2 DIABETES MELLITUS WITH COMPLICATION, WITHOUT LONG-TERM CURRENT USE OF INSULIN (HCC): ICD-10-CM

## 2023-08-25 RX ORDER — GLUCOSA SU 2KCL/CHONDROITIN SU 500-400 MG
1 CAPSULE ORAL DAILY
Qty: 30 TABLET | Refills: 11 | Status: SHIPPED | OUTPATIENT
Start: 2023-08-25

## 2023-08-30 ENCOUNTER — CARE COORDINATION (OUTPATIENT)
Facility: CLINIC | Age: 63
End: 2023-08-30

## 2023-08-30 ENCOUNTER — CARE COORDINATION (OUTPATIENT)
Dept: CARE COORDINATION | Age: 63
End: 2023-08-30

## 2023-08-30 ENCOUNTER — TELEPHONE (OUTPATIENT)
Dept: FAMILY MEDICINE CLINIC | Age: 63
End: 2023-08-30

## 2023-08-30 NOTE — CARE COORDINATION
Remote Alert Monitoring Note  Rpm alert to be reviewed by the provider   red alert   weight (weight gain of 5 lbs in last 7 days)   Additional needs to be addressed by N/A: JULIAN PCPLILI                    Date/Time:  2023 8:22 AM  Patient Current Location: Home: [address] [place]  LPN contacted patient by telephone. Verified patients name and  as identifiers. Background: HTN, COPD, Diabetes  Refer to 911 immediately if:  Patient unresponsive or unable to provide history  Change in cognition or sudden confusion  Patient unable to respond in complete sentences  Intense chest pain/tightness  Any concern for any clinical emergency  Red Alert: Provider response time of 1 hr required for any red alert requiring intervention  Yellow Alert: Provider response time of 3hr required for any escalated yellow alert    Weight Scale Triage  Was your weight obtained upon rising/waking today? yes   Was your weight obtained after voiding and/or use of the bathroom today? yes   Did you weigh yourself in the same amount of clothing today, compared to how you typically do? yes   Was the scale bumped or moved prior to today's weight? no   Is your scale on a flat/hard surface? no   Did you obtain your weight with shoes on? no   If yes, is this something you normally do during your daily weights? no   Were you standing up straight on the scale today? yes   Were you leaning on anything while obtaining your weight today? yes      Clinical Interventions: Reviewed and followed up on alerts and treatments-Spoke with patient about weight gain. Patient weighted 204 at 1 am today then 205.8 at 5 am. Patient states she did drink a bottle of tea after weighting at 1 am. Patient states she had a fall last night. EMT was called. Patient states she felt fine last night but she now is hurting. Patient states her right rib/side is hurting. Patient states she is going to go to the Urgent Care to get an xray.     Plan/Follow

## 2023-08-30 NOTE — TELEPHONE ENCOUNTER
Patient called and stated she fell yesterday evening around 5 pm rolling her laundry basket across yard and lost balance. Patient stated her right side rib cage and shoulder hurts, patient was asking for a script to get an xray. Writer told patient she should be seen at walk in , patient stated she's trying to find a ride to do that.

## 2023-09-05 ENCOUNTER — CARE COORDINATION (OUTPATIENT)
Dept: CARE COORDINATION | Age: 63
End: 2023-09-05

## 2023-09-05 NOTE — CARE COORDINATION
7:30 PM [facility] SLE[facility] RM 4 [facility]   9/25/2023  9:00 AM DO Alexandro Saravia Neuro [facility]   10/10/2023  1:00 PM BRAULIO Jarrett CNP [facility]   10/16/2023 10:15 AM Ileneeagle Cruz DPM [facility]   10/23/2023 10:30 AM Madelyn Lynch MD [facility]   11/7/2023  1:00 PM BRAULIO Jarrett CNP [facility]   12/5/2023  1:00 PM BRAULIO Jarrett CNP

## 2023-09-07 ENCOUNTER — CARE COORDINATION (OUTPATIENT)
Dept: CARE COORDINATION | Age: 63
End: 2023-09-07

## 2023-09-07 NOTE — CARE COORDINATION
Chinedur went to [facility] as she was having Major Difficulties in switching over her Insurance/MEDICAID from [employer] to another MEDICAID Product. Chinedur helped her to switch her MEDICAID over to [facility] and that will start on 10/01/2023. Patient was not sure about switching her MEDICARE over to [facility] as well, we called the MEDICARE HOTLINE and could not get through: Chinedur is going to go back and aid Patient with this last step with her Insurance.

## 2023-09-08 ENCOUNTER — CARE COORDINATION (OUTPATIENT)
Dept: CARE COORDINATION | Age: 63
End: 2023-09-08

## 2023-09-08 NOTE — TELEPHONE ENCOUNTER
Patient was last seen by me March 6, 2020. At that time I asked her to make sure she repeated her INR in about 4 weeks, which should be in any day. Please make sure patient still is getting her INR checked within the office.   Thank you
Pt scheduled for tomorrow
Urine Every 6 Months 0/87/0750   Basic Metabolic Panel Every 6 Months 8/28/2020   CBC Every 6 Months 8/28/2020   POCT INR                 Patient Active Problem List:     Hypertension     Hyperlipidemia     Osteoarthritis     Depression     Obesity     CKD (chronic kidney disease) stage 3, GFR 30-59 ml/min ([facility])     Proteinuria     Controlled type 2 diabetes mellitus with stage 3 chronic kidney disease, without long-term current use of insulin ([facility])     History of DVT of lower extremity     NARCISO on CPAP     Vitamin D deficiency     Major depressive disorder, recurrent episode, severe, with psychotic behavior ([address]     Major depressive disorder, recurrent, in partial remission ([address]     Major depressive disorder, recurrent, severe with psychotic symptoms ([address]
This is a 54 y/o F with PMHx of HTN who presented to the ED for evaluation of diarrhea and weight loss. Patient noted ongoing diarrhea for the past 1-2 months alongside significant weight loss. On CT A/P noncon, found to have evidence of metastatic disease w/ multiple liver lesions, peritoneal carcinomatosis/malignant ascites and several areas of colonic wall thickening w/ unknown primary.     #Metastatic disease of unknown origin  #Diarrhea   Patient presented w/ diarrhea, sig weight loss, early satiety and poor appetite. Patient on imaging was found to have evidence of metastatic disease, possibly colon as primary. Several areas of colonic wall thickening, serosal implants vs primary neoplasm. IR consulted for potential biopsy, either from liver or omental implant. Discussed w/ patient plan for colonoscopy, but pt hesitant to proceed as she does not think she can complete bowel prep. Given masslike thickening in upper rectum/distal sigmoid, will proceed with flex sig early next week once medically optimized.     #UGIB   #Anemia   Patient w/ 1-2 months of reflux, n/v, poor appetite and diarrhea (dark brown or black in color). Hgb drop from 11.5 in 7/2023 to 10 on current admission in 9/2023. Concern for GIB though pt did take Pepto-Bismol which could cause black stools. JUNIOR w/ light brown stool, no blood. Given pt is hemodynamically and no recent melena, no urgent indication for EGD.     Recommendations:   - plan for inpatient EGD and flex sig once better medically optimized, likely Monday 9/11  - maintain 2 large bore IV access  - maintain active T&S  - PO PPI BID  - maintain PTT in therapeutic range while on heparin gtt  - trend CBC, transfuse w/ hgb goal >7   - correct electrolytes, replete as necessary   - rest of care per primary and heme/onc team     note not finalized until signed/attested by attending

## 2023-09-12 ENCOUNTER — OFFICE VISIT (OUTPATIENT)
Dept: FAMILY MEDICINE CLINIC | Age: 63
End: 2023-09-12
Payer: MEDICARE

## 2023-09-12 VITALS
WEIGHT: 206 LBS | HEART RATE: 85 BPM | DIASTOLIC BLOOD PRESSURE: 88 MMHG | OXYGEN SATURATION: 98 % | SYSTOLIC BLOOD PRESSURE: 134 MMHG | BODY MASS INDEX: 37.91 KG/M2 | HEIGHT: 62 IN

## 2023-09-12 DIAGNOSIS — I10 ESSENTIAL HYPERTENSION: ICD-10-CM

## 2023-09-12 DIAGNOSIS — K59.01 SLOW TRANSIT CONSTIPATION: ICD-10-CM

## 2023-09-12 DIAGNOSIS — F33.3 MAJOR DEPRESSIVE DISORDER, RECURRENT EPISODE, SEVERE, WITH PSYCHOTIC BEHAVIOR (HCC): Primary | Chronic | ICD-10-CM

## 2023-09-12 DIAGNOSIS — E11.8 CONTROLLED TYPE 2 DIABETES MELLITUS WITH COMPLICATION, WITHOUT LONG-TERM CURRENT USE OF INSULIN (HCC): ICD-10-CM

## 2023-09-12 DIAGNOSIS — N18.30 STAGE 3 CHRONIC KIDNEY DISEASE, UNSPECIFIED WHETHER STAGE 3A OR 3B CKD (HCC): Chronic | ICD-10-CM

## 2023-09-12 DIAGNOSIS — K21.9 GASTROESOPHAGEAL REFLUX DISEASE, UNSPECIFIED WHETHER ESOPHAGITIS PRESENT: ICD-10-CM

## 2023-09-12 DIAGNOSIS — Z79.01 LONG TERM (CURRENT) USE OF ANTICOAGULANTS: ICD-10-CM

## 2023-09-12 DIAGNOSIS — G47.33 OSA (OBSTRUCTIVE SLEEP APNEA): ICD-10-CM

## 2023-09-12 DIAGNOSIS — N18.30 CONTROLLED TYPE 2 DIABETES MELLITUS WITH STAGE 3 CHRONIC KIDNEY DISEASE, WITHOUT LONG-TERM CURRENT USE OF INSULIN (HCC): ICD-10-CM

## 2023-09-12 DIAGNOSIS — E11.22 CONTROLLED TYPE 2 DIABETES MELLITUS WITH STAGE 3 CHRONIC KIDNEY DISEASE, WITHOUT LONG-TERM CURRENT USE OF INSULIN (HCC): ICD-10-CM

## 2023-09-12 DIAGNOSIS — Z86.718 HISTORY OF DVT OF LOWER EXTREMITY: ICD-10-CM

## 2023-09-12 DIAGNOSIS — J41.1 MUCOPURULENT CHRONIC BRONCHITIS (HCC): ICD-10-CM

## 2023-09-12 PROCEDURE — 3077F SYST BP >= 140 MM HG: CPT | Performed by: NURSE PRACTITIONER

## 2023-09-12 PROCEDURE — 3044F HG A1C LEVEL LT 7.0%: CPT | Performed by: NURSE PRACTITIONER

## 2023-09-12 PROCEDURE — 99215 OFFICE O/P EST HI 40 MIN: CPT | Performed by: NURSE PRACTITIONER

## 2023-09-12 PROCEDURE — 3079F DIAST BP 80-89 MM HG: CPT | Performed by: NURSE PRACTITIONER

## 2023-09-12 PROCEDURE — 1123F ACP DISCUSS/DSCN MKR DOCD: CPT | Performed by: NURSE PRACTITIONER

## 2023-09-12 RX ORDER — OMEPRAZOLE 20 MG/1
CAPSULE, DELAYED RELEASE ORAL
Qty: 90 CAPSULE | Refills: 3 | Status: SHIPPED | OUTPATIENT
Start: 2023-09-12 | End: 2024-09-12

## 2023-09-12 RX ORDER — LISINOPRIL 5 MG/1
5 TABLET ORAL DAILY
Qty: 90 TABLET | Refills: 3 | Status: SHIPPED | OUTPATIENT
Start: 2023-09-12 | End: 2024-09-11

## 2023-09-12 RX ORDER — DEXAMETHASONE 1 MG
1 TABLET ORAL ONCE
Qty: 1 TABLET | Refills: 0 | Status: SHIPPED | OUTPATIENT
Start: 2023-09-12 | End: 2023-09-12

## 2023-09-12 RX ORDER — POLYETHYLENE GLYCOL 3350 17 G/17G
POWDER, FOR SOLUTION ORAL
Qty: 1 EACH | Refills: 5 | Status: SHIPPED | OUTPATIENT
Start: 2023-09-12

## 2023-09-12 RX ORDER — ALBUTEROL SULFATE 90 UG/1
2 AEROSOL, METERED RESPIRATORY (INHALATION) 4 TIMES DAILY
Qty: 8.5 G | Refills: 1 | Status: SHIPPED | OUTPATIENT
Start: 2023-09-12

## 2023-09-12 RX ORDER — SENNOSIDES 8.6 MG
1 TABLET ORAL 2 TIMES DAILY
Qty: 180 TABLET | Refills: 3 | Status: SHIPPED | OUTPATIENT
Start: 2023-09-12 | End: 2024-09-11

## 2023-09-12 RX ORDER — TRAZODONE HYDROCHLORIDE 50 MG/1
50 TABLET ORAL NIGHTLY
Qty: 90 TABLET | Refills: 3 | Status: SHIPPED | OUTPATIENT
Start: 2023-09-12 | End: 2024-09-11

## 2023-09-12 NOTE — PROGRESS NOTES
rash and wound. Allergic/Immunologic: Negative for environmental allergies. Neurological:  Positive for tremors (worse with anxiety). Negative for dizziness, syncope, light-headedness, numbness and headaches. Follows with neurology - routine MRI scans. Both scheduled   Unsteady at times. No recent falls   Hematological:  Does not bruise/bleed easily. Psychiatric/Behavioral:  Positive for decreased concentration and sleep disturbance. Negative for agitation, confusion (memory concerns?), self-injury and suicidal ideas. The patient is nervous/anxious. Follows with psychiatry for pharmacology and therapy routinely   Recent increase in her SSRI - also potential for weight gain ? Physical exam   Physical Exam  Vitals and nursing note reviewed. Constitutional:       General: She is not in acute distress. Appearance: Normal appearance. She is well-developed and well-groomed. She is morbidly obese. She is not ill-appearing or toxic-appearing. Cardiovascular:      Rate and Rhythm: Normal rate and regular rhythm. No extrasystoles are present. Heart sounds: Normal heart sounds, S1 normal and S2 normal. No murmur heard. Pulmonary:      Effort: Pulmonary effort is normal. No prolonged expiration or respiratory distress. Breath sounds: Normal breath sounds and air entry. Musculoskeletal:      Right lower leg: No edema. Left lower leg: No edema. Skin:     General: Skin is warm and dry. Coloration: Skin is not ashen, cyanotic, jaundiced or pale. Neurological:      Mental Status: She is alert and oriented to person, place, and time. Motor: Motor function is intact. Gait: Gait is intact. Psychiatric:         Attention and Perception: Attention and perception normal.         Mood and Affect: Affect normal. Mood is anxious. Speech: Speech normal.         Behavior: Behavior normal. Behavior is cooperative. Thought Content:  Thought content normal.

## 2023-09-13 ENCOUNTER — HOSPITAL ENCOUNTER (OUTPATIENT)
Dept: SLEEP CENTER | Age: 63
Discharge: HOME | End: 2023-09-15
Payer: MEDICARE

## 2023-09-13 ENCOUNTER — CARE COORDINATION (OUTPATIENT)
Dept: CARE COORDINATION | Age: 63
End: 2023-09-13

## 2023-09-13 DIAGNOSIS — G47.33 OSA (OBSTRUCTIVE SLEEP APNEA): ICD-10-CM

## 2023-09-13 PROCEDURE — 95810 POLYSOM 6/> YRS 4/> PARAM: CPT

## 2023-09-13 NOTE — CARE COORDINATION
Writer called Patient to do a follow up Social service call with her in regards to her MEDICARE. Patient reported that she has a sleep study tonight at 7pm and won't be done until 6am tomorrow morning. She asked what Insurance should she tell them she has ([address] [place] [address] 10/01/2023 THEN SHE WILL HAVE [employer] MEDICAID: AND [employer] MEDICARE UNTIL WE FIGURE OUT IF SHE NEEDS TO SWITCH THAT OVER AS WELL)    Patient voiced her understanding and will call writer this Friday after she has got some rest from her Sleep study.

## 2023-09-14 VITALS
HEIGHT: 62 IN | OXYGEN SATURATION: 95 % | WEIGHT: 205 LBS | BODY MASS INDEX: 37.73 KG/M2 | HEART RATE: 55 BPM | RESPIRATION RATE: 16 BRPM

## 2023-09-15 ENCOUNTER — CARE COORDINATION (OUTPATIENT)
Facility: CLINIC | Age: 63
End: 2023-09-15

## 2023-09-15 LAB
CORTISOL: 0.4
VITAMIN D 25-HYDROXY: 45.6
VITAMIN D2, 25 HYDROXY: NORMAL
VITAMIN D3,25 HYDROXY: NORMAL

## 2023-09-15 NOTE — CARE COORDINATION
Remote Patient Monitoring Note      Date/Time:  9/15/2023 9:30 AM  Patient Current Location: Home: [address] [place], [place] [address]  LPN attempted to contact patient by telephone regarding red alert received for weight increase (5 lbs in last 7 days). Background: HTN, COPD, Diabetes  Clinical Interventions:  Call place to patient. Message left on voicemail. Plan/Follow Up: Will continue to review, monitor and address alerts with follow up based on severity of symptoms and risk factors.

## 2023-09-15 NOTE — CARE COORDINATION
Remote Patient Monitoring Note      Date/Time:  9/15/2023 11:12 AM  Patient Current Location: Home: [facility] [address] [place] [address] Ave  LPN contacted by patient by telephone regarding red alert received for weight increase (5lbs in last 7 days). Verified patients name and  as identifiers. Background: Weight increase 5 lbs in last 7 days  Clinical Interventions: Reviewed and followed up on alerts and treatments-Spoke with patient about weight increase. Patient states she re weighted when she returned home this am in her clothes she wore out. Patient also states she is concerned about her blood sugars running over 200 every day. We reviewed her blood sugars in [facility]. Sugars only over 200 twice in the last 14 days. I did emphasize that her weight is the one over 200 everyday. Patient states ok. Plan/Follow Up: Will continue to review, monitor and address alerts with follow up based on severity of symptoms and risk factors.

## 2023-09-18 ENCOUNTER — TELEPHONE (OUTPATIENT)
Dept: NEUROSURGERY | Age: 63
End: 2023-09-18

## 2023-09-18 ENCOUNTER — CARE COORDINATION (OUTPATIENT)
Dept: CARE COORDINATION | Age: 63
End: 2023-09-18

## 2023-09-18 ASSESSMENT — ENCOUNTER SYMPTOMS
WHEEZING: 0
BACK PAIN: 1
CONSTIPATION: 0
SHORTNESS OF BREATH: 0
SINUS PRESSURE: 0
SORE THROAT: 0
ABDOMINAL DISTENTION: 0
CHEST TIGHTNESS: 1
ABDOMINAL PAIN: 0
COUGH: 0
RHINORRHEA: 0
RECTAL PAIN: 0
TROUBLE SWALLOWING: 0
DIARRHEA: 0

## 2023-09-18 NOTE — TELEPHONE ENCOUNTER
Patient has a 6 month follow up appointment on 9/25/2023. Patient did completed the MRI Brain on 8/1/2023, however, patient did cancel her Neurology appointment. Verifying that you would still like to see patient for her scheduled appointment with you.

## 2023-09-18 NOTE — CARE COORDINATION
Remote Alert Monitoring Note      Date/Time:  2023 2:09 PM  Patient Current Location: Home: [address] Danielle enrique    LPN contacted patient by telephone regarding red alert received for weight increase (211.4). 9lb fluctuation noted in weight over the past week. Verified patients name and  as identifiers. Rpm alert to be reviewed by the provider   red alert   FYI 9lb fluctuation noted in weight over the past week. She denies any swelling, SOB or changes in diet. Will continue to monitor. Metric history attached. Background:  High Blood-Pressure, COPD, Diabetes    Refer to 911 immediately if:  Patient unresponsive or unable to provide history  Change in cognition or sudden confusion  Patient unable to respond in complete sentences  Intense chest pain/tightness  Any concern for any clinical emergency  Red Alert: Provider response time of 1 hr required for any red alert requiring intervention  Yellow Alert: Provider response time of 3hr required for any escalated yellow alert       Have you taken your medications as instructed by your doctor today? Yes   Weight Triage  Are you weighing any different than you did yesterday? (time of day, clothes and shoes on or off, etc)? no   Do you have any shortness of breath? no   Do you have any swelling in your hands of feet? no   Are you having any other health concerns or issues? no       Clinical Interventions: Reviewed and followed up on alerts and treatments-. Pt is asymptomatic and in no apparent distress, speaking in full sentences. Patient states she feels fine and just doesn't have the answer to why her weight is fluctuating so much. We discussed proper weighing technique and she states she is doing everything right. She denies any swelling or SOB. DELTAI provided to PCP and ACM. Plan/Follow Up:  Will continue to review, monitor and address alerts with follow up based on severity of symptoms and risk

## 2023-09-19 ENCOUNTER — CARE COORDINATION (OUTPATIENT)
Dept: CARE COORDINATION | Age: 63
End: 2023-09-19

## 2023-09-19 RX ORDER — METOPROLOL SUCCINATE 25 MG/1
25 TABLET, EXTENDED RELEASE ORAL DAILY
COMMUNITY

## 2023-09-19 NOTE — CARE COORDINATION
Ambulatory Care Coordination Note  2023    Patient Current Location:  Home: [address]  [facility] [address]     ACM contacted the patient by telephone. Verified name and  with patient as identifiers. Provided introduction to self, and explanation of the ACM role. Challenges to be reviewed by the provider   Additional needs identified to be addressed with provider: No  none               Method of communication with provider: none. ACM: Shannan Diego RN  Spoke with pt who said she did see cardiology who started her on metoprolol 25 xl daily did add this to her med list she will see them back on  for a nurse visit for her bp she will see the provider again on oct 10th they also ordered a cardia CT not scheduled yet. She did have her sleep study done    1) acp done   2) sdoh done   3) med review done   4) cardiology dr Howard Yo 10/10  5) podiatry Dr [facility] aug   6) nephrology Dr Cheyenne Arevalo   7) neurosurgery dr Mark Raymond    8) eye yearly exam due in )    10) pcp   11) pill packing through [address]   12)  referral for passport, life alert and food insecurities done   13) home care referral for PT / OT [facility]  14) sleep study  done  15) labs      Offered patient enrollment in the Remote Patient Monitoring (RPM) program for in-home monitoring: Yes, patient already enrolled.     Lab Results       None                 Goals Addressed                   This Visit's Progress     COMPLETED: Reduce Falls    On track     I will reduce my risk of falls by the following: Remove rugs or use non slip rugs  Install grab bars in bathroom  Use walking aids like cane or walker  Follow through on orders for PT    Barriers: lack of support and overwhelmed by complexity of regimen  Plan for overcoming my barriers: care coordination   Confidence: 9/10  Anticipated Goal Completion Date: 23              Future Appointments   Date Time Provider [address]

## 2023-09-20 NOTE — CARE COORDINATION
I am aware of weight gain.    I have ordered labs and she continues to follow with neurology and cardiology

## 2023-09-22 ENCOUNTER — CARE COORDINATION (OUTPATIENT)
Facility: CLINIC | Age: 63
End: 2023-09-22

## 2023-09-22 NOTE — CARE COORDINATION
Remote Alert Monitoring Note  Rpm alert to be reviewed by the provider   red alert   weight (up 4.6 lbs in last day)   Additional needs to be addressed by N/A: No                    Date/Time:  2023 11:12 AM  Patient Current Location: Home: [address] [place] [address]  LPN contacted patient by telephone. Verified patients name and  as identifiers. Background: HTN, COPD, Diabetes  Refer to 911 immediately if:  Patient unresponsive or unable to provide history  Change in cognition or sudden confusion  Patient unable to respond in complete sentences  Intense chest pain/tightness  Any concern for any clinical emergency  Red Alert: Provider response time of 1 hr required for any red alert requiring intervention  Yellow Alert: Provider response time of 3hr required for any escalated yellow alert    Weight Scale Triage  Was your weight obtained upon rising/waking today? yes   Was your weight obtained after voiding and/or use of the bathroom today? yes   Did you weigh yourself in the same amount of clothing today, compared to how you typically do? yes   Was the scale bumped or moved prior to today's weight? no   Is your scale on a flat/hard surface? yes   Did you obtain your weight with shoes on? no   If yes, is this something you normally do during your daily weights? NA   Were you standing up straight on the scale today? yes   Were you leaning on anything while obtaining your weight today? no      Clinical Interventions: Reviewed and followed up on alerts and treatments-Spoke with patient about weight gain. Patient is asymptomatic speaking in complete sentences. Red flags reviewed with patient. Patient verbalizes understanding. Plan/Follow Up: Will continue to review, monitor and address alerts with follow up based on severity of symptoms and risk factors.

## 2023-09-25 ENCOUNTER — OFFICE VISIT (OUTPATIENT)
Dept: NEUROSURGERY | Age: 63
End: 2023-09-25
Payer: MEDICARE

## 2023-09-25 VITALS
BODY MASS INDEX: 38.12 KG/M2 | SYSTOLIC BLOOD PRESSURE: 144 MMHG | HEART RATE: 77 BPM | WEIGHT: 208.4 LBS | OXYGEN SATURATION: 97 % | DIASTOLIC BLOOD PRESSURE: 85 MMHG | TEMPERATURE: 97.6 F

## 2023-09-25 DIAGNOSIS — R25.1 TREMOR: Primary | ICD-10-CM

## 2023-09-25 DIAGNOSIS — D18.00 CAVERNOMA: ICD-10-CM

## 2023-09-25 PROCEDURE — 3079F DIAST BP 80-89 MM HG: CPT | Performed by: NEUROLOGICAL SURGERY

## 2023-09-25 PROCEDURE — 3077F SYST BP >= 140 MM HG: CPT | Performed by: NEUROLOGICAL SURGERY

## 2023-09-25 PROCEDURE — 1123F ACP DISCUSS/DSCN MKR DOCD: CPT | Performed by: NEUROLOGICAL SURGERY

## 2023-09-25 PROCEDURE — 99213 OFFICE O/P EST LOW 20 MIN: CPT | Performed by: NEUROLOGICAL SURGERY

## 2023-09-25 NOTE — PROGRESS NOTES
COLONOSCOPY N/A 2/16/2018    COLONOSCOPY performed by Cat Chand MD at [address] [place] [address] CATH LAB PROCEDURE      EYE SURGERY Bilateral     cataracts    EYE SURGERY Bilateral     glaucoma    TONSILLECTOMY AND ADENOIDECTOMY       Family History   Problem Relation Age of Onset    Hypertension Mother     Liver Disease Mother     Cancer Father         stomach    Diabetes Sister     Cancer Brother         kidney    No Known Problems Maternal Grandmother     No Known Problems Maternal Grandfather     No Known Problems Paternal Grandmother     No Known Problems Paternal Grandfather     Other Brother         AIDS     Current Outpatient Medications on File Prior to Visit   Medication Sig Dispense Refill    metoprolol succinate (TOPROL XL) 25 MG extended release tablet Take 1 tablet by mouth daily      linaCLOtide (LINZESS) 72 MCG CAPS capsule Take 1 capsule by mouth every morning (before breakfast) 90 capsule 3    tiotropium-olodaterol (STIOLTO RESPIMAT) 2.5-2.5 MCG/ACT AERS INHALE 2 PUFFS BY MOUTH INTO THE LUNGS DAILY 1 each 5    omeprazole (PRILOSEC) 20 MG delayed release capsule TAKE 1 CAPSULE BY MOUTH DAILY 90 capsule 3    lisinopril (PRINIVIL;ZESTRIL) 5 MG tablet Take 1 tablet by mouth daily 90 tablet 3    albuterol sulfate HFA (PROVENTIL;VENTOLIN;PROAIR) 108 (90 Base) MCG/ACT inhaler Inhale 2 puffs into the lungs 4 times daily 8.5 g 1    polyethylene glycol (GLYCOLAX) 17 GM/SCOOP powder DISSOLVE 17 GRAMS IN 8 OUNCES OF WATER AND DRINK BY MOUTH DAILY 1 each 5    apixaban (ELIQUIS) 5 MG TABS tablet Take 1 tablet by mouth 2 times daily 180 tablet 3    senna (SENOKOT) 8.6 MG TABS tablet Take 1 tablet by mouth 2 times daily 180 tablet 3    dapagliflozin (FARXIGA) 10 MG tablet Take 1 tablet by mouth every morning 90 tablet 3    traZODone (DESYREL) 50 MG tablet Take 1 tablet by mouth nightly 90 tablet 3    Multiple Vitamins-Minerals (THERAPEUTIC-M/LUTEIN) TABS Take 1 tablet by mouth daily 30 tablet

## 2023-09-27 ENCOUNTER — CARE COORDINATION (OUTPATIENT)
Dept: CARE COORDINATION | Age: 63
End: 2023-09-27

## 2023-09-27 DIAGNOSIS — G47.33 OSA (OBSTRUCTIVE SLEEP APNEA): Primary | ICD-10-CM

## 2023-09-27 LAB — STATUS: NORMAL

## 2023-09-29 DIAGNOSIS — E11.8 CONTROLLED TYPE 2 DIABETES MELLITUS WITH COMPLICATION, WITHOUT LONG-TERM CURRENT USE OF INSULIN (HCC): ICD-10-CM

## 2023-10-03 ENCOUNTER — CARE COORDINATION (OUTPATIENT)
Dept: CARE COORDINATION | Age: 63
End: 2023-10-03

## 2023-10-03 DIAGNOSIS — J42 CHRONIC BRONCHITIS, UNSPECIFIED CHRONIC BRONCHITIS TYPE (HCC): ICD-10-CM

## 2023-10-03 DIAGNOSIS — E11.8 CONTROLLED TYPE 2 DIABETES MELLITUS WITH COMPLICATION, WITHOUT LONG-TERM CURRENT USE OF INSULIN (HCC): ICD-10-CM

## 2023-10-03 DIAGNOSIS — I10 ESSENTIAL HYPERTENSION: Primary | ICD-10-CM

## 2023-10-03 NOTE — CARE COORDINATION
CCSS placed call to patient to arrange RPM kit  through [facility] ([place], [place]). Reviewed with patient how to pack equipment in original packing. Verified patient's availability to schedule UPS  time. UPS  time requested.  Anticipated  date range 2-4 business days

## 2023-10-03 NOTE — PROGRESS NOTES
Remote Patient Order Discontinued    Received request from Shasha Casey RN  to discontinue order for remote patient monitoring of COPD, Diabetes, and HTN and order completed.

## 2023-10-03 NOTE — CARE COORDINATION
Remote Patient Monitoring Graduation      Date/Time:  10/3/2023 9:53 AM  [facility] [address]  Patient has graduated from the Remote Patient Monitoring program on 10/3/2023. RPM goals have been met at this time. Patient has been provided instruction on process to return RPM equipment and RPM has been deactivated. Patient has ACM's contact information for any further questions, concerns, or needs.
PM BRAULIO Haskins CNP  [facility]   11/20/2023  9:30 AM Elizabeth Frost DPM [facility]   12/5/2023  1:00 PM BRAULIO Haskins CNP  [facility]   12/21/2023 10:00 AM Sanford Moore MD Neuro Spec Neurology -   9/25/2024  8:30 AM DO Alexandro Camilo Neuro [facility]

## 2023-10-03 NOTE — CARE COORDINATION
Writer called Patient to do a follow up Social service call with her. Patient was suppose to switch over to [employer] and reported that she has received the [employer].

## 2023-10-04 DIAGNOSIS — N18.30 STAGE 3 CHRONIC KIDNEY DISEASE, UNSPECIFIED WHETHER STAGE 3A OR 3B CKD (HCC): Chronic | ICD-10-CM

## 2023-10-04 DIAGNOSIS — E11.8 CONTROLLED TYPE 2 DIABETES MELLITUS WITH COMPLICATION, WITHOUT LONG-TERM CURRENT USE OF INSULIN (HCC): ICD-10-CM

## 2023-10-05 DIAGNOSIS — E11.8 CONTROLLED TYPE 2 DIABETES MELLITUS WITH COMPLICATION, WITHOUT LONG-TERM CURRENT USE OF INSULIN (HCC): ICD-10-CM

## 2023-10-05 RX ORDER — DEXAMETHASONE 1 MG
1 TABLET ORAL ONCE
Qty: 1 TABLET | Refills: 0 | OUTPATIENT
Start: 2023-10-05 | End: 2023-10-05

## 2023-10-05 NOTE — TELEPHONE ENCOUNTER
LOV 9/12/2023     RTO 10/10/2023  LRF 9/12/2023          Controlled Substance Monitoring:    Acute and Chronic Pain Monitoring:   RX Monitoring Periodic Controlled Substance Monitoring   2/15/2023   7:12 PM Obtaining appropriate analgesic effect of treatment.

## 2023-10-06 NOTE — TELEPHONE ENCOUNTER
I am confused? Why are we telling patient to take this again???  She took one time prior to her labs being drawn  I have the labs indicating she took it. When I asked for cortisol levels I was asking for the lab results. Please call patient  Apologize as office staff has been giving her incorrect information. She did test as directed already!!     If not clinical and do not understand information DO NOT interpret to patients. Please ask providers.

## 2023-10-10 ENCOUNTER — OFFICE VISIT (OUTPATIENT)
Dept: FAMILY MEDICINE CLINIC | Age: 63
End: 2023-10-10
Payer: MEDICARE

## 2023-10-10 VITALS
WEIGHT: 211 LBS | DIASTOLIC BLOOD PRESSURE: 80 MMHG | SYSTOLIC BLOOD PRESSURE: 124 MMHG | OXYGEN SATURATION: 98 % | BODY MASS INDEX: 38.59 KG/M2 | TEMPERATURE: 98 F | HEART RATE: 68 BPM

## 2023-10-10 DIAGNOSIS — F33.3 MAJOR DEPRESSIVE DISORDER, RECURRENT EPISODE, SEVERE, WITH PSYCHOTIC BEHAVIOR (HCC): ICD-10-CM

## 2023-10-10 DIAGNOSIS — N18.30 STAGE 3 CHRONIC KIDNEY DISEASE, UNSPECIFIED WHETHER STAGE 3A OR 3B CKD (HCC): Chronic | ICD-10-CM

## 2023-10-10 DIAGNOSIS — E66.01 SEVERE OBESITY (BMI 35.0-39.9) WITH COMORBIDITY (HCC): ICD-10-CM

## 2023-10-10 DIAGNOSIS — E11.8 CONTROLLED TYPE 2 DIABETES MELLITUS WITH COMPLICATION, WITHOUT LONG-TERM CURRENT USE OF INSULIN (HCC): Primary | ICD-10-CM

## 2023-10-10 DIAGNOSIS — J41.8 MIXED SIMPLE AND MUCOPURULENT CHRONIC BRONCHITIS (HCC): ICD-10-CM

## 2023-10-10 DIAGNOSIS — I71.40 ABDOMINAL AORTIC ANEURYSM (AAA) WITHOUT RUPTURE, UNSPECIFIED PART (HCC): ICD-10-CM

## 2023-10-10 DIAGNOSIS — K59.01 SLOW TRANSIT CONSTIPATION: ICD-10-CM

## 2023-10-10 DIAGNOSIS — I10 ESSENTIAL HYPERTENSION: ICD-10-CM

## 2023-10-10 PROCEDURE — 3074F SYST BP LT 130 MM HG: CPT | Performed by: NURSE PRACTITIONER

## 2023-10-10 PROCEDURE — 99214 OFFICE O/P EST MOD 30 MIN: CPT | Performed by: NURSE PRACTITIONER

## 2023-10-10 PROCEDURE — 3078F DIAST BP <80 MM HG: CPT | Performed by: NURSE PRACTITIONER

## 2023-10-10 PROCEDURE — 1123F ACP DISCUSS/DSCN MKR DOCD: CPT | Performed by: NURSE PRACTITIONER

## 2023-10-10 PROCEDURE — 3044F HG A1C LEVEL LT 7.0%: CPT | Performed by: NURSE PRACTITIONER

## 2023-10-10 RX ORDER — METOPROLOL SUCCINATE 25 MG/1
25 TABLET, EXTENDED RELEASE ORAL NIGHTLY
Qty: 30 TABLET | Refills: 0 | Status: SHIPPED
Start: 2023-10-10

## 2023-10-10 RX ORDER — ESCITALOPRAM OXALATE 10 MG/1
TABLET ORAL
COMMUNITY
Start: 2023-09-25

## 2023-10-10 ASSESSMENT — ENCOUNTER SYMPTOMS
SORE THROAT: 0
SHORTNESS OF BREATH: 0
BACK PAIN: 1
CHEST TIGHTNESS: 1
DIARRHEA: 0
ABDOMINAL PAIN: 0
WHEEZING: 0
SINUS PRESSURE: 0
COUGH: 0
ABDOMINAL DISTENTION: 0
TROUBLE SWALLOWING: 0
CONSTIPATION: 1
RHINORRHEA: 0
RECTAL PAIN: 0

## 2023-10-10 NOTE — PROGRESS NOTES
DPM as Consulting Physician (Podiatry)  Kenrick Parks MD (Psychiatry)  Bear Delcid MD as Consulting Physician (Neurology)  Beatrice Fong, DO as Surgeon (Neurosurgery)  Deb Jha RN as [address], MSW, LSW as  (Licensed Clinical )    SUBJECTIVE/OBJECTIVE  History of Present illness / Visit Summary   Patient presents today for follow up   Reviewed health maintenance and any recent labs/imaging    Patient struggles with transportation   Has transportation services, they no show for her pick ups which leads to her not being able to keep appointments? Monitoring hyperparathyroidism ? May need referral ?     Needs to have CPAP portion sleep study   Sleep study indicates mild NARCISO   This is mild, may be exacerbating memory, weight gain, diabetes. Etc. .        Pth Intact   9/3/2013 67    3/28/2022 61.67    8/2/2022 29.54    8/23/2023 435 (H)        Vit D, 25-Hydroxy   9/3/2013 33.0    8/23/2023 43.6        Review of Systems  Review of Systems   Constitutional:  Positive for unexpected weight change (weight gain over past few months. diet changes?). Negative for activity change, appetite change, chills, fatigue and fever. HENT:  Negative for congestion, ear pain, postnasal drip, rhinorrhea, sinus pressure, sneezing, sore throat and trouble swallowing. New upper dentures. Will get lower nest Tuesday    Eyes:  Negative for visual disturbance. Recent eye exam, new glasses    Respiratory:  Positive for chest tightness (w/anxiety). Negative for cough (excessive sputum), shortness of breath (w/exertion) and wheezing. Follows with pulmonology. Cardiovascular:  Positive for palpitations (w/anxiety). Negative for chest pain and leg swelling. Follows with cardiology    Gastrointestinal:  Positive for constipation. Negative for abdominal distention, abdominal pain, diarrhea and rectal pain.         Moving bowels almost daily with

## 2023-10-16 ENCOUNTER — CARE COORDINATION (OUTPATIENT)
Dept: CARE COORDINATION | Age: 63
End: 2023-10-16

## 2023-10-16 NOTE — CARE COORDINATION
Writer called Patient to do a  follow up Social service call with her. We had been working on her Insurance concerns as she had [employer] and they were not IN-NETWORK with [employer] for [employer]. Patient received Notification that the Dispute had been settled and they were back IN-NETWORK with Lefty Ortez agreed to leave her MEDICAID with [employer] and Shyann Lucas would stay with [employer]. Patient was in agreement with this decision.

## 2023-10-17 ENCOUNTER — CARE COORDINATION (OUTPATIENT)
Dept: CARE COORDINATION | Age: 63
End: 2023-10-17

## 2023-10-17 NOTE — CARE COORDINATION
Ambulatory Care Coordination Note  10/17/2023    Patient Current Location:  Home: [address]  [facility] [address]     ACM contacted the patient by telephone. Verified name and  with patient as identifiers. Provided introduction to self, and explanation of the ACM role. Challenges to be reviewed by the provider   Additional needs identified to be addressed with provider: No  none               Method of communication with provider: none. ACM: Durga Sanches, RN  Spoke with pt who said her granddaughter has been staying with her and this has been very helpful to her. She said this has lifted her spirits and physically is helping her. She is in the process of doing a 24 hour urine. She will have the rest of her sleep study done on  she did follow up with cardiology yesterday. She will see nephrology next week     1) acp done   2) sdoh done   3) med review done   4) cardiology dr Turner Brock 10/16  5) podiatry Dr Beck Childress done   6) nephrology Dr Juli Mitchell   7) neurosurgery dr Alfonso Todd  done   8) eye yearly exam due in  done   9)   done   10) pcp 10/10  11) pill packing through [address]   12)  referral for passport, life alert and food insecurities done   13) home care referral for PT / OT [facility]  14) sleep study  done  15) labs done   16) dr Bo Alberto          Offered patient enrollment in the Remote Patient Monitoring (RPM) program for in-home monitoring: NA.     Lab Results       None                 Goals Addressed    None         Future Appointments   Date Time Provider [address]   10/22/2023  7:30 PM [facility] SLEEP RM [address] [facility]   10/23/2023 10:30 AM Vanessa Smiley MD [facility] None   2023  1:00 PM Marlen Flavors, APRN - CNP [facility]   2023  9:30 AM Pretty Hays DPM [facility]   2023  1:00 PM Marlen Flavors, APRN - CNP [facility]   2023 10:00 AM Shelley Bone MD Neuro

## 2023-10-18 ENCOUNTER — HOSPITAL ENCOUNTER (OUTPATIENT)
Age: 63
Setting detail: SPECIMEN
Discharge: HOME | End: 2023-10-18

## 2023-10-18 ENCOUNTER — CARE COORDINATION (OUTPATIENT)
Dept: CARE COORDINATION | Age: 63
End: 2023-10-18

## 2023-10-18 DIAGNOSIS — F33.3 MAJOR DEPRESSIVE DISORDER, RECURRENT EPISODE, SEVERE, WITH PSYCHOTIC BEHAVIOR (HCC): ICD-10-CM

## 2023-10-18 DIAGNOSIS — E11.8 CONTROLLED TYPE 2 DIABETES MELLITUS WITH COMPLICATION, WITHOUT LONG-TERM CURRENT USE OF INSULIN (HCC): ICD-10-CM

## 2023-10-18 DIAGNOSIS — I10 ESSENTIAL HYPERTENSION: ICD-10-CM

## 2023-10-18 NOTE — CARE COORDINATION
Patient has missed UPS  times. Pt is willing to drop off the RPM kit to a UPS location. Patient confirmed address. [address] [facility],  [address]  Writer contacted [facility] to have them create a return label for her. Pt informed once she receives the return label in the mail, she can drop off the kit to a UPS location, pt voiced understanding.    Writer will update ACM

## 2023-10-21 LAB
COLLECT DURATION TIME SPEC: NORMAL H
CORTISOL (UR), FREE: 17.5 UG/D
CORTISOL URINE, FREE (/G CRT): 5.9 UG/L
CORTISOL, URINE RATIO CREATININE: 24.58 UG/G CRT
CORTISOL, URINE: NORMAL
CREAT 24H UR-MCNC: 24 MG/DL
CREATININE URINE /24 HR: 713 MG/D (ref 500–1400)
SPECIMEN VOL ?TM UR: 2972 ML

## 2023-10-22 ENCOUNTER — HOSPITAL ENCOUNTER (OUTPATIENT)
Dept: SLEEP CENTER | Age: 63
Discharge: HOME | End: 2023-10-24
Payer: MEDICARE

## 2023-10-22 VITALS — BODY MASS INDEX: 38.83 KG/M2 | HEIGHT: 62 IN | WEIGHT: 211 LBS

## 2023-10-22 DIAGNOSIS — G47.33 OSA (OBSTRUCTIVE SLEEP APNEA): ICD-10-CM

## 2023-10-22 PROCEDURE — 95811 POLYSOM 6/>YRS CPAP 4/> PARM: CPT

## 2023-10-24 ENCOUNTER — TELEPHONE (OUTPATIENT)
Dept: FAMILY MEDICINE CLINIC | Age: 63
End: 2023-10-24

## 2023-10-24 NOTE — TELEPHONE ENCOUNTER
Lannis Mcburney from [facility] called and stated they will see patient Two times a week for three weeks then one time a week for one week, for OT

## 2023-10-31 LAB — STATUS: NORMAL

## 2023-11-01 ENCOUNTER — CARE COORDINATION (OUTPATIENT)
Dept: CARE COORDINATION | Age: 63
End: 2023-11-01

## 2023-11-01 ENCOUNTER — TELEPHONE (OUTPATIENT)
Dept: FAMILY MEDICINE CLINIC | Age: 63
End: 2023-11-01

## 2023-11-01 NOTE — TELEPHONE ENCOUNTER
----- Message from Priti Ozuna sent at 10/31/2023  3:02 PM EDT -----  Subject: Message to Provider    QUESTIONS  Information for Provider? Nurse needs a call back   ---------------------------------------------------------------------------  --------------  [address]  [phone]; OK to leave message on voicemail  ---------------------------------------------------------------------------  --------------  SCRIPT ANSWERS  Relationship to Patient? Covered Entity  Covered Entity Type? Other  Other Covered Entity Type? Nurse  Representative Name?  Niall Valencia

## 2023-11-01 NOTE — CARE COORDINATION
Ambulatory Care Coordination Note  2023    Patient Current Location:  Home: [address]  [facility] [address]     ACM contacted the patient by telephone. Verified name and  with patient as identifiers. Provided introduction to self, and explanation of the ACM role. Challenges to be reviewed by the provider   Additional needs identified to be addressed with provider: No  none               Method of communication with provider: none. ACM: Willis Figueroa RN    Spoke with pt who said she is doing ok at home. She had PT this am she feels PT is helping her knees. She did have her second part of her sleep study done. She did see nephrology who does not need to see her back for a year. She will see her pcp next week. She denies any care coordination needs at this time. Will graduate from care coordination she does have ac phone number to call for future care coordination needs. Offered patient enrollment in the Remote Patient Monitoring (RPM) program for in-home monitoring: NA. Lab Results       None            Care Coordination Interventions    Referral from Primary Care Provider: No  Suggested Interventions and Community Resources  Diabetes Education: Declined  Fall Risk Prevention:  In Process  Disease Specific Clinic: Completed  Home Health Services: Completed  Physical Therapy: Completed  Social Work: Completed  Other Therapy Services: Completed  Zone Management Tools: Completed          Goals Addressed    None         Future Appointments   Date Time Provider [address]   2023  1:00 PM BRAULIO Dalton CNP  [facility]   2023  9:30 AM Elie Claros DPM [facility]   2023  1:00 PM BRAULIO Dalton CNP  [facility]   2023 10:00 AM Lilian Asif MD Neuro Spec Neurology -   2024  8:30 AM DO Alexandro Trejo Neuro [facility]

## 2023-11-02 NOTE — TELEPHONE ENCOUNTER
Spoke with elias mcclain home care nurse who wanted to talk about her wt gain, and depression. Pt is doing better with her granddaughter staying with her lately. Samson Medina has been making poor dietary choices. She has been snaking lately. This is also increasing her bs. She is getting meals on wheels. They did discuss better choices.  Will refer her to dietician to see if she can help

## 2023-11-06 ENCOUNTER — CARE COORDINATION (OUTPATIENT)
Dept: CARE COORDINATION | Age: 63
End: 2023-11-06

## 2023-11-06 NOTE — CARE COORDINATION
Ambulatory Care Coordination Note  2023    Patient Current Location:  Home: [address]  [facility] [address]     ACM contacted the patient by telephone. Verified name and  with patient as identifiers. Provided introduction to self, and explanation of the ACM role. Challenges to be reviewed by the provider   Additional needs identified to be addressed with provider: No  none               Method of communication with provider: none. ACM: Jorge Luis Roche, RN  Pt called to say she called the pharmacy on Friday and told them she was going to run out of her meds. They were to send her meds over night. She did not get them now she has been out of meds since Saturday. Did call [employer] who said they will track her package for delivery and see if they can get 4-5 days worth of meds to giver her until delivery. Yesica said she will call her daughter to see if she can go pick them up. [employer] will call Yesica and let her know for sure if she should come  a short order of meds  Pt needs a dietician referral for wt gain. Her home care nurse feels her wt gain is likely related to seditary lifestyle and making poor food choices and too many snacks. Pt agrees to call acm if she does not hear from the pharmacy by 330 pm  1) acp done   2) sdoh done   3) med review done   4) cardiology dr Rod Velasquez 24  5) podiatry Dr Dewayne Morgan 23   6) nephrology Dr Beulah Almeida oct 2024  7) neurosurgery dr Sree Mcgee 24  8) eye yearly exam due in  done   9)   done   10) pcp   11) pill packing through [address]   12)  referral for passport, life alert and food insecurities done   13) home care referral for [place] / [place] [facility] done   14) dietician referral   15) dr Orlando Cheung     Offered patient enrollment in the Remote Patient Monitoring (RPM) program for in-home monitoring: NA.     Lab Results       None            Care Coordination Interventions    Referral from Primary Care Provider:

## 2023-11-07 ENCOUNTER — OFFICE VISIT (OUTPATIENT)
Dept: FAMILY MEDICINE CLINIC | Age: 63
End: 2023-11-07

## 2023-11-07 ENCOUNTER — CARE COORDINATION (OUTPATIENT)
Dept: CARE COORDINATION | Age: 63
End: 2023-11-07

## 2023-11-07 VITALS
BODY MASS INDEX: 39.6 KG/M2 | RESPIRATION RATE: 22 BRPM | TEMPERATURE: 97.2 F | OXYGEN SATURATION: 97 % | SYSTOLIC BLOOD PRESSURE: 132 MMHG | DIASTOLIC BLOOD PRESSURE: 82 MMHG | HEART RATE: 82 BPM | WEIGHT: 215.2 LBS | HEIGHT: 62 IN

## 2023-11-07 VITALS — BODY MASS INDEX: 39.6 KG/M2 | WEIGHT: 215.2 LBS | HEIGHT: 62 IN

## 2023-11-07 DIAGNOSIS — N18.30 STAGE 3 CHRONIC KIDNEY DISEASE, UNSPECIFIED WHETHER STAGE 3A OR 3B CKD (HCC): Chronic | ICD-10-CM

## 2023-11-07 DIAGNOSIS — E11.21 DIABETIC NEPHROPATHY ASSOCIATED WITH TYPE 2 DIABETES MELLITUS (HCC): ICD-10-CM

## 2023-11-07 DIAGNOSIS — J41.8 MIXED SIMPLE AND MUCOPURULENT CHRONIC BRONCHITIS (HCC): ICD-10-CM

## 2023-11-07 DIAGNOSIS — I71.40 ABDOMINAL AORTIC ANEURYSM (AAA) WITHOUT RUPTURE, UNSPECIFIED PART (HCC): ICD-10-CM

## 2023-11-07 DIAGNOSIS — E11.8 CONTROLLED TYPE 2 DIABETES MELLITUS WITH COMPLICATION, WITHOUT LONG-TERM CURRENT USE OF INSULIN (HCC): ICD-10-CM

## 2023-11-07 DIAGNOSIS — M10.9 ACUTE GOUT OF RIGHT KNEE, UNSPECIFIED CAUSE: ICD-10-CM

## 2023-11-07 DIAGNOSIS — D18.02 FAMILIAL CAVERNOUS CEREBRAL ANGIOMA (HCC): ICD-10-CM

## 2023-11-07 DIAGNOSIS — Z00.00 ENCOUNTER FOR SUBSEQUENT ANNUAL WELLNESS VISIT (AWV) IN MEDICARE PATIENT: Primary | ICD-10-CM

## 2023-11-07 DIAGNOSIS — I63.9 CEREBROVASCULAR ACCIDENT (CVA), UNSPECIFIED MECHANISM (HCC): ICD-10-CM

## 2023-11-07 DIAGNOSIS — Z00.00 WELCOME TO MEDICARE PREVENTIVE VISIT: Primary | ICD-10-CM

## 2023-11-07 DIAGNOSIS — G47.33 OSA (OBSTRUCTIVE SLEEP APNEA): ICD-10-CM

## 2023-11-07 DIAGNOSIS — F33.3 MAJOR DEPRESSIVE DISORDER, RECURRENT EPISODE, SEVERE, WITH PSYCHOTIC BEHAVIOR (HCC): Chronic | ICD-10-CM

## 2023-11-07 DIAGNOSIS — E21.3 HYPERPARATHYROIDISM (HCC): ICD-10-CM

## 2023-11-07 DIAGNOSIS — K59.01 SLOW TRANSIT CONSTIPATION: ICD-10-CM

## 2023-11-07 DIAGNOSIS — B37.2 YEAST DERMATITIS: ICD-10-CM

## 2023-11-07 DIAGNOSIS — R42 VERTIGO: ICD-10-CM

## 2023-11-07 DIAGNOSIS — I10 ESSENTIAL HYPERTENSION: ICD-10-CM

## 2023-11-07 DIAGNOSIS — F41.9 ANXIETY: ICD-10-CM

## 2023-11-07 LAB
VITAMIN D 25-HYDROXY: 39
VITAMIN D2, 25 HYDROXY: NORMAL
VITAMIN D3,25 HYDROXY: NORMAL

## 2023-11-07 RX ORDER — ACYCLOVIR 400 MG/1
1 TABLET ORAL CONTINUOUS
Qty: 1 EACH | Refills: 0 | Status: SHIPPED | OUTPATIENT
Start: 2023-11-07 | End: 2024-11-06

## 2023-11-07 RX ORDER — ACYCLOVIR 400 MG/1
1 TABLET ORAL
Qty: 10 EACH | Refills: 3 | Status: SHIPPED | OUTPATIENT
Start: 2023-11-07 | End: 2024-11-06

## 2023-11-07 RX ORDER — NYSTATIN 100000 [USP'U]/G
POWDER TOPICAL
Qty: 30 G | Refills: 1 | Status: SHIPPED | OUTPATIENT
Start: 2023-11-07

## 2023-11-07 RX ORDER — COLCHICINE 0.6 MG/1
0.6 TABLET ORAL 2 TIMES DAILY PRN
Qty: 30 TABLET | Refills: 0 | Status: SHIPPED | OUTPATIENT
Start: 2023-11-07 | End: 2023-12-07

## 2023-11-07 RX ORDER — BENZTROPINE MESYLATE 0.5 MG/1
0.5 TABLET ORAL 2 TIMES DAILY
COMMUNITY
Start: 2023-10-20

## 2023-11-07 RX ORDER — POLYETHYLENE GLYCOL 3350 17 G/17G
POWDER, FOR SOLUTION ORAL
Qty: 1 EACH | Refills: 5 | Status: SHIPPED | OUTPATIENT
Start: 2023-11-07

## 2023-11-07 ASSESSMENT — ENCOUNTER SYMPTOMS
COUGH: 0
WHEEZING: 0
SINUS PRESSURE: 0
DIARRHEA: 0
RECTAL PAIN: 0
CONSTIPATION: 1
SORE THROAT: 0
CHEST TIGHTNESS: 1
ABDOMINAL PAIN: 0
TROUBLE SWALLOWING: 0
ABDOMINAL DISTENTION: 0
SHORTNESS OF BREATH: 0
BACK PAIN: 1
RHINORRHEA: 0

## 2023-11-07 ASSESSMENT — PATIENT HEALTH QUESTIONNAIRE - PHQ9
SUM OF ALL RESPONSES TO PHQ QUESTIONS 1-9: 5
1. LITTLE INTEREST OR PLEASURE IN DOING THINGS: 3
2. FEELING DOWN, DEPRESSED OR HOPELESS: 2
SUM OF ALL RESPONSES TO PHQ9 QUESTIONS 1 & 2: 5
SUM OF ALL RESPONSES TO PHQ QUESTIONS 1-9: 5

## 2023-11-07 NOTE — PROGRESS NOTES
Constitutional:  Positive for unexpected weight change (weight gain over past few months. diet changes?). Negative for activity change, appetite change, chills, fatigue and fever. More active. Referral for nutritionist    HENT:  Negative for congestion, ear pain, postnasal drip, rhinorrhea, sinus pressure, sneezing, sore throat and trouble swallowing. New upper dentures. Will get lower nest Tuesday    Eyes:  Negative for visual disturbance. Recent eye exam, new glasses    Respiratory:  Positive for chest tightness (w/anxiety). Negative for cough (excessive sputum), shortness of breath (w/exertion) and wheezing. Follows with pulmonology. Repeat sleep study indicates need for CPAP - will need sleep medicine    Cardiovascular:  Negative for chest pain, palpitations (w/anxiety) and leg swelling. Follows with cardiology    Gastrointestinal:  Positive for constipation. Negative for abdominal distention, abdominal pain, diarrhea and rectal pain. Moving bowels almost daily with addition of Linzess    Endocrine:        Follows with podiatry routinely   150-250  Insurance still not covering CGM ?? Genitourinary:  Negative for difficulty urinating, dysuria, frequency, hematuria and urgency. Follows with nephrology    Musculoskeletal:  Positive for arthralgias (bilateral knee pain - psudeo gout?) and back pain (chronic, lumbar). Negative for gait problem, joint swelling and myalgias. Skin:  Negative for rash (abdominal fold - yeast like) and wound. Allergic/Immunologic: Negative for environmental allergies. Neurological:  Positive for tremors (worse with anxiety). Negative for dizziness, syncope, light-headedness, numbness and headaches. Follows with neurology - routine MRI scans. Both scheduled   Unsteady at times. No recent falls   Hematological:  Does not bruise/bleed easily.    Psychiatric/Behavioral:  Positive for decreased concentration and sleep

## 2023-11-07 NOTE — CARE COORDINATION
Referral from [facility], Navid Childs, RN-  This is a very sweet lady who has dm,ckd, copd. A few years ago she lost wt (intentional) and now is putting a lot of wt back on. She has seen pcp and nephrology related to her wt gain. Her home care nurse called me Friday and said she things laurita is just sitting around a lot and make poor dietary choices particularly her snacking. Laurita is agreeable to talk to you she is really sweet. She does have complex family issues and does have depression which contributes to her diet choices. Thank you Laurel Garcia :)     Will reach out to patient for consult. Call to patient who relays she is in the doctor's office and requests a call back later today.   RELL Lion
Vitamins-Minerals (THERAPEUTIC-M/LUTEIN) TABS Take 1 tablet by mouth daily 30 tablet 11    atorvastatin (LIPITOR) 40 MG tablet Take 1 tablet by mouth daily 90 tablet 1    acetaminophen (TYLENOL) 500 MG tablet Take 2 tablets by mouth every 6 hours as needed      isosorbide mononitrate (IMDUR) 30 MG extended release tablet Take 1 tablet by mouth daily 90 tablet 1    Lift Chair MISC by Does not apply route 1 each 0    denosumab (PROLIA) 60 MG/ML SOSY SC injection Inject 1 mL into the skin once for 1 dose Every 6 months - to be given in office 1 mL 0    blood glucose test strips (ONETOUCH ULTRA) strip TEST THREE TIMES A  each 5    allopurinol (ZYLOPRIM) 100 MG tablet Take 1 tablet by mouth 2 times daily 180 tablet 3    buPROPion (WELLBUTRIN XL) 150 MG extended release tablet Take 3 tablets by mouth daily Taking 150 mg and 300 mg- Total 450 mg. Caltrate 600+D Plus Minerals (CALTRATE) 600-800 MG-UNIT TABS tablet Take 1 tablet by mouth 2 times daily 60 tablet 11    tiZANidine (ZANAFLEX) 2 MG tablet Take 1 tablet by mouth every 6 hours as needed      Blood Pressure Monitoring (BLOOD PRESSURE CUFF) MISC Use as directed by physician to check blood pressure. Please fit to patient. 1 each 0    Alcohol Swabs (ALCOHOL PREP) 70 % PADS Use twice daily and as needed to check blood sugars 120 each 3    ipratropium-albuterol (DUONEB) 0.5-2.5 (3) MG/3ML SOLN nebulizer solution Take 3 mLs by nebulization every 6 hours as needed for Shortness of Breath 360 mL 0    risperiDONE (RISPERDAL) 0.5 MG tablet Take 2 tablets by mouth at bedtime      Respiratory Therapy Supplies (NEBULIZER/TUBING/MOUTHPIECE) KIT 1 kit by Does not apply route 4 times daily as needed (wheezing) 1 kit 0    Lancets Misc. (ACCU-CHEK FASTCLIX LANCET) KIT use as directed PLEASE APPROVED NEW DEVICE WHILE WE WAIT FOR PRIOR AUTH. ..  FASTCLIX MIGHT BE EAISER TO MANIPULATE 1 kit 0    Accu-Chek FastClix Lancets MISC use 1 LANCET to TEST BLOOD SUGAR one to two times

## 2023-11-07 NOTE — PROGRESS NOTES
Medicare Annual Wellness Visit    Rae Madden is here for Medicare AWV    Assessment & Plan   Welcome to Medicare preventive visit  Abdominal aortic aneurysm (AAA) without rupture, unspecified part [facility])  Assessment & Plan:   Monitored by specialist- no acute findings meriting change in the plan  Cerebrovascular accident (CVA), unspecified mechanism ([address])  Assessment & Plan:   Monitored by specialist- no acute findings meriting change in the plan  Familial cavernous cerebral angioma ([address])  Assessment & Plan:   Monitored by specialist- no acute findings meriting change in the plan  Mixed simple and mucopurulent chronic bronchitis ([address])  Assessment & Plan:   Monitored by specialist- no acute findings meriting change in the plan  Controlled type 2 diabetes mellitus with complication, without long-term current use of insulin ([address])  Assessment & Plan:   Borderline controlled, continue current medications and continue current treatment plan  Diabetic nephropathy associated with type 2 diabetes mellitus ([address])  Assessment & Plan:   Well-controlled, continue current medications and continue current treatment plan  Stage 3 chronic kidney disease, unspecified whether stage 3a or 3b CKD ([address])  Assessment & Plan:   Monitored by specialist- no acute findings meriting change in the plan  Major depressive disorder, recurrent episode, severe, with psychotic behavior ([address])  Assessment & Plan:   Monitored by specialist- no acute findings meriting change in the plan  Recommendations for Preventive Services Due: see orders and patient instructions/AVS.  Recommended screening schedule for the next 5-10 years is provided to the patient in written form: see Patient Instructions/AVS.     No follow-ups on file. Subjective   The following acute and/or chronic problems were also addressed today:  See additional OV note. Patient's complete Health Risk Assessment and screening values have been reviewed and are found in Flowsheets.  The

## 2023-11-10 DIAGNOSIS — M54.50 ACUTE BILATERAL LOW BACK PAIN WITHOUT SCIATICA: ICD-10-CM

## 2023-11-10 DIAGNOSIS — E21.3 HYPERPARATHYROIDISM (HCC): ICD-10-CM

## 2023-11-10 DIAGNOSIS — I10 ESSENTIAL HYPERTENSION: ICD-10-CM

## 2023-11-12 DIAGNOSIS — M10.9 ACUTE GOUT OF RIGHT KNEE, UNSPECIFIED CAUSE: ICD-10-CM

## 2023-11-13 ENCOUNTER — CARE COORDINATION (OUTPATIENT)
Dept: CARE COORDINATION | Age: 63
End: 2023-11-13

## 2023-11-13 RX ORDER — COLCHICINE 0.6 MG/1
TABLET ORAL
Qty: 30 TABLET | Refills: 0 | OUTPATIENT
Start: 2023-11-13

## 2023-11-13 RX ORDER — LIDOCAINE 50 MG/G
PATCH TOPICAL
Qty: 30 PATCH | Refills: 0 | Status: SHIPPED | OUTPATIENT
Start: 2023-11-13

## 2023-11-13 NOTE — CARE COORDINATION
Ambulatory Care Coordination Note  2023    Patient Current Location:  Home: [address]  [facility] [address]     ACM contacted the patient by telephone. Verified name and  with patient as identifiers. Provided introduction to self, and explanation of the ACM role. Challenges to be reviewed by the provider   Additional needs identified to be addressed with provider: No  none               Method of communication with provider: none. ACM: Christine Judd, RN  Spoke with pt who said she is doing ok at home. She has been feeling sleepy she has been sitting around more lately she agrees to try and increase her activity doing some of her PT exercises for 5 min 3 times a day. She did start her Rybelsus she did talk to the dietician. She does have all her medications at this time. 1) acp done   2) sdoh done   3) med review done   4) cardiology dr Victorina Dunbar 24  5) podiatry Dr Zenon Mullen 23   6) nephrology Dr Ginna Gabriel oct 2024  7) neurosurgery dr Parish Allen 24  8) eye yearly exam due in  done   9)   done   10) pcp   11) pill packing through [address]   12)  referral for passport, life alert and food insecurities done   13) home care referral for PT / OT [facility] [facility] done   14) dietician referral   15) dr Irwin Barraza       Offered patient enrollment in the Remote Patient Monitoring (RPM) program for in-home monitoring: Patient declined. Lab Results       None            Care Coordination Interventions    Referral from Primary Care Provider: No  Suggested Interventions and Community Resources  Fall Risk Prevention: Completed  Disease Specific Clinic: In Process  Medi Set or Pill Pack: Completed  Registered Dietician: In Process  Transportation Support: Completed          Goals Addressed                   This Visit's Progress     Conditions and Symptoms   On track     I will schedule office visits, as directed by my provider.   I will keep my appointment or reschedule if I have

## 2023-11-13 NOTE — TELEPHONE ENCOUNTER
LOV 11/7/223   RTO 12/5/23  LRF 5/16/23          Controlled Substance Monitoring:    Acute and Chronic Pain Monitoring:   RX Monitoring Periodic Controlled Substance Monitoring   2/15/2023   7:12 PM Obtaining appropriate analgesic effect of treatment.

## 2023-11-19 DIAGNOSIS — M81.0 AGE-RELATED OSTEOPOROSIS WITHOUT CURRENT PATHOLOGICAL FRACTURE: Primary | ICD-10-CM

## 2023-11-20 ENCOUNTER — CARE COORDINATION (OUTPATIENT)
Dept: CARE COORDINATION | Age: 63
End: 2023-11-20

## 2023-11-20 ENCOUNTER — OFFICE VISIT (OUTPATIENT)
Dept: PODIATRY | Age: 63
End: 2023-11-20
Payer: MEDICARE

## 2023-11-20 VITALS — HEIGHT: 62 IN | BODY MASS INDEX: 39.56 KG/M2 | WEIGHT: 215 LBS

## 2023-11-20 DIAGNOSIS — E11.51 TYPE II DIABETES MELLITUS WITH PERIPHERAL CIRCULATORY DISORDER (HCC): Primary | ICD-10-CM

## 2023-11-20 DIAGNOSIS — B35.1 DERMATOPHYTOSIS OF NAIL: ICD-10-CM

## 2023-11-20 DIAGNOSIS — M79.671 PAIN IN BOTH FEET: ICD-10-CM

## 2023-11-20 DIAGNOSIS — L85.3 XEROSIS OF SKIN: ICD-10-CM

## 2023-11-20 DIAGNOSIS — I73.9 PVD (PERIPHERAL VASCULAR DISEASE) (HCC): ICD-10-CM

## 2023-11-20 DIAGNOSIS — M79.672 PAIN IN BOTH FEET: ICD-10-CM

## 2023-11-20 PROCEDURE — 3044F HG A1C LEVEL LT 7.0%: CPT | Performed by: PODIATRIST

## 2023-11-20 PROCEDURE — 1123F ACP DISCUSS/DSCN MKR DOCD: CPT | Performed by: PODIATRIST

## 2023-11-20 PROCEDURE — 11721 DEBRIDE NAIL 6 OR MORE: CPT | Performed by: PODIATRIST

## 2023-11-20 PROCEDURE — 99213 OFFICE O/P EST LOW 20 MIN: CPT | Performed by: PODIATRIST

## 2023-11-20 RX ORDER — AMMONIUM LACTATE 12 G/100G
CREAM TOPICAL
Qty: 385 G | Refills: 3 | Status: SHIPPED | OUTPATIENT
Start: 2023-11-20

## 2023-11-20 RX ORDER — GINGER ROOT/GINGER ROOT EXT 262.5 MG
1 CAPSULE ORAL 2 TIMES DAILY
Qty: 180 TABLET | Refills: 3 | Status: SHIPPED | OUTPATIENT
Start: 2023-11-20 | End: 2024-11-19

## 2023-11-20 NOTE — CARE COORDINATION
Writer called Patient to do a follow up Social service call with her. Patient wants to switch her Insurance from [employer] back to [employer] Ajay is trying to help her find out what the process is to do so. Patient switched when her PCP and the Organization was Not In Network with [employer].

## 2023-11-20 NOTE — CARE COORDINATION
Ambulatory Care Coordination Note  2023    Patient Current Location:  Home: [address]  [facility] [address]     ACM contacted the patient by telephone. Verified name and  with patient as identifiers. Provided introduction to self, and explanation of the ACM role. Challenges to be reviewed by the provider   Additional needs identified to be addressed with provider: No  none               Method of communication with provider: none. ACM: Marissa Lamb, RN  Spoke with pt who said she is doing ok at home. She said her fatigue has been really bad. She started taking cogentin recently and thinks this might be what is causing her fatigue she does follow up with them Wednesday and will ask them about changing the dose to hs only. She said her weight is stable she has not gained any more wt. She is going to be getting a home health aids again starting today 4 days a week 4 hours at a time.  She is going to look into getting [employer] on her phone so her hha can  her groceries for her if she orders them on the ca.   1) acp done   2) sdoh done   3) med review done   4) cardiology dr Lorenzo Pearson 24  5) podiatry Dr Diane Bishop 23   6) nephrology Dr Cara Guerrero oct 2024  7) neurosurgery dr Patricia Stratton 24  8) eye yearly exam due in  done   9)   done   10) pcp   11) pill packing through [address]   12)  referral for passport, life alert and food insecurities done   13) home care referral for PT / [place] [facility] done   14) dietician referral   15) dr Megan Varghese     Offered patient enrollment in the Remote Patient Monitoring (RPM) program for in-home monitoring:  na .    Lab Results       None            Care Coordination Interventions    Referral from Primary Care Provider: No  Suggested Interventions and Community Resources  Fall Risk Prevention: Completed  Disease Specific Clinic: In Process  Medi Set or Pill Pack: Completed  Registered Dietician: Completed  Transportation Support:

## 2023-11-20 NOTE — TELEPHONE ENCOUNTER
LOV 11/7/2023   RTO 12/5/2023  LRF 12/19/2022          Controlled Substance Monitoring:    Acute and Chronic Pain Monitoring:   RX Monitoring Periodic Controlled Substance Monitoring   2/15/2023   7:12 PM Obtaining appropriate analgesic effect of treatment.

## 2023-11-20 NOTE — PROGRESS NOTES
[address]  Dept: [phone]    DIABETIC PROGRESS NOTE  Date of patient's visit: 11/21/2023  Patient's Name:  Rere Arora YOB: 1960            Patient Care Team:  BRAULIO Carlos CNP as PCP - General (Nurse Practitioner Family)  BRAULIO Carlos CNP as PCP - Empaneled Provider  Marianna Yo MD as Consulting Physician (Nephrology)  Cristobal Montero MD as Consulting Physician (Pulmonology)  Kacey Reyes MD as Consulting Physician (Cardiology)  Ash Arevalo DPM as Consulting Physician (Podiatry)  Gypsy Faustin MD (Psychiatry)  Donnie Wasserman MD as Consulting Physician (Neurology)  Darell Alvarado DO as Surgeon (Neurosurgery)  Asya Lennon MSW, LSW as  (Licensed Clinical )  Angie Gabriel RN as Ambulatory Care Manager  Zohaib Nieto [place], LD as Dietitian          Chief Complaint   Patient presents with    Diabetes     Diabetic foot care  Bs 140 this am    Peripheral Neuropathy     Bl feet       Subjective:   Rere Arora comes to clinic for Diabetes (Diabetic foot care/Bs 140 this am) and Peripheral Neuropathy (Bl feet)    she is a diabetic and states that she checks her feet daily . Pt currently has complaint of thickened, elongated nails that they cannot manage by themselves. Pt's primary care physician is BRAULIO Carlos CNP last seen November 7 2023   Pt's last blood sugar was 140 . Pt has a new complaint of dry scaly skin to the bottom of both of the feet. Pt states they have tried OTC cream but it isnt applied regularly. Pt has tried changing shoes but it has not helped the pain       Lab Results   Component Value Date    LABA1C 6.6 (A) 08/23/2023      Complains of numbness in the feet bilat.   Past Medical History:   Diagnosis Date    Acute tracheobronchitis-viral 8/5/2021    Anxiety     Chronic kidney

## 2023-11-23 ENCOUNTER — APPOINTMENT (OUTPATIENT)
Dept: GENERAL RADIOLOGY | Age: 63
End: 2023-11-23
Payer: MEDICARE

## 2023-11-23 ENCOUNTER — HOSPITAL ENCOUNTER (EMERGENCY)
Age: 63
Discharge: HOME | End: 2023-11-23
Attending: EMERGENCY MEDICINE
Payer: MEDICARE

## 2023-11-23 VITALS
TEMPERATURE: 98.4 F | WEIGHT: 218 LBS | HEART RATE: 93 BPM | DIASTOLIC BLOOD PRESSURE: 64 MMHG | OXYGEN SATURATION: 95 % | HEIGHT: 62 IN | BODY MASS INDEX: 40.12 KG/M2 | RESPIRATION RATE: 16 BRPM | SYSTOLIC BLOOD PRESSURE: 112 MMHG

## 2023-11-23 DIAGNOSIS — J44.1 COPD WITH ACUTE EXACERBATION (HCC): Primary | ICD-10-CM

## 2023-11-23 LAB
ALBUMIN SERPL-MCNC: 4.1 G/DL (ref 3.5–5.2)
ALBUMIN/GLOB SERPL: 1.3 {RATIO} (ref 1–2.5)
ALP SERPL-CCNC: 92 U/L (ref 35–104)
ALT SERPL-CCNC: 18 U/L (ref 5–33)
ANION GAP SERPL CALCULATED.3IONS-SCNC: 12 MMOL/L (ref 9–17)
AST SERPL-CCNC: 18 U/L
BACTERIA URNS QL MICRO: ABNORMAL
BASOPHILS # BLD: 0.1 K/UL (ref 0–0.2)
BASOPHILS NFR BLD: 1 % (ref 0–2)
BILIRUB SERPL-MCNC: 0.3 MG/DL (ref 0.3–1.2)
BILIRUB UR QL STRIP: NEGATIVE
BUN SERPL-MCNC: 14 MG/DL (ref 8–23)
CALCIUM SERPL-MCNC: 9.4 MG/DL (ref 8.6–10.4)
CHARACTER UR: ABNORMAL
CHLORIDE SERPL-SCNC: 104 MMOL/L (ref 98–107)
CLARITY UR: CLEAR
CO2 SERPL-SCNC: 23 MMOL/L (ref 20–31)
COLOR UR: YELLOW
CREAT SERPL-MCNC: 1 MG/DL (ref 0.5–0.9)
EOSINOPHIL # BLD: 0.2 K/UL (ref 0–0.4)
EOSINOPHILS RELATIVE PERCENT: 1 % (ref 1–4)
EPI CELLS #/AREA URNS HPF: ABNORMAL /HPF (ref 0–5)
ERYTHROCYTE [DISTWIDTH] IN BLOOD BY AUTOMATED COUNT: 15.4 % (ref 12.5–15.4)
FLUAV AG SPEC QL: NEGATIVE
FLUBV AG SPEC QL: NEGATIVE
GFR SERPL CREATININE-BSD FRML MDRD: >60 ML/MIN/1.73M2
GLUCOSE SERPL-MCNC: 136 MG/DL (ref 70–99)
GLUCOSE UR STRIP-MCNC: ABNORMAL MG/DL
HCT VFR BLD AUTO: 38 % (ref 36–46)
HGB BLD-MCNC: 12.9 G/DL (ref 12–16)
HGB UR QL STRIP.AUTO: ABNORMAL
KETONES UR STRIP-MCNC: NEGATIVE MG/DL
LEUKOCYTE ESTERASE UR QL STRIP: NEGATIVE
LIPASE SERPL-CCNC: 30 U/L (ref 13–60)
LYMPHOCYTES NFR BLD: 2.6 K/UL (ref 1–4.8)
LYMPHOCYTES RELATIVE PERCENT: 21 % (ref 24–44)
MCH RBC QN AUTO: 31.5 PG (ref 26–34)
MCHC RBC AUTO-ENTMCNC: 33.9 G/DL (ref 31–37)
MCV RBC AUTO: 93.1 FL (ref 80–100)
MONOCYTES NFR BLD: 0.7 K/UL (ref 0.1–1.2)
MONOCYTES NFR BLD: 6 % (ref 2–11)
NEUTROPHILS NFR BLD: 71 % (ref 36–66)
NEUTS SEG NFR BLD: 8.7 K/UL (ref 1.8–7.7)
NITRITE UR QL STRIP: NEGATIVE
PH UR STRIP: 6 [PH] (ref 5–8)
PLATELET # BLD AUTO: 211 K/UL (ref 140–450)
PMV BLD AUTO: 6.7 FL (ref 6–12)
POTASSIUM SERPL-SCNC: 4.1 MMOL/L (ref 3.7–5.3)
PROT SERPL-MCNC: 7.3 G/DL (ref 6.4–8.3)
PROT UR STRIP-MCNC: NEGATIVE MG/DL
RBC # BLD AUTO: 4.08 M/UL (ref 4–5.2)
RBC #/AREA URNS HPF: ABNORMAL /HPF (ref 0–2)
SARS-COV-2 RDRP RESP QL NAA+PROBE: NOT DETECTED
SODIUM SERPL-SCNC: 139 MMOL/L (ref 135–144)
SP GR UR STRIP: 1.01 (ref 1–1.03)
SPECIMEN DESCRIPTION: NORMAL
TROPONIN I SERPL HS-MCNC: 17 NG/L (ref 0–14)
TROPONIN I SERPL HS-MCNC: 19 NG/L (ref 0–14)
UROBILINOGEN UR STRIP-ACNC: NORMAL EU/DL (ref 0–1)
WBC #/AREA URNS HPF: ABNORMAL /HPF (ref 0–5)
WBC OTHER # BLD: 12.2 K/UL (ref 3.5–11)

## 2023-11-23 PROCEDURE — 96361 HYDRATE IV INFUSION ADD-ON: CPT

## 2023-11-23 PROCEDURE — 2580000003 HC RX 258: Performed by: REGISTERED NURSE

## 2023-11-23 PROCEDURE — 6370000000 HC RX 637 (ALT 250 FOR IP): Performed by: REGISTERED NURSE

## 2023-11-23 PROCEDURE — 87804 INFLUENZA ASSAY W/OPTIC: CPT

## 2023-11-23 PROCEDURE — 87635 SARS-COV-2 COVID-19 AMP PRB: CPT

## 2023-11-23 PROCEDURE — 93005 ELECTROCARDIOGRAM TRACING: CPT | Performed by: REGISTERED NURSE

## 2023-11-23 PROCEDURE — 96375 TX/PRO/DX INJ NEW DRUG ADDON: CPT

## 2023-11-23 PROCEDURE — 84484 ASSAY OF TROPONIN QUANT: CPT

## 2023-11-23 PROCEDURE — 82803 BLOOD GASES ANY COMBINATION: CPT

## 2023-11-23 PROCEDURE — 6360000002 HC RX W HCPCS: Performed by: REGISTERED NURSE

## 2023-11-23 PROCEDURE — 81001 URINALYSIS AUTO W/SCOPE: CPT

## 2023-11-23 PROCEDURE — 71045 X-RAY EXAM CHEST 1 VIEW: CPT

## 2023-11-23 PROCEDURE — 85025 COMPLETE CBC W/AUTO DIFF WBC: CPT

## 2023-11-23 PROCEDURE — 83690 ASSAY OF LIPASE: CPT

## 2023-11-23 PROCEDURE — 99285 EMERGENCY DEPT VISIT HI MDM: CPT

## 2023-11-23 PROCEDURE — 80053 COMPREHEN METABOLIC PANEL: CPT

## 2023-11-23 PROCEDURE — 96365 THER/PROPH/DIAG IV INF INIT: CPT

## 2023-11-23 PROCEDURE — 36415 COLL VENOUS BLD VENIPUNCTURE: CPT

## 2023-11-23 RX ORDER — ONDANSETRON 2 MG/ML
4 INJECTION INTRAMUSCULAR; INTRAVENOUS ONCE
Status: COMPLETED | OUTPATIENT
Start: 2023-11-23 | End: 2023-11-23

## 2023-11-23 RX ORDER — DEXAMETHASONE SODIUM PHOSPHATE 10 MG/ML
10 INJECTION, SOLUTION INTRAMUSCULAR; INTRAVENOUS ONCE
Status: COMPLETED | OUTPATIENT
Start: 2023-11-23 | End: 2023-11-23

## 2023-11-23 RX ORDER — ACETAMINOPHEN 500 MG
1000 TABLET ORAL ONCE
Status: COMPLETED | OUTPATIENT
Start: 2023-11-23 | End: 2023-11-23

## 2023-11-23 RX ORDER — IPRATROPIUM BROMIDE AND ALBUTEROL SULFATE 2.5; .5 MG/3ML; MG/3ML
1 SOLUTION RESPIRATORY (INHALATION) EVERY 8 HOURS PRN
Qty: 180 EACH | Refills: 0 | Status: SHIPPED | OUTPATIENT
Start: 2023-11-23

## 2023-11-23 RX ORDER — IPRATROPIUM BROMIDE AND ALBUTEROL SULFATE 2.5; .5 MG/3ML; MG/3ML
1 SOLUTION RESPIRATORY (INHALATION) ONCE
Status: COMPLETED | OUTPATIENT
Start: 2023-11-23 | End: 2023-11-23

## 2023-11-23 RX ORDER — PREDNISONE 20 MG/1
40 TABLET ORAL DAILY
Qty: 10 TABLET | Refills: 0 | Status: SHIPPED | OUTPATIENT
Start: 2023-11-23 | End: 2023-11-28

## 2023-11-23 RX ORDER — AZITHROMYCIN 250 MG/1
250 TABLET, FILM COATED ORAL SEE ADMIN INSTRUCTIONS
Qty: 6 TABLET | Refills: 0 | Status: SHIPPED | OUTPATIENT
Start: 2023-11-23 | End: 2023-11-28

## 2023-11-23 RX ORDER — 0.9 % SODIUM CHLORIDE 0.9 %
1000 INTRAVENOUS SOLUTION INTRAVENOUS ONCE
Status: COMPLETED | OUTPATIENT
Start: 2023-11-23 | End: 2023-11-23

## 2023-11-23 RX ORDER — IPRATROPIUM BROMIDE AND ALBUTEROL SULFATE 2.5; .5 MG/3ML; MG/3ML
1 SOLUTION RESPIRATORY (INHALATION) EVERY 8 HOURS PRN
Qty: 180 EACH | Refills: 0 | Status: SHIPPED | OUTPATIENT
Start: 2023-11-23 | End: 2023-11-23 | Stop reason: SDUPTHER

## 2023-11-23 RX ADMIN — SODIUM CHLORIDE 1000 ML: 9 INJECTION, SOLUTION INTRAVENOUS at 18:30

## 2023-11-23 RX ADMIN — ACETAMINOPHEN 1000 MG: 500 TABLET ORAL at 19:39

## 2023-11-23 RX ADMIN — IPRATROPIUM BROMIDE AND ALBUTEROL SULFATE 1 DOSE: 2.5; .5 SOLUTION RESPIRATORY (INHALATION) at 19:52

## 2023-11-23 RX ADMIN — DEXAMETHASONE SODIUM PHOSPHATE 10 MG: 10 INJECTION, SOLUTION INTRAMUSCULAR; INTRAVENOUS at 20:53

## 2023-11-23 RX ADMIN — CEFTRIAXONE SODIUM 1000 MG: 1 INJECTION, POWDER, FOR SOLUTION INTRAMUSCULAR; INTRAVENOUS at 20:53

## 2023-11-23 RX ADMIN — ONDANSETRON 4 MG: 2 INJECTION INTRAMUSCULAR; INTRAVENOUS at 18:32

## 2023-11-23 ASSESSMENT — PAIN - FUNCTIONAL ASSESSMENT
PAIN_FUNCTIONAL_ASSESSMENT: NONE - DENIES PAIN

## 2023-11-23 ASSESSMENT — ENCOUNTER SYMPTOMS
DIARRHEA: 0
VOMITING: 1
NAUSEA: 1
SHORTNESS OF BREATH: 0
COUGH: 1
ABDOMINAL PAIN: 0
BACK PAIN: 0

## 2023-11-23 NOTE — ED PROVIDER NOTES
Differential   Result Value Ref Range    WBC 12.2 (H) 3.5 - 11.0 k/uL    RBC 4.08 4.0 - 5.2 m/uL    Hemoglobin 12.9 12.0 - 16.0 g/dL    Hematocrit 38.0 36 - 46 %    MCV 93.1 80 - 100 fL    MCH 31.5 26 - 34 pg    MCHC 33.9 31 - 37 g/dL    RDW 15.4 12.5 - 15.4 %    Platelets 148 577 - 798 k/uL    MPV 6.7 6.0 - 12.0 fL    Neutrophils % 71 (H) 36 - 66 %    Lymphocytes % 21 (L) 24 - 44 %    Monocytes % 6 2 - 11 %    Eosinophils % 1 1 - 4 %    Basophils % 1 0 - 2 %    Neutrophils Absolute 8.70 (H) 1.8 - 7.7 k/uL    Lymphocytes Absolute 2.60 1.0 - 4.8 k/uL    Monocytes Absolute 0.70 0.1 - 1.2 k/uL    Eosinophils Absolute 0.20 0.0 - 0.4 k/uL    Basophils Absolute 0.10 0.0 - 0.2 k/uL   Comprehensive Metabolic Panel   Result Value Ref Range    Sodium 139 135 - 144 mmol/L    Potassium 4.1 3.7 - 5.3 mmol/L    Chloride 104 98 - 107 mmol/L    CO2 23 20 - 31 mmol/L    Anion Gap 12 9 - 17 mmol/L    Glucose 136 (H) 70 - 99 mg/dL    BUN 14 8 - 23 mg/dL    Creatinine 1.0 (H) 0.5 - 0.9 mg/dL    Est, Glom Filt Rate >60 >60 mL/min/1.73m2    Calcium 9.4 8.6 - 10.4 mg/dL    Total Protein 7.3 6.4 - 8.3 g/dL    Albumin 4.1 3.5 - 5.2 g/dL    Albumin/Globulin Ratio 1.3 1.0 - 2.5    Total Bilirubin 0.3 0.3 - 1.2 mg/dL    Alkaline Phosphatase 92 35 - 104 U/L    ALT 18 5 - 33 U/L    AST 18 <32 U/L   Lipase   Result Value Ref Range    Lipase 30 13 - 60 U/L   Urinalysis with Reflex to Culture    Specimen: Urine, clean catch   Result Value Ref Range    Color, UA Yellow Yellow    Turbidity UA Clear Clear    Glucose, Ur 3+ (A) NEGATIVE mg/dL    Bilirubin Urine NEGATIVE NEGATIVE    Ketones, Urine NEGATIVE NEGATIVE mg/dL    Specific Gravity, UA 1.014 1.005 - 1.030    Urine Hgb TRACE (A) NEGATIVE    pH, UA 6.0 5.0 - 8.0    Protein, UA NEGATIVE NEGATIVE mg/dL    Urobilinogen, Urine Normal 0.0 - 1.0 EU/dL    Nitrite, Urine NEGATIVE NEGATIVE    Leukocyte Esterase, Urine NEGATIVE NEGATIVE   Troponin   Result Value Ref Range    Troponin, High Sensitivity 19 patient demonstrate understanding. Patient is nontoxic and stable for discharge with outpatient follow-up. CRITICAL CARE:    None    PROCEDURES:    See midlevel documentation      OARRS Report if indicated         DISPOSITION Decision To Discharge 11/23/2023 09:31:54 PM      (Please note that portions of this note were completed with a voice recognition program.  Efforts were made to edit the dictations but occasionally words are mis-transcribed. Additionally, portions of this note may also include information that was incorporated after care transfer to another provider that were not available at the time of my evaluation.   Some of this information could likely include laboratory values, vital sign updates, medications etc.)    Ethel Israel DO   Attending Emergency Physician       Ethel Israel DO  11/24/23 [id number]

## 2023-11-24 ENCOUNTER — CARE COORDINATION (OUTPATIENT)
Dept: CARE COORDINATION | Age: 63
End: 2023-11-24

## 2023-11-24 LAB
EKG ATRIAL RATE: 95 BPM
EKG P AXIS: 32 DEGREES
EKG P-R INTERVAL: 186 MS
EKG Q-T INTERVAL: 366 MS
EKG QRS DURATION: 66 MS
EKG QTC CALCULATION (BAZETT): 459 MS
EKG R AXIS: -16 DEGREES
EKG T AXIS: 23 DEGREES
EKG VENTRICULAR RATE: 95 BPM

## 2023-11-24 NOTE — CARE COORDINATION
Ambulatory Care Coordination Note  2023    Patient Current Location:  Home: [address]  [facility] [address]     ACM contacted the patient by telephone. Verified name and  with patient as identifiers. Provided introduction to self, and explanation of the ACM role. Challenges to be reviewed by the provider   Additional needs identified to be addressed with provider: No  none               Method of communication with provider: none. ACM: Saba Almendarez, RN  Spoke with pt who said her hha did start. She did go to the ER yesterday because of her cough. She was told it was her copd she is feeling better today. She has follow up apt for . 1) acp done   2) sdoh done   3) med review done   4) cardiology dr Reyes Gasmen 24  5) podiatry Dr Jeff Sotelo 24  6) nephrology Dr Hayde Ruiz oct 2024  7) neurosurgery dr Lazara Shannon 24  8) eye yearly exam due in  done   9)   done   10) pcp   11) pill packing through [address]   12)  referral for passport, life alert and food insecurities done   13) home care referral for PT / [place] [facility] done   14) dietician referral   15) dr Crockett       Offered patient enrollment in the Remote Patient Monitoring (RPM) program for in-home monitoring: Patient declined. Lab Results       None                 Goals Addressed                   This Visit's Progress     Conditions and Symptoms   On track     I will schedule office visits, as directed by my provider. I will keep my appointment or reschedule if I have to cancel. I will notify my provider of any barriers to my plan of care.     Barriers: overwhelmed by complexity of regimen  Plan for overcoming my barriers: care coordination   Confidence: 8/10  Anticipated Goal Completion Date: 24                Future Appointments   Date Time Provider [address]   2023  1:00 PM BRAULIO Oh - [facility] Sousa [facility]   2023 10:00 AM Jonita Bloch, MD Neuro

## 2023-11-24 NOTE — DISCHARGE INSTRUCTIONS
Please understand that at this time there is no evidence for a more serious underlying process, but that early in the process of an illness or injury, an emergency department workup can be falsely reassuring. You should contact your family doctor within the next 48 hours for a follow up appointment    [address]!!!    From [facility] ([facility]) and [facility] Emergency Services    On behalf of the Emergency Department staff at [facility]), I would like to thank you for giving us the opportunity to address your health care needs and concerns. We hope that during your visit, our service was delivered in a professional and caring manner. Please keep [facility] ([facility]) in mind as we walk with you down the path to your own personal wellness. Please expect an automated text message or email from us so we can ask a few questions about your health and progress. Based on your answers, a clinician may call you back to offer help and instructions. Please understand that early in the process of an illness or injury, an emergency department workup can be falsely reassuring. If you notice any worsening, changing or persistent symptoms please call your family doctor or return to the ER immediately. Tell us how we did during your visit at [email or web address]   and let us know about your experience

## 2023-11-25 NOTE — CARE COORDINATION
I referred her to Carolyn Vargas - saw once summer 2022. She was to make follow up.  I think she was waiting for sleep study first?   She used to see Danise Kehr

## 2023-11-26 NOTE — PROGRESS NOTES
[facility]   [address]  [phone]    11/26/23     Patient ID  Hope Braden is a 70 y.o. female  Established patient    Chief Complaint  Hope Braden presents today for Cough      ASSESSMENT/PLAN  1. Essential hypertension  2. Acute bronchiolitis due to unspecified organism  -     fluticasone-umeclidin-vilant (TRELEGY ELLIPTA) 100-62.5-25 MCG/ACT AEPB inhaler; Inhale 1 puff into the lungs daily [id number] exp 4/25, Disp-2 each, R-0Sample  -     guaiFENesin (MUCINEX) 600 MG extended release tablet; Take 1 tablet by mouth 2 times daily for 15 days, Disp-30 tablet, R-0Normal  -     benzonatate (TESSALON) 200 MG capsule; Take 1 capsule by mouth 3 times daily as needed for Cough, Disp-42 capsule, R-0Normal  3. Subacute cough  -     fluticasone-umeclidin-vilant (TRELEGY ELLIPTA) 100-62.5-25 MCG/ACT AEPB inhaler; Inhale 1 puff into the lungs daily [id number] exp 4/25, Disp-2 each, R-0Sample  -     guaiFENesin (MUCINEX) 600 MG extended release tablet; Take 1 tablet by mouth 2 times daily for 15 days, Disp-30 tablet, R-0Normal  -     benzonatate (TESSALON) 200 MG capsule; Take 1 capsule by mouth 3 times daily as needed for Cough, Disp-42 capsule, R-0Normal  4. Controlled type 2 diabetes mellitus with complication, without long-term current use of insulin ([facility])  -     Iron and TIBC; Future  -     Ferritin; Future  -     Vitamin B12 & Folate; Future  -     CBC with Auto Differential; Future  5. Slow transit constipation  -     linaCLOtide (LINZESS) 72 MCG CAPS capsule; Take 1 capsule by mouth every morning (before breakfast), Disp-90 capsule, R-3Adjust Sig  6. Major depressive disorder, recurrent episode, severe, with psychotic behavior ([facility])  -     [facility]  7. Anxiety  -     [facility] ([facility])  8. Hyperparathyroidism ([facility])  9. Stage 3 chronic kidney disease, unspecified whether stage 3a or 3b CKD ([facility])  10.  RLS

## 2023-11-27 ENCOUNTER — CARE COORDINATION (OUTPATIENT)
Dept: CARE COORDINATION | Age: 63
End: 2023-11-27

## 2023-11-27 NOTE — CARE COORDINATION
Nutrition Care Coordinator Follow-Up visit:    Food Recall:eating 2-3 meals/d    Activity Level:  Sedentary:X  Lightly Active: Moderately Active:  Very Active:    Adult BMI:  Underweight (below 18.5)  Normal Weight (18.5-24.9)  Overweight (25-29. 9)  Obese (30-39.9)X  Morbidly Obese (>40)    Plan:  Plan was established with patient:  Increase dietary fiber by consuming whole grains, fruits and vegetables:  Limit dietary cholesterol to >200mg/day: Increase water intake:  Avoid added sugar:X  Avoid sweetened beverages:X  Choose lean meats:      Monitoring: Will monitor weight:  Will monitor adherence to meal plan:X  Will monitor adherence to exercise plan: Will monitor HGA1c:    Handouts Provided :  Low Carb snacking:X  Carb counting /individual meal plan:X  Portion Control:X  Food Labels:X  Physical Activity:  Low Fat/Cholesterol:  Hypo/Hyperglycemia:  Calorie Controlled Meal Plan:    Goals: Increase water consumption to 8oz. 6-8 times daily:  Manage blood sugars by consuming 3 meals spaced every 4-5 hours with 2-3 snacks daily:discussed  Increase fiber and decrease fat intake by consuming 1-2 fruit servings and 2-3 vegetable servings per day. Increase physical activity by:  Consume less than 2,000mg of sodium/day  Avoid consumption of sweetened beverages and added sugar by reading food labels:discussed  Monitor blood sugars by using meter to check blood glucose before morning meal and 2 hours after a meal daily:  Decrease risk of coronary heart disease by consuming fish that contains omega-3 fatty acids at least twice a week, avoiding partially hydrogenated oil/trans fats and limiting saturated fat intake by reading food labels:    Patient goals set: 1. Reviewed meal pattern, skips lunch. Importance of having a set eating schedule and timing, spacing and consistency of meal times discussed.  Quick/easy lunch suggestions discussed, will send patient a list of suggestions- encouraged to eat a snack at least.

## 2023-12-01 ENCOUNTER — CARE COORDINATION (OUTPATIENT)
Dept: CARE COORDINATION | Age: 63
End: 2023-12-01

## 2023-12-01 NOTE — CARE COORDINATION
Ambulatory Care Coordination Note  2023    Patient Current Location:  Home: [address]  [facility] [address]     ACM contacted the patient by telephone. Verified name and  with patient as identifiers. Provided introduction to self, and explanation of the ACM role. Challenges to be reviewed by the provider   Additional needs identified to be addressed with provider: No  none               Method of communication with provider: none. ACM: Willis Figueroa, RN  Spoke with pt who said she is feeling better but not great. She will see her pcp on Tuesday. She denies any needs at this time. Some one was at her house and she had to let acm go   1) acp done   2) sdoh done   3) med review done   4) cardiology dr Adela Lucia 24  5) podiatry Dr Rosa Maria Hardy 24  6) nephrology Dr Deshawn Jennings oct 2024  7) neurosurgery dr Caty Chowdhury 24  8) eye yearly exam due in  done   9)   done   10) pcp   11) pill packing through [address]   12)  referral for passport, life alert and food insecurities done   13) home care referral for [place] / [place] [facility] done   14) dietician referral   15) dr Kandy Nyhan       Offered patient enrollment in the Remote Patient Monitoring (RPM) program for in-home monitoring: completed. Lab Results       None            Care Coordination Interventions    Referral from Primary Care Provider: No  Suggested Interventions and Community Resources  Fall Risk Prevention: Completed  Disease Specific Clinic: In Process  Medi Set or Pill Pack: Completed  Registered Dietician: Completed  Transportation Support: Completed          Goals Addressed                   This Visit's Progress     Conditions and Symptoms   On track     I will schedule office visits, as directed by my provider. I will keep my appointment or reschedule if I have to cancel. I will notify my provider of any barriers to my plan of care.     Barriers: overwhelmed by complexity of regimen  Plan for overcoming my

## 2023-12-05 ENCOUNTER — OFFICE VISIT (OUTPATIENT)
Dept: FAMILY MEDICINE CLINIC | Age: 63
End: 2023-12-05

## 2023-12-05 VITALS
TEMPERATURE: 98.2 F | HEART RATE: 68 BPM | OXYGEN SATURATION: 98 % | BODY MASS INDEX: 38.41 KG/M2 | SYSTOLIC BLOOD PRESSURE: 130 MMHG | WEIGHT: 210 LBS | DIASTOLIC BLOOD PRESSURE: 80 MMHG

## 2023-12-05 DIAGNOSIS — R05.2 SUBACUTE COUGH: ICD-10-CM

## 2023-12-05 DIAGNOSIS — G25.81 RLS (RESTLESS LEGS SYNDROME): ICD-10-CM

## 2023-12-05 DIAGNOSIS — J21.9 ACUTE BRONCHIOLITIS DUE TO UNSPECIFIED ORGANISM: ICD-10-CM

## 2023-12-05 DIAGNOSIS — J41.8 MIXED SIMPLE AND MUCOPURULENT CHRONIC BRONCHITIS (HCC): ICD-10-CM

## 2023-12-05 DIAGNOSIS — I10 ESSENTIAL HYPERTENSION: Primary | ICD-10-CM

## 2023-12-05 DIAGNOSIS — N18.30 STAGE 3 CHRONIC KIDNEY DISEASE, UNSPECIFIED WHETHER STAGE 3A OR 3B CKD (HCC): Chronic | ICD-10-CM

## 2023-12-05 DIAGNOSIS — K59.01 SLOW TRANSIT CONSTIPATION: ICD-10-CM

## 2023-12-05 DIAGNOSIS — F33.3 MAJOR DEPRESSIVE DISORDER, RECURRENT EPISODE, SEVERE, WITH PSYCHOTIC BEHAVIOR (HCC): ICD-10-CM

## 2023-12-05 DIAGNOSIS — E11.8 CONTROLLED TYPE 2 DIABETES MELLITUS WITH COMPLICATION, WITHOUT LONG-TERM CURRENT USE OF INSULIN (HCC): ICD-10-CM

## 2023-12-05 DIAGNOSIS — E21.3 HYPERPARATHYROIDISM (HCC): ICD-10-CM

## 2023-12-05 DIAGNOSIS — G47.33 OSA (OBSTRUCTIVE SLEEP APNEA): ICD-10-CM

## 2023-12-05 DIAGNOSIS — F41.9 ANXIETY: ICD-10-CM

## 2023-12-05 LAB
BASOPHILS ABSOLUTE: 0.03 /ΜL
BASOPHILS RELATIVE PERCENT: 0.3 %
EOSINOPHILS ABSOLUTE: 0.27 /ΜL
EOSINOPHILS RELATIVE PERCENT: 3 %
FERRITIN: 42 NG/ML (ref 9–150)
FOLATE: 23
HCT VFR BLD CALC: 40.8 % (ref 36–46)
HEMOGLOBIN: 12.8 G/DL (ref 12–16)
IRON: 65
LYMPHOCYTES ABSOLUTE: 2.71 /ΜL
LYMPHOCYTES RELATIVE PERCENT: 29.6 %
MCH RBC QN AUTO: 30.8 PG
MCHC RBC AUTO-ENTMCNC: 31.4 G/DL
MCV RBC AUTO: 98.1 FL
MONOCYTES ABSOLUTE: 0.56 /ΜL
MONOCYTES RELATIVE PERCENT: 6.1 %
NEUTROPHILS ABSOLUTE: 5.55 /ΜL
NEUTROPHILS RELATIVE PERCENT: 60.8 %
PDW BLD-RTO: 14.2 %
PLATELET # BLD: 213 K/ΜL
PMV BLD AUTO: ABNORMAL FL
RBC # BLD: 4.16 10^6/ΜL
TOTAL IRON BINDING CAPACITY: 348
VITAMIN B-12: 579
WBC # BLD: 9.14 10^3/ML

## 2023-12-05 RX ORDER — GUAIFENESIN 600 MG/1
600 TABLET, EXTENDED RELEASE ORAL 2 TIMES DAILY
Qty: 30 TABLET | Refills: 0 | Status: SHIPPED | OUTPATIENT
Start: 2023-12-05 | End: 2023-12-20

## 2023-12-05 RX ORDER — BUPROPION HYDROCHLORIDE 300 MG/1
300 TABLET ORAL EVERY MORNING
COMMUNITY

## 2023-12-05 RX ORDER — BENZONATATE 200 MG/1
200 CAPSULE ORAL 3 TIMES DAILY PRN
Qty: 42 CAPSULE | Refills: 0 | Status: SHIPPED | OUTPATIENT
Start: 2023-12-05 | End: 2023-12-19

## 2023-12-05 ASSESSMENT — ENCOUNTER SYMPTOMS
RECTAL PAIN: 0
TROUBLE SWALLOWING: 0
COUGH: 0
DIARRHEA: 0
CONSTIPATION: 1
WHEEZING: 0
BACK PAIN: 1
SINUS PRESSURE: 0
CHEST TIGHTNESS: 1
SORE THROAT: 0
RHINORRHEA: 0
ABDOMINAL DISTENTION: 0
ABDOMINAL PAIN: 0
SHORTNESS OF BREATH: 0

## 2023-12-06 ENCOUNTER — CARE COORDINATION (OUTPATIENT)
Dept: CARE COORDINATION | Age: 63
End: 2023-12-06

## 2023-12-06 NOTE — CARE COORDINATION
Visit Vitals  /82   Pulse 80   Ht 5' 6\" (1.676 m)   Wt 342 lb (155.1 kg)   LMP 01/01/2011 Comment: Last cycle 5 years ago    BMI 55.20 kg/m² Writer called Patient to do a follow up Social service call with her to see how Patient was doing. Patient reported that she was doing ok, but not ok: As she has had to pay 90.00 within the last few days for medications. Patient was inquiring about if she switched her MEDICAID back to [facility], would that make a difference in her Medication cost.    Writer is going out to Patient's house on this Friday 12/08/2023 so we can look at her options.

## 2023-12-08 ENCOUNTER — CARE COORDINATION (OUTPATIENT)
Dept: CARE COORDINATION | Age: 63
End: 2023-12-08

## 2023-12-08 NOTE — CARE COORDINATION
Ambulatory Care Coordination Note  2023    Patient Current Location:  Home: [address]  [facility] [address]     ACM contacted the patient by telephone. Verified name and  with patient as identifiers. Provided introduction to self, and explanation of the ACM role. Challenges to be reviewed by the provider   Additional needs identified to be addressed with provider: No  none               Method of communication with provider: none. ACM: Merna Gutierrez, RN  Spoke with pt who said she is doing ok at this time. She does still have a residual cough she is doing neb, inhaler and taking tesslon pearls. She did get her labs done these are in care everywhere. SW will be coming to her house today   1) acp done   2) sdoh done   3) med review done   4) cardiology dr Bette Tavarez 24  5) podiatry Dr Violeta Garcia 24  6) nephrology Dr Sakina Gong oct 2024  7) neurosurgery dr Lenny Mead 24  8) eye yearly exam due in  done   9)  new referral placed   10) pcp 1/10  11) pill packing through [address]   12)  referral for passport, life alert and food insecurities done   13) home care referral for [place] / [place] [facility] done   15) dietician referral   15) dr Candice France       Offered patient enrollment in the Remote Patient Monitoring (RPM) program for in-home monitoring: Yes, patient already enrolled. Lab Results       None            Care Coordination Interventions    Referral from Primary Care Provider: No  Suggested Interventions and Community Resources  Fall Risk Prevention: Completed  Disease Specific Clinic: In Process  Medi Set or Pill Pack: Completed  Registered Dietician: Completed  Transportation Support: Completed          Goals Addressed                   This Visit's Progress     Conditions and Symptoms   On track     I will schedule office visits, as directed by my provider. I will keep my appointment or reschedule if I have to cancel.   I will notify my provider of any barriers to my plan of

## 2023-12-11 DIAGNOSIS — E11.8 CONTROLLED TYPE 2 DIABETES MELLITUS WITH COMPLICATION, WITHOUT LONG-TERM CURRENT USE OF INSULIN (HCC): ICD-10-CM

## 2023-12-11 DIAGNOSIS — G25.81 RLS (RESTLESS LEGS SYNDROME): ICD-10-CM

## 2023-12-13 ENCOUNTER — CARE COORDINATION (OUTPATIENT)
Dept: CARE COORDINATION | Age: 63
End: 2023-12-13

## 2023-12-13 DIAGNOSIS — I10 ESSENTIAL HYPERTENSION: ICD-10-CM

## 2023-12-13 RX ORDER — ISOSORBIDE MONONITRATE 30 MG/1
30 TABLET, EXTENDED RELEASE ORAL DAILY
Qty: 90 TABLET | Refills: 1 | Status: SHIPPED | OUTPATIENT
Start: 2023-12-13 | End: 2024-06-10

## 2023-12-13 ASSESSMENT — VISUAL ACUITY: OU: 1

## 2023-12-13 NOTE — TELEPHONE ENCOUNTER
LOV 12/5/23  RTO F/U scheduled  LRF 6/30/23    Health Maintenance   Topic Date Due    Diabetic Alb to Cr ratio (uACR) test  04/08/2020    Diabetic retinal exam  07/09/2022    Cervical cancer screen  11/16/2022    Hepatitis B vaccine (1 of 3 - Risk 3-dose series) 12/29/2023 (Originally 4/23/2018)    COVID-19 Vaccine (8 - 2023-24 season) 12/30/2023 (Originally 11/24/2023)    Pneumococcal 65+ years Vaccine (3 - PPSV23 or PCV20) 01/01/2024 (Originally 4/23/2023)    Respiratory Syncytial Virus (RSV) age 61 yrs+ (1 - 1-dose 60+ series) 12/05/2024 (Originally 4/23/2018)    A1C test (Diabetic or Prediabetic)  08/23/2024    Lipids  08/23/2024    Depression Monitoring  11/07/2024    GFR test (Diabetes, CKD 3-4, OR last GFR 15-59)  11/23/2024    Diabetic foot exam  11/27/2024    Breast cancer screen  12/09/2024    DTaP/Tdap/Td vaccine (2 - Td or Tdap) 08/04/2026    Colorectal Cancer Screen  02/16/2028    DEXA (modify frequency per FRAX score)  Completed    Flu vaccine  Completed    Shingles vaccine  Completed    Hepatitis C screen  Addressed    HIV screen  Addressed    Hepatitis A vaccine  Aged Out    Hib vaccine  Aged Out    Meningococcal (ACWY) vaccine  Aged Out    Pneumococcal 0-64 years Vaccine  Discontinued             (applicable per patient's age: Cancer Screenings, Depression Screening, Fall Risk Screening, Immunizations)    Hemoglobin A1C (%)   Date Value   08/23/2023 6.6 (A)   01/19/2023 6.1 (H)   09/19/2022 5.3     LDL Cholesterol (mg/dL)   Date Value   01/19/2023 89     LDL Calculated (mg/dL)   Date Value   08/23/2023 96     AST (U/L)   Date Value   11/23/2023 18     ALT (U/L)   Date Value   11/23/2023 18     BUN (mg/dL)   Date Value   11/23/2023 14      (goal A1C is < 7)   (goal LDL is <100) need 30-50% reduction from baseline     BP Readings from Last 3 Encounters:   12/05/23 130/80   11/23/23 112/64   11/07/23 132/82    (goal /80)      All Future Testing planned in [facility]:  Lab Frequency Next Occurrence

## 2023-12-13 NOTE — CARE COORDINATION
Writer called Patient back as she had reached out to  in regards to her Insurance. Chinedur is going to Patient's home on this Friday 12/15/2023 at 1PM to assist her with her Insurance Transition.

## 2023-12-13 NOTE — RESULT ENCOUNTER NOTE
Please call. Patient does not use MyChart.  Labs reviewed.    Blood count showing no infection or concern of chronic blood loss.    Iron and B12 okay   No changes in pharmaology.

## 2023-12-15 ENCOUNTER — CARE COORDINATION (OUTPATIENT)
Dept: CARE COORDINATION | Age: 63
End: 2023-12-15

## 2023-12-15 NOTE — CARE COORDINATION
was suppose to meet with Patient on today but Patient had another appointment that she went to.  called Patient and we Rescheduled our meeting for another day on the first part of Next week.

## 2023-12-15 NOTE — CARE COORDINATION
Ambulatory Care Coordination Note  12/15/2023    Patient Current Location:  Home: [address]  [facility] [id number]     ACM contacted the patient by telephone. Verified name and  with patient as identifiers. Provided introduction to self, and explanation of the ACM role. Challenges to be reviewed by the provider   Additional needs identified to be addressed with provider: No  none               Method of communication with provider: none. ACM: Merna Gutierrez, RN  Spoke with pt who said she had her neuropsychic evaluation done today. [id number] The SW will come out to her house next week. 1) acp done   2) sdoh done   3) med review done   4) cardiology dr Bette Tavarez 24  5) podiatry Dr Violeta Garcia 24  6) nephrology Dr Sakina Gong oct 2024  7) neurosurgery dr Lenny Mead 24  8) eye yearly exam due in  done   9)  new referral placed   10) pcp 1/10  11) pill packing through [address]   12)  referral for passport, life alert and food insecurities done   13) home care referral for [place] [facility] [place] [facility] done   14) dietician referral   15) dr Candice France       Offered patient enrollment in the Remote Patient Monitoring (RPM) program for in-home monitoring:  completed . Lab Results       None            Care Coordination Interventions    Referral from Primary Care Provider: No  Suggested Interventions and Community Resources  Fall Risk Prevention: Completed  Disease Specific Clinic: In Process  Medi Set or Pill Pack: Completed  Registered Dietician: Completed  Transportation Support: Completed          Goals Addressed                   This Visit's Progress     Conditions and Symptoms   On track     I will schedule office visits, as directed by my provider. I will keep my appointment or reschedule if I have to cancel. I will notify my provider of any barriers to my plan of care.     Barriers: overwhelmed by complexity of regimen  Plan for overcoming my barriers: care coordination

## 2024-01-02 ENCOUNTER — OFFICE VISIT (OUTPATIENT)
Dept: BEHAVIORAL/MENTAL HEALTH CLINIC | Age: 64
End: 2024-01-02
Payer: COMMERCIAL

## 2024-01-02 ENCOUNTER — HOSPITAL ENCOUNTER (OUTPATIENT)
Dept: MRI IMAGING | Age: 64
Discharge: HOME | End: 2024-01-04
Attending: PSYCHIATRY & NEUROLOGY
Payer: MEDICARE

## 2024-01-02 DIAGNOSIS — J21.9 ACUTE BRONCHIOLITIS DUE TO UNSPECIFIED ORGANISM: ICD-10-CM

## 2024-01-02 DIAGNOSIS — R05.2 SUBACUTE COUGH: ICD-10-CM

## 2024-01-02 DIAGNOSIS — G89.29 CHRONIC MIDLINE LOW BACK PAIN WITH SCIATICA, SCIATICA LATERALITY UNSPECIFIED: ICD-10-CM

## 2024-01-02 DIAGNOSIS — M54.40 CHRONIC MIDLINE LOW BACK PAIN WITH SCIATICA, SCIATICA LATERALITY UNSPECIFIED: ICD-10-CM

## 2024-01-02 DIAGNOSIS — F43.10 POST TRAUMATIC STRESS DISORDER: Primary | ICD-10-CM

## 2024-01-02 DIAGNOSIS — R26.81 GAIT INSTABILITY: ICD-10-CM

## 2024-01-02 PROCEDURE — 72148 MRI LUMBAR SPINE W/O DYE: CPT

## 2024-01-02 PROCEDURE — 1123F ACP DISCUSS/DSCN MKR DOCD: CPT | Performed by: COUNSELOR

## 2024-01-02 PROCEDURE — 90832 PSYTX W PT 30 MINUTES: CPT | Performed by: COUNSELOR

## 2024-01-02 NOTE — TELEPHONE ENCOUNTER
LOV 12/05/2023   RTO 01/10/2024  LRF 12/05/2023    Sample x1  Lot: [id number]  Exp: 02/2025  Kellyg: AUDI  NDC: [id number]          Controlled Substance Monitoring:    Acute and Chronic Pain Monitoring:   RX Monitoring Periodic Controlled Substance Monitoring   2/15/2023   7:12 PM Obtaining appropriate analgesic effect of treatment.

## 2024-01-02 NOTE — PROGRESS NOTES
ADULT BEHAVIORAL HEALTH FOLLOW UP  KIT RODRIGUEZ, [facility]       Visit Date: 1/2/2024   Time of appointment:  11:00am -  11:17am  Time spent with Patient: 17 minutes.   This is patient's second appointment.    Reason for Consult: Trauma and stress.      Referring Provider/PCP:    No ref. provider found  Trisha Arndt, APRN - CNP      Patient provided informed consent for the behavioral health program. Discussed with patient model of service to include the limits of confidentiality (i.e. abuse reporting, suicide intervention, etc.) and short-term intervention focused approach.  Patient indicated understanding.    LEV Silver is a 63 y.o. female who presents for follow up of stress and trauma. Patient reported that she has another appointment at 11:45am and can only stay for around 20 minutes. Patient discussed that something happened to her bank account and that her social security was withdrawn from her account without her knowledge or consent. Patient stated she only has $0.22 in her bank account now. Patient identified feeling anxious and unsure of what to do. Writer provided empathetic and supportive listening and feedback. Writer guided patient in problem solving. Patient identified that she going to call her bank after her next appointment and then will speak to her landlord about her rent. Writer informed patient that she may need to contact the police regarding the issue. Writer reminded patient of crisis resources.     Previous Recommendations: Patient was to follow up with writer to engage in therapy services.         MENTAL STATUS EXAM  Mood was anxious and sad with anxious and tearful affect.   Suicidal ideation was denied.   Homicidal ideation was denied.   Hygiene was good .  Dress was appropriate.   Behavior was Within Normal Limits with Yes observation of difficulties ambulating.   Attitude was Cooperative and Engageable.  Eye-contact was good.  Speech: rate - WNL, rhythm - WNL,

## 2024-01-04 NOTE — TELEPHONE ENCOUNTER
Hypertension     Hyperlipidemia     Osteoarthritis     Depression     Obesity     CKD (chronic kidney disease) stage 3, GFR 30-59 ml/min     Proteinuria     Controlled type 2 diabetes mellitus with stage 3 chronic kidney disease, without long-term current use of insulin ([facility])     History of DVT of lower extremity     NARCISO on CPAP 4

## 2024-01-05 ENCOUNTER — CARE COORDINATION (OUTPATIENT)
Dept: CARE COORDINATION | Age: 64
End: 2024-01-05

## 2024-01-05 NOTE — CARE COORDINATION
Ambulatory Care Coordination Note  2024    Patient Current Location:  Home: [id number]  [facility] [address]     ACM contacted the patient by telephone. Verified name and  with patient as identifiers. Provided introduction to self, and explanation of the ACM role.     Challenges to be reviewed by the provider   Additional needs identified to be addressed with provider: No  none               Method of communication with provider: none.    ACM: Zoie Varma, RN  Spoke with pt who said she is doing ok at home she will be at her apt next week with  and her PCP. She denies any needs at this time   1) acp done   2) sdoh done   3) med review done   4) cardiology dr jean 24  5) podiatry Dr Alejandra 24  6) nephrology Dr Lopez oct 2024  7) neurosurgery dr baumann 24  8) eye yearly exam due in  done   9) BH 24  10) pcp 1/10  11) pill packing through [facility]   12)  referral for passport, life alert and food insecurities done   13) home care referral for [place] [facility] done   14) dietician referral   15) dr cardona 3/12/24  Offered patient enrollment in the Remote Patient Monitoring (RPM) program for in-home monitoring: Patient declined.    Lab Results       None            Care Coordination Interventions    Referral from Primary Care Provider: No  Suggested Interventions and Community Resources  Fall Risk Prevention: Completed  Disease Specific Clinic: In Process  Medi Set or Pill Pack: Completed  Registered Dietician: Completed  Transportation Support: Completed          Goals Addressed                   This Visit's Progress     Conditions and Symptoms   On track     I will schedule office visits, as directed by my provider.  I will keep my appointment or reschedule if I have to cancel.  I will notify my provider of any barriers to my plan of care.    Barriers: overwhelmed by complexity of regimen  Plan for overcoming my barriers: care coordination   Confidence:

## 2024-01-06 ENCOUNTER — APPOINTMENT (OUTPATIENT)
Dept: GENERAL RADIOLOGY | Age: 64
End: 2024-01-06
Payer: MEDICARE

## 2024-01-06 ENCOUNTER — HOSPITAL ENCOUNTER (EMERGENCY)
Age: 64
Discharge: HOME | End: 2024-01-06
Attending: EMERGENCY MEDICINE
Payer: MEDICARE

## 2024-01-06 ENCOUNTER — APPOINTMENT (OUTPATIENT)
Dept: CT IMAGING | Age: 64
End: 2024-01-06
Payer: MEDICARE

## 2024-01-06 VITALS
HEART RATE: 81 BPM | BODY MASS INDEX: 38.28 KG/M2 | WEIGHT: 208 LBS | SYSTOLIC BLOOD PRESSURE: 134 MMHG | TEMPERATURE: 98.5 F | HEIGHT: 62 IN | OXYGEN SATURATION: 99 % | RESPIRATION RATE: 17 BRPM | DIASTOLIC BLOOD PRESSURE: 80 MMHG

## 2024-01-06 DIAGNOSIS — R07.9 CHEST PAIN, UNSPECIFIED TYPE: Primary | ICD-10-CM

## 2024-01-06 LAB
ANION GAP SERPL CALCULATED.3IONS-SCNC: 9 MMOL/L (ref 9–17)
BASOPHILS # BLD: 0 K/UL (ref 0–0.2)
BASOPHILS NFR BLD: 0 % (ref 0–2)
BILIRUB UR QL STRIP: NEGATIVE
BUN SERPL-MCNC: 16 MG/DL (ref 8–23)
CALCIUM SERPL-MCNC: 9.9 MG/DL (ref 8.6–10.4)
CHLORIDE SERPL-SCNC: 105 MMOL/L (ref 98–107)
CLARITY UR: CLEAR
CO2 SERPL-SCNC: 27 MMOL/L (ref 20–31)
COLOR UR: YELLOW
COMMENT: ABNORMAL
CREAT SERPL-MCNC: 1.1 MG/DL (ref 0.5–0.9)
EOSINOPHIL # BLD: 0.2 K/UL (ref 0–0.4)
EOSINOPHILS RELATIVE PERCENT: 4 % (ref 1–4)
ERYTHROCYTE [DISTWIDTH] IN BLOOD BY AUTOMATED COUNT: 15.4 % (ref 12.5–15.4)
GFR SERPL CREATININE-BSD FRML MDRD: 56 ML/MIN/1.73M2
GLUCOSE SERPL-MCNC: 114 MG/DL (ref 70–99)
GLUCOSE UR STRIP-MCNC: ABNORMAL MG/DL
HCT VFR BLD AUTO: 39.3 % (ref 36–46)
HGB BLD-MCNC: 13.1 G/DL (ref 12–16)
HGB UR QL STRIP.AUTO: NEGATIVE
KETONES UR STRIP-MCNC: NEGATIVE MG/DL
LEUKOCYTE ESTERASE UR QL STRIP: NEGATIVE
LYMPHOCYTES NFR BLD: 2.6 K/UL (ref 1–4.8)
LYMPHOCYTES RELATIVE PERCENT: 45 % (ref 24–44)
MCH RBC QN AUTO: 31.8 PG (ref 26–34)
MCHC RBC AUTO-ENTMCNC: 33.4 G/DL (ref 31–37)
MCV RBC AUTO: 95 FL (ref 80–100)
MONOCYTES NFR BLD: 0.4 K/UL (ref 0.1–1.2)
MONOCYTES NFR BLD: 6 % (ref 2–11)
NEUTROPHILS NFR BLD: 45 % (ref 36–66)
NEUTS SEG NFR BLD: 2.7 K/UL (ref 1.8–7.7)
NITRITE UR QL STRIP: NEGATIVE
PH UR STRIP: 6 [PH] (ref 5–8)
PLATELET # BLD AUTO: 189 K/UL (ref 140–450)
PMV BLD AUTO: 6.7 FL (ref 6–12)
POTASSIUM SERPL-SCNC: 4 MMOL/L (ref 3.7–5.3)
PROT UR STRIP-MCNC: NEGATIVE MG/DL
RBC # BLD AUTO: 4.14 M/UL (ref 4–5.2)
SODIUM SERPL-SCNC: 141 MMOL/L (ref 135–144)
SP GR UR STRIP: 1.01 (ref 1–1.03)
TROPONIN I SERPL HS-MCNC: 23 NG/L (ref 0–14)
TROPONIN I SERPL HS-MCNC: 23 NG/L (ref 0–14)
UROBILINOGEN UR STRIP-ACNC: NORMAL EU/DL (ref 0–1)
WBC OTHER # BLD: 5.9 K/UL (ref 3.5–11)

## 2024-01-06 PROCEDURE — 70450 CT HEAD/BRAIN W/O DYE: CPT

## 2024-01-06 PROCEDURE — 84484 ASSAY OF TROPONIN QUANT: CPT

## 2024-01-06 PROCEDURE — 71045 X-RAY EXAM CHEST 1 VIEW: CPT

## 2024-01-06 PROCEDURE — 99285 EMERGENCY DEPT VISIT HI MDM: CPT | Performed by: EMERGENCY MEDICINE

## 2024-01-06 PROCEDURE — 93005 ELECTROCARDIOGRAM TRACING: CPT | Performed by: NURSE PRACTITIONER

## 2024-01-06 PROCEDURE — 36415 COLL VENOUS BLD VENIPUNCTURE: CPT

## 2024-01-06 PROCEDURE — 80048 BASIC METABOLIC PNL TOTAL CA: CPT

## 2024-01-06 PROCEDURE — 81003 URINALYSIS AUTO W/O SCOPE: CPT

## 2024-01-06 PROCEDURE — 85025 COMPLETE CBC W/AUTO DIFF WBC: CPT

## 2024-01-06 ASSESSMENT — PAIN - FUNCTIONAL ASSESSMENT: PAIN_FUNCTIONAL_ASSESSMENT: 0-10

## 2024-01-06 ASSESSMENT — PAIN DESCRIPTION - LOCATION: LOCATION: CHEST

## 2024-01-06 ASSESSMENT — PAIN SCALES - GENERAL: PAINLEVEL_OUTOF10: 2

## 2024-01-06 NOTE — DISCHARGE INSTRUCTIONS
Please follow-up with Dr. Jojo Anderson cardiology for reevaluation of chest pain symptoms    Do not perform any activities that increase your pain.    Continue your home medications    If you develop any chest pain, shortness of breath, vomiting, lightheadedness dizziness or any other concerning symptoms that may arise please return immediately to the emergency department.

## 2024-01-06 NOTE — ED PROVIDER NOTES
[facility] EMERGENCY DEPARTMENT  EMERGENCY DEPARTMENT ENCOUNTER      Pt Name: Adrianne Campos  MRN: [id number]  Birthdate 1960  Date of evaluation: 1/6/2024  Provider: BRAULIO Dukes CNP    CHIEF COMPLAINT       Chief Complaint   Patient presents with    Chest Pain         HISTORY OF PRESENT ILLNESS   (Location/Symptom, Timing/Onset, Context/Setting, Quality, Duration, Modifying Factors, Severity)  Note limiting factors.   Adrianne Campos is a 63 y.o. female who presents to the emergency department for evaluation of chest pain.  Patient reports she began having chest pain while putting away her [place] tree.  She states she had to sit down and rest and the chest pain did seem to resolve.  She denies any history of MI in the past.  She does have significant history of CKD, hyperlipidemia, obesity, hypertension, type 2 diabetes, COPD, AAA, CVA.  She denies any chest pain at this time.  She denies any shortness of breath abdominal pain nausea vomiting or diarrhea.  She denies any cough or cold symptoms.  Her daughter is at bedside with her.        Nursing Notes were reviewed.    REVIEW OF SYSTEMS       Constitutional: No fevers or chills   HEENT: No sore throat, rhinorrhea, or earache   Eyes: No blurry vision or double vision no drainage   Cardiovascular: No chest pain or tachycardia   Respiratory: No wheezing or shortness of breath no cough   Gastrointestinal: No nausea, vomiting, diarrhea, constipation, or abdominal pain   : No hematuria or dysuria   Musculoskeletal: No swelling or pain   Skin: No rash   Neurological: No focal neurologic complaints, paresthesias, weakness, or headache      Except as noted above the remainder of the review of systems was reviewed and negative.       PAST MEDICAL HISTORY     Past Medical History:   Diagnosis Date    Acute tracheobronchitis-viral 08/05/2021    Anxiety     Cerebrovascular accident (CVA) (HCC) 09/10/2021    Chronic kidney disease     COPD (chronic

## 2024-01-07 NOTE — ED PROVIDER NOTES
Attending Supervisory Note/Shared Visit   I have personally performed a face to face diagnostic evaluation on this patient. I have reviewed the mid-level’s findings and agree.      EKG shows sinus rhythm with rate 82 beats a minute and no acute ischemic changes.  There findings consistent with old inferior wall and old anteroseptal infarct.  Patient's workup does not reveal any sign of acute coronary injury.  She is discharged to outpatient PCP and cardiology follow-up.  She is to return anytime for worsening symptoms.      (Please note that portions of this note were completed with a voice recognition program.  Efforts were made to edit the dictations but occasionally words are mis-transcribed.)    Jamie Rainey MD  Attending Emergency Physician        Jamie Rainey MD  01/06/24 [id number]

## 2024-01-08 ENCOUNTER — OFFICE VISIT (OUTPATIENT)
Dept: BEHAVIORAL/MENTAL HEALTH CLINIC | Age: 64
End: 2024-01-08
Payer: COMMERCIAL

## 2024-01-08 DIAGNOSIS — F43.10 POST TRAUMATIC STRESS DISORDER: Primary | ICD-10-CM

## 2024-01-08 LAB
EKG ATRIAL RATE: 82 BPM
EKG P AXIS: 46 DEGREES
EKG P-R INTERVAL: 190 MS
EKG Q-T INTERVAL: 350 MS
EKG QRS DURATION: 56 MS
EKG QTC CALCULATION (BAZETT): 408 MS
EKG R AXIS: 8 DEGREES
EKG T AXIS: 35 DEGREES
EKG VENTRICULAR RATE: 82 BPM

## 2024-01-08 PROCEDURE — 90834 PSYTX W PT 45 MINUTES: CPT | Performed by: COUNSELOR

## 2024-01-08 PROCEDURE — 1123F ACP DISCUSS/DSCN MKR DOCD: CPT | Performed by: COUNSELOR

## 2024-01-08 NOTE — PROGRESS NOTES
ADULT BEHAVIORAL HEALTH FOLLOW UP  KIT RODRIGUEZ, [facility]       Visit Date: 1/8/2024   Time of appointment:  3:04pm - 3:42pm   Time spent with Patient: 38 minutes.   This is patient's third appointment.    Reason for Consult: Management of stress and trauma.      Referring Provider/PCP:    No ref. provider found  Trisha Arndt, APRN - CNP      Patient provided informed consent for the behavioral health program. Discussed with patient model of service to include the limits of confidentiality (i.e. abuse reporting, suicide intervention, etc.) and short-term intervention focused approach. Patient indicated understanding.    LEV Silver is a 63 y.o. female who presents for follow up of stress and trauma. Patient stated that she was able to resolve the financial stressor and stated that it was just a miscommunication between the bank and her. Patient discussed that she is feeling stressed because her daughter is trying to start coming to her doctors' appointments again and that her daughter \"made\" her add her back into her MyChart and create a new BRITTNEY for her so that she has access to client's records. Patient stated that she knows she does not have to do want her daughter demands but that she loves her daughter and does not want to fight with her. Writer provided supportive and empathetic listening and feedback. Writer encouraged patient to consider what she wants versus what her daughter wants. Patient identified that she does not want her daughter coming to her doctors' appointments. Writer provided psychoeducation on assertive communication and boundary setting.     Previous Recommendations: Patient was to engage in therapy with writer and call her bank to manage her financial concern.         MENTAL STATUS EXAM  Mood was sad with sad  affect.   Suicidal ideation was denied.   Homicidal ideation was denied.   Hygiene was good .  Dress was appropriate.   Behavior was Within Normal Limits with Yes

## 2024-01-09 ENCOUNTER — TRANSCRIBE ORDERS (OUTPATIENT)
Dept: ADMINISTRATIVE | Age: 64
End: 2024-01-09

## 2024-01-09 DIAGNOSIS — I10 ESSENTIAL HYPERTENSION, MALIGNANT: Primary | ICD-10-CM

## 2024-01-09 DIAGNOSIS — I71.40 ABDOMINAL AORTIC ANEURYSM (AAA) WITHOUT RUPTURE, UNSPECIFIED PART (HCC): ICD-10-CM

## 2024-01-09 DIAGNOSIS — Z82.49 FH: ANEURYSM: ICD-10-CM

## 2024-01-09 DIAGNOSIS — R42 DIZZINESS: ICD-10-CM

## 2024-01-10 ENCOUNTER — OFFICE VISIT (OUTPATIENT)
Dept: FAMILY MEDICINE CLINIC | Age: 64
End: 2024-01-10

## 2024-01-10 VITALS
SYSTOLIC BLOOD PRESSURE: 112 MMHG | RESPIRATION RATE: 16 BRPM | HEART RATE: 86 BPM | TEMPERATURE: 97 F | BODY MASS INDEX: 37.75 KG/M2 | OXYGEN SATURATION: 98 % | WEIGHT: 206.4 LBS | DIASTOLIC BLOOD PRESSURE: 86 MMHG

## 2024-01-10 DIAGNOSIS — F33.3 MAJOR DEPRESSIVE DISORDER, RECURRENT EPISODE, SEVERE, WITH PSYCHOTIC BEHAVIOR (HCC): ICD-10-CM

## 2024-01-10 DIAGNOSIS — I71.40 ABDOMINAL AORTIC ANEURYSM (AAA) WITHOUT RUPTURE, UNSPECIFIED PART (HCC): ICD-10-CM

## 2024-01-10 DIAGNOSIS — K59.01 SLOW TRANSIT CONSTIPATION: ICD-10-CM

## 2024-01-10 DIAGNOSIS — F41.9 ANXIETY: ICD-10-CM

## 2024-01-10 DIAGNOSIS — D18.02 FAMILIAL CAVERNOUS CEREBRAL ANGIOMA (HCC): ICD-10-CM

## 2024-01-10 DIAGNOSIS — I10 ESSENTIAL HYPERTENSION: ICD-10-CM

## 2024-01-10 DIAGNOSIS — J41.8 MIXED SIMPLE AND MUCOPURULENT CHRONIC BRONCHITIS (HCC): ICD-10-CM

## 2024-01-10 DIAGNOSIS — R91.8 MULTIPLE LUNG NODULES ON CT: ICD-10-CM

## 2024-01-10 DIAGNOSIS — N18.30 STAGE 3 CHRONIC KIDNEY DISEASE, UNSPECIFIED WHETHER STAGE 3A OR 3B CKD (HCC): ICD-10-CM

## 2024-01-10 DIAGNOSIS — G47.33 OSA (OBSTRUCTIVE SLEEP APNEA): ICD-10-CM

## 2024-01-10 DIAGNOSIS — J41.1 MUCOPURULENT CHRONIC BRONCHITIS (HCC): ICD-10-CM

## 2024-01-10 DIAGNOSIS — E21.3 HYPERPARATHYROIDISM (HCC): ICD-10-CM

## 2024-01-10 DIAGNOSIS — Z12.31 SCREENING MAMMOGRAM FOR BREAST CANCER: ICD-10-CM

## 2024-01-10 DIAGNOSIS — E11.8 CONTROLLED TYPE 2 DIABETES MELLITUS WITH COMPLICATION, WITHOUT LONG-TERM CURRENT USE OF INSULIN (HCC): Primary | ICD-10-CM

## 2024-01-10 RX ORDER — ISOSORBIDE MONONITRATE 30 MG/1
15 TABLET, EXTENDED RELEASE ORAL DAILY
Qty: 45 TABLET | Refills: 1 | Status: SHIPPED
Start: 2024-01-10 | End: 2024-07-08

## 2024-01-10 ASSESSMENT — PATIENT HEALTH QUESTIONNAIRE - PHQ9
10. IF YOU CHECKED OFF ANY PROBLEMS, HOW DIFFICULT HAVE THESE PROBLEMS MADE IT FOR YOU TO DO YOUR WORK, TAKE CARE OF THINGS AT HOME, OR GET ALONG WITH OTHER PEOPLE: 2
1. LITTLE INTEREST OR PLEASURE IN DOING THINGS: 2
4. FEELING TIRED OR HAVING LITTLE ENERGY: 1
SUM OF ALL RESPONSES TO PHQ QUESTIONS 1-9: 13
7. TROUBLE CONCENTRATING ON THINGS, SUCH AS READING THE NEWSPAPER OR WATCHING TELEVISION: 2
8. MOVING OR SPEAKING SO SLOWLY THAT OTHER PEOPLE COULD HAVE NOTICED. OR THE OPPOSITE, BEING SO FIGETY OR RESTLESS THAT YOU HAVE BEEN MOVING AROUND A LOT MORE THAN USUAL: 1
2. FEELING DOWN, DEPRESSED OR HOPELESS: 2
9. THOUGHTS THAT YOU WOULD BE BETTER OFF DEAD, OR OF HURTING YOURSELF: 1
3. TROUBLE FALLING OR STAYING ASLEEP: 1
SUM OF ALL RESPONSES TO PHQ QUESTIONS 1-9: 13
SUM OF ALL RESPONSES TO PHQ QUESTIONS 1-9: 13
SUM OF ALL RESPONSES TO PHQ9 QUESTIONS 1 & 2: 4
6. FEELING BAD ABOUT YOURSELF - OR THAT YOU ARE A FAILURE OR HAVE LET YOURSELF OR YOUR FAMILY DOWN: 2
SUM OF ALL RESPONSES TO PHQ QUESTIONS 1-9: 12
5. POOR APPETITE OR OVEREATING: 1

## 2024-01-10 ASSESSMENT — COLUMBIA-SUICIDE SEVERITY RATING SCALE - C-SSRS
2. HAVE YOU ACTUALLY HAD ANY THOUGHTS OF KILLING YOURSELF?: NO
1. WITHIN THE PAST MONTH, HAVE YOU WISHED YOU WERE DEAD OR WISHED YOU COULD GO TO SLEEP AND NOT WAKE UP?: YES
6. HAVE YOU EVER DONE ANYTHING, STARTED TO DO ANYTHING, OR PREPARED TO DO ANYTHING TO END YOUR LIFE?: NO

## 2024-01-10 ASSESSMENT — ENCOUNTER SYMPTOMS
SORE THROAT: 0
COUGH: 0
ABDOMINAL DISTENTION: 0
CHEST TIGHTNESS: 0
RECTAL PAIN: 0
SINUS PRESSURE: 0
ABDOMINAL PAIN: 0
BACK PAIN: 0
DIARRHEA: 0
TROUBLE SWALLOWING: 0
RHINORRHEA: 0
SHORTNESS OF BREATH: 0
WHEEZING: 0
CONSTIPATION: 1

## 2024-01-10 NOTE — PROGRESS NOTES
vomiting or diarrhea.  She denies any cough or cold symptoms.  Her daughter is at bedside with her.     12/21/23 neurology -   Patient has postural tremor which is more likely tardive dyskinesia from psychotropic medications to continue on cogentin. There is gait imbalance likely fom diabetic neuropathy needing to rule out lumbar stenosis with low back pain to undergo MRI lumbar spine and go on neurontin 300 mg po bid for pain complaints . She is to undergo neuropathic bloodwork       10/16/2023 cardiology -   ASSESSMENT/PLAN:   Hypertension: Blood pressure recently suboptimal control significant difference B arms-check CTA Chest (aorta) given known AAA. Avoid tight BP control to avoid syncope and falls, see #2. Orthostatics (-), but underlying labile BPs, overall well controlled. Supportive care reiterated, stockings, liberal na/fluids. Log bps  Dizziness /Syncope-orthostatics (-) but symptoms less frequent with decreased imdur dose. If would benefit from balance therapy, encouraged to use adaptive equipment, slow position changes. No hemodynamically significant carotid stenosis. Holter and echo without clinically significant findings.   Shortness of breath -multifactorial & stable. Treating COPD. Not treating NARCISO currently . Echo normal EF, no obvious RWMA tech diff due to lung interference. EKG without acute ischemic changes. Normal cors 7/2020.  Hyperlipidemia: Managed per PCP. I did not order any further blood work. On statin.   Abdominal Aortic Aneurysm: Without rupture. 3.1 on 7/2020, 2.2 9/2021. BP control. [facility] [place] vascular following. Check CTA (aorta) given BP difference in each arm and known AAA.   Carotid stenosis - only mild 7/23 duplex. Following [facility] [place] vascular now.  Hx of DVT and PE: Eliquis managed per pcp [place] per previous coagulopathy w/u. Remotely saw Dr Butt. If ongoing falls ? If need revisit with heme, encouraged to d/w pcp.  Hx TIA - no recurrence. See #7.  Chest Pain: atypical for CV

## 2024-01-11 ENCOUNTER — TELEPHONE (OUTPATIENT)
Dept: FAMILY MEDICINE CLINIC | Age: 64
End: 2024-01-11

## 2024-01-11 NOTE — ASSESSMENT & PLAN NOTE
Well-controlled, continue current medications and continue current treatment plan  Insurance still not covering continuous glucose meter.  Continue Rybelsus and Farxiga.  Concerns with increasing Rybelsus dose is known main side effect is constipation?  Will adjust pharmacology as needed per A1c results

## 2024-01-11 NOTE — ASSESSMENT & PLAN NOTE
Well-controlled, continue current medications and continue current treatment plan  Care continues to be coordinated with cardiology.  Patient to continue taking Imdur 15 mg daily, Toprol extended release 25 mg at bedtime, lisinopril 5 mg daily, also for kidney health

## 2024-01-11 NOTE — ASSESSMENT & PLAN NOTE
Monitored by specialist- no acute findings meriting change in the plan  Patient ready has follow-up appointment scheduled with neurosurgeon as well as yearly MRI of brain for monitoring.

## 2024-01-11 NOTE — ASSESSMENT & PLAN NOTE
Repeat parathyroid hormone ordered.  If continues to be elevated without improvement with increasing vitamin D supplementation, will need referral and further imaging of parathyroid glands.  May be rationale for worsening memory changes, fatigue, as well as tremors?

## 2024-01-11 NOTE — ASSESSMENT & PLAN NOTE
Patient had known sleep apnea in which utilizing CPAP in the past.  Patient lost a significant amount of weight and stopped using for over 1 years time.  Patient regained weight and started to complain of worsening memory fog?  Repeat a sleep study which indicated need for CPAP machine.  New CPAP machine recently sent to patient's home and patient has a referral for sleep medicine and pulmonology.  Appointment still needs to be scheduled

## 2024-01-11 NOTE — ASSESSMENT & PLAN NOTE
Monitored by specialist- no acute findings meriting change in the plan  Most recent aortic imaging with no aneurysmal concerns.  Being followed and monitored per cardiology.

## 2024-01-11 NOTE — ASSESSMENT & PLAN NOTE
Was having routine CT chest screenings by pulmonology.  Patient stopped following pulmonology when she stopped wearing her CPAP recently.  CT of chest ordered, after discussion with cardiology order plans to be changed to a CTA to monitor vascular along with stable nodules.

## 2024-01-11 NOTE — ASSESSMENT & PLAN NOTE
Monitored by specialist- no acute findings meriting change in the plan  Wellbutrin, Risperdal, Cogentin and Desyrel prescribed by psychiatry

## 2024-01-12 ENCOUNTER — CARE COORDINATION (OUTPATIENT)
Dept: CARE COORDINATION | Age: 64
End: 2024-01-12

## 2024-01-12 DIAGNOSIS — E78.5 HYPERLIPIDEMIA, UNSPECIFIED HYPERLIPIDEMIA TYPE: ICD-10-CM

## 2024-01-12 NOTE — TELEPHONE ENCOUNTER
LOV 1/10/24  RTO F/U scheduled  LRF 7/26/23    Health Maintenance   Topic Date Due    Diabetic Alb to Cr ratio (uACR) test  04/08/2020    Diabetic retinal exam  07/09/2022    Cervical cancer screen  11/16/2022    Pneumococcal 65+ years Vaccine (3 - PPSV23 or PCV20) 04/23/2023    COVID-19 Vaccine (8 - 2023-24 season) 11/24/2023    Annual Wellness Visit (Medicare Advantage)  01/01/2024    Respiratory Syncytial Virus (RSV) Pregnant or age 60 yrs+ (1 - 1-dose 60+ series) 12/05/2024 (Originally 4/23/2018)    A1C test (Diabetic or Prediabetic)  08/23/2024    Lipids  08/23/2024    Diabetic foot exam  11/27/2024    Breast cancer screen  12/09/2024    GFR test (Diabetes, CKD 3-4, OR last GFR 15-59)  01/06/2025    Depression Monitoring  01/10/2025    DTaP/Tdap/Td vaccine (2 - Td or Tdap) 08/04/2026    Colorectal Cancer Screen  02/16/2028    DEXA (modify frequency per FRAX score)  Completed    Flu vaccine  Completed    Shingles vaccine  Completed    Hepatitis C screen  Addressed    HIV screen  Addressed    Hepatitis A vaccine  Aged Out    Hepatitis B vaccine  Aged Out    Hib vaccine  Aged Out    Polio vaccine  Aged Out    Meningococcal (ACWY) vaccine  Aged Out    Pneumococcal 0-64 years Vaccine  Discontinued             (applicable per patient's age: Cancer Screenings, Depression Screening, Fall Risk Screening, Immunizations)    Hemoglobin A1C (%)   Date Value   08/23/2023 6.6 (A)   01/19/2023 6.1 (H)   09/19/2022 5.3     LDL Cholesterol (mg/dL)   Date Value   01/19/2023 89     LDL Calculated (mg/dL)   Date Value   08/23/2023 96     AST (U/L)   Date Value   11/23/2023 18     ALT (U/L)   Date Value   11/23/2023 18     BUN (mg/dL)   Date Value   01/06/2024 16      (goal A1C is < 7)   (goal LDL is <100) need 30-50% reduction from baseline     BP Readings from Last 3 Encounters:   01/10/24 112/86   01/06/24 134/80   12/21/23 139/85    (goal /80)      All Future Testing planned in [facility]:  Lab Frequency Next Occurrence

## 2024-01-12 NOTE — CARE COORDINATION
Did talk to her case manger with waiver program and they are still looking for staff to help Yesica out   
Completed  Registered Dietician: Completed  Transportation Support: Completed          Goals Addressed                   This Visit's Progress     Conditions and Symptoms   On track     I will schedule office visits, as directed by my provider.  I will keep my appointment or reschedule if I have to cancel.  I will notify my provider of any barriers to my plan of care.    Barriers: overwhelmed by complexity of regimen  Plan for overcoming my barriers: care coordination   Confidence: 8/10  Anticipated Goal Completion Date: 4/6/24                Future Appointments   Date Time Provider Department Center   1/29/2024  9:15 AM Jered Alejandra DPM [facility]   1/29/2024 11:00 AM Rhonda Jackson [facility]   2/7/2024 11:30 AM Trisha Arndt APRN - [facility]   3/6/2024 11:30 AM Trisha Arndt APRN - [facility]   3/12/2024 10:00 AM Rafael Oglesby MD [facility] -   4/3/2024 11:00 AM Trisha Arndt APRN - [facility]   5/8/2024 11:00 AM Trisha Arndt APRN - [facility]   6/5/2024 11:00 AM Trisha Arndt APRN - [facility]   7/10/2024 11:00 AM Trisha Arndt APRN - [facility]   8/7/2024 11:00 AM Trisha Arndt APRN - [facility]   9/4/2024 11:00 AM Trisha Arndt APRN - [facility]   9/25/2024  8:30 AM Heydi España DO [facility]   10/9/2024 11:00 AM Trisha Arndt APRN - [facility]   11/6/2024 11:00 AM Trisha Arndt APRN - [facility]   12/4/2024 11:00 AM Trisha Arndt, APRN - [facility]

## 2024-01-12 NOTE — CARE COORDINATION
Nutrition Care Coordinator Follow-Up visit:    Food Recall:eating 2-3 meals/d    Activity Level:  Sedentary:X  Lightly Active:  Moderately Active:  Very Active:    Adult BMI 65 and over:  Overweight (30-39.9)X    Weight Change:down 4# over past month    Plan:  Plan was established with patient:  Increase dietary fiber by consuming whole grains, fruits and vegetables:X  Limit dietary cholesterol to >200mg/day:  Increase water intake:  Avoid added sugar:X  Avoid sweetened beverages:X  Choose lean meats:      Monitoring:  Will monitor weight:  Will monitor adherence to meal plan:X  Will monitor adherence to exercise plan:  Will monitor HGA1c:    Handouts Provided :  Low Carb snacking:X  Carb counting /individual meal plan:  Portion Control:  Food Labels:  Physical Activity:  Low Fat/Cholesterol:  Hypo/Hyperglycemia:  Calorie Controlled Meal Plan:    Goals:  Increase water consumption to 8oz. 6-8 times daily:  Manage blood sugars by consuming 3 meals spaced every 4-5 hours with 2-3 snacks daily:reviewed  Increase fiber and decrease fat intake by consuming 1-2 fruit servings and 2-3 vegetable servings per day.  Increase physical activity by:  Consume less than 2,000mg of sodium/day  Avoid consumption of sweetened beverages and added sugar by reading food labels:reviewed  Monitor blood sugars by using meter to check blood glucose before morning meal and 2 hours after a meal daily:pt. States up over the holidays but coming back down  Decrease risk of coronary heart disease by consuming fish that contains omega-3 fatty acids at least twice a week, avoiding partially hydrogenated oil/trans fats and limiting saturated fat intake by reading food labels:reviewed    Patient goals set:  1.Reviewed meal pattern, skips lunch. Importance of having a set eating schedule and timing, spacing and consistency of meal times discussed. Quick/easy lunch suggestions discussed, will send patient a list of suggestions- encouraged to eat

## 2024-01-13 RX ORDER — ATORVASTATIN CALCIUM 40 MG/1
40 TABLET, FILM COATED ORAL DAILY
Qty: 90 TABLET | Refills: 1 | Status: SHIPPED | OUTPATIENT
Start: 2024-01-13 | End: 2024-07-11

## 2024-01-15 ENCOUNTER — TELEPHONE (OUTPATIENT)
Dept: FAMILY MEDICINE CLINIC | Age: 64
End: 2024-01-15

## 2024-01-15 NOTE — TELEPHONE ENCOUNTER
JULIAN....Patient   Adrianne Campos called and stated she took her blood pressure at  8 am it was 108/69 then at 9 am and it was 111/49 and called office at 10 am , writer had patient recheck blood pressure while on phone it was 111/66.

## 2024-01-16 LAB
ESTIMATED AVERAGE GLUCOSE: 143
HBA1C MFR BLD: 6.6 %
PHOSPHORUS: 3.8 MG/DL
VITAMIN D 25-HYDROXY: 50.4
VITAMIN D2, 25 HYDROXY: NORMAL
VITAMIN D3,25 HYDROXY: NORMAL

## 2024-01-16 NOTE — TELEPHONE ENCOUNTER
Patient states she is lightheaded and mild unsteadiness while walking. BP has been low the last few days. Writer advised to food and fluids until PCP gives input.     She is taking all medications as prescribed.   Last few days BP checks:    110/70  105/62  100/55  104/58  112/68  111/86

## 2024-01-16 NOTE — TELEPHONE ENCOUNTER
Please call patient back and let her know I spoke with cardiology.  We are discontinuing her Imdur moving forward.  I will assume she ready took it is usually a morning daily dose?  Please make sure she does not take it tomorrow in office call in the afternoon to see how she is doing

## 2024-01-18 ENCOUNTER — CARE COORDINATION (OUTPATIENT)
Dept: CARE COORDINATION | Age: 64
End: 2024-01-18

## 2024-01-18 DIAGNOSIS — I10 ESSENTIAL HYPERTENSION: ICD-10-CM

## 2024-01-18 DIAGNOSIS — E11.8 CONTROLLED TYPE 2 DIABETES MELLITUS WITH COMPLICATION, WITHOUT LONG-TERM CURRENT USE OF INSULIN (HCC): ICD-10-CM

## 2024-01-18 NOTE — CARE COORDINATION
Writer called Patient to do a follow up Social service call with her to explain to her that it is better to leave her Insurance as is at this time and when Open Enrollment becomes Open she can switch if she wants to at that time.    Patient voiced understanding and we will re-visit the issue when that becomes open.    Patient reported that her only need at this time was for writer to assist her with submitting her Bills to her Insurance carriers for them to pay them.

## 2024-01-19 ENCOUNTER — CARE COORDINATION (OUTPATIENT)
Dept: CARE COORDINATION | Age: 64
End: 2024-01-19

## 2024-01-19 NOTE — CARE COORDINATION
Ambulatory Care Coordination Note  2024    Patient Current Location:  Home: [id number]  [place] [id number]     ACM contacted the patient by telephone. Verified name and  with patient as identifiers. Provided introduction to self, and explanation of the ACM role.     Challenges to be reviewed by the provider   Additional needs identified to be addressed with provider: No  none               Method of communication with provider: none.    ACM: Zoie Varma, RN  Spoke with pt who said she is doing well at home. She said she did stop the imdur and this is helping she does have 2 apt on  and feels she can get to both. She denies any needs at this time   1) acp done   2) sdoh done   3) med review done   4) cardiology dr jean 24 done   5) podiatry Dr Alejandra 24  6) nephrology Dr Lopez oct 2024  7) neurosurgery dr baumann 24  8) eye yearly exam due in  done   9)    10) pcp   11) pill packing through [facility]   12)  referral for passport, life alert and food insecurities done   13) home care referral for PT / [facility] done   14) dietician referral   15) dr cardona 3/12/24    Offered patient enrollment in the Remote Patient Monitoring (RPM) program for in-home monitoring: Patient declined.    Lab Results       None            Care Coordination Interventions    Referral from Primary Care Provider: No  Suggested Interventions and Community Resources  Fall Risk Prevention: Completed  Disease Specific Clinic: Completed  Medi Set or Pill Pack: Completed  Registered Dietician: Completed  Transportation Support: Completed          Goals Addressed                   This Visit's Progress     Conditions and Symptoms   On track     I will schedule office visits, as directed by my provider.  I will keep my appointment or reschedule if I have to cancel.  I will notify my provider of any barriers to my plan of care.    Barriers: overwhelmed by complexity of regimen  Plan for

## 2024-01-26 ENCOUNTER — CARE COORDINATION (OUTPATIENT)
Dept: CARE COORDINATION | Age: 64
End: 2024-01-26

## 2024-01-26 NOTE — CARE COORDINATION
Ambulatory Care Coordination Note  2024    Patient Current Location:  Home: [id number]  [place] [id number]     ACM contacted the patient by telephone. Verified name and  with patient as identifiers. Provided introduction to self, and explanation of the ACM role.     Challenges to be reviewed by the provider   Additional needs identified to be addressed with provider: No  none               Method of communication with provider: none.    ACM: Zoie Varma, RN    Spoke with pt who said she is doing well at home her bp has been stable. She will see  and podiatry on Monday. She denies any needs at this time   1) acp done   2) sdoh done   3) med review done   4) cardiology dr jean 24 done   5) podiatry Dr Alejandra 24  6) nephrology Dr Lopez oct 2024  7) neurosurgery dr baumann 24  8) eye yearly exam due in  done   9) BH   10) pcp   11) pill packing through [facility]   12)  referral for passport, life alert and food insecurities done   13) home care referral for PT / [facility] done   14) dietician referral   15) dr cardona 3/12/24    Offered patient enrollment in the Remote Patient Monitoring (RPM) program for in-home monitoring: graduated  Lab Results       None            Care Coordination Interventions    Referral from Primary Care Provider: No  Suggested Interventions and Community Resources  Fall Risk Prevention: Completed  Disease Specific Clinic: Completed  Medi Set or Pill Pack: Completed  Registered Dietician: Completed  Transportation Support: Completed          Goals Addressed                   This Visit's Progress     Conditions and Symptoms   On track     I will schedule office visits, as directed by my provider.  I will keep my appointment or reschedule if I have to cancel.  I will notify my provider of any barriers to my plan of care.    Barriers: overwhelmed by complexity of regimen  Plan for overcoming my barriers: care coordination   Confidence:

## 2024-01-29 ENCOUNTER — OFFICE VISIT (OUTPATIENT)
Dept: BEHAVIORAL/MENTAL HEALTH CLINIC | Age: 64
End: 2024-01-29
Payer: COMMERCIAL

## 2024-01-29 ENCOUNTER — OFFICE VISIT (OUTPATIENT)
Dept: PODIATRY | Age: 64
End: 2024-01-29
Payer: MEDICARE

## 2024-01-29 ENCOUNTER — CARE COORDINATION (OUTPATIENT)
Dept: CARE COORDINATION | Age: 64
End: 2024-01-29

## 2024-01-29 VITALS — HEIGHT: 62 IN | WEIGHT: 206 LBS | BODY MASS INDEX: 37.91 KG/M2

## 2024-01-29 DIAGNOSIS — F43.10 POST TRAUMATIC STRESS DISORDER: Primary | ICD-10-CM

## 2024-01-29 DIAGNOSIS — M79.671 PAIN IN BOTH FEET: ICD-10-CM

## 2024-01-29 DIAGNOSIS — M79.672 PAIN IN BOTH FEET: ICD-10-CM

## 2024-01-29 DIAGNOSIS — E11.8 CONTROLLED TYPE 2 DIABETES MELLITUS WITH COMPLICATION, WITHOUT LONG-TERM CURRENT USE OF INSULIN (HCC): ICD-10-CM

## 2024-01-29 DIAGNOSIS — E11.51 TYPE II DIABETES MELLITUS WITH PERIPHERAL CIRCULATORY DISORDER (HCC): Primary | ICD-10-CM

## 2024-01-29 DIAGNOSIS — I73.9 PVD (PERIPHERAL VASCULAR DISEASE) (HCC): ICD-10-CM

## 2024-01-29 DIAGNOSIS — B35.1 DERMATOPHYTOSIS OF NAIL: ICD-10-CM

## 2024-01-29 PROCEDURE — NBSRV NON-BILLABLE SERVICE: Performed by: PODIATRIST

## 2024-01-29 PROCEDURE — 90832 PSYTX W PT 30 MINUTES: CPT | Performed by: COUNSELOR

## 2024-01-29 PROCEDURE — 1123F ACP DISCUSS/DSCN MKR DOCD: CPT | Performed by: COUNSELOR

## 2024-01-29 PROCEDURE — 11721 DEBRIDE NAIL 6 OR MORE: CPT | Performed by: PODIATRIST

## 2024-01-29 RX ORDER — ESCITALOPRAM OXALATE 10 MG/1
10 TABLET ORAL NIGHTLY
COMMUNITY
Start: 2024-01-16

## 2024-01-29 NOTE — PROGRESS NOTES
(current) use of anticoagulants 08/07/2020    Obesity     NARCISO on CPAP 01/19/2018    Osteoarthritis     Proteinuria     Pseudogout 03/01/2022    Pulmonary embolism (HCC)     Sleep apnea     c-pap. Doesn't use everynight    SOB (shortness of breath) 05/07/2020    Type 2 diabetes mellitus without complication (HCC)     Type II or unspecified type diabetes mellitus without mention of complication, not stated as uncontrolled     Von Willebrand disease (HCC)        Allergies   Allergen Reactions    Bone Density [Calcium-Vit D-Arg-Inos-Silicon]      I called the patient today.  Gave her the result of DEXA scan.  Told her to make an appointment to be seen at the clinic.  She said she would be able to do it for week after next.  In the meantime did discuss with her taking vitamin D3 and calcium.     Current Outpatient Medications on File Prior to Visit   Medication Sig Dispense Refill    escitalopram (LEXAPRO) 10 MG tablet Take 1 tablet by mouth nightly      atorvastatin (LIPITOR) 40 MG tablet Take 1 tablet by mouth daily 90 tablet 1    Misc. Devices (CPAP MACHINE) MISC by Does not apply route Has been unable to use due to set up.      tiotropium-olodaterol (STIOLTO RESPIMAT) 2.5-2.5 MCG/ACT AERS INHALE 2 PUFFS BY MOUTH INTO THE LUNGS DAILY 1 each 5    risperiDONE (RISPERDAL) 1 MG tablet       gabapentin (NEURONTIN) 300 MG capsule Take 1 po bid 60 capsule 2    buPROPion (WELLBUTRIN XL) 300 MG extended release tablet Take 1 tablet by mouth every morning      linaCLOtide (LINZESS) 72 MCG CAPS capsule Take 1 capsule by mouth every morning (before breakfast) 90 capsule 3    Respiratory Therapy Supplies (FULL KIT NEBULIZER SET) MISC Use as directed 1 each 0    ipratropium 0.5 mg-albuterol 2.5 mg (DUONEB) 0.5-2.5 (3) MG/3ML SOLN nebulizer solution Inhale 3 mLs into the lungs every 8 hours as needed for Shortness of Breath or Wheezing 180 each 0    calcium carb-cholecalciferol (CALCIUM 600/VITAMIN D3) 600-20 MG-MCG TABS Take 1

## 2024-01-29 NOTE — CARE COORDINATION
called Patient to do a follow up Social service call with her.    Patient did not answer the phone,  left a voicemail asking for a return call from her.

## 2024-01-29 NOTE — PROGRESS NOTES
ADULT BEHAVIORAL HEALTH FOLLOW UP  KIT RODRIGUEZ, [facility]       Visit Date: 1/29/2024   Time of appointment: 11:07am - 11:44am  Time spent with Patient: 37 minutes.   This is patient's fourth appointment.    Reason for Consult: PTSD.     Referring Provider/PCP:    No ref. provider found  Trisha Arndt, APRN - CNP      Patient provided informed consent for the behavioral health program. Discussed with patient model of service to include the limits of confidentiality (i.e. abuse reporting, suicide intervention, etc.) and short-term intervention focused approach. Patient indicated understanding.    LEV Silver is a 63 y.o. female who presents for follow up of PTSD. Patient discussed that she has been struggling with accepting the distance in her life from her children and does not know how to fill the void. Patient identified feeling lonely and sad. Writer provided supportive and empathetic listening and feedback. Writer provided psychoeducation on healthy versus unhealthy relationships. Writer provided psychoeducation behavior activation and life fulfillment. Patient identified that spending more time with her neighbors and going to the [employer] would be options for her feeling more fulfilled and active.     Previous Recommendations: Patient will attend her next therapy appointment in 3 weeks. Patient will attempt to set a boundary regarding her doctor's appointment with her daughter.         MENTAL STATUS EXAM  Mood was sad with sad  affect.   Suicidal ideation was denied.   Homicidal ideation was denied.   Hygiene was good .  Dress was appropriate.   Behavior was Within Normal Limits with Yes observation of difficulties ambulating.   Attitude was Cooperative and Engageable.  Eye-contact was good.  Speech: rate - WNL, rhythm - WNL, volume - soft.  Verbalizations were goal directed and coherent.  Thought processes were intact and goal-oriented without evidence of delusions, hallucinations,

## 2024-01-30 RX ORDER — ORAL SEMAGLUTIDE 7 MG/1
1 TABLET ORAL DAILY
Qty: 30 TABLET | Refills: 2 | Status: SHIPPED | OUTPATIENT
Start: 2024-01-30

## 2024-01-30 NOTE — TELEPHONE ENCOUNTER
LOV 1/10/23   RTO 2/7/24            Controlled Substance Monitoring:    Acute and Chronic Pain Monitoring:   RX Monitoring Periodic Controlled Substance Monitoring   2/15/2023   7:12 PM Obtaining appropriate analgesic effect of treatment.

## 2024-02-01 ENCOUNTER — CARE COORDINATION (OUTPATIENT)
Dept: CARE COORDINATION | Age: 64
End: 2024-02-01

## 2024-02-01 NOTE — CARE COORDINATION
Patient goals reviewed:  1.Reviewed meal pattern, skips lunch. Importance of having a set eating schedule and timing, spacing and consistency of meal times discussed. Quick/easy lunch suggestions discussed, will send patient a list of suggestions- encouraged to eat a snack at least.  Goal is 3 meals daily spaced every 4-5 hours.   2. Reviewed what foods contain carbohydrate to limit and how to measure out portions. Encouraged patient to limit carbs to 45-60gms/meal, 15gms/snack.  Patient admits snacks often in the evening, may be cause of high morning sugars. Discussed low carb snacking- will send a list of low carb snacks to patient.   3. Reviewed plate method, encouraged patient to increase intake of non-starchy vegetables.  Goal is 1/2 of plate to be non-starchy vegetables, 1/4 lean protein, 1/4 carbs. Foods from each category reviewed along with what a serving size is.When reviewing what patient is eating on a normal day she admits eating mostly carb- not a lot of protein in her diet. We discussed working on increasing protein sources- gave her several examples and will send her meal examples containing protein.   4. Reviewed avoiding/limiting sweets and sweetened drinks. Patient drinks water, denies sugary drinks does drink some milk- encouraged to limit to 1 glass/day. Patient states she does eats sweets not daily but a few times/week,  encouraged to limit portion and to 1-2 times/week.  Spoke with patient who relays doing well. She states her blood sugars  Have been staying down <130 in am and <150 overall. She has the   Nutrition information I sent her, encouraged her to review occasionally.  Patient was provided with contact information to call with questions.  Removed from panel.  RELL Lion

## 2024-02-06 ENCOUNTER — CARE COORDINATION (OUTPATIENT)
Dept: CARE COORDINATION | Age: 64
End: 2024-02-06

## 2024-02-06 NOTE — CARE COORDINATION
Writer has addressed Patient's needs and writer is signing off on the case at this time as a result.

## 2024-02-07 ENCOUNTER — OFFICE VISIT (OUTPATIENT)
Dept: FAMILY MEDICINE CLINIC | Age: 64
End: 2024-02-07

## 2024-02-07 VITALS
BODY MASS INDEX: 38.15 KG/M2 | SYSTOLIC BLOOD PRESSURE: 128 MMHG | TEMPERATURE: 97.3 F | RESPIRATION RATE: 18 BRPM | DIASTOLIC BLOOD PRESSURE: 88 MMHG | HEART RATE: 82 BPM | WEIGHT: 208.6 LBS | OXYGEN SATURATION: 96 %

## 2024-02-07 DIAGNOSIS — I95.1 ORTHOSTATIC HYPOTENSION: ICD-10-CM

## 2024-02-07 DIAGNOSIS — E11.8 CONTROLLED TYPE 2 DIABETES MELLITUS WITH COMPLICATION, WITHOUT LONG-TERM CURRENT USE OF INSULIN (HCC): Primary | ICD-10-CM

## 2024-02-07 DIAGNOSIS — N18.30 STAGE 3 CHRONIC KIDNEY DISEASE, UNSPECIFIED WHETHER STAGE 3A OR 3B CKD (HCC): Chronic | ICD-10-CM

## 2024-02-07 DIAGNOSIS — K59.01 SLOW TRANSIT CONSTIPATION: ICD-10-CM

## 2024-02-07 DIAGNOSIS — E21.3 HYPERPARATHYROIDISM (HCC): ICD-10-CM

## 2024-02-07 DIAGNOSIS — I10 ESSENTIAL HYPERTENSION: ICD-10-CM

## 2024-02-07 DIAGNOSIS — E08.42 DIABETIC POLYNEUROPATHY ASSOCIATED WITH DIABETES MELLITUS DUE TO UNDERLYING CONDITION (HCC): ICD-10-CM

## 2024-02-07 DIAGNOSIS — E83.42 HYPOMAGNESEMIA: ICD-10-CM

## 2024-02-07 DIAGNOSIS — F41.9 ANXIETY: ICD-10-CM

## 2024-02-07 DIAGNOSIS — E55.9 VITAMIN D DEFICIENCY: ICD-10-CM

## 2024-02-07 DIAGNOSIS — F33.3 MAJOR DEPRESSIVE DISORDER, RECURRENT EPISODE, SEVERE, WITH PSYCHOTIC BEHAVIOR (HCC): Chronic | ICD-10-CM

## 2024-02-07 PROBLEM — I34.0 NONRHEUMATIC MITRAL (VALVE) INSUFFICIENCY: Status: ACTIVE | Noted: 2024-01-11

## 2024-02-07 PROBLEM — Z86.73 HISTORY OF CEREBROVASCULAR ACCIDENT: Status: ACTIVE | Noted: 2022-05-06

## 2024-02-07 PROBLEM — R42 DIZZINESS: Status: ACTIVE | Noted: 2023-07-18

## 2024-02-07 PROBLEM — R06.09 OTHER FORMS OF DYSPNEA: Status: ACTIVE | Noted: 2024-01-11

## 2024-02-07 PROBLEM — G47.30 SLEEP APNEA: Status: ACTIVE | Noted: 2024-01-11

## 2024-02-07 NOTE — PROGRESS NOTES
[facility]   [address] [place] [id number]  [phone]    2/7/24     Patient ID  Adrianne Campos is a 63 y.o. female  Established patient    Chief Complaint  Adrianne Campos presents today for Diabetes and Post Cardio/Podiatry     Have you seen any other physician or provider since your last visit? Yes - Records Obtained  Have you had any other diagnostic tests since your last visit? Yes - Records Obtained  Have you been seen in the emergency room and/or had an admission to a hospital since we last saw you? No     ASSESSMENT/PLAN  1. Controlled type 2 diabetes mellitus with complication, without long-term current use of insulin ([facility])  -     Comprehensive Metabolic Panel; Future  2. Hyperparathyroidism ([facility])  -     PTH, Intact; Future  -     Vitamin D 25 Hydroxy; Future  3. Orthostatic hypotension  4. Diabetic polyneuropathy associated with diabetes mellitus due to underlying condition ([facility])  5. Slow transit constipation  -     linaclotide (LINZESS) 145 MCG capsule; Take 1 capsule by mouth every morning (before breakfast), Disp-90 capsule, R-3Normal  6. Essential hypertension  7. Stage 3 chronic kidney disease, unspecified whether stage 3a or 3b CKD ([facility])  -     Comprehensive Metabolic Panel; Future  8. Vitamin D deficiency  9. Hypomagnesemia  10. Major depressive disorder, recurrent episode, severe, with psychotic behavior ([facility])  11. Anxiety       Zoie - needs life alert back   Jessica 10/2024  Mri brain? Schedule?    Medications refilled.  Increase Linzess dose     Continue to monitor PTH - may need referral? Cause of tremors? Memory?     No follow-ups on file.      Patient Care Team:  Trisha Arndt APRN - CNP as PCP - General (Nurse Practitioner Family)  Trisha Arndt APRN - CNP as PCP - Empaneled Provider  Jamie Lopez MD as Consulting Physician (Nephrology)  Jojo Anderson MD as Consulting Physician (Cardiology)  Jered Alejandra DPM as Consulting Physician

## 2024-02-08 ENCOUNTER — CARE COORDINATION (OUTPATIENT)
Dept: CARE COORDINATION | Age: 64
End: 2024-02-08

## 2024-02-08 DIAGNOSIS — M11.20 PSEUDOGOUT: ICD-10-CM

## 2024-02-08 RX ORDER — ALLOPURINOL 100 MG/1
100 TABLET ORAL 2 TIMES DAILY
Qty: 180 TABLET | Refills: 3 | Status: SHIPPED | OUTPATIENT
Start: 2024-02-08 | End: 2025-02-07

## 2024-02-08 NOTE — TELEPHONE ENCOUNTER
LOV 2/7/24  RTO F/U scheduled  LRF 3/8/23    Health Maintenance   Topic Date Due    Diabetic Alb to Cr ratio (uACR) test  04/08/2020    Diabetic retinal exam  07/09/2022    Cervical cancer screen  11/16/2022    Pneumococcal 65+ years Vaccine (3 - PPSV23 or PCV20) 04/23/2023    COVID-19 Vaccine (8 - 2023-24 season) 11/24/2023    Annual Wellness Visit (Medicare Advantage)  01/01/2024    Respiratory Syncytial Virus (RSV) Pregnant or age 60 yrs+ (1 - 1-dose 60+ series) 12/05/2024 (Originally 4/23/2018)    Lipids  08/23/2024    Breast cancer screen  12/09/2024    GFR test (Diabetes, CKD 3-4, OR last GFR 15-59)  01/06/2025    Depression Monitoring  01/10/2025    A1C test (Diabetic or Prediabetic)  01/16/2025    Diabetic foot exam  01/29/2025    DTaP/Tdap/Td vaccine (2 - Td or Tdap) 08/04/2026    Colorectal Cancer Screen  02/16/2028    DEXA (modify frequency per FRAX score)  Completed    Flu vaccine  Completed    Shingles vaccine  Completed    Hepatitis C screen  Addressed    HIV screen  Addressed    Hepatitis A vaccine  Aged Out    Hepatitis B vaccine  Aged Out    Hib vaccine  Aged Out    Polio vaccine  Aged Out    Meningococcal (ACWY) vaccine  Aged Out    Pneumococcal 0-64 years Vaccine  Discontinued             (applicable per patient's age: Cancer Screenings, Depression Screening, Fall Risk Screening, Immunizations)    Hemoglobin A1C (%)   Date Value   01/16/2024 6.6 (H)   08/23/2023 6.6 (A)   01/19/2023 6.1 (H)     LDL Cholesterol (mg/dL)   Date Value   01/19/2023 89     LDL Calculated (mg/dL)   Date Value   08/23/2023 96     AST (U/L)   Date Value   11/23/2023 18     ALT (U/L)   Date Value   11/23/2023 18     BUN (mg/dL)   Date Value   01/06/2024 16      (goal A1C is < 7)   (goal LDL is <100) need 30-50% reduction from baseline     BP Readings from Last 3 Encounters:   02/07/24 128/88   01/10/24 112/86   01/06/24 134/80    (goal /80)      All Future Testing planned in [facility]:  Lab Frequency Next Occurrence

## 2024-02-08 NOTE — CARE COORDINATION
Ambulatory Care Coordination Note  2024    Patient Current Location:  Home: [id number]  [place] [id number]     ACM contacted the patient by telephone. Verified name and  with patient as identifiers. Provided introduction to self, and explanation of the ACM role.     Challenges to be reviewed by the provider   Additional needs identified to be addressed with provider: No    Pt called to say she forgot to tell her pcp she has a burn on her back the size of a dime this from her heating pad she left on too long. This area is open she is putting a dry dressing on it and changing 3 times a day. Her pulmonologist looked at it yesterday and said it looks like a 2nd degree burn she wants to know if she should be putting anything on this             Method of communication with provider: none.    ACM: Zoie Varma RN  Pt called to say she forgot to tell her pcp she has a burn on her back the size of a dime this from her heating pad she left on too long. This area is open she is putting a dry dressing on it and changing 3 times a day. Her pulmonologist looked at it yesterday and said it looks like a 2nd degree burn. Her pulmonologist also said she did not think she has copd but has asthma she had her stop the inhalers except her albuterol. She will return to see her in 2 weeks.    1) acp done   2) sdoh done   3) med review done   4) cardiology dr jean 24 done   5) podiatry Dr Alejandra 24  6) nephrology Dr Lopez oct 2024  7) neurosurgery dr baumann 24  8) eye yearly exam due in  done   9)    10) pcp   11) pill packing through [facility]   12)  referral for passport, life alert and food insecurities done   13) home care referral for [place] [facility] [place] [facility] done   14) dietician referral   15) dr cardona 3/12/24      Offered patient enrollment in the Remote Patient Monitoring (RPM) program for in-home monitoring: Patient declined.    Lab Results       None            Care

## 2024-02-12 ENCOUNTER — OFFICE VISIT (OUTPATIENT)
Dept: BEHAVIORAL/MENTAL HEALTH CLINIC | Age: 64
End: 2024-02-12
Payer: COMMERCIAL

## 2024-02-12 ENCOUNTER — OFFICE VISIT (OUTPATIENT)
Dept: FAMILY MEDICINE CLINIC | Age: 64
End: 2024-02-12
Payer: MEDICARE

## 2024-02-12 VITALS
DIASTOLIC BLOOD PRESSURE: 86 MMHG | WEIGHT: 210 LBS | OXYGEN SATURATION: 99 % | BODY MASS INDEX: 38.41 KG/M2 | HEART RATE: 76 BPM | TEMPERATURE: 97.2 F | SYSTOLIC BLOOD PRESSURE: 134 MMHG

## 2024-02-12 DIAGNOSIS — T30.0 BURN: Primary | ICD-10-CM

## 2024-02-12 DIAGNOSIS — F43.10 POST TRAUMATIC STRESS DISORDER: Primary | ICD-10-CM

## 2024-02-12 PROCEDURE — 1123F ACP DISCUSS/DSCN MKR DOCD: CPT | Performed by: REGISTERED NURSE

## 2024-02-12 PROCEDURE — 3075F SYST BP GE 130 - 139MM HG: CPT | Performed by: REGISTERED NURSE

## 2024-02-12 PROCEDURE — 90834 PSYTX W PT 45 MINUTES: CPT | Performed by: COUNSELOR

## 2024-02-12 PROCEDURE — 99213 OFFICE O/P EST LOW 20 MIN: CPT | Performed by: REGISTERED NURSE

## 2024-02-12 PROCEDURE — 3079F DIAST BP 80-89 MM HG: CPT | Performed by: REGISTERED NURSE

## 2024-02-12 PROCEDURE — 1123F ACP DISCUSS/DSCN MKR DOCD: CPT | Performed by: COUNSELOR

## 2024-02-12 NOTE — PROGRESS NOTES
(12/28/2022)    Overall Financial Resource Strain (CARDIA)     Difficulty of Paying Living Expenses: Somewhat hard   Food Insecurity:   Recent Concern: Food Insecurity - Food Insecurity Present (12/28/2022)    Hunger Vital Sign     Worried About Running Out of Food in the Last Year: Sometimes true     Ran Out of Food in the Last Year: Sometimes true   Transportation Needs: No Transportation Needs (12/28/2022)    PRAPARE - Transportation     Lack of Transportation (Medical): No     Lack of Transportation (Non-Medical): No   Physical Activity: Inactive (11/7/2023)    Exercise Vital Sign     Days of Exercise per Week: 0 days     Minutes of Exercise per Session: 0 min   Stress: Stress Concern Present (12/28/2022)    [place] [employer] - Occupational Stress Questionnaire     Feeling of Stress : Rather much   Social Connections: Socially Isolated (12/28/2022)    Social Connection and Isolation Panel [NHANES]     Frequency of Communication with Friends and Family: More than three times a week     Frequency of Social Gatherings with Friends and Family: Three times a week     Attends Buddhism Services: Never     Active Member of Clubs or Organizations: No     Attends Club or Organization Meetings: Never     Marital Status:    Housing Stability: Low Risk  (12/28/2022)    Housing Stability Vital Sign     Unable to Pay for Housing in the Last Year: No     Number of Places Lived in the Last Year: 1     Unstable Housing in the Last Year: No       MEDICATIONS:      Current Outpatient Medications   Medication Sig Dispense Refill    silver sulfADIAZINE (SILVADENE) 1 % cream Apply topically daily. 25 g 0    allopurinol (ZYLOPRIM) 100 MG tablet Take 1 tablet by mouth 2 times daily 180 tablet 3    linaclotide (LINZESS) 145 MCG capsule Take 1 capsule by mouth every morning (before breakfast) 90 capsule 3    RYBELSUS 7 MG TABS take 1 tablet by mouth once daily 30 tablet 2    escitalopram (LEXAPRO) 10 MG

## 2024-02-12 NOTE — PROGRESS NOTES
ADULT BEHAVIORAL HEALTH FOLLOW UP  KIT RODRIGUEZ, [facility]       Visit Date: 2/12/2024   Time of appointment: 11:00am -  11:50am  Time spent with Patient: 50 minutes.   This is patient's fifth appointment.    Reason for Consult: PTSD      Referring Provider/PCP:    No ref. provider found  Trisha Arndt, APRN - CNP      Patient provided informed consent for the behavioral health program. Discussed with patient model of service to include the limits of confidentiality (i.e. abuse reporting, suicide intervention, etc.) and short-term intervention focused approach. Patient indicated understanding.    LEV Silver is a 63 y.o. female who presents for follow up of PTSD. Patient discussed that two days ago she was experiencing suicidal ideations. Writer assessed risk of harm towards self. Patient stated that her depressed mood had increased significantly due to feeling alone and not having anyone to spend time with for several days. Patient stated that she had planned to overdose on her medication and then go lay down on her favorite towel outside. Patient stated that one of her neighbors called her and asked to go for a walk and that this is what changed her mind. Patient stated that her neighbor showing she cares and wanting to spend time with her helped to stop her suicidal thoughts and decrease her depression. Patient denied current suicidal ideation, intent, plan, and means. Writer guided patient in creating a safety plan for if her SI returns. Patient identified that she would try to call one of her daughters and that if they cannot/will not help she will call one of the crisis numbers that writer provided her with. Writer gave patient another copy of the crisis resources. Writer encouraged patient to consider how she can reduce her loneliness in order to better manage her depression. Patient identified that she is can try to talk to become friends with her neighbor so that she has someone to spend

## 2024-02-15 ENCOUNTER — CARE COORDINATION (OUTPATIENT)
Dept: CARE COORDINATION | Age: 64
End: 2024-02-15

## 2024-02-15 NOTE — CARE COORDINATION
Pt called to say last night she got up around 11 pm and started making spaghetti for her family that she thought was over at her house she called her daughter shannan to tell her she needed ground beef for the spaghetti she was making for people who she though was at her house. There was not anyone at her house. She is not sure if she was imagining this or what happened but her daughter shannan did benavides over to check on her. Yesica wants someone to call and talk to Shannan about the incident that happened last night

## 2024-02-16 ENCOUNTER — TELEPHONE (OUTPATIENT)
Dept: FAMILY MEDICINE CLINIC | Age: 64
End: 2024-02-16

## 2024-02-16 NOTE — TELEPHONE ENCOUNTER
Those BPs are fine.   Can we see if able to be seen sooner with ?   Also I want her to call her established psychiatrist regarding hallucinations

## 2024-02-16 NOTE — TELEPHONE ENCOUNTER
Per Zoie's note on 2/16/24:     Patient experienced scary episode of either hallucination or continuation of a dream. She woke up and started making spaghetti and no one else was home.     She states her BP's are running low again 110/49-50 the last few days.     Writer did advise to inform cardiology as PCP not in today.     Any additional recommendations?

## 2024-02-20 NOTE — TELEPHONE ENCOUNTER
Note per Zoie -   The watch she has, has fall detection and will alert her family if she falls   I called nephrology she is not due for follow up with him until October 2024   She sees neurology 3/12 they did the mri of her back not sure whey they did not do the brain she will get clarification with neurology on march 12th   She has an apt with her psych dr vance on 2/27 she will see valarie the same day   Her bp is still running low last night is was 80/50     Please call.   I am not sure what would be different causing her BPs to be low?   Is she having symptoms?     Is she dehydrated? Drinking enough? Moving bowels?

## 2024-02-21 ENCOUNTER — OFFICE VISIT (OUTPATIENT)
Dept: PRIMARY CARE CLINIC | Age: 64
End: 2024-02-21
Payer: MEDICARE

## 2024-02-21 VITALS
SYSTOLIC BLOOD PRESSURE: 156 MMHG | TEMPERATURE: 97.5 F | HEART RATE: 81 BPM | OXYGEN SATURATION: 98 % | DIASTOLIC BLOOD PRESSURE: 94 MMHG

## 2024-02-21 DIAGNOSIS — T30.0 BURN: Primary | ICD-10-CM

## 2024-02-21 PROCEDURE — 3077F SYST BP >= 140 MM HG: CPT | Performed by: PHYSICIAN ASSISTANT

## 2024-02-21 PROCEDURE — 3080F DIAST BP >= 90 MM HG: CPT | Performed by: PHYSICIAN ASSISTANT

## 2024-02-21 PROCEDURE — 1123F ACP DISCUSS/DSCN MKR DOCD: CPT | Performed by: PHYSICIAN ASSISTANT

## 2024-02-21 PROCEDURE — 99213 OFFICE O/P EST LOW 20 MIN: CPT | Performed by: PHYSICIAN ASSISTANT

## 2024-02-21 RX ORDER — CEPHALEXIN 500 MG/1
500 CAPSULE ORAL 2 TIMES DAILY
Qty: 14 CAPSULE | Refills: 0 | Status: SHIPPED | OUTPATIENT
Start: 2024-02-21 | End: 2024-02-28
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
## 2024-02-22 ENCOUNTER — CARE COORDINATION (OUTPATIENT)
Dept: CARE COORDINATION | Age: 64
End: 2024-02-22

## 2024-02-22 NOTE — CARE COORDINATION
Ambulatory Care Coordination Note  2024    Patient Current Location:  Home: [id number]  [place] [id number]     ACM contacted the patient by telephone. Verified name and  with patient as identifiers. Provided introduction to self, and explanation of the ACM role.     Challenges to be reviewed by the provider   Additional needs identified to be addressed with provider: No  none               Method of communication with provider: none.    ACM: Zoie Varma, RN  Spoke with pt who said she is doing ok at home. She did fall yesterday and ems came and got her up. She did go to the walk in clinic and was prescribed an abx. She did pick this up from the pharmacy. She will come to the office on Tuesday and bring her bp cuff with her for comparison as her bp reading at home is low but in the office her bp was high.   1) acp done   2) sdoh done   3) med review done   4) cardiology dr jean  testing 3/4  5) podiatry Dr Alejandra done   6) nephrology Dr Lopez oct 2024  7) neurosurgery dr baumann 24  8) eye yearly exam due in  done   9)    10) pcp 3/6  11) pill packing through [facility] pharmacy   12)  referral for passport, life alert and food insecurities done   13) home care referral for PT / [facility] done   14) dietician referral   15) dr cardona 3/12/24    Offered patient enrollment in the Remote Patient Monitoring (RPM) program for in-home monitoring: Patient declined.    Lab Results       None            Care Coordination Interventions    Referral from Primary Care Provider: No  Suggested Interventions and Community Resources  Fall Risk Prevention: Completed  Disease Specific Clinic: Completed  Medi Set or Pill Pack: Completed  Registered Dietician: Completed  Transportation Support: Completed          Goals Addressed                   This Visit's Progress     Conditions and Symptoms   On track     I will schedule office visits, as directed by my provider.  I will keep my appointment

## 2024-02-25 ENCOUNTER — HOSPITAL ENCOUNTER (EMERGENCY)
Age: 64
Discharge: HOME | End: 2024-02-25
Attending: EMERGENCY MEDICINE
Payer: MEDICARE

## 2024-02-25 ENCOUNTER — APPOINTMENT (OUTPATIENT)
Dept: GENERAL RADIOLOGY | Age: 64
End: 2024-02-25
Payer: MEDICARE

## 2024-02-25 ENCOUNTER — APPOINTMENT (OUTPATIENT)
Dept: CT IMAGING | Age: 64
End: 2024-02-25
Payer: MEDICARE

## 2024-02-25 VITALS
OXYGEN SATURATION: 96 % | TEMPERATURE: 98.4 F | SYSTOLIC BLOOD PRESSURE: 156 MMHG | DIASTOLIC BLOOD PRESSURE: 83 MMHG | RESPIRATION RATE: 10 BRPM | BODY MASS INDEX: 36.58 KG/M2 | HEART RATE: 73 BPM | WEIGHT: 200 LBS

## 2024-02-25 DIAGNOSIS — R41.0 DISORIENTATION: Primary | ICD-10-CM

## 2024-02-25 LAB
ALBUMIN SERPL-MCNC: 4.1 G/DL (ref 3.5–5.2)
ALBUMIN/GLOB SERPL: 1.6 {RATIO} (ref 1–2.5)
ALP SERPL-CCNC: 78 U/L (ref 35–104)
ALT SERPL-CCNC: 48 U/L (ref 5–33)
AMMONIA PLAS-SCNC: <10 UMOL/L (ref 11–51)
ANION GAP SERPL CALCULATED.3IONS-SCNC: 8 MMOL/L (ref 9–17)
AST SERPL-CCNC: 27 U/L
BASOPHILS # BLD: 0 K/UL (ref 0–0.2)
BASOPHILS NFR BLD: 0 % (ref 0–2)
BILIRUB SERPL-MCNC: 0.3 MG/DL (ref 0.3–1.2)
BILIRUB UR QL STRIP: NEGATIVE
BUN SERPL-MCNC: 19 MG/DL (ref 8–23)
CALCIUM SERPL-MCNC: 9.8 MG/DL (ref 8.6–10.4)
CHLORIDE SERPL-SCNC: 107 MMOL/L (ref 98–107)
CLARITY UR: CLEAR
CO2 SERPL-SCNC: 29 MMOL/L (ref 20–31)
COLOR UR: YELLOW
COMMENT: ABNORMAL
CREAT SERPL-MCNC: 1.2 MG/DL (ref 0.5–0.9)
EOSINOPHIL # BLD: 0.2 K/UL (ref 0–0.4)
EOSINOPHILS RELATIVE PERCENT: 4 % (ref 1–4)
ERYTHROCYTE [DISTWIDTH] IN BLOOD BY AUTOMATED COUNT: 15.3 % (ref 12.5–15.4)
GFR SERPL CREATININE-BSD FRML MDRD: 50 ML/MIN/1.73M2
GLUCOSE SERPL-MCNC: 124 MG/DL (ref 70–99)
GLUCOSE UR STRIP-MCNC: ABNORMAL MG/DL
HCT VFR BLD AUTO: 38 % (ref 36–46)
HGB BLD-MCNC: 12.5 G/DL (ref 12–16)
HGB UR QL STRIP.AUTO: NEGATIVE
KETONES UR STRIP-MCNC: NEGATIVE MG/DL
LEUKOCYTE ESTERASE UR QL STRIP: NEGATIVE
LIPASE SERPL-CCNC: 36 U/L (ref 13–60)
LYMPHOCYTES NFR BLD: 2 K/UL (ref 1–4.8)
LYMPHOCYTES RELATIVE PERCENT: 38 % (ref 24–44)
MCH RBC QN AUTO: 31.9 PG (ref 26–34)
MCHC RBC AUTO-ENTMCNC: 33 G/DL (ref 31–37)
MCV RBC AUTO: 96.8 FL (ref 80–100)
MONOCYTES NFR BLD: 0.4 K/UL (ref 0.1–1.2)
MONOCYTES NFR BLD: 7 % (ref 2–11)
NEUTROPHILS NFR BLD: 51 % (ref 36–66)
NEUTS SEG NFR BLD: 2.7 K/UL (ref 1.8–7.7)
NITRITE UR QL STRIP: NEGATIVE
PH UR STRIP: 7 [PH] (ref 5–8)
PLATELET # BLD AUTO: 199 K/UL (ref 140–450)
PMV BLD AUTO: 6.4 FL (ref 6–12)
POTASSIUM SERPL-SCNC: 4.6 MMOL/L (ref 3.7–5.3)
PROT SERPL-MCNC: 6.7 G/DL (ref 6.4–8.3)
PROT UR STRIP-MCNC: NEGATIVE MG/DL
RBC # BLD AUTO: 3.92 M/UL (ref 4–5.2)
SODIUM SERPL-SCNC: 144 MMOL/L (ref 135–144)
SP GR UR STRIP: 1.01 (ref 1–1.03)
TROPONIN I SERPL HS-MCNC: 25 NG/L (ref 0–14)
UROBILINOGEN UR STRIP-ACNC: NORMAL EU/DL (ref 0–1)
WBC OTHER # BLD: 5.3 K/UL (ref 3.5–11)

## 2024-02-25 PROCEDURE — 96360 HYDRATION IV INFUSION INIT: CPT

## 2024-02-25 PROCEDURE — 70450 CT HEAD/BRAIN W/O DYE: CPT

## 2024-02-25 PROCEDURE — 81003 URINALYSIS AUTO W/O SCOPE: CPT

## 2024-02-25 PROCEDURE — 36415 COLL VENOUS BLD VENIPUNCTURE: CPT

## 2024-02-25 PROCEDURE — 99285 EMERGENCY DEPT VISIT HI MDM: CPT

## 2024-02-25 PROCEDURE — 71045 X-RAY EXAM CHEST 1 VIEW: CPT

## 2024-02-25 PROCEDURE — 84484 ASSAY OF TROPONIN QUANT: CPT

## 2024-02-25 PROCEDURE — 82140 ASSAY OF AMMONIA: CPT

## 2024-02-25 PROCEDURE — 93005 ELECTROCARDIOGRAM TRACING: CPT | Performed by: NURSE PRACTITIONER

## 2024-02-25 PROCEDURE — 73502 X-RAY EXAM HIP UNI 2-3 VIEWS: CPT

## 2024-02-25 PROCEDURE — 2580000003 HC RX 258: Performed by: NURSE PRACTITIONER

## 2024-02-25 PROCEDURE — 80053 COMPREHEN METABOLIC PANEL: CPT

## 2024-02-25 PROCEDURE — 85025 COMPLETE CBC W/AUTO DIFF WBC: CPT

## 2024-02-25 PROCEDURE — 83690 ASSAY OF LIPASE: CPT

## 2024-02-25 RX ORDER — 0.9 % SODIUM CHLORIDE 0.9 %
500 INTRAVENOUS SOLUTION INTRAVENOUS ONCE
Status: COMPLETED | OUTPATIENT
Start: 2024-02-25 | End: 2024-02-25

## 2024-02-25 RX ADMIN — SODIUM CHLORIDE 500 ML: 9 INJECTION, SOLUTION INTRAVENOUS at 17:51

## 2024-02-25 NOTE — ED PROVIDER NOTES
recognition program.  Efforts were made to edit the dictations but occasionally words are mis-transcribed.)    Amy Jin DO  Board Certified Emergency Medicine Physician    
presently.  The daughter feels very comfortable taking her home tonight.  We discussed what to watch out for and when to return including signs of stroke which I very low suspicion for presently.      REASSESSMENT          CRITICAL CARE TIME       CONSULTS:  None    PROCEDURES:  Unless otherwise noted below, none     Procedures        FINAL IMPRESSION      1. Disorientation          DISPOSITION/PLAN   DISPOSITION Decision To Discharge 02/25/2024 06:24:05 PM      PATIENT REFERRED TO:  Sumeet Kothari DO  [address] [address]  [phone]          [facility]  [address]  [place] [address]  [phone]    If symptoms worsen    Estela Awad, PhD  [address] [address]  [phone]            DISCHARGE MEDICATIONS:  New Prescriptions    No medications on file     Controlled Substances Monitoring:     RX Monitoring Periodic Controlled Substance Monitoring   2/15/2023   7:12 PM Obtaining appropriate analgesic effect of treatment.       (Please note that portions of this note were completed with a voice recognition program.  Efforts were made to edit the dictations but occasionally words are mis-transcribed.)    BRAULIO Rodriges CNP (electronically signed)  Attending Emergency Physician           Zoey Madrid APRN - CNP  02/25/24 [id number]

## 2024-02-25 NOTE — DISCHARGE INSTRUCTIONS
Home.  Watch for any focal deficits as we discussed.  Please follow-up with family practice and neuropsych as we discussed.  Return for any worsening symptoms or concern

## 2024-02-25 NOTE — ED TRIAGE NOTES
Pt went to [facility] where altered gait and AMS was noted. Family states mentation has been off for months but has been getting increasing worse

## 2024-02-26 ENCOUNTER — CARE COORDINATION (OUTPATIENT)
Dept: CARE COORDINATION | Age: 64
End: 2024-02-26

## 2024-02-26 LAB
EKG ATRIAL RATE: 75 BPM
EKG P AXIS: 15 DEGREES
EKG P-R INTERVAL: 182 MS
EKG Q-T INTERVAL: 390 MS
EKG QRS DURATION: 76 MS
EKG QTC CALCULATION (BAZETT): 435 MS
EKG R AXIS: -21 DEGREES
EKG T AXIS: 44 DEGREES
EKG VENTRICULAR RATE: 75 BPM

## 2024-02-26 ASSESSMENT — ENCOUNTER SYMPTOMS
GASTROINTESTINAL NEGATIVE: 1
RESPIRATORY NEGATIVE: 1
BURN: 1

## 2024-02-26 NOTE — CARE COORDINATION
Ambulatory Care Coordination Note  2024    Patient Current Location:  Home: [id number]  [place] [id number]     ACM contacted the patient by telephone. Verified name and  with patient as identifiers. Provided introduction to self, and explanation of the ACM role.     Challenges to be reviewed by the provider   Additional needs identified to be addressed with provider: No  none               Method of communication with provider: none.    ACM: Zoie Varma, RN  Spoke with pt who said she did get confused yesterday and was taken to the ER they did let her go home. She is not staying at her daughter Lainey diggs. She said her daughter is going to let her stay at her house for a while. Did remind her of her apt with her psychiatrist Dr Wolf for tomorrow and with Rhonda  tomorrow. She will see her pcp next week   1) acp done   2) sdoh done   3) med review done   4) cardiology dr jean  testing 3/4  5) podiatry Dr Alejandra done   6) nephrology Dr Lopez oct 2024  7) neurosurgery dr baumann 24  8) eye yearly exam due in  done   9)   dr wolf Psychiatry   10) pcp 3/6  11) pill packing through [facility] pharmacy   12)  referral for passport, life alert and food insecurities done   13) home care referral for PT / [place] [facility] done   14) dietician referral   15) dr cardona 3/12/24      Offered patient enrollment in the Remote Patient Monitoring (RPM) program for in-home monitoring:  graduated  .    Lab Results       None            Care Coordination Interventions    Referral from Primary Care Provider: No  Suggested Interventions and Community Resources  Fall Risk Prevention: Completed  Disease Specific Clinic: Completed  Medi Set or Pill Pack: Completed  Registered Dietician: Completed  Transportation Support: Completed          Goals Addressed                   This Visit's Progress     Conditions and Symptoms   On track     I will schedule office visits, as directed by my provider.  I

## 2024-02-28 ENCOUNTER — HOSPITAL ENCOUNTER (OUTPATIENT)
Dept: MAMMOGRAPHY | Age: 64
Discharge: HOME | End: 2024-03-01
Payer: MEDICARE

## 2024-02-28 VITALS — HEIGHT: 63 IN | BODY MASS INDEX: 36.86 KG/M2 | WEIGHT: 208 LBS

## 2024-02-28 DIAGNOSIS — Z12.31 SCREENING MAMMOGRAM FOR BREAST CANCER: ICD-10-CM

## 2024-02-28 PROCEDURE — 77063 BREAST TOMOSYNTHESIS BI: CPT

## 2024-03-07 ENCOUNTER — CARE COORDINATION (OUTPATIENT)
Dept: CARE COORDINATION | Age: 64
End: 2024-03-07

## 2024-03-13 RX ORDER — GABAPENTIN 300 MG/1
CAPSULE ORAL
Qty: 60 CAPSULE | Refills: 5 | Status: SHIPPED | OUTPATIENT
Start: 2024-03-13 | End: 2024-09-13

## 2024-03-13 NOTE — TELEPHONE ENCOUNTER
Pharmacy requesting refill of Gabapentin 300mg.      Medication active on med list yes      Date of last Rx: 12/21/2023 with 2 refills          verified by KEAGAN BLAND      Date of last appointment 12/21/2023    Next Visit Date:  Visit date not found

## 2024-03-14 ENCOUNTER — CARE COORDINATION (OUTPATIENT)
Dept: CARE COORDINATION | Age: 64
End: 2024-03-14

## 2024-03-14 NOTE — CARE COORDINATION
Ambulatory Care Coordination Note  3/14/2024    Patient Current Location:  Home: [address]  [place] [id number]     ACM contacted the patient by telephone. Verified name and  with patient as identifiers. Provided introduction to self, and explanation of the ACM role.     Challenges to be reviewed by the provider   Additional needs identified to be addressed with provider: No  none               Method of communication with provider: none.    ACM: Zoie Varma, RN  Spoke with pt who said she was at [facility] she was taken to [facility] psych by her daughters she felt \"tricked\" into signing her self in. She said some one told her psych she was suicidal Yesica denies being suicidal. She has already followed up with the [facility] center her psychatrist is retiring soon they will be assigning her to some one new this makes her nervous as Dr Wolf has been her psychiatrist for 10 years she also saw eye doctor yesterday. She will see her pcp   1) acp done   2) sdoh done   3) med review done   4) cardiology dr jean  testing 3/4  5) podiatry Dr Alejandra   6) nephrology Dr Lopez oct 2024  7) neurosurgery dr baumann 24  8) eye yearly exam due in  done   9)   dr wolf Psychiatry next week   10) pcp 4/3  11) pill packing through [facility]   12)  referral for passport, life alert and food insecurities done   13) home care referral for PT / [place] [facility] done   14) dietician referral   15) dr cardona 3/12/24      Offered patient enrollment in the Remote Patient Monitoring (RPM) program for in-home monitoring: Patient declined.    Lab Results       None            Care Coordination Interventions    Referral from Primary Care Provider: No  Suggested Interventions and Community Resources  Fall Risk Prevention: Completed  Disease Specific Clinic: Completed  Medi Set or Pill Pack: Completed  Registered Dietician: Completed  Transportation Support: Completed          Goals Addressed

## 2024-03-20 ENCOUNTER — CARE COORDINATION (OUTPATIENT)
Dept: CARE COORDINATION | Age: 64
End: 2024-03-20

## 2024-03-20 DIAGNOSIS — M62.838 MUSCLE SPASM: ICD-10-CM

## 2024-03-20 NOTE — CARE COORDINATION
Ambulatory Care Coordination Note  3/20/2024    Patient Current Location:  Home: [id number]  [place] [id number]     ACM contacted the patient by telephone. Verified name and  with patient as identifiers. Provided introduction to self, and explanation of the ACM role.     Challenges to be reviewed by the provider   Additional needs identified to be addressed with provider: No  none               Method of communication with provider: none.    ACM: Zoie Varma, RN  Spoke with pt who said she fell this morning she was stooped down to get a bottle water and feel back she did not hit her head she was able get herself back up using the couch. She said she is pretty sore from this mostly her back, knees and would like to get a muscle relaxer from her pcp for this.  She will see her pcp April 3 she will have her labs done before this. She will also stop by pcp office and sign a BRITTNEY to obtain her dc summery from her inpt psych admit.   1) acp done   2) sdoh done   3) med review done   4) cardiology dr jean  testing 3/4  5) podiatry Dr Alejandra  will reschedule because this is the same time as cardiology   6) nephrology Dr Lopez oct 2024  7) neurosurgery dr baumann 24  8) eye yearly exam due in  done   9)   dr oneill Psychiatry next week   10) pcp 4/3  11) pill packing through [facility]   12)  referral for passport, life alert and food insecurities done   13) home care referral for PT / [place] [facility] done   14) dietician referral   15) dr cardona 3/12/24      Offered patient enrollment in the Remote Patient Monitoring (RPM) program for in-home monitoring: Patient declined.    Lab Results       None            Care Coordination Interventions    Referral from Primary Care Provider: No  Suggested Interventions and Community Resources  Fall Risk Prevention: Completed  Disease Specific Clinic: Completed  Medi Set or Pill Pack: Completed  Registered Dietician: Completed  Transportation

## 2024-03-22 ENCOUNTER — TELEPHONE (OUTPATIENT)
Dept: PRIMARY CARE CLINIC | Age: 64
End: 2024-03-22

## 2024-03-22 ENCOUNTER — OFFICE VISIT (OUTPATIENT)
Dept: PRIMARY CARE CLINIC | Age: 64
End: 2024-03-22
Payer: MEDICARE

## 2024-03-22 VITALS
SYSTOLIC BLOOD PRESSURE: 130 MMHG | BODY MASS INDEX: 38.39 KG/M2 | OXYGEN SATURATION: 98 % | DIASTOLIC BLOOD PRESSURE: 88 MMHG | HEIGHT: 62 IN | HEART RATE: 78 BPM | WEIGHT: 208.6 LBS

## 2024-03-22 DIAGNOSIS — M25.561 PAIN AND SWELLING OF RIGHT KNEE: ICD-10-CM

## 2024-03-22 DIAGNOSIS — S59.911A INJURY OF RIGHT FOREARM, INITIAL ENCOUNTER: ICD-10-CM

## 2024-03-22 DIAGNOSIS — S89.91XA INJURY OF RIGHT KNEE, INITIAL ENCOUNTER: ICD-10-CM

## 2024-03-22 DIAGNOSIS — S89.92XA INJURY OF LEFT KNEE, INITIAL ENCOUNTER: ICD-10-CM
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
## 2024-03-22 DIAGNOSIS — M25.562 PAIN AND SWELLING OF LEFT KNEE: ICD-10-CM

## 2024-03-22 DIAGNOSIS — M25.461 PAIN AND SWELLING OF RIGHT KNEE: ICD-10-CM
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
## 2024-03-22 DIAGNOSIS — M25.462 PAIN AND SWELLING OF LEFT KNEE: ICD-10-CM

## 2024-03-22 PROCEDURE — 99214 OFFICE O/P EST MOD 30 MIN: CPT

## 2024-03-22 PROCEDURE — 1123F ACP DISCUSS/DSCN MKR DOCD: CPT

## 2024-03-22 PROCEDURE — 3079F DIAST BP 80-89 MM HG: CPT

## 2024-03-22 PROCEDURE — 3075F SYST BP GE 130 - 139MM HG: CPT

## 2024-03-22 RX ORDER — TRAMADOL HYDROCHLORIDE 25 MG/1
25 TABLET, COATED ORAL EVERY 6 HOURS PRN
Qty: 20 TABLET | Refills: 0 | Status: SHIPPED | OUTPATIENT
Start: 2024-03-22 | End: 2024-03-22

## 2024-03-22 RX ORDER — TRAMADOL HYDROCHLORIDE 50 MG/1
50 TABLET ORAL EVERY 6 HOURS PRN
Qty: 20 TABLET | Refills: 0 | Status: SHIPPED | OUTPATIENT
Start: 2024-03-22 | End: 2024-03-27

## 2024-03-22 SDOH — ECONOMIC STABILITY: FOOD INSECURITY: WITHIN THE PAST 12 MONTHS, THE FOOD YOU BOUGHT JUST DIDN'T LAST AND YOU DIDN'T HAVE MONEY TO GET MORE.: NEVER TRUE

## 2024-03-22 SDOH — ECONOMIC STABILITY: INCOME INSECURITY: HOW HARD IS IT FOR YOU TO PAY FOR THE VERY BASICS LIKE FOOD, HOUSING, MEDICAL CARE, AND HEATING?: NOT HARD AT ALL

## 2024-03-22 SDOH — ECONOMIC STABILITY: FOOD INSECURITY: WITHIN THE PAST 12 MONTHS, YOU WORRIED THAT YOUR FOOD WOULD RUN OUT BEFORE YOU GOT MONEY TO BUY MORE.: NEVER TRUE

## 2024-03-22 ASSESSMENT — ENCOUNTER SYMPTOMS: COLOR CHANGE: 1

## 2024-03-22 NOTE — PROGRESS NOTES
[facility]  [facility] PRIMARY CARE  [address]  [facility] [address]    [facility] PHYSICIANS [facility] WALK-IN  [address],  [address]  [facility] [address]  Dept: [phone]    Adrianne Campos is a 63 y.o. female Established patient, who presents to the walk-in clinic today with conditions/complaints as noted below:    Chief Complaint   Patient presents with    Fall    Arm Injury     right    Knee Injury     bilateral         HPI:     Patient is a 63-year-old female that presents today accompanied by her daughter for evaluation following a fall at home. Reports that she was bent over picking up water bottles to stock her fridge on Wednesday morning when she fell forward onto both of her knees. States that she hit her right forearm on a table while maneuvering and attempting to get back up. She's on Eliquis; denies head injury or LOC. She has pain in her lateral right forearm and has noticed a bump. Reports bilateral knee pain and swelling; the left is worse. Describes sharp quality and rates it 6-7/10. Notes associated limited ROM; she uses a walker. It improves with rest. Denies distal numbness or tingling. She's taken leftover Percocet with some relief and applied ice.   right lateral forearm pain, and localized swelling, took Percocet, anteromedial bruising left knee, mild swelling, global right tenderedn , medial and patellar left        Past Medical History:   Diagnosis Date    Acute tracheobronchitis-viral 08/05/2021    Anxiety     Cerebrovascular accident (CVA) ([facility]) 09/10/2021    Chronic kidney disease     COPD (chronic obstructive pulmonary disease) ([facility])     COPD exacerbation ([facility]) 08/05/2021    Depression     Effusion of right knee 03/07/2022    Fracture of ankle 05/14/2020    Hyperlipidemia     Hypertension     Long term (current) use of anticoagulants 08/07/2020    Obesity     NARCISO on CPAP 01/19/2018    Osteoarthritis

## 2024-03-22 NOTE — TELEPHONE ENCOUNTER
Pharmacy calling regarding tramadol, stated you wrote for 25mg and shell have to order it on Monday and itll be expensive around $86, stated she can order 50mg and the patient will be able to  today and itll be around $7, stated the patient can cut the tablet in half

## 2024-03-22 NOTE — PATIENT INSTRUCTIONS
Will call with results.  Continue with supportive treatment measures.  Recommend ice application.  Use Tylenol as needed for pain. May use Tramadol as needed for moderate-to-severe pain.  Follow-up with PCP.

## 2024-03-28 ENCOUNTER — CARE COORDINATION (OUTPATIENT)
Dept: CARE COORDINATION | Age: 64
End: 2024-03-28

## 2024-03-28 DIAGNOSIS — N39.46 MIXED STRESS AND URGE URINARY INCONTINENCE: Primary | ICD-10-CM

## 2024-03-28 NOTE — CARE COORDINATION
Ambulatory Care Coordination Note  3/28/2024    Patient Current Location:  Home: [id number]  [place] [id number]     ACM contacted the patient by telephone. Verified name and  with patient as identifiers. Provided introduction to self, and explanation of the ACM role.     Challenges to be reviewed by the provider   Additional needs identified to be addressed with provider: No  none               Method of communication with provider: none.    ACM: Zoie Varma, RN  Spoke with pt who said she would like to get incontinence supplies sent to [facility] she talked to her insurance and was told she can get them did send an order to her pcp   1) acp done   2) sdoh done   3) med review done   4) cardiology dr jean  testing 3/4  5) podiatry Dr Alejandra   6) nephrology Dr Lopez oct 2024  7) neurosurgery dr baumann 24  8) eye yearly exam due in  done   9)   dr oneill Psychiatry next week   10) pcp   11) pill packing through [facility]   12)  referral for passport, life alert and food insecurities done   13) home care referral for PT / OT [facility] done   14) dietician referral   15) dr cardona 3/12/24      Offered patient enrollment in the Remote Patient Monitoring (RPM) program for in-home monitoring: Patient declined.    Lab Results       None            Care Coordination Interventions    Referral from Primary Care Provider: No  Suggested Interventions and Community Resources  Fall Risk Prevention: Completed  Disease Specific Clinic: Completed  Medi Set or Pill Pack: Completed  Registered Dietician: Completed  Transportation Support: Completed          Goals Addressed                   This Visit's Progress     Conditions and Symptoms   On track     I will schedule office visits, as directed by my provider.  I will keep my appointment or reschedule if I have to cancel.  I will notify my provider of any barriers to my plan of care.    Barriers: overwhelmed by complexity of

## 2024-04-04 ENCOUNTER — CARE COORDINATION (OUTPATIENT)
Dept: CARE COORDINATION | Age: 64
End: 2024-04-04

## 2024-04-04 DIAGNOSIS — E11.22 CONTROLLED TYPE 2 DIABETES MELLITUS WITH STAGE 3 CHRONIC KIDNEY DISEASE, WITHOUT LONG-TERM CURRENT USE OF INSULIN (HCC): Chronic | ICD-10-CM

## 2024-04-04 DIAGNOSIS — N18.30 CONTROLLED TYPE 2 DIABETES MELLITUS WITH STAGE 3 CHRONIC KIDNEY DISEASE, WITHOUT LONG-TERM CURRENT USE OF INSULIN (HCC): Chronic | ICD-10-CM

## 2024-04-04 RX ORDER — BLOOD SUGAR DIAGNOSTIC
STRIP MISCELLANEOUS
Qty: 100 EACH | Refills: 5 | Status: SHIPPED | OUTPATIENT
Start: 2024-04-04

## 2024-04-04 NOTE — CARE COORDINATION
Ambulatory Care Coordination Note  2024    Patient Current Location:  Home: [id number]  [place] [id number]     ACM contacted the patient by telephone. Verified name and  with patient as identifiers. Provided introduction to self, and explanation of the ACM role.     Challenges to be reviewed by the provider   Additional needs identified to be addressed with provider: No  none               Method of communication with provider: none.    ACM: Zoie Varma, RN  Spoke with pt who said she is doing well at home. Did call her neurologist and made her a follow up apt. She will see cardiology on Monday.   1) acp done   2) sdoh done   3) med review done   4) cardiology dr jean  testing 3/4  5) podiatry Dr Alejandra   6) nephrology Dr Lopez oct 2024  7) neurosurgery dr baumann 24  8) eye yearly exam due in  done   9)   dr oneill Psychiatry next week   10) pcp   11) pill packing through [facility] pharmacy   12)  referral for passport, life alert and food insecurities done   13) home care referral for PT / [facility] done   14) dietician referral   15) dr cardona 7/15 1:40 pm          Offered patient enrollment in the Remote Patient Monitoring (RPM) program for in-home monitoring: Patient declined.    Lab Results       None            Care Coordination Interventions    Referral from Primary Care Provider: No  Suggested Interventions and Community Resources  Fall Risk Prevention: Completed  Disease Specific Clinic: Completed  Medi Set or Pill Pack: Completed  Registered Dietician: Completed  Transportation Support: Completed          Goals Addressed                   This Visit's Progress     Conditions and Symptoms   On track     I will schedule office visits, as directed by my provider.  I will keep my appointment or reschedule if I have to cancel.  I will notify my provider of any barriers to my plan of care.    Barriers: overwhelmed by complexity of regimen  Plan for overcoming my

## 2024-04-04 NOTE — TELEPHONE ENCOUNTER
LOV 2/7/24  RTO F/U scheduled  LRF 3/24/23    Health Maintenance   Topic Date Due    Diabetic Alb to Cr ratio (uACR) test  04/08/2020    Diabetic retinal exam  07/09/2022    Cervical cancer screen  11/16/2022    Pneumococcal 65+ years Vaccine (3 of 3 - PPSV23 or PCV20) 04/23/2023    COVID-19 Vaccine (8 - 2023-24 season) 11/24/2023    Annual Wellness Visit (Medicare Advantage)  01/01/2024    Respiratory Syncytial Virus (RSV) Pregnant or age 60 yrs+ (1 - 1-dose 60+ series) 12/05/2024 (Originally 4/23/2018)    Lipids  08/23/2024    Depression Monitoring  01/10/2025    A1C test (Diabetic or Prediabetic)  01/16/2025    Diabetic foot exam  01/29/2025    GFR test (Diabetes, CKD 3-4, OR last GFR 15-59)  02/25/2025    Breast cancer screen  02/28/2026    DTaP/Tdap/Td vaccine (2 - Td or Tdap) 08/04/2026    Colorectal Cancer Screen  02/16/2028    DEXA (modify frequency per FRAX score)  Completed    Flu vaccine  Completed    Shingles vaccine  Completed    Hepatitis C screen  Addressed    HIV screen  Addressed    Hepatitis A vaccine  Aged Out    Hepatitis B vaccine  Aged Out    Hib vaccine  Aged Out    Polio vaccine  Aged Out    Meningococcal (ACWY) vaccine  Aged Out    Pneumococcal 0-64 years Vaccine  Discontinued             (applicable per patient's age: Cancer Screenings, Depression Screening, Fall Risk Screening, Immunizations)    Hemoglobin A1C (%)   Date Value   01/16/2024 6.6 (H)   08/23/2023 6.6 (A)   01/19/2023 6.1 (H)     LDL Cholesterol (mg/dL)   Date Value   01/19/2023 89     LDL Calculated (mg/dL)   Date Value   08/23/2023 96     AST (U/L)   Date Value   02/25/2024 27     ALT (U/L)   Date Value   02/25/2024 48 (H)     BUN (mg/dL)   Date Value   02/25/2024 19      (goal A1C is < 7)   (goal LDL is <100) need 30-50% reduction from baseline     BP Readings from Last 3 Encounters:   03/22/24 130/88   02/25/24 (!) 156/83   02/21/24 (!) 156/94    (goal /80)      All Future Testing planned in [facility]:  Lab Frequency

## 2024-04-12 ENCOUNTER — OFFICE VISIT (OUTPATIENT)
Dept: NEUROLOGY | Age: 64
End: 2024-04-12
Payer: MEDICARE

## 2024-04-12 VITALS
SYSTOLIC BLOOD PRESSURE: 129 MMHG | DIASTOLIC BLOOD PRESSURE: 89 MMHG | BODY MASS INDEX: 37.73 KG/M2 | HEART RATE: 77 BPM | HEIGHT: 62 IN | WEIGHT: 205 LBS

## 2024-04-12 DIAGNOSIS — M54.40 CHRONIC MIDLINE LOW BACK PAIN WITH SCIATICA, SCIATICA LATERALITY UNSPECIFIED: ICD-10-CM

## 2024-04-12 DIAGNOSIS — G89.29 CHRONIC MIDLINE LOW BACK PAIN WITH SCIATICA, SCIATICA LATERALITY UNSPECIFIED: ICD-10-CM

## 2024-04-12 DIAGNOSIS — E08.42 DIABETIC POLYNEUROPATHY ASSOCIATED WITH DIABETES MELLITUS DUE TO UNDERLYING CONDITION (HCC): ICD-10-CM

## 2024-04-12 DIAGNOSIS — G62.9 SENSORY NEUROPATHY: ICD-10-CM

## 2024-04-12 DIAGNOSIS — R26.81 GAIT INSTABILITY: ICD-10-CM

## 2024-04-12 DIAGNOSIS — G25.2 POSTURAL TREMOR: Primary | ICD-10-CM

## 2024-04-12 PROCEDURE — 1123F ACP DISCUSS/DSCN MKR DOCD: CPT | Performed by: PSYCHIATRY & NEUROLOGY

## 2024-04-12 PROCEDURE — 3074F SYST BP LT 130 MM HG: CPT | Performed by: PSYCHIATRY & NEUROLOGY

## 2024-04-12 PROCEDURE — 99214 OFFICE O/P EST MOD 30 MIN: CPT | Performed by: PSYCHIATRY & NEUROLOGY

## 2024-04-12 PROCEDURE — 3079F DIAST BP 80-89 MM HG: CPT | Performed by: PSYCHIATRY & NEUROLOGY

## 2024-04-12 NOTE — PROGRESS NOTES
120 each 3     Current Facility-Administered Medications   Medication Dose Route Frequency Provider Last Rate Last Admin    methylPREDNISolone acetate (DEPO-MEDROL) injection 40 mg  40 mg Intra-artICUlar Once Palmer Shukla MD        lidocaine 1 % injection 5 mL  5 mL Intra-artICUlar Once Palmer Shukla MD        methylPREDNISolone acetate (DEPO-MEDROL) injection 40 mg  40 mg Intra-artICUlar Once Palmer Shukla MD        lidocaine 1 % injection 5 mL  5 mL Intra-artICUlar Once Palmer Shukla MD           No Known Allergies        Review of Systems     Vitals:    04/12/24 1424   BP: 129/89   Pulse: 77     weight: 93 kg (205 lb)      Review of Systems   Constitutional: Negative.    HENT: Negative.     Eyes: Negative.    Respiratory: Negative.     Cardiovascular: Negative.    Gastrointestinal: Negative.    Endocrine: Negative.    Musculoskeletal:  Positive for gait problem.   Skin: Negative.    Allergic/Immunologic: Negative.    Neurological:  Positive for numbness.   Hematological: Negative.    Psychiatric/Behavioral: Negative.          Neurological Examination  Constitutional .General exam well groomed   Head/Ears /Nose/Throat: external ear .Normal exam  Neck and thyroid .Normal size. No bruits  Respiratory .Breathsounds clear bilaterally  Cardiovascular: Auscultation of heart with regular rate and rhythm  Musculoskeletal. Muscle bulk and tone normal                                                           Muscle strength 5/5 strength throughout                                                                                No dysmetria or dysdiadokinesis  Postural tremor both arms with minor resting tremor compaoant   Normal fine motor  Gait imbalance   Orientation Alert and oriented x 3   Attention and concentration normal  Short term memory normal  Language process and speech normal . No aphasia   Cranial nerve 2 normal acuety and visual fields  Cranial nerve 3, 4 and 6 .Extraocular muscles are intact . Pupils are equal

## 2024-04-16 ENCOUNTER — HOSPITAL ENCOUNTER (OUTPATIENT)
Facility: CLINIC | Age: 64
Discharge: HOME | End: 2024-04-16
Payer: MEDICARE

## 2024-04-16 DIAGNOSIS — E08.42 DIABETIC POLYNEUROPATHY ASSOCIATED WITH DIABETES MELLITUS DUE TO UNDERLYING CONDITION (HCC): ICD-10-CM

## 2024-04-16 DIAGNOSIS — N18.31 STAGE 3A CHRONIC KIDNEY DISEASE (HCC): ICD-10-CM

## 2024-04-16 LAB
ANION GAP SERPL CALCULATED.3IONS-SCNC: 12 MMOL/L (ref 9–16)
BUN SERPL-MCNC: 19 MG/DL (ref 8–23)
CALCIUM SERPL-MCNC: 9.8 MG/DL (ref 8.6–10.4)
CHLORIDE SERPL-SCNC: 103 MMOL/L (ref 98–107)
CO2 SERPL-SCNC: 25 MMOL/L (ref 20–31)
CREAT SERPL-MCNC: 1.2 MG/DL (ref 0.5–0.9)
ERYTHROCYTE [DISTWIDTH] IN BLOOD BY AUTOMATED COUNT: 13.9 % (ref 11.8–14.4)
ERYTHROCYTE [SEDIMENTATION RATE] IN BLOOD BY PHOTOMETRIC METHOD: 12 MM/HR (ref 0–30)
FOLATE SERPL-MCNC: >20 NG/ML (ref 4.8–24.2)
GFR SERPL CREATININE-BSD FRML MDRD: 52 ML/MIN/1.73M2
GLUCOSE SERPL-MCNC: 104 MG/DL (ref 74–99)
HCT VFR BLD AUTO: 40.8 % (ref 36.3–47.1)
HGB BLD-MCNC: 12.9 G/DL (ref 11.9–15.1)
MCH RBC QN AUTO: 31.5 PG (ref 25.2–33.5)
MCHC RBC AUTO-ENTMCNC: 31.6 G/DL (ref 28.4–34.8)
MCV RBC AUTO: 99.5 FL (ref 82.6–102.9)
NRBC BLD-RTO: 0 PER 100 WBC
PLATELET # BLD AUTO: 195 K/UL (ref 138–453)
PMV BLD AUTO: 9 FL (ref 8.1–13.5)
POTASSIUM SERPL-SCNC: 4.7 MMOL/L (ref 3.7–5.3)
RBC # BLD AUTO: 4.1 M/UL (ref 3.95–5.11)
SODIUM SERPL-SCNC: 140 MMOL/L (ref 136–145)
TSH SERPL DL<=0.05 MIU/L-ACNC: 0.77 UIU/ML (ref 0.27–4.2)
VIT B12 SERPL-MCNC: 838 PG/ML (ref 232–1245)
WBC OTHER # BLD: 6.5 K/UL (ref 3.5–11.3)

## 2024-04-16 PROCEDURE — 82746 ASSAY OF FOLIC ACID SERUM: CPT

## 2024-04-16 PROCEDURE — 82570 ASSAY OF URINE CREATININE: CPT

## 2024-04-16 PROCEDURE — 84155 ASSAY OF PROTEIN SERUM: CPT

## 2024-04-16 PROCEDURE — 85027 COMPLETE CBC AUTOMATED: CPT

## 2024-04-16 PROCEDURE — 86038 ANTINUCLEAR ANTIBODIES: CPT

## 2024-04-16 PROCEDURE — 84443 ASSAY THYROID STIM HORMONE: CPT

## 2024-04-16 PROCEDURE — 86225 DNA ANTIBODY NATIVE: CPT

## 2024-04-16 PROCEDURE — 36415 COLL VENOUS BLD VENIPUNCTURE: CPT

## 2024-04-16 PROCEDURE — 84165 PROTEIN E-PHORESIS SERUM: CPT

## 2024-04-16 PROCEDURE — 84156 ASSAY OF PROTEIN URINE: CPT

## 2024-04-16 PROCEDURE — 85652 RBC SED RATE AUTOMATED: CPT

## 2024-04-16 PROCEDURE — 82607 VITAMIN B-12: CPT

## 2024-04-16 PROCEDURE — 80048 BASIC METABOLIC PNL TOTAL CA: CPT

## 2024-04-17 ENCOUNTER — OFFICE VISIT (OUTPATIENT)
Dept: PODIATRY | Age: 64
End: 2024-04-17
Payer: MEDICARE

## 2024-04-17 VITALS — HEIGHT: 62 IN | WEIGHT: 205 LBS | BODY MASS INDEX: 37.73 KG/M2

## 2024-04-17 DIAGNOSIS — M79.672 PAIN IN BOTH FEET: ICD-10-CM

## 2024-04-17 DIAGNOSIS — B35.1 DERMATOPHYTOSIS OF NAIL: ICD-10-CM

## 2024-04-17 DIAGNOSIS — E11.51 TYPE II DIABETES MELLITUS WITH PERIPHERAL CIRCULATORY DISORDER (HCC): Primary | ICD-10-CM

## 2024-04-17 DIAGNOSIS — M79.671 PAIN IN BOTH FEET: ICD-10-CM

## 2024-04-17 DIAGNOSIS — I89.0 LYMPHATIC EDEMA: ICD-10-CM

## 2024-04-17 DIAGNOSIS — I73.9 PVD (PERIPHERAL VASCULAR DISEASE) (HCC): ICD-10-CM

## 2024-04-17 LAB
ANA SER QL IA: NEGATIVE
CREAT UR-MCNC: 19.7 MG/DL (ref 28–217)
DSDNA IGG SER QL IA: 0.6 IU/ML
NUCLEAR IGG SER IA-RTO: 0.1 U/ML
TOTAL PROTEIN, URINE: <4 MG/DL
URINE TOTAL PROTEIN CREATININE RATIO: ABNORMAL

## 2024-04-17 PROCEDURE — 11721 DEBRIDE NAIL 6 OR MORE: CPT | Performed by: PODIATRIST

## 2024-04-17 NOTE — PROGRESS NOTES
feet daily and contact the office with any questions/problems/concerns.   Other comorbidity noted and will be managed by PCP.  4/17/2024    Electronically signed by Jered Alejandra DPM on 4/17/2024 at 10:32 AM  4/17/2024

## 2024-04-18 LAB
ALBUMIN PERCENT: 66 % (ref 45–65)
ALBUMIN SERPL-MCNC: 4.4 G/DL (ref 3.2–5.2)
ALPHA 2 PERCENT: 11 % (ref 6–13)
ALPHA1 GLOB SERPL ELPH-MCNC: 0.1 G/DL (ref 0.1–0.4)
ALPHA1 GLOB SERPL ELPH-MCNC: 2 % (ref 3–6)
ALPHA2 GLOB SERPL ELPH-MCNC: 0.7 G/DL (ref 0.5–0.9)
B-GLOBULIN SERPL ELPH-MCNC: 0.7 G/DL (ref 0.7–1.4)
B-GLOBULIN SERPL ELPH-MCNC: 10 % (ref 11–19)
GAMMA GLOB SERPL ELPH-MCNC: 0.7 G/DL (ref 0.5–1.5)
GAMMA GLOBULIN %: 11 % (ref 9–20)
PATHOLOGIST: ABNORMAL
PROT PATTERN SERPL ELPH-IMP: ABNORMAL
PROT SERPL-MCNC: 6.6 G/DL (ref 6.6–8.7)
TOTAL PROT. SUM,%: 100 % (ref 98–102)
TOTAL PROT. SUM: 6.6 G/DL (ref 6.3–8.2)

## 2024-04-23 ENCOUNTER — HOSPITAL ENCOUNTER (OUTPATIENT)
Age: 64
Setting detail: THERAPIES SERIES
Discharge: HOME | End: 2024-04-23
Payer: MEDICARE

## 2024-04-23 PROCEDURE — 97163 PT EVAL HIGH COMPLEX 45 MIN: CPT

## 2024-04-23 NOTE — CONSULTS
Supination [] [] []    Leg Length Discrp [] [] []    Slumped Sitting [] [] []    Palpation [] [] []    Sensation [x] [] []    Edema [] [] []    Neurological [] [x] [] Patellar: Rt:0; Lt:1+  Achilles: 0 bilat   Patellar Mobility [] [] []    Patellar Orientation [] [] []    Gait [] [x] [] Analysis: Very slow carol, short step lengths, limited hip/ankle/knee mobility         FUNCTION Normal Difficult Unable   transfers [] [x] []   Standing [] [x] []   Ambulation [] [x] []   Groom/Dress [] [x] []   Lift/Carry [] [x] []   Stairs [] [x] []   Bending [] [x] []   Squat [] [x] []   Kneel [] [] []     BALANCE/PROPRIOCEPTION              [x] Not tested   Single leg stance       R                     L                                PAIN   Eyes open                             Sec.                  Sec                  .[]    Eyes closed                          Sec.                  Sec                  .[]        FUNCTIONAL TESTS PAIN NO PAIN COMMENTS   Step Test 4” [] []    6” [] []    8” [] []    Squat [] []      Functional Test: Tinetti Score: 12/28: 43% functionally impaired (harry risk for falls)     Comments:    Assessment:  Patient presents to PT with severe gait, balance, and mobility deficits, pain, weakness and functional limitations.  Patient would benefit from skilled physical therapy services in order to: prevent falls, improve balance, decrease pain, improve ROM/flexibility, improve strength, improve gait and improve functional activity including walking household/community distances safely, standing, stairs, transfers, ADLs, and light housekeeping in order to live independently with minimal difficulty.     Problem list, as detailed above:   [x] ? Pain: knees/back    [x] ? ROM: hips/knees/ankles    [x] ? Strength: LE's   [x] ? Function:   [x] ? Balance  [] Edema  [x] Postural Deviations  [x] Gait Deviations  [] Other     STG: (to be met in 12 treatments)  ? ROM: hip/knees/ankle to WFL's to improve walking

## 2024-04-24 DIAGNOSIS — K21.9 GASTROESOPHAGEAL REFLUX DISEASE, UNSPECIFIED WHETHER ESOPHAGITIS PRESENT: ICD-10-CM

## 2024-04-24 RX ORDER — OMEPRAZOLE 20 MG/1
CAPSULE, DELAYED RELEASE ORAL
Qty: 90 CAPSULE | Refills: 3 | Status: SHIPPED | OUTPATIENT
Start: 2024-04-24 | End: 2025-04-25

## 2024-04-24 NOTE — TELEPHONE ENCOUNTER
disorder, recurrent episode, severe, with psychotic behavior (HCC)     Multiple lung nodules on CT     Hypomagnesemia     Anxiety     Chronic obstructive pulmonary disease (HCC)     History of pulmonary embolism     Family history of abdominal aortic aneurysm     Normal coronary arteries     Long term (current) use of anticoagulants     AAA (abdominal aortic aneurysm) without rupture (HCC)     Abnormal mammogram     Cerebrovascular accident (CVA) (HCC)     Diabetic nephropathy (HCC)     Slow transit constipation     History of gout     Familial cavernous cerebral angioma (HCC)     Meniscus, medial, anterior horn derangement, right     Post traumatic stress disorder     Patellofemoral arthritis of right knee     Syncope     Hyperparathyroidism (HCC)     Body mass index (BMI) 37.0-37.9, adult     Nonrheumatic mitral (valve) insufficiency     Orthostatic hypotension     Other forms of dyspnea     Sleep apnea     Dizziness     History of cerebrovascular accident

## 2024-04-25 ENCOUNTER — CARE COORDINATION (OUTPATIENT)
Dept: CARE COORDINATION | Age: 64
End: 2024-04-25

## 2024-04-25 NOTE — CARE COORDINATION
Ambulatory Care Coordination Note     4/25/2024 11:24 AM     Patient Current Location:  Home: [address] [place] [address] [address]  [place] [address]     Patient has graduated from the Ambulatory Care Management program on 4/25/2024.  Patient/family verbalizes confidence in the ability to self-manage at this time..  Care management goals have been completed. No further Ambulatory Care Manager follow up scheduled.      ACM: Zoie Varma RN     Challenges to be reviewed by the provider   Additional needs identified to be addressed with provider No  none               Method of communication with provider: none.    Care Summary Note:     Offered patient enrollment in the Remote Patient Monitoring (RPM) program for in-home monitoring: Yes, but did not enroll at this time: graduating  .     Assessments Completed:   No changes since last call    Care Planning:    Goals Addressed                   This Visit's Progress     COMPLETED: Conditions and Symptoms   On track     I will schedule office visits, as directed by my provider.  I will keep my appointment or reschedule if I have to cancel.  I will notify my provider of any barriers to my plan of care.    Barriers: overwhelmed by complexity of regimen  Plan for overcoming my barriers: care coordination   Confidence: 8/10  Anticipated Goal Completion Date: 4/6/24                 Advance Care Planning:   Reviewed during previous call      Medications Reviewed:   Completed during a previous call     PCP/Specialist follow up:   Future Appointments         Provider Specialty Dept Phone    4/26/2024 10:15 AM Brandon Mcgrath PT Physical Therapy [phone]    4/29/2024 4:30 PM Brandon Mcgrath PT Physical Therapy [phone]    5/2/2024 3:45 PM Zoie Floyd PTA Physical Therapy [phone]    5/6/2024 3:00 PM Brandon Mcgrath PT Physical Therapy [phone]    5/9/2024 3:45 PM Zoie Floyd PTA Physical Therapy [phone]    6/5/2024 11:00 AM Trisha Arndt, [facility] - [facility]

## 2024-04-26 ENCOUNTER — HOSPITAL ENCOUNTER (OUTPATIENT)
Age: 64
Setting detail: THERAPIES SERIES
Discharge: HOME | End: 2024-04-26
Payer: MEDICARE

## 2024-04-26 DIAGNOSIS — E55.9 VITAMIN D DEFICIENCY: ICD-10-CM

## 2024-04-26 PROCEDURE — 97110 THERAPEUTIC EXERCISES: CPT

## 2024-04-26 RX ORDER — GABAPENTIN 300 MG/1
CAPSULE ORAL
Qty: 56 CAPSULE | OUTPATIENT
Start: 2024-04-26

## 2024-04-26 RX ORDER — ACETAMINOPHEN 160 MG
2000 TABLET,DISINTEGRATING ORAL EVERY MORNING
Qty: 30 CAPSULE | OUTPATIENT
Start: 2024-04-26

## 2024-04-26 NOTE — FLOWSHEET NOTE
[x] [facility]  Outpatient Rehabilitation & Therapy  [address], [place] [address]    Physical Therapy Daily Treatment Note      Date:  2024  Patient Name:  Adrianne Campos    :  1960  MRN: [id number]  Physician: Rafael Oglesby MD                          Insurance: [facility]-prior auth 30 visits  Medical Diagnosis: Gait instability               Rehab Codes: R26.2, R53.1, M25.561/M25.562, M25.661/M25.662  Onset date: 23               Next Dr's appt.: TBD  Visit# / total visits:   Cancels/No Shows: 0/0    Subjective:    Pain:  [x] Yes  [] No Location: knees Pain Rating: (0-10 scale) 3/10  Pain altered Tx:  [] No  [] Yes  Action:  Comments: took some meds this morning.  Was a little sore from last visit, but also started an exercise [facility] at the [facility] which made very sore for a couple days.      PMHx: [] Unremarkable        [] Diabetes     [] HTN                        [] Pacemaker              [] MI/Heart Problems [] Cancer       [] Arthritis       [x] Other: Rt ankle Fx; Lt ankle Fx x2              [x] Refer to full medical chart  In EPIC   Past Medical History:   Diagnosis Date    Acute tracheobronchitis-viral 2021    Anxiety     Cerebrovascular accident (CVA) ([facility]) 09/10/2021    Chronic kidney disease     COPD (chronic obstructive pulmonary disease) ([facility])     COPD exacerbation (HCC) 2021    Depression     Effusion of right knee 2022    Fracture of ankle 2020    Hearing loss     Hyperlipidemia     Hypertension     Long term (current) use of anticoagulants 2020    Obesity     NARCISO on CPAP 2018    Osteoarthritis     Proteinuria     Pseudogout 2022    Pulmonary embolism (HCC)     Sleep apnea     c-pap. Doesn't use everynight    SOB (shortness of breath) 2020    Type 2 diabetes mellitus without complication (HCC)     Type II or unspecified type diabetes mellitus without mention of complication, not

## 2024-04-26 NOTE — TELEPHONE ENCOUNTER
Patient taking calcium with vitamin D3. Please review.     LOV 2/7/24  RTO F/U scheduled  LRF 2022    Health Maintenance   Topic Date Due    Diabetic Alb to Cr ratio (uACR) test  04/08/2020    Diabetic retinal exam  07/09/2022    Pneumococcal 65+ years Vaccine (3 of 3 - PPSV23 or PCV20) 04/23/2023    COVID-19 Vaccine (8 - 2023-24 season) 11/24/2023    Annual Wellness Visit (Medicare Advantage)  01/01/2024    Respiratory Syncytial Virus (RSV) Pregnant or age 60 yrs+ (1 - 1-dose 60+ series) 12/05/2024 (Originally 4/23/2018)    Lipids  08/23/2024    Depression Monitoring  01/10/2025    A1C test (Diabetic or Prediabetic)  01/16/2025    GFR test (Diabetes, CKD 3-4, OR last GFR 15-59)  04/16/2025    Diabetic foot exam  04/23/2025    Breast cancer screen  02/28/2026    DTaP/Tdap/Td vaccine (2 - Td or Tdap) 08/04/2026    Colorectal Cancer Screen  02/16/2028    DEXA (modify frequency per FRAX score)  Completed    Flu vaccine  Completed    Shingles vaccine  Completed    Hepatitis C screen  Addressed    Hepatitis A vaccine  Aged Out    Hepatitis B vaccine  Aged Out    Hib vaccine  Aged Out    Polio vaccine  Aged Out    Meningococcal (ACWY) vaccine  Aged Out    Pneumococcal 0-64 years Vaccine  Discontinued    HIV screen  Discontinued    Cervical cancer screen  Discontinued             (applicable per patient's age: Cancer Screenings, Depression Screening, Fall Risk Screening, Immunizations)    Hemoglobin A1C (%)   Date Value   01/16/2024 6.6 (H)   08/23/2023 6.6 (A)   01/19/2023 6.1 (H)     LDL Cholesterol (mg/dL)   Date Value   01/19/2023 89     LDL Calculated (mg/dL)   Date Value   08/23/2023 96     AST (U/L)   Date Value   02/25/2024 27     ALT (U/L)   Date Value   02/25/2024 48 (H)     BUN (mg/dL)   Date Value   04/16/2024 19      (goal A1C is < 7)   (goal LDL is <100) need 30-50% reduction from baseline     BP Readings from Last 3 Encounters:   04/12/24 129/89   03/22/24 130/88   02/25/24 (!) 156/83    (goal BP

## 2024-04-29 ENCOUNTER — HOSPITAL ENCOUNTER (OUTPATIENT)
Age: 64
Setting detail: THERAPIES SERIES
Discharge: HOME | End: 2024-04-29
Payer: MEDICARE

## 2024-04-29 PROCEDURE — 97110 THERAPEUTIC EXERCISES: CPT

## 2024-04-29 RX ORDER — GLUCOSAMINE/CHONDR SU A SOD 750-600 MG
2000 TABLET ORAL DAILY
Qty: 30 CAPSULE | Refills: 5 | OUTPATIENT
Start: 2024-04-29 | End: 2024-10-26

## 2024-04-29 NOTE — FLOWSHEET NOTE
weekly - 2 sets - 10 reps  - Seated Hip Adduction Squeeze with Ball  - 1 x daily - 7 x weekly - 2 sets - 10 reps  - Gastroc Stretch on Wall  - 1 x daily - 7 x weekly - 3 sets - 15 seconds hold  - Standing Heel Raise with Support  - 1 x daily - 7 x weekly - 2 sets - 10 reps  - Standing Toe Raises at Chair  - 1 x daily - 7 x weekly - 2 sets - 10 reps  - Standing Hip Abduction with Counter Support  - 1 x daily - 7 x weekly - 2 sets - 10 reps  - Standing Hip Extension with Counter Support  - 1 x daily - 7 x weekly - 2 sets - 10 reps  - Standing Hip Flexion AROM  - 1 x daily - 7 x weekly - 2 sets - 10 reps  - Standing Knee Flexion AROM with Chair Support  - 1 x daily - 7 x weekly - 2 sets - 10 reps  Plan: [x] Continue per plan of care.   [] Other:      Treatment Charges: Mins Units   []  Modalities     [x]  Ther Exercise 51 3   []  Manual Therapy     []  Ther Activities     []  Aquatics     []  Neuromuscular     [] Vasocompression     [] Gait Training     [] Dry needling        [] 1 or 2 muscles        [] 3 or more muscles     []  Other     Total Treatment time 51 3     Time In: 4:31 p.m.            Time Out: 5:22 p.m.     Electronically signed by:  Brandon Mcgrath, PT

## 2024-04-29 NOTE — TELEPHONE ENCOUNTER
I have labs ordered to repeat PTH and vitamin D  We can discuss at her upcoming appointment with recent lab results

## 2024-05-02 ENCOUNTER — HOSPITAL ENCOUNTER (OUTPATIENT)
Age: 64
Setting detail: THERAPIES SERIES
Discharge: HOME | End: 2024-05-02
Payer: MEDICARE

## 2024-05-02 PROCEDURE — 97110 THERAPEUTIC EXERCISES: CPT

## 2024-05-02 NOTE — FLOWSHEET NOTE
[x] [facility]  Outpatient Rehabilitation & Therapy  [address], [place] [address]    Physical Therapy Daily Treatment Note      Date:  2024  Patient Name:  Adrianne Campos    :  1960  MRN: [id number]  Physician: Rafael Oglesby MD                          Insurance: [facility]-prior auth 30 visits  Medical Diagnosis: Gait instability               Rehab Codes: R26.2, R53.1, M25.561/M25.562, M25.661/M25.662  Onset date: 23               Next Dr's appt.: TBD  Visit# / total visits:   Cancels/No Shows: 0/0    Subjective:    Pain:  [x] Yes  [] No Location: knees Pain Rating: (0-10 scale) 410  Pain altered Tx:  [x] No  [] Yes  Action:  Comments: States her left knee is worse than right today. States she is moving back In with her daughter who has steps to enter home, she has fallen twice in the past on steps and would like to work on steps. States she did not put lidocaine patches on sherman knees prior to PT today.    PMHx: [] Unremarkable        [] Diabetes     [] HTN                        [] Pacemaker              [] MI/Heart Problems [] Cancer       [] Arthritis       [x] Other: Rt ankle Fx; Lt ankle Fx x2              [x] Refer to full medical chart  In EPIC   Past Medical History:   Diagnosis Date    Acute tracheobronchitis-viral 2021    Anxiety     Cerebrovascular accident (CVA) ([facility]) 09/10/2021    Chronic kidney disease     COPD (chronic obstructive pulmonary disease) ([facility])     COPD exacerbation ([facility]) 2021    Depression     Effusion of right knee 2022    Fracture of ankle 2020    Hearing loss     Hyperlipidemia     Hypertension     Long term (current) use of anticoagulants 2020    Obesity     NARCISO on CPAP 2018    Osteoarthritis     Proteinuria     Pseudogout 2022    Pulmonary embolism ([facility])     Sleep apnea     c-pap. Doesn't use everynight    SOB (shortness of breath) 2020    Type 2 diabetes mellitus

## 2024-05-06 ENCOUNTER — HOSPITAL ENCOUNTER (OUTPATIENT)
Age: 64
Setting detail: THERAPIES SERIES
Discharge: HOME | End: 2024-05-06
Payer: MEDICARE

## 2024-05-06 NOTE — FLOWSHEET NOTE
[] [facility]  Outpatient Rehabilitation & Therapy  [address]   [place], [place] [address]       Physical Therapy Cancel/No Show note    Date: 2024  Patient: Adrianne Campos  : 1960  MRN: [id number]    Visit Count:   Cancels/No Shows to date:     For today's appointment patient:    [x]  Cancelled    [] Rescheduled appointment    [] No-show     Reason given by patient:    []  Patient ill    []  Conflicting appointment    [] No transportation      [] Conflict with work    [x] No reason given    [] Weather related    [] COVID-19    [] Other:      Comments:        [x] Next appointment was confirmed    Electronically signed by: Zoie Floyd PTA

## 2024-05-07 ENCOUNTER — OFFICE VISIT (OUTPATIENT)
Dept: ORTHOPEDIC SURGERY | Age: 64
End: 2024-05-07
Payer: MEDICARE

## 2024-05-07 VITALS — BODY MASS INDEX: 38.09 KG/M2 | WEIGHT: 207 LBS | HEIGHT: 62 IN

## 2024-05-07 DIAGNOSIS — M17.11 PATELLOFEMORAL ARTHRITIS OF RIGHT KNEE: Primary | ICD-10-CM

## 2024-05-07 DIAGNOSIS — M17.12 PATELLOFEMORAL ARTHRITIS OF LEFT KNEE: ICD-10-CM

## 2024-05-07 PROCEDURE — 1123F ACP DISCUSS/DSCN MKR DOCD: CPT | Performed by: PHYSICIAN ASSISTANT

## 2024-05-07 PROCEDURE — 20610 DRAIN/INJ JOINT/BURSA W/O US: CPT | Performed by: PHYSICIAN ASSISTANT

## 2024-05-07 PROCEDURE — 99214 OFFICE O/P EST MOD 30 MIN: CPT | Performed by: PHYSICIAN ASSISTANT

## 2024-05-07 RX ORDER — BUPIVACAINE HYDROCHLORIDE 2.5 MG/ML
2 INJECTION, SOLUTION INFILTRATION; PERINEURAL ONCE
Status: COMPLETED | OUTPATIENT
Start: 2024-05-07 | End: 2024-05-07

## 2024-05-07 RX ORDER — METHYLPREDNISOLONE ACETATE 40 MG/ML
40 INJECTION, SUSPENSION INTRA-ARTICULAR; INTRALESIONAL; INTRAMUSCULAR; SOFT TISSUE ONCE
Status: COMPLETED | OUTPATIENT
Start: 2024-05-07 | End: 2024-05-07

## 2024-05-07 RX ADMIN — METHYLPREDNISOLONE ACETATE 40 MG: 40 INJECTION, SUSPENSION INTRA-ARTICULAR; INTRALESIONAL; INTRAMUSCULAR; SOFT TISSUE at 14:35

## 2024-05-07 RX ADMIN — BUPIVACAINE HYDROCHLORIDE 5 MG: 2.5 INJECTION, SOLUTION INFILTRATION; PERINEURAL at 14:29

## 2024-05-07 RX ADMIN — BUPIVACAINE HYDROCHLORIDE 5 MG: 2.5 INJECTION, SOLUTION INFILTRATION; PERINEURAL at 14:30

## 2024-05-07 RX ADMIN — METHYLPREDNISOLONE ACETATE 40 MG: 40 INJECTION, SUSPENSION INTRA-ARTICULAR; INTRALESIONAL; INTRAMUSCULAR; SOFT TISSUE at 14:37

## 2024-05-07 ASSESSMENT — ENCOUNTER SYMPTOMS
SHORTNESS OF BREATH: 0
COUGH: 0
COLOR CHANGE: 0
VOMITING: 0

## 2024-05-07 NOTE — PROGRESS NOTES
[facility] PHYSICIANS [facility] ORTHOPEDICS AND SPORTS MEDICINE  [address]  [address] [place] [address]  Dept: [phone]    Ambulatory Orthopedic New Patient Visit      CHIEF COMPLAINT:    Chief Complaint   Patient presents with    Knee Pain     bilateral       HISTORY OF PRESENT ILLNESS:      The patient is a 64 y.o. female who is being seen  for consultation and evaluation of chronic bilateral knee pain previously managed by Dr. Palmer Shukla at [facility].    Adrianne Campos  presents for bilateral knee pain that has been present for  many years.  Patient was previously managed by Dr. Palmer Shukla at [facility] but he is retiring here in the near future therefore she has transition care.  Patient had prior corticosteroid injections and is actively in formal physical therapy for balance and mobility issues as per neurology.  Patient denies recent trauma or injury but does have episodes of losing her balance.  The patient does utilize a walker but inconsistently.      Patient's last injection was 8/15/2023 to the left knee with Dr. Shukla. She denies prior Visco supplemental injections for her knees.  Patient is a type II diabetic with her last documented hemoglobin A1c of 6.6 as of 1/16/2024.  She notes that she does have upcoming blood work scheduled. She is accompanied by her daughter today in office.      Past Medical History:    Past Medical History:   Diagnosis Date    Acute tracheobronchitis-viral 08/05/2021    Anxiety     Cerebrovascular accident (CVA) ([facility]) 09/10/2021    Chronic kidney disease     COPD (chronic obstructive pulmonary disease) (Former[facility])     COPD exacerbation ([facility]) 08/05/2021    Depression     Effusion of right knee 03/07/2022    Fracture of ankle 05/14/2020    Hearing loss     Hyperlipidemia     Hypertension     Long term (current) use of anticoagulants 08/07/2020    Obesity     NARCISO on CPAP 01/19/2018

## 2024-05-08 NOTE — FLOWSHEET NOTE
[] [facility]  Outpatient Rehabilitation & Therapy  [address]   [place], [place] [address]       Physical Therapy Cancel/No Show note    Date: 2024  Patient: Adrianne Campos  : 1960  MRN: [id number]    Visit Count:   Cancels/No Shows to date:     For today's appointment patient:    [x]  Cancelled    [] Rescheduled appointment    [] No-show     Reason given by patient:    []  Patient ill    []  Conflicting appointment    [] No transportation      [] Conflict with work    [] No reason given    [] Weather related    [] COVID-19    [x] Other:      Comments:  hurt her ankle and wants to hold on PT until next week.      [x] Next appointment was confirmed    Electronically signed by: Brandon Mcgrath, PT

## 2024-05-09 ENCOUNTER — HOSPITAL ENCOUNTER (OUTPATIENT)
Age: 64
Setting detail: THERAPIES SERIES
Discharge: HOME | End: 2024-05-09
Payer: MEDICARE

## 2024-05-13 ENCOUNTER — APPOINTMENT (OUTPATIENT)
Age: 64
End: 2024-05-13
Payer: MEDICARE

## 2024-05-13 NOTE — PROGRESS NOTES
[facility]  [address] [place] [address] [place] [address]  Phone: [phone]       Name: Adrianne Campos  : 1960     Chief Complaint:    Adrianne Campos is a 64 y.o. year old female who presents today for   Chief Complaint   Patient presents with    New Patient     New to provider-previous Trisha Arndt pt, last seen 24, last physical 23.       History of Present Illness:    Patient here to establish care as a new patient.   Previous PCP: Trisha Arndt NP   Last appt with previous PCP 24  Specialists: [facility], [facility] (completed), POD-Dr Alejandra, Neuro-Dr Oglesby, [facility], [facility], [facility] (Princess), Nephrology-Dr Lopez, Nsx - Dr. Heydi España.   OARRS report reviewed: gabapentin from Dr. Oglesby (neuro). Previously xanax and hydrocodone from Trisha Arndt NP      Chronic conditions and associated medications. Taken from my review of the electronic chart, discussion with patient, and any records available to me at the time of visit and prior to the visit:    Diabetes Mellitus Type II, Follow-up: Patient is here for follow-up of Type 2 diabetes mellitus.  Rybelsus 7mg, farxiga 10mg. A1c appears to have been well controlled over the past year. She was on metformin before but caused diarrhea.     Hemoglobin A1C (%)   Date Value   2024 6.6 (H)   2023 6.6 (A)   2023 6.1 (H)     Hyperparathyroidism - noted in chart, but last few lab values I see are normal.     Neuropathy - gabapentin from neurologist.     HTN - lisinopril     osteoporosis - prolia (was done at Trisha's office). States she is due soon for prolia she thinks.     CKD 3b - had labs in April from Dr. Lopez.     Depression/anxiety - managed by psychiatry.     Has had DVT and PEs - she is on eliquis. This was started by a PCP prior to seeing Trisha. She also has vWB. Those were about 15 years ago. Dx with vwb at the time. She was at [facility]

## 2024-05-15 ENCOUNTER — OFFICE VISIT (OUTPATIENT)
Dept: PRIMARY CARE CLINIC | Age: 64
End: 2024-05-15
Payer: MEDICARE

## 2024-05-15 VITALS
WEIGHT: 204 LBS | HEART RATE: 82 BPM | OXYGEN SATURATION: 98 % | SYSTOLIC BLOOD PRESSURE: 126 MMHG | DIASTOLIC BLOOD PRESSURE: 84 MMHG | BODY MASS INDEX: 37.54 KG/M2 | HEIGHT: 62 IN

## 2024-05-15 DIAGNOSIS — E11.21 DIABETIC NEPHROPATHY ASSOCIATED WITH TYPE 2 DIABETES MELLITUS (HCC): ICD-10-CM

## 2024-05-15 DIAGNOSIS — N18.30 STAGE 3 CHRONIC KIDNEY DISEASE, UNSPECIFIED WHETHER STAGE 3A OR 3B CKD (HCC): Chronic | ICD-10-CM

## 2024-05-15 DIAGNOSIS — M81.0 AGE-RELATED OSTEOPOROSIS WITHOUT CURRENT PATHOLOGICAL FRACTURE: ICD-10-CM

## 2024-05-15 DIAGNOSIS — E11.8 CONTROLLED TYPE 2 DIABETES MELLITUS WITH COMPLICATION, WITHOUT LONG-TERM CURRENT USE OF INSULIN (HCC): ICD-10-CM

## 2024-05-15 DIAGNOSIS — Z79.01 LONG TERM (CURRENT) USE OF ANTICOAGULANTS: ICD-10-CM

## 2024-05-15 DIAGNOSIS — G62.9 NEUROPATHY: Primary | ICD-10-CM

## 2024-05-15 DIAGNOSIS — I12.9 RENAL HYPERTENSION: ICD-10-CM

## 2024-05-15 PROCEDURE — 99215 OFFICE O/P EST HI 40 MIN: CPT | Performed by: FAMILY MEDICINE

## 2024-05-15 PROCEDURE — 1123F ACP DISCUSS/DSCN MKR DOCD: CPT | Performed by: FAMILY MEDICINE

## 2024-05-15 PROCEDURE — 3044F HG A1C LEVEL LT 7.0%: CPT | Performed by: FAMILY MEDICINE

## 2024-05-15 RX ORDER — GABAPENTIN 300 MG/1
CAPSULE ORAL
Qty: 60 CAPSULE | Refills: 5 | Status: SHIPPED | OUTPATIENT
Start: 2024-05-15 | End: 2024-11-15

## 2024-05-15 RX ORDER — PREDNISOLONE ACETATE 10 MG/ML
SUSPENSION/ DROPS OPHTHALMIC
COMMUNITY
Start: 2024-03-07

## 2024-05-15 RX ORDER — LATANOPROST 50 UG/ML
SOLUTION/ DROPS OPHTHALMIC
COMMUNITY
Start: 2024-03-17

## 2024-05-17 ENCOUNTER — APPOINTMENT (OUTPATIENT)
Age: 64
End: 2024-05-17
Payer: MEDICARE

## 2024-05-24 ENCOUNTER — PATIENT MESSAGE (OUTPATIENT)
Dept: PRIMARY CARE CLINIC | Age: 64
End: 2024-05-24

## 2024-05-24 ENCOUNTER — TELEPHONE (OUTPATIENT)
Dept: PRIMARY CARE CLINIC | Age: 64
End: 2024-05-24

## 2024-05-24 DIAGNOSIS — N18.30 CONTROLLED TYPE 2 DIABETES MELLITUS WITH STAGE 3 CHRONIC KIDNEY DISEASE, WITHOUT LONG-TERM CURRENT USE OF INSULIN (HCC): ICD-10-CM

## 2024-05-24 DIAGNOSIS — Z79.01 LONG TERM (CURRENT) USE OF ANTICOAGULANTS: ICD-10-CM

## 2024-05-24 DIAGNOSIS — I10 ESSENTIAL HYPERTENSION: ICD-10-CM

## 2024-05-24 DIAGNOSIS — M11.20 PSEUDOGOUT: ICD-10-CM

## 2024-05-24 DIAGNOSIS — K59.01 SLOW TRANSIT CONSTIPATION: ICD-10-CM

## 2024-05-24 DIAGNOSIS — E11.22 CONTROLLED TYPE 2 DIABETES MELLITUS WITH STAGE 3 CHRONIC KIDNEY DISEASE, WITHOUT LONG-TERM CURRENT USE OF INSULIN (HCC): ICD-10-CM

## 2024-05-24 DIAGNOSIS — M81.0 AGE-RELATED OSTEOPOROSIS WITHOUT CURRENT PATHOLOGICAL FRACTURE: Primary | ICD-10-CM

## 2024-05-24 DIAGNOSIS — D68.9 CLOTTING DISORDER (HCC): ICD-10-CM

## 2024-05-24 DIAGNOSIS — Z86.718 HISTORY OF DVT OF LOWER EXTREMITY: ICD-10-CM

## 2024-05-24 DIAGNOSIS — E11.8 CONTROLLED TYPE 2 DIABETES MELLITUS WITH COMPLICATION, WITHOUT LONG-TERM CURRENT USE OF INSULIN (HCC): ICD-10-CM

## 2024-05-24 NOTE — TELEPHONE ENCOUNTER
Chart review after patient visit to establish care last week.     Follow up, for Ammy, please:   - prolia will need sent to infusion center. Unfortunately I am struggling to find the lat time her prolia dose was administered. I'm hoping they can help, or patient has documentation but she did believe she was due soon. (Unless Ammy can find - I THINK she said her old office administered it there?)         Notes for myself to follow up from OV:   - AAA noted on cardio's last note to be 3.1 in 2020 then 2.2 in 2021. States [facility] vascular was following. So I will need to make sure at next visit that patient is, in fact, still seeing them and having this monitored.     - that cardio note also states eliquis but also vWb. I'm going to try to consult with hematology about this before her follow up appt. Or I may consider referring her to hematology for formal consult. The history is too remote for me to find.

## 2024-05-25 NOTE — TELEPHONE ENCOUNTER
Last OV:  5/15/2024    Next OV: 6/12/2024    Pharmacy:     [id number] - [place], [place] - [address] [phone] - F [phone]  [address] [place] [id number]  Phone: [phone] Fax: [phone]       Confirmed? Yes

## 2024-05-25 NOTE — TELEPHONE ENCOUNTER
From: Adrianne Campos  To: Dr. Renu Ceron  Sent: 5/24/2024 6:37 PM EDT  Subject: Refills Needed    Good evening my pharmacy needs refills for the following medications.   [facility] in [facility]  -Eliquis  -Senna  -Lisinopril  -Allopurinol

## 2024-05-28 RX ORDER — DAPAGLIFLOZIN 10 MG/1
10 TABLET, FILM COATED ORAL EVERY MORNING
Qty: 90 TABLET | Refills: 3 | Status: SHIPPED | OUTPATIENT
Start: 2024-05-28 | End: 2025-05-29

## 2024-05-28 RX ORDER — ALLOPURINOL 100 MG/1
100 TABLET ORAL 2 TIMES DAILY
Qty: 180 TABLET | Refills: 3 | Status: SHIPPED | OUTPATIENT
Start: 2024-05-28 | End: 2025-05-28

## 2024-05-28 RX ORDER — LISINOPRIL 5 MG/1
5 TABLET ORAL DAILY
Qty: 90 TABLET | Refills: 3 | Status: SHIPPED | OUTPATIENT
Start: 2024-05-28 | End: 2025-05-28

## 2024-05-28 RX ORDER — SENNOSIDES 8.6 MG
1 TABLET ORAL 2 TIMES DAILY
Qty: 180 TABLET | Refills: 3 | Status: SHIPPED | OUTPATIENT
Start: 2024-05-28 | End: 2025-05-28

## 2024-05-29 DIAGNOSIS — M81.0 AGE-RELATED OSTEOPOROSIS WITHOUT CURRENT PATHOLOGICAL FRACTURE: Primary | ICD-10-CM

## 2024-05-29 RX ORDER — ACETAMINOPHEN 325 MG/1
650 TABLET ORAL
OUTPATIENT
Start: 2024-05-29

## 2024-05-29 RX ORDER — ONDANSETRON 2 MG/ML
8 INJECTION INTRAMUSCULAR; INTRAVENOUS
OUTPATIENT
Start: 2024-05-29

## 2024-05-29 RX ORDER — EPINEPHRINE 1 MG/ML
0.3 INJECTION, SOLUTION, CONCENTRATE INTRAVENOUS PRN
OUTPATIENT
Start: 2024-05-29

## 2024-05-29 RX ORDER — DIPHENHYDRAMINE HYDROCHLORIDE 50 MG/ML
50 INJECTION INTRAMUSCULAR; INTRAVENOUS
OUTPATIENT
Start: 2024-05-29

## 2024-05-29 RX ORDER — ALBUTEROL SULFATE 90 UG/1
4 AEROSOL, METERED RESPIRATORY (INHALATION) PRN
OUTPATIENT
Start: 2024-05-29

## 2024-05-29 RX ORDER — SODIUM CHLORIDE 9 MG/ML
INJECTION, SOLUTION INTRAVENOUS CONTINUOUS
OUTPATIENT
Start: 2024-05-29

## 2024-05-29 NOTE — TELEPHONE ENCOUNTER
I am ordering von willebrand testing to see if this is an accurate diagnosis for her. I also want to refer her to hematology - I placed referral for [facility].     I am hoping they can weigh in on if she needs to be on eliquis or not. With her history, and falls, I want to make sure this is a necessary medication in their opinion.

## 2024-05-30 ENCOUNTER — TELEPHONE (OUTPATIENT)
Dept: PRIMARY CARE CLINIC | Age: 64
End: 2024-05-30

## 2024-05-30 DIAGNOSIS — Z86.718 HISTORY OF DVT OF LOWER EXTREMITY: Primary | ICD-10-CM

## 2024-05-30 NOTE — TELEPHONE ENCOUNTER
Any provider is fine.   If patient prefers a different location for their offices, I am ok with that as well.

## 2024-05-30 NOTE — TELEPHONE ENCOUNTER
Paula from [facility] called reagarding referral received for pt to Dr. Porter.    Dr. Porter is no longer at this office.  She moved to [facility]    Do you still want pt to see Dr. Porter or can they see another provider at [facility]?    Please advise    [phone]

## 2024-05-31 ENCOUNTER — TELEPHONE (OUTPATIENT)
Dept: PRIMARY CARE CLINIC | Age: 64
End: 2024-05-31

## 2024-05-31 NOTE — TELEPHONE ENCOUNTER
Pt wanting to know if it's necessary for her to take the following 'vitamens\":    Vitamen D3  Calcium  Thurapeutic    Would like a call back please

## 2024-05-31 NOTE — TELEPHONE ENCOUNTER
Patient stated she would like to see someone close to home, stated she stays in [place] and is wondering if she can see someone in [place]

## 2024-06-04 ENCOUNTER — TELEPHONE (OUTPATIENT)
Dept: PRIMARY CARE CLINIC | Age: 64
End: 2024-06-04

## 2024-06-04 DIAGNOSIS — E11.22 CONTROLLED TYPE 2 DIABETES MELLITUS WITH STAGE 3 CHRONIC KIDNEY DISEASE, WITHOUT LONG-TERM CURRENT USE OF INSULIN (HCC): Primary | ICD-10-CM

## 2024-06-04 DIAGNOSIS — N18.30 CONTROLLED TYPE 2 DIABETES MELLITUS WITH STAGE 3 CHRONIC KIDNEY DISEASE, WITHOUT LONG-TERM CURRENT USE OF INSULIN (HCC): Primary | ICD-10-CM

## 2024-06-04 NOTE — TELEPHONE ENCOUNTER
Patient calls in requesting an A1C order to be placed for her. Last done 1/16/24. Please review and place if needed.

## 2024-06-04 NOTE — TELEPHONE ENCOUNTER
I placed an order for an A1c.  She can complete this with her next lab draw before seeing Dr. Ceron.

## 2024-06-05 ENCOUNTER — HOSPITAL ENCOUNTER (OUTPATIENT)
Age: 64
Discharge: HOME | End: 2024-06-05
Payer: MEDICARE

## 2024-06-05 DIAGNOSIS — E11.22 CONTROLLED TYPE 2 DIABETES MELLITUS WITH STAGE 3 CHRONIC KIDNEY DISEASE, WITHOUT LONG-TERM CURRENT USE OF INSULIN (HCC): ICD-10-CM

## 2024-06-05 DIAGNOSIS — D68.9 CLOTTING DISORDER (HCC): ICD-10-CM

## 2024-06-05 DIAGNOSIS — N18.30 CONTROLLED TYPE 2 DIABETES MELLITUS WITH STAGE 3 CHRONIC KIDNEY DISEASE, WITHOUT LONG-TERM CURRENT USE OF INSULIN (HCC): ICD-10-CM

## 2024-06-05 DIAGNOSIS — M81.0 AGE-RELATED OSTEOPOROSIS WITHOUT CURRENT PATHOLOGICAL FRACTURE: ICD-10-CM

## 2024-06-05 LAB
ALBUMIN SERPL-MCNC: 4.3 G/DL (ref 3.5–5.2)
ALBUMIN/GLOB SERPL: 2 {RATIO} (ref 1–2.5)
ALP SERPL-CCNC: 85 U/L (ref 35–104)
ALT SERPL-CCNC: 31 U/L (ref 10–35)
ANION GAP SERPL CALCULATED.3IONS-SCNC: 11 MMOL/L (ref 9–16)
AST SERPL-CCNC: 25 U/L (ref 10–35)
BILIRUB SERPL-MCNC: 0.2 MG/DL (ref 0–1.2)
BUN SERPL-MCNC: 25 MG/DL (ref 8–23)
CALCIUM SERPL-MCNC: 9.6 MG/DL (ref 8.6–10.4)
CHLORIDE SERPL-SCNC: 104 MMOL/L (ref 98–107)
CO2 SERPL-SCNC: 26 MMOL/L (ref 20–31)
CREAT SERPL-MCNC: 1.3 MG/DL (ref 0.5–0.9)
EST. AVERAGE GLUCOSE BLD GHB EST-MCNC: 123 MG/DL
GFR, ESTIMATED: 46 ML/MIN/1.73M2
GLUCOSE SERPL-MCNC: 99 MG/DL (ref 74–99)
HBA1C MFR BLD: 5.9 % (ref 4–6)
PHOSPHATE SERPL-MCNC: 3.7 MG/DL (ref 2.5–4.5)
POTASSIUM SERPL-SCNC: 4.3 MMOL/L (ref 3.7–5.3)
PROT SERPL-MCNC: 7.1 G/DL (ref 6.6–8.7)
SODIUM SERPL-SCNC: 141 MMOL/L (ref 136–145)

## 2024-06-05 PROCEDURE — 82103 ALPHA-1-ANTITRYPSIN TOTAL: CPT

## 2024-06-05 PROCEDURE — 83036 HEMOGLOBIN GLYCOSYLATED A1C: CPT

## 2024-06-05 PROCEDURE — 80053 COMPREHEN METABOLIC PANEL: CPT

## 2024-06-05 PROCEDURE — 86003 ALLG SPEC IGE CRUDE XTRC EA: CPT

## 2024-06-05 PROCEDURE — 82785 ASSAY OF IGE: CPT

## 2024-06-05 PROCEDURE — 85240 CLOT FACTOR VIII AHG 1 STAGE: CPT

## 2024-06-05 PROCEDURE — 84100 ASSAY OF PHOSPHORUS: CPT

## 2024-06-05 PROCEDURE — 85246 CLOT FACTOR VIII VW ANTIGEN: CPT

## 2024-06-05 PROCEDURE — 36415 COLL VENOUS BLD VENIPUNCTURE: CPT

## 2024-06-05 PROCEDURE — 85245 CLOT FACTOR VIII VW RISTOCTN: CPT

## 2024-06-06 LAB
A1AT SERPL-MCNC: 134 MG/DL (ref 90–200)
FACTOR VIII ACTIVITY: 84 % (ref 50–150)

## 2024-06-07 LAB
A ALTERNATA IGE QN: <0.1 KU/L (ref 0–0.34)
A FUMIGATUS IGE QN: <0.1 KU/L (ref 0–0.34)
ALLERGEN BIRCH IGE: <0.1 KU/L (ref 0–0.34)
BERMUDA GRASS IGE QN: <0.1 KU/L (ref 0–0.34)
BOXELDER IGE QN: <0.1 KU/L (ref 0–0.34)
C HERBARUM IGE QN: <0.1 KUL/L (ref 0–0.34)
CALIF WALNUT POLN IGE QN: <0.1 KU/L (ref 0–0.34)
CAT DANDER IGE QN: <0.1 KU/L (ref 0–0.34)
CMN PIGWEED IGE QN: <0.1 KU/L (ref 0–0.34)
COMMON RAGWEED IGE QN: <0.1 KU/L (ref 0–0.34)
COTTONWOOD IGE QN: <0.1 KU/L (ref 0–0.34)
D FARINAE IGE QN: <0.1 KU/L (ref 0–0.34)
D PTERONYSS IGE QN: <0.1 KU/L (ref 0–0.34)
DOG DANDER IGE QN: <0.1 KU/L (ref 0–0.34)
IGE SERPL-ACNC: 6 IU/ML (ref 0–100)
LONDON PLANE IGE QN: <0.1 KU/L (ref 0–0.34)
M RACEMOSUS IGE QN: <0.1 KU/L (ref 0–0.34)
MOUSE EPITH IGE QN: <0.1 KU/L (ref 0–0.34)
MT JUNIPER IGE QN: <0.1 KU/L (ref 0–0.34)
P NOTATUM IGE QN: <0.1 KU/L (ref 0–0.34)
PECAN/HICK TREE IGE QN: <0.1 KU/L (ref 0–0.34)
ROACH IGE QN: <0.1 KU/L (ref 0–0.34)
SALTWORT IGE QN: <0.1 KU/L (ref 0–0.34)
SHEEP SORREL IGE QN: <0.1 KU/L (ref 0–0.34)
TIMOTHY IGE QN: <0.1 KU/L (ref 0–0.34)
WHITE ASH IGE QN: <0.1 KU/L (ref 0–0.34)
WHITE ELM IGE QN: <0.1 KU/L (ref 0–0.34)
WHITE MULBERRY IGE QN: <0.1 KU/L (ref 0–0.34)
WHITE OAK IGE QN: <0.1 KU/L (ref 0–0.34)

## 2024-06-08 LAB
VWF AG ACT/NOR PPP IA: 61 % (ref 52–214)
VWF:RCO ACT/NOR PPP PL AGG: 60 % (ref 51–215)

## 2024-06-10 ENCOUNTER — HOSPITAL ENCOUNTER (OUTPATIENT)
Dept: INFUSION THERAPY | Age: 64
Discharge: HOME | End: 2024-06-10
Payer: MEDICARE

## 2024-06-10 DIAGNOSIS — M81.0 AGE-RELATED OSTEOPOROSIS WITHOUT CURRENT PATHOLOGICAL FRACTURE: Primary | ICD-10-CM

## 2024-06-10 PROCEDURE — 6360000002 HC RX W HCPCS: Performed by: FAMILY MEDICINE

## 2024-06-10 PROCEDURE — 96372 THER/PROPH/DIAG INJ SC/IM: CPT

## 2024-06-10 RX ORDER — ONDANSETRON 2 MG/ML
8 INJECTION INTRAMUSCULAR; INTRAVENOUS
OUTPATIENT
Start: 2024-12-04

## 2024-06-10 RX ORDER — EPINEPHRINE 1 MG/ML
0.3 INJECTION, SOLUTION, CONCENTRATE INTRAVENOUS PRN
OUTPATIENT
Start: 2024-12-04

## 2024-06-10 RX ORDER — ACETAMINOPHEN 325 MG/1
650 TABLET ORAL
OUTPATIENT
Start: 2024-12-04

## 2024-06-10 RX ORDER — FAMOTIDINE 10 MG/ML
20 INJECTION, SOLUTION INTRAVENOUS
OUTPATIENT
Start: 2024-12-04

## 2024-06-10 RX ORDER — SODIUM CHLORIDE 9 MG/ML
INJECTION, SOLUTION INTRAVENOUS CONTINUOUS
OUTPATIENT
Start: 2024-12-04

## 2024-06-10 RX ORDER — DIPHENHYDRAMINE HYDROCHLORIDE 50 MG/ML
50 INJECTION INTRAMUSCULAR; INTRAVENOUS
OUTPATIENT
Start: 2024-12-04

## 2024-06-10 RX ORDER — ALBUTEROL SULFATE 90 UG/1
4 AEROSOL, METERED RESPIRATORY (INHALATION) PRN
OUTPATIENT
Start: 2024-12-04

## 2024-06-10 RX ADMIN — DENOSUMAB 60 MG: 60 INJECTION SUBCUTANEOUS at 15:38

## 2024-06-10 NOTE — PROGRESS NOTES
[facility]  [facility] [address] [place] [address]  Phone: [phone]       Name: Adrianne Campos  : 1960     Chief Complaint:    Adrianne Campos is a 64 y.o. year old female who presents today for   Chief Complaint   Patient presents with    Diabetes     A1c completed 24- 5.9    1 Month Follow-Up    Hypertension    Chronic Kidney Disease    Bladder Problem       History of Present Illness:    Diabetes Mellitus Type II, Follow-up: Patient is here for follow-up of Type 2 diabetes mellitus.      Hemoglobin A1C (%)   Date Value   2024 5.9   2024 6.6 (H)   2023 6.6 (A)     Bladder - States she is \"losing her bladder\" anytime she does anything from standing up, sleeping at night she has an accident and states has gotten worse. She has incontinence at night. Loses urine when coughing, sneezing.     She did have her prolia done the other day, so she is up to date on that.     Medications:    Outpatient Medications Prior to Visit   Medication Sig Dispense Refill    benztropine (COGENTIN) 0.5 MG tablet Take 1 tablet by mouth      RESTASIS 0.05 % ophthalmic emulsion Place 1 drop into both eyes in the morning and at bedtime      METHACHOLINE CHLORIDE IN Inhale 1 Inhalation into the lungs once as needed      tiotropium-olodaterol (STIOLTO) 2.5-2.5 MCG/ACT AERS Inhale into the lungs      risperiDONE (RISPERDAL) 2 MG tablet       risperiDONE (RISPERDAL) 0.5 MG tablet       allopurinol (ZYLOPRIM) 100 MG tablet Take 1 tablet by mouth 2 times daily 180 tablet 3    apixaban (ELIQUIS) 5 MG TABS tablet Take 1 tablet by mouth 2 times daily 180 tablet 3    dapagliflozin (FARXIGA) 10 MG tablet Take 1 tablet by mouth every morning 90 tablet 3    lisinopril (PRINIVIL;ZESTRIL) 5 MG tablet Take 1 tablet by mouth daily 90 tablet 3    senna (SENOKOT) 8.6 MG TABS tablet Take 1 tablet by mouth 2 times daily 180 tablet 3    denosumab (PROLIA) 60 MG/ML SOSY SC injection Inject 1 mL into the skin

## 2024-06-10 NOTE — PROGRESS NOTES
Pt here for prolia   Arrives ambulatory   Denies complaint/concern, no current jaw/dental issues    Labs reviewed   Tx complete without incident   Pt discharged in stable condition   Returns 7/19 for MD visit

## 2024-06-12 ENCOUNTER — OFFICE VISIT (OUTPATIENT)
Dept: PRIMARY CARE CLINIC | Age: 64
End: 2024-06-12
Payer: MEDICARE

## 2024-06-12 VITALS
SYSTOLIC BLOOD PRESSURE: 122 MMHG | BODY MASS INDEX: 38.28 KG/M2 | OXYGEN SATURATION: 97 % | HEART RATE: 84 BPM | DIASTOLIC BLOOD PRESSURE: 84 MMHG | WEIGHT: 208 LBS | HEIGHT: 62 IN

## 2024-06-12 DIAGNOSIS — N18.30 CONTROLLED TYPE 2 DIABETES MELLITUS WITH STAGE 3 CHRONIC KIDNEY DISEASE, WITHOUT LONG-TERM CURRENT USE OF INSULIN (HCC): ICD-10-CM

## 2024-06-12 DIAGNOSIS — N39.46 MIXED STRESS AND URGE URINARY INCONTINENCE: Primary | ICD-10-CM

## 2024-06-12 DIAGNOSIS — E11.22 CONTROLLED TYPE 2 DIABETES MELLITUS WITH STAGE 3 CHRONIC KIDNEY DISEASE, WITHOUT LONG-TERM CURRENT USE OF INSULIN (HCC): ICD-10-CM

## 2024-06-12 PROCEDURE — 1123F ACP DISCUSS/DSCN MKR DOCD: CPT | Performed by: FAMILY MEDICINE

## 2024-06-12 PROCEDURE — 3044F HG A1C LEVEL LT 7.0%: CPT | Performed by: FAMILY MEDICINE

## 2024-06-12 PROCEDURE — 99214 OFFICE O/P EST MOD 30 MIN: CPT | Performed by: FAMILY MEDICINE

## 2024-06-12 RX ORDER — BENZTROPINE MESYLATE 0.5 MG/1
0.5 TABLET ORAL
COMMUNITY

## 2024-06-12 RX ORDER — OXYBUTYNIN CHLORIDE 5 MG/1
5 TABLET, EXTENDED RELEASE ORAL DAILY
Qty: 30 TABLET | Refills: 2 | Status: SHIPPED | OUTPATIENT
Start: 2024-06-12 | End: 2024-06-14

## 2024-06-12 RX ORDER — RISPERIDONE 2 MG/1
TABLET ORAL
COMMUNITY
Start: 2024-05-21

## 2024-06-12 RX ORDER — CYCLOSPORINE 0.5 MG/ML
1 EMULSION OPHTHALMIC 2 TIMES DAILY
COMMUNITY
Start: 2024-05-15

## 2024-06-12 RX ORDER — RISPERIDONE 0.5 MG/1
TABLET ORAL
COMMUNITY
Start: 2024-05-21

## 2024-06-13 ENCOUNTER — TELEPHONE (OUTPATIENT)
Dept: PRIMARY CARE CLINIC | Age: 64
End: 2024-06-13

## 2024-06-13 NOTE — TELEPHONE ENCOUNTER
Last appt 06/12/24  Last refill 06/12/24  Next appt 09/12/24      Pt called requesting a call back for a prescription that was prescribed yesterday at appt, Oxybutynin 5 mg, she said she has Glaucoma and it says not to take this medication if have Glaucoma.  She is wondering what else can be prescribed that she can take with her Glaucoma.    Please advise. Thank you,.

## 2024-06-14 RX ORDER — MIRABEGRON 25 MG/1
25 TABLET, FILM COATED, EXTENDED RELEASE ORAL DAILY
Qty: 30 TABLET | Refills: 2 | Status: SHIPPED | OUTPATIENT
Start: 2024-06-14

## 2024-06-14 NOTE — TELEPHONE ENCOUNTER
I apologize, she should not be on that medication with glaucoma. I will send in a different medication.

## 2024-06-17 DIAGNOSIS — E11.8 CONTROLLED TYPE 2 DIABETES MELLITUS WITH COMPLICATION, WITHOUT LONG-TERM CURRENT USE OF INSULIN (HCC): ICD-10-CM

## 2024-06-17 RX ORDER — ORAL SEMAGLUTIDE 7 MG/1
1 TABLET ORAL DAILY
Qty: 30 TABLET | Refills: 2 | Status: SHIPPED | OUTPATIENT
Start: 2024-06-17

## 2024-06-18 ENCOUNTER — TELEPHONE (OUTPATIENT)
Dept: ORTHOPEDIC SURGERY | Age: 64
End: 2024-06-18

## 2024-06-18 NOTE — TELEPHONE ENCOUNTER
Patient is calling to get the status of the prior auth for gel injections.  Please return her call to let her know.   Thanks.

## 2024-06-26 ENCOUNTER — TELEPHONE (OUTPATIENT)
Dept: ORTHOPEDIC SURGERY | Age: 64
End: 2024-06-26

## 2024-06-26 NOTE — TELEPHONE ENCOUNTER
Gaudencio hurley LPN   to [place] with [facility]   AS      6/26/24 10:24 AM  Spoke with Mary at [facility] direct phone number:   - [phone]  R#[id number]  F:[phone].  At this time we are waiting for either approved or denied. Provided our fax and phone number.    Patient called updated on current status on prior authorize , she voiced understanding

## 2024-06-26 NOTE — TELEPHONE ENCOUNTER
Spoke with Mary at [facility] direct phone number:   - [phone]   R#[id number]   F:[phone].   At this time we are waiting for either approved or denied. Provided our fax and phone number.

## 2024-06-28 ENCOUNTER — TELEPHONE (OUTPATIENT)
Dept: PRIMARY CARE CLINIC | Age: 64
End: 2024-06-28

## 2024-06-28 NOTE — TELEPHONE ENCOUNTER
Spoke with Yesica in regards to getting an appt scheduled with Trisha Fajardo. we scheduled Yesica 7/2/24@10:45 in [facility] for APPROVED bilateral knee injections.

## 2024-06-28 NOTE — TELEPHONE ENCOUNTER
Last appt 06/12/24  Next appt 09/13/24    Pt would like a call back in regards to if she can discontinue the Gabapentin? She states she is trying to get off some medicines.   She states she has more numbness than pain.    Please advise. Thank you.

## 2024-07-01 ENCOUNTER — TELEPHONE (OUTPATIENT)
Dept: PODIATRY | Age: 64
End: 2024-07-01

## 2024-07-01 NOTE — TELEPHONE ENCOUNTER
Patient called today stating her foot feels like its \"vibrating\". She states its not all the time but sometimes. She has an appointment on 7/15.

## 2024-07-02 ENCOUNTER — OFFICE VISIT (OUTPATIENT)
Dept: ORTHOPEDIC SURGERY | Age: 64
End: 2024-07-02
Payer: MEDICARE

## 2024-07-02 VITALS — BODY MASS INDEX: 38.28 KG/M2 | WEIGHT: 208 LBS | HEIGHT: 62 IN | RESPIRATION RATE: 14 BRPM

## 2024-07-02 DIAGNOSIS — M17.12 PATELLOFEMORAL ARTHRITIS OF LEFT KNEE: ICD-10-CM

## 2024-07-02 DIAGNOSIS — M17.11 PATELLOFEMORAL ARTHRITIS OF RIGHT KNEE: Primary | ICD-10-CM

## 2024-07-02 PROCEDURE — 20610 DRAIN/INJ JOINT/BURSA W/O US: CPT | Performed by: PHYSICIAN ASSISTANT

## 2024-07-02 RX ORDER — BUPIVACAINE HYDROCHLORIDE 2.5 MG/ML
2 INJECTION, SOLUTION INFILTRATION; PERINEURAL ONCE
Status: COMPLETED | OUTPATIENT
Start: 2024-07-02 | End: 2024-07-02

## 2024-07-02 RX ADMIN — BUPIVACAINE HYDROCHLORIDE 5 MG: 2.5 INJECTION, SOLUTION INFILTRATION; PERINEURAL at 11:35

## 2024-07-02 RX ADMIN — BUPIVACAINE HYDROCHLORIDE 5 MG: 2.5 INJECTION, SOLUTION INFILTRATION; PERINEURAL at 11:36

## 2024-07-02 NOTE — PROGRESS NOTES
[facility] PHYSICIANS [facility] ORTHOPEDICS AND SPORTS MEDICINE  [address]  [address]  [place] [id number]  Dept: [phone]  Dept Fax: [phone]        Procedure Note      Subjective:   Adrianne Campos is a 64 y.o. year old female who presents to our office today for routine followup regarding her   1. Patellofemoral arthritis of right knee    2. Patellofemoral arthritis of left knee        Chief Complaint   Patient presents with    Follow-up     Bilateral knee pain         HPI Adrianne Campos  is a 64 y.o.  female who presents today for insurance approved bilateral knee Durolane injections. She notes continued clicking within both knees. She continues to use a walker.      Review of Systems   Constitutional:  Negative for activity change and fever.   HENT:  Negative for sneezing.    Respiratory:  Negative for cough and shortness of breath.    Cardiovascular:  Negative for chest pain.   Gastrointestinal:  Negative for vomiting.   Musculoskeletal:  Positive for arthralgias (bilateral knee). Negative for joint swelling and myalgias.   Skin:  Negative for color change.   Neurological:  Negative for weakness and numbness.   Psychiatric/Behavioral:  Negative for sleep disturbance.          Objective :   Resp 14   Ht 1.575 m (5' 2.01\")   Wt 94.3 kg (208 lb)   BMI 38.03 kg/m²  Body mass index is 38.03 kg/m².  General: Adrianne Campos is a 64 y.o. female who is alert and oriented and sitting comfortably in our office.  Ortho Exam  MS:  Evaluation of the Bilateral knee reveals no obvious deformity.  There is no active effusion appreciated today.  AROM Bilateral knee 5-95.  Motor, sensory, vascular examination to the Bilateral lower extremity is grossly intact without focal deficits.    Neuro: alert and oriented to person and place.   Eyes: Extra-ocular muscles intact  Mouth: Oral mucosa moist. No perioral lesions  Pulm: Respirations unlabored and regular.

## 2024-07-04 ASSESSMENT — ENCOUNTER SYMPTOMS
COUGH: 0
VOMITING: 0
COLOR CHANGE: 0
SHORTNESS OF BREATH: 0

## 2024-07-05 DIAGNOSIS — K59.01 SLOW TRANSIT CONSTIPATION: ICD-10-CM

## 2024-07-05 DIAGNOSIS — E78.5 HYPERLIPIDEMIA, UNSPECIFIED HYPERLIPIDEMIA TYPE: ICD-10-CM

## 2024-07-05 RX ORDER — ATORVASTATIN CALCIUM 40 MG/1
40 TABLET, FILM COATED ORAL DAILY
Qty: 90 TABLET | Refills: 1 | OUTPATIENT
Start: 2024-07-05 | End: 2025-01-01

## 2024-07-09 DIAGNOSIS — E78.5 HYPERLIPIDEMIA, UNSPECIFIED HYPERLIPIDEMIA TYPE: ICD-10-CM

## 2024-07-09 DIAGNOSIS — K59.01 SLOW TRANSIT CONSTIPATION: ICD-10-CM

## 2024-07-09 NOTE — TELEPHONE ENCOUNTER
Adrianne Campos is requesting a refill on the following medication(s):  Requested Prescriptions     Pending Prescriptions Disp Refills    atorvastatin (LIPITOR) 40 MG tablet 90 tablet 1     Sig: Take 1 tablet by mouth daily    linaclotide (LINZESS) 145 MCG capsule       Sig: Take 1 capsule by mouth every morning (before breakfast)       Last Visit Date (If Applicable):  6/12/2024    Next Visit Date:    9/13/2024

## 2024-07-10 RX ORDER — ATORVASTATIN CALCIUM 40 MG/1
40 TABLET, FILM COATED ORAL DAILY
Qty: 90 TABLET | Refills: 1 | Status: SHIPPED | OUTPATIENT
Start: 2024-07-10 | End: 2025-01-06

## 2024-07-10 NOTE — TELEPHONE ENCOUNTER
Patient would like it to go to [facility]. Pharmacy updated on pended med

## 2024-07-11 ENCOUNTER — CLINICAL DOCUMENTATION (OUTPATIENT)
Facility: HOSPITAL | Age: 64
End: 2024-07-11

## 2024-07-15 ENCOUNTER — OFFICE VISIT (OUTPATIENT)
Dept: PODIATRY | Age: 64
End: 2024-07-15
Payer: MEDICARE

## 2024-07-15 VITALS — WEIGHT: 208 LBS | HEIGHT: 62 IN | BODY MASS INDEX: 38.28 KG/M2

## 2024-07-15 DIAGNOSIS — E11.51 TYPE II DIABETES MELLITUS WITH PERIPHERAL CIRCULATORY DISORDER (HCC): Primary | ICD-10-CM

## 2024-07-15 DIAGNOSIS — M79.672 PAIN IN BOTH FEET: ICD-10-CM

## 2024-07-15 DIAGNOSIS — B35.1 DERMATOPHYTOSIS OF NAIL: ICD-10-CM

## 2024-07-15 DIAGNOSIS — M79.671 PAIN IN BOTH FEET: ICD-10-CM

## 2024-07-15 DIAGNOSIS — I73.9 INTERMITTENT CLAUDICATION (HCC): ICD-10-CM

## 2024-07-15 DIAGNOSIS — I73.9 PVD (PERIPHERAL VASCULAR DISEASE) (HCC): ICD-10-CM

## 2024-07-15 PROCEDURE — 1123F ACP DISCUSS/DSCN MKR DOCD: CPT | Performed by: PODIATRIST

## 2024-07-15 PROCEDURE — 99214 OFFICE O/P EST MOD 30 MIN: CPT | Performed by: PODIATRIST

## 2024-07-15 PROCEDURE — 3044F HG A1C LEVEL LT 7.0%: CPT | Performed by: PODIATRIST

## 2024-07-15 PROCEDURE — 11721 DEBRIDE NAIL 6 OR MORE: CPT | Performed by: PODIATRIST

## 2024-07-19 ENCOUNTER — TELEPHONE (OUTPATIENT)
Dept: ONCOLOGY | Age: 64
End: 2024-07-19

## 2024-07-19 ENCOUNTER — INITIAL CONSULT (OUTPATIENT)
Dept: ONCOLOGY | Age: 64
End: 2024-07-19
Payer: MEDICARE

## 2024-07-19 VITALS
TEMPERATURE: 96.8 F | DIASTOLIC BLOOD PRESSURE: 85 MMHG | SYSTOLIC BLOOD PRESSURE: 143 MMHG | HEIGHT: 62 IN | HEART RATE: 72 BPM | OXYGEN SATURATION: 98 % | WEIGHT: 212.6 LBS | RESPIRATION RATE: 18 BRPM | BODY MASS INDEX: 39.12 KG/M2

## 2024-07-19 DIAGNOSIS — D68.59 HYPERCOAGULABLE STATE (HCC): Primary | ICD-10-CM

## 2024-07-19 DIAGNOSIS — I10 ESSENTIAL (PRIMARY) HYPERTENSION: ICD-10-CM

## 2024-07-19 DIAGNOSIS — E66.01 SEVERE OBESITY (BMI 35.0-39.9) WITH COMORBIDITY (HCC): ICD-10-CM

## 2024-07-19 PROCEDURE — 3079F DIAST BP 80-89 MM HG: CPT | Performed by: INTERNAL MEDICINE

## 2024-07-19 PROCEDURE — 3077F SYST BP >= 140 MM HG: CPT | Performed by: INTERNAL MEDICINE

## 2024-07-19 PROCEDURE — 99205 OFFICE O/P NEW HI 60 MIN: CPT | Performed by: INTERNAL MEDICINE

## 2024-07-19 NOTE — TELEPHONE ENCOUNTER
Instructions   from Dr. Alva Rodriguez MD    Labs soon  RV after tests results      Labs to have labs done soon.   Patient to call us when she has the labs done to reschedule.

## 2024-07-19 NOTE — PROGRESS NOTES
focal findings or movement disorder noted  Musculoskeletal - no joint tenderness, deformity or swelling  Extremities - peripheral pulses normal, no pedal edema, no clubbing or cyanosis  Skin - normal coloration and turgor, no rashes, no suspicious skin lesions noted     Review of Diagnostic data:   Lab Results   Component Value Date    WBC 6.5 04/16/2024    HGB 12.9 04/16/2024    HCT 40.8 04/16/2024    MCV 99.5 04/16/2024     04/16/2024       Chemistry        Component Value Date/Time     06/05/2024 1153    K 4.3 06/05/2024 1153     06/05/2024 1153    CO2 26 06/05/2024 1153    BUN 25 (H) 06/05/2024 1153    CREATININE 1.3 (H) 06/05/2024 1153        Component Value Date/Time    CALCIUM 9.6 06/05/2024 1153    ALKPHOS 85 06/05/2024 1153    AST 25 06/05/2024 1153    ALT 31 06/05/2024 1153    BILITOT 0.2 06/05/2024 1153          [facility]      IMPRESSION:   History of DVT and 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 I explained to the patient the nature of this problem of possible hypercoagulability and management plan.  Obviously she had unprovoked pulmonary embolism and DVT 15 years ago.  No other events.  She is maintained on chronic anticoagulation.  She had recurrent falls will high risk for injuries and internal bleeding due to use of anticoagulation.  Patient understands benefits and risks related to use of anticoagulation in the setting.  I think considering all these variables we would like to have full hypercoagulability workup done to look at her risk of thrombosis and embolization and to see if continued anticoagulation treatment is still justified since she had recurrent falls and she had the risk of internal bleeding and significant injuries secondary to falls.  We will order hypercoagulable workup today and

## 2024-07-22 ENCOUNTER — HOSPITAL ENCOUNTER (OUTPATIENT)
Age: 64
Discharge: HOME | End: 2024-07-22
Payer: MEDICARE

## 2024-07-22 ENCOUNTER — HOSPITAL ENCOUNTER (OUTPATIENT)
Dept: VASCULAR LAB | Age: 64
Discharge: HOME | End: 2024-07-24
Attending: PODIATRIST
Payer: MEDICARE

## 2024-07-22 DIAGNOSIS — I73.9 INTERMITTENT CLAUDICATION (HCC): ICD-10-CM

## 2024-07-22 DIAGNOSIS — M79.672 PAIN IN BOTH FEET: ICD-10-CM

## 2024-07-22 DIAGNOSIS — D68.59 HYPERCOAGULABLE STATE (HCC): ICD-10-CM

## 2024-07-22 DIAGNOSIS — E11.51 TYPE II DIABETES MELLITUS WITH PERIPHERAL CIRCULATORY DISORDER (HCC): ICD-10-CM

## 2024-07-22 DIAGNOSIS — M79.671 PAIN IN BOTH FEET: ICD-10-CM

## 2024-07-22 DIAGNOSIS — B35.1 DERMATOPHYTOSIS OF NAIL: ICD-10-CM

## 2024-07-22 DIAGNOSIS — I73.9 PVD (PERIPHERAL VASCULAR DISEASE) (HCC): ICD-10-CM

## 2024-07-22 DIAGNOSIS — I10 ESSENTIAL (PRIMARY) HYPERTENSION: ICD-10-CM

## 2024-07-22 LAB
HCYS SERPL-SCNC: 11.8 UMOL/L (ref 0–15)
VAS LEFT ABI: 1.34
VAS LEFT ANKLE BP: 242 MMHG
VAS LEFT ARM BP: 133 MMHG
VAS LEFT CALF PRESSURE: 226 MMHG
VAS LEFT DORSALIS PEDIS BP: 228 MMHG
VAS LEFT PTA BP: 242 MMHG
VAS LEFT TBI: 0.76
VAS LEFT THIGH PRESSURE: 209 MMHG
VAS LEFT TOE PRESSURE: 137 MMHG
VAS RIGHT ABI: 1.35
VAS RIGHT ANKLE BP: 243 MMHG
VAS RIGHT ARM BP: 180 MMHG
VAS RIGHT CALF PRESSURE: 220 MMHG
VAS RIGHT DORSALIS PEDIS BP: 216 MMHG
VAS RIGHT PTA BP: 243 MMHG
VAS RIGHT TBI: 0.7
VAS RIGHT THIGH PRESSURE: 223 MMHG
VAS RIGHT TOE PRESSURE: 126 MMHG

## 2024-07-22 PROCEDURE — 93923 UPR/LXTR ART STDY 3+ LVLS: CPT | Performed by: SURGERY

## 2024-07-22 PROCEDURE — 93923 UPR/LXTR ART STDY 3+ LVLS: CPT

## 2024-07-22 PROCEDURE — 85301 ANTITHROMBIN III ANTIGEN: CPT

## 2024-07-22 PROCEDURE — 36415 COLL VENOUS BLD VENIPUNCTURE: CPT

## 2024-07-22 PROCEDURE — 85306 CLOT INHIBIT PROT S FREE: CPT

## 2024-07-22 PROCEDURE — 85303 CLOT INHIBIT PROT C ACTIVITY: CPT

## 2024-07-22 PROCEDURE — 81241 F5 GENE: CPT

## 2024-07-22 PROCEDURE — 81291 MTHFR GENE: CPT

## 2024-07-22 PROCEDURE — 83090 ASSAY OF HOMOCYSTEINE: CPT

## 2024-07-22 PROCEDURE — 81240 F2 GENE: CPT

## 2024-07-22 NOTE — PROGRESS NOTES
sharply in length and thickness with a nipper and , without incident. Pt will follow up in 9 weeks or sooner if any problems arise. Diagnosis was discussed with the pt and all of their questions were answered in detail. Proper foot hygiene and care was discussed with the pt. Informed patient on proper diabetic foot care and importance of tight glycemic control.  Patient to check feet daily and contact the office with any questions/problems/concerns.   Other comorbidity noted and will be managed by PCP.    All labs were reviewed and all imaging including the above findings were reviewed PRIOR to the patients arrival and with the patient today.    Previous patient encounter was reviewed.  Encounters from the patients other medical providers were reviewed and noted.   I personally interpreted the imaging and labs and discussed the results with the patient. Time was spent educating the patient and their families/caregivers on proper care of the feet and ankles.  All the above diagnosis were addressed at todays visit and all questions were answered.  A total of 35 minutes was spent with this patients encounter which included charting after the patients visit    7/15/2024    Electronically signed by Jered Alejandra DPM on 7/22/2024 at 7:01 AM  7/15/2024

## 2024-07-23 ENCOUNTER — TELEPHONE (OUTPATIENT)
Dept: PRIMARY CARE CLINIC | Age: 64
End: 2024-07-23

## 2024-07-24 ENCOUNTER — TELEPHONE (OUTPATIENT)
Dept: PRIMARY CARE CLINIC | Age: 64
End: 2024-07-24

## 2024-07-24 LAB — AT III AG ACT/NOR PPP IA: 113 % (ref 82–136)

## 2024-07-24 RX ORDER — MIRABEGRON 50 MG/1
50 TABLET, EXTENDED RELEASE ORAL DAILY
Qty: 30 TABLET | Refills: 2 | Status: SHIPPED | OUTPATIENT
Start: 2024-07-24

## 2024-07-25 NOTE — PROGRESS NOTES
[facility]  [address] [place] [address] [place] [address]  Phone: [phone]       Name: Adrianne Campos  : 1960     Chief Complaint:    Adrianne Campos is a 64 y.o. year old female who presents today for   Chief Complaint   Patient presents with    Other       History of Present Illness:    Patient requesting ENT referral. Patient states that she needs this because her hearing is bad. Previously had hearing aids, but they have since stopped working.     No pain, no tinnitus.   Used to see someone but they don't take her insurance any longer.     Leg/feet - numb and tingling, vibration. She is seeing podiatry and recently had a vascular test done. I did briefly review that with patient.     DVT/PE - I did send her to hematology and she is currently undergoing workup. Labs are pending mostly. I did review what was available though.     Data reviewed: at3 antigen, homocysteine, vascular arterial lower b/l     Medications:    Outpatient Medications Prior to Visit   Medication Sig Dispense Refill    mirabegron (MYRBETRIQ) 50 MG TB24 Take 50 mg by mouth daily 30 tablet 2    atorvastatin (LIPITOR) 40 MG tablet Take 1 tablet by mouth daily 90 tablet 1    linaCLOtide (LINZESS) 72 MCG CAPS capsule Take 1 capsule by mouth every morning (before breakfast) 90 capsule 0    Semaglutide (RYBELSUS) 7 MG TABS Take 1 tablet by mouth daily 30 tablet 2    benztropine (COGENTIN) 0.5 MG tablet Take 1 tablet by mouth      RESTASIS 0.05 % ophthalmic emulsion Place 1 drop into both eyes in the morning and at bedtime      risperiDONE (RISPERDAL) 2 MG tablet       risperiDONE (RISPERDAL) 0.5 MG tablet       allopurinol (ZYLOPRIM) 100 MG tablet Take 1 tablet by mouth 2 times daily 180 tablet 3    apixaban (ELIQUIS) 5 MG TABS tablet Take 1 tablet by mouth 2 times daily 180 tablet 3    dapagliflozin (FARXIGA) 10 MG tablet Take 1 tablet by mouth every morning 90 tablet 3    lisinopril (PRINIVIL;ZESTRIL) 5 MG tablet Take 1

## 2024-07-26 ENCOUNTER — OFFICE VISIT (OUTPATIENT)
Dept: PRIMARY CARE CLINIC | Age: 64
End: 2024-07-26
Payer: MEDICARE

## 2024-07-26 VITALS
SYSTOLIC BLOOD PRESSURE: 138 MMHG | RESPIRATION RATE: 16 BRPM | HEART RATE: 67 BPM | BODY MASS INDEX: 39.01 KG/M2 | OXYGEN SATURATION: 99 % | WEIGHT: 212 LBS | HEIGHT: 62 IN | DIASTOLIC BLOOD PRESSURE: 82 MMHG

## 2024-07-26 DIAGNOSIS — R20.2 NUMBNESS AND TINGLING OF BOTH LEGS: ICD-10-CM

## 2024-07-26 DIAGNOSIS — R20.0 NUMBNESS AND TINGLING OF BOTH LEGS: ICD-10-CM

## 2024-07-26 DIAGNOSIS — Z86.718 HISTORY OF DVT OF LOWER EXTREMITY: ICD-10-CM

## 2024-07-26 DIAGNOSIS — H91.8X3 OTHER SPECIFIED HEARING LOSS OF BOTH EARS: Primary | ICD-10-CM

## 2024-07-26 LAB
F2 C.20210G>A GENO BLD/T: NEGATIVE
F5 P.R506Q BLD/T QL: NEGATIVE
MTHFR INTERPRETATION: NORMAL
MTHFR MUTATION A1286C: NORMAL
MTHFR MUTATION C665T: NEGATIVE
PROTEIN C ACTIVITY: 100 %
PROTEIN S ACTIVITY: 120 % (ref 59–130)
SPECIMEN SOURCE: NORMAL
SPECIMEN SOURCE: NORMAL
SPECIMEN: NORMAL

## 2024-07-26 PROCEDURE — 1123F ACP DISCUSS/DSCN MKR DOCD: CPT | Performed by: FAMILY MEDICINE

## 2024-07-26 PROCEDURE — 99213 OFFICE O/P EST LOW 20 MIN: CPT | Performed by: FAMILY MEDICINE

## 2024-08-12 ENCOUNTER — TELEPHONE (OUTPATIENT)
Dept: PRIMARY CARE CLINIC | Age: 64
End: 2024-08-12

## 2024-08-12 ENCOUNTER — TELEPHONE (OUTPATIENT)
Dept: ONCOLOGY | Age: 64
End: 2024-08-12

## 2024-08-12 ENCOUNTER — OFFICE VISIT (OUTPATIENT)
Dept: ONCOLOGY | Age: 64
End: 2024-08-12
Payer: MEDICARE

## 2024-08-12 VITALS
SYSTOLIC BLOOD PRESSURE: 137 MMHG | DIASTOLIC BLOOD PRESSURE: 87 MMHG | HEART RATE: 81 BPM | OXYGEN SATURATION: 98 % | WEIGHT: 218.7 LBS | BODY MASS INDEX: 40 KG/M2 | TEMPERATURE: 96.9 F | RESPIRATION RATE: 18 BRPM

## 2024-08-12 DIAGNOSIS — D68.59 HYPERCOAGULABLE STATE (HCC): Primary | ICD-10-CM

## 2024-08-12 DIAGNOSIS — Z15.89 MTHFR MUTATION: ICD-10-CM

## 2024-08-12 PROCEDURE — 99211 OFF/OP EST MAY X REQ PHY/QHP: CPT | Performed by: INTERNAL MEDICINE

## 2024-08-12 PROCEDURE — 1123F ACP DISCUSS/DSCN MKR DOCD: CPT | Performed by: INTERNAL MEDICINE

## 2024-08-12 PROCEDURE — 99214 OFFICE O/P EST MOD 30 MIN: CPT | Performed by: INTERNAL MEDICINE

## 2024-08-12 NOTE — TELEPHONE ENCOUNTER
Wanted to inform Dr Ceron was told no longer needs to be on Eliquis. Was also told does not have all these \"medical diagnosis' \" but wonders when she would have had the aneurysm and why is in her chart she does not recall this info if was told has no heart disease or willebrand disease. She is just confused

## 2024-08-12 NOTE — TELEPHONE ENCOUNTER
Instructions   from Dr. Alva Rodriguez MD    B aspirin and vitamins (vitamin B and folic acid)  RV PRN

## 2024-08-13 ENCOUNTER — OFFICE VISIT (OUTPATIENT)
Dept: ORTHOPEDIC SURGERY | Age: 64
End: 2024-08-13
Payer: MEDICARE

## 2024-08-13 VITALS — WEIGHT: 217.2 LBS | BODY MASS INDEX: 39.97 KG/M2 | RESPIRATION RATE: 14 BRPM | HEIGHT: 62 IN

## 2024-08-13 DIAGNOSIS — M25.561 CHRONIC PAIN OF BOTH KNEES: Primary | ICD-10-CM

## 2024-08-13 DIAGNOSIS — M17.12 PATELLOFEMORAL ARTHRITIS OF LEFT KNEE: ICD-10-CM

## 2024-08-13 DIAGNOSIS — M25.562 CHRONIC PAIN OF BOTH KNEES: Primary | ICD-10-CM

## 2024-08-13 DIAGNOSIS — M17.11 PATELLOFEMORAL ARTHRITIS OF RIGHT KNEE: ICD-10-CM

## 2024-08-13 DIAGNOSIS — G89.29 CHRONIC PAIN OF BOTH KNEES: Primary | ICD-10-CM

## 2024-08-13 PROCEDURE — 1123F ACP DISCUSS/DSCN MKR DOCD: CPT | Performed by: PHYSICIAN ASSISTANT

## 2024-08-13 PROCEDURE — 99213 OFFICE O/P EST LOW 20 MIN: CPT | Performed by: PHYSICIAN ASSISTANT

## 2024-08-13 ASSESSMENT — ENCOUNTER SYMPTOMS
VOMITING: 0
COLOR CHANGE: 0
SHORTNESS OF BREATH: 0
COUGH: 0

## 2024-08-13 NOTE — PROGRESS NOTES
[facility] ORTHOPEDICS AND SPORTS MEDICINE  [address]  [address] [place], [place] [id number]  Dept: [phone]  Dept Fax: [phone]        Ambulatory Follow Up      Subjective:   Adrianne Campos is a 64 y.o. year old female who presents to our office today for routine followup regarding her   1. Chronic pain of both knees    2. Patellofemoral arthritis of right knee    3. Patellofemoral arthritis of left knee    .    Chief Complaint   Patient presents with    Follow-up     Bilateral patellofemoral arthritis       HPI Adrianne Campos  is a 64 y.o.  female who presents today in follow for chronic bilateral knee pain due to patellofemoral osteoarthritis.  The patient was last seen on 7/2/2024 and underwent treatment in the form of bilateral knee Durolane injections.   The patient notes 70-80% improvement with the previous treatment.   She notes that she had 5-6 days of discomfort in her knee pain after the injections, but notes that after that her knees are feeling much better. She notes that her knees are no longer clicking or cracking.     She continues to use a walker especially when out of the house. She notes she is not ready for surgery for her knees.    Review of Systems   Constitutional:  Negative for activity change and fever.   HENT:  Negative for sneezing.    Respiratory:  Negative for cough and shortness of breath.    Cardiovascular:  Negative for chest pain.   Gastrointestinal:  Negative for vomiting.   Musculoskeletal:  Positive for arthralgias (bilateral knee). Negative for joint swelling and myalgias.   Skin:  Negative for color change.   Neurological:  Negative for weakness and numbness.   Psychiatric/Behavioral:  Negative for sleep disturbance.          Objective :   Resp 14   Ht 1.575 m (5' 2.01\")   Wt 98.5 kg (217 lb 3.2 oz)   BMI 39.72 kg/m²  Body mass index is 39.72 kg/m².  General: Adrianne Campos is a 64 y.o.

## 2024-08-14 NOTE — TELEPHONE ENCOUNTER
It looks like she called her cardiologist about this as well. They mentioned she has AAA (which is an abdominal aneurysm). From when I reviewed her charts previously - she used to see a vascular specialist at [facility] who was monitoring. So I don't have any recent scans on this. But then ones I saw from 2021 showed that this was pretty small.

## 2024-08-21 ENCOUNTER — APPOINTMENT (OUTPATIENT)
Dept: CT IMAGING | Age: 64
End: 2024-08-21
Payer: MEDICARE

## 2024-08-21 ENCOUNTER — HOSPITAL ENCOUNTER (EMERGENCY)
Age: 64
Discharge: HOME | End: 2024-08-21
Attending: EMERGENCY MEDICINE
Payer: MEDICARE

## 2024-08-21 ENCOUNTER — TELEPHONE (OUTPATIENT)
Dept: INFUSION THERAPY | Age: 64
End: 2024-08-21

## 2024-08-21 ENCOUNTER — TELEPHONE (OUTPATIENT)
Dept: PRIMARY CARE CLINIC | Age: 64
End: 2024-08-21

## 2024-08-21 VITALS
HEIGHT: 62 IN | SYSTOLIC BLOOD PRESSURE: 162 MMHG | WEIGHT: 215 LBS | RESPIRATION RATE: 18 BRPM | BODY MASS INDEX: 39.56 KG/M2 | TEMPERATURE: 97.7 F | OXYGEN SATURATION: 99 % | HEART RATE: 91 BPM | DIASTOLIC BLOOD PRESSURE: 93 MMHG

## 2024-08-21 DIAGNOSIS — I26.99 OTHER ACUTE PULMONARY EMBOLISM WITHOUT ACUTE COR PULMONALE (HCC): Primary | ICD-10-CM

## 2024-08-21 LAB
ANION GAP SERPL CALCULATED.3IONS-SCNC: 10 MMOL/L (ref 9–17)
BASOPHILS # BLD: 0 K/UL (ref 0–0.2)
BASOPHILS NFR BLD: 1 % (ref 0–2)
BUN SERPL-MCNC: 15 MG/DL (ref 8–23)
CALCIUM SERPL-MCNC: 8.9 MG/DL (ref 8.6–10.4)
CHLORIDE SERPL-SCNC: 107 MMOL/L (ref 98–107)
CO2 SERPL-SCNC: 26 MMOL/L (ref 20–31)
CREAT SERPL-MCNC: 1.2 MG/DL (ref 0.5–0.9)
EOSINOPHIL # BLD: 0.1 K/UL (ref 0–0.4)
EOSINOPHILS RELATIVE PERCENT: 2 % (ref 1–4)
ERYTHROCYTE [DISTWIDTH] IN BLOOD BY AUTOMATED COUNT: 15.1 % (ref 12.5–15.4)
GFR, ESTIMATED: 50 ML/MIN/1.73M2
GLUCOSE SERPL-MCNC: 101 MG/DL (ref 70–99)
HCT VFR BLD AUTO: 38.4 % (ref 36–46)
HGB BLD-MCNC: 12.3 G/DL (ref 12–16)
INR PPP: 0.9
LYMPHOCYTES NFR BLD: 2.5 K/UL (ref 1–4.8)
LYMPHOCYTES RELATIVE PERCENT: 39 % (ref 24–44)
MCH RBC QN AUTO: 30.6 PG (ref 26–34)
MCHC RBC AUTO-ENTMCNC: 32.1 G/DL (ref 31–37)
MCV RBC AUTO: 95.2 FL (ref 80–100)
MONOCYTES NFR BLD: 0.5 K/UL (ref 0.1–1.2)
MONOCYTES NFR BLD: 8 % (ref 2–11)
NEUTROPHILS NFR BLD: 50 % (ref 36–66)
NEUTS SEG NFR BLD: 3.3 K/UL (ref 1.8–7.7)
PARTIAL THROMBOPLASTIN TIME: 25.2 SEC (ref 21.3–31.3)
PLATELET # BLD AUTO: 214 K/UL (ref 140–450)
PMV BLD AUTO: 6.4 FL (ref 6–12)
POTASSIUM SERPL-SCNC: 4.1 MMOL/L (ref 3.7–5.3)
PROTHROMBIN TIME: 9.5 SEC (ref 9.4–12.6)
RBC # BLD AUTO: 4.03 M/UL (ref 4–5.2)
SODIUM SERPL-SCNC: 143 MMOL/L (ref 135–144)
TROPONIN I SERPL HS-MCNC: 28 NG/L (ref 0–14)
WBC OTHER # BLD: 6.4 K/UL (ref 3.5–11)

## 2024-08-21 PROCEDURE — 99285 EMERGENCY DEPT VISIT HI MDM: CPT | Performed by: EMERGENCY MEDICINE

## 2024-08-21 PROCEDURE — 85610 PROTHROMBIN TIME: CPT

## 2024-08-21 PROCEDURE — 6360000004 HC RX CONTRAST MEDICATION: Performed by: EMERGENCY MEDICINE

## 2024-08-21 PROCEDURE — 85025 COMPLETE CBC W/AUTO DIFF WBC: CPT

## 2024-08-21 PROCEDURE — 36415 COLL VENOUS BLD VENIPUNCTURE: CPT

## 2024-08-21 PROCEDURE — 84484 ASSAY OF TROPONIN QUANT: CPT

## 2024-08-21 PROCEDURE — 71260 CT THORAX DX C+: CPT

## 2024-08-21 PROCEDURE — 80048 BASIC METABOLIC PNL TOTAL CA: CPT

## 2024-08-21 PROCEDURE — 85730 THROMBOPLASTIN TIME PARTIAL: CPT

## 2024-08-21 PROCEDURE — 6370000000 HC RX 637 (ALT 250 FOR IP): Performed by: EMERGENCY MEDICINE

## 2024-08-21 PROCEDURE — 2580000003 HC RX 258: Performed by: EMERGENCY MEDICINE

## 2024-08-21 RX ORDER — SODIUM CHLORIDE 0.9 % (FLUSH) 0.9 %
10 SYRINGE (ML) INJECTION PRN
Status: DISCONTINUED | OUTPATIENT
Start: 2024-08-21 | End: 2024-08-21 | Stop reason: HOSPADM

## 2024-08-21 RX ORDER — 0.9 % SODIUM CHLORIDE 0.9 %
80 INTRAVENOUS SOLUTION INTRAVENOUS ONCE
Status: DISCONTINUED | OUTPATIENT
Start: 2024-08-21 | End: 2024-08-21 | Stop reason: HOSPADM

## 2024-08-21 RX ADMIN — SODIUM CHLORIDE, PRESERVATIVE FREE 10 ML: 5 INJECTION INTRAVENOUS at 17:48

## 2024-08-21 RX ADMIN — IOPAMIDOL 75 ML: 755 INJECTION, SOLUTION INTRAVENOUS at 17:48

## 2024-08-21 RX ADMIN — APIXABAN 5 MG: 5 TABLET, FILM COATED ORAL at 19:43

## 2024-08-21 RX ADMIN — Medication 80 ML: at 17:48

## 2024-08-21 ASSESSMENT — PAIN - FUNCTIONAL ASSESSMENT: PAIN_FUNCTIONAL_ASSESSMENT: NONE - DENIES PAIN

## 2024-08-21 NOTE — ED NOTES
Pt walked with walker and finger pulse ox for ox saturation with exertion. Pt pulse ox range from 94-97 on ra with ambulation

## 2024-08-21 NOTE — TELEPHONE ENCOUNTER
Patient called in complaining of SOB since being off eliquis for the past 10 days. Advised patient to the ER.

## 2024-08-21 NOTE — TELEPHONE ENCOUNTER
Marina and Adrianne called stating that the patient has been off eliquis for 10 days and now she is feeling very SOB.  Dr. Rincon updated and ordered patient to go to the ER.  Adrianne updated and Marina updated via VM.

## 2024-08-21 NOTE — ED PROVIDER NOTES
(DESYREL) 50 MG TABLET    Take 1 tablet by mouth nightly       ALLERGIES     has No Known Allergies.    FAMILY HISTORY     She indicated that her mother is . She indicated that her father is . She indicated that four of her six sisters are alive. She indicated that two of her four brothers are alive. She indicated that her maternal grandmother is . She indicated that her maternal grandfather is . She indicated that her paternal grandmother is . She indicated that her paternal grandfather is .     family history includes Cancer in her brother and father; Diabetes in her sister, sister, and sister; Hypertension in her mother; Liver Disease in her mother; No Known Problems in her maternal grandfather, maternal grandmother, paternal grandfather, and paternal grandmother; Other in her brother.    SOCIAL HISTORY      reports that she quit smoking about 41 years ago. Her smoking use included cigarettes. She started smoking about 46 years ago. She has a 5 pack-year smoking history. She has never been exposed to tobacco smoke. She has never used smokeless tobacco. She reports that she does not drink alcohol and does not use drugs.    PHYSICAL EXAM     INITIAL VITALS:  height is 1.575 m (5' 2\") and weight is 97.5 kg (215 lb). Her oral temperature is 97.7 °F (36.5 °C). Her blood pressure is 162/93 (abnormal) and her pulse is 91. Her respiration is 18 and oxygen saturation is 99%.   Physical Exam  Vitals reviewed.   Constitutional:       General: She is not in acute distress.     Appearance: She is well-developed.   HENT:      Head: Normocephalic and atraumatic.   Eyes:      Conjunctiva/sclera: Conjunctivae normal.      Pupils: Pupils are equal, round, and reactive to light.   Neck:      Trachea: No tracheal deviation.   Cardiovascular:      Rate and Rhythm: Normal rate and regular rhythm.   Pulmonary:      Effort: No respiratory distress.      Breath sounds: Normal breath sounds.

## 2024-08-22 ENCOUNTER — TELEPHONE (OUTPATIENT)
Dept: PRIMARY CARE CLINIC | Age: 64
End: 2024-08-22

## 2024-08-22 LAB
EKG ATRIAL RATE: 88 BPM
EKG P AXIS: 38 DEGREES
EKG P-R INTERVAL: 182 MS
EKG Q-T INTERVAL: 366 MS
EKG QRS DURATION: 68 MS
EKG QTC CALCULATION (BAZETT): 442 MS
EKG R AXIS: -3 DEGREES
EKG T AXIS: 69 DEGREES
EKG VENTRICULAR RATE: 88 BPM

## 2024-08-22 NOTE — TELEPHONE ENCOUNTER
Pt calling states she was in the ER last night and was said to have blood clots in her lungs and possibly in the leg. She was told to restart the Eliquis (she was off of it for 9 days) and she has restarted it at 5mg BID and is wondering if that dose is good or if will need it increased? Wonders if that is enough

## 2024-08-22 NOTE — TELEPHONE ENCOUNTER
Patient informed. Upon discussion, patient states pain and tenderness in her leg as gotten much worse since yesterday. Writer advised patient to go back to the ER due to possibility of clots in leg.

## 2024-08-23 ENCOUNTER — OFFICE VISIT (OUTPATIENT)
Dept: ONCOLOGY | Age: 64
End: 2024-08-23
Payer: MEDICARE

## 2024-08-23 ENCOUNTER — TELEPHONE (OUTPATIENT)
Dept: ONCOLOGY | Age: 64
End: 2024-08-23

## 2024-08-23 VITALS
TEMPERATURE: 96.8 F | DIASTOLIC BLOOD PRESSURE: 89 MMHG | SYSTOLIC BLOOD PRESSURE: 138 MMHG | RESPIRATION RATE: 18 BRPM | WEIGHT: 216.2 LBS | BODY MASS INDEX: 39.54 KG/M2 | HEART RATE: 74 BPM | OXYGEN SATURATION: 98 %

## 2024-08-23 DIAGNOSIS — M79.661 BILATERAL CALF PAIN: ICD-10-CM

## 2024-08-23 DIAGNOSIS — D68.59 HYPERCOAGULABLE STATE (HCC): Primary | ICD-10-CM

## 2024-08-23 DIAGNOSIS — M79.662 BILATERAL CALF PAIN: ICD-10-CM

## 2024-08-23 PROCEDURE — 99211 OFF/OP EST MAY X REQ PHY/QHP: CPT | Performed by: INTERNAL MEDICINE

## 2024-08-23 PROCEDURE — 99214 OFFICE O/P EST MOD 30 MIN: CPT | Performed by: INTERNAL MEDICINE

## 2024-08-23 PROCEDURE — 1123F ACP DISCUSS/DSCN MKR DOCD: CPT | Performed by: INTERNAL MEDICINE

## 2024-08-23 NOTE — TELEPHONE ENCOUNTER
Instructions   from Dr. Alva Rodriguez MD    Bilateral LE doppler US  Continue Eliquis  RV 6 months      Doppler 9/5/24 at 8:45 am at [facility]  RV 2/17/25 at 10:15 am

## 2024-08-23 NOTE — PROGRESS NOTES
normal  Nose - normal and patent, no erythema, discharge or polyps  Mouth - mucous membranes moist, pharynx normal without lesions  Neck - supple, no significant adenopathy  Lymphatics - no palpable lymphadenopathy, no hepatosplenomegaly  Chest - clear to auscultation, no wheezes, rales or rhonchi, symmetric air entry  Heart - normal rate, regular rhythm, normal S1, S2, no murmurs, rubs, clicks or gallops  Abdomen - soft, nontender, nondistended, no masses or organomegaly  Neurological - alert, oriented, normal speech, no focal findings or movement disorder noted  Musculoskeletal - no joint tenderness, deformity or swelling  Extremities - peripheral pulses normal, no pedal edema, no clubbing or cyanosis  Skin - normal coloration and turgor, no rashes, no suspicious skin lesions noted     Review of Diagnostic data:   Lab Results   Component Value Date    WBC 6.4 08/21/2024    HGB 12.3 08/21/2024    HCT 38.4 08/21/2024    MCV 95.2 08/21/2024     08/21/2024       Chemistry        Component Value Date/Time     08/21/2024 1708    K 4.1 08/21/2024 1708     08/21/2024 1708    CO2 26 08/21/2024 1708    BUN 15 08/21/2024 1708    CREATININE 1.2 (H) 08/21/2024 1708        Component Value Date/Time    CALCIUM 8.9 08/21/2024 1708    ALKPHOS 85 06/05/2024 1153    AST 25 06/05/2024 1153    ALT 31 06/05/2024 1153    BILITOT 0.2 06/05/2024 1153          @[facility]@      IMPRESSION:   Homozygous MTHFR gene mutation  History of DVT and 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 I explained to the patient the nature of this problem of possible hypercoagulability and management plan.  Obviously she had unprovoked pulmonary embolism and DVT 15 years ago.  No other events.  She is maintained on chronic anticoagulation.  She had recurrent falls

## 2024-08-26 NOTE — PROGRESS NOTES
[facility]  [address] [place] [address] [place] [address]  Phone: [phone]       Name: Adrianne Campos  : 1960     Chief Complaint:    Adrianne Campos is a 64 y.o. year old female who presents today for   Chief Complaint   Patient presents with    Pulmonary Embolism     [facility] ER 24. Was having bilateral leg pain and that has gotten better and states still having some shortness of breath when up and doing things otherwise better       History of Present Illness:    Went to ER on  and was found to have small volume b/l PEs with no evidence of heart strain. Saw hematology on . She is back on eliquis.     Hematology had d/c her eliquis after appropriate workup that did not show significant clinic findings that would explain a hypercoagulable state. She had only been off of eliquis for 8 days at time of ER visit.     Today she states she is feeling much better. Her breathing is improving. Her leg pain has resolved. She did see hematology in follow up already as well.     Medications:    Outpatient Medications Prior to Visit   Medication Sig Dispense Refill    mirabegron (MYRBETRIQ) 50 MG TB24 Take 50 mg by mouth daily 30 tablet 2    atorvastatin (LIPITOR) 40 MG tablet Take 1 tablet by mouth daily 90 tablet 1    linaCLOtide (LINZESS) 72 MCG CAPS capsule Take 1 capsule by mouth every morning (before breakfast) 90 capsule 0    Semaglutide (RYBELSUS) 7 MG TABS Take 1 tablet by mouth daily 30 tablet 2    benztropine (COGENTIN) 0.5 MG tablet Take 1 tablet by mouth      RESTASIS 0.05 % ophthalmic emulsion Place 1 drop into both eyes in the morning and at bedtime      risperiDONE (RISPERDAL) 2 MG tablet       risperiDONE (RISPERDAL) 0.5 MG tablet       allopurinol (ZYLOPRIM) 100 MG tablet Take 1 tablet by mouth 2 times daily 180 tablet 3    apixaban (ELIQUIS) 5 MG TABS tablet Take 1 tablet by mouth 2 times daily 180 tablet 3    dapagliflozin (FARXIGA) 10 MG tablet Take 1 tablet by

## 2024-08-28 ENCOUNTER — OFFICE VISIT (OUTPATIENT)
Dept: PRIMARY CARE CLINIC | Age: 64
End: 2024-08-28
Payer: MEDICARE

## 2024-08-28 VITALS
HEART RATE: 79 BPM | WEIGHT: 215 LBS | DIASTOLIC BLOOD PRESSURE: 82 MMHG | HEIGHT: 62 IN | OXYGEN SATURATION: 97 % | SYSTOLIC BLOOD PRESSURE: 112 MMHG | BODY MASS INDEX: 39.56 KG/M2

## 2024-08-28 DIAGNOSIS — I26.99 BILATERAL PULMONARY EMBOLISM (HCC): Primary | ICD-10-CM

## 2024-08-28 DIAGNOSIS — D68.59 HYPERCOAGULABLE STATE (HCC): ICD-10-CM

## 2024-08-28 PROCEDURE — 99214 OFFICE O/P EST MOD 30 MIN: CPT | Performed by: FAMILY MEDICINE

## 2024-08-28 PROCEDURE — 1123F ACP DISCUSS/DSCN MKR DOCD: CPT | Performed by: FAMILY MEDICINE

## 2024-08-29 ENCOUNTER — HOSPITAL ENCOUNTER (EMERGENCY)
Facility: CLINIC | Age: 64
Discharge: HOME | End: 2024-08-29
Attending: EMERGENCY MEDICINE
Payer: MEDICARE

## 2024-08-29 ENCOUNTER — APPOINTMENT (OUTPATIENT)
Dept: CT IMAGING | Facility: CLINIC | Age: 64
End: 2024-08-29
Payer: MEDICARE

## 2024-08-29 VITALS
DIASTOLIC BLOOD PRESSURE: 97 MMHG | TEMPERATURE: 97.8 F | WEIGHT: 215 LBS | RESPIRATION RATE: 20 BRPM | SYSTOLIC BLOOD PRESSURE: 168 MMHG | HEART RATE: 90 BPM | OXYGEN SATURATION: 99 % | BODY MASS INDEX: 39.56 KG/M2 | HEIGHT: 62 IN

## 2024-08-29 DIAGNOSIS — I10 HYPERTENSION, UNSPECIFIED TYPE: ICD-10-CM

## 2024-08-29 DIAGNOSIS — R51.9 ACUTE NONINTRACTABLE HEADACHE, UNSPECIFIED HEADACHE TYPE: Primary | ICD-10-CM

## 2024-08-29 PROCEDURE — 99284 EMERGENCY DEPT VISIT MOD MDM: CPT

## 2024-08-29 PROCEDURE — 70450 CT HEAD/BRAIN W/O DYE: CPT

## 2024-08-29 ASSESSMENT — ENCOUNTER SYMPTOMS
ABDOMINAL PAIN: 0
BACK PAIN: 0
NAUSEA: 0
VOMITING: 0
COUGH: 0
DIARRHEA: 0
SHORTNESS OF BREATH: 0

## 2024-08-29 ASSESSMENT — PAIN SCALES - GENERAL: PAINLEVEL_OUTOF10: 6

## 2024-08-29 ASSESSMENT — PAIN DESCRIPTION - LOCATION: LOCATION: GENERALIZED

## 2024-08-29 ASSESSMENT — LIFESTYLE VARIABLES
HOW OFTEN DO YOU HAVE A DRINK CONTAINING ALCOHOL: NEVER
HOW MANY STANDARD DRINKS CONTAINING ALCOHOL DO YOU HAVE ON A TYPICAL DAY: PATIENT DOES NOT DRINK

## 2024-08-29 ASSESSMENT — PAIN - FUNCTIONAL ASSESSMENT: PAIN_FUNCTIONAL_ASSESSMENT: 0-10

## 2024-08-29 NOTE — DISCHARGE INSTRUCTIONS
PLEASE RETURN TO THE EMERGENCY DEPARTMENT IMMEDIATELY if your symptoms worsen in anyway or in 8-12 hours if not improved for re-evaluation.  You should immediately return to the ER for symptoms such as new or worsening pain, fever, visual or hearing changes, stiff neck, rash, a feeling of passing out, chest pain, shortness of breath, persistent nausea and/or vomiting, numbness or weakness to the arms or legs, coolness or color change of the arms or legs.      Take your medication as indicated and prescribed.  If you are given an antibiotic then, make sure you get the prescription filled and take the antibiotics until finished.      Please understand that at this time there is no evidence for a more serious underlying process, but that early in the process of an illness or injury, an emergency department workup can be falsely reassuring.  You should contact your family doctor within the next 24 hours for a follow up appointment    THANK YOU!!!    From [facility] and [facility]    On behalf of the Emergency Department staff at [facility], I would like to thank you for giving us the opportunity to address your health care needs and concerns.    We hope that during your visit, our service was delivered in a professional and caring manner. Please keep [facility] in mind as we walk with you down the path to your own personal wellness.     Please expect an automated text message or email from us so we can ask a few questions about your health and progress. Based on your answers, a clinician may call you back to offer help and instructions.    Please understand that early in the process of an illness or injury, an emergency department workup can be falsely reassuring.  If you notice any worsening, changing or persistent symptoms please call your family doctor or return to the ER immediately.     Tell us how we did during your visit at [email or web address]   and let us know about your

## 2024-08-29 NOTE — ED TRIAGE NOTES
Patient was at her physician's office for a follow up appointment, blood pressure was taken three times - twice automatically and once manually. BP was 210/112 per daughter. Was instructed to come to the ED for evaulation. Was diagnosed with pulmonary embolism last Wednesday, on Eliquis.

## 2024-08-29 NOTE — ED PROVIDER NOTES
[facility]      Pt Name: Adrianne Campos  MRN: [id number]  Birthdate 1960  Date of evaluation: 8/29/2024    EMERGENCY DEPARTMENT ENCOUNTER           I reviewed the mid level provider's note and agree with the documented findings and we have discussed the plan of care. I have reviewed the emergency nurses triage note. I agree with the chief complaint, past medical history, past surgical history, allergies, medications, social and family history as documented unless otherwise noted below.      Enrique Muhammad, DO  08/29/24 [id number]

## 2024-08-29 NOTE — ED PROVIDER NOTES
[facility] ED  [address] [address]  Phone: [phone]        Pt Name: Adrianne Campos  MRN: [id number]  Birthdate 1960  Date of evaluation: 8/29/24    CHIEFCOMPLAINT       Chief Complaint   Patient presents with    Hypertension     BP was 210/112 at doctor's office, diagnosed with pulmonary embolism last Wednesday.        HISTORY OF PRESENT ILLNESS (Location/Symptom, Timing/Onset, Context/Setting, Quality, Duration, Modifying Factors, Severity)      Adrianne Campos is a 64 y.o. female who presents to the ED via private auto with headache and hypertension.  Patient states that she was at her appointments of center earlier today, was told that her blood pressure was in the 200s and that she should come be evaluated.  Patient does report a mild headache but denies any vision changes, numbness, tingling, fevers, chills, chest pain or shortness of breath.  States she is concerned as she recently started Eliquis due to bilateral PEs.  On arrival she is resting on the cot with even unlabored breaths nontoxic.  No acute distress noted.    PAST MEDICAL / SURGICAL / SOCIAL / FAMILY HISTORY     PMH:  has a past medical history of Acute tracheobronchitis-viral, Anxiety, Cerebrovascular accident (CVA) (HCC), Chronic back pain, Chronic kidney disease, COPD (chronic obstructive pulmonary disease) (HCC), COPD exacerbation (HCC), Depression, Effusion of right knee, Fracture of ankle, Hearing loss, Hyperlipidemia, Hypertension, Long term (current) use of anticoagulants, Obesity, NARCISO on CPAP, Osteoarthritis, Proteinuria, Pseudogout, Pulmonary embolism (HCC), Sleep apnea, SOB (shortness of breath), Type 2 diabetes mellitus without complication (HCC), Type II or unspecified type diabetes mellitus without mention of complication, not stated as uncontrolled, and Von Willebrand disease (HCC).  Surgical History:  has a past surgical history that includes Tonsillectomy and adenoidectomy; eye surgery (Bilateral);  in the process of an illness or injury, an emergency department workup can be falsely reassuring.  Routine discharge counseling was given, and the patient understands that worsening, changing or persistent symptoms should prompt an immediate call or follow up with their primary physician or return to the emergency department. The importance of appropriate follow up was also discussed.  I have reviewed the disposition diagnosis with the patient and or their family/guardian.  I have answered their questions and given discharge instructions.  They voiced understanding of these instructions and did not have any further questions or complaints.  PLAN (LABS / IMAGING / EKG):  Orders Placed This Encounter   Procedures    CT HEAD WO CONTRAST       MEDICATIONS ORDERED:  No orders of the defined types were placed in this encounter.      Controlled Substances Monitoring:     DIAGNOSTIC RESULTS     EKG: All EKG's are interpreted by the Emergency Department Physician who either signs or Co-signs this chart in the absenceof a cardiologist.        RADIOLOGY: All images are read by the radiologist and their interpretations are reviewed.    CT HEAD WO CONTRAST   Preliminary Result   Stable 6 mm hyperdensity within the left frontal lobe likely representing a   cavernoma.      No acute intracranial abnormality.             LABS:  No results found for this visit on 08/29/24.    EMERGENCY DEPARTMENT COURSE           Vitals:    Vitals:    08/29/24 1613 08/29/24 1616   BP: (!) 193/99 (!) 179/92   Pulse: 90    Resp: 20    Temp: 97.8 °F (36.6 °C)    TempSrc: Oral    SpO2: 100% 99%   Weight: 97.5 kg (215 lb)    Height: 1.575 m (5' 2\")      -------------------------  BP: (!) 179/92, Temp: 97.8 °F (36.6 °C), Pulse: 90, Respirations: 20      RE-EVALUATION:  See ED Course notes above.        CONSULTS:  None    PROCEDURES:  None    FINAL IMPRESSION      1. Acute nonintractable headache, unspecified headache type    2. Hypertension, unspecified

## 2024-09-11 DIAGNOSIS — E11.8 CONTROLLED TYPE 2 DIABETES MELLITUS WITH COMPLICATION, WITHOUT LONG-TERM CURRENT USE OF INSULIN (HCC): ICD-10-CM

## 2024-09-11 RX ORDER — ORAL SEMAGLUTIDE 7 MG/1
1 TABLET ORAL DAILY
Qty: 30 TABLET | Refills: 2 | Status: SHIPPED | OUTPATIENT
Start: 2024-09-11

## 2024-09-13 ENCOUNTER — TELEPHONE (OUTPATIENT)
Dept: FAMILY MEDICINE CLINIC | Age: 64
End: 2024-09-13

## 2024-09-23 ENCOUNTER — OFFICE VISIT (OUTPATIENT)
Dept: PODIATRY | Age: 64
End: 2024-09-23
Payer: MEDICARE

## 2024-09-23 VITALS — HEIGHT: 62 IN | BODY MASS INDEX: 39.56 KG/M2 | WEIGHT: 215 LBS

## 2024-09-23 DIAGNOSIS — E11.51 TYPE II DIABETES MELLITUS WITH PERIPHERAL CIRCULATORY DISORDER (HCC): Primary | ICD-10-CM

## 2024-09-23 DIAGNOSIS — B35.1 DERMATOPHYTOSIS OF NAIL: ICD-10-CM

## 2024-09-23 DIAGNOSIS — M79.672 PAIN IN BOTH FEET: ICD-10-CM

## 2024-09-23 DIAGNOSIS — I73.9 PVD (PERIPHERAL VASCULAR DISEASE) (HCC): ICD-10-CM

## 2024-09-23 DIAGNOSIS — M79.671 PAIN IN BOTH FEET: ICD-10-CM

## 2024-09-23 PROCEDURE — 11721 DEBRIDE NAIL 6 OR MORE: CPT | Performed by: PODIATRIST

## 2024-09-23 NOTE — PROGRESS NOTES
[facility] PODIATRY  [address] [place], [place] [id number]  Dept: [phone]    DIABETIC PROGRESS NOTE  Date of patient's visit: 9/23/2024  Patient's Name:  Adrianne Campos YOB: 1960            Patient Care Team:  Renu Ceron DO as PCP - General (Family Medicine)  [facility]  Jamie Lopez MD as Consulting Physician (Nephrology)  Jojo Anderson MD as Consulting Physician (Cardiology)  Jered Alejandra DPM as Consulting Physician (Podiatry)  Gaby Asif MD (Psychiatry)  Heydi España DO as Surgeon (Neurosurgery)  Rafael Oglesby MD as Consulting Physician (Neurology)          Chief Complaint   Patient presents with    Diabetes     Diabetic foot care  Bs 128    Peripheral Neuropathy     Bl feet       Subjective:   Adrianne Campos comes to clinic for Diabetes (Diabetic foot care/Bs 128) and Peripheral Neuropathy (Bl feet)    she is a diabetic and states that she checks her feet daily.  Pt currently has complaint of thickened, elongated nails that they cannot manage by themselves.   Pt's primary care physician is Renu Ceron DO last seen August 28 2024   Pt's last blood sugar was 128 .  Pt has a new complaint of none.       Lab Results   Component Value Date    LABA1C 5.9 06/05/2024      Complains of numbness in the feet bilat.  Past Medical History:   Diagnosis Date    Acute tracheobronchitis-viral 08/05/2021    Anxiety     Cerebrovascular accident (CVA) ([facility]) 09/10/2021    Chronic back pain     Chronic kidney disease     COPD (chronic obstructive pulmonary disease) ([facility])     COPD exacerbation ([facility]) 08/05/2021    Depression     Effusion of right knee 03/07/2022    Fracture of ankle 05/14/2020    Hearing loss     Hyperlipidemia     Hypertension     Long term (current) use of anticoagulants 08/07/2020    Obesity     NARCISO on CPAP 01/19/2018    Osteoarthritis

## 2024-09-24 ENCOUNTER — TELEPHONE (OUTPATIENT)
Dept: NEUROSURGERY | Age: 64
End: 2024-09-24

## 2024-09-24 DIAGNOSIS — D18.00 CAVERNOMA: Primary | ICD-10-CM

## 2024-09-26 ENCOUNTER — HOSPITAL ENCOUNTER (OUTPATIENT)
Age: 64
Setting detail: SPECIMEN
Discharge: HOME | End: 2024-09-26

## 2024-09-26 ENCOUNTER — HOSPITAL ENCOUNTER (OUTPATIENT)
Dept: MRI IMAGING | Facility: CLINIC | Age: 64
Discharge: HOME | End: 2024-09-28
Payer: MEDICARE

## 2024-09-26 DIAGNOSIS — D18.00 CAVERNOMA: ICD-10-CM

## 2024-09-26 LAB — CREAT BLD-MCNC: 1.3 MG/DL (ref 0.6–1.4)

## 2024-09-26 PROCEDURE — A9579 GAD-BASE MR CONTRAST NOS,1ML: HCPCS | Performed by: NURSE PRACTITIONER

## 2024-09-26 PROCEDURE — 2580000003 HC RX 258: Performed by: NURSE PRACTITIONER

## 2024-09-26 PROCEDURE — 70553 MRI BRAIN STEM W/O & W/DYE: CPT

## 2024-09-26 PROCEDURE — 6360000004 HC RX CONTRAST MEDICATION: Performed by: NURSE PRACTITIONER

## 2024-09-26 RX ORDER — SODIUM CHLORIDE 0.9 % (FLUSH) 0.9 %
10 SYRINGE (ML) INJECTION PRN
Status: DISCONTINUED | OUTPATIENT
Start: 2024-09-26 | End: 2024-09-29 | Stop reason: HOSPADM

## 2024-09-26 RX ADMIN — GADOTERIDOL 20 ML: 279.3 INJECTION, SOLUTION INTRAVENOUS at 12:18

## 2024-09-26 RX ADMIN — SODIUM CHLORIDE, PRESERVATIVE FREE 10 ML: 5 INJECTION INTRAVENOUS at 12:19

## 2024-09-30 ENCOUNTER — TELEPHONE (OUTPATIENT)
Dept: PRIMARY CARE CLINIC | Age: 64
End: 2024-09-30

## 2024-09-30 NOTE — TELEPHONE ENCOUNTER
Pt tested positive for Covid on Saturday and asking if there is anything she can do for it.  States feeling it in her chest.    Please advise. Thank you.

## 2024-10-02 NOTE — TELEPHONE ENCOUNTER
Just OTC - she is past the time where paxlovid would be of benefit. Walkin, ER or UC if symptoms progress (SOB, difficulty breathing, etc)

## 2024-10-03 DIAGNOSIS — K59.01 SLOW TRANSIT CONSTIPATION: ICD-10-CM

## 2024-10-10 ENCOUNTER — HOSPITAL ENCOUNTER (OUTPATIENT)
Age: 64
Setting detail: SPECIMEN
Discharge: HOME | End: 2024-10-10

## 2024-10-10 ENCOUNTER — TELEPHONE (OUTPATIENT)
Dept: PRIMARY CARE CLINIC | Age: 64
End: 2024-10-10

## 2024-10-10 ENCOUNTER — OFFICE VISIT (OUTPATIENT)
Dept: PRIMARY CARE CLINIC | Age: 64
End: 2024-10-10

## 2024-10-10 VITALS
HEART RATE: 84 BPM | WEIGHT: 221 LBS | OXYGEN SATURATION: 98 % | BODY MASS INDEX: 40.67 KG/M2 | DIASTOLIC BLOOD PRESSURE: 78 MMHG | SYSTOLIC BLOOD PRESSURE: 126 MMHG | HEIGHT: 62 IN

## 2024-10-10 DIAGNOSIS — E11.22 TYPE 2 DIABETES MELLITUS WITH STAGE 3A CHRONIC KIDNEY DISEASE, WITHOUT LONG-TERM CURRENT USE OF INSULIN (HCC): ICD-10-CM

## 2024-10-10 DIAGNOSIS — Z23 NEED FOR INFLUENZA VACCINATION: ICD-10-CM

## 2024-10-10 DIAGNOSIS — E11.22 CONTROLLED TYPE 2 DIABETES MELLITUS WITH STAGE 3 CHRONIC KIDNEY DISEASE, WITHOUT LONG-TERM CURRENT USE OF INSULIN (HCC): ICD-10-CM

## 2024-10-10 DIAGNOSIS — E78.5 HYPERLIPIDEMIA, UNSPECIFIED HYPERLIPIDEMIA TYPE: ICD-10-CM

## 2024-10-10 DIAGNOSIS — N18.30 CONTROLLED TYPE 2 DIABETES MELLITUS WITH STAGE 3 CHRONIC KIDNEY DISEASE, WITHOUT LONG-TERM CURRENT USE OF INSULIN (HCC): ICD-10-CM

## 2024-10-10 DIAGNOSIS — N18.31 TYPE 2 DIABETES MELLITUS WITH STAGE 3A CHRONIC KIDNEY DISEASE, WITHOUT LONG-TERM CURRENT USE OF INSULIN (HCC): ICD-10-CM

## 2024-10-10 DIAGNOSIS — N18.30 STAGE 3 CHRONIC KIDNEY DISEASE, UNSPECIFIED WHETHER STAGE 3A OR 3B CKD (HCC): Chronic | ICD-10-CM

## 2024-10-10 DIAGNOSIS — N18.31 STAGE 3A CHRONIC KIDNEY DISEASE (HCC): ICD-10-CM

## 2024-10-10 DIAGNOSIS — Z00.00 MEDICARE ANNUAL WELLNESS VISIT, SUBSEQUENT: Primary | ICD-10-CM

## 2024-10-10 DIAGNOSIS — F33.1 MAJOR DEPRESSIVE DISORDER, RECURRENT, MODERATE (HCC): ICD-10-CM

## 2024-10-10 DIAGNOSIS — I12.9 RENAL HYPERTENSION: ICD-10-CM

## 2024-10-10 DIAGNOSIS — Z79.01 LONG TERM (CURRENT) USE OF ANTICOAGULANTS: ICD-10-CM

## 2024-10-10 DIAGNOSIS — G62.9 NEUROPATHY: ICD-10-CM

## 2024-10-10 DIAGNOSIS — K59.01 SLOW TRANSIT CONSTIPATION: ICD-10-CM

## 2024-10-10 PROBLEM — Z82.49 FAMILY HISTORY OF ABDOMINAL AORTIC ANEURYSM: Status: RESOLVED | Noted: 2020-06-30 | Resolved: 2024-10-10

## 2024-10-10 LAB
ALBUMIN SERPL-MCNC: 4.4 G/DL (ref 3.5–5.2)
ALBUMIN/GLOB SERPL: 2 {RATIO} (ref 1–2.5)
ALP SERPL-CCNC: 74 U/L (ref 35–104)
ALT SERPL-CCNC: 18 U/L (ref 10–35)
ANION GAP SERPL CALCULATED.3IONS-SCNC: 10 MMOL/L (ref 9–16)
ANION GAP SERPL CALCULATED.3IONS-SCNC: 8 MMOL/L (ref 9–16)
AST SERPL-CCNC: 16 U/L (ref 10–35)
BILIRUB SERPL-MCNC: 0.2 MG/DL (ref 0–1.2)
BUN SERPL-MCNC: 20 MG/DL (ref 8–23)
BUN SERPL-MCNC: 20 MG/DL (ref 8–23)
CALCIUM SERPL-MCNC: 9.5 MG/DL (ref 8.6–10.4)
CALCIUM SERPL-MCNC: 9.6 MG/DL (ref 8.6–10.4)
CHLORIDE SERPL-SCNC: 104 MMOL/L (ref 98–107)
CHLORIDE SERPL-SCNC: 105 MMOL/L (ref 98–107)
CHOLEST SERPL-MCNC: 152 MG/DL (ref 0–199)
CHOLESTEROL/HDL RATIO: 4
CO2 SERPL-SCNC: 27 MMOL/L (ref 20–31)
CO2 SERPL-SCNC: 28 MMOL/L (ref 20–31)
CREAT SERPL-MCNC: 1.1 MG/DL (ref 0.5–0.9)
CREAT SERPL-MCNC: 1.1 MG/DL (ref 0.5–0.9)
CREAT UR-MCNC: 81.9 MG/DL (ref 28–217)
CREAT UR-MCNC: 82.4 MG/DL (ref 28–217)
ERYTHROCYTE [DISTWIDTH] IN BLOOD BY AUTOMATED COUNT: 15 % (ref 11.8–14.4)
GFR, ESTIMATED: 53 ML/MIN/1.73M2
GFR, ESTIMATED: 54 ML/MIN/1.73M2
GLUCOSE P FAST SERPL-MCNC: 151 MG/DL (ref 74–99)
GLUCOSE SERPL-MCNC: 155 MG/DL (ref 74–99)
HBA1C MFR BLD: 6.3 %
HCT VFR BLD AUTO: 41 % (ref 36.3–47.1)
HDLC SERPL-MCNC: 43 MG/DL
HGB BLD-MCNC: 12.9 G/DL (ref 11.9–15.1)
LDLC SERPL CALC-MCNC: 74 MG/DL (ref 0–100)
MCH RBC QN AUTO: 30.9 PG (ref 25.2–33.5)
MCHC RBC AUTO-ENTMCNC: 31.5 G/DL (ref 28.4–34.8)
MCV RBC AUTO: 98.1 FL (ref 82.6–102.9)
MICROALBUMIN UR-MCNC: <12 MG/L (ref 0–20)
MICROALBUMIN/CREAT UR-RTO: NORMAL MCG/MG CREAT (ref 0–25)
NRBC BLD-RTO: 0 PER 100 WBC
PLATELET # BLD AUTO: 239 K/UL (ref 138–453)
PMV BLD AUTO: 9 FL (ref 8.1–13.5)
POTASSIUM SERPL-SCNC: 4.5 MMOL/L (ref 3.7–5.3)
POTASSIUM SERPL-SCNC: 4.5 MMOL/L (ref 3.7–5.3)
PROT SERPL-MCNC: 7.2 G/DL (ref 6.6–8.7)
RBC # BLD AUTO: 4.18 M/UL (ref 3.95–5.11)
SODIUM SERPL-SCNC: 141 MMOL/L (ref 136–145)
SODIUM SERPL-SCNC: 141 MMOL/L (ref 136–145)
TOTAL PROTEIN, URINE: 7 MG/DL
TRIGL SERPL-MCNC: 175 MG/DL
URINE TOTAL PROTEIN CREATININE RATIO: 0.08
VLDLC SERPL CALC-MCNC: 35 MG/DL
WBC OTHER # BLD: 6.6 K/UL (ref 3.5–11.3)

## 2024-10-10 RX ORDER — VALBENAZINE 80 MG/1
CAPSULE ORAL
COMMUNITY
Start: 2024-09-11

## 2024-10-10 ASSESSMENT — PATIENT HEALTH QUESTIONNAIRE - PHQ9
SUM OF ALL RESPONSES TO PHQ9 QUESTIONS 1 & 2: 6
6. FEELING BAD ABOUT YOURSELF - OR THAT YOU ARE A FAILURE OR HAVE LET YOURSELF OR YOUR FAMILY DOWN: NOT AT ALL
SUM OF ALL RESPONSES TO PHQ QUESTIONS 1-9: 6
4. FEELING TIRED OR HAVING LITTLE ENERGY: NOT AT ALL
7. TROUBLE CONCENTRATING ON THINGS, SUCH AS READING THE NEWSPAPER OR WATCHING TELEVISION: NOT AT ALL
3. TROUBLE FALLING OR STAYING ASLEEP: NOT AT ALL
1. LITTLE INTEREST OR PLEASURE IN DOING THINGS: NEARLY EVERY DAY
9. THOUGHTS THAT YOU WOULD BE BETTER OFF DEAD, OR OF HURTING YOURSELF: NOT AT ALL
5. POOR APPETITE OR OVEREATING: NOT AT ALL
2. FEELING DOWN, DEPRESSED OR HOPELESS: NEARLY EVERY DAY
SUM OF ALL RESPONSES TO PHQ QUESTIONS 1-9: 6
10. IF YOU CHECKED OFF ANY PROBLEMS, HOW DIFFICULT HAVE THESE PROBLEMS MADE IT FOR YOU TO DO YOUR WORK, TAKE CARE OF THINGS AT HOME, OR GET ALONG WITH OTHER PEOPLE: NOT DIFFICULT AT ALL
8. MOVING OR SPEAKING SO SLOWLY THAT OTHER PEOPLE COULD HAVE NOTICED. OR THE OPPOSITE, BEING SO FIGETY OR RESTLESS THAT YOU HAVE BEEN MOVING AROUND A LOT MORE THAN USUAL: NOT AT ALL

## 2024-10-10 NOTE — PROGRESS NOTES
Medicare Annual Wellness Visit    Adrianne Campos is here for Medicare AWV and Diabetes (A1c today)    Assessment & Plan   Medicare annual wellness visit, subsequent  Controlled type 2 diabetes mellitus with stage 3 chronic kidney disease, without long-term current use of insulin (HCC) - controlled. Continue farxiga, and rybelsus.   -     POCT glycosylated hemoglobin (Hb A1C)  -     Comprehensive Metabolic Panel, Fasting; Future  -     Microalbumin, Ur; Future  Slow transit constipation -- uncontrolled. Increase linzess   -     linaclotide (LINZESS) 145 MCG capsule; Take 1 capsule by mouth every morning (before breakfast), Disp-90 capsule, R-0Normal  Hyperlipidemia, unspecified hyperlipidemia type  -     Lipid Panel; Future  Neuropathy - she wants to try to taper off of gabapentin. Will decrease to qHS dosing then will stop.   Need for influenza vaccination  -     Influenza, FLUAD Trivalent, (age 65 y+), IM, Preservative Free, 0.5mL  Long term (current) use of anticoagulants  Major depressive disorder, recurrent, moderate (HCC) - managed by psychiatry.   Renal hypertension  Stage 3 chronic kidney disease, unspecified whether stage 3a or 3b CKD (HCC)        Recommendations for Preventive Services Due: see orders and patient instructions/AVS.  Recommended screening schedule for the next 5-10 years is provided to the patient in written form: see Patient Instructions/AVS.     Return in about 3 months (around 1/10/2025) for DM, with A1c.     Subjective   The following acute and/or chronic problems were also addressed today:    Diabetes Mellitus Type II, Follow-up: Patient is here for follow-up of Type 2 diabetes mellitus.      Hemoglobin A1C (%)   Date Value   10/10/2024 6.3   06/05/2024 5.9   01/16/2024 6.6 (H)     Depression - wellbutrin 450mg and risperdal 2mg (trying to taper off). Also was recently started on ingressza from \"mental health doctor\"    IBS/constipation - she is on lowest dose of linzess. Still having

## 2024-10-10 NOTE — PATIENT INSTRUCTIONS
Try to get 7 to 9 hours of sleep each night.     Limit alcohol to 2 drinks a day for men and 1 drink a day for women. Too much alcohol can cause health problems.     Manage other health problems such as diabetes, high blood pressure, and high cholesterol. If you think you may have a problem with alcohol or drug use, talk to your doctor.   Medicines    Take your medicines exactly as prescribed. Call your doctor if you think you are having a problem with your medicine.     If your doctor recommends aspirin, take the amount directed each day. Make sure you take aspirin and not another kind of pain reliever, such as acetaminophen (Tylenol).   When should you call for help?   Call 911 if you have symptoms of a heart attack. These may include:    Chest pain or pressure, or a strange feeling in the chest.     Sweating.     Shortness of breath.     Pain, pressure, or a strange feeling in the back, neck, jaw, or upper belly or in one or both shoulders or arms.     Lightheadedness or sudden weakness.     A fast or irregular heartbeat.   After you call 911, the  may tell you to chew 1 adult-strength or 2 to 4 low-dose aspirin. Wait for an ambulance. Do not try to drive yourself.  Watch closely for changes in your health, and be sure to contact your doctor if you have any problems.  Where can you learn more?  Go to https://www.[employer].net/patientEd and enter F075 to learn more about \"A Healthy Heart: Care Instructions.\"  Current as of: June 24, 2023  Content Version: 14.2  © 2024 [employer].   Care instructions adapted under license by [employer]. If you have questions about a medical condition or this instruction, always ask your healthcare professional. [employer] disclaims any warranty or liability for your use of this information.      Personalized Preventive Plan for Adrianne V Andres - 10/10/2024  Medicare offers a range of preventive health benefits. Some of the tests and screenings

## 2024-10-11 RX ORDER — OXYBUTYNIN CHLORIDE 5 MG/1
5 TABLET, EXTENDED RELEASE ORAL DAILY
Qty: 30 TABLET | Refills: 3 | Status: SHIPPED | OUTPATIENT
Start: 2024-10-11

## 2024-10-11 NOTE — TELEPHONE ENCOUNTER
I took myrbetriq off her list. She should stop that med.   I sent in a new med for her to start. Make sure she is aware that this medication commonly causes dry mouth.   
Is she still taking myrbetriq? If she doesn't think it's helping - we could try another medication, but the other medications usually come with a side effect of pretty significant dry mouth.  
Pt notified by phone   
Pt notified by phone and she would like to try something different as she is taking the Myrbetriq and it does not seem to be working since she is still having all these issues   
States she is still dealing with her bladder control and leaking daily. She wears a pad daily. She wakes up 3-4 times a night, no accidents.   
Statement Selected

## 2024-10-29 ENCOUNTER — TELEPHONE (OUTPATIENT)
Dept: PRIMARY CARE CLINIC | Age: 64
End: 2024-10-29

## 2024-10-30 ENCOUNTER — OFFICE VISIT (OUTPATIENT)
Dept: PRIMARY CARE CLINIC | Age: 64
End: 2024-10-30

## 2024-10-30 VITALS
SYSTOLIC BLOOD PRESSURE: 130 MMHG | DIASTOLIC BLOOD PRESSURE: 80 MMHG | HEIGHT: 62 IN | HEART RATE: 84 BPM | OXYGEN SATURATION: 97 % | BODY MASS INDEX: 41.22 KG/M2 | WEIGHT: 224 LBS

## 2024-10-30 DIAGNOSIS — R53.1 GENERALIZED WEAKNESS: Primary | ICD-10-CM

## 2024-10-30 DIAGNOSIS — R29.6 RECURRENT FALLS: ICD-10-CM

## 2024-10-30 NOTE — PROGRESS NOTES
Required     Requested Specialty:   Physical Therapy     Number of Visits Requested:   1         I reviewed the above assessment and plan with Adrianne.  Questions were answered and it appears that the Adrianne has a good understanding of the visit today.    The patient (or guardian, if applicable) and other individuals in attendance with the patient were advised that Artificial Intelligence will be utilized during this visit to record, process the conversation to generate a clinical note and to support improvement of the AI technology. The patient (or guardian, if applicable) and other individuals in attendance at the appointment consented to the use of AI, including the recording.      An electronic signature was used to authenticate this note.    --Renu Ceron DO     Electronically signed by Renu Ceron DO on 10/30/2024 at 12:31 PM

## 2024-11-05 ENCOUNTER — APPOINTMENT (OUTPATIENT)
Age: 64
End: 2024-11-05
Payer: MEDICARE

## 2024-11-11 ENCOUNTER — HOSPITAL ENCOUNTER (OUTPATIENT)
Age: 64
Setting detail: THERAPIES SERIES
Discharge: HOME | End: 2024-11-11
Payer: MEDICARE

## 2024-11-11 PROCEDURE — 97162 PT EVAL MOD COMPLEX 30 MIN: CPT

## 2024-11-11 NOTE — CONSULTS
Seated ham/calf stretches      Supine heel slides      SLR      bridges            Sidelying hip abd      clamshells            marching      Balance: feet together            Other:    Specific Instructions for next treatment: Monitor response to today's visit and home program compliance.  Advance program to tolerance.  Give fall prevention handout.        Evaluation Complexity:  History (Personal factors, comorbidities) [] 0 [x] 1-2 [] 3+   Exam (limitations, restrictions) [] 1-2 [x] 3 [] 4+   Clinical presentation (progression) [] Stable [x] Evolving  [] Unstable   Decision Making [] Low [x] Moderate [] High    [] Low Complexity [x] Moderate Complexity [] High Complexity        Treatment Charges: Mins Units Time In/Out   [x] Evaluation       []  Low       [x]  Moderate       []  High 43` 1 3:03 pm-3:45 pm   []  Modalities        []  Ther Exercise      []  Neuromuscular Re-ed      []  Gait Training      []  Manual Therapy      []  Ther Activities      []  Aquatics      []  Vasocompression      []  Cervical Traction      []  Other      Total Billable time 43 min 1         Time in:3:03 pm   Time Out:3:45 pm    Electronically signed by: Brandon Mcgrath PT        Physician Signature:________________________________Date:__________________  By signing above or cosigning this note, I have reviewed this plan of care and certify a need for medically necessary rehabilitation services.     *PLEASE SIGN ABOVE AND FAX BACK ALL PAGES*

## 2024-11-13 ENCOUNTER — HOSPITAL ENCOUNTER (OUTPATIENT)
Age: 64
Setting detail: THERAPIES SERIES
Discharge: HOME | End: 2024-11-13
Payer: MEDICARE

## 2024-11-13 PROCEDURE — 97110 THERAPEUTIC EXERCISES: CPT

## 2024-11-13 NOTE — FLOWSHEET NOTE
[] [facility]  Outpatient Rehabilitation & Therapy  [address] [place], [place] [address]    Physical Therapy Daily Treatment Note      Date:  2024  Patient Name:  Adrianne Campos    :  1960  MRN: [id number]   Renu Zuniga DO                                  Insurance: [facility] medicaid  Medical Diagnosis: generalized weakness; falls                 Rehab Codes: R26.2, R53.1, M25.661, m25.662  Onset date: 23             Next 's appt.: 1/10/25  Visit# / total visits:   Cancels/No Shows: 0/0  PN due at visit 10 per medicare guidelines  Subjective:    Pain:  [x] Yes  [] No Location: sherman knees  Pain Rating: (0-10 scale) 4/10  Pain altered Tx:  [x] No  [] Yes  Action:  Comments: Patient denies issues after last session. States knee pain is \"normal\" for her    Objective:   Walks into clinic with 4 wheeled walker and slow carol  Modalities:   Precautions:  [] No  [x] Yes: fall risk; Hx of CVA; biateral knee OA; diabetic   Exercises:  Nustep  x5'  L3     LAQ x15       Ham curls  x15       Seated ankle HR/TR x15       Ball squeeze                   Seated ham/calf stretches  15\" x3 sherman / each       Supine heel slides  x10 sherman       SLR  x10 sherman       bridges  x5                 Sidelying hip abd  x5 sherman       clamshells  x10 sherman                 marching  x15 sherman   SEATED     Balance: feet together                   Other: fall prevention handout issued     Specific Instructions for next treatment:Monitor response to today's visit and home program compliance.  Advance program to tolerance. Add standing marches and balance acts as appropriate       Assessment: [] Progressing toward goals.    [x] No change as today is first full session post initial evaluation. Generalized weakness in LE's causing safety concerns with ambulation and mobility although she did do pretty well with ex's today (however  challenged). She required assist with supine to and from sit

## 2024-11-18 ENCOUNTER — HOSPITAL ENCOUNTER (OUTPATIENT)
Age: 64
Setting detail: THERAPIES SERIES
Discharge: HOME | End: 2024-11-18
Payer: MEDICARE

## 2024-11-18 PROCEDURE — 97110 THERAPEUTIC EXERCISES: CPT

## 2024-11-18 NOTE — FLOWSHEET NOTE
[] [facility]  Outpatient Rehabilitation & Therapy  [address] [place], [place] [address]    Physical Therapy Daily Treatment Note      Date:  2024  Patient Name:  Adrianne Campos    :  1960  MRN: [id number]   Renu Zuniga DO                                  Insurance: [facility] medicaid  Medical Diagnosis: generalized weakness; falls                 Rehab Codes: R26.2, R53.1, M25.661, m25.662  Onset date: 23             Next 's appt.: 1/10/25  Visit# / total visits: 3/24  Cancels/No Shows: 0/0  PN due at visit 10 per medicare guidelines  Subjective:    Pain:  [x] Yes  [] No Location: sherman knees  Pain Rating: (0-10 scale) 3/10  Pain altered Tx:  [x] No  [] Yes  Action:  Comments: Pt reports pain 3/10 sherman knees, equally. Right thigh pain following exercises. Pt states right leg feels weaker than left. Pt states she is still having difficulty with steps and would like to work toward doing steps at home.     Objective:   Walks into clinic with 4 wheeled walker and slow carol  Modalities:   Precautions:  [] No  [x] Yes: fall risk; Hx of CVA; biateral knee OA; diabetic   Exercises:  Nustep  x5'  L3     LAQ x15       Ham curls  x15  lime green      Seated ankle HR/TR x20       Ball squeeze  5\" x15                 Seated ham/calf stretches  15\" x3 sherman / each       Supine heel slides  x15 sherman       SLR  x10 sherman       bridges  2 x5                 Sidelying hip abd  x5 sherman NP       clamshells  x10 sherman  yellow  Hook-lying             marching  x15 sherman     seated         Balance: feet together  3 x20\"  CGA  added     modified tandem 3 x20\" sherman  CGA-min A  added    Other: fall prevention handout issued     Specific Instructions for next treatment:Monitor response to today's visit and home program compliance.  Advance program to tolerance. Add standing marches and balance acts as appropriate.       Assessment: [x] Progressing toward goals. Pt able to complete

## 2024-11-21 ENCOUNTER — HOSPITAL ENCOUNTER (OUTPATIENT)
Age: 64
Setting detail: THERAPIES SERIES
Discharge: HOME | End: 2024-11-21
Payer: MEDICARE

## 2024-11-21 PROCEDURE — 97110 THERAPEUTIC EXERCISES: CPT

## 2024-11-21 NOTE — FLOWSHEET NOTE
[] [facility]  Outpatient Rehabilitation & Therapy  [address] [place], [place] [address]    Physical Therapy Daily Treatment Note      Date:  2024  Patient Name:  Adrianne Campos    :  1960  MRN: [id number]   Renu Zuniga DO                                  Insurance: [facility] medicaid  Medical Diagnosis: generalized weakness; falls                 Rehab Codes: R26.2, R53.1, M25.661, m25.662  Onset date: 23             Next 's appt.: 1/10/25  Visit# / total visits:   Cancels/No Shows: 0/0  PN due at visit 10 per medicare guidelines  Subjective:    Pain:  [x] Yes  [] No Location: sherman knees  Pain Rating: (0-10 scale) 4/10  Pain altered Tx:  [x] No  [] Yes  Action:  Comments: Pt reports pain 4/10 sherman knees, equally. Pt states she experiences some thigh pain with activity.     Objective:   Walks into clinic with 4 wheeled walker and slow carol  Modalities:   Precautions:  [] No  [x] Yes: fall risk; Hx of CVA; biateral knee OA; diabetic   Exercises:  Nustep  x5'  L3     LAQ x20       Ham curls  x20  lime green      Seated ankle HR/TR x20       Ball squeeze  5\" x15                 Seated ham/calf stretches  15\" x3 sherman / each       Supine heel slides  x15 sherman       SLR  x10 sherman       bridges  2 x5                 Sidelying hip abd  x5 sherman NP       clamshells  x10 sherman  yellow  Hook-lying             marching  x15 sherman     seated         Balance: feet together  4 x30\"  CGA  added     modified tandem 3 x30\" sherman  CGA-min A  added    Other: fall prevention handout issued     Specific Instructions for next treatment:Monitor response to today's visit and home program compliance.  Advance program to tolerance. Add standing marches and balance acts as appropriate.       Assessment: [x] Progressing toward goals.  Pt able to complete balance activities added this session CGA-min A with occasional UE support, cues for upright posture. Pt tolerated session

## 2024-11-25 ENCOUNTER — HOSPITAL ENCOUNTER (OUTPATIENT)
Age: 64
Setting detail: THERAPIES SERIES
Discharge: HOME | End: 2024-11-25
Payer: MEDICARE

## 2024-11-25 PROCEDURE — 97110 THERAPEUTIC EXERCISES: CPT

## 2024-11-25 NOTE — FLOWSHEET NOTE
[] [facility]  Outpatient Rehabilitation & Therapy  [address] [place], [place] [address]    Physical Therapy Daily Treatment Note      Date:  2024  Patient Name:  Adrianne Campos    :  1960  MRN: [id number]   Renu Zuniga DO                                  Insurance: [facility] medicaid 8 VISITS APPRVD (24-25)   Medical Diagnosis: generalized weakness; falls                 Rehab Codes: R26.2, R53.1, M25.661, m25.662  Onset date: 23             Next 's appt.: 1/10/25  Visit# / total visits: 5/8  Cancels/No Shows: 0/0  PN due at visit 10 per medicare guidelines  Subjective:    Pain:  [x] Yes  [] No Location: sherman knees  Pain Rating: (0-10 scale) 3/10  Pain altered Tx:  [x] No  [] Yes  Action:  Comments: Pt reports 3/10 pain at beginning of session. Pt states pain at worst since last session was about 4/10. Pt states she feels like she is getting stronger and is able to do more around her house.     Objective:   Walks into clinic with 4 wheeled walker and slow carol  Modalities:   Precautions:  [] No  [x] Yes: fall risk; Hx of CVA; biateral knee OA; diabetic   Exercises:  Nustep  x5'  L3     LAQ x20       Ham curls  x20  lime green      Seated ankle HR/TR x20       Ball squeeze  5\" x15                 Seated ham/calf stretches  15\" x3 sherman / each       Supine heel slides  x15 sherman       SLR  x10 sherman       bridges  15x                  Sidelying hip abd  x5 sherman NP       clamshells  x10 sherman  yellow  Hook-lying             marching  x15 sherman     standing   Calf stretch at SM  4 x15\"   Added                Balance: feet together  4 x30\"  CGA  added     modified tandem 3 x30\" sherman  CGA-min A  added    Other: fall prevention handout issued     Specific Instructions for next treatment:Monitor response to today's visit and home program compliance.  Advance program to tolerance. Add standing marches and balance acts as appropriate.

## 2024-11-27 ENCOUNTER — HOSPITAL ENCOUNTER (OUTPATIENT)
Age: 64
Setting detail: THERAPIES SERIES
Discharge: HOME | End: 2024-11-27
Payer: MEDICARE

## 2024-11-27 PROCEDURE — 97110 THERAPEUTIC EXERCISES: CPT

## 2024-11-27 NOTE — FLOWSHEET NOTE
[] [facility]  Outpatient Rehabilitation & Therapy  [address] [place], [place] [address]    Physical Therapy Daily Treatment Note      Date:  2024  Patient Name:  Adrianne Campos    :  1960  MRN: [id number]   Renu Zuniga DO                                  Insurance: [facility] medicaid 8 VISITS APPRVD (24-25)   Medical Diagnosis: generalized weakness; falls                 Rehab Codes: R26.2, R53.1, M25.661, m25.662  Onset date: 23             Next 's appt.: 1/10/25  Visit# / total visits: 6/8  Cancels/No Shows: 0/0  PN due at visit 10 per medicare guidelines  Subjective:    Pain:  [x] Yes  [] No Location: sherman knees  Pain Rating: (0-10 scale) 3/10  Pain altered Tx:  [x] No  [] Yes  Action:  Comments: Patient reports sherman knee pain is unchanged, but she does continue to report she is progressively feeling stronger.   Objective:   Walks into clinic with 4 wheeled walker and good carol  Modalities:   Precautions:  [] No  [x] Yes: fall risk; Hx of CVA; biateral knee OA; diabetic   Exercises:  Nustep  x5'  L3     LAQ X20 sherman  2#     Ham curls  x20  lime green      Seated ankle HR/TR x20       Ball squeeze  5\" x15                 Seated ham/calf stretches  15\" x3 sherman / each       Supine heel slides  x15 sherman       SLR  x10 sherman       bridges  15x                  Sidelying hip abd  x5 sherman NP       clamshells  x15 sherman  orange Hook-lying             marching  x15 sherman     standing   Calf stretch at SM  4 x15\"   Added          Gait with cane ~75' Carole    Balance: feet together  3x30\" Carole No UE support    modified tandem 1x30\" sherman Carole No UE support   Other: fall prevention handout issued     Specific Instructions for next treatment:Monitor response to today's visit and home program compliance.  Advance program to tolerance.Continue working on gait with st cane if patient feels good  Assessment: [x] Progressing toward goals. Practiced walking a bit

## 2024-12-02 ENCOUNTER — OFFICE VISIT (OUTPATIENT)
Dept: PRIMARY CARE CLINIC | Age: 64
End: 2024-12-02
Payer: MEDICARE

## 2024-12-02 ENCOUNTER — HOSPITAL ENCOUNTER (OUTPATIENT)
Age: 64
Setting detail: THERAPIES SERIES
Discharge: HOME | End: 2024-12-02

## 2024-12-02 VITALS
OXYGEN SATURATION: 99 % | WEIGHT: 221 LBS | HEIGHT: 62 IN | BODY MASS INDEX: 40.67 KG/M2 | HEART RATE: 85 BPM | DIASTOLIC BLOOD PRESSURE: 84 MMHG | SYSTOLIC BLOOD PRESSURE: 116 MMHG

## 2024-12-02 DIAGNOSIS — L71.0 PERIORAL DERMATITIS: Primary | ICD-10-CM

## 2024-12-02 PROCEDURE — 99213 OFFICE O/P EST LOW 20 MIN: CPT | Performed by: FAMILY MEDICINE

## 2024-12-02 PROCEDURE — 1123F ACP DISCUSS/DSCN MKR DOCD: CPT | Performed by: FAMILY MEDICINE

## 2024-12-02 PROCEDURE — 1159F MED LIST DOCD IN RCRD: CPT | Performed by: FAMILY MEDICINE

## 2024-12-02 RX ORDER — VALBENAZINE 40 MG/1
CAPSULE ORAL
COMMUNITY
Start: 2024-10-29

## 2024-12-02 RX ORDER — ERYTHROMYCIN 20 MG/G
GEL TOPICAL
Qty: 30 G | Refills: 1 | Status: SHIPPED | OUTPATIENT
Start: 2024-12-02

## 2024-12-02 NOTE — FLOWSHEET NOTE
[x] [facility]  Outpatient Rehabilitation & Therapy  [address] [facility]   [place], [place] [address]       Physical Therapy Cancel/No Show note    Date: 2024  Patient: Adrianne Campos  : 1960  MRN: [id number]    Visit Count: 6  Cancels/No Shows to date:     For today's appointment patient:    [x]  Cancelled    [] Rescheduled appointment    [] No-show     Reason given by patient:    []  Patient ill    []  Conflicting appointment    [x] No transportation      [] Conflict with work    [] No reason given    [] Weather related    [] COVID-19    [] Other:      Comments:        [x] Next appointment was confirmed    Electronically signed by: Brandon Mcgrath PT

## 2024-12-02 NOTE — PROGRESS NOTES
[facility]  [address] [place], [place] [address], [place] [address]  Phone: [phone]       Name: Adrianne Campos  : 1960     Chief Complaint:    Adrianne Campos is a 64 y.o. year old female who presents today for   Chief Complaint   Patient presents with    Rash     X 1 week on the face around the mouth. She has used neosporin on it     Other     CHRIS used last visit        History of Present Illness:      Subjective   History of Present Illness  The patient presents for evaluation of rash of face.    She reports the presence of a non-painful rash which started above her lip, which has been causing discomfort due to its persistent nature. She has been applying Neosporin to the affected area since last month, which has resulted in some improvement as the rash was previously more pronounced and dry.    She brought her glucose and BP logs to review today.       She also reports instances of memory loss, particularly during conversations where she struggles to recall the words she intends to use. These episodes occur sporadically. She had MMSE done as part of her AWV just 1-2 months ago.       Medications:    Outpatient Medications Prior to Visit   Medication Sig Dispense Refill    INGREZZA 40 MG CAPS       oxyBUTYnin (DITROPAN XL) 5 MG extended release tablet Take 1 tablet by mouth daily 30 tablet 3    linaclotide (LINZESS) 145 MCG capsule Take 1 capsule by mouth every morning (before breakfast) 90 capsule 0    Semaglutide (RYBELSUS) 7 MG TABS Take 1 tablet by mouth daily 30 tablet 2    atorvastatin (LIPITOR) 40 MG tablet Take 1 tablet by mouth daily 90 tablet 1    RESTASIS 0.05 % ophthalmic emulsion Place 1 drop into both eyes in the morning and at bedtime      risperiDONE (RISPERDAL) 2 MG tablet       allopurinol (ZYLOPRIM) 100 MG tablet Take 1 tablet by mouth 2 times daily 180 tablet 3    apixaban (ELIQUIS) 5 MG TABS tablet Take 1 tablet by mouth 2 times daily 180 tablet 3    dapagliflozin (FARXIGA) 10 MG

## 2024-12-17 NOTE — DISCHARGE SUMMARY
Epic.    Assessment:  Refer to most recent note in Epic.    Treatment to Date:  Refer to most recent note in Epic.     Discharge Status:     [] Pt to continue exercise/home instructions independently.    [] Therapy interrupted due to:    [] Pt has 2 or more no shows/cancels, is discontinued per our policy.    [x] Unknown discharge/failed to continue treatment.    [] Other:         Electronically signed by Arlene Baker PT on 12/17/2024 at 2:10 PM      If you have any questions or concerns, please don't hesitate to call.  Thank you for your referral.

## 2024-12-30 DIAGNOSIS — E78.5 HYPERLIPIDEMIA, UNSPECIFIED HYPERLIPIDEMIA TYPE: ICD-10-CM

## 2024-12-30 RX ORDER — ATORVASTATIN CALCIUM 40 MG/1
40 TABLET, FILM COATED ORAL DAILY
Qty: 90 TABLET | Refills: 1 | Status: SHIPPED | OUTPATIENT
Start: 2024-12-30 | End: 2025-06-28

## 2025-01-03 RX ORDER — IPRATROPIUM BROMIDE AND ALBUTEROL SULFATE 2.5; .5 MG/3ML; MG/3ML
1 SOLUTION RESPIRATORY (INHALATION) EVERY 8 HOURS PRN
Qty: 180 EACH | Refills: 0 | Status: SHIPPED | OUTPATIENT
Start: 2025-01-03

## 2025-01-06 DIAGNOSIS — E11.22 CONTROLLED TYPE 2 DIABETES MELLITUS WITH STAGE 3 CHRONIC KIDNEY DISEASE, WITHOUT LONG-TERM CURRENT USE OF INSULIN (HCC): Chronic | ICD-10-CM

## 2025-01-06 DIAGNOSIS — N18.30 CONTROLLED TYPE 2 DIABETES MELLITUS WITH STAGE 3 CHRONIC KIDNEY DISEASE, WITHOUT LONG-TERM CURRENT USE OF INSULIN (HCC): Chronic | ICD-10-CM

## 2025-01-06 DIAGNOSIS — K59.01 SLOW TRANSIT CONSTIPATION: ICD-10-CM

## 2025-01-06 RX ORDER — BLOOD SUGAR DIAGNOSTIC
STRIP MISCELLANEOUS
Qty: 100 EACH | Refills: 5 | Status: SHIPPED | OUTPATIENT
Start: 2025-01-06 | End: 2025-01-10 | Stop reason: SDUPTHER

## 2025-01-06 NOTE — TELEPHONE ENCOUNTER
Adrianne Campos is requesting a refill on the following medication(s):  Requested Prescriptions     Pending Prescriptions Disp Refills    linaclotide (LINZESS) 145 MCG capsule 90 capsule 0     Sig: Take 1 capsule by mouth every morning (before breakfast)    blood glucose test strips (ONETOUCH ULTRA) strip 100 each 5     Sig: TEST THREE TIMES A DAY       Last Visit Date (If Applicable):  12/2/2024    Next Visit Date:    1/10/2025

## 2025-01-07 ASSESSMENT — PATIENT HEALTH QUESTIONNAIRE - PHQ9
SUM OF ALL RESPONSES TO PHQ QUESTIONS 1-9: 17
3. TROUBLE FALLING OR STAYING ASLEEP: MORE THAN HALF THE DAYS
5. POOR APPETITE OR OVEREATING: MORE THAN HALF THE DAYS
4. FEELING TIRED OR HAVING LITTLE ENERGY: MORE THAN HALF THE DAYS
7. TROUBLE CONCENTRATING ON THINGS, SUCH AS READING THE NEWSPAPER OR WATCHING TELEVISION: MORE THAN HALF THE DAYS
1. LITTLE INTEREST OR PLEASURE IN DOING THINGS: NEARLY EVERY DAY
8. MOVING OR SPEAKING SO SLOWLY THAT OTHER PEOPLE COULD HAVE NOTICED. OR THE OPPOSITE, BEING SO FIGETY OR RESTLESS THAT YOU HAVE BEEN MOVING AROUND A LOT MORE THAN USUAL: SEVERAL DAYS
9. THOUGHTS THAT YOU WOULD BE BETTER OFF DEAD, OR OF HURTING YOURSELF: NOT AT ALL
SUM OF ALL RESPONSES TO PHQ QUESTIONS 1-9: 17
SUM OF ALL RESPONSES TO PHQ QUESTIONS 1-9: 17
6. FEELING BAD ABOUT YOURSELF - OR THAT YOU ARE A FAILURE OR HAVE LET YOURSELF OR YOUR FAMILY DOWN: MORE THAN HALF THE DAYS
6. FEELING BAD ABOUT YOURSELF - OR THAT YOU ARE A FAILURE OR HAVE LET YOURSELF OR YOUR FAMILY DOWN: MORE THAN HALF THE DAYS
3. TROUBLE FALLING OR STAYING ASLEEP: MORE THAN HALF THE DAYS
4. FEELING TIRED OR HAVING LITTLE ENERGY: MORE THAN HALF THE DAYS
7. TROUBLE CONCENTRATING ON THINGS, SUCH AS READING THE NEWSPAPER OR WATCHING TELEVISION: MORE THAN HALF THE DAYS
2. FEELING DOWN, DEPRESSED OR HOPELESS: NEARLY EVERY DAY
SUM OF ALL RESPONSES TO PHQ QUESTIONS 1-9: 17
5. POOR APPETITE OR OVEREATING: MORE THAN HALF THE DAYS
1. LITTLE INTEREST OR PLEASURE IN DOING THINGS: NEARLY EVERY DAY
9. THOUGHTS THAT YOU WOULD BE BETTER OFF DEAD, OR OF HURTING YOURSELF: NOT AT ALL
2. FEELING DOWN, DEPRESSED OR HOPELESS: NEARLY EVERY DAY
SUM OF ALL RESPONSES TO PHQ QUESTIONS 1-9: 17
8. MOVING OR SPEAKING SO SLOWLY THAT OTHER PEOPLE COULD HAVE NOTICED. OR THE OPPOSITE - BEING SO FIDGETY OR RESTLESS THAT YOU HAVE BEEN MOVING AROUND A LOT MORE THAN USUAL: SEVERAL DAYS
SUM OF ALL RESPONSES TO PHQ9 QUESTIONS 1 & 2: 6
10. IF YOU CHECKED OFF ANY PROBLEMS, HOW DIFFICULT HAVE THESE PROBLEMS MADE IT FOR YOU TO DO YOUR WORK, TAKE CARE OF THINGS AT HOME, OR GET ALONG WITH OTHER PEOPLE: SOMEWHAT DIFFICULT
10. IF YOU CHECKED OFF ANY PROBLEMS, HOW DIFFICULT HAVE THESE PROBLEMS MADE IT FOR YOU TO DO YOUR WORK, TAKE CARE OF THINGS AT HOME, OR GET ALONG WITH OTHER PEOPLE: SOMEWHAT DIFFICULT

## 2025-01-10 ENCOUNTER — OFFICE VISIT (OUTPATIENT)
Dept: PRIMARY CARE CLINIC | Age: 65
End: 2025-01-10
Payer: MEDICARE

## 2025-01-10 ENCOUNTER — TELEPHONE (OUTPATIENT)
Dept: PRIMARY CARE CLINIC | Age: 65
End: 2025-01-10

## 2025-01-10 VITALS
OXYGEN SATURATION: 99 % | WEIGHT: 214 LBS | HEART RATE: 107 BPM | DIASTOLIC BLOOD PRESSURE: 74 MMHG | BODY MASS INDEX: 39.38 KG/M2 | HEIGHT: 62 IN | SYSTOLIC BLOOD PRESSURE: 126 MMHG

## 2025-01-10 DIAGNOSIS — J44.1 COPD EXACERBATION (HCC): ICD-10-CM

## 2025-01-10 DIAGNOSIS — E11.22 CONTROLLED TYPE 2 DIABETES MELLITUS WITH STAGE 3 CHRONIC KIDNEY DISEASE, WITHOUT LONG-TERM CURRENT USE OF INSULIN (HCC): Chronic | ICD-10-CM

## 2025-01-10 DIAGNOSIS — N18.31 TYPE 2 DIABETES MELLITUS WITH STAGE 3A CHRONIC KIDNEY DISEASE, WITHOUT LONG-TERM CURRENT USE OF INSULIN (HCC): Primary | ICD-10-CM

## 2025-01-10 DIAGNOSIS — F33.3 MAJOR DEPRESSIVE DISORDER, RECURRENT EPISODE, SEVERE, WITH PSYCHOTIC BEHAVIOR (HCC): ICD-10-CM

## 2025-01-10 DIAGNOSIS — N18.30 CONTROLLED TYPE 2 DIABETES MELLITUS WITH STAGE 3 CHRONIC KIDNEY DISEASE, WITHOUT LONG-TERM CURRENT USE OF INSULIN (HCC): Chronic | ICD-10-CM

## 2025-01-10 DIAGNOSIS — E11.22 TYPE 2 DIABETES MELLITUS WITH STAGE 3A CHRONIC KIDNEY DISEASE, WITHOUT LONG-TERM CURRENT USE OF INSULIN (HCC): Primary | ICD-10-CM

## 2025-01-10 PROBLEM — D18.02 FAMILIAL CAVERNOUS CEREBRAL ANGIOMA (HCC): Status: RESOLVED | Noted: 2022-03-01 | Resolved: 2025-01-10

## 2025-01-10 LAB — HBA1C MFR BLD: 6.7 %

## 2025-01-10 PROCEDURE — 3044F HG A1C LEVEL LT 7.0%: CPT | Performed by: FAMILY MEDICINE

## 2025-01-10 PROCEDURE — 99214 OFFICE O/P EST MOD 30 MIN: CPT | Performed by: FAMILY MEDICINE

## 2025-01-10 PROCEDURE — 1123F ACP DISCUSS/DSCN MKR DOCD: CPT | Performed by: FAMILY MEDICINE

## 2025-01-10 PROCEDURE — 83036 HEMOGLOBIN GLYCOSYLATED A1C: CPT | Performed by: FAMILY MEDICINE

## 2025-01-10 PROCEDURE — 1159F MED LIST DOCD IN RCRD: CPT | Performed by: FAMILY MEDICINE

## 2025-01-10 RX ORDER — DOXYCYCLINE HYCLATE 100 MG
100 TABLET ORAL 2 TIMES DAILY
Qty: 20 TABLET | Refills: 0 | Status: SHIPPED | OUTPATIENT
Start: 2025-01-10 | End: 2025-01-10 | Stop reason: SDUPTHER

## 2025-01-10 RX ORDER — PREDNISONE 20 MG/1
20 TABLET ORAL DAILY
Qty: 5 TABLET | Refills: 0 | Status: SHIPPED | OUTPATIENT
Start: 2025-01-10 | End: 2025-01-10 | Stop reason: SDUPTHER

## 2025-01-10 RX ORDER — BLOOD SUGAR DIAGNOSTIC
STRIP MISCELLANEOUS
Qty: 100 EACH | Refills: 5 | Status: SHIPPED | OUTPATIENT
Start: 2025-01-10

## 2025-01-10 RX ORDER — DOXYCYCLINE HYCLATE 100 MG
100 TABLET ORAL 2 TIMES DAILY
Qty: 20 TABLET | Refills: 0 | Status: SHIPPED | OUTPATIENT
Start: 2025-01-10 | End: 2025-01-20

## 2025-01-10 RX ORDER — PREDNISONE 20 MG/1
20 TABLET ORAL DAILY
Qty: 5 TABLET | Refills: 0 | Status: SHIPPED | OUTPATIENT
Start: 2025-01-10 | End: 2025-01-15

## 2025-01-10 SDOH — ECONOMIC STABILITY: FOOD INSECURITY: WITHIN THE PAST 12 MONTHS, YOU WORRIED THAT YOUR FOOD WOULD RUN OUT BEFORE YOU GOT MONEY TO BUY MORE.: NEVER TRUE

## 2025-01-10 SDOH — ECONOMIC STABILITY: FOOD INSECURITY: WITHIN THE PAST 12 MONTHS, THE FOOD YOU BOUGHT JUST DIDN'T LAST AND YOU DIDN'T HAVE MONEY TO GET MORE.: NEVER TRUE

## 2025-01-10 NOTE — PROGRESS NOTES
Cardiovascular:      Rate and Rhythm: Normal rate and regular rhythm.      Heart sounds: Normal heart sounds.   Pulmonary:      Effort: No respiratory distress.      Breath sounds: Wheezing and rhonchi present.   Neurological:      General: No focal deficit present.      Mental Status: She is alert.   Psychiatric:         Mood and Affect: Mood normal.         Behavior: Behavior normal.         Assessment:      Diagnosis Orders   1. Type 2 diabetes mellitus with stage 3a chronic kidney disease, without long-term current use of insulin (HCC)  POCT glycosylated hemoglobin (Hb A1C)      2. COPD exacerbation (HCC)  XR CHEST (2 VW)    doxycycline hyclate (VIBRA-TABS) 100 MG tablet    predniSONE (DELTASONE) 20 MG tablet      3. Major depressive disorder, recurrent episode, severe, with psychotic behavior (HCC)                  Plan:        Assessment & Plan  1. Type 2 Diabetes Mellitus.  Her A1c level has shown a slight increase from 6.3% in October to 6.7% currently, but it remains within the well-controlled range. She is advised to continue her current medication regimen without any changes. She is currently on Rybelsus 7 mg and Farxiga 10 mg, which have been effective in maintaining her blood sugar levels. No new medications are needed at this time.    2. Chronic Obstructive Pulmonary Disease (COPD). Acute on chronic  She reports feeling weak and has experienced weight loss, likely due to decreased appetite. Her heart rate is elevated, likely due to her current illness. A chest x-ray will be ordered to further evaluate her condition. She will be started on a course of steroids and antibiotics. If the chest x-ray results indicate a different diagnosis, the treatment plan will be adjusted accordingly.    3. Depression - she reports this condition is stable. It is managed by psychiatry.            MDM: Moderate MDM - 2 or more chronic conditions with prescription medication management as described above.        Return in

## 2025-01-10 NOTE — TELEPHONE ENCOUNTER
Her scripts today went to [facility] and she was wanting to know if they could be sent to [facility] instead.

## 2025-01-27 ENCOUNTER — OFFICE VISIT (OUTPATIENT)
Dept: PODIATRY | Age: 65
End: 2025-01-27
Payer: MEDICARE

## 2025-01-27 VITALS — WEIGHT: 214 LBS | HEIGHT: 62 IN | BODY MASS INDEX: 39.38 KG/M2

## 2025-01-27 DIAGNOSIS — I73.9 INTERMITTENT CLAUDICATION (HCC): ICD-10-CM

## 2025-01-27 DIAGNOSIS — M79.671 PAIN IN BOTH FEET: ICD-10-CM

## 2025-01-27 DIAGNOSIS — E11.51 TYPE II DIABETES MELLITUS WITH PERIPHERAL CIRCULATORY DISORDER (HCC): Primary | ICD-10-CM

## 2025-01-27 DIAGNOSIS — B35.1 DERMATOPHYTOSIS OF NAIL: ICD-10-CM

## 2025-01-27 DIAGNOSIS — M79.672 PAIN IN BOTH FEET: ICD-10-CM

## 2025-01-27 DIAGNOSIS — I73.9 PVD (PERIPHERAL VASCULAR DISEASE) (HCC): ICD-10-CM

## 2025-01-27 PROCEDURE — 11721 DEBRIDE NAIL 6 OR MORE: CPT | Performed by: PODIATRIST

## 2025-01-27 PROCEDURE — 1126F AMNT PAIN NOTED NONE PRSNT: CPT | Performed by: PODIATRIST

## 2025-01-27 PROCEDURE — 99213 OFFICE O/P EST LOW 20 MIN: CPT | Performed by: PODIATRIST

## 2025-01-27 PROCEDURE — 1123F ACP DISCUSS/DSCN MKR DOCD: CPT | Performed by: PODIATRIST

## 2025-01-27 PROCEDURE — 1159F MED LIST DOCD IN RCRD: CPT | Performed by: PODIATRIST

## 2025-01-27 PROCEDURE — 3044F HG A1C LEVEL LT 7.0%: CPT | Performed by: PODIATRIST

## 2025-01-27 NOTE — PROGRESS NOTES
an X-ray and can show healthcare providers the circulation in that area. They can also often see decreases in circulation that happen with intermittent claudication.  Other imaging tests  In some cases, healthcare providers may try other imaging tests that can help with determining the underlying cause of intermittent claudication. These include:  Magnetic resonance imaging (MRI).  Computed tomography (CT) scan.  Vascular ultrasound.  How is this symptom treated?  Claudication treatment can take various forms, all of which focus on improving the circulation in the areas where you feel pain. Unfortunately, claudication also increases your risk for serious heart and circulatory problems, which is why treatment usually involves preventive measures to avoid those complications.  Possible treatments include the ones seen below.  Medications  Drugs can often play a major role in treating intermittent claudication. These drugs typically help improve circulation or prevent serious problems that are more likely because of claudication. These usually include:  Blood-thinning medications. These drugs reduce your blood’s ability to clot. This is important because it helps prevent life-threatening complications like stroke, pulmonary embolism or clots that can develop and block the arteries in your legs.  Statins. These medications lower your blood cholesterol levels. Higher cholesterol levels can cause plaque to build up more quickly. Lowering those levels helps prevent that buildup and any related complications like heart attacks.  Blood pressure medications. These medications typically make your blood vessels relax and widen, improving blood flow. Improving blood flow can ease or stop claudication-related pain entirely. These medications also help prevent problems like heart attack and stroke.  Diabetes-controlling medications. Diabetes has strong connections to blood pressure, cholesterol levels and your risk of developing

## 2025-02-04 RX ORDER — OXYBUTYNIN CHLORIDE 5 MG/1
5 TABLET, EXTENDED RELEASE ORAL DAILY
Qty: 30 TABLET | Refills: 3 | Status: SHIPPED | OUTPATIENT
Start: 2025-02-04

## 2025-02-04 NOTE — TELEPHONE ENCOUNTER
Adrianne Campos is requesting a refill on the following medication(s):  Requested Prescriptions     Pending Prescriptions Disp Refills    oxyBUTYnin (DITROPAN XL) 5 MG extended release tablet 30 tablet 3     Sig: Take 1 tablet by mouth daily       Last Visit Date (If Applicable):  1/10/2025    Next Visit Date:    4/11/2025

## 2025-02-10 ENCOUNTER — TELEPHONE (OUTPATIENT)
Dept: PRIMARY CARE CLINIC | Age: 65
End: 2025-02-10

## 2025-02-10 NOTE — TELEPHONE ENCOUNTER
Patient would like an order for Urine Specimen for possible UTI. States she is having urinary frequency and pain with urination.

## 2025-02-10 NOTE — TELEPHONE ENCOUNTER
I would prefer she is seen - (can offer walk-in near her - [place] or [place]) so that UA can be done and addressed all at once. If we do a UA today, there's a chance I won't get it back until Wednesday.     However, If she is unable/unwilling to do walkin, then I will order a lab and she can go to a [facility] lab close to her.

## 2025-02-12 ENCOUNTER — OFFICE VISIT (OUTPATIENT)
Dept: PRIMARY CARE CLINIC | Age: 65
End: 2025-02-12
Payer: MEDICARE

## 2025-02-12 VITALS
BODY MASS INDEX: 39.9 KG/M2 | DIASTOLIC BLOOD PRESSURE: 100 MMHG | OXYGEN SATURATION: 99 % | SYSTOLIC BLOOD PRESSURE: 142 MMHG | WEIGHT: 216.8 LBS | HEIGHT: 62 IN | HEART RATE: 97 BPM

## 2025-02-12 DIAGNOSIS — R35.0 URINE FREQUENCY: Primary | ICD-10-CM

## 2025-02-12 LAB
BILIRUBIN, POC: NORMAL
BLOOD URINE, POC: NORMAL
CLARITY, POC: NORMAL
COLOR, POC: YELLOW
GLUCOSE URINE, POC: NORMAL MG/DL
KETONES, POC: NORMAL MG/DL
LEUKOCYTE EST, POC: NORMAL
NITRITE, POC: NORMAL
PH, POC: 6
PROTEIN, POC: NORMAL MG/DL
SPECIFIC GRAVITY, POC: 1.01
UROBILINOGEN, POC: 0.2 MG/DL

## 2025-02-12 PROCEDURE — 99213 OFFICE O/P EST LOW 20 MIN: CPT | Performed by: NURSE PRACTITIONER

## 2025-02-12 PROCEDURE — 1159F MED LIST DOCD IN RCRD: CPT | Performed by: NURSE PRACTITIONER

## 2025-02-12 PROCEDURE — 1123F ACP DISCUSS/DSCN MKR DOCD: CPT | Performed by: NURSE PRACTITIONER

## 2025-02-12 PROCEDURE — 81002 URINALYSIS NONAUTO W/O SCOPE: CPT | Performed by: NURSE PRACTITIONER

## 2025-02-12 PROCEDURE — 1160F RVW MEDS BY RX/DR IN RCRD: CPT | Performed by: NURSE PRACTITIONER

## 2025-02-12 RX ORDER — OXYBUTYNIN CHLORIDE 10 MG/1
10 TABLET, EXTENDED RELEASE ORAL DAILY
Qty: 30 TABLET | Refills: 0 | Status: SHIPPED | OUTPATIENT
Start: 2025-02-12

## 2025-02-12 RX ORDER — RISPERIDONE 1 MG/1
TABLET ORAL
COMMUNITY
Start: 2025-01-29

## 2025-02-12 ASSESSMENT — ENCOUNTER SYMPTOMS
DIARRHEA: 0
CONSTIPATION: 1
ABDOMINAL PAIN: 0
NAUSEA: 0
BLOOD IN STOOL: 0
ABDOMINAL DISTENTION: 0

## 2025-02-12 NOTE — PROGRESS NOTES
[facility] PHYSICIANS [facility] WALK-IN  [address],  [address]  [facility] [address]  Dept: [phone]    Adrianne Campos is a 64 y.o. female Established patient, who presents to the walk-in clinic today with conditions/complaints as noted below:    Chief Complaint   Patient presents with    Urinary Tract Infection     Sx started about Thursday she states. Urinary frequency.         HPI:     BRIAN Robert presents to the office today with concerns of frequent urination. Started on Thursday. Waking up 3 times a night. Voiding a lot. Denies stress incontinence. On Ditropan for the last several months with relief in symptoms. Was constipated the last week. Finally had a BM  yesterday, but no change in urinary symptoms,   Drinks 40 ounces of water. Diet pop 2 cans a day. Urine is yellow.  Sugars have been good did have some increase with Prednisone last week for viral illness.  Does not feel like she empty's her bladder when she voids.               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 Doesn't use everynight    SOB (shortness of breath) 05/07/2020    Type 2 diabetes mellitus without complication (formerly [facility])     Type II or unspecified type diabetes mellitus without

## 2025-02-20 ENCOUNTER — HOSPITAL ENCOUNTER (OUTPATIENT)
Dept: VASCULAR LAB | Age: 65
Discharge: HOME | End: 2025-02-22
Attending: PODIATRIST
Payer: MEDICARE

## 2025-02-20 ENCOUNTER — HOSPITAL ENCOUNTER (OUTPATIENT)
Dept: INFUSION THERAPY | Age: 65
Discharge: HOME | End: 2025-02-20
Payer: MEDICARE

## 2025-02-20 DIAGNOSIS — I73.9 INTERMITTENT CLAUDICATION: ICD-10-CM

## 2025-02-20 DIAGNOSIS — M81.0 AGE-RELATED OSTEOPOROSIS WITHOUT CURRENT PATHOLOGICAL FRACTURE: Primary | ICD-10-CM

## 2025-02-20 PROCEDURE — 96372 THER/PROPH/DIAG INJ SC/IM: CPT

## 2025-02-20 PROCEDURE — 93923 UPR/LXTR ART STDY 3+ LVLS: CPT

## 2025-02-20 PROCEDURE — 6360000002 HC RX W HCPCS: Performed by: FAMILY MEDICINE

## 2025-02-20 RX ORDER — EPINEPHRINE 1 MG/ML
0.3 INJECTION, SOLUTION, CONCENTRATE INTRAVENOUS PRN
OUTPATIENT
Start: 2025-06-01

## 2025-02-20 RX ORDER — ONDANSETRON 2 MG/ML
8 INJECTION INTRAMUSCULAR; INTRAVENOUS
OUTPATIENT
Start: 2025-06-01

## 2025-02-20 RX ORDER — FAMOTIDINE 10 MG/ML
20 INJECTION, SOLUTION INTRAVENOUS
OUTPATIENT
Start: 2025-06-01

## 2025-02-20 RX ORDER — ACETAMINOPHEN 325 MG/1
650 TABLET ORAL
OUTPATIENT
Start: 2025-06-01

## 2025-02-20 RX ORDER — HYDROCORTISONE SODIUM SUCCINATE 100 MG/2ML
100 INJECTION INTRAMUSCULAR; INTRAVENOUS
OUTPATIENT
Start: 2025-06-01

## 2025-02-20 RX ORDER — DIPHENHYDRAMINE HYDROCHLORIDE 50 MG/ML
50 INJECTION INTRAMUSCULAR; INTRAVENOUS
OUTPATIENT
Start: 2025-06-01

## 2025-02-20 RX ORDER — SODIUM CHLORIDE 9 MG/ML
INJECTION, SOLUTION INTRAVENOUS CONTINUOUS
OUTPATIENT
Start: 2025-06-01

## 2025-02-20 RX ORDER — ALBUTEROL SULFATE 90 UG/1
4 INHALANT RESPIRATORY (INHALATION) PRN
OUTPATIENT
Start: 2025-06-01

## 2025-02-20 RX ADMIN — DENOSUMAB 60 MG: 60 INJECTION SUBCUTANEOUS at 11:17

## 2025-02-20 NOTE — PROGRESS NOTES
Pt here for prolia.  Arrives ambulatory.  Denies any new complaints.  Tx complete without incident.  Pt d/c'd in stable condition.  Will schedule next injection with new order.

## 2025-02-21 LAB
VAS LEFT ABI: 1.26
VAS LEFT ARM BP: 192 MMHG
VAS LEFT CALF PRESSURE: 230 MMHG
VAS LEFT DORSALIS PEDIS BP: 224 MMHG
VAS LEFT PTA BP: 242 MMHG
VAS LEFT TBI: 0.88
VAS LEFT TOE PRESSURE: 168 MMHG
VAS RIGHT ABI: 1.2
VAS RIGHT ARM BP: 181 MMHG
VAS RIGHT DORSALIS PEDIS BP: 217 MMHG
VAS RIGHT PTA BP: 231 MMHG
VAS RIGHT TBI: 0.81
VAS RIGHT TOE PRESSURE: 155 MMHG

## 2025-03-06 ENCOUNTER — OFFICE VISIT (OUTPATIENT)
Dept: NEUROLOGY | Age: 65
End: 2025-03-06
Payer: MEDICARE

## 2025-03-06 VITALS
WEIGHT: 216.4 LBS | HEART RATE: 80 BPM | BODY MASS INDEX: 39.82 KG/M2 | DIASTOLIC BLOOD PRESSURE: 90 MMHG | SYSTOLIC BLOOD PRESSURE: 160 MMHG | HEIGHT: 62 IN

## 2025-03-06 DIAGNOSIS — G89.29 CHRONIC MIDLINE LOW BACK PAIN WITH SCIATICA, SCIATICA LATERALITY UNSPECIFIED: ICD-10-CM

## 2025-03-06 DIAGNOSIS — G24.01 TARDIVE DYSKINESIA: Primary | ICD-10-CM

## 2025-03-06 DIAGNOSIS — G62.9 SENSORY NEUROPATHY: ICD-10-CM

## 2025-03-06 DIAGNOSIS — M54.40 CHRONIC MIDLINE LOW BACK PAIN WITH SCIATICA, SCIATICA LATERALITY UNSPECIFIED: ICD-10-CM

## 2025-03-06 DIAGNOSIS — R26.81 GAIT INSTABILITY: ICD-10-CM

## 2025-03-06 DIAGNOSIS — G21.9 SECONDARY PARKINSONISM, UNSPECIFIED SECONDARY PARKINSONISM TYPE (HCC): ICD-10-CM

## 2025-03-06 DIAGNOSIS — G25.2 POSTURAL TREMOR: ICD-10-CM

## 2025-03-06 PROCEDURE — 1123F ACP DISCUSS/DSCN MKR DOCD: CPT | Performed by: PSYCHIATRY & NEUROLOGY

## 2025-03-06 PROCEDURE — 1159F MED LIST DOCD IN RCRD: CPT | Performed by: PSYCHIATRY & NEUROLOGY

## 2025-03-06 PROCEDURE — 99214 OFFICE O/P EST MOD 30 MIN: CPT | Performed by: PSYCHIATRY & NEUROLOGY

## 2025-03-06 RX ORDER — BENZTROPINE MESYLATE 0.5 MG/1
TABLET ORAL
Qty: 180 TABLET | Refills: 1 | Status: SHIPPED | OUTPATIENT
Start: 2025-03-06

## 2025-03-06 ASSESSMENT — ENCOUNTER SYMPTOMS
ALLERGIC/IMMUNOLOGIC NEGATIVE: 1
GASTROINTESTINAL NEGATIVE: 1
RESPIRATORY NEGATIVE: 1
EYES NEGATIVE: 1

## 2025-03-06 NOTE — PROGRESS NOTES
and visual fields  Cranial nerve 3, 4 and 6 .Extraocular muscles are intact . Pupils are equal and reactive   Cranial nerve 5 . Intact corneal reflex. Normal facial sensation  Cranial nerve 7 normal exam   Cranial nerve 8. Grossly intact hearing   Cranial nerve 9 and 10. Symmetric palate elevation   Cranial nerve 11 , 5 out of 5 strength   Cranial Nerve 12 midline tongue . No atrophy  Sensation .Decrease pinprick and light touch at ankle level   Deep Tendon Reflexes hypoactoce with bassent   Plantar response flexor bilaterally      ASSESSMENT/PLAN      Diagnosis Orders   1. Tardive dyskinesia        2. Gait instability        3. Chronic midline low back pain with sciatica, sciatica laterality unspecified        4. Postural tremor        5. Secondary parkinsonism, unspecified secondary Parkinsonism type (HCC)        6. Sensory neuropathy          She is to resume congentin 0.5 mg po bid     As above         oral

## 2025-03-12 ENCOUNTER — TELEPHONE (OUTPATIENT)
Dept: PRIMARY CARE CLINIC | Age: 65
End: 2025-03-12

## 2025-03-12 DIAGNOSIS — E11.22 TYPE 2 DIABETES MELLITUS WITH STAGE 3A CHRONIC KIDNEY DISEASE, WITHOUT LONG-TERM CURRENT USE OF INSULIN (HCC): Primary | ICD-10-CM

## 2025-03-12 DIAGNOSIS — N18.31 TYPE 2 DIABETES MELLITUS WITH STAGE 3A CHRONIC KIDNEY DISEASE, WITHOUT LONG-TERM CURRENT USE OF INSULIN (HCC): Primary | ICD-10-CM

## 2025-03-12 RX ORDER — AVOBENZONE, HOMOSALATE, OCTISALATE, OCTOCRYLENE 30; 40; 45; 26 MG/ML; MG/ML; MG/ML; MG/ML
1 CREAM TOPICAL DAILY
Qty: 100 EACH | Refills: 5 | Status: SHIPPED | OUTPATIENT
Start: 2025-03-12

## 2025-03-12 RX ORDER — BLOOD-GLUCOSE METER
1 EACH MISCELLANEOUS DAILY
Qty: 1 KIT | Refills: 0 | Status: SHIPPED | OUTPATIENT
Start: 2025-03-12

## 2025-03-12 RX ORDER — OXYBUTYNIN CHLORIDE 10 MG/1
10 TABLET, EXTENDED RELEASE ORAL DAILY
Qty: 30 TABLET | Refills: 0 | Status: SHIPPED | OUTPATIENT
Start: 2025-03-12

## 2025-03-12 NOTE — TELEPHONE ENCOUNTER
Patient is asking for a new BS one touch ultra machine and lancets to be sent to her pharmacy?    [facility]    Please advise

## 2025-03-12 NOTE — TELEPHONE ENCOUNTER
Adrianne Campos is requesting a refill on the following medication(s):  Requested Prescriptions     Pending Prescriptions Disp Refills    oxyBUTYnin (DITROPAN-XL) 10 MG extended release tablet [Pharmacy Med Name: Oxybutynin Chloride ER 10MG TB24] 30 tablet 0     Sig: TAKE 1 TABLET BY MOUTH DAILY       Last Visit Date (If Applicable):  2/12/2025    Next Visit Date:    Visit date not found

## 2025-04-03 DIAGNOSIS — K59.01 SLOW TRANSIT CONSTIPATION: ICD-10-CM

## 2025-04-03 DIAGNOSIS — G62.9 NEUROPATHY: ICD-10-CM

## 2025-04-03 RX ORDER — GABAPENTIN 300 MG/1
CAPSULE ORAL
Qty: 60 CAPSULE | Refills: 5 | Status: SHIPPED | OUTPATIENT
Start: 2025-04-03 | End: 2025-10-04

## 2025-04-07 ENCOUNTER — OFFICE VISIT (OUTPATIENT)
Dept: PODIATRY | Age: 65
End: 2025-04-07
Payer: MEDICARE

## 2025-04-07 VITALS — WEIGHT: 216 LBS | BODY MASS INDEX: 39.75 KG/M2 | HEIGHT: 62 IN

## 2025-04-07 DIAGNOSIS — I73.9 PVD (PERIPHERAL VASCULAR DISEASE): ICD-10-CM

## 2025-04-07 DIAGNOSIS — E11.51 TYPE II DIABETES MELLITUS WITH PERIPHERAL CIRCULATORY DISORDER (HCC): Primary | ICD-10-CM

## 2025-04-07 DIAGNOSIS — E11.42 DIABETIC POLYNEUROPATHY ASSOCIATED WITH TYPE 2 DIABETES MELLITUS (HCC): ICD-10-CM

## 2025-04-07 DIAGNOSIS — M79.671 PAIN IN BOTH FEET: ICD-10-CM

## 2025-04-07 DIAGNOSIS — M79.672 PAIN IN BOTH FEET: ICD-10-CM

## 2025-04-07 DIAGNOSIS — B35.1 DERMATOPHYTOSIS OF NAIL: ICD-10-CM

## 2025-04-07 DIAGNOSIS — I73.9 INTERMITTENT CLAUDICATION: ICD-10-CM

## 2025-04-07 PROCEDURE — 3044F HG A1C LEVEL LT 7.0%: CPT | Performed by: PODIATRIST

## 2025-04-07 PROCEDURE — 99213 OFFICE O/P EST LOW 20 MIN: CPT | Performed by: PODIATRIST

## 2025-04-07 PROCEDURE — 11721 DEBRIDE NAIL 6 OR MORE: CPT | Performed by: PODIATRIST

## 2025-04-07 PROCEDURE — 1123F ACP DISCUSS/DSCN MKR DOCD: CPT | Performed by: PODIATRIST

## 2025-04-07 NOTE — PROGRESS NOTES
[facility] PODIATRY  [address] [place] [id number]  Dept: [phone]    DIABETIC PROGRESS NOTE  Date of patient's visit: 4/7/2025  Patient's Name:  Adrianne Campos YOB: 1960            Patient Care Team:  Renu Ceron DO as PCP - General (Family Medicine)  [facility]  Jamie Lopez MD as Consulting Physician (Nephrology)  Jojo Anderson MD as Consulting Physician (Cardiology)  Jered Alejandra DPM as Consulting Physician (Podiatry)  Gaby Asif MD (Psychiatry)  Heydi España DO as Surgeon (Neurosurgery)  Rafael Oglesby MD as Consulting Physician (Neurology)          Chief Complaint   Patient presents with    Diabetes     Diabetic foot care  A1C 6.7    Peripheral Neuropathy     Bl feet       Subjective:   Adrianne Campos comes to clinic for Diabetes (Diabetic foot care/A1C 6.7) and Peripheral Neuropathy (Bl feet)    she is a diabetic and states that she has not been seen in over 8 months.  And is in need for diabetic foot check.  Pt currently has complaint of thickened, elongated nails that they cannot manage by themselves.   Pt's primary care physician is Renu Ceron DO last seen January 10 2025   Pt's last blood sugar was  A1C 6.7 .  Pt has a new complaint of worsening diabetic neuropathy..  Patient states that she is try to keeping her blood sugars under control but states that she does have some back pain and it is causing some issues      Lab Results   Component Value Date    LABA1C 6.7 01/10/2025      Complains of numbness in the feet bilat.  Past Medical History:   Diagnosis Date    Acute tracheobronchitis-viral 08/05/2021    Anxiety     Cerebrovascular accident (CVA) ([facility]) 09/10/2021    Chronic back pain     Chronic kidney disease     COPD (chronic obstructive pulmonary disease) ([facility])     COPD exacerbation ([facility]) 08/05/2021    Depression

## 2025-04-10 DIAGNOSIS — G62.9 NEUROPATHY: Primary | ICD-10-CM

## 2025-04-10 NOTE — TELEPHONE ENCOUNTER
LAST VISIT:   1/10/2025     Future Appointments   Date Time Provider Department Center   4/11/2025 10:15 AM Ibeth Noonan MD [place] [facility] ECC DEP   5/19/2025  1:30 PM Jamie Lopez MD [facility] None   7/3/2025 10:00 AM Rafael Oglesby MD [facility] Neurology -   7/7/2025 10:15 AM Jered Alejandra DPM [facility]   8/7/2025 11:15 AM Renu Ceron DO [place] [facility] ECC DEP

## 2025-04-11 ENCOUNTER — OFFICE VISIT (OUTPATIENT)
Dept: PRIMARY CARE CLINIC | Age: 65
End: 2025-04-11
Payer: MEDICARE

## 2025-04-11 VITALS
SYSTOLIC BLOOD PRESSURE: 142 MMHG | OXYGEN SATURATION: 97 % | BODY MASS INDEX: 40.19 KG/M2 | HEART RATE: 87 BPM | DIASTOLIC BLOOD PRESSURE: 96 MMHG | WEIGHT: 218.4 LBS | HEIGHT: 62 IN

## 2025-04-11 DIAGNOSIS — N18.31 TYPE 2 DIABETES MELLITUS WITH STAGE 3A CHRONIC KIDNEY DISEASE, WITHOUT LONG-TERM CURRENT USE OF INSULIN (HCC): Primary | ICD-10-CM

## 2025-04-11 DIAGNOSIS — K58.1 IRRITABLE BOWEL SYNDROME WITH CONSTIPATION: ICD-10-CM

## 2025-04-11 DIAGNOSIS — E11.22 TYPE 2 DIABETES MELLITUS WITH STAGE 3A CHRONIC KIDNEY DISEASE, WITHOUT LONG-TERM CURRENT USE OF INSULIN (HCC): Primary | ICD-10-CM

## 2025-04-11 DIAGNOSIS — N18.30 STAGE 3 CHRONIC KIDNEY DISEASE, UNSPECIFIED WHETHER STAGE 3A OR 3B CKD (HCC): Chronic | ICD-10-CM

## 2025-04-11 DIAGNOSIS — Z87.891 FORMER CIGARETTE SMOKER: ICD-10-CM

## 2025-04-11 DIAGNOSIS — E66.01 CLASS 2 SEVERE OBESITY DUE TO EXCESS CALORIES WITH SERIOUS COMORBIDITY AND BODY MASS INDEX (BMI) OF 39.0 TO 39.9 IN ADULT: ICD-10-CM

## 2025-04-11 DIAGNOSIS — E66.812 CLASS 2 SEVERE OBESITY DUE TO EXCESS CALORIES WITH SERIOUS COMORBIDITY AND BODY MASS INDEX (BMI) OF 39.0 TO 39.9 IN ADULT: ICD-10-CM

## 2025-04-11 LAB — HBA1C MFR BLD: 6 %

## 2025-04-11 PROCEDURE — 3044F HG A1C LEVEL LT 7.0%: CPT | Performed by: STUDENT IN AN ORGANIZED HEALTH CARE EDUCATION/TRAINING PROGRAM

## 2025-04-11 PROCEDURE — 99214 OFFICE O/P EST MOD 30 MIN: CPT | Performed by: STUDENT IN AN ORGANIZED HEALTH CARE EDUCATION/TRAINING PROGRAM

## 2025-04-11 PROCEDURE — 83036 HEMOGLOBIN GLYCOSYLATED A1C: CPT | Performed by: STUDENT IN AN ORGANIZED HEALTH CARE EDUCATION/TRAINING PROGRAM

## 2025-04-11 PROCEDURE — 1123F ACP DISCUSS/DSCN MKR DOCD: CPT | Performed by: STUDENT IN AN ORGANIZED HEALTH CARE EDUCATION/TRAINING PROGRAM

## 2025-04-11 RX ORDER — OXYBUTYNIN CHLORIDE 10 MG/1
10 TABLET, EXTENDED RELEASE ORAL DAILY
Qty: 30 TABLET | Refills: 0 | Status: SHIPPED | OUTPATIENT
Start: 2025-04-11

## 2025-04-11 NOTE — PATIENT INSTRUCTIONS
In order to help manage your constipation, I want you to  Drink more water. I want you to drink 2 full 30oz Venkat cups per day   Get more physical activity in. Every time you have to change a CD for your audiobook, take a lap around your house.   Take Miralax daily- start with one capful per day. Give this about one week before you start making dose changes. Miralax is safe to use for a longer period of time, as the medication stays in your gut.  Your A1c was 6.0 today (down from 6.7), so no changes to your diabetes medication regimen today.

## 2025-04-11 NOTE — PROGRESS NOTES
[facility]  [address] [place] [address].  [place] [address]  Phone: [phone]  Fax: [phone]    Office Visit Note    Date of Visit: 2025   Patient Name: Adrianne Campos   Patient :  1960     ASSESSMENT/PLAN     1. Type 2 diabetes mellitus with stage 3a chronic kidney disease, without long-term current use of insulin ([facility])  Overview:  Farxiga, rybelsus   Assessment & Plan:  - A1c improved from 6.7 to 6.0, indicating good control  - No changes to current regimen of Farxiga and Rybelsus made today  Orders:  -     POCT glycosylated hemoglobin (Hb A1C)  2. Stage 3 chronic kidney disease, unspecified whether stage 3a or 3b CKD ([facility])  3. Irritable bowel syndrome with constipation  Overview:  Senokot, miralax, and linzess  Assessment & Plan:  - May be exacerbated by Wellbutrin, Risperdal, and oxybutynin  - Increase water intake to 60 oz/day  - Incorporate more physical activity (e.g., taking a lap around the house each time she changes a CD for her audiobook)  - Take MiraLAX daily, starting with one capful per day for a week before considering dose adjustments  - Follow up in 6 weeks to monitor progress on water intake, physical activity, and bowel movement regularity  4. Former cigarette smoker  5. Class 2 severe obesity due to excess calories with serious comorbidity and body mass index (BMI) of 39.0 to 39.9 in adult      Patient Instructions   In order to help manage your constipation, I want you to  Drink more water. I want you to drink 2 full 30oz Venkat cups per day   Get more physical activity in. Every time you have to change a CD for your audiobook, take a lap around your house.   Take Miralax daily- start with one capful per day. Give this about one week before you start making dose changes. Miralax is safe to use for a longer period of time, as the medication stays in your gut.  Your A1c was 6.0 today (down from 6.7), so no changes to your diabetes medication regimen today.

## 2025-04-16 ENCOUNTER — RESULTS FOLLOW-UP (OUTPATIENT)
Dept: PRIMARY CARE CLINIC | Age: 65
End: 2025-04-16

## 2025-04-19 NOTE — ASSESSMENT & PLAN NOTE
- A1c improved from 6.7 to 6.0, indicating good control  - No changes to current regimen of Farxiga and Rybelsus made today

## 2025-04-19 NOTE — ASSESSMENT & PLAN NOTE
- May be exacerbated by Wellbutrin, Risperdal, and oxybutynin  - Increase water intake to 60 oz/day  - Incorporate more physical activity (e.g., taking a lap around the house each time she changes a CD for her audiobook)  - Take MiraLAX daily, starting with one capful per day for a week before considering dose adjustments

## 2025-04-22 ENCOUNTER — HOSPITAL ENCOUNTER (OUTPATIENT)
Dept: CT IMAGING | Age: 65
Discharge: HOME | End: 2025-04-24
Attending: STUDENT IN AN ORGANIZED HEALTH CARE EDUCATION/TRAINING PROGRAM
Payer: MEDICARE

## 2025-04-22 ENCOUNTER — OFFICE VISIT (OUTPATIENT)
Dept: PRIMARY CARE CLINIC | Age: 65
End: 2025-04-22
Payer: MEDICARE

## 2025-04-22 VITALS
OXYGEN SATURATION: 99 % | WEIGHT: 219 LBS | BODY MASS INDEX: 40.3 KG/M2 | HEART RATE: 82 BPM | SYSTOLIC BLOOD PRESSURE: 126 MMHG | HEIGHT: 62 IN | DIASTOLIC BLOOD PRESSURE: 88 MMHG

## 2025-04-22 DIAGNOSIS — M54.9 COSTOVERTEBRAL ANGLE TENDERNESS: ICD-10-CM

## 2025-04-22 DIAGNOSIS — K58.1 IRRITABLE BOWEL SYNDROME WITH CONSTIPATION: ICD-10-CM

## 2025-04-22 DIAGNOSIS — R10.9 FLANK PAIN, ACUTE: Primary | ICD-10-CM

## 2025-04-22 DIAGNOSIS — R10.11 RUQ ABDOMINAL PAIN: ICD-10-CM

## 2025-04-22 DIAGNOSIS — N18.31 STAGE 3A CHRONIC KIDNEY DISEASE (HCC): Chronic | ICD-10-CM

## 2025-04-22 DIAGNOSIS — R07.1 INSPIRATORY PAIN: ICD-10-CM

## 2025-04-22 DIAGNOSIS — R10.9 FLANK PAIN, ACUTE: ICD-10-CM

## 2025-04-22 LAB
ALBUMIN: 4.3 G/DL
ALP BLD-CCNC: 81 U/L
ALT SERPL-CCNC: 15 U/L
ANION GAP SERPL CALCULATED.3IONS-SCNC: 9 MMOL/L
AST SERPL-CCNC: 13 U/L
BASOPHILS ABSOLUTE: 0.03 /ΜL
BASOPHILS RELATIVE PERCENT: 0.4 %
BILIRUB SERPL-MCNC: 0.4 MG/DL (ref 0.1–1.4)
BUN BLDV-MCNC: 9 MG/DL
CALCIUM SERPL-MCNC: 8.8 MG/DL
CHLORIDE BLD-SCNC: 105 MMOL/L
CO2: 28 MMOL/L
CREAT SERPL-MCNC: 0.94 MG/DL
EOSINOPHILS ABSOLUTE: 0.13 /ΜL
EOSINOPHILS RELATIVE PERCENT: 1.7 %
GFR, ESTIMATED: 66.9
GLUCOSE BLD-MCNC: 119 MG/DL
HCT VFR BLD CALC: 43.7 % (ref 36–46)
HEMOGLOBIN: 14.2 G/DL (ref 12–16)
LIPASE: 35 UNITS/L
LYMPHOCYTES ABSOLUTE: 2.88 /ΜL
LYMPHOCYTES RELATIVE PERCENT: 37.3 %
MCH RBC QN AUTO: 32.1 PG
MCHC RBC AUTO-ENTMCNC: 32.5 G/DL
MCV RBC AUTO: 98.9 FL
MICROSCOPIC URINE: NORMAL
MONOCYTES ABSOLUTE: 0.51 /ΜL
MONOCYTES RELATIVE PERCENT: 6.6 %
NEUTROPHILS ABSOLUTE: 4.17 /ΜL
NEUTROPHILS RELATIVE PERCENT: 53.9 %
PDW BLD-RTO: 14 %
PLATELET # BLD: 215 K/ΜL
PMV BLD AUTO: NORMAL FL
POTASSIUM SERPL-SCNC: 5.2 MMOL/L
RBC # BLD: 4.42 10^6/ΜL
SODIUM BLD-SCNC: 137 MMOL/L
TOTAL PROTEIN: 7 G/DL (ref 6.4–8.2)
WBC # BLD: 7.73 10^3/ML

## 2025-04-22 PROCEDURE — 1159F MED LIST DOCD IN RCRD: CPT | Performed by: STUDENT IN AN ORGANIZED HEALTH CARE EDUCATION/TRAINING PROGRAM

## 2025-04-22 PROCEDURE — 99215 OFFICE O/P EST HI 40 MIN: CPT | Performed by: STUDENT IN AN ORGANIZED HEALTH CARE EDUCATION/TRAINING PROGRAM

## 2025-04-22 PROCEDURE — 74176 CT ABD & PELVIS W/O CONTRAST: CPT

## 2025-04-22 PROCEDURE — 1123F ACP DISCUSS/DSCN MKR DOCD: CPT | Performed by: STUDENT IN AN ORGANIZED HEALTH CARE EDUCATION/TRAINING PROGRAM

## 2025-04-22 PROCEDURE — 1160F RVW MEDS BY RX/DR IN RCRD: CPT | Performed by: STUDENT IN AN ORGANIZED HEALTH CARE EDUCATION/TRAINING PROGRAM

## 2025-04-22 NOTE — PROGRESS NOTES
[facility]  [address] [place] [address].  [place] [address]  Phone: [phone]  Fax: [phone]    Office Visit Note    Date of Visit: 2025   Patient Name: Adrianne Campos   Patient :  1960     ASSESSMENT/PLAN     1. Flank pain, acute  Assessment & Plan:  - Constant dull pain, sharp pain with increase in intra-abdominal pressure  - Comprehensive metabolic panel and lipase test to assess liver and kidney function  - Urgent CT scan of the abdomen without contrast to rule out kidney stones or gallbladder issues  - Advised heating pad or ice pack for pain relief as NSAIDs are contraindicated in CKD  - Will contact with imaging and lab results to discuss next steps  Orders:  -     CBC with Auto Differential; Future  -     Urinalysis with Microscopic; Future  -     Comprehensive Metabolic Panel; Future  -     Lipase; Future  -     CT ABDOMEN PELVIS WO CONTRAST Additional Contrast? Radiologist Recommendation (pt has CKD 3a); Future  2. Stage 3a chronic kidney disease (HCC)  Assessment & Plan:  - Managed by Dr. Jamie Lopez  - NSAIDs contraindicated   3. RUQ abdominal pain  -     CBC with Auto Differential; Future  -     Urinalysis with Microscopic; Future  -     Comprehensive Metabolic Panel; Future  -     Lipase; Future  -     CT ABDOMEN PELVIS WO CONTRAST Additional Contrast? Radiologist Recommendation (pt has CKD 3a); Future  4. Costovertebral angle tenderness  -     CBC with Auto Differential; Future  -     Urinalysis with Microscopic; Future  -     Comprehensive Metabolic Panel; Future  -     Lipase; Future  -     CT ABDOMEN PELVIS WO CONTRAST Additional Contrast? Radiologist Recommendation (pt has CKD 3a); Future  5. Inspiratory pain  -     CBC with Auto Differential; Future  -     Urinalysis with Microscopic; Future  -     Comprehensive Metabolic Panel; Future  -     Lipase; Future  -     CT ABDOMEN PELVIS WO CONTRAST Additional Contrast? Radiologist Recommendation (pt has CKD 3a); Future  6.

## 2025-04-23 NOTE — ASSESSMENT & PLAN NOTE
- Daily MiraLAX provides some relief  - Advised to continue current regimen and monitor bowel movements

## 2025-04-23 NOTE — ASSESSMENT & PLAN NOTE
- Constant dull pain, sharp pain with increase in intra-abdominal pressure  - Comprehensive metabolic panel and lipase test to assess liver and kidney function  - Urgent CT scan of the abdomen without contrast to rule out kidney stones or gallbladder issues  - Advised heating pad or ice pack for pain relief as NSAIDs are contraindicated in CKD  - Will contact with imaging and lab results to discuss next steps

## 2025-04-24 DIAGNOSIS — R07.1 INSPIRATORY PAIN: ICD-10-CM

## 2025-04-24 DIAGNOSIS — M54.9 COSTOVERTEBRAL ANGLE TENDERNESS: ICD-10-CM

## 2025-04-24 DIAGNOSIS — R10.9 FLANK PAIN, ACUTE: ICD-10-CM

## 2025-04-24 DIAGNOSIS — R10.11 RUQ ABDOMINAL PAIN: ICD-10-CM

## 2025-04-29 ENCOUNTER — RESULTS FOLLOW-UP (OUTPATIENT)
Dept: PRIMARY CARE CLINIC | Age: 65
End: 2025-04-29

## 2025-05-07 DIAGNOSIS — K21.9 GASTROESOPHAGEAL REFLUX DISEASE, UNSPECIFIED WHETHER ESOPHAGITIS PRESENT: ICD-10-CM

## 2025-05-07 RX ORDER — OMEPRAZOLE 20 MG/1
CAPSULE, DELAYED RELEASE ORAL
Qty: 90 CAPSULE | Refills: 3 | Status: SHIPPED | OUTPATIENT
Start: 2025-05-07 | End: 2026-05-08

## 2025-05-07 NOTE — TELEPHONE ENCOUNTER
LAST VISIT:   4/22/2025     Future Appointments   Date Time Provider Department Center   5/19/2025  1:30 PM Jamie Lopez MD [place] None   5/23/2025 10:00 AM [facility]   7/3/2025 10:00 AM Rafael Oglesby MD [facility] Neurology -   7/7/2025 10:15 AM Jered Alejandra DPM [facility]   8/7/2025 11:15 AM Renu Ceron DO [place] [facility] ECC DEP

## 2025-05-12 DIAGNOSIS — G62.9 NEUROPATHY: ICD-10-CM

## 2025-05-12 NOTE — TELEPHONE ENCOUNTER
LAST VISIT:   4/22/2025     Future Appointments   Date Time Provider Department Center   5/19/2025  1:30 PM Jamie Lopez MD [place] None   5/23/2025 10:00 AM Ibeth Noonan MD [place] [facility] DEP   7/3/2025 10:00 AM Rafael Oglesby MD [facility] Neurology -   7/7/2025 10:15 AM Jered Alejandra DPM [facility]   8/7/2025 11:15 AM Renu Ceron DO [place] [facility] ECC DEP

## 2025-05-13 RX ORDER — OXYBUTYNIN CHLORIDE 10 MG/1
10 TABLET, EXTENDED RELEASE ORAL DAILY
Qty: 30 TABLET | Refills: 0 | Status: SHIPPED | OUTPATIENT
Start: 2025-05-13

## 2025-05-16 ENCOUNTER — TELEPHONE (OUTPATIENT)
Dept: PRIMARY CARE CLINIC | Age: 65
End: 2025-05-16

## 2025-06-04 DIAGNOSIS — E11.8 CONTROLLED TYPE 2 DIABETES MELLITUS WITH COMPLICATION, WITHOUT LONG-TERM CURRENT USE OF INSULIN (HCC): ICD-10-CM

## 2025-06-04 DIAGNOSIS — Z79.01 LONG TERM (CURRENT) USE OF ANTICOAGULANTS: ICD-10-CM

## 2025-06-04 DIAGNOSIS — I10 ESSENTIAL HYPERTENSION: ICD-10-CM

## 2025-06-04 DIAGNOSIS — M11.20 PSEUDOGOUT: ICD-10-CM

## 2025-06-04 DIAGNOSIS — E11.22 CONTROLLED TYPE 2 DIABETES MELLITUS WITH STAGE 3 CHRONIC KIDNEY DISEASE, WITHOUT LONG-TERM CURRENT USE OF INSULIN (HCC): ICD-10-CM

## 2025-06-04 DIAGNOSIS — Z86.718 HISTORY OF DVT OF LOWER EXTREMITY: ICD-10-CM

## 2025-06-04 DIAGNOSIS — N18.30 CONTROLLED TYPE 2 DIABETES MELLITUS WITH STAGE 3 CHRONIC KIDNEY DISEASE, WITHOUT LONG-TERM CURRENT USE OF INSULIN (HCC): ICD-10-CM

## 2025-06-04 RX ORDER — ALLOPURINOL 100 MG/1
100 TABLET ORAL 2 TIMES DAILY
Qty: 180 TABLET | Refills: 3 | Status: SHIPPED | OUTPATIENT
Start: 2025-06-04 | End: 2026-06-04

## 2025-06-04 RX ORDER — LISINOPRIL 5 MG/1
5 TABLET ORAL DAILY
Qty: 90 TABLET | Refills: 3 | Status: SHIPPED | OUTPATIENT
Start: 2025-06-04 | End: 2026-06-04

## 2025-06-04 RX ORDER — DAPAGLIFLOZIN 10 MG/1
10 TABLET, FILM COATED ORAL EVERY MORNING
Qty: 90 TABLET | Refills: 3 | Status: SHIPPED | OUTPATIENT
Start: 2025-06-04 | End: 2026-06-05

## 2025-06-04 NOTE — TELEPHONE ENCOUNTER
LAST VISIT:   4/22/2025     Future Appointments   Date Time Provider Department Center   7/7/2025 10:15 AM Jered Alejandra DPM [facility]   8/7/2025 11:15 AM Renu Ceron DO [place] [facility] DEP

## 2025-06-13 DIAGNOSIS — G62.9 NEUROPATHY: ICD-10-CM

## 2025-06-13 RX ORDER — OXYBUTYNIN CHLORIDE 10 MG/1
10 TABLET, EXTENDED RELEASE ORAL DAILY
Qty: 30 TABLET | Refills: 0 | Status: SHIPPED | OUTPATIENT
Start: 2025-06-13

## 2025-07-07 ENCOUNTER — OFFICE VISIT (OUTPATIENT)
Dept: PODIATRY | Age: 65
End: 2025-07-07

## 2025-07-07 VITALS — HEIGHT: 62 IN | WEIGHT: 215 LBS | BODY MASS INDEX: 39.56 KG/M2

## 2025-07-07 DIAGNOSIS — M79.671 PAIN IN BOTH FEET: ICD-10-CM

## 2025-07-07 DIAGNOSIS — I73.9 PVD (PERIPHERAL VASCULAR DISEASE): ICD-10-CM

## 2025-07-07 DIAGNOSIS — M19.071 DEGENERATIVE JOINT DISEASE OF BOTH ANKLES AND FEET: ICD-10-CM

## 2025-07-07 DIAGNOSIS — E11.51 TYPE II DIABETES MELLITUS WITH PERIPHERAL CIRCULATORY DISORDER (HCC): Primary | ICD-10-CM

## 2025-07-07 DIAGNOSIS — B35.1 DERMATOPHYTOSIS OF NAIL: ICD-10-CM

## 2025-07-07 DIAGNOSIS — M79.672 PAIN IN BOTH FEET: ICD-10-CM

## 2025-07-07 DIAGNOSIS — M19.072 DEGENERATIVE JOINT DISEASE OF BOTH ANKLES AND FEET: ICD-10-CM

## 2025-07-07 RX ORDER — COLCHICINE 0.6 MG/1
TABLET ORAL
COMMUNITY

## 2025-07-07 RX ORDER — ALPRAZOLAM 0.25 MG
0.25 TABLET ORAL 2 TIMES DAILY
COMMUNITY

## 2025-07-07 NOTE — PROGRESS NOTES
and thickness with a nipper and , without incident. Pt will follow up in 9 weeks or sooner if any problems arise. Diagnosis was discussed with the pt and all of their questions were answered in detail. Proper foot hygiene and care was discussed with the pt. Informed patient on proper diabetic foot care and importance of tight glycemic control.  Patient to check feet daily and contact the office with any questions/problems/concerns.   Other comorbidity noted and will be managed by PCP.       A total of 25 minutes was spent with this patients encounter which included charting after the patients visit    7/7/2025    Electronically signed by Jered Alejandra DPM on 7/7/2025 at 10:06 AM  7/7/2025

## 2025-07-08 DIAGNOSIS — E78.5 HYPERLIPIDEMIA, UNSPECIFIED HYPERLIPIDEMIA TYPE: ICD-10-CM

## 2025-07-08 DIAGNOSIS — K59.01 SLOW TRANSIT CONSTIPATION: ICD-10-CM

## 2025-07-08 RX ORDER — ATORVASTATIN CALCIUM 40 MG/1
40 TABLET, FILM COATED ORAL DAILY
Qty: 90 TABLET | Refills: 0 | Status: SHIPPED | OUTPATIENT
Start: 2025-07-08 | End: 2026-01-04

## 2025-07-08 NOTE — TELEPHONE ENCOUNTER
LAST VISIT:   2/12/2025     Future Appointments   Date Time Provider Department Center   8/7/2025 11:15 AM Renu Ceron DO [place] [facility] DEP   10/13/2025 10:00 AM Jered Alejandra DPM [facility]

## 2025-08-04 DIAGNOSIS — G62.9 NEUROPATHY: ICD-10-CM

## 2025-08-04 RX ORDER — OXYBUTYNIN CHLORIDE 10 MG/1
10 TABLET, EXTENDED RELEASE ORAL DAILY
Qty: 30 TABLET | Refills: 0 | Status: SHIPPED | OUTPATIENT
Start: 2025-08-04

## 2025-08-04 SDOH — HEALTH STABILITY: PHYSICAL HEALTH: ON AVERAGE, HOW MANY DAYS PER WEEK DO YOU ENGAGE IN MODERATE TO STRENUOUS EXERCISE (LIKE A BRISK WALK)?: 0 DAYS

## 2025-08-04 ASSESSMENT — PATIENT HEALTH QUESTIONNAIRE - PHQ9
SUM OF ALL RESPONSES TO PHQ QUESTIONS 1-9: 9
9. THOUGHTS THAT YOU WOULD BE BETTER OFF DEAD, OR OF HURTING YOURSELF: NOT AT ALL
SUM OF ALL RESPONSES TO PHQ QUESTIONS 1-9: 9
7. TROUBLE CONCENTRATING ON THINGS, SUCH AS READING THE NEWSPAPER OR WATCHING TELEVISION: SEVERAL DAYS
SUM OF ALL RESPONSES TO PHQ QUESTIONS 1-9: 9
1. LITTLE INTEREST OR PLEASURE IN DOING THINGS: MORE THAN HALF THE DAYS
8. MOVING OR SPEAKING SO SLOWLY THAT OTHER PEOPLE COULD HAVE NOTICED. OR THE OPPOSITE, BEING SO FIGETY OR RESTLESS THAT YOU HAVE BEEN MOVING AROUND A LOT MORE THAN USUAL: NOT AT ALL
3. TROUBLE FALLING OR STAYING ASLEEP: SEVERAL DAYS
SUM OF ALL RESPONSES TO PHQ QUESTIONS 1-9: 9
6. FEELING BAD ABOUT YOURSELF - OR THAT YOU ARE A FAILURE OR HAVE LET YOURSELF OR YOUR FAMILY DOWN: SEVERAL DAYS
5. POOR APPETITE OR OVEREATING: SEVERAL DAYS
4. FEELING TIRED OR HAVING LITTLE ENERGY: SEVERAL DAYS
10. IF YOU CHECKED OFF ANY PROBLEMS, HOW DIFFICULT HAVE THESE PROBLEMS MADE IT FOR YOU TO DO YOUR WORK, TAKE CARE OF THINGS AT HOME, OR GET ALONG WITH OTHER PEOPLE: SOMEWHAT DIFFICULT
2. FEELING DOWN, DEPRESSED OR HOPELESS: MORE THAN HALF THE DAYS

## 2025-08-04 ASSESSMENT — LIFESTYLE VARIABLES
HOW OFTEN DO YOU HAVE A DRINK CONTAINING ALCOHOL: 1
HOW MANY STANDARD DRINKS CONTAINING ALCOHOL DO YOU HAVE ON A TYPICAL DAY: 0
HOW OFTEN DO YOU HAVE SIX OR MORE DRINKS ON ONE OCCASION: 1
HOW OFTEN DO YOU HAVE A DRINK CONTAINING ALCOHOL: NEVER
HOW MANY STANDARD DRINKS CONTAINING ALCOHOL DO YOU HAVE ON A TYPICAL DAY: PATIENT DOES NOT DRINK

## 2025-08-07 ENCOUNTER — TELEPHONE (OUTPATIENT)
Dept: ORTHOPEDIC SURGERY | Age: 65
End: 2025-08-07

## 2025-08-07 ENCOUNTER — OFFICE VISIT (OUTPATIENT)
Dept: PRIMARY CARE CLINIC | Age: 65
End: 2025-08-07

## 2025-08-07 VITALS
BODY MASS INDEX: 39.56 KG/M2 | OXYGEN SATURATION: 96 % | SYSTOLIC BLOOD PRESSURE: 112 MMHG | TEMPERATURE: 98.3 F | HEIGHT: 62 IN | WEIGHT: 215 LBS | RESPIRATION RATE: 20 BRPM | DIASTOLIC BLOOD PRESSURE: 72 MMHG | HEART RATE: 99 BPM

## 2025-08-07 DIAGNOSIS — Z00.00 MEDICARE ANNUAL WELLNESS VISIT, SUBSEQUENT: Primary | ICD-10-CM

## 2025-08-07 DIAGNOSIS — F43.9 STRESS: ICD-10-CM

## 2025-08-19 ENCOUNTER — OFFICE VISIT (OUTPATIENT)
Dept: ORTHOPEDIC SURGERY | Age: 65
End: 2025-08-19

## 2025-08-19 ENCOUNTER — TELEPHONE (OUTPATIENT)
Dept: ORTHOPEDIC SURGERY | Age: 65
End: 2025-08-19

## 2025-08-19 VITALS — WEIGHT: 215 LBS | HEIGHT: 62 IN | BODY MASS INDEX: 39.56 KG/M2

## 2025-08-19 DIAGNOSIS — M25.562 CHRONIC PAIN OF BOTH KNEES: Primary | ICD-10-CM

## 2025-08-19 DIAGNOSIS — M25.561 CHRONIC PAIN OF BOTH KNEES: Primary | ICD-10-CM

## 2025-08-19 DIAGNOSIS — M17.0 ARTHRITIS OF BOTH KNEES: ICD-10-CM

## 2025-08-19 DIAGNOSIS — M17.12 PATELLOFEMORAL ARTHRITIS OF LEFT KNEE: ICD-10-CM

## 2025-08-19 DIAGNOSIS — M17.11 PATELLOFEMORAL ARTHRITIS OF RIGHT KNEE: ICD-10-CM

## 2025-08-19 DIAGNOSIS — G89.29 CHRONIC PAIN OF BOTH KNEES: Primary | ICD-10-CM

## 2025-08-19 RX ORDER — BUPIVACAINE HYDROCHLORIDE 2.5 MG/ML
2 INJECTION, SOLUTION INFILTRATION; PERINEURAL ONCE
Status: COMPLETED | OUTPATIENT
Start: 2025-08-19 | End: 2025-08-19

## 2025-08-19 RX ORDER — BETAMETHASONE SODIUM PHOSPHATE AND BETAMETHASONE ACETATE 3; 3 MG/ML; MG/ML
12 INJECTION, SUSPENSION INTRA-ARTICULAR; INTRALESIONAL; INTRAMUSCULAR; SOFT TISSUE ONCE
Status: COMPLETED | OUTPATIENT
Start: 2025-08-19 | End: 2025-08-19

## 2025-08-19 RX ADMIN — BUPIVACAINE HYDROCHLORIDE 5 MG: 2.5 INJECTION, SOLUTION INFILTRATION; PERINEURAL at 15:18

## 2025-08-19 RX ADMIN — BETAMETHASONE SODIUM PHOSPHATE AND BETAMETHASONE ACETATE 12 MG: 3; 3 INJECTION, SUSPENSION INTRA-ARTICULAR; INTRALESIONAL; INTRAMUSCULAR; SOFT TISSUE at 15:16

## 2025-08-19 RX ADMIN — BETAMETHASONE SODIUM PHOSPHATE AND BETAMETHASONE ACETATE 12 MG: 3; 3 INJECTION, SUSPENSION INTRA-ARTICULAR; INTRALESIONAL; INTRAMUSCULAR; SOFT TISSUE at 15:17

## 2025-08-20 ASSESSMENT — ENCOUNTER SYMPTOMS
VOMITING: 0
COUGH: 0
SHORTNESS OF BREATH: 0
COLOR CHANGE: 0

## 2025-08-21 ENCOUNTER — CARE COORDINATION (OUTPATIENT)
Dept: CARE COORDINATION | Age: 65
End: 2025-08-21

## 2025-09-03 DIAGNOSIS — E11.22 TYPE 2 DIABETES MELLITUS WITH STAGE 3A CHRONIC KIDNEY DISEASE, WITHOUT LONG-TERM CURRENT USE OF INSULIN (HCC): ICD-10-CM

## 2025-09-03 DIAGNOSIS — N18.31 TYPE 2 DIABETES MELLITUS WITH STAGE 3A CHRONIC KIDNEY DISEASE, WITHOUT LONG-TERM CURRENT USE OF INSULIN (HCC): ICD-10-CM

## 2025-09-04 ENCOUNTER — CARE COORDINATION (OUTPATIENT)
Dept: CARE COORDINATION | Age: 65
End: 2025-09-04

## 2025-09-05 ENCOUNTER — CARE COORDINATION (OUTPATIENT)
Dept: CARE COORDINATION | Age: 65
End: 2025-09-05

## (undated) DEVICE — BRUSH PRESOAK CLEAN BACTERIOSTATIC DUAL

## (undated) DEVICE — 4-PORT MANIFOLD: Brand: NEPTUNE 2

## (undated) DEVICE — 60 ML SYRINGE REGULAR TIP: Brand: MONOJECT

## (undated) DEVICE — SPONGE GZ W4XL4IN COT 4 PLY WVN

## (undated) DEVICE — Device

## (undated) DEVICE — Z INACTIVE NO SUPPLIER IDENTIFIED TUBING SUCT L10FT DIA0.375IN L BOR HI VOL CONNECTIVE FOR

## (undated) DEVICE — SOLUTION IV IRRIG WATER 1000ML POUR BRL 2F7114